# Patient Record
Sex: MALE | Race: WHITE | Employment: OTHER | URBAN - METROPOLITAN AREA
[De-identification: names, ages, dates, MRNs, and addresses within clinical notes are randomized per-mention and may not be internally consistent; named-entity substitution may affect disease eponyms.]

---

## 2016-01-20 LAB — EJECTION FRACTION: 86

## 2017-02-13 ENCOUNTER — TRANSFERRED RECORDS (OUTPATIENT)
Dept: HEALTH INFORMATION MANAGEMENT | Facility: CLINIC | Age: 67
End: 2017-02-13

## 2017-04-28 ENCOUNTER — TRANSFERRED RECORDS (OUTPATIENT)
Dept: HEALTH INFORMATION MANAGEMENT | Facility: CLINIC | Age: 67
End: 2017-04-28

## 2017-05-05 ENCOUNTER — TRANSFERRED RECORDS (OUTPATIENT)
Dept: HEALTH INFORMATION MANAGEMENT | Facility: CLINIC | Age: 67
End: 2017-05-05

## 2017-05-17 ENCOUNTER — TELEPHONE (OUTPATIENT)
Dept: FAMILY MEDICINE | Facility: OTHER | Age: 67
End: 2017-05-17

## 2017-05-17 NOTE — TELEPHONE ENCOUNTER
Glenroy Padilla is a 66 year old male who calls with circulatory concerns.    NURSING ASSESSMENT:  Description:  Waiting for a knee replacement for about 10 years in [place], which has been cancelled 3 times. Once had a sore on his leg. Has seen a vascular provider who stated he has restriction. Looking for a second opinion on circulation in legs, the restriction noted, edema and known neuropathy. Has edema in the legs. Type 2 diabetic and known neuropathy. Denies sudden onset, cough, chest pain, severe pain.   Onset/duration:  ongoing  Last exam/Treatment:  Seen in [place] within the past 3 weeks, last seen in 2000  Allergies:   Allergies   Allergen Reactions     No Known Allergies      NURSING PLAN: Nursing advice to patient to keep scheduled appt    RECOMMENDED DISPOSITION:  To keep scheduled appt  Will comply with recommendation: Yes  If further questions/concerns or if symptoms do not improve, worsen or new symptoms develop, call your PCP or [facility] as soon as possible.    NOTES:  Disposition was determined by the first positive assessment question, therefore all previous assessment questions were negative    Guideline used:  Telephone Triage Protocols for Nurses, Fourth Edition, Leydi Harkins  Leg pain/swelling  Nursing Judgment    Hermelinda Coronado, RN, BSN

## 2017-05-17 NOTE — PROGRESS NOTES
SUBJECTIVE:                                                    Glenroy Padilla is a 66 year old male who presents to clinic today for the following health issues:    Waiting for a knee replacement for about 10 years in [place], which has been cancelled 3 times. Once had a sore on his leg. Has seen a vascular provider who stated he has restriction. Looking for a second opinion on circulation in legs, the restriction noted, edema and known neuropathy. Has edema in the legs. Type 2 diabetic and known neuropathy. Denies sudden onset, cough, chest pain, severe pain.   Onset/duration: ongoing  Last exam/Treatment: Seen in [place] within the past 3 weeks, last seen in 2000  Patient also mentioned having a stent.    Concern - knee pain     Onset: for several years, both knee, Right knee more than left knee    Description:   Pain with movement and walking    Intensity: moderate    Progression of Symptoms:  waxing and waning    Accompanying Signs & Symptoms:  No fever or chills       Previous history of similar problem:   yes    Precipitating factors:   Worsened by: activity    Alleviating factors:  Improved by: rest       Therapies Tried and outcome: OTC medication    Concern - wound left leg     Onset: several weeks    Description:   Left leg ulcer, been seeing specialist and has improved and gotten smaller    Intensity: mild, moderate    Progression of Symptoms:  improving    Accompanying Signs & Symptoms:  Minimum discharge, no fever or chills       Previous history of similar problem:   yes    Precipitating factors:   Worsened by: if hit leg to something accidently    Alleviating factors:  Improved by: wound care       Therapies Tried and outcome: wound care    Diabetes Follow-up      Patient is checking blood sugars: no    Diabetic concerns: other - healing left leg ulcer     Symptoms of hypoglycemia (low blood sugar): none     Paresthesias (numbness or burning in feet) or sores: Yes      Date of last diabetic eye exam:  "unknown     Hyperlipidemia Follow-Up      Rate your low fat/cholesterol diet?: good    Taking statin?  Yes, no muscle aches from statin    Other lipid medications/supplements?:  none     Problem list and histories reviewed & adjusted, as indicated.  Additional history: as documented    Patient Active Problem List   Diagnosis     Type 2 diabetes mellitus with other circulatory complication, without long-term current use of insulin (H)     Venous stasis ulcer of left lower extremity (H)     Acute pain of left knee     Chronic pain of right knee     Open wound of left lower extremity without complication, initial encounter     Hx of coronary artery disease     Hx of heart artery stent     DAYTON (obstructive sleep apnea)     No past surgical history on file.    Social History   Substance Use Topics     Smoking status: Current Some Day Smoker     Types: Cigars     Smokeless tobacco: Never Used     Alcohol use No     No family history on file.      Current Outpatient Prescriptions   Medication Sig Dispense Refill     SitaGLIPtin-MetFORMIN HCl (JANUMET XR) 100-1000 MG TB24        rosuvastatin (CRESTOR) 10 MG tablet Take 1 tablet (10 mg) by mouth daily 90 tablet 1     ferrous sulfate (IRON) 325 (65 FE) MG tablet Take 1 tablet (325 mg) by mouth daily (with breakfast) 90 tablet 2     order for DME One touch glucose monitoring strip with Glucometer and lancets. 1 Box 1     Allergies   Allergen Reactions     No Known Allergies        Reviewed and updated as needed this visit by clinical staff       Reviewed and updated as needed this visit by Provider         ROS:  Constitutional, HEENT, cardiovascular, pulmonary, gi and gu systems are negative, except as otherwise noted.    OBJECTIVE:                                                    /62 (Cuff Size: Adult Regular)  Pulse 84  Temp 98.5  F (36.9  C) (Temporal)  Resp 20  Ht 5' 10\" (1.778 m)  Wt (!) 319 lb 12.8 oz (145.1 kg)  SpO2 94%  BMI 45.89 kg/m2  Body mass index " is 45.89 kg/(m^2).  GENERAL: healthy, alert and no distress  EYES: Eyes grossly normal to inspection, PERRL and conjunctivae and sclerae normal  HENT: ear canals and TM's normal, nose and mouth without ulcers or lesions  NECK: no adenopathy, no asymmetry,  RESP: lungs clear to auscultation - no rales, rhonchi or wheezes  CV: regular rate and rhythm, normal S1 S2,   ABDOMEN: soft, nontender, no hepatosplenomegaly, no masses and bowel sounds normal  MS: no gross musculoskeletal defects noted, no edema  NEURO: Normal strength and tone, mentation intact and speech normal  BACK: no CVA tenderness, no paralumbar tenderness  Diabetic foot exam: DP reduced bilaterally, reduced sensation B/l and venous stasis dermatitis noted  Edema present +1 with venous stasis.    Diagnostic Test Results:  Results for orders placed or performed in visit on 05/22/17   Hepatitis C Screen Reflex to HCV RNA Quant and Genotype   Result Value Ref Range    Hepatitis C Antibody  NR     Nonreactive   Assay performance characteristics have not been established for newborns,   infants, and children     Lipid panel reflex to direct LDL   Result Value Ref Range    Cholesterol 130 <200 mg/dL    Triglycerides 118 <150 mg/dL    HDL Cholesterol 42 >39 mg/dL    LDL Cholesterol Calculated 64 <100 mg/dL    Non HDL Cholesterol 88 <130 mg/dL   Comprehensive metabolic panel (BMP + Alb, Alk Phos, ALT, AST, Total. Bili, TP)   Result Value Ref Range    Sodium 137 133 - 144 mmol/L    Potassium 4.0 3.4 - 5.3 mmol/L    Chloride 101 94 - 109 mmol/L    Carbon Dioxide 29 20 - 32 mmol/L    Anion Gap 7 3 - 14 mmol/L    Glucose 158 (H) 70 - 99 mg/dL    Urea Nitrogen 19 7 - 30 mg/dL    Creatinine 0.91 0.66 - 1.25 mg/dL    GFR Estimate 82 >60 mL/min/1.7m2    GFR Estimate If Black >90   GFR Calc   >60 mL/min/1.7m2    Calcium 9.2 8.5 - 10.1 mg/dL    Bilirubin Total 0.5 0.2 - 1.3 mg/dL    Albumin 3.4 3.4 - 5.0 g/dL    Protein Total 8.4 6.8 - 8.8 g/dL    Alkaline  Phosphatase 58 40 - 150 U/L    ALT 20 0 - 70 U/L    AST 17 0 - 45 U/L   Erythrocyte sedimentation rate auto   Result Value Ref Range    Sed Rate 49 (H) 0 - 20 mm/h   CRP inflammation   Result Value Ref Range    CRP Inflammation 21.5 (H) 0.0 - 8.0 mg/L   CBC with platelets and differential   Result Value Ref Range    WBC 8.7 4.0 - 11.0 10e9/L    RBC Count 4.31 (L) 4.4 - 5.9 10e12/L    Hemoglobin 11.9 (L) 13.3 - 17.7 g/dL    Hematocrit 38.0 (L) 40.0 - 53.0 %    MCV 88 78 - 100 fl    MCH 27.6 26.5 - 33.0 pg    MCHC 31.3 (L) 31.5 - 36.5 g/dL    RDW 16.5 (H) 10.0 - 15.0 %    Platelet Count 279 150 - 450 10e9/L    Diff Method Automated Method     % Neutrophils 77.1 %    % Lymphocytes 11.3 %    % Monocytes 8.4 %    % Eosinophils 2.6 %    % Basophils 0.6 %    Absolute Neutrophil 6.7 1.6 - 8.3 10e9/L    Absolute Lymphocytes 1.0 0.8 - 5.3 10e9/L    Absolute Monocytes 0.7 0.0 - 1.3 10e9/L    Absolute Eosinophils 0.2 0.0 - 0.7 10e9/L    Absolute Basophils 0.1 0.0 - 0.2 10e9/L   Hemoglobin A1c   Result Value Ref Range    Hemoglobin A1C 6.4 (H) 4.3 - 6.0 %        ASSESSMENT/PLAN:                                                      1. Need for hepatitis C screening test  - Hepatitis C Screen Reflex to HCV RNA Quant and Genotype    2. Open wound of left lower extremity without complication, initial encounter  - GENERAL SURG ADULT REFERRAL; Future  - Erythrocyte sedimentation rate auto  - CRP inflammation  - CBC with platelets and differential  The patient and his spouse understood the rational for the diagnosis and treatment plan. All questions were answered to best of my ability and the patient's satisfaction.  Risks, benefits and alternatives of treatments discussed. Plan agreed on.  Followup: if not better. Will call, return to clinic, or go to ED if worsening or symptoms not improving as discussed. See patient instructions.     4. Venous stasis ulcer of left lower extremity (H)    - GENERAL SURG ADULT REFERRAL; Future  -  Erythrocyte sedimentation rate auto  - CRP inflammation  - CBC with platelets and differential  The patient and his spouse understood the rational for the diagnosis and treatment plan. All questions were answered to best of my ability and the patient's satisfaction.  Risks, benefits and alternatives of treatments discussed. Plan agreed on.  Followup: if not better. Will call, return to clinic, or go to ED if worsening or symptoms not improving as discussed. See patient instructions.     5. Type 2 diabetes mellitus with other circulatory complication, without long-term current use of insulin (H)  Stable, no change in medication, follow up in 3 months. Continue healthy life style and balance diet.  - Lipid panel reflex to direct LDL  - Comprehensive metabolic panel (BMP + Alb, Alk Phos, ALT, AST, Total. Bili, TP)  - Erythrocyte sedimentation rate auto  - CRP inflammation  - Hemoglobin A1c  - rosuvastatin (CRESTOR) 10 MG tablet; Take 1 tablet (10 mg) by mouth daily  Dispense: 90 tablet; Refill: 1  - order for DME; One touch glucose monitoring strip with Glucometer and lancets.  Dispense: 1 Box; Refill: 1    The patient and his spouse understood the rational for the diagnosis and treatment plan. All questions were answered to best of my ability and the patient's satisfaction.  Risks, benefits and alternatives of treatments discussed. Plan agreed on.  Followup: if not better.     6. Chronic pain of right knee  The patient and his spouse understood the rational for the diagnosis and treatment plan. All questions were answered to best of my ability and the patient's satisfaction.  Risks, benefits and alternatives of treatments discussed. Plan agreed on.  Followup: if not better. Will call, return to clinic, if worsening or symptoms not improving as discussed. See patient instructions.     7. Hx of heart artery stent  8. Hx of coronary artery disease  9. History of anemia  Stable, continue current medication.  - ferrous  sulfate (IRON) 325 (65 FE) MG tablet; Take 1 tablet (325 mg) by mouth daily (with breakfast)  Dispense: 90 tablet; Refill: 2    10. DAYTON (obstructive sleep apnea)  11. Morbid obesity due to excess calories (H)  Encourage healthy life style and balance diet and regular exercise.    Orders Placed This Encounter   Procedures     Hepatitis C Screen Reflex to HCV RNA Quant and Genotype     Lipid panel reflex to direct LDL     Comprehensive metabolic panel (BMP + Alb, Alk Phos, ALT, AST, Total. Bili, TP)     Erythrocyte sedimentation rate auto     CRP inflammation     CBC with platelets and differential     Hemoglobin A1c     GENERAL SURG ADULT REFERRAL     followup Ortho right knee, medical records pending, need to review them once available.  The patient and his spouse understood the rational for the diagnosis and treatment plan. All questions were answered to best of my ability and the patient's satisfaction.  I have reviewed all pertinent investigations including labs and imaging including outside records if relevent.  Hydrate well, tylenol as needed.  Risks, benefits and alternatives of treatments discussed. Plan agreed on.  Followup: if not better.  Will call, return to clinic, or go to ED if worsening or symptoms not improving as discussed.  See patient instructions.           Patient Instructions       Chronic Venous Insufficiency: Treating Ulcers    If leg swelling due to chronic venous insufficiency isn t controlled, an ulcer (open wound) can form. Although ulcers vary in size and shape, they usually appear on the inside of the ankle.  Treating an Ulcer    Visit your doctor. Ulcers need frequent medical care. Special dressings may be applied. You may be given antibiotics to fight infection.    Your doctor may prescribe medications, such as aspirin or pentoxifylline, to help the ulcer heal.    Elevate your legs often to reduce swelling. The ulcer needs oxygen-rich blood to heal. This blood can t reach the ulcer  until swelling is reduced.  When to Call Your Doctor  Contact your doctor right away if:    You have an increase in pain.    You develop a fever of 101 F (38.3 C) or higher.    The ulcer oozes discolored fluid or smells bad.    Swelling increases suddenly or the dressing feels tight.     8215-2582 [employer]. [address] [place], [place] [id number]. All rights reserved. This information is not intended as a substitute for professional medical care. Always follow your healthcare professional's instructions.        Understanding Chronic Venous Insufficiency  Problems with the veins in the legs may lead to chronic venous insufficiency (CVI). CVI means that there is a long-term problem with the veins not working well. Because of it, blood stays in the legs and causes swelling.   Two problems that may lead to chronic venous insufficiency are:    Damaged valves. Valves keep blood flowing from the legs through the blood vessels and back to the heart. When the valves are damaged, blood does not flow as well.     Deep vein thrombosis (DVT). Blood clots may form in the deep veins of the legs. This may cause pain, redness, and swelling in the legs. It may also block the flow of blood back to the heart. Call your health care provider if you have these symptoms.    A blood clot in the leg can also break off and travel to the lungs. This is called pulmonary embolism (PE). In the lungs, the clot can cut off the flow of blood. This may cause chest pain, trouble breathing, sweating, a fast heartbeat, coughing (may cough up blood), and fainting. It is a medical emergency and may cause death. Call 911 if you have these symptoms.    Health care providers call the 2 conditions, DVT and PE, venous thromboembolism (VTE).  CVI can t be cured, but you can control leg swelling to reduce the likelihood of ulcers (sores).  Recognizing the symptoms    If you stand or sit with your feet down for long periods, your legs may ache  or feel heavy.    Swollen ankles are possibly the most common symptom of CVI.    As swelling increases, the skin over your ankles may show red spots or a brownish tinge. The skin may feel leathery or scaly, and may start to itch.    If swelling is not controlled, an ulcer (open wound) may form.  What you can do  Reduce your risk of developing ulcers by doing the following:    Increase blood flow back to your heart by elevating your legs, exercising daily, and wearing elastic stockings.    Boost blood flow in your legs by losing excess weight.    If you must stand or sit in 1 place for a period of time, keep your blood moving by wiggling your toes, shifting your body position, and rising up on the balls of your feet.      2976-6238 [employer]. [address] [place] [address], [place] [address]. All rights reserved. This information is not intended as a substitute for professional medical care. Always follow your healthcare professional's instructions.        Venous Leg Ulcer  Arteries carry oxygenated blood from your heart to the rest of your body. Veins return the blood to the heart.  When you sit or stand, blood in the veins in your legs must flow  uphill  to your heart. When you move your legs while walking, the contraction of your leg muscles has a pumping effect on the blood in the veins. The veins have one-way valves that stop the blood from flowing backward.  If you have poor blood flow in your veins (venous insufficiency), the one-way valves are damaged. The blood doesn t flow uphill very well. This raises pressure in the veins, and the tissues in your legs don t get enough oxygen. As the problem gets worse, an area of skin near the ankle turns dark and an ulcer forms. This is called a venous stasis ulcer. These ulcers usually don t hurt unless they become infected.  Venous stasis ulcers are treated with compression wraps, antibiotics that you put on your skin, and special dressings. Sometimes you may  need a skin graft. Once the ulcer has healed, you will need to use compression stocking to help the pumping action in your veins. The stockings will also reduce swelling.  Home care  Follow these tips when caring for yourself at home:    Use any special compression dressings or stockings as directed.    If you were told to change your dressing, follow the instructions your health care provider gave you. Many different kinds of dressings can be used for this problem, and each is used differently.    Walk regularly. This helps the blood flow better in your legs.    Maintain a healthy weight. If you are overweight, talk with your provider about a weight loss program.    If you smoke, quit smoking. This will ease your symptoms and lower the chance that the disease will get worse. Join a stop-smoking program or ask your provider for help in quitting.    Check your feet and legs for skin breaks or color changes. Report these to your provider. This could be an early sign of an ulcer.    Wear comfortable, well-fitting shoes and socks. Don t use socks that are tight. If they leave a dent in your leg by the end of the day, they are too tight.    When standing, shift your weight from one leg to the other.    When sitting for long periods, put your feet up. Move your feet and ankles often to get your calf muscles moving. Get up and walk from time to time.  Follow-up care  Follow up with your health care provider.  When to seek medical advice  Call your health care provider right away if any of these occur:    Pus or discharge coming from the ulcer    Pain in or around the ulcer    Redness or swelling of your leg around the ulcer    Fever of 100.4 F (38 C) or higher, or as directed by your health care provider    Shortness of breath or painful breathing    Chest pain, repeated cough, or coughing up blood       0413-6842 [employer]. [address] [place] [address], [place] [address]. All rights reserved. This information is  not intended as a substitute for professional medical care. Always follow your healthcare professional's instructions.        Weight Management: Overcoming Your Barriers  You may have many reasons why you re not ready to lose weight. You may not feel you have the time or the skills. You may be afraid of losing weight and gaining it back again. Well, you can lose weight. And you can keep the weight off, if you make changes slowly and stick with them. Remember that you may never find the perfect time to lose weight. Decide that the right time to be healthier is now.    Common barriers  Barrier 1: I don t want to deny myself.  Barrier Buster: You don t have to! Moderation is the key.    Watch portion sizes and know when you're eating more than one serving.    Plan to ask for a doggy bag when you eat out.    Have just one.    Choose lower-fat and lower-calorie versions of your favorites.  Barrier 2: I lost weight before but I gained it right back.  Barrier Buster: Make this time different.    List what worked and didn t work last time and what you can try this time.    Choose changes that you are willing to stick with.    Work exercise into your weight-loss plan.    Be realistic about what is possible.   Barrier 3: I don t have the time to be active.  Barrier Buster: It takes just a few minutes a day!    Be active with a pet or the kids.    Block off activity time in your schedule.    Borrow some time that you usually spend watching TV.    You are too important not to take time to exercise -- it is your life!   Feel good about yourself  Do you eat more because you feel bad about yourself, then feel even worse as you gain weight? This is a  vicious cycle.  Breaking this cycle is not easy. You may need group support or counseling. Always remember that you are a valuable person, no matter what size or shape you are.  Do you have a health problem? If so, don t use it as an excuse for not losing weight. Ask your doctor,  dietitian, or other health care provider about methods to lose weight that are safe for you. For example, even if you have severe arthritis, you can walk in a pool. Get advice from a .      7046-1636 [employer]. [address] [place] [address], [place] [address]. All rights reserved. This information is not intended as a substitute for professional medical care. Always follow your healthcare professional's instructions.        Weight Management: Take It Off and Keep It Off  It s easy to be motivated when you first start. The key is to stay motivated all along the way and to have realistic and achievable goals. There are things you can do to keep yourself on the path to success.    Don t focus on daily weight gains and losses. Instead, weigh yourself no more than once a week at the same time of day.   Stay motivated    Remind yourself of your goals. Post them near the refrigerator or desk.    Make daily entries in your diary or journal about your activity and eating. A visual reminder of success, like a gold star, can help keep you going.    Every week, take time to look back on how much you ve accomplished.    Try taking a nutrition class. It can help you learn new skills and meet new people. You might try a low-fat cooking or yoga class.    Don t be hard on yourself or give up if you slip. Be patient. Learn from your mistakes and adjust your plan if you need to. Then get right back to it.    Be realistic about your goals. Make sure you can achieve them.   Believe that you can do it  How you think about yourself is just as important as what you do. If you don t think you can succeed, chances are you won t. Believe that you can stick to your plan and meet your goals.    If you don t meet a goal, don t use it as an excuse to give up on your whole plan. Adjust your goal and try again.    Learn how to accept compliments. Even if you get embarrassed, just say  thank you.     Make a list of the  things that others like about you and that you like about yourself. Add something new from time to time. Keep this list to look at when you need a lift.  Resources    The President s [place] on Fitness, Sports & Nutritionwww.fitness.gov    Academy of Nutrition and Dieteticswww.eatright.org    Healthfinderwww.healthfinder.gov    5648-1398 [employer]. [address] [place] [address], [place] [address]. All rights reserved. This information is not intended as a substitute for professional medical care. Always follow your healthcare professional's instructions.        Weight Management: Getting Started  Healthy bodies come in all shapes and sizes. Not all bodies are made to be thin. For some people, a healthy weight is higher or lower than the average weight listed on weight charts. Your doctor can help you decide on a healthy weight for you.    Reasons to lose weight  Losing weight can help with some health problems, such as high blood pressure, heart disease, diabetes, and sleep apnea. You may also feel more energy.  Set your long-term goal  Your goal doesn't even have to be a specific weight. You may decide on a fitness goal (such as being able to walk 10 miles a week), or a health goal (such as lowering your blood pressure). Choose a goal that is measurable and reasonable, so you know when you've reached it. A goal of reaching a BMI of less than 25 is not always reasonable (or possible).   Make an action plan  Habits don t change overnight. Setting your goals too high can leave you feeling discouraged if you can t reach them. Be realistic. Choose 1 or 2 small changes you can make now. Set an action plan for how you are going to make these changes. When you can stick to this plan, keep making a few more small changes. Taking small steps will help you stay on the path to success.  Track your progress  Write down your goals. Then, keep a daily record of your progress. Write down what you eat and how active you  are. This record lets you look back on how much you ve done. It may also help when you re feeling frustrated. Reward yourself for success. Even if you don t reach every goal, give yourself credit for what you do get done.  Get support  Encouragement from others can help make losing weight easier. Ask your family members and friends for support. They may even want to join you. Also look to your doctor, registered dietitian, and  for help. Your local hospital can give you more information about nutrition, exercise, and weight loss.    7183-7374 [employer]. [address] [place] [address], [place] [address]. All rights reserved. This information is not intended as a substitute for professional medical care. Always follow your healthcare professional's instructions.        Losing Weight (Cardiovascular)  Excess weight is a major risk factor for heart disease. Losing weight may help keep your arteries open so that your heart can get the oxygen-rich blood it needs. Weight loss can also help lower your blood pressure and reduce your risk for diabetes. All in all, losing weight makes you healthier.     Exercise with a friend. When activity is fun, you're more likely to stick with it.   Calories and weight loss    Calories are the fuel your body burns for energy. You get the calories you need from the food you eat. For healthy weight loss, women should eat at least 1,200 calories a day, men at least 1,500.    When you eat more calories than you need, your body stores the extra calories as fat. One pound of fat equals 3,500 calories.    To lose weight, try to burn 500 calories a day more than you eat. To do this, eat 250 calories less each day. Add activity to burn the other 250 calories. Walking 2.5 miles burns about 250 calories.    Eat a variety of healthy foods to get the nutrients you need.  Tips for losing weight    Drink 8 to 10 glasses of water a day.    Don t skip meals. Instead, eat smaller  portions.   Brisk activity is best  Brisk activity gets your heart pumping faster and it makes it healthier. It s also a great way to burn calories. In fact, your body may keep burning calories for hours after you stop a brisk activity:    Begin by walking 10 minutes most days.    Add more time and speed to your walk. Build up as you feel able.    Aim for 3 to 4 sessions of aerobic exercise a week. Each session should last about 40 minutes and include moderate to vigorous physical activity.     1203-4009 [employer]. [address] [place] [address]. All rights reserved. This information is not intended as a substitute for professional medical care. Always follow your healthcare professional's instructions.        Wound Care  Taking proper care of your wound will help it heal. Your healthcare provider may show you how to clean and dress the wound. He or she will also explain how to tell if the wound is healing normally. If you are unsure of how to take care of the wound, be sure to clarify what dressing to use and how often you should change the bandages. Here are the basic steps.     A wound that's not healing normally may be dark in color or have white streaks.   Wash your hands  Tips for washing your hands include:    Use liquid soap and lather for 2 minutes. Scrub between your fingers and under your nails.    Rinse with warm water, keeping your fingers pointing down.    Use a paper towel to dry your hands and to turn off the faucet.  Remove the used dressing  Here are suggestions for removing the dressing:    If dressing changes cause you pain, be sure to take your pain medicine as prescribed by your healthcare provider 30 minutes before dressing changes.    Set up your supplies.    Put on disposable gloves if you re dressing a wound for someone else or your wound is infected.    Loosen the tape by pulling gently toward the wound.    Gently take off the old dressing. If the dressing is  stuck to the wound, moisten it with saline (if available) or clean water.    If you have a drain or tube in the wound, be careful not to pull on it.    Remove the dressing 1 layer at a time and put it in a plastic bag.    Remove your gloves.  Inspect and dress the wound  Check the wound carefully:    Each time you change the dressing, inspect the wound carefully to be sure it s healing normally by making sure your wound appears to be pink and moist, and is free of infection.      Wash your hands again. Put on a new pair of gloves.    Clean and dress the wound as directed by your healthcare provider or nurse. Do not put anything in the wound that is not prescribed or directed by your healthcare provider. If you have a drain or tube, be careful not to pull on it. Make sure to secure the drain or tube as well.    Put all supplies in a plastic bag. Seal the bag and put it in the trash.    Be sure to wash your hands again.  Call your healthcare provider  Call your healthcare provider if you see any of the following signs of a problem:    Bleeding that soaks the dressing    Pink fluid weeping from the wound    Increased drainage or drainage that is yellow, yellow-green, or foul-smelling    Increased swelling or pain, or redness or swelling in the skin around the wound    A change in the color of the wound, or if streaks develop in a direction away from the wound    The area between any stitches opens up    An increase in the size of the wound    A fever over 101 F (38.3 C), chills, increased fatigue, or a loss of appetite.        3958-8655 The [employer]. [address] [place] [address], [place] [address]. All rights reserved. This information is not intended as a substitute for professional medical care. Always follow your healthcare professional's instructions.            Hugo Francisco MD  [facility]          Time: I spent 40 minutes of face- face time with patient greater than one half devoted  to coordination of care for diagnosis and plan above.

## 2017-05-22 ENCOUNTER — OFFICE VISIT (OUTPATIENT)
Dept: FAMILY MEDICINE | Facility: OTHER | Age: 67
End: 2017-05-22
Payer: MEDICAID

## 2017-05-22 VITALS
SYSTOLIC BLOOD PRESSURE: 118 MMHG | HEIGHT: 70 IN | BODY MASS INDEX: 45.1 KG/M2 | DIASTOLIC BLOOD PRESSURE: 62 MMHG | WEIGHT: 315 LBS | OXYGEN SATURATION: 94 % | TEMPERATURE: 98.5 F | HEART RATE: 84 BPM | RESPIRATION RATE: 20 BRPM

## 2017-05-22 DIAGNOSIS — Z95.5 HX OF HEART ARTERY STENT: ICD-10-CM

## 2017-05-22 DIAGNOSIS — M25.561 CHRONIC PAIN OF RIGHT KNEE: ICD-10-CM

## 2017-05-22 DIAGNOSIS — S81.802A OPEN WOUND OF LEFT LOWER EXTREMITY WITHOUT COMPLICATION, INITIAL ENCOUNTER: Primary | ICD-10-CM

## 2017-05-22 DIAGNOSIS — I83.029 VENOUS STASIS ULCER OF LEFT LOWER EXTREMITY (H): ICD-10-CM

## 2017-05-22 DIAGNOSIS — E11.59 TYPE 2 DIABETES MELLITUS WITH OTHER CIRCULATORY COMPLICATION, WITHOUT LONG-TERM CURRENT USE OF INSULIN (H): ICD-10-CM

## 2017-05-22 DIAGNOSIS — Z11.59 NEED FOR HEPATITIS C SCREENING TEST: ICD-10-CM

## 2017-05-22 DIAGNOSIS — G89.29 CHRONIC PAIN OF RIGHT KNEE: ICD-10-CM

## 2017-05-22 DIAGNOSIS — Z86.2 HISTORY OF ANEMIA: ICD-10-CM

## 2017-05-22 DIAGNOSIS — G47.33 OSA (OBSTRUCTIVE SLEEP APNEA): ICD-10-CM

## 2017-05-22 DIAGNOSIS — L97.929 VENOUS STASIS ULCER OF LEFT LOWER EXTREMITY (H): ICD-10-CM

## 2017-05-22 DIAGNOSIS — M25.562 ACUTE PAIN OF LEFT KNEE: ICD-10-CM

## 2017-05-22 DIAGNOSIS — E66.01 MORBID OBESITY DUE TO EXCESS CALORIES (H): ICD-10-CM

## 2017-05-22 DIAGNOSIS — Z86.79 HX OF CORONARY ARTERY DISEASE: ICD-10-CM

## 2017-05-22 LAB
ALBUMIN SERPL-MCNC: 3.4 G/DL (ref 3.4–5)
ALP SERPL-CCNC: 58 U/L (ref 40–150)
ALT SERPL W P-5'-P-CCNC: 20 U/L (ref 0–70)
ANION GAP SERPL CALCULATED.3IONS-SCNC: 7 MMOL/L (ref 3–14)
AST SERPL W P-5'-P-CCNC: 17 U/L (ref 0–45)
BASOPHILS # BLD AUTO: 0.1 10E9/L (ref 0–0.2)
BASOPHILS NFR BLD AUTO: 0.6 %
BILIRUB SERPL-MCNC: 0.5 MG/DL (ref 0.2–1.3)
BUN SERPL-MCNC: 19 MG/DL (ref 7–30)
CALCIUM SERPL-MCNC: 9.2 MG/DL (ref 8.5–10.1)
CHLORIDE SERPL-SCNC: 101 MMOL/L (ref 94–109)
CHOLEST SERPL-MCNC: 130 MG/DL
CO2 SERPL-SCNC: 29 MMOL/L (ref 20–32)
CREAT SERPL-MCNC: 0.91 MG/DL (ref 0.66–1.25)
CRP SERPL-MCNC: 21.5 MG/L (ref 0–8)
DIFFERENTIAL METHOD BLD: ABNORMAL
EOSINOPHIL # BLD AUTO: 0.2 10E9/L (ref 0–0.7)
EOSINOPHIL NFR BLD AUTO: 2.6 %
ERYTHROCYTE [DISTWIDTH] IN BLOOD BY AUTOMATED COUNT: 16.5 % (ref 10–15)
ERYTHROCYTE [SEDIMENTATION RATE] IN BLOOD BY WESTERGREN METHOD: 49 MM/H (ref 0–20)
GFR SERPL CREATININE-BSD FRML MDRD: 82 ML/MIN/1.7M2
GLUCOSE SERPL-MCNC: 158 MG/DL (ref 70–99)
HBA1C MFR BLD: 6.4 % (ref 4.3–6)
HCT VFR BLD AUTO: 38 % (ref 40–53)
HDLC SERPL-MCNC: 42 MG/DL
HGB BLD-MCNC: 11.9 G/DL (ref 13.3–17.7)
LDLC SERPL CALC-MCNC: 64 MG/DL
LYMPHOCYTES # BLD AUTO: 1 10E9/L (ref 0.8–5.3)
LYMPHOCYTES NFR BLD AUTO: 11.3 %
MCH RBC QN AUTO: 27.6 PG (ref 26.5–33)
MCHC RBC AUTO-ENTMCNC: 31.3 G/DL (ref 31.5–36.5)
MCV RBC AUTO: 88 FL (ref 78–100)
MONOCYTES # BLD AUTO: 0.7 10E9/L (ref 0–1.3)
MONOCYTES NFR BLD AUTO: 8.4 %
NEUTROPHILS # BLD AUTO: 6.7 10E9/L (ref 1.6–8.3)
NEUTROPHILS NFR BLD AUTO: 77.1 %
NONHDLC SERPL-MCNC: 88 MG/DL
PLATELET # BLD AUTO: 279 10E9/L (ref 150–450)
POTASSIUM SERPL-SCNC: 4 MMOL/L (ref 3.4–5.3)
PROT SERPL-MCNC: 8.4 G/DL (ref 6.8–8.8)
RBC # BLD AUTO: 4.31 10E12/L (ref 4.4–5.9)
SODIUM SERPL-SCNC: 137 MMOL/L (ref 133–144)
TRIGL SERPL-MCNC: 118 MG/DL
WBC # BLD AUTO: 8.7 10E9/L (ref 4–11)

## 2017-05-22 PROCEDURE — 86140 C-REACTIVE PROTEIN: CPT | Performed by: FAMILY MEDICINE

## 2017-05-22 PROCEDURE — 80061 LIPID PANEL: CPT | Performed by: FAMILY MEDICINE

## 2017-05-22 PROCEDURE — 85025 COMPLETE CBC W/AUTO DIFF WBC: CPT | Performed by: FAMILY MEDICINE

## 2017-05-22 PROCEDURE — 99203 OFFICE O/P NEW LOW 30 MIN: CPT | Performed by: FAMILY MEDICINE

## 2017-05-22 PROCEDURE — 83036 HEMOGLOBIN GLYCOSYLATED A1C: CPT | Performed by: FAMILY MEDICINE

## 2017-05-22 PROCEDURE — 85652 RBC SED RATE AUTOMATED: CPT | Performed by: FAMILY MEDICINE

## 2017-05-22 PROCEDURE — 36415 COLL VENOUS BLD VENIPUNCTURE: CPT | Performed by: FAMILY MEDICINE

## 2017-05-22 PROCEDURE — 80053 COMPREHEN METABOLIC PANEL: CPT | Performed by: FAMILY MEDICINE

## 2017-05-22 PROCEDURE — 86803 HEPATITIS C AB TEST: CPT | Performed by: FAMILY MEDICINE

## 2017-05-22 RX ORDER — FERROUS SULFATE 325(65) MG
325 TABLET ORAL
Qty: 90 TABLET | Refills: 2 | Status: ON HOLD | OUTPATIENT
Start: 2017-05-22 | End: 2018-03-13

## 2017-05-22 RX ORDER — ROSUVASTATIN CALCIUM 10 MG/1
10 TABLET, COATED ORAL DAILY
Qty: 90 TABLET | Refills: 1 | Status: SHIPPED | OUTPATIENT
Start: 2017-05-22 | End: 2017-12-14

## 2017-05-22 ASSESSMENT — PAIN SCALES - GENERAL: PAINLEVEL: MILD PAIN (3)

## 2017-05-22 NOTE — PATIENT INSTRUCTIONS
Chronic Venous Insufficiency: Treating Ulcers    If leg swelling due to chronic venous insufficiency isn t controlled, an ulcer (open wound) can form. Although ulcers vary in size and shape, they usually appear on the inside of the ankle.  Treating an Ulcer    Visit your doctor. Ulcers need frequent medical care. Special dressings may be applied. You may be given antibiotics to fight infection.    Your doctor may prescribe medications, such as aspirin or pentoxifylline, to help the ulcer heal.    Elevate your legs often to reduce swelling. The ulcer needs oxygen-rich blood to heal. This blood can t reach the ulcer until swelling is reduced.  When to Call Your Doctor  Contact your doctor right away if:    You have an increase in pain.    You develop a fever of 101 F (38.3 C) or higher.    The ulcer oozes discolored fluid or smells bad.    Swelling increases suddenly or the dressing feels tight.     4903-7378 [employer]. [address] [place] [address]. All rights reserved. This information is not intended as a substitute for professional medical care. Always follow your healthcare professional's instructions.        Understanding Chronic Venous Insufficiency  Problems with the veins in the legs may lead to chronic venous insufficiency (CVI). CVI means that there is a long-term problem with the veins not working well. Because of it, blood stays in the legs and causes swelling.   Two problems that may lead to chronic venous insufficiency are:    Damaged valves. Valves keep blood flowing from the legs through the blood vessels and back to the heart. When the valves are damaged, blood does not flow as well.     Deep vein thrombosis (DVT). Blood clots may form in the deep veins of the legs. This may cause pain, redness, and swelling in the legs. It may also block the flow of blood back to the heart. Call your health care provider if you have these symptoms.    A blood clot in the leg can also  break off and travel to the lungs. This is called pulmonary embolism (PE). In the lungs, the clot can cut off the flow of blood. This may cause chest pain, trouble breathing, sweating, a fast heartbeat, coughing (may cough up blood), and fainting. It is a medical emergency and may cause death. Call 911 if you have these symptoms.    Health care providers call the 2 conditions, DVT and PE, venous thromboembolism (VTE).  CVI can t be cured, but you can control leg swelling to reduce the likelihood of ulcers (sores).  Recognizing the symptoms    If you stand or sit with your feet down for long periods, your legs may ache or feel heavy.    Swollen ankles are possibly the most common symptom of CVI.    As swelling increases, the skin over your ankles may show red spots or a brownish tinge. The skin may feel leathery or scaly, and may start to itch.    If swelling is not controlled, an ulcer (open wound) may form.  What you can do  Reduce your risk of developing ulcers by doing the following:    Increase blood flow back to your heart by elevating your legs, exercising daily, and wearing elastic stockings.    Boost blood flow in your legs by losing excess weight.    If you must stand or sit in 1 place for a period of time, keep your blood moving by wiggling your toes, shifting your body position, and rising up on the balls of your feet.      8583-7984 The [employer]. [address] [place], [place] [id number]. All rights reserved. This information is not intended as a substitute for professional medical care. Always follow your healthcare professional's instructions.        Venous Leg Ulcer  Arteries carry oxygenated blood from your heart to the rest of your body. Veins return the blood to the heart.  When you sit or stand, blood in the veins in your legs must flow  uphill  to your heart. When you move your legs while walking, the contraction of your leg muscles has a pumping effect on the blood in the veins. The  veins have one-way valves that stop the blood from flowing backward.  If you have poor blood flow in your veins (venous insufficiency), the one-way valves are damaged. The blood doesn t flow uphill very well. This raises pressure in the veins, and the tissues in your legs don t get enough oxygen. As the problem gets worse, an area of skin near the ankle turns dark and an ulcer forms. This is called a venous stasis ulcer. These ulcers usually don t hurt unless they become infected.  Venous stasis ulcers are treated with compression wraps, antibiotics that you put on your skin, and special dressings. Sometimes you may need a skin graft. Once the ulcer has healed, you will need to use compression stocking to help the pumping action in your veins. The stockings will also reduce swelling.  Home care  Follow these tips when caring for yourself at home:    Use any special compression dressings or stockings as directed.    If you were told to change your dressing, follow the instructions your health care provider gave you. Many different kinds of dressings can be used for this problem, and each is used differently.    Walk regularly. This helps the blood flow better in your legs.    Maintain a healthy weight. If you are overweight, talk with your provider about a weight loss program.    If you smoke, quit smoking. This will ease your symptoms and lower the chance that the disease will get worse. Join a stop-smoking program or ask your provider for help in quitting.    Check your feet and legs for skin breaks or color changes. Report these to your provider. This could be an early sign of an ulcer.    Wear comfortable, well-fitting shoes and socks. Don t use socks that are tight. If they leave a dent in your leg by the end of the day, they are too tight.    When standing, shift your weight from one leg to the other.    When sitting for long periods, put your feet up. Move your feet and ankles often to get your calf muscles  moving. Get up and walk from time to time.  Follow-up care  Follow up with your health care provider.  When to seek medical advice  Call your health care provider right away if any of these occur:    Pus or discharge coming from the ulcer    Pain in or around the ulcer    Redness or swelling of your leg around the ulcer    Fever of 100.4 F (38 C) or higher, or as directed by your health care provider    Shortness of breath or painful breathing    Chest pain, repeated cough, or coughing up blood       [id number] The [employer]. [address] [place] [address]. All rights reserved. This information is not intended as a substitute for professional medical care. Always follow your healthcare professional's instructions.        Weight Management: Overcoming Your Barriers  You may have many reasons why you re not ready to lose weight. You may not feel you have the time or the skills. You may be afraid of losing weight and gaining it back again. Well, you can lose weight. And you can keep the weight off, if you make changes slowly and stick with them. Remember that you may never find the perfect time to lose weight. Decide that the right time to be healthier is now.    Common barriers  Barrier 1: I don t want to deny myself.  Barrier Buster: You don t have to! Moderation is the key.    Watch portion sizes and know when you're eating more than one serving.    Plan to ask for a doggy bag when you eat out.    Have just one.    Choose lower-fat and lower-calorie versions of your favorites.  Barrier 2: I lost weight before but I gained it right back.  Barrier Buster: Make this time different.    List what worked and didn t work last time and what you can try this time.    Choose changes that you are willing to stick with.    Work exercise into your weight-loss plan.    Be realistic about what is possible.   Barrier 3: I don t have the time to be active.  Barrier Buster: It takes just a few minutes a day!     Be active with a pet or the kids.    Block off activity time in your schedule.    Borrow some time that you usually spend watching TV.    You are too important not to take time to exercise   it is your life!   Feel good about yourself  Do you eat more because you feel bad about yourself, then feel even worse as you gain weight? This is a  vicious cycle.  Breaking this cycle is not easy. You may need group support or counseling. Always remember that you are a valuable person, no matter what size or shape you are.  Do you have a health problem? If so, don t use it as an excuse for not losing weight. Ask your doctor, dietitian, or other health care provider about methods to lose weight that are safe for you. For example, even if you have severe arthritis, you can walk in a pool. Get advice from a .      3198-3090 The [employer]. [address] [place] [address], [place] [address]. All rights reserved. This information is not intended as a substitute for professional medical care. Always follow your healthcare professional's instructions.        Weight Management: Take It Off and Keep It Off  It s easy to be motivated when you first start. The key is to stay motivated all along the way and to have realistic and achievable goals. There are things you can do to keep yourself on the path to success.    Don t focus on daily weight gains and losses. Instead, weigh yourself no more than once a week at the same time of day.   Stay motivated    Remind yourself of your goals. Post them near the refrigerator or desk.    Make daily entries in your diary or journal about your activity and eating. A visual reminder of success, like a gold star, can help keep you going.    Every week, take time to look back on how much you ve accomplished.    Try taking a nutrition class. It can help you learn new skills and meet new people. You might try a low-fat cooking or yoga class.    Don t be hard on yourself or give up if  you slip. Be patient. Learn from your mistakes and adjust your plan if you need to. Then get right back to it.    Be realistic about your goals. Make sure you can achieve them.   Believe that you can do it  How you think about yourself is just as important as what you do. If you don t think you can succeed, chances are you won t. Believe that you can stick to your plan and meet your goals.    If you don t meet a goal, don t use it as an excuse to give up on your whole plan. Adjust your goal and try again.    Learn how to accept compliments. Even if you get embarrassed, just say  thank you.     Make a list of the things that others like about you and that you like about yourself. Add something new from time to time. Keep this list to look at when you need a lift.  Resources    The [employer] [place] on Fitness, Sports & Nutritionwww.fitness.gov    Academy of Nutrition and Dieteticswww.eatright.org    Healthfinderwww.healthfinder.gov    8133-0751 [employer]. [address] [place] [address]. All rights reserved. This information is not intended as a substitute for professional medical care. Always follow your healthcare professional's instructions.        Weight Management: Getting Started  Healthy bodies come in all shapes and sizes. Not all bodies are made to be thin. For some people, a healthy weight is higher or lower than the average weight listed on weight charts. Your doctor can help you decide on a healthy weight for you.    Reasons to lose weight  Losing weight can help with some health problems, such as high blood pressure, heart disease, diabetes, and sleep apnea. You may also feel more energy.  Set your long-term goal  Your goal doesn't even have to be a specific weight. You may decide on a fitness goal (such as being able to walk 10 miles a week), or a health goal (such as lowering your blood pressure). Choose a goal that is measurable and reasonable, so you know when you've  reached it. A goal of reaching a BMI of less than 25 is not always reasonable (or possible).   Make an action plan  Habits don t change overnight. Setting your goals too high can leave you feeling discouraged if you can t reach them. Be realistic. Choose 1 or 2 small changes you can make now. Set an action plan for how you are going to make these changes. When you can stick to this plan, keep making a few more small changes. Taking small steps will help you stay on the path to success.  Track your progress  Write down your goals. Then, keep a daily record of your progress. Write down what you eat and how active you are. This record lets you look back on how much you ve done. It may also help when you re feeling frustrated. Reward yourself for success. Even if you don t reach every goal, give yourself credit for what you do get done.  Get support  Encouragement from others can help make losing weight easier. Ask your family members and friends for support. They may even want to join you. Also look to your doctor, registered dietitian, and  for help. Your local hospital can give you more information about nutrition, exercise, and weight loss.    7840-2604 The [employer]. [address] [place] [address], [place] [address]. All rights reserved. This information is not intended as a substitute for professional medical care. Always follow your healthcare professional's instructions.        Losing Weight (Cardiovascular)  Excess weight is a major risk factor for heart disease. Losing weight may help keep your arteries open so that your heart can get the oxygen-rich blood it needs. Weight loss can also help lower your blood pressure and reduce your risk for diabetes. All in all, losing weight makes you healthier.     Exercise with a friend. When activity is fun, you're more likely to stick with it.   Calories and weight loss    Calories are the fuel your body burns for energy. You get the calories you  need from the food you eat. For healthy weight loss, women should eat at least 1,200 calories a day, men at least 1,500.    When you eat more calories than you need, your body stores the extra calories as fat. One pound of fat equals 3,500 calories.    To lose weight, try to burn 500 calories a day more than you eat. To do this, eat 250 calories less each day. Add activity to burn the other 250 calories. Walking 2.5 miles burns about 250 calories.    Eat a variety of healthy foods to get the nutrients you need.  Tips for losing weight    Drink 8 to 10 glasses of water a day.    Don t skip meals. Instead, eat smaller portions.   Brisk activity is best  Brisk activity gets your heart pumping faster and it makes it healthier. It s also a great way to burn calories. In fact, your body may keep burning calories for hours after you stop a brisk activity:    Begin by walking 10 minutes most days.    Add more time and speed to your walk. Build up as you feel able.    Aim for 3 to 4 sessions of aerobic exercise a week. Each session should last about 40 minutes and include moderate to vigorous physical activity.     0061-2600 [employer]. [address] [place], [place] [id number]. All rights reserved. This information is not intended as a substitute for professional medical care. Always follow your healthcare professional's instructions.        Wound Care  Taking proper care of your wound will help it heal. Your healthcare provider may show you how to clean and dress the wound. He or she will also explain how to tell if the wound is healing normally. If you are unsure of how to take care of the wound, be sure to clarify what dressing to use and how often you should change the bandages. Here are the basic steps.     A wound that's not healing normally may be dark in color or have white streaks.   Wash your hands  Tips for washing your hands include:    Use liquid soap and lather for 2 minutes. Scrub between your  fingers and under your nails.    Rinse with warm water, keeping your fingers pointing down.    Use a paper towel to dry your hands and to turn off the faucet.  Remove the used dressing  Here are suggestions for removing the dressing:    If dressing changes cause you pain, be sure to take your pain medicine as prescribed by your healthcare provider 30 minutes before dressing changes.    Set up your supplies.    Put on disposable gloves if you re dressing a wound for someone else or your wound is infected.    Loosen the tape by pulling gently toward the wound.    Gently take off the old dressing. If the dressing is stuck to the wound, moisten it with saline (if available) or clean water.    If you have a drain or tube in the wound, be careful not to pull on it.    Remove the dressing 1 layer at a time and put it in a plastic bag.    Remove your gloves.  Inspect and dress the wound  Check the wound carefully:    Each time you change the dressing, inspect the wound carefully to be sure it s healing normally by making sure your wound appears to be pink and moist, and is free of infection.      Wash your hands again. Put on a new pair of gloves.    Clean and dress the wound as directed by your healthcare provider or nurse. Do not put anything in the wound that is not prescribed or directed by your healthcare provider. If you have a drain or tube, be careful not to pull on it. Make sure to secure the drain or tube as well.    Put all supplies in a plastic bag. Seal the bag and put it in the trash.    Be sure to wash your hands again.  Call your healthcare provider  Call your healthcare provider if you see any of the following signs of a problem:    Bleeding that soaks the dressing    Pink fluid weeping from the wound    Increased drainage or drainage that is yellow, yellow-green, or foul-smelling    Increased swelling or pain, or redness or swelling in the skin around the wound    A change in the color of the wound, or if  streaks develop in a direction away from the wound    The area between any stitches opens up    An increase in the size of the wound    A fever over 101 F (38.3 C), chills, increased fatigue, or a loss of appetite.        [id number] The [employer]. [address] [place] [address]. All rights reserved. This information is not intended as a substitute for professional medical care. Always follow your healthcare professional's instructions.

## 2017-05-22 NOTE — NURSING NOTE
"Chief Complaint   Patient presents with     Circulation Problems     Panel Management     tdap, prevnar, aortic aneurysm, colonoscopy, fall risk, honoring choices, hep c, ldl        Initial /62 (Cuff Size: Adult Regular)  Pulse 84  Temp 98.5  F (36.9  C) (Temporal)  Resp 20  Ht 5' 10\" (1.778 m)  Wt (!) 319 lb 12.8 oz (145.1 kg)  SpO2 94%  BMI 45.89 kg/m2 Estimated body mass index is 45.89 kg/(m^2) as calculated from the following:    Height as of this encounter: 5' 10\" (1.778 m).    Weight as of this encounter: 319 lb 12.8 oz (145.1 kg).  Medication Reconciliation: luci Zuluaga CMA (AAMA)    "

## 2017-05-22 NOTE — MR AVS SNAPSHOT
After Visit Summary   5/22/2017    Glenroy Padilla    MRN: [id number]           Patient Information     Date Of Birth          1950        Visit Information        Provider Department      5/22/2017 9:10 AM Hugo Francisco MD [facility]        Today's Diagnoses     Open wound of left lower extremity without complication, initial encounter    -  1    Need for hepatitis C screening test        Acute pain of left knee        Venous stasis ulcer of left lower extremity (H)        Type 2 diabetes mellitus with other circulatory complication, without long-term current use of insulin (H)        Chronic pain of right knee        Hx of heart artery stent        Hx of coronary artery disease        History of anemia        [place] (obstructive sleep apnea)        Morbid obesity due to excess calories (H)          Care Instructions      Chronic Venous Insufficiency: Treating Ulcers    If leg swelling due to chronic venous insufficiency isn t controlled, an ulcer (open wound) can form. Although ulcers vary in size and shape, they usually appear on the inside of the ankle.  Treating an Ulcer    Visit your doctor. Ulcers need frequent medical care. Special dressings may be applied. You may be given antibiotics to fight infection.    Your doctor may prescribe medications, such as aspirin or pentoxifylline, to help the ulcer heal.    Elevate your legs often to reduce swelling. The ulcer needs oxygen-rich blood to heal. This blood can t reach the ulcer until swelling is reduced.  When to Call Your Doctor  Contact your doctor right away if:    You have an increase in pain.    You develop a fever of 101 F (38.3 C) or higher.    The ulcer oozes discolored fluid or smells bad.    Swelling increases suddenly or the dressing feels tight.     1840-1495 The [employer]. [address] [place] [address], [place] [address]. All rights reserved. This information is not intended as a substitute for  professional medical care. Always follow your healthcare professional's instructions.        Understanding Chronic Venous Insufficiency  Problems with the veins in the legs may lead to chronic venous insufficiency (CVI). CVI means that there is a long-term problem with the veins not working well. Because of it, blood stays in the legs and causes swelling.   Two problems that may lead to chronic venous insufficiency are:    Damaged valves. Valves keep blood flowing from the legs through the blood vessels and back to the heart. When the valves are damaged, blood does not flow as well.     Deep vein thrombosis (DVT). Blood clots may form in the deep veins of the legs. This may cause pain, redness, and swelling in the legs. It may also block the flow of blood back to the heart. Call your health care provider if you have these symptoms.    A blood clot in the leg can also break off and travel to the lungs. This is called pulmonary embolism (PE). In the lungs, the clot can cut off the flow of blood. This may cause chest pain, trouble breathing, sweating, a fast heartbeat, coughing (may cough up blood), and fainting. It is a medical emergency and may cause death. Call 911 if you have these symptoms.    Health care providers call the 2 conditions, DVT and PE, venous thromboembolism (VTE).  CVI can t be cured, but you can control leg swelling to reduce the likelihood of ulcers (sores).  Recognizing the symptoms    If you stand or sit with your feet down for long periods, your legs may ache or feel heavy.    Swollen ankles are possibly the most common symptom of CVI.    As swelling increases, the skin over your ankles may show red spots or a brownish tinge. The skin may feel leathery or scaly, and may start to itch.    If swelling is not controlled, an ulcer (open wound) may form.  What you can do  Reduce your risk of developing ulcers by doing the following:    Increase blood flow back to your heart by elevating your legs,  exercising daily, and wearing elastic stockings.    Boost blood flow in your legs by losing excess weight.    If you must stand or sit in 1 place for a period of time, keep your blood moving by wiggling your toes, shifting your body position, and rising up on the balls of your feet.      1754-6972 The [employer]. [address] [place] [address]. All rights reserved. This information is not intended as a substitute for professional medical care. Always follow your healthcare professional's instructions.        Venous Leg Ulcer  Arteries carry oxygenated blood from your heart to the rest of your body. Veins return the blood to the heart.  When you sit or stand, blood in the veins in your legs must flow  uphill  to your heart. When you move your legs while walking, the contraction of your leg muscles has a pumping effect on the blood in the veins. The veins have one-way valves that stop the blood from flowing backward.  If you have poor blood flow in your veins (venous insufficiency), the one-way valves are damaged. The blood doesn t flow uphill very well. This raises pressure in the veins, and the tissues in your legs don t get enough oxygen. As the problem gets worse, an area of skin near the ankle turns dark and an ulcer forms. This is called a venous stasis ulcer. These ulcers usually don t hurt unless they become infected.  Venous stasis ulcers are treated with compression wraps, antibiotics that you put on your skin, and special dressings. Sometimes you may need a skin graft. Once the ulcer has healed, you will need to use compression stocking to help the pumping action in your veins. The stockings will also reduce swelling.  Home care  Follow these tips when caring for yourself at home:    Use any special compression dressings or stockings as directed.    If you were told to change your dressing, follow the instructions your health care provider gave you. Many different kinds of dressings  can be used for this problem, and each is used differently.    Walk regularly. This helps the blood flow better in your legs.    Maintain a healthy weight. If you are overweight, talk with your provider about a weight loss program.    If you smoke, quit smoking. This will ease your symptoms and lower the chance that the disease will get worse. Join a stop-smoking program or ask your provider for help in quitting.    Check your feet and legs for skin breaks or color changes. Report these to your provider. This could be an early sign of an ulcer.    Wear comfortable, well-fitting shoes and socks. Don t use socks that are tight. If they leave a dent in your leg by the end of the day, they are too tight.    When standing, shift your weight from one leg to the other.    When sitting for long periods, put your feet up. Move your feet and ankles often to get your calf muscles moving. Get up and walk from time to time.  Follow-up care  Follow up with your health care provider.  When to seek medical advice  Call your health care provider right away if any of these occur:    Pus or discharge coming from the ulcer    Pain in or around the ulcer    Redness or swelling of your leg around the ulcer    Fever of 100.4 F (38 C) or higher, or as directed by your health care provider    Shortness of breath or painful breathing    Chest pain, repeated cough, or coughing up blood       0057-3153 [employer]. [address] [place] [address]. All rights reserved. This information is not intended as a substitute for professional medical care. Always follow your healthcare professional's instructions.        Weight Management: Overcoming Your Barriers  You may have many reasons why you re not ready to lose weight. You may not feel you have the time or the skills. You may be afraid of losing weight and gaining it back again. Well, you can lose weight. And you can keep the weight off, if you make changes slowly and  stick with them. Remember that you may never find the perfect time to lose weight. Decide that the right time to be healthier is now.    Common barriers  Barrier 1: I don t want to deny myself.  Barrier Buster: You don t have to! Moderation is the key.    Watch portion sizes and know when you're eating more than one serving.    Plan to ask for a doggy bag when you eat out.    Have just one.    Choose lower-fat and lower-calorie versions of your favorites.  Barrier 2: I lost weight before but I gained it right back.  Barrier Buster: Make this time different.    List what worked and didn t work last time and what you can try this time.    Choose changes that you are willing to stick with.    Work exercise into your weight-loss plan.    Be realistic about what is possible.   Barrier 3: I don t have the time to be active.  Barrier Buster: It takes just a few minutes a day!    Be active with a pet or the kids.    Block off activity time in your schedule.    Borrow some time that you usually spend watching TV.    You are too important not to take time to exercise -- it is your life!   Feel good about yourself  Do you eat more because you feel bad about yourself, then feel even worse as you gain weight? This is a  vicious cycle.  Breaking this cycle is not easy. You may need group support or counseling. Always remember that you are a valuable person, no matter what size or shape you are.  Do you have a health problem? If so, don t use it as an excuse for not losing weight. Ask your doctor, dietitian, or other health care provider about methods to lose weight that are safe for you. For example, even if you have severe arthritis, you can walk in a pool. Get advice from a .      4295-7340 The [employer]. [address] [place] [address], [place] [address]. All rights reserved. This information is not intended as a substitute for professional medical care. Always follow your healthcare professional's  instructions.        Weight Management: Take It Off and Keep It Off  It s easy to be motivated when you first start. The key is to stay motivated all along the way and to have realistic and achievable goals. There are things you can do to keep yourself on the path to success.    Don t focus on daily weight gains and losses. Instead, weigh yourself no more than once a week at the same time of day.   Stay motivated    Remind yourself of your goals. Post them near the refrigerator or desk.    Make daily entries in your diary or journal about your activity and eating. A visual reminder of success, like a gold star, can help keep you going.    Every week, take time to look back on how much you ve accomplished.    Try taking a nutrition class. It can help you learn new skills and meet new people. You might try a low-fat cooking or yoga class.    Don t be hard on yourself or give up if you slip. Be patient. Learn from your mistakes and adjust your plan if you need to. Then get right back to it.    Be realistic about your goals. Make sure you can achieve them.   Believe that you can do it  How you think about yourself is just as important as what you do. If you don t think you can succeed, chances are you won t. Believe that you can stick to your plan and meet your goals.    If you don t meet a goal, don t use it as an excuse to give up on your whole plan. Adjust your goal and try again.    Learn how to accept compliments. Even if you get embarrassed, just say  thank you.     Make a list of the things that others like about you and that you like about yourself. Add something new from time to time. Keep this list to look at when you need a lift.  Resources    The President s [place] on Fitness, Sports & Nutritionwww.fitness.gov    Academy of Nutrition and Dieteticswww.eatright.org    Healthfinderwww.healthfinder.gov    6422-7670 [employer]. [address] [place] [address], [place] [address]. All rights reserved. This  information is not intended as a substitute for professional medical care. Always follow your healthcare professional's instructions.        Weight Management: Getting Started  Healthy bodies come in all shapes and sizes. Not all bodies are made to be thin. For some people, a healthy weight is higher or lower than the average weight listed on weight charts. Your doctor can help you decide on a healthy weight for you.    Reasons to lose weight  Losing weight can help with some health problems, such as high blood pressure, heart disease, diabetes, and sleep apnea. You may also feel more energy.  Set your long-term goal  Your goal doesn't even have to be a specific weight. You may decide on a fitness goal (such as being able to walk 10 miles a week), or a health goal (such as lowering your blood pressure). Choose a goal that is measurable and reasonable, so you know when you've reached it. A goal of reaching a BMI of less than 25 is not always reasonable (or possible).   Make an action plan  Habits don t change overnight. Setting your goals too high can leave you feeling discouraged if you can t reach them. Be realistic. Choose 1 or 2 small changes you can make now. Set an action plan for how you are going to make these changes. When you can stick to this plan, keep making a few more small changes. Taking small steps will help you stay on the path to success.  Track your progress  Write down your goals. Then, keep a daily record of your progress. Write down what you eat and how active you are. This record lets you look back on how much you ve done. It may also help when you re feeling frustrated. Reward yourself for success. Even if you don t reach every goal, give yourself credit for what you do get done.  Get support  Encouragement from others can help make losing weight easier. Ask your family members and friends for support. They may even want to join you. Also look to your doctor, registered dietitian, and fitness  professional for help. Your local hospital can give you more information about nutrition, exercise, and weight loss.    9496-0038 The [employer]. [address] [place] [address], [place] [address]. All rights reserved. This information is not intended as a substitute for professional medical care. Always follow your healthcare professional's instructions.        Losing Weight (Cardiovascular)  Excess weight is a major risk factor for heart disease. Losing weight may help keep your arteries open so that your heart can get the oxygen-rich blood it needs. Weight loss can also help lower your blood pressure and reduce your risk for diabetes. All in all, losing weight makes you healthier.     Exercise with a friend. When activity is fun, you're more likely to stick with it.   Calories and weight loss    Calories are the fuel your body burns for energy. You get the calories you need from the food you eat. For healthy weight loss, women should eat at least 1,200 calories a day, men at least 1,500.    When you eat more calories than you need, your body stores the extra calories as fat. One pound of fat equals 3,500 calories.    To lose weight, try to burn 500 calories a day more than you eat. To do this, eat 250 calories less each day. Add activity to burn the other 250 calories. Walking 2.5 miles burns about 250 calories.    Eat a variety of healthy foods to get the nutrients you need.  Tips for losing weight    Drink 8 to 10 glasses of water a day.    Don t skip meals. Instead, eat smaller portions.   Brisk activity is best  Brisk activity gets your heart pumping faster and it makes it healthier. It s also a great way to burn calories. In fact, your body may keep burning calories for hours after you stop a brisk activity:    Begin by walking 10 minutes most days.    Add more time and speed to your walk. Build up as you feel able.    Aim for 3 to 4 sessions of aerobic exercise a week. Each session should last about 40  minutes and include moderate to vigorous physical activity.     1433-9877 The [employer]. [address] [place] [address], [place] [address]. All rights reserved. This information is not intended as a substitute for professional medical care. Always follow your healthcare professional's instructions.        Wound Care  Taking proper care of your wound will help it heal. Your healthcare provider may show you how to clean and dress the wound. He or she will also explain how to tell if the wound is healing normally. If you are unsure of how to take care of the wound, be sure to clarify what dressing to use and how often you should change the bandages. Here are the basic steps.     A wound that's not healing normally may be dark in color or have white streaks.   Wash your hands  Tips for washing your hands include:    Use liquid soap and lather for 2 minutes. Scrub between your fingers and under your nails.    Rinse with warm water, keeping your fingers pointing down.    Use a paper towel to dry your hands and to turn off the faucet.  Remove the used dressing  Here are suggestions for removing the dressing:    If dressing changes cause you pain, be sure to take your pain medicine as prescribed by your healthcare provider 30 minutes before dressing changes.    Set up your supplies.    Put on disposable gloves if you re dressing a wound for someone else or your wound is infected.    Loosen the tape by pulling gently toward the wound.    Gently take off the old dressing. If the dressing is stuck to the wound, moisten it with saline (if available) or clean water.    If you have a drain or tube in the wound, be careful not to pull on it.    Remove the dressing 1 layer at a time and put it in a plastic bag.    Remove your gloves.  Inspect and dress the wound  Check the wound carefully:    Each time you change the dressing, inspect the wound carefully to be sure it s healing normally by making sure your wound appears to be  pink and moist, and is free of infection.      Wash your hands again. Put on a new pair of gloves.    Clean and dress the wound as directed by your healthcare provider or nurse. Do not put anything in the wound that is not prescribed or directed by your healthcare provider. If you have a drain or tube, be careful not to pull on it. Make sure to secure the drain or tube as well.    Put all supplies in a plastic bag. Seal the bag and put it in the trash.    Be sure to wash your hands again.  Call your healthcare provider  Call your healthcare provider if you see any of the following signs of a problem:    Bleeding that soaks the dressing    Pink fluid weeping from the wound    Increased drainage or drainage that is yellow, yellow-green, or foul-smelling    Increased swelling or pain, or redness or swelling in the skin around the wound    A change in the color of the wound, or if streaks develop in a direction away from the wound    The area between any stitches opens up    An increase in the size of the wound    A fever over 101 F (38.3 C), chills, increased fatigue, or a loss of appetite.        5783-1577 The [employer]. [address] [place] [address]. All rights reserved. This information is not intended as a substitute for professional medical care. Always follow your healthcare professional's instructions.              Follow-ups after your visit        Additional Services     GENERAL SURG ADULT REFERRAL       Your provider has referred you to: [facility]: [facility] [phone]   [email or web address]  FMG: [facility] [phone]   [email or web address]  FMG: [facility] [phone]   [email or web address]    Please be aware that coverage of these services is subject to the terms and limitations of your health insurance plan.  Call member services at  "your health plan with any benefit or coverage questions.      Please bring the following with you to your appointment:    (1) Any X-Rays, CTs or MRIs which have been performed.  Contact the facility where they were done to arrange for  prior to your scheduled appointment.   (2) List of current medications   (3) This referral request   (4) Any documents/labs given to you for this referral                  Future tests that were ordered for you today     Open Future Orders        Priority Expected Expires Ordered    GENERAL SURG ADULT REFERRAL Routine  8/15/2017 5/22/2017            Who to contact     If you have questions or need follow up information about today's clinic visit or your schedule please contact [facility] directly at [phone].  Normal or non-critical lab and imaging results will be communicated to you by [employer], letter or phone within 4 business days after the clinic has received the results. If you do not hear from us within 7 days, please contact the clinic through [employer] or phone. If you have a critical or abnormal lab result, we will notify you by phone as soon as possible.  Submit refill requests through [employer] or call your pharmacy and they will forward the refill request to us. Please allow 3 business days for your refill to be completed.          Additional Information About Your Visit        [employer] Information     [employer] lets you send messages to your doctor, view your test results, renew your prescriptions, schedule appointments and more. To sign up, go to [email or web address] . Click on \"Log in\" on the left side of the screen, which will take you to the Welcome page. Then click on \"Sign up Now\" on the right side of the page.     You will be asked to enter the access code listed below, as well as some personal information. Please follow the directions to create your username and password.     Your access code is: [email or web address]  Expires: 8/15/2017  8:46 AM   " "  Your access code will  in 90 days. If you need help or a new code, please call your [facility] clinic or [phone].        Care EveryWhere ID     This is your Care EveryWhere ID. This could be used by other organizations to access your [facility] medical records  [id number]        Your Vitals Were     Pulse Temperature Respirations Height Pulse Oximetry BMI (Body Mass Index)    84 98.5  F (36.9  C) (Temporal) 20 5' 10\" (1.778 m) 94% 45.89 kg/m2       Blood Pressure from Last 3 Encounters:   17 118/62   00 152/94    Weight from Last 3 Encounters:   17 (!) 319 lb 12.8 oz (145.1 kg)   00 (!) 350 lb (158.8 kg)              We Performed the Following     CBC with platelets and differential     Comprehensive metabolic panel (BMP + Alb, Alk Phos, ALT, AST, Total. Bili, TP)     CRP inflammation     Erythrocyte sedimentation rate auto     Hemoglobin A1c     Hepatitis C Screen Reflex to HCV RNA Quant and Genotype     Lipid panel reflex to direct LDL          Today's Medication Changes          These changes are accurate as of: 17 10:58 AM.  If you have any questions, ask your nurse or doctor.               Start taking these medicines.        Dose/Directions    ferrous sulfate 325 (65 FE) MG tablet   Commonly known as:  IRON   Used for:  History of anemia   Started by:  Hugo Francisco MD        Dose:  325 mg   Take 1 tablet (325 mg) by mouth daily (with breakfast)   Quantity:  90 tablet   Refills:  2       order for DME   Used for:  Type 2 diabetes mellitus with other circulatory complication, without long-term current use of insulin (H)   Started by:  Hugo Francisco MD        One touch glucose monitoring strip with Glucometer and lancets.   Quantity:  1 Box   Refills:  1       rosuvastatin 10 MG tablet   Commonly known as:  CRESTOR   Used for:  Type 2 diabetes mellitus with other circulatory complication, without long-term current use of insulin (H)   Started by:  Nolan" MD Hugo        Dose:  10 mg   Take 1 tablet (10 mg) by mouth daily   Quantity:  90 tablet   Refills:  1            Where to get your medicines      These medications were sent to [facility] - [address]   [address] [id number]     Phone:  [phone]     ferrous sulfate 325 (65 FE) MG tablet    rosuvastatin 10 MG tablet         Some of these will need a paper prescription and others can be bought over the counter.  Ask your nurse if you have questions.     Bring a paper prescription for each of these medications     order for DME                Primary Care Provider Office Phone # Fax #    Reyna Ramos PA-C [phone]       [facility] [address] [place] [address]        Thank you!     Thank you for choosing [facility]  for your care. Our goal is always to provide you with excellent care. Hearing back from our patients is one way we can continue to improve our services. Please take a few minutes to complete the written survey that you may receive in the mail after your visit with us. Thank you!             Your Updated Medication List - Protect others around you: Learn how to safely use, store and throw away your medicines at www.disposemymeds.org.          This list is accurate as of: 5/22/17 10:58 AM.  Always use your most recent med list.                   Brand Name Dispense Instructions for use    ferrous sulfate 325 (65 FE) MG tablet    IRON    90 tablet    Take 1 tablet (325 mg) by mouth daily (with breakfast)       order for DME     1 Box    One touch glucose monitoring strip with Glucometer and lancets.       rosuvastatin 10 MG tablet    CRESTOR    90 tablet    Take 1 tablet (10 mg) by mouth daily

## 2017-05-22 NOTE — NURSING NOTE
SUBJECTIVE: Patient presents today for wound care/ dressing change.    Left lower shin: hit on coffee table, has been seeing wound care in [place]  New concerns:none,  First time in clinic    OBJECTIVE / ASSESSMENT:    APPEARANCE-redness and drainage  SIZE-open area about 1 cm close to skin surface   CONDITIONS OF MARGINS- intact and healing  SKIN AROUND WOUND-red, swollen and cracked and peeling skin  ODOR-absent  DRAINAGE-present, YES: clear and milky      PLAN:  Reviewed provider orders. Wound Care-Removal of existing dressing  Visual inspection  Cleansing with iodine solution  Application of topical medication bacitracin  Application of sterile dressing  Discussed signs and symptoms of infection. Patient verbalized understanding. Patient will call as needed if symptoms develop. Patient will follow up wound care per AM    Yonny Meyers, RN, BSN

## 2017-05-23 ENCOUNTER — TELEPHONE (OUTPATIENT)
Dept: FAMILY MEDICINE | Facility: OTHER | Age: 67
End: 2017-05-23

## 2017-05-23 LAB — HCV AB SERPL QL IA: NORMAL

## 2017-05-23 NOTE — TELEPHONE ENCOUNTER
Pt's wife stopped in to the clinic to give you their old clinics fax #. It is [facility] [phone]. She states you wanted them to get this for you.  Thank you,  Claire Hinojoas- Pt Rep.

## 2017-05-25 ENCOUNTER — TELEPHONE (OUTPATIENT)
Dept: FAMILY MEDICINE | Facility: OTHER | Age: 67
End: 2017-05-25

## 2017-05-25 ENCOUNTER — OFFICE VISIT (OUTPATIENT)
Dept: SURGERY | Facility: OTHER | Age: 67
End: 2017-05-25
Payer: MEDICAID

## 2017-05-25 VITALS — HEART RATE: 79 BPM | BODY MASS INDEX: 45.2 KG/M2 | WEIGHT: 315 LBS | TEMPERATURE: 97.2 F

## 2017-05-25 DIAGNOSIS — R60.0 EDEMA OF BOTH LEGS: Primary | ICD-10-CM

## 2017-05-25 DIAGNOSIS — S81.802A OPEN WOUND OF LEFT LOWER EXTREMITY WITHOUT COMPLICATION, INITIAL ENCOUNTER: ICD-10-CM

## 2017-05-25 DIAGNOSIS — I87.303 STASIS EDEMA OF BOTH LOWER EXTREMITIES: ICD-10-CM

## 2017-05-25 PROCEDURE — 99243 OFF/OP CNSLTJ NEW/EST LOW 30: CPT | Performed by: SPECIALIST

## 2017-05-25 NOTE — NURSING NOTE
LC Shine-Carlitos applied to lower leg wound , covered with sterile 3x3 gauze and sterile jerry, secured with tape.........................................Hui Ashraf CMA  ([facility])

## 2017-05-25 NOTE — MR AVS SNAPSHOT
After Visit Summary   5/25/2017    Glenroy Padilla    MRN: [id number]           Patient Information     Date Of Birth          1950        Visit Information        Provider Department      5/25/2017 2:15 PM Casey Shine MD [facility]        Today's Diagnoses     Edema of both legs    -  1    Stasis edema of both lower extremities        Open wound of left lower extremity without complication, initial encounter           Follow-ups after your visit        Your next 10 appointments already scheduled     Jun 01, 2017  2:00 PM CDT   US LOWER EXTREMITY ARTERIAL DOPPLER SEG PRESSURES W EXER with PHUS3   [facility] ([facility])    [address] [place] [id number]   [phone]           Please bring a list of your medicines (including vitamins, minerals and over-the-counter drugs). Also, tell your doctor about any allergies you may have. Wear comfortable clothes and leave your valuables at home.  You do not need to do anything special to prepare for your exam.  Please call the Imaging Department at your exam site with any questions.            Jun 02, 2017 10:30 AM CDT   US LOWER EXTREMITY VENOUS COMPETENCY BILATERAL with PHUS1   [facility] ([facility])    [address] [place] [id number]   [phone]           Please bring a list of your medicines (including vitamins, minerals and over-the-counter drugs). Also, tell your doctor about any allergies you may have. Wear comfortable clothes and leave your valuables at home.  You do not need to do anything special to prepare for your exam.  Please call the Imaging Department at your exam site with any questions.              Future tests that were ordered for you today     Open Future Orders        Priority Expected Expires Ordered    US Venous Competency Bilateral Routine  5/25/2018 5/25/2017    US Lower Extremity Venous Duplex Bilateral  "Routine  2018    US Low Ext Arterial Dop Seg Pres w Ex Routine  2018            Who to contact     If you have questions or need follow up information about today's clinic visit or your schedule please contact [facility] directly at [phone].  Normal or non-critical lab and imaging results will be communicated to you by MyChart, letter or phone within 4 business days after the clinic has received the results. If you do not hear from us within 7 days, please contact the clinic through [employer] or phone. If you have a critical or abnormal lab result, we will notify you by phone as soon as possible.  Submit refill requests through [employer] or call your pharmacy and they will forward the refill request to us. Please allow 3 business days for your refill to be completed.          Additional Information About Your Visit        [employer] Information     [employer] lets you send messages to your doctor, view your test results, renew your prescriptions, schedule appointments and more. To sign up, go to [email or web address] . Click on \"Log in\" on the left side of the screen, which will take you to the Welcome page. Then click on \"Sign up Now\" on the right side of the page.     You will be asked to enter the access code listed below, as well as some personal information. Please follow the directions to create your username and password.     Your access code is: [email or web address]  Expires: 8/15/2017  8:46 AM     Your access code will  in 90 days. If you need help or a new code, please call your [place] clinic or [phone].        Care EveryWhere ID     This is your Care EveryWhere ID. This could be used by other organizations to access your [place] medical records  [id number]        Your Vitals Were     Pulse Temperature BMI (Body Mass Index)             79 97.2  F (36.2  C) (Temporal) 45.2 kg/m2          Blood Pressure from Last 3 Encounters:   17 118/62   00 " 152/94    Weight from Last 3 Encounters:   05/25/17 (!) 315 lb (142.9 kg)   05/22/17 (!) 319 lb 12.8 oz (145.1 kg)   12/20/00 (!) 350 lb (158.8 kg)               Primary Care Provider Office Phone # Fax #    Hugo Francisco [phone]       [place] [address] [facility] [address]        Thank you!     Thank you for choosing [facility]  for your care. Our goal is always to provide you with excellent care. Hearing back from our patients is one way we can continue to improve our services. Please take a few minutes to complete the written survey that you may receive in the mail after your visit with us. Thank you!             Your Updated Medication List - Protect others around you: Learn how to safely use, store and throw away your medicines at www.disposemymeds.org.          This list is accurate as of: 5/25/17  4:38 PM.  Always use your most recent med list.                   Brand Name Dispense Instructions for use    ferrous sulfate 325 (65 FE) MG tablet    IRON    90 tablet    Take 1 tablet (325 mg) by mouth daily (with breakfast)       order for DME     1 Box    One touch glucose monitoring strip with Glucometer and lancets.       rosuvastatin 10 MG tablet    CRESTOR    90 tablet    Take 1 tablet (10 mg) by mouth daily

## 2017-05-25 NOTE — PROGRESS NOTES
Consult requested by Dr. Francisco    Reason for consultation - left leg wound    HPI:  Patient is a 66-year-old white male presenting with a 5 week history of a left leg wound. He hit a coffee table 5 weeks ago and the wound is slow to heal. He was initially treated in [place] and has a 15 year history of bilateral lower extremity edema. He has never been treated with any form of compression therapy and was told not to based on an ultrasound results in [place]. Those results are not available to me at this time. He denies any prior DVT, nonhealing wounds, leg trauma, claudication or rest pain. He was told in [place] to treat the leg with Betadine. He decided to come the [place] for second opinion. He now presents for evaluation of his left leg wound.    Past Medical History:   Diagnosis Date     Acute pain of left knee 5/22/2017     Chronic pain of right knee 5/22/2017     Hx of coronary artery disease 5/22/2017     Hx of heart artery stent 5/22/2017     Open wound of left lower extremity without complication, initial encounter 5/22/2017     [place] (obstructive sleep apnea) 5/22/2017     Type 2 diabetes mellitus with other circulatory complication, without long-term current use of insulin (H) 5/22/2017     Venous stasis ulcer of left lower extremity (H) 5/22/2017     Venous stasis ulcer of left lower extremity (H) 5/22/2017     No past surgical history on file.    Current Outpatient Prescriptions   Medication     rosuvastatin (CRESTOR) 10 MG tablet     ferrous sulfate (IRON) 325 (65 FE) MG tablet     order for DME     No current facility-administered medications for this visit.      Plavix       Allergies   Allergen Reactions     No Known Allergies      Social History   Substance Use Topics     Smoking status: Current Some Day Smoker     Types: Cigars     Smokeless tobacco: Never Used     Alcohol use No     No family history on file.     ROS: 10 point ROS neg other than the symptoms noted above in the  HPI.    PE:  B/P: Data Unavailable, T: 97.2, P: 79, R: Data Unavailable  General: well developed, well nourished obese WM who appears his stated age  HEENT: NC/AT, EOMI, (-)icterus, (-)injection, poor dentition  Neck: Supple, No JVD  Chest: CTA  Heart: S1, S2, (-)m/r/g  Abd: obese, Soft, non tender, non distended  Ext; Warm, +2  Edema bilaterally.  Bilateral venous stasis changes with thickened skin.   (+)FEM pulses.  No distal pulses.  LLE wound 1.5x0.4x0.4cm  Psych: AAOx3  Neuro: No focal deficits    Impression/plan:  This is a 66 year old gentleman with bilateral chronic venous stasis changes and edema of unclear etiology. He has a wound as well as a result of his left leg hitting a coffee table. I suspect he has either has underlying lymphedema versus chronic venous insufficiency. He was also told never to wear compression socks for some unclear reason. I think he would benefit greatly from compression therapy however the [place] ultrasound results are not available at this time. The plan was to have him stop the Betadine and just wash it daily with soap and water and apply triad. An arterial and venous ultrasound will be ordered. Compression therapy will be considered pending those results. He will follow-up with me after his ultrasounds and proceed based on most findings.    Casey Shine MD, FACS

## 2017-05-25 NOTE — TELEPHONE ENCOUNTER
Results below given to patient,  Patient went to the wound clinic and they stated that they didn't like him on the water pills, patient states that he would like to talk to you about this, he said he will call back.  Josselin Montes RT (R)        Notes Recorded by Hugo Fracnisco MD on 5/25/2017 at 4:53 PM  Overall labs were with in normal range.    Keep up the great work.    Continue plan as discussed.    If any questions, I can call.

## 2017-05-26 NOTE — TELEPHONE ENCOUNTER
Patient called, he wanted to know, now that he seen surgeon, if he can stop taking his water pills, advised continue taking medications and lasix at this time.  Follow surgeon's recommendations.     Electronically signed:  Hugo Francisco M.D.

## 2017-06-01 ENCOUNTER — HOSPITAL ENCOUNTER (OUTPATIENT)
Dept: ULTRASOUND IMAGING | Facility: CLINIC | Age: 67
Discharge: HOME | End: 2017-06-01
Attending: SPECIALIST | Admitting: SPECIALIST
Payer: MEDICAID

## 2017-06-01 DIAGNOSIS — R60.0 EDEMA OF BOTH LEGS: ICD-10-CM

## 2017-06-01 DIAGNOSIS — S81.802A OPEN WOUND OF LEFT LOWER EXTREMITY WITHOUT COMPLICATION, INITIAL ENCOUNTER: ICD-10-CM

## 2017-06-01 DIAGNOSIS — I87.303 STASIS EDEMA OF BOTH LOWER EXTREMITIES: ICD-10-CM

## 2017-06-01 PROCEDURE — 93970 EXTREMITY STUDY: CPT

## 2017-06-01 PROCEDURE — 93922 UPR/L XTREMITY ART 2 LEVELS: CPT

## 2017-06-02 ENCOUNTER — TELEPHONE (OUTPATIENT)
Dept: SURGERY | Facility: CLINIC | Age: 67
End: 2017-06-02

## 2017-06-02 NOTE — TELEPHONE ENCOUNTER
Reason for Call:  Same Day Appointment, Requested Provider:   Melvin Shine    PCP: Hugo Francisco    Reason for visit: FU and U/S results     Have you been treated for this in the past? Yes    Additional comments: pt stated that he can come in today.     Can we leave a detailed message on this number? YES    Phone number patient can be reached at: Other phone number:  [phone]   Best Time:any today would be great    Call taken on 6/2/2017 at 8:34 AM by Beverly Howe

## 2017-06-05 ENCOUNTER — OFFICE VISIT (OUTPATIENT)
Dept: SURGERY | Facility: CLINIC | Age: 67
End: 2017-06-05
Payer: MEDICAID

## 2017-06-05 VITALS — HEART RATE: 100 BPM | BODY MASS INDEX: 45.37 KG/M2 | WEIGHT: 315 LBS | OXYGEN SATURATION: 96 % | TEMPERATURE: 96.6 F

## 2017-06-05 DIAGNOSIS — R60.0 EDEMA OF BOTH LEGS: ICD-10-CM

## 2017-06-05 DIAGNOSIS — S81.802D OPEN WOUND OF LEFT LOWER EXTREMITY WITHOUT COMPLICATION, SUBSEQUENT ENCOUNTER: Primary | ICD-10-CM

## 2017-06-05 DIAGNOSIS — I87.303 STASIS EDEMA OF BOTH LOWER EXTREMITIES: ICD-10-CM

## 2017-06-05 PROCEDURE — 99213 OFFICE O/P EST LOW 20 MIN: CPT | Performed by: SPECIALIST

## 2017-06-05 NOTE — NURSING NOTE
"Chief Complaint   Patient presents with     RECHECK     US results       Initial Pulse 100  Temp 96.6  F (35.9  C) (Temporal)  Wt (!) 316 lb 3.2 oz (143.4 kg)  SpO2 96%  BMI 45.37 kg/m2 Estimated body mass index is 45.37 kg/(m^2) as calculated from the following:    Height as of 5/22/17: 5' 10\" (1.778 m).    Weight as of this encounter: 316 lb 3.2 oz (143.4 kg).  Medication Reconciliation: complete    "

## 2017-06-05 NOTE — PROGRESS NOTES
F/U for leg wound    Subjective:  Patient is a 66-year-old white male presenting with a 5 week history of a left leg wound. He hit a coffee table 5 weeks ago and the wound is slow to heal. He was initially treated in [place] and has a 15 year history of bilateral lower extremity edema. He has never been treated with any form of compression therapy and was told not to based on an ultrasound results in [place]. Those results are not available to me at this time. He denies any prior DVT, nonhealing wounds, leg trauma, claudication or rest pain. He was told in [place] to treat the leg with Betadine. He decided to come the [place] for second opinion.      He has had his imaging studies and has been using TRIAD for past week.   He now presents for f/u of his left leg wound.      Objective:  B/P: Data Unavailable, T: 96.6, P: 100, R: Data Unavailable  Ext; Warm, +2  Edema bilaterally.  Bilateral venous stasis changes with thickened skin.   (+)FEM pulses.  No distal pulses.  LLE wound 0.7x0.4x0.4cm      U/S -   ULTRASOUND VENOUS COMPETENCY BILATERAL   6/1/2017 3:29 PM      HISTORY: Localized edema. Chronic venous hypertension (idiopathic)  without complications of bilateral lower extremity. Unspecified open  wound, left lower leg, initial encounter.     COMPARISON: None.     TECHNIQUE: Color Doppler and spectral waveform analysis performed.     FINDINGS: Bilateral common femoral veins, femoral veins, and popliteal  veins are patent and demonstrate no evidence of reflux.     Other than questionable 1 second reflux at the right saphenofemoral  junction, the right great saphenous vein is competent throughout its  length. Diameter of the right great saphenous vein is 3-7 mm.     The left great saphenous vein is competent throughout its length.  Diameter of the left great saphenous vein is 2-7 mm.     Visualized portions of bilateral small saphenous veins appear to be  competent, without evidence of reflux.     No prominent  perforators were demonstrated. Bilateral Giacomini veins  were competent.         IMPRESSION: The deep and superficial venous systems of both legs are  patent and competent.     PATRICK SCHERER MD    [facility] -   ULTRASOUND ANKLE-BRACHIAL INDEX DOPPLER NO EXERCISE   6/1/2017 3:16 PM        HISTORY: Localized edema. Chronic venous hypertension (idiopathic)  without complications of bilateral lower extremity.     COMPARISON: None.     FINDINGS: Ankle-brachial index is 0.96 on the right and 0.91 on the  left. Waveforms appear biphasic/monophasic bilaterally.         IMPRESSION: Borderline bilateral arterial insufficiency.     PATRICK SCHERER MD        Impression/plan:  This is a 66 year old gentleman with bilateral chronic venous stasis changes and edema of unclear etiology. He has a wound as well as a result of his left leg hitting a coffee table.  The wound is smaller this week.   I suspect he has either has underlying lymphedema versus central venous stenosis.   His venous U/S saw essentially normal.    His wound is smaller with the TRIAD.  Will start compression to the left leg.   Get CT venogram to r/o central stenosis.   If CT normal then will get a lymphedema consult.    Casey Shine MD, FACS

## 2017-06-05 NOTE — NURSING NOTE
Unna boot placed on left lower leg. Pt will call if the boot falls down over the next week. YAHIR Wilkins

## 2017-06-05 NOTE — MR AVS SNAPSHOT
"              After Visit Summary   2017    Glenroy Padilla    MRN: [id number]           Patient Information     Date Of Birth          1950        Visit Information        Provider Department      2017 8:00 AM Casey Shine MD [facility]        Today's Diagnoses     Open wound of left lower extremity without complication, subsequent encounter    -  1    Edema of both legs        Stasis edema of both lower extremities           Follow-ups after your visit        Who to contact     If you have questions or need follow up information about today's clinic visit or your schedule please contact [facility] directly at [phone].  Normal or non-critical lab and imaging results will be communicated to you by MyChart, letter or phone within 4 business days after the clinic has received the results. If you do not hear from us within 7 days, please contact the clinic through [employer] or phone. If you have a critical or abnormal lab result, we will notify you by phone as soon as possible.  Submit refill requests through [employer] or call your pharmacy and they will forward the refill request to us. Please allow 3 business days for your refill to be completed.          Additional Information About Your Visit        MyChart Information     [employer] lets you send messages to your doctor, view your test results, renew your prescriptions, schedule appointments and more. To sign up, go to [email or web address] . Click on \"Log in\" on the left side of the screen, which will take you to the Welcome page. Then click on \"Sign up Now\" on the right side of the page.     You will be asked to enter the access code listed below, as well as some personal information. Please follow the directions to create your username and password.     Your access code is: [email or web address]  Expires: 8/15/2017  8:46 AM     Your access code will  in 90 days. If you need help or a new code, please call your " [facility] or [phone].        Care EveryWhere ID     This is your Care EveryWhere ID. This could be used by other organizations to access your [place] medical records  [id number]        Your Vitals Were     Pulse Temperature Pulse Oximetry BMI (Body Mass Index)          100 96.6  F (35.9  C) (Temporal) 96% 45.37 kg/m2         Blood Pressure from Last 3 Encounters:   05/22/17 118/62   12/20/00 152/94    Weight from Last 3 Encounters:   06/05/17 (!) 316 lb 3.2 oz (143.4 kg)   05/25/17 (!) 315 lb (142.9 kg)   05/22/17 (!) 319 lb 12.8 oz (145.1 kg)              Today, you had the following     No orders found for display       Primary Care Provider Office Phone # Fax #    Hugo Francisco [phone]       [address] [address]        Thank you!     Thank you for choosing [facility]  for your care. Our goal is always to provide you with excellent care. Hearing back from our patients is one way we can continue to improve our services. Please take a few minutes to complete the written survey that you may receive in the mail after your visit with us. Thank you!             Your Updated Medication List - Protect others around you: Learn how to safely use, store and throw away your medicines at www.disposemymeds.org.          This list is accurate as of: 6/5/17  8:13 AM.  Always use your most recent med list.                   Brand Name Dispense Instructions for use    ferrous sulfate 325 (65 FE) MG tablet    IRON    90 tablet    Take 1 tablet (325 mg) by mouth daily (with breakfast)       order for DME     1 Box    One touch glucose monitoring strip with Glucometer and lancets.       rosuvastatin 10 MG tablet    CRESTOR    90 tablet    Take 1 tablet (10 mg) by mouth daily

## 2017-06-12 ENCOUNTER — HOSPITAL ENCOUNTER (OUTPATIENT)
Dept: WOUND CARE | Facility: CLINIC | Age: 67
Discharge: HOME | End: 2017-06-12
Attending: SPECIALIST | Admitting: SPECIALIST
Payer: MEDICAID

## 2017-06-12 ENCOUNTER — HOSPITAL ENCOUNTER (OUTPATIENT)
Dept: CT IMAGING | Facility: CLINIC | Age: 67
End: 2017-06-12
Attending: SPECIALIST
Payer: MEDICAID

## 2017-06-12 DIAGNOSIS — R60.0 EDEMA OF BOTH LEGS: ICD-10-CM

## 2017-06-12 DIAGNOSIS — I87.303 STASIS EDEMA OF BOTH LOWER EXTREMITIES: ICD-10-CM

## 2017-06-12 PROCEDURE — 99213 OFFICE O/P EST LOW 20 MIN: CPT

## 2017-06-12 PROCEDURE — 25000125 ZZHC RX 250: Performed by: SPECIALIST

## 2017-06-12 PROCEDURE — 25000128 H RX IP 250 OP 636: Performed by: SPECIALIST

## 2017-06-12 PROCEDURE — 74177 CT ABD & PELVIS W/CONTRAST: CPT

## 2017-06-12 RX ORDER — IOPAMIDOL 755 MG/ML
500 INJECTION, SOLUTION INTRAVASCULAR ONCE
Status: COMPLETED | OUTPATIENT
Start: 2017-06-12 | End: 2017-06-12

## 2017-06-12 RX ADMIN — SODIUM CHLORIDE 60 ML: 9 INJECTION, SOLUTION INTRAVENOUS at 09:21

## 2017-06-12 RX ADMIN — IOPAMIDOL 100 ML: 755 INJECTION, SOLUTION INTRAVENOUS at 09:21

## 2017-06-12 NOTE — DISCHARGE INSTRUCTIONS
Today we re did the same cares that Dr Shine did with you last week.  Keep your dressing clean and dry as able.  I will see you in clinic next Monday 6/19/17  Jett Garza RN [email or web address]

## 2017-06-12 NOTE — PROGRESS NOTES
Reason For Visit: Glenroy Padilla, 66 year old male, seen as outpatient to evaluate and treat left lower leg traumatic wound complicated by edema. Referred by Dr Rl BLACK MD. Patient presents with his wife  today.      History: Pt has approximately 15 year history of LE edema but has never worn any type of compression.  Near the end of April of this year pt hit his leg on a table causing a traumatic wound.  He was assessed in [place] and determined he would get a second opinion.  He saw Dr Francisco near the end of May and has seen Dr Rl BLACK MD in the specialty clinic over th last two weeks.  He was referred to me for wound cares for the next two weeks.      Personal/social history: Patient is  and is a dual citizen of [place] and [place].      Objective:   Physical appearance: alert and oriented times three.  Ambulation: slow with use of quad cane.  Current treatment plan: Triad Hydrophilic wound dressing applied to the wound, lower leg wraped with an Unna's boot viscopaste wrap with calamine lotion, covered with dry gauze and then an ace wrap for compression.    Last changed: one week ago on 6/5/17    Wound #1 Left lower leg, lateral of center, traumatic wound complicated by long history of LE edema.  Stage/tissue depth: full thickness  1.1 cm L x 0.6 cm W x 0.3 cm D  Tunneling: no  Undermining: no  Wound bed type/amount: red nongranular tissue; NA fluctuant  Wound Edges: open and irregular  Periwound: edema with hemosiderin staining noted.  Drainage: moderate amount of serosang drainage  Odor: no  Pain: patient denied pain today at the wound    Dorsalis Pedal Pulse: weak but palpable: NA doppler: NA phasic  Hair growth: none noted on the lower leg or foot  Capillary Refill: less than 3 seconds  Feet/toes color: edema with hemosiderin staining   Nails: WNL  R Leg: Edema plus 2. Ankle circumference NA cm. Calf circumference NA cm.  L Leg: Edema plus 2. Ankle circumference NA cm. Calf circumference NA  cm.    Mobility: independent, slow, with use of quad cane  Current offloading/footwear: none, wears regular shoes.  Sensation: limited in the feet and lower legs due to peripheral neuropathy.  HgbA1C: 6.4 Date: 5/22/17  Checks Blood Glucose?:  Pt does not check blood glucose regularly.  Only checks when he feels he is high of low.  On average once a month. Average Readings: when he checks the blood his blood sugars per his report they are never over 200 or below 100.  Other callousing/areas of concern: none noted    Diet: regular  Smoking: Occasional cigar smoker    Discussed: etiology of wound (trauma complicated by edema, venous blood flow), pathophysiology and patient specific goals for wound healing.   Education: On roll of [facility] nurse in his wound care team, follow ups with the Wound and Ostomy clinic next week and then will see Dr Shine again.  Wound status.  Tips on keeping dressing dry during showers.      Assessment:  Wound, based on past charting and pt's wife's report has made some progress.  Wound bed after cleansing with cotton tipped applicator and wound cleanser was clean and all red nongranular tissue.      Barriers to wound healing:   Poor nutrition: inadequate supply of protein, carbohydrates, fatty acids, and trace elements essential for all phases of wound healing, NA  Reduced Blood Supply: inadequate perfusion to heal wound, MINESH ultrasound done 6/1/17 was 0.96 on the right and 0.91 on the left, borderline arterial insuffiencey per report.   Medication: NA  Chemotherapy: suppresses the immune system and inflammatory response, NA  Radiotherapy: increases production of free radical which damage cells, NA  Psychological stress: NA  Obesity: decreases tissue perfusion, NA  Infection: prolongs inflammatory phase, uses vital nutrients, impairs epithelialization and releases toxins, NA no active signs of infection noted at this time.  Underlying Disease: diabetes mellitis, but currently well  controlled  Maceration: reduces wound tensile strength and inhibits epithelial migration, None noted at this time  Patient compliance, NA  Unrelieved pressure, NA  Immobility, NA  Substance abuse: NA    Plan: Continue with current plan of care, triad, unna's boot viscopaste wrap, roll gauze and ace wrap changed weekly.      Topical care: Triad wound paste  Offloading: NA  Additional recommendations: None this visit    Wound Care: Wound cleansed with Microklenz and gauze, patted dry. Periwound protected with NA. Wound base filled with triad hydrophilic wound dressing/paste. Covered with Unna's boot viscopaste wrap, followed by roll gauze. Secured with NA.  Applied Ace wrap over the dressing for compression. To be changed weekly in clinic.    Discussed plan of care with patient and his wife. Teaching done with patient and his wife for dressing changes; current plan of care is for pt to be seen in clinic weekly for dressing changes and will call if any concerns arise with the dressing.    The following discharge instructions were reviewed with and sent home with the patient: See discharge instructions    The following supplies were sent home with the patient:  None this visit  Will reassess care plan in 1 week and order patient supplies as needed    Return visit: 6/19/17    Verbal, written, & demonstrative education provided.  Face to face time (excluding procedure): approximately 45 minutes.  Procedure: NA  Care plan was not changed.    [phone]

## 2017-06-12 NOTE — IP AVS SNAPSHOT
[address] [address]    Phone:  [phone]    Fax:  [phone]                                       After Visit Summary   6/12/2017    Glenroy Padilla    MRN: [id number]           After Visit Summary Signature Page     I have received my discharge instructions, and my questions have been answered. I have discussed any challenges I see with this plan with the nurse or doctor.    ..........................................................................................................................................  Patient/Patient Representative Signature      ..........................................................................................................................................  Patient Representative Print Name and Relationship to Patient    ..................................................               ................................................  Date                                            Time    ..........................................................................................................................................  Reviewed by Signature/Title    ...................................................              ..............................................  Date                                                            Time

## 2017-06-12 NOTE — IP AVS SNAPSHOT
MRN:[id number]                      After Visit Summary   6/12/2017    Glenroy Padilla    MRN: [id number]           Visit Information        Provider Department      6/12/2017  8:00 AM PH WOUND EXAM ROOM [facility]           Review of your medicines      UNREVIEWED medicines. Ask your doctor about these medicines        Dose / Directions    ferrous sulfate 325 (65 FE) MG tablet   Commonly known as:  IRON   Used for:  History of anemia        Dose:  325 mg   Take 1 tablet (325 mg) by mouth daily (with breakfast)   Quantity:  90 tablet   Refills:  2       JANUMET -1000 MG Tb24   Generic drug:  SitaGLIPtin-MetFORMIN HCl        Refills:  0       rosuvastatin 10 MG tablet   Commonly known as:  CRESTOR   Used for:  Type 2 diabetes mellitus with other circulatory complication, without long-term current use of insulin (H)        Dose:  10 mg   Take 1 tablet (10 mg) by mouth daily   Quantity:  90 tablet   Refills:  1         CONTINUE these medicines which have NOT CHANGED        Dose / Directions    order for DME   Used for:  Type 2 diabetes mellitus with other circulatory complication, without long-term current use of insulin (H)        One touch glucose monitoring strip with Glucometer and lancets.   Quantity:  1 Box   Refills:  1                Protect others around you: Learn how to safely use, store and throw away your medicines at www.disposemymeds.org.         Follow-ups after your visit        Your next 10 appointments already scheduled     Jun 12, 2017  9:00 AM CDT   CT ABDOMEN PELVIS W CONTRAST with PHCT1   [facility] CT Scan ([facility])    [address] [place] [id number] [phone]           Please bring any scans or X-rays taken at other hospitals, if similar tests were done. Also bring a list of your medicines, including vitamins, minerals and over-the-counter drugs. It is safest to leave personal items at home.  Be sure to tell  your doctor:   If you have any allergies.   If there s any chance you are pregnant.   If you are breastfeeding.   If you have any special needs.  You may have contrast for this exam. To prepare:   Do not eat or drink for 2 hours before your exam. If you need to take medicine, you may take it with small sips of water. (We may ask you to take liquid medicine as well.)   The day before your exam, drink extra fluids at least six 8-ounce glasses (unless your doctor tells you to restrict your fluids).  Patients over 70 or patients with diabetes or kidney problems:   If you haven t had a blood test (creatinine test) within the last 30 days, go to your clinic or Diagnostic Imaging Department for this test.  If you have diabetes:   If your kidney function is normal, continue taking your metformin (Avandamet, Glucophage, Glucovance, Metaglip) on the day of your exam.   If your kidney function is abnormal, wait 48 hours before restarting this medicine.  You will have oral contrast for this exam:   You will drink the contrast at home. Get this from your clinic or Diagnostic Imaging Department. Please follow the directions given.  Please wear loose clothing, such as a sweat suit or jogging clothes. Avoid snaps, zippers and other metal. We may ask you to undress and put on a hospital gown.  If you have any questions, please call the Imaging Department where you will have your exam.            Jun 19, 2017  8:00 AM CDT   Return Visit with PH WOUND EXAM ROOM   [facility] ([facility]    [address] [place] [id number] [phone]               Care Instructions        Further instructions from your care team       Today we re did the same cares that Dr Shine did with you last week.  Keep your dressing clean and dry as able.  I will see you in clinic next Monday 6/19/17  Jett Garza RN cwocn     Additional Information About Your Visit        MyChart Information     [facility]  "you send messages to your doctor, view your test results, renew your prescriptions, schedule appointments and more. To sign up, go to [email or web address] . Click on \"Log in\" on the left side of the screen, which will take you to the Welcome page. Then click on \"Sign up Now\" on the right side of the page.     You will be asked to enter the access code listed below, as well as some personal information. Please follow the directions to create your username and password.     Your access code is: [email or web address]  Expires: 8/15/2017  8:46 AM     Your access code will  in 90 days. If you need help or a new code, please call your [facility] clinic or [phone].        Care EveryWhere ID     This is your Care EveryWhere ID. This could be used by other organizations to access your [facility] medical records  [id number]         Primary Care Provider Office Phone # Fax #    Hugo Francisco [phone]      Thank you!     Thank you for choosing [facility] for your care. Our goal is always to provide you with excellent care. Hearing back from our patients is one way we can continue to improve our services. Please take a few minutes to complete the written survey that you may receive in the mail after you visit with us. Thank you!             Medication List: This is a list of all your medications and when to take them. Check marks below indicate your daily home schedule. Keep this list as a reference.      Medications           Morning Afternoon Evening Bedtime As Needed    ferrous sulfate 325 (65 FE) MG tablet   Commonly known as:  IRON   Take 1 tablet (325 mg) by mouth daily (with breakfast)                                JANUMET -1000 MG Tb24   Generic drug:  SitaGLIPtin-MetFORMIN HCl                                order for DME   One touch glucose monitoring strip with Glucometer and lancets.                                rosuvastatin 10 MG tablet   Commonly known as:  CRESTOR   Take 1 tablet " (10 mg) by mouth daily

## 2017-06-14 DIAGNOSIS — E11.8 TYPE 2 DIABETES MELLITUS WITH COMPLICATION, WITHOUT LONG-TERM CURRENT USE OF INSULIN (H): Primary | ICD-10-CM

## 2017-06-14 NOTE — TELEPHONE ENCOUNTER
Patient looking for a new order for Janumet, not ever filled here before. Please review historical med and advise.  Yue Freitas, [facility]  [phone]

## 2017-06-19 ENCOUNTER — TELEPHONE (OUTPATIENT)
Dept: FAMILY MEDICINE | Facility: OTHER | Age: 67
End: 2017-06-19

## 2017-06-19 ENCOUNTER — HOSPITAL ENCOUNTER (OUTPATIENT)
Dept: WOUND CARE | Facility: CLINIC | Age: 67
Discharge: HOME | End: 2017-06-19
Attending: FAMILY MEDICINE | Admitting: FAMILY MEDICINE
Payer: MEDICAID

## 2017-06-19 PROCEDURE — 99213 OFFICE O/P EST LOW 20 MIN: CPT

## 2017-06-19 NOTE — PROGRESS NOTES
Reason For Visit: Glenroy Padilla, 66 year old male, seen as outpatient to re evaluate and treat left lower leg traumatic wound complicated by edema. Referred by Dr Rl BLACK MD. Patient presents with his wife  today.       History:  Pt has approximately 15 year history of LE edema but has never worn any type of compression.  Near the end of April of this year pt hit his leg on a table causing a traumatic wound.  He was assessed in [place] and after he determined he would get a second [place].  He saw Dr Francisco near the end of May and has seen Dr Rl BLACK MD in the specialty clinic over the last two weeks.  He was referred to me for continued wound cares.       Personal/social history: Patient is  and is a dual citizen of [place] and [place]. He and his wife will be retuning to their home in [place] later this week and do not intend to return to [place] until the end of August.  Pt needs bilateral knee replacements as both left and right knee's are bone to bone.  He has been prevented from getting this surgery initially due to heart problem requiring stent placement and now due to the open wound on the left lower leg.       Objective:   Physical appearance: alert and oriented times three.  Ambulation: slow with use of quad cane.  Current treatment plan: Triad Hydrophilic wound dressing applied to the wound, lower leg wraped with an Unna's boot viscopaste wrap with calamine lotion, covered with dry gauze and then an ace wrap for compression.    Last changed: one week ago on 6/19/17     Wound #1 Left lower leg, lateral of center, traumatic wound complicated by long history of LE edema.  Stage/tissue depth: full thickness  1.1 cm L x 0.6 cm W x 0.3 cm D, No change since last visit one week ago.  Tunneling: no  Undermining: no  Wound bed type/amount: red nongranular tissue; NA fluctuant  Wound Edges: open and irregular  Periwound: hemosiderin staining noted.  Edema is resolved today.  Drainage: moderate  amounts of bloody drainage, slow initially when dressing was removed but increased to a moderate amount once vigorus cleaning with cotton tipped applicator was done.    Odor: no  Pain: patient denied pain today at the wound.  Only pain pt states is in both of his knees, left knee greater than the right.  This pain has been getting worse as of late.     Dorsalis Pedal Pulse: weak but palpable: NA doppler: NA phasic  Hair growth: none noted on the lower leg or foot  Capillary Refill: less than 3 seconds  Feet/toes color: edema with hemosiderin staining   Nails: WNL  R Leg: Edema plus 2. Ankle circumference NA cm. Calf circumference NA cm.  L Leg: Edema none noted today after dressing was removed. Ankle circumference NA cm. Calf circumference NA cm.     Mobility: independent, slow, with use of quad cane  Current offloading/footwear: none, wears regular shoes.  Sensation: limited in the feet and lower legs due to peripheral neuropathy.  HgbA1C: 6.4 Date: 5/22/17  Checks Blood Glucose?:  Pt does not check blood glucose regularly.  Only checks when he feels he is high of low.   Other callousing/areas of concern: Left heel is very dry today with moderate amount of callusing and flaky skin.  Treated with use of Sween 24 today prior to dressing application.       Diet: regular  Smoking: Occasional cigar smoker     Discussed: etiology of wound (trauma complicated by edema, venous blood flow), pathophysiology and patient specific goals for wound healing. Need for application from them for home care services resumption once they get back home to their place in [place].  Education: On wound cares in writing.. Wound care products currently in use, listed both the brand names and the generic names.  Signs of infection of the wound and wound status today and addition of plain calcium alginate applied to wound for better absorbency today.     Assessment:  Wound is more pink and bloody today but no change in the size today.  Wound  edges remain open, periwound skin remains intact with desquamation noted.     Barriers to wound healing:   Poor nutrition: inadequate supply of protein, carbohydrates, fatty acids, and trace elements essential for all phases of wound healing, NA  Reduced Blood Supply: inadequate perfusion to heal wound, MINESH ultrasound done 6/1/17 was 0.96 on the right and 0.91 on the left, borderline arterial insuffiencey per report.   Medication: NA  Chemotherapy: suppresses the immune system and inflammatory response, NA  Radiotherapy: increases production of free radical which damage cells, NA  Psychological stress: NA  Obesity: decreases tissue perfusion, NA  Infection: prolongs inflammatory phase, uses vital nutrients, impairs epithelialization and releases toxins, NA no active signs of infection noted at this time.  Underlying Disease: diabetes mellitis, but currently well controlled  Maceration: reduces wound tensile strength and inhibits epithelial migration, None noted at this time.  But with the increased drainage noted today compared to last clinic visit, the addition of calcium alginate was done to prevent due to the increased risk for maceration.  Patient compliance, NA  Unrelieved pressure, NA  Immobility, NA  Substance abuse: NA     Plan: Continue with current plan of care, triad, Unna's boot viscopaste wrap, roll gauze and ace wrap changed weekly. Added in today, after applying the Triad paste, a cut to fit piece of plain calcium alginate placed over the opening of the wound.       Topical care: Triad wound paste  Offloading: NA  Additional recommendations: None this visit     Wound Care: Wound cleansed with Microklenz and gauze, patted dry. Periwound protected with NA. Wound base filled with triad hydrophilic wound dressing/paste. Applied cut to fit piece of pain calcium alginate on top of the wounds opening. Covered with Unna's boot viscopaste wrap, followed by roll gauze. Secured with NA.  Applied Ace wrap over  the dressing for compression. To be changed weekly.     Discussed plan of care with patient and his wife. Teaching done with patient and his wife for dressing changes; current plan is for the pts wife to do the cares weekly herself until their home care services can resume in [place].     The following discharge instructions were reviewed with and sent home with the patient: See discharge instructions     The following supplies were sent home with the patient: Remains of Unna's boot not used up today, plus two more Unna's boots for them to use till they can get more when home in [place].  Two rolls of roll gauze.  The remains of the tube of Triad wound paste not used up today in clinic.    Will reassess care plan in, NA pt will be returning to their home in [place] later this week and do not plan to [place] the [place] until the end of Aug 2017.     Return visit: NA see above     Verbal, written, & demonstrative education provided.  Face to face time (excluding procedure): approximately 45 minutes.  Procedure: NA  Care plan was not changed.     [phone]

## 2017-06-19 NOTE — DISCHARGE INSTRUCTIONS
Today we did the same dressing changes as we did last week.  1 Cleanse with wound cleanser.  2 Fill hole of the wound with Triad Hydrophilic Wound Paste.  3 Add a piece of calcium alginate with add absorbency.    4 Apply the Unna's boot, (Visco Paste Wrap) over the lower foot and leg.  Overlap by half once up the leg.  5 Apply roll gauze over the Unna's boot.  6 Apply an Ace wrap from the far foot to just below the knee.  7 Change the dressing once weekly.    Good luck with your travel home.    Jett Garza RN cwocn

## 2017-06-19 NOTE — TELEPHONE ENCOUNTER
Reason for Call:  Request for results:    Name of test or procedure: CT     Date of test of procedure: 6/12    Location of the test or procedure: [facility] to leave the result message on voice mail or with a family member? YES    Phone number Patient can be reached at:  [phone]    Additional comments:  Dr Shine is out, he is wondering if you can give him these results?      Call taken on 6/19/2017 at 10:57 AM by Claire Hinojosa

## 2017-06-19 NOTE — IP AVS SNAPSHOT
MRN:[id number]                      After Visit Summary   6/19/2017    Glenroy Padilla    MRN: [id number]           Visit Information        Provider Department      6/19/2017  8:00 AM  WOUND EXAM ROOM [facility]           Review of your medicines      UNREVIEWED medicines. Ask your doctor about these medicines        Dose / Directions    ferrous sulfate 325 (65 FE) MG tablet   Commonly known as:  IRON   Used for:  History of anemia        Dose:  325 mg   Take 1 tablet (325 mg) by mouth daily (with breakfast)   Quantity:  90 tablet   Refills:  2       rosuvastatin 10 MG tablet   Commonly known as:  CRESTOR   Used for:  Type 2 diabetes mellitus with other circulatory complication, without long-term current use of insulin (H)        Dose:  10 mg   Take 1 tablet (10 mg) by mouth daily   Quantity:  90 tablet   Refills:  1       SitaGLIPtin-MetFORMIN HCl 100-1000 MG Tb24   Commonly known as:  JANUMET XR   Used for:  Type 2 diabetes mellitus with complication, without long-term current use of insulin (H)        Dose:  1 tablet   Take 1 tablet by mouth daily   Quantity:  30 tablet   Refills:  3         CONTINUE these medicines which have NOT CHANGED        Dose / Directions    order for DME   Used for:  Type 2 diabetes mellitus with other circulatory complication, without long-term current use of insulin (H)        One touch glucose monitoring strip with Glucometer and lancets.   Quantity:  1 Box   Refills:  1                Protect others around you: Learn how to safely use, store and throw away your medicines at www.disposemymeds.org.         Follow-ups after your visit         Care Instructions        Further instructions from your care team       Today we did the same dressing changes as we did last week.  1 Cleanse with wound cleanser.  2 Fill hole of the wound with Triad Hydrophilic Wound Paste.  3 Add a piece of calcium alginate with add absorbency.    4 Apply the Unna's boot,  "(Visco Paste Wrap) over the lower foot and leg.  Overlap by half once up the leg.  5 Apply roll gauze over the Unna's boot.  6 Apply an Ace wrap from the far foot to just below the knee.  7 Change the dressing once weekly.    Good luck with your travel home.    Jett Garza RN [email or web address]     Additional Information About Your Visit        [employer] Information     [employer] lets you send messages to your doctor, view your test results, renew your prescriptions, schedule appointments and more. To sign up, go to [email or web address] . Click on \"Log in\" on the left side of the screen, which will take you to the Welcome page. Then click on \"Sign up Now\" on the right side of the page.     You will be asked to enter the access code listed below, as well as some personal information. Please follow the directions to create your username and password.     Your access code is: [email or web address]  Expires: 8/15/2017  8:46 AM     Your access code will  in 90 days. If you need help or a new code, please call your [facility] clinic or [phone].        Care EveryWhere ID     This is your Care EveryWhere ID. This could be used by other organizations to access your [facility] medical records  [id number]         Primary Care Provider Office Phone # Fax #    Hugo Francisco [phone]      Thank you!     Thank you for choosing [facility] for your care. Our goal is always to provide you with excellent care. Hearing back from our patients is one way we can continue to improve our services. Please take a few minutes to complete the written survey that you may receive in the mail after you visit with us. Thank you!             Medication List: This is a list of all your medications and when to take them. Check marks below indicate your daily home schedule. Keep this list as a reference.      Medications           Morning Afternoon Evening Bedtime As Needed    ferrous sulfate 325 (65 FE) MG tablet   Commonly known as:  " IRON   Take 1 tablet (325 mg) by mouth daily (with breakfast)                                order for DME   One touch glucose monitoring strip with Glucometer and lancets.                                rosuvastatin 10 MG tablet   Commonly known as:  CRESTOR   Take 1 tablet (10 mg) by mouth daily                                SitaGLIPtin-MetFORMIN HCl 100-1000 MG Tb24   Commonly known as:  JANUMET XR   Take 1 tablet by mouth daily

## 2017-06-19 NOTE — IP AVS SNAPSHOT
[address] [address]    Phone:  [phone]    Fax:  [phone]                                       After Visit Summary   6/19/2017    Glenroy Padilla    MRN: [id number]           After Visit Summary Signature Page     I have received my discharge instructions, and my questions have been answered. I have discussed any challenges I see with this plan with the nurse or doctor.    ..........................................................................................................................................  Patient/Patient Representative Signature      ..........................................................................................................................................  Patient Representative Print Name and Relationship to Patient    ..................................................               ................................................  Date                                            Time    ..........................................................................................................................................  Reviewed by Signature/Title    ...................................................              ..............................................  Date                                                            Time

## 2017-06-20 NOTE — TELEPHONE ENCOUNTER
Patient called and updated with CT abdomen result.  Explain that there is an enlarged lymph node and needs repeat imagining and follow up.    Electronically signed:  Hugo Francisco M.D.

## 2017-07-05 ENCOUNTER — TELEPHONE (OUTPATIENT)
Dept: FAMILY MEDICINE | Facility: OTHER | Age: 67
End: 2017-07-05

## 2017-07-05 NOTE — TELEPHONE ENCOUNTER
Reason for Call:  Other Question regarding wound care treatment    Detailed comments: Pt states he is in [place] and unsure if he needs to continue wound care treatment.  Pt would like a return santiago with direction on if he needs to continue treatment while in [place].  Pt states he won't return until end of August.    Phone Number Patient can be reached at: [phone]    Best Time: anytime    Can we leave a detailed message on this number? YES    Call taken on 7/5/2017 at 9:36 AM by Tea Dhillon

## 2017-07-05 NOTE — TELEPHONE ENCOUNTER
Jett has the patient in an UNNA boot. Last seen by Jett 6/19/17. Has been seen by home care nurse twice and boot was changed twice. Wound care nurse to see patient next week. Instructed patient to keep boot on and dry from showering. Explained purpose of boot and reason he doesn't have one on the other leg. Writer verbalized understanding and he should make an appt at [place] once back in the [facility].    Before chart review, writer reviewed wound care with MD. Continue triad. After talking with pt and review, noted patient to be in UNNA boot, told pt to continue.

## 2017-07-10 ENCOUNTER — TELEPHONE (OUTPATIENT)
Dept: SURGERY | Facility: CLINIC | Age: 67
End: 2017-07-10

## 2017-07-10 NOTE — TELEPHONE ENCOUNTER
Reason for Call:  Patient would like to talk to Jett. Please call him back    Detailed comments: none    Phone Number Patient can be reached at: Home number on file [phone] (home)    Best Time: any    Can we leave a detailed message on this number? YES    Call taken on 7/10/2017 at 1:02 PM by Meme Isaacs

## 2017-09-22 ENCOUNTER — TELEPHONE (OUTPATIENT)
Dept: SURGERY | Facility: OTHER | Age: 67
End: 2017-09-22

## 2017-09-22 ENCOUNTER — OFFICE VISIT (OUTPATIENT)
Dept: SURGERY | Facility: OTHER | Age: 67
End: 2017-09-22
Payer: MEDICAID

## 2017-09-22 VITALS — TEMPERATURE: 96.6 F | HEART RATE: 78 BPM | BODY MASS INDEX: 45.05 KG/M2 | WEIGHT: 314 LBS

## 2017-09-22 DIAGNOSIS — R60.0 EDEMA OF BOTH LEGS: ICD-10-CM

## 2017-09-22 DIAGNOSIS — I87.303 STASIS EDEMA OF BOTH LOWER EXTREMITIES: ICD-10-CM

## 2017-09-22 DIAGNOSIS — S81.802D OPEN WOUND OF LEFT LOWER EXTREMITY WITHOUT COMPLICATION, SUBSEQUENT ENCOUNTER: Primary | ICD-10-CM

## 2017-09-22 PROCEDURE — 99214 OFFICE O/P EST MOD 30 MIN: CPT | Performed by: SPECIALIST

## 2017-09-22 NOTE — PROGRESS NOTES
SUBJECTIVE:                                                    Glenroy Chung for HPI/ROS submitted by the patient on 9/25/2017   ARNALDO 7 TOTAL SCORE: 7  If you checked off any problems, how difficult have these problems made it for you to do your work, take care of things at home, or get along with other people?: Not difficult at all  PHQ9 TOTAL SCORE: 5  robert is a 67 year old male who presents to clinic today for the following health issues:    HPI    Diabetes Follow-up      Patient is checking blood sugars: 1 times a week    Diabetic concerns: other - Open sores on legs     Symptoms of hypoglycemia (low blood sugar): none     Paresthesias (numbness or burning in feet) or sores: Yes but has vascular deficits noted as well.  Is seeing specialist for this.     Date of last diabetic eye exam: June 2016    Hyperlipidemia Follow-Up      Rate your low fat/cholesterol diet?: fair    Taking statin?  Yes, no muscle aches from statin    Other lipid medications/supplements?:  none    Hypertension Follow-up      Outpatient blood pressures are being checked at home.  Results are 130/70.    Low Salt Diet: no added salt          Problem list and histories reviewed & adjusted, as indicated.  Additional history: as documented    Patient Active Problem List   Diagnosis     Type 2 diabetes mellitus with other circulatory complication, without long-term current use of insulin (H)     Venous stasis ulcer of left lower extremity (H)     Acute pain of left knee     Chronic pain of right knee     Open wound of left lower extremity without complication, initial encounter     Hx of coronary artery disease     Hx of heart artery stent     DAYTON (obstructive sleep apnea)     Hypertension, goal below 140/90     Hyperlipidemia LDL goal <100     No past surgical history on file.    Social History   Substance Use Topics     Smoking status: Current Some Day Smoker     Types: Cigars     Smokeless tobacco: Never Used     Alcohol use No     No  family history on file.          ROS:  Constitutional, HEENT, cardiovascular, pulmonary, gi and gu systems are negative, except as otherwise noted.      OBJECTIVE:   /70 (Cuff Size: Adult Large)  Pulse 80  Temp 97.7  F (36.5  C) (Temporal)  Resp 20  Wt (!) 317 lb 12.8 oz (144.2 kg)  BMI 45.6 kg/m2  Body mass index is 45.6 kg/(m^2).  GENERAL: healthy, alert and no distress  NECK: no adenopathy, no asymmetry, masses, or scars and trachea midline and normal to palpation  RESP: lungs clear to auscultation - no rales, rhonchi or wheezes  CV: regular rate and rhythm, normal S1 S2, no S3 or S4, no murmur, click or rub, no peripheral edema and peripheral pulses strong  MS: compression stockings are noted and patient informs us that we are not to be removing them for evaluation.  Edema noted.  PSYCH: mentation appears normal, affect normal/bright    Diagnostic Test Results:  Results for orders placed or performed in visit on 09/25/17 (from the past 24 hour(s))   Hemoglobin A1c   Result Value Ref Range    Hemoglobin A1C 6.2 (H) 4.3 - 6.0 %   CBC with platelets   Result Value Ref Range    WBC 8.6 4.0 - 11.0 10e9/L    RBC Count 4.08 (L) 4.4 - 5.9 10e12/L    Hemoglobin 11.6 (L) 13.3 - 17.7 g/dL    Hematocrit 36.4 (L) 40.0 - 53.0 %    MCV 89 78 - 100 fl    MCH 28.4 26.5 - 33.0 pg    MCHC 31.9 31.5 - 36.5 g/dL    RDW 16.0 (H) 10.0 - 15.0 %    Platelet Count 340 150 - 450 10e9/L       ASSESSMENT/PLAN:   1. Screen for colon cancer  Due for FIT  - DIABETES EDUCATOR REFERRAL    2. At risk for falling  noted  - DIABETES EDUCATOR REFERRAL    3. DAYTON (obstructive sleep apnea)  historical  - DIABETES EDUCATOR REFERRAL    4. Type 2 diabetes mellitus with other circulatory complication, without long-term current use of insulin (H)  noted  - Albumin Random Urine Quantitative with Creat Ratio  - TSH WITH FREE T4 REFLEX  - OPTOMETRY REFERRAL  - Hemoglobin A1c  - Lipid panel reflex to direct LDL  - Fecal colorectal cancer screen  (FIT); Future  - Comprehensive metabolic panel  - CBC with platelets  - DIABETES EDUCATOR REFERRAL    5. Venous stasis ulcer of left lower extremity (H)  Continue with specialist.  - Comprehensive metabolic panel  - CBC with platelets  - DIABETES EDUCATOR REFERRAL    6. Hx of coronary artery disease  - DIABETES EDUCATOR REFERRAL    7. Hypertension, goal below 140/90  Fair control  - DIABETES EDUCATOR REFERRAL    8. Hyperlipidemia LDL goal <100  Labs due in the future  - Lipid panel reflex to direct LDL  - Comprehensive metabolic panel  - DIABETES EDUCATOR REFERRAL    9. Special screening for malignant neoplasms, colon  noted  - Fecal colorectal cancer screen (FIT); Future  - DIABETES EDUCATOR REFERRAL    Julio Cesar Sahu PA-C  [facility]

## 2017-09-22 NOTE — MR AVS SNAPSHOT
After Visit Summary   9/22/2017    Glenroy Padilla    MRN: [id number]           Patient Information     Date Of Birth          1950        Visit Information        Provider Department      9/22/2017 11:00 AM Casey Shine MD [facility]        Today's Diagnoses     Open wound of left lower extremity without complication, subsequent encounter    -  1    Edema of both legs        Stasis edema of both lower extremities           Follow-ups after your visit        Additional Services     LYMPHEDEMA THERAPY REFERRAL       PT or OT                  Your next 10 appointments already scheduled     Sep 22, 2017 11:00 AM CDT   [facility]    [address]  [facility] [id number] [phone]            Sep 25, 2017 11:20 AM CDT   Office Visit with Julio Csear Esteban PA-C   [facility]    [id number] [place] [address] [phone]           Bring a current list of meds and any records pertaining to this visit. For Physicals, please bring immunization records and any forms needing to be filled out. Please arrive 10 minutes early to complete paperwork.            Sep 29, 2017  9:30 AM CDT   [facility] Visit with [facility]ine MD   [facility]    [address] [address] [phone]              Who to contact     If you have questions or need follow up information about today's clinic visit or your schedule please contact [facility] directly at [phone].  Normal or non-critical lab and imaging results will be communicated to you by MyChart, letter or phone within 4 business days after the clinic has received the results. If you do not hear from us within 7 days, please contact the clinic through MyChart or phone. If you have a critical or abnormal lab  "result, we will notify you by phone as soon as possible.  Submit refill requests through [employer] or call your pharmacy and they will forward the refill request to us. Please allow 3 business days for your refill to be completed.          Additional Information About Your Visit        [employer] Information     [employer] lets you send messages to your doctor, view your test results, renew your prescriptions, schedule appointments and more. To sign up, go to [email or web address] . Click on \"Log in\" on the left side of the screen, which will take you to the Welcome page. Then click on \"Sign up Now\" on the right side of the page.     You will be asked to enter the access code listed below, as well as some personal information. Please follow the directions to create your username and password.     Your access code is: [email or web address]  Expires: 2017 10:46 AM     Your access code will  in 90 days. If you need help or a new code, please call your [place] clinic or [phone].        Care EveryWhere ID     This is your Care EveryWhere ID. This could be used by other organizations to access your [place] medical records  [id number]        Your Vitals Were     Pulse Temperature BMI (Body Mass Index)             78 96.6  F (35.9  C) (Temporal) 45.05 kg/m2          Blood Pressure from Last 3 Encounters:   17 118/62   00 152/94    Weight from Last 3 Encounters:   17 (!) 142.4 kg (314 lb)   17 (!) 143.4 kg (316 lb 3.2 oz)   17 (!) 142.9 kg (315 lb)              We Performed the Following     LYMPHEDEMA THERAPY REFERRAL        Primary Care Provider Office Phone # Fax #    Julio Cesar Esteban PA-C [phone]       [address] [facility] [address]        Equal Access to Services     JZA TOBAR : Pietro Omalley, rudi cody, qaadams enrique. [facility] [phone].    ATENCIÓN: Si miguel dill " disposición servicios gratuitos de asistencia lingüística. [employer] smart [phone].    We comply with applicable federal civil rights laws and [place] laws. We do not discriminate on the basis of race, color, national origin, age, disability sex, sexual orientation or gender identity.            Thank you!     Thank you for choosing [facility]  for your care. Our goal is always to provide you with excellent care. Hearing back from our patients is one way we can continue to improve our services. Please take a few minutes to complete the written survey that you may receive in the mail after your visit with us. Thank you!             Your Updated Medication List - Protect others around you: Learn how to safely use, store and throw away your medicines at www.disposemymeds.org.          This list is accurate as of: 9/22/17 10:47 AM.  Always use your most recent med list.                   Brand Name Dispense Instructions for use Diagnosis    ferrous sulfate 325 (65 FE) MG tablet    IRON    90 tablet    Take 1 tablet (325 mg) by mouth daily (with breakfast)    History of anemia       order for DME     1 Box    One touch glucose monitoring strip with Glucometer and lancets.    Type 2 diabetes mellitus with other circulatory complication, without long-term current use of insulin (H)       rosuvastatin 10 MG tablet    CRESTOR    90 tablet    Take 1 tablet (10 mg) by mouth daily    Type 2 diabetes mellitus with other circulatory complication, without long-term current use of insulin (H)       SitaGLIPtin-MetFORMIN HCl 100-1000 MG Tb24    JANUMET XR    30 tablet    Take 1 tablet by mouth daily    Type 2 diabetes mellitus with complication, without long-term current use of insulin (H)

## 2017-09-22 NOTE — PROGRESS NOTES
F/U for leg wound    Subjective:  Patient is a 66-year-old white male presenting history of a left leg wound. He hit a coffee table  the wound is slow to heal. He was initially treated in [place] and has a 15 year history of bilateral lower extremity edema. He has never been treated with any form of compression therapy and was told not to based on an ultrasound results in [place]. Those results are not available to me at this time. He denies any prior DVT, nonhealing wounds, leg trauma, claudication or rest pain. He was told in [place] to treat the leg with Betadine. He came [place] the [place] for second opinion.      Last seen by me in June and was tx with UNNA boot and silvercel.  Returned to [place] and was treated with hydroferra blue.  Now [place] to [place] for F/[place].      Objective:  B/P: Data Unavailable, T: 96.6, P: 78, R: Data Unavailable  Ext; Warm, +2  Edema bilaterally.  Bilateral venous stasis changes with thickened skin.   (+)FEM pulses.  No distal pulses.  LLE wound 3.5x2.5x0.5cm      U/S -   ULTRASOUND VENOUS COMPETENCY BILATERAL   6/1/2017 3:29 PM      HISTORY: Localized edema. Chronic venous hypertension (idiopathic)  without complications of bilateral lower extremity. Unspecified open  wound, left lower leg, initial encounter.     COMPARISON: None.     TECHNIQUE: Color Doppler and spectral waveform analysis performed.     FINDINGS: Bilateral common femoral veins, femoral veins, and popliteal  veins are patent and demonstrate no evidence of reflux.     Other than questionable 1 second reflux at the right saphenofemoral  junction, the right great saphenous vein is competent throughout its  length. Diameter of the right great saphenous vein is 3-7 mm.     The left great saphenous vein is competent throughout its length.  Diameter of the left great saphenous vein is 2-7 mm.     Visualized portions of bilateral small saphenous veins appear to be  competent, without evidence of reflux.     No prominent  perforators were demonstrated. Bilateral Giacomini veins  were competent.         IMPRESSION: The deep and superficial venous systems of both legs are  patent and competent.     PATRICK SCHERER MD    [facility] -   ULTRASOUND ANKLE-BRACHIAL INDEX DOPPLER NO EXERCISE   6/1/2017 3:16 PM        HISTORY: Localized edema. Chronic venous hypertension (idiopathic)  without complications of bilateral lower extremity.     COMPARISON: None.     FINDINGS: Ankle-brachial index is 0.96 on the right and 0.91 on the  left. Waveforms appear biphasic/monophasic bilaterally.         IMPRESSION: Borderline bilateral arterial insufficiency.     PATRICK SCHERER MD        Impression/plan:  This is a 66 year old gentleman with bilateral chronic stasis changes and lymphedema. He has a wound as well as a result of his left leg hitting a coffee table.   Was treated in [place] with hydroferra blue.  The wound is much worse this visit.      Will resume UNNA boot and silvercel.  Lymphedema consult with PT or OT.  F/U 1 week.      Casey Shine MD, FACS

## 2017-09-22 NOTE — NURSING NOTE
"Chief Complaint   Patient presents with     WOUND CARE     patient has been recieving care in [place]     RECHECK       Initial Pulse 78  Temp 96.6  F (35.9  C) (Temporal)  Wt (!) 142.4 kg (314 lb)  BMI 45.05 kg/m2 Estimated body mass index is 45.05 kg/(m^2) as calculated from the following:    Height as of 5/22/17: 1.778 m (5' 10\").    Weight as of this encounter: 142.4 kg (314 lb).  Medication Reconciliation: complete    "

## 2017-09-22 NOTE — TELEPHONE ENCOUNTER
Reason for Call:  Other     Detailed comments: pt states needs dressing changed on leg with Kub[place]a. Pt states wondering if pt can be worked in today at ER. Please advise     Phone Number Patient can be reached at: Work number on file:  [phone] (work)    Best Time: ANY    Can we leave a detailed message on this number? YES    Call taken on 9/22/2017 at 9:25 AM by Riana Price

## 2017-09-22 NOTE — TELEPHONE ENCOUNTER
Call placed to patient-patient states he is back from [place] and needs wound dressing changed...patient scheduled for today in [place] at 1015....................................Hui Ashraf CMA  ([facility])

## 2017-09-22 NOTE — NURSING NOTE
"This pt is new to me. He has seen MD previously. He is wearing tubigrip on right leg in 2 layers, no open skin. Left lateral santiago with wound, MD did the measurements bleeds with cleansing. Color is yellow/pink. Can feel at 4-6 o'clock sharp edges in wound bed, MD was shown this. Pt states he had \"calcium built up there\". Skin is firm on both lower legs.  tubigrip remains on right lower leg.. Unna boot placed with silvercel cut to fit in 2 layers over wound bed. YAHIR Wilkins     "

## 2017-09-25 ENCOUNTER — TELEPHONE (OUTPATIENT)
Dept: FAMILY MEDICINE | Facility: OTHER | Age: 67
End: 2017-09-25

## 2017-09-25 ENCOUNTER — OFFICE VISIT (OUTPATIENT)
Dept: FAMILY MEDICINE | Facility: OTHER | Age: 67
End: 2017-09-25
Payer: MEDICAID

## 2017-09-25 VITALS
SYSTOLIC BLOOD PRESSURE: 122 MMHG | DIASTOLIC BLOOD PRESSURE: 70 MMHG | RESPIRATION RATE: 20 BRPM | HEART RATE: 80 BPM | WEIGHT: 315 LBS | TEMPERATURE: 97.7 F | BODY MASS INDEX: 45.6 KG/M2

## 2017-09-25 DIAGNOSIS — E11.59 TYPE 2 DIABETES MELLITUS WITH OTHER CIRCULATORY COMPLICATION, WITHOUT LONG-TERM CURRENT USE OF INSULIN (H): ICD-10-CM

## 2017-09-25 DIAGNOSIS — M25.561 CHRONIC PAIN OF RIGHT KNEE: ICD-10-CM

## 2017-09-25 DIAGNOSIS — G89.29 CHRONIC PAIN OF RIGHT KNEE: ICD-10-CM

## 2017-09-25 DIAGNOSIS — I10 HYPERTENSION, GOAL BELOW 140/90: ICD-10-CM

## 2017-09-25 DIAGNOSIS — K21.9 GASTROESOPHAGEAL REFLUX DISEASE, ESOPHAGITIS PRESENCE NOT SPECIFIED: ICD-10-CM

## 2017-09-25 DIAGNOSIS — I83.029 VENOUS STASIS ULCER OF LEFT LOWER EXTREMITY (H): ICD-10-CM

## 2017-09-25 DIAGNOSIS — E78.5 HYPERLIPIDEMIA LDL GOAL <100: ICD-10-CM

## 2017-09-25 DIAGNOSIS — Z86.79 HX OF CORONARY ARTERY DISEASE: ICD-10-CM

## 2017-09-25 DIAGNOSIS — G47.33 OSA (OBSTRUCTIVE SLEEP APNEA): Primary | ICD-10-CM

## 2017-09-25 DIAGNOSIS — Z12.11 SCREEN FOR COLON CANCER: ICD-10-CM

## 2017-09-25 DIAGNOSIS — E11.59 TYPE 2 DIABETES MELLITUS WITH OTHER CIRCULATORY COMPLICATION, WITHOUT LONG-TERM CURRENT USE OF INSULIN (H): Primary | ICD-10-CM

## 2017-09-25 DIAGNOSIS — L97.929 VENOUS STASIS ULCER OF LEFT LOWER EXTREMITY (H): ICD-10-CM

## 2017-09-25 DIAGNOSIS — Z12.11 SPECIAL SCREENING FOR MALIGNANT NEOPLASMS, COLON: ICD-10-CM

## 2017-09-25 DIAGNOSIS — S81.802A OPEN WOUND OF LEFT LOWER EXTREMITY WITHOUT COMPLICATION, INITIAL ENCOUNTER: ICD-10-CM

## 2017-09-25 DIAGNOSIS — Z91.81 AT RISK FOR FALLING: ICD-10-CM

## 2017-09-25 DIAGNOSIS — Z95.5 HISTORY OF CORONARY ARTERY STENT PLACEMENT: ICD-10-CM

## 2017-09-25 LAB
ALBUMIN SERPL-MCNC: 3.3 G/DL (ref 3.4–5)
ALP SERPL-CCNC: 68 U/L (ref 40–150)
ALT SERPL W P-5'-P-CCNC: 19 U/L (ref 0–70)
ANION GAP SERPL CALCULATED.3IONS-SCNC: 12 MMOL/L (ref 3–14)
AST SERPL W P-5'-P-CCNC: 15 U/L (ref 0–45)
BILIRUB SERPL-MCNC: 0.5 MG/DL (ref 0.2–1.3)
BUN SERPL-MCNC: 17 MG/DL (ref 7–30)
CALCIUM SERPL-MCNC: 9.1 MG/DL (ref 8.5–10.1)
CHLORIDE SERPL-SCNC: 100 MMOL/L (ref 94–109)
CHOLEST SERPL-MCNC: 128 MG/DL
CO2 SERPL-SCNC: 27 MMOL/L (ref 20–32)
CREAT SERPL-MCNC: 0.92 MG/DL (ref 0.66–1.25)
CREAT UR-MCNC: 112 MG/DL
ERYTHROCYTE [DISTWIDTH] IN BLOOD BY AUTOMATED COUNT: 16 % (ref 10–15)
GFR SERPL CREATININE-BSD FRML MDRD: 81 ML/MIN/1.7M2
GLUCOSE SERPL-MCNC: 230 MG/DL (ref 70–99)
HBA1C MFR BLD: 6.2 % (ref 4.3–6)
HCT VFR BLD AUTO: 36.4 % (ref 40–53)
HDLC SERPL-MCNC: 43 MG/DL
HGB BLD-MCNC: 11.6 G/DL (ref 13.3–17.7)
LDLC SERPL CALC-MCNC: 57 MG/DL
MCH RBC QN AUTO: 28.4 PG (ref 26.5–33)
MCHC RBC AUTO-ENTMCNC: 31.9 G/DL (ref 31.5–36.5)
MCV RBC AUTO: 89 FL (ref 78–100)
MICROALBUMIN UR-MCNC: 117 MG/L
MICROALBUMIN/CREAT UR: 104.46 MG/G CR (ref 0–17)
NONHDLC SERPL-MCNC: 85 MG/DL
PLATELET # BLD AUTO: 340 10E9/L (ref 150–450)
POTASSIUM SERPL-SCNC: 4.1 MMOL/L (ref 3.4–5.3)
PROT SERPL-MCNC: 8.4 G/DL (ref 6.8–8.8)
RBC # BLD AUTO: 4.08 10E12/L (ref 4.4–5.9)
SODIUM SERPL-SCNC: 139 MMOL/L (ref 133–144)
TRIGL SERPL-MCNC: 140 MG/DL
TSH SERPL DL<=0.005 MIU/L-ACNC: 2.07 MU/L (ref 0.4–4)
WBC # BLD AUTO: 8.6 10E9/L (ref 4–11)

## 2017-09-25 PROCEDURE — 99214 OFFICE O/P EST MOD 30 MIN: CPT | Performed by: PHYSICIAN ASSISTANT

## 2017-09-25 PROCEDURE — 36415 COLL VENOUS BLD VENIPUNCTURE: CPT | Performed by: PHYSICIAN ASSISTANT

## 2017-09-25 PROCEDURE — 80061 LIPID PANEL: CPT | Performed by: PHYSICIAN ASSISTANT

## 2017-09-25 PROCEDURE — 82043 UR ALBUMIN QUANTITATIVE: CPT | Performed by: PHYSICIAN ASSISTANT

## 2017-09-25 PROCEDURE — 83036 HEMOGLOBIN GLYCOSYLATED A1C: CPT | Performed by: PHYSICIAN ASSISTANT

## 2017-09-25 PROCEDURE — 84443 ASSAY THYROID STIM HORMONE: CPT | Performed by: PHYSICIAN ASSISTANT

## 2017-09-25 PROCEDURE — 80053 COMPREHEN METABOLIC PANEL: CPT | Performed by: PHYSICIAN ASSISTANT

## 2017-09-25 PROCEDURE — 85027 COMPLETE CBC AUTOMATED: CPT | Performed by: PHYSICIAN ASSISTANT

## 2017-09-25 RX ORDER — LIDOCAINE 50 MG/G
OINTMENT TOPICAL PRN
COMMUNITY
End: 2017-09-26

## 2017-09-25 ASSESSMENT — ANXIETY QUESTIONNAIRES
3. WORRYING TOO MUCH ABOUT DIFFERENT THINGS: SEVERAL DAYS
7. FEELING AFRAID AS IF SOMETHING AWFUL MIGHT HAPPEN: NOT AT ALL
6. BECOMING EASILY ANNOYED OR IRRITABLE: SEVERAL DAYS
GAD7 TOTAL SCORE: 7
7. FEELING AFRAID AS IF SOMETHING AWFUL MIGHT HAPPEN: NOT AT ALL
2. NOT BEING ABLE TO STOP OR CONTROL WORRYING: SEVERAL DAYS
GAD7 TOTAL SCORE: 7
1. FEELING NERVOUS, ANXIOUS, OR ON EDGE: SEVERAL DAYS
GAD7 TOTAL SCORE: 7
4. TROUBLE RELAXING: MORE THAN HALF THE DAYS
5. BEING SO RESTLESS THAT IT IS HARD TO SIT STILL: SEVERAL DAYS

## 2017-09-25 ASSESSMENT — PATIENT HEALTH QUESTIONNAIRE - PHQ9
SUM OF ALL RESPONSES TO PHQ QUESTIONS 1-9: 5
SUM OF ALL RESPONSES TO PHQ QUESTIONS 1-9: 5
10. IF YOU CHECKED OFF ANY PROBLEMS, HOW DIFFICULT HAVE THESE PROBLEMS MADE IT FOR YOU TO DO YOUR WORK, TAKE CARE OF THINGS AT HOME, OR GET ALONG WITH OTHER PEOPLE: NOT DIFFICULT AT ALL

## 2017-09-25 ASSESSMENT — PAIN SCALES - GENERAL: PAINLEVEL: MODERATE PAIN (4)

## 2017-09-25 NOTE — MR AVS SNAPSHOT
After Visit Summary   9/25/2017    Glenroy Padilla    MRN: [id number]           Patient Information     Date Of Birth          1950        Visit Information        Provider Department      9/25/2017 11:20 AM Julio Cesar Esteban PA-C [facility]        Today's Diagnoses     [place] (obstructive sleep apnea)    -  1    Screen for colon cancer        At risk for falling        Type 2 diabetes mellitus with other circulatory complication, without long-term current use of insulin (H)        Venous stasis ulcer of left lower extremity (H)        Hx of coronary artery disease        Hypertension, goal below 140/90        Hyperlipidemia LDL goal <100        Special screening for malignant neoplasms, colon           Follow-ups after your visit        Additional Services     DIABETES EDUCATOR REFERRAL       DIABETES SELF MANAGEMENT TRAINING (DSMT)      Your provider has referred you to Diabetes Education: [facility]: Diabetes Education - All [facility] [phone]   [email or web address]    Type of training and number of hours: Previous Diagnosis: Follow-up DSMT - 2 hours.    Medicare covers: 10 hours of initial DSMT in 12 month period from the time of first visit, plus 2 hours of follow-up DSMT annually, and additional hours as requested for insulin training.    Diabetes Type: Type 2 - On Oral Medication             Diabetes Co-Morbidities: atherosclerotic cardiovascular disease, hypertension and neuropathy               A1C Goal:  <8.0       A1C is: Lab Results       Component                Value               Date                       A1C                      6.4                 05/22/2017              If an urgent visit is needed or A1C is above 12, Care Team to call the Diabetes Education Team at [phone] or send an In Basket message to the Diabetes Education Pool (P DIAB ED-PATIENT CARE).    Diabetes Education Topics: Comprehensive Knowledge  Assessment and Instruction    Special Educational Needs Requiring Individual DSMT: Additional DSMT hours requested: 2 hours       MEDICAL NUTRITION THERAPY (MNT) for Diabetes    Medical Nutrition Therapy with a Registered Dietitian can be provided in coordination with Diabetes Self-Management Training to assist in achieving optimal diabetes management.    MNT Type and Hours: Previous diagnosis: Annual follow-up MNT - 2 hours                       Medicare will cover: 3 hours initial MNT in 12 month period after first visit, plus 2 hours of follow-up MNT annually    Please be aware that coverage of these services is subject to the terms and limitations of your health insurance plan.  Call member services at your health plan to determine Diabetes Self-Management Training benefits and ask which blood glucose monitor brands are covered by your plan.      Please bring the following with you to your appointment:    (1)  List of current medications   (2)  List of Blood Glucose Monitor brands that are covered by your insurance plan  (3)  Blood Glucose Monitor and log book  (4)   Food records for the 3 days prior to your visit    The Certified Diabetes Educator may make diabetes medication adjustments per the CDE Protocol and Collaborative Practice Agreement.            OPTOMETRY REFERRAL       Your provider has referred you to:  [facility]: [facility] [phone]   [email or web address]    Please be aware that coverage of these services is subject to the terms and limitations of your health insurance plan.  Call member services at your health plan with any benefit or coverage questions.      Please bring the following to your appointment:  >>   Any x-rays, CTs or MRIs which have been performed.  Contact the facility where they were done to arrange for  prior to your scheduled appointment.  Any new CT, MRI or other procedures ordered by your  "specialist must be performed at a [place] facility or coordinated by your clinic's referral office.    >>   List of current medications   >>   This referral request   >>   Any documents/labs given to you for this referral                  Your next 10 appointments already scheduled     Sep 29, 2017  9:45 AM CDT   [facility] with [facility] MD   [facility]    [address] [place] [id number] [phone]              Future tests that were ordered for you today     Open Future Orders        Priority Expected Expires Ordered    Fecal colorectal cancer screen (FIT) Routine 10/16/2017 12/18/2017 9/25/2017            Who to contact     If you have questions or need follow up information about today's clinic visit or your schedule please contact [facility] directly at [phone].  Normal or non-critical lab and imaging results will be communicated to you by MyChart, letter or phone within 4 business days after the clinic has received the results. If you do not hear from us within 7 days, please contact the clinic through MyChart or phone. If you have a critical or abnormal lab result, we will notify you by phone as soon as possible.  Submit refill requests through [employer] or call your pharmacy and they will forward the refill request to us. Please allow 3 business days for your refill to be completed.          Additional Information About Your Visit        [employer] Information     [employer] lets you send messages to your doctor, view your test results, renew your prescriptions, schedule appointments and more. To sign up, go to [email or web address] . Click on \"Log in\" on the left side of the screen, which will take you to the Welcome page. Then click on \"Sign up Now\" on the right side of the page.     You will be asked to enter the access code listed below, as well as some personal information. Please follow the directions to create your " username and password.     Your access code is: [email or web address]  Expires: 2017 10:46 AM     Your access code will  in 90 days. If you need help or a new code, please call your [place] clinic or [phone].        Care EveryWhere ID     This is your Care EveryWhere ID. This could be used by other organizations to access your [place] medical records  [id number]        Your Vitals Were     Pulse Temperature Respirations BMI (Body Mass Index)          80 97.7  F (36.5  C) (Temporal) 20 45.6 kg/m2         Blood Pressure from Last 3 Encounters:   17 122/70   17 118/62   00 152/94    Weight from Last 3 Encounters:   17 (!) 317 lb 12.8 oz (144.2 kg)   17 (!) 314 lb (142.4 kg)   17 (!) 316 lb 3.2 oz (143.4 kg)              We Performed the Following     Albumin Random Urine Quantitative with Creat Ratio     CBC with platelets     Comprehensive metabolic panel     DIABETES EDUCATOR REFERRAL     Hemoglobin A1c     Lipid panel reflex to direct LDL     OPTOMETRY REFERRAL     TSH WITH FREE T4 REFLEX        Primary Care Provider Office Phone # Fax #    Julio Cesar Esteban PA-C [phone]       [address] [facility] [address]        Equal Access to Services     JAZ TOBAR AH: Hadii aad ku hadasho Soomaali, waaxda luqadaha, qaybta kaalmada adeegyada, adams horton. So [facility] [phone].    ATENCIÓN: Si habla español, tiene a wilkinson disposición servicios gratuitos de asistencia lingüística. Llame al [phone].    We comply with applicable federal civil rights laws and [place] laws. We do not discriminate on the basis of race, color, national origin, age, disability sex, sexual orientation or gender identity.            Thank you!     Thank you for choosing [facility]  for your care. Our goal is always to provide you with excellent care. Hearing back from our patients is one way we can continue to improve our services. Please take a  few minutes to complete the written survey that you may receive in the mail after your visit with us. Thank you!             Your Updated Medication List - Protect others around you: Learn how to safely use, store and throw away your medicines at www.disposemymeds.org.          This list is accurate as of: 9/25/17 12:05 PM.  Always use your most recent med list.                   Brand Name Dispense Instructions for use Diagnosis    CLOPIDOGREL BISULFATE PO      Take 75 mg by mouth        FAMOTIDINE PO      Take 40 mg by mouth        ferrous sulfate 325 (65 FE) MG tablet    IRON    90 tablet    Take 1 tablet (325 mg) by mouth daily (with breakfast)    History of anemia       FUROSEMIDE PO      Take 40 mg by mouth daily        lidocaine 5 % ointment    XYLOCAINE     Apply topically as needed for moderate pain        order for DME     1 Box    One touch glucose monitoring strip with Glucometer and lancets.    Type 2 diabetes mellitus with other circulatory complication, without long-term current use of insulin (H)       rosuvastatin 10 MG tablet    CRESTOR    90 tablet    Take 1 tablet (10 mg) by mouth daily    Type 2 diabetes mellitus with other circulatory complication, without long-term current use of insulin (H)       SitaGLIPtin-MetFORMIN HCl 100-1000 MG Tb24    JANUMET XR    30 tablet    Take 1 tablet by mouth daily    Type 2 diabetes mellitus with complication, without long-term current use of insulin (H)

## 2017-09-25 NOTE — TELEPHONE ENCOUNTER
TC. When patient calls back give results below.  Notes Recorded by Julio Cesar Esteban PA-C on 9/25/2017 at 5:14 PM  Very stable labs at this point in time hemoglobin A1c is excellent. The microalbumin in your urine is of concern enough to say that we should have you increase the fluids that you are drinking and recheck this in about one month. We may want to consider medication adjustment at that point in time.    Send  letter, with copy of results.  Please place microalbumin for one month from now.  Electronically signed:    Julio Cesar Bedolla MA

## 2017-09-25 NOTE — NURSING NOTE
"Chief Complaint   Patient presents with     Establish Care     Panel Management     Eye exam, TSH, microalbumin, Tdap, ARNALDO, PHQ, CC screen, honoring choices, Fall risk, prevnar, Flu       Initial /70 (Cuff Size: Adult Large)  Pulse 80  Temp 97.7  F (36.5  C) (Temporal)  Resp 20  Wt (!) 317 lb 12.8 oz (144.2 kg)  BMI 45.6 kg/m2 Estimated body mass index is 45.6 kg/(m^2) as calculated from the following:    Height as of 5/22/17: 5' 10\" (1.778 m).    Weight as of this encounter: 317 lb 12.8 oz (144.2 kg).  Medication Reconciliation: complete   Lisa Zuluaga CMA (AAMA)    "

## 2017-09-26 RX ORDER — LIDOCAINE 50 MG/G
OINTMENT TOPICAL DAILY
Qty: 50 G | Refills: 3 | Status: SHIPPED | OUTPATIENT
Start: 2017-09-26 | End: 2019-02-21

## 2017-09-26 RX ORDER — CLOPIDOGREL BISULFATE 75 MG/1
75 TABLET ORAL DAILY
Qty: 90 TABLET | Refills: 1 | Status: ON HOLD | OUTPATIENT
Start: 2017-09-26 | End: 2018-03-14

## 2017-09-26 RX ORDER — FUROSEMIDE 40 MG
40 TABLET ORAL DAILY
Qty: 90 TABLET | Refills: 1 | Status: ON HOLD | OUTPATIENT
Start: 2017-09-26 | End: 2018-03-13

## 2017-09-26 RX ORDER — FAMOTIDINE 40 MG/1
40 TABLET, FILM COATED ORAL
Qty: 90 TABLET | Refills: 1 | Status: SHIPPED | OUTPATIENT
Start: 2017-09-26 | End: 2018-01-25

## 2017-09-26 ASSESSMENT — ANXIETY QUESTIONNAIRES: GAD7 TOTAL SCORE: 7

## 2017-09-26 ASSESSMENT — PATIENT HEALTH QUESTIONNAIRE - PHQ9: SUM OF ALL RESPONSES TO PHQ QUESTIONS 1-9: 5

## 2017-09-26 NOTE — TELEPHONE ENCOUNTER
Patient informed. He is wondering if you could now refill all of his rx's now to [facility] pharmacy.

## 2017-09-26 NOTE — TELEPHONE ENCOUNTER
Furosemide      Last Written Prescription Date:  NA  Last Fill Quantity: NA,   # refills: NA  Last Office Visit with [facility], [facility] or [facility] prescribing provider: 09/25/17  Future Office visit:    Next 5 appointments (look out 90 days)     Sep 29, 2017  9:45 AM CDT   [facility] Visit with [facility] MD   [facility]    [address] [place] [address]   [phone]                   Routing refill request to provider for review/approval because:  Medication is reported/historical  Clopidogrel      Last Written Prescription Date:  NA  Last Fill Quantity: NA,   # refills: NA  Last Office Visit with [facility], [facility] or [facility] prescribing provider: 09/25/17  Future Office visit:    Next 5 appointments (look out 90 days)     Sep 29, 2017  9:45 AM CDT   Return Visit with Casey Shine MD   [facility]    [address] [place] [address]   [phone]                   Routing refill request to provider for review/approval because:  Drug not active on patient's medication list  Lidocaine 5% ointment      Last Written Prescription Date:  NA  Last Fill Quantity: NA,   # refills: NA  Last Office Visit with [facility], [facility] or [facility] prescribing provider: 09/25/17  Future Office visit:    Next 5 appointments (look out 90 days)     Sep 29, 2017  9:45 AM CDT   Return Visit with Casey Shine MD   [facility]    [address] [place] [address]   [phone]                   Routing refill request to provider for review/approval because:  Drug not on the [facility], [facility] or [facility] refill protocol or controlled substance  Medication is reported/historical  Janumet         Last Written Prescription Date: 06/15/17  Last Fill Quantity: 30, # refills: 3  Last Office Visit with [facility], [facility] or [facility] prescribing provider:  09/26/17   Next 5 appointments (look out 90 days)     Sep  29, 2017  9:45 AM CDT   [facility]   [facility]    [address] [place] [id number] [phone]                   BP Readings from Last 3 Encounters:   09/25/17 122/70   05/22/17 118/62   12/20/00 152/94     Lab Results   Component Value Date    MICROL 117 09/25/2017     Lab Results   Component Value Date    UMALCR 104.46 09/25/2017     Creatinine   Date Value Ref Range Status   09/25/2017 0.92 0.66 - 1.25 mg/dL Final   ]  GFR Estimate   Date Value Ref Range Status   09/25/2017 81 >60 mL/min/1.7m2 Final     Comment:     Non  GFR Calc   05/22/2017 82 >60 mL/min/1.7m2 Final     Comment:     Non  GFR Calc     GFR Estimate If Black   Date Value Ref Range Status   09/25/2017 >90 >60 mL/min/1.7m2 Final     Comment:      GFR Calc   05/22/2017 >90   GFR Calc   >60 mL/min/1.7m2 Final     Lab Results   Component Value Date    CHOL 128 09/25/2017     Lab Results   Component Value Date    HDL 43 09/25/2017     Lab Results   Component Value Date    LDL 57 09/25/2017     Lab Results   Component Value Date    TRIG 140 09/25/2017     No results found for: CHOLHDLRATIO  Lab Results   Component Value Date    AST 15 09/25/2017     Lab Results   Component Value Date    ALT 19 09/25/2017     Lab Results   Component Value Date    A1C 6.2 09/25/2017    A1C 6.4 05/22/2017     Potassium   Date Value Ref Range Status   09/25/2017 4.1 3.4 - 5.3 mmol/L Final     Rouvastatin     Last Written Prescription Date: 05/22/17  Last Fill Quantity: 90, # refills: 1  Last Office Visit with [facility], [facility] or  Health prescribing provider: 05/22/17  Next 5 appointments (look out 90 days)     Sep 29, 2017  9:45 AM CDT   [facility]    [address] [place] [id number] [phone]                   Lab Results    Component Value Date    CHOL 128 09/25/2017     Lab Results   Component Value Date    HDL 43 09/25/2017     Lab Results   Component Value Date    LDL 57 09/25/2017     Lab Results   Component Value Date    TRIG 140 09/25/2017     No results found for: CHOLHDLRATIO  Ferrous Sulfate      Last Written Prescription Date:  05/22/17  Last Fill Quantity: 90,   # refills: 2  Last Office Visit with [facility] or  Health prescribing provider: 09/25/17  Future Office visit:    Next 5 appointments (look out 90 days)     Sep 29, 2017  9:45 AM CDT   [facility] with [facility]   [facility]    [address] [place] [address] [phone]                   Routing refill request to provider for review/approval because:  Drug not on the [facility], [facility] or  Health refill protocol or controlled substance

## 2017-09-28 ENCOUNTER — TELEPHONE (OUTPATIENT)
Dept: FAMILY MEDICINE | Facility: OTHER | Age: 67
End: 2017-09-28

## 2017-09-28 DIAGNOSIS — K21.9 GASTROESOPHAGEAL REFLUX DISEASE, ESOPHAGITIS PRESENCE NOT SPECIFIED: ICD-10-CM

## 2017-09-28 NOTE — TELEPHONE ENCOUNTER
Famotidine is non-formulary.  Consider a change to ranitidine or a prior auth.    [facility]  [phone]  ID [id number]    Thanks.

## 2017-09-29 ENCOUNTER — OFFICE VISIT (OUTPATIENT)
Dept: SURGERY | Facility: CLINIC | Age: 67
End: 2017-09-29
Payer: MEDICAID

## 2017-09-29 VITALS — HEART RATE: 106 BPM | OXYGEN SATURATION: 96 % | BODY MASS INDEX: 45.59 KG/M2 | WEIGHT: 315 LBS

## 2017-09-29 DIAGNOSIS — R60.0 EDEMA OF BOTH LEGS: ICD-10-CM

## 2017-09-29 DIAGNOSIS — I87.303 STASIS EDEMA OF BOTH LOWER EXTREMITIES: ICD-10-CM

## 2017-09-29 DIAGNOSIS — E11.59 TYPE 2 DIABETES MELLITUS WITH OTHER CIRCULATORY COMPLICATION, WITHOUT LONG-TERM CURRENT USE OF INSULIN (H): ICD-10-CM

## 2017-09-29 DIAGNOSIS — S81.802D OPEN WOUND OF LEFT LOWER EXTREMITY WITHOUT COMPLICATION, SUBSEQUENT ENCOUNTER: Primary | ICD-10-CM

## 2017-09-29 PROCEDURE — 99213 OFFICE O/P EST LOW 20 MIN: CPT | Performed by: SPECIALIST

## 2017-09-29 NOTE — MR AVS SNAPSHOT
After Visit Summary   9/29/2017    Glenroy Padilla    MRN: [id number]           Patient Information     Date Of Birth          1950        Visit Information        Provider Department      9/29/2017 9:45 AM Casey Shine MD [facility]         Follow-ups after your visit        Your next 10 appointments already scheduled     Sep 29, 2017  9:45 AM CDT   Return Visit with Casey Shine MD   [facility]    [address] [place] [id number] [phone]            Oct 02, 2017  1:40 PM CDT   Office Visit with Hernandez Hendricks DO   [facility])    [address] [place] [id number] [phone]           Bring a current list of meds and any records pertaining to this visit. For Physicals, please bring immunization records and any forms needing to be filled out. Please arrive 10 minutes early to complete paperwork.            Oct 06, 2017  1:30 PM CDT   Lymphedema Evaluation with Zoya Harkins, PT, NL LYMPHEDEMA REHAB ROOM   [facility] ([facility])    [address] [place] [address] [address] [place] [address] [phone]              Who to contact     If you have questions or need follow up information about today's clinic visit or your schedule please contact [facility] directly at [phone].  Normal or non-critical lab and imaging results will be communicated to you by MyChart, letter or phone within 4 business days after the clinic has received the results. If you do not hear from us within 7 days, please contact the clinic through MyChart or phone. If you have a critical or abnormal lab result, we will notify you by phone as soon as possible.  Submit refill requests through [employer] or call your pharmacy and they will forward the refill request to us. Please allow 3 business days for your refill to be  "completed.          Additional Information About Your Visit        [place] [employer] Information     [employer] lets you send messages to your doctor, view your test results, renew your prescriptions, schedule appointments and more. To sign up, go to [email or web address] . Click on \"Log in\" on the left side of the screen, which will take you to the Welcome page. Then click on \"Sign up Now\" on the right side of the page.     You will be asked to enter the access code listed below, as well as some personal information. Please follow the directions to create your username and password.     Your access code is: [email or web address]  Expires: 2017 10:46 AM     Your access code will  in 90 days. If you need help or a new code, please call your [place] clinic or [phone].        Care EveryWhere ID     This is your Care EveryWhere ID. This could be used by other organizations to access your [place] medical records  [id number]        Your Vitals Were     Pulse Pulse Oximetry BMI (Body Mass Index)             106 96% 45.59 kg/m2          Blood Pressure from Last 3 Encounters:   17 122/70   17 118/62   00 152/94    Weight from Last 3 Encounters:   17 (!) 317 lb 11.2 oz (144.1 kg)   17 (!) 317 lb 12.8 oz (144.2 kg)   17 (!) 314 lb (142.4 kg)              Today, you had the following     No orders found for display       Primary Care Provider Office Phone # Fax #    Julio Cesar Esteban PA-C [phone]       [address] [facility] [address]        Equal Access to Services     [facility] : Hadii katia Omalley, waaxda luqadaha, qaybta kaalmada duke, adams horton. So [facility] [phone].    ATENCIÓN: Si habla español, tiene a wilkinson disposición servicios gratuitos de asistencia lingüística. Llame al [phone].    We comply with applicable federal civil rights laws and [place] laws. We do not discriminate on the basis of race, color, national " origin, age, disability sex, sexual orientation or gender identity.            Thank you!     Thank you for choosing [facility]  for your care. Our goal is always to provide you with excellent care. Hearing back from our patients is one way we can continue to improve our services. Please take a few minutes to complete the written survey that you may receive in the mail after your visit with us. Thank you!             Your Updated Medication List - Protect others around you: Learn how to safely use, store and throw away your medicines at www.disposemymeds.org.          This list is accurate as of: 9/29/17  9:43 AM.  Always use your most recent med list.                   Brand Name Dispense Instructions for use Diagnosis    clopidogrel 75 MG tablet    PLAVIX    90 tablet    Take 1 tablet (75 mg) by mouth daily    Hypertension, goal below 140/90, Hyperlipidemia LDL goal <100, Type 2 diabetes mellitus with other circulatory complication, without long-term current use of insulin (H)       famotidine 40 MG tablet    PEPCID    90 tablet    Take 1 tablet (40 mg) by mouth nightly as needed    Gastroesophageal reflux disease, esophagitis presence not specified       ferrous sulfate 325 (65 FE) MG tablet    IRON    90 tablet    Take 1 tablet (325 mg) by mouth daily (with breakfast)    History of anemia       furosemide 40 MG tablet    LASIX    90 tablet    Take 1 tablet (40 mg) by mouth daily    Hypertension, goal below 140/90       lidocaine 5 % ointment    XYLOCAINE    50 g    Apply topically daily    Chronic pain of right knee       order for DME     1 Box    One touch glucose monitoring strip with Glucometer and lancets.    Type 2 diabetes mellitus with other circulatory complication, without long-term current use of insulin (H)       ranitidine 150 MG tablet    ZANTAC    60 tablet    Take 1 tablet (150 mg) by mouth 2 times daily    Gastroesophageal reflux disease, esophagitis presence not specified        rosuvastatin 10 MG tablet    CRESTOR    90 tablet    Take 1 tablet (10 mg) by mouth daily    Type 2 diabetes mellitus with other circulatory complication, without long-term current use of insulin (H)       SitaGLIPtin-MetFORMIN HCl 100-1000 MG Tb24    JANUMET XR    30 tablet    Take 1 tablet by mouth daily    Type 2 diabetes mellitus with complication, without long-term current use of insulin (H)

## 2017-09-29 NOTE — NURSING NOTE
"Chief Complaint   Patient presents with     WOUND CARE     UNNA boot with silvercell       Initial Pulse 106  Wt (!) 317 lb 11.2 oz (144.1 kg)  SpO2 96%  BMI 45.59 kg/m2 Estimated body mass index is 45.59 kg/(m^2) as calculated from the following:    Height as of 5/22/17: 5' 10\" (1.778 m).    Weight as of this encounter: 317 lb 11.2 oz (144.1 kg).  Medication Reconciliation: complete    "

## 2017-09-29 NOTE — PROGRESS NOTES
F/U for leg wound    Subjective:  Patient is a 66-year-old white male presenting history of a left leg wound. He hit a coffee table  the wound is slow to heal. He was initially treated in [place] and has a 15 year history of bilateral lower extremity edema. He has never been treated with any form of compression therapy and was told not to based on an ultrasound results in [place]. Those results are not available to me at this time. He denies any prior DVT, nonhealing wounds, leg trauma, claudication or rest pain. He was told in [place] to treat the leg with Betadine. [place] opinion.      Last seen by me in June and was tx with UNNA boot and silvercel.  Returned to [place] and was treated with hydroferra blue.      Unna boot with silvercel restarted last week.      Objective:  B/P: Data Unavailable, T: Data Unavailable, P: 106, R: Data Unavailable  Ext; Warm, +2  Edema bilaterally.  Bilateral venous stasis changes with thickened skin.   (+)FEM pulses.  No distal pulses.  LLE wound 3.5x2.1x0.4cm      U/S -   ULTRASOUND VENOUS COMPETENCY BILATERAL   6/1/2017 3:29 PM      HISTORY: Localized edema. Chronic venous hypertension (idiopathic)  without complications of bilateral lower extremity. Unspecified open  wound, left lower leg, initial encounter.     COMPARISON: None.     TECHNIQUE: Color Doppler and spectral waveform analysis performed.     FINDINGS: Bilateral common femoral veins, femoral veins, and popliteal  veins are patent and demonstrate no evidence of reflux.     Other than questionable 1 second reflux at the right saphenofemoral  junction, the right great saphenous vein is competent throughout its  length. Diameter of the right great saphenous vein is 3-7 mm.     The left great saphenous vein is competent throughout its length.  Diameter of the left great saphenous vein is 2-7 mm.     Visualized portions of bilateral small saphenous veins appear to be  competent, without evidence  of reflux.     No prominent perforators were demonstrated. Bilateral Giacomini veins  were competent.         IMPRESSION: The deep and superficial venous systems of both legs are  patent and competent.     PATRICK SCHERER MD    [facility] -   ULTRASOUND ANKLE-BRACHIAL INDEX DOPPLER NO EXERCISE   6/1/2017 3:16 PM        HISTORY: Localized edema. Chronic venous hypertension (idiopathic)  without complications of bilateral lower extremity.     COMPARISON: None.     FINDINGS: Ankle-brachial index is 0.96 on the right and 0.91 on the  left. Waveforms appear biphasic/monophasic bilaterally.         IMPRESSION: Borderline bilateral arterial insufficiency.     PATRICK SCHERER MD      Assessment/plan:  This is a 66 year old gentleman with bilateral chronic stasis changes and lymphedema. He has a wound as well as a result of his left leg hitting a coffee table.   Was treated in [place] with hydroferra blue.  Restarted UNNA boot and Silvercel last week.  Wound much improved and smaller.  Will cont UNNA boot and silvercel.  Lymphedema consult with PT or OT.  F/U 1 week.      Casey Shine MD, FACS.

## 2017-09-29 NOTE — NURSING NOTE
Pt here for f/u of left pretibial wound. Had unna boot on this week. Wound bed is clean, no slough. Pink color. Measures 3.5 x 2.1 x 0.4cm. No maceration. No leg edema. Is wearing the tubigrip on right lower leg. Drainage was minimal.   New unna placed with silvercel cut to fit over wound bed. Pt is seeing lymphedema therapist in 1 week.YAHIR Wilkins

## 2017-09-30 ENCOUNTER — HOSPITAL ENCOUNTER (EMERGENCY)
Facility: CLINIC | Age: 67
Discharge: HOME | End: 2017-09-30
Attending: FAMILY MEDICINE | Admitting: FAMILY MEDICINE
Payer: MEDICAID

## 2017-09-30 ENCOUNTER — NURSE TRIAGE (OUTPATIENT)
Dept: NURSING | Facility: CLINIC | Age: 67
End: 2017-09-30

## 2017-09-30 VITALS
HEART RATE: 91 BPM | BODY MASS INDEX: 45.48 KG/M2 | TEMPERATURE: 96.2 F | DIASTOLIC BLOOD PRESSURE: 64 MMHG | WEIGHT: 315 LBS | SYSTOLIC BLOOD PRESSURE: 172 MMHG | OXYGEN SATURATION: 92 % | RESPIRATION RATE: 16 BRPM

## 2017-09-30 DIAGNOSIS — M79.662 PAIN OF LEFT LOWER LEG: ICD-10-CM

## 2017-09-30 PROCEDURE — 82274 ASSAY TEST FOR BLOOD FECAL: CPT | Performed by: PHYSICIAN ASSISTANT

## 2017-09-30 PROCEDURE — 99282 EMERGENCY DEPT VISIT SF MDM: CPT | Performed by: FAMILY MEDICINE

## 2017-09-30 PROCEDURE — 99282 EMERGENCY DEPT VISIT SF MDM: CPT | Mod: Z6 | Performed by: FAMILY MEDICINE

## 2017-09-30 NOTE — DISCHARGE INSTRUCTIONS
I replaced your dressing with a looser dressing so it was not constricting the circulation to the lower extremity.    Return to the ER if your symptoms worsen at any time.    Follow up as planned next week for your wound care and dressing change.

## 2017-09-30 NOTE — ED AVS SNAPSHOT
[facility]    [address]     [place] [address]    Phone:  [phone]    Fax:  [phone]                                       Glenroy Padilla   MRN: [id number]    Department:  [facility]   Date of Visit:  9/30/2017           Patient Information     Date Of Birth          1950        Your diagnoses for this visit were:     Pain of left lower leg        You were seen by Hang Corbin MD.      Follow-up Information     Follow up with Wound clinic In 5 days.        Follow up with [facility].    Specialty:  EMERGENCY MEDICINE    Why:  If symptoms worsen    Contact information:    [address] [place] [address] [phone]    Additional information:    From [address]: Exit at [address] on [place] side of [place]. Turn right on [address]. Turn left at stoplight on [address]. [facility] will be in view two blocks ahead        Discharge Instructions       I replaced your dressing with a looser dressing so it was not constricting the circulation to the lower extremity.    Return to the ER if your symptoms worsen at any time.    Follow up as planned next week for your wound care and dressing change.    Future Appointments        Provider Department Dept Phone Center    10/2/2017 9:30 AM NL LAB [facility] [phone] [facility]    10/2/2017 1:40 PM Hernandez Hendricks DO [facility] [phone] [facility]    10/6/2017 11:00 AM Casey Shine MD [facility] [phone] [facility]    10/6/2017 1:30 PM Zoya Harkins, PT; NL LYMPHEDEMA REHAB ROOM [facility] [phone] [facility]      24 Hour Appointment Hotline       To make an appointment at any [facility], call [phone] ([phone]). If you don't have a family doctor or clinic, we will help you find one. [facility]  clinics are conveniently located to serve the needs of you and your family.             Review of your medicines      Our records show that you are taking the medicines listed below. If these are incorrect, please call your family doctor or clinic.        Dose / Directions Last dose taken    clopidogrel 75 MG tablet   Commonly known as:  PLAVIX   Dose:  75 mg   Quantity:  90 tablet        Take 1 tablet (75 mg) by mouth daily   Refills:  1        famotidine 40 MG tablet   Commonly known as:  PEPCID   Dose:  40 mg   Quantity:  90 tablet        Take 1 tablet (40 mg) by mouth nightly as needed   Refills:  1        ferrous sulfate 325 (65 FE) MG tablet   Commonly known as:  IRON   Dose:  325 mg   Quantity:  90 tablet        Take 1 tablet (325 mg) by mouth daily (with breakfast)   Refills:  2        furosemide 40 MG tablet   Commonly known as:  LASIX   Dose:  40 mg   Quantity:  90 tablet        Take 1 tablet (40 mg) by mouth daily   Refills:  1        lidocaine 5 % ointment   Commonly known as:  XYLOCAINE   Quantity:  50 g        Apply topically daily   Refills:  3        order for DME   Quantity:  1 Box        One touch glucose monitoring strip with Glucometer and lancets.   Refills:  1        ranitidine 150 MG tablet   Commonly known as:  ZANTAC   Dose:  150 mg   Quantity:  60 tablet        Take 1 tablet (150 mg) by mouth 2 times daily   Refills:  1        rosuvastatin 10 MG tablet   Commonly known as:  CRESTOR   Dose:  10 mg   Quantity:  90 tablet        Take 1 tablet (10 mg) by mouth daily   Refills:  1        SitaGLIPtin-MetFORMIN HCl 100-1000 MG Tb24   Commonly known as:  JANUMET XR   Dose:  1 tablet   Quantity:  30 tablet        Take 1 tablet by mouth daily   Refills:  3                Orders Needing Specimen Collection     None      Pending Results     No orders found from 9/28/2017 to 10/1/2017.            Pending Culture Results     No orders found from 9/28/2017 to 10/1/2017.            Pending Results  "Instructions     If you had any lab results that were not finalized at the time of your Discharge, you can call the ED Lab Result RN at [phone]. You will be contacted by this team for any positive Lab results or changes in treatment. The nurses are available 7 days a week from 10A to 6:30P.  You can leave a message 24 hours per day and they will return your call.        Thank you for choosing [facility]       Thank you for choosing [facility] for your care. Our goal is always to provide you with excellent care. Hearing back from our patients is one way we can continue to improve our services. Please take a few minutes to complete the written survey that you may receive in the mail after you visit with us. Thank you!        [employer] Information     [employer] lets you send messages to your doctor, view your test results, renew your prescriptions, schedule appointments and more. To sign up, go to [email or web address] . Click on \"Log in\" on the left side of the screen, which will take you to the Welcome page. Then click on \"Sign up Now\" on the right side of the page.     You will be asked to enter the access code listed below, as well as some personal information. Please follow the directions to create your username and password.     Your access code is: [email or web address]  Expires: 2017 10:46 AM     Your access code will  in 90 days. If you need help or a new code, please call your [facility] clinic or [phone].        Care EveryWhere ID     This is your Care EveryWhere ID. This could be used by other organizations to access your [facility] medical records  [id number]        Equal Access to Services     [facility] Hospital[facility]FIA : Hadii katia [facility], waaxda maribethadaha, qaybta adams stafford . So [facility] [phone].    ATENCIÓN: Si habla español, tiene a wilkinson disposición servicios gratuitos de asistencia lingüística. Llame al [phone].    We comply with applicable " federal civil rights laws and [place] laws. We do not discriminate on the basis of race, color, national origin, age, disability, sex, sexual orientation, or gender identity.            After Visit Summary       This is your record. Keep this with you and show to your community pharmacist(s) and doctor(s) at your next visit.

## 2017-09-30 NOTE — ED PROVIDER NOTES
ED Provider Note     CC:     Chief Complaint   Patient presents with     Leg Pain          History is obtained from the patient.  He is accompanied by his wife.    HPI: Glenroy Padilla is a 67 year old male with diabetes who is currently being treated for a venous stasis ulcer presenting with acute pain in the left lower extremity, likely related to a dressing that was causing constriction of circulation.  Patient states that this typical Unna boot is usually not as constricting.  There is usually a overlap of dressing and not a circumferential dressing.  It seems to be cutting off the circulation from the proximal calf, and it is digging into the skin.  He has some numbness and tingling into the toes that is worse than usual.        Patient Active Problem List   Diagnosis     Type 2 diabetes mellitus with other circulatory complication, without long-term current use of insulin (H)     Venous stasis ulcer of left lower extremity (H)     Acute pain of left knee     Chronic pain of right knee     Open wound of left lower extremity without complication, initial encounter     Hx of coronary artery disease     Hx of heart artery stent     [place] (obstructive sleep apnea)     Hypertension, goal below 140/90     Hyperlipidemia LDL goal <100     History of coronary artery stent placement       MEDS:   No current facility-administered medications on file prior to encounter.   Current Outpatient Prescriptions on File Prior to Encounter:  ranitidine (ZANTAC) 150 MG tablet Take 1 tablet (150 mg) by mouth 2 times daily   furosemide (LASIX) 40 MG tablet Take 1 tablet (40 mg) by mouth daily   clopidogrel (PLAVIX) 75 MG tablet Take 1 tablet (75 mg) by mouth daily   famotidine (PEPCID) 40 MG tablet Take 1 tablet (40 mg) by mouth nightly as needed   lidocaine (XYLOCAINE) 5 % ointment Apply topically daily   SitaGLIPtin-MetFORMIN HCl (JANUMET XR) 100-1000 MG TB24 Take 1 tablet  by mouth daily   rosuvastatin (CRESTOR) 10 MG tablet Take 1 tablet (10 mg) by mouth daily   ferrous sulfate (IRON) 325 (65 FE) MG tablet Take 1 tablet (325 mg) by mouth daily (with breakfast)   order for DME One touch glucose monitoring strip with Glucometer and lancets.       Allergies: No known allergies    Triage and ursing notes were reviewed.    ROS: Negative except in HPI    Physical Exam:  Vitals:    09/30/17 1406   BP: 172/64   Pulse: 91   Resp: 16   Temp: 96.2  F (35.7  C)   TempSrc: Oral   SpO2: 92%   Weight: (!) 143.8 kg (317 lb)     GENERAL APPEARANCE: Alert, oriented, mild distress   HEAD: atraumatic  EYES: PER  HENT: Normal external exam  NECK: no adenopathy or masses, trachea is midline  RESP: Normal respiratory effort  EXT: Dressing of the left lower extremity is constricting from the proximal calf down.  He has slight discoloration of the toes, and it is tingly to touch.  The proximal end of the dressing is indenting the skin.  SKIN: no rash; as above  NEURO: mentation and speech normal; no focal deficits    No results found for this or any previous visit (from the past 24 hour(s)).    Procedure Note: The patient's dressing was partially removed without exposing the wound.  This was replaced with a 4 inch circumferential dressing, Waffle gauze, and Ace wrap.    Impression:  Final diagnoses:   Pain of left lower leg         ED Course & Medical Decision Making (Plan):  Glenroy Padilla is a 67 year old male with a dressing that is to constricting causing pain.  Dressing was placed yesterday and he is now having worse tingling and numbness to the toes with what appears to be a dressing that is too snug.  This dressing was changed, and he felt much better.  Follow-up as planned with the wound clinic next Friday, and sooner if he has any further problems.  Written after-visit summary and instructions were given at the time of discharge.          New Prescriptions    No medications on file           This  note was completed in part using Dragon voice recognition, and may contain word and grammatical errors.        Hang Corbin MD  09/30/17 6219

## 2017-09-30 NOTE — ED NOTES
Pt w/hx of venous stasis and diabetes/neuropathy with unna boot placed yesterday.  Initially dressing did not cause a problem but now is tight at upper calf.  Dressing partially removed and restriction noted at top of calf.  Pt states feels better with dressing removed.  MD present and replacing dressing.  No other complaints.  Wife present.  Pt able to feel light touch and states his neuropathy is improving.

## 2017-09-30 NOTE — ED AVS SNAPSHOT
[facility] Emergency Department    [address] [place] [address]    Phone:  [phone]    Fax:  [phone]                                       Glenroy Padilla   MRN: [id number]    Department:  [facility] Emergency Department   Date of Visit:  9/30/2017           After Visit Summary Signature Page     I have received my discharge instructions, and my questions have been answered. I have discussed any challenges I see with this plan with the nurse or doctor.    ..........................................................................................................................................  Patient/Patient Representative Signature      ..........................................................................................................................................  Patient Representative Print Name and Relationship to Patient    ..................................................               ................................................  Date                                            Time    ..........................................................................................................................................  Reviewed by Signature/Title    ...................................................              ..............................................  Date                                                            Time

## 2017-09-30 NOTE — TELEPHONE ENCOUNTER
"Patient reporting he was in clinic yesterday and had left leg wrapped. Reporting back of leg \"is pinching.\" Patient is requesting to come in to have leg rewrapped for swelling and leg wound.  Reporting toes are blue intermittent \"it comes and goes.\" Patient has elevated his leg today with no improvement.  Patient agrees to be seen in ED now.    Reason for Disposition    Entire foot is cool or blue in comparison to other side    Additional Information    Negative: Severe difficulty breathing (e.g., struggling for each breath, speaks in single words)    Negative: Looks like a broken bone or dislocated joint (e.g., crooked or deformed)    Negative: Sounds like a life-threatening emergency to the triager    Negative: Chest pain    Negative: Followed a leg injury    Negative: [1] Small area of swelling AND [2] followed an insect bite to the area    Negative: Swelling of one ankle joint    Negative: Swelling of knee is main symptom    Negative: Pregnant    Negative: Postpartum (< 1 month since delivery)    Negative: Difficulty breathing at rest    Protocols used: LEG SWELLING AND EDEMA-ADULT-    "

## 2017-10-02 ENCOUNTER — OFFICE VISIT (OUTPATIENT)
Dept: INTERNAL MEDICINE | Facility: CLINIC | Age: 67
End: 2017-10-02
Payer: MEDICAID

## 2017-10-02 VITALS
SYSTOLIC BLOOD PRESSURE: 158 MMHG | BODY MASS INDEX: 45.63 KG/M2 | TEMPERATURE: 96.4 F | DIASTOLIC BLOOD PRESSURE: 86 MMHG | HEART RATE: 72 BPM | OXYGEN SATURATION: 95 % | WEIGHT: 315 LBS

## 2017-10-02 DIAGNOSIS — Z71.89 ADVANCED DIRECTIVES, COUNSELING/DISCUSSION: ICD-10-CM

## 2017-10-02 DIAGNOSIS — Z12.11 SPECIAL SCREENING FOR MALIGNANT NEOPLASMS, COLON: ICD-10-CM

## 2017-10-02 DIAGNOSIS — Z12.11 SPECIAL SCREENING FOR MALIGNANT NEOPLASMS, COLON: Primary | ICD-10-CM

## 2017-10-02 DIAGNOSIS — E11.59 TYPE 2 DIABETES MELLITUS WITH OTHER CIRCULATORY COMPLICATION, WITHOUT LONG-TERM CURRENT USE OF INSULIN (H): ICD-10-CM

## 2017-10-02 LAB — HEMOCCULT STL QL IA: ABNORMAL

## 2017-10-02 PROCEDURE — 99207 ZZC NO CHARGE LOS: CPT | Performed by: INTERNAL MEDICINE

## 2017-10-02 ASSESSMENT — PAIN SCALES - GENERAL: PAINLEVEL: NO PAIN (0)

## 2017-10-02 NOTE — PROGRESS NOTES
"  SUBJECTIVE:   Glenroy Padilla is a 67 year old male who presents to clinic today for the following health issues:    New Patient/Transfer of Care    CHIEF COMPLAINT:    The patient is a 67-year-old gentleman who presents today for first time evaluation. He spent at least 15 minutes complaining about his medical care in the providers that he has recently seen. He was quite concerned that his most recent provider gave him Zantac instead of Pepcid and that he \"messed up the dose\" etc. He is concerned regarding his surgeon, the wound care that he is received, and his cardiac care. 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  At this point I have explained to the patient that I'm pretty certain that I will be his next \"incompetent\" physician.. And that at this time, I had nothing to offer him. There will be no charge for this visit and I wish him the best of luck in his future endeavors. He understood fully my concerns as did his significant other who was with him.      Ruthie"

## 2017-10-02 NOTE — NURSING NOTE
"Chief Complaint   Patient presents with     Establish Care       Initial /86 (BP Location: Right arm, Patient Position: Chair, Cuff Size: Adult Large)  Pulse 72  Temp 96.4  F (35.8  C) (Temporal)  Wt (!) 318 lb (144.2 kg)  SpO2 95%  BMI 45.63 kg/m2 Estimated body mass index is 45.63 kg/(m^2) as calculated from the following:    Height as of 5/22/17: 5' 10\" (1.778 m).    Weight as of this encounter: 318 lb (144.2 kg).  Medication Reconciliation: complete  Health Maintenance reviewed at today's visit patient asked to schedule/complete:   Colon Cancer:  Patient agrees to schedule   Josselin STRAUSS      "

## 2017-10-06 ENCOUNTER — OFFICE VISIT (OUTPATIENT)
Dept: SURGERY | Facility: CLINIC | Age: 67
End: 2017-10-06
Payer: MEDICAID

## 2017-10-06 ENCOUNTER — HOSPITAL ENCOUNTER (OUTPATIENT)
Dept: PHYSICAL THERAPY | Facility: CLINIC | Age: 67
Setting detail: THERAPIES SERIES
End: 2017-10-06
Attending: SPECIALIST
Payer: MEDICAID

## 2017-10-06 VITALS — HEART RATE: 111 BPM | OXYGEN SATURATION: 94 % | BODY MASS INDEX: 45.69 KG/M2 | WEIGHT: 315 LBS

## 2017-10-06 DIAGNOSIS — R60.0 EDEMA OF BOTH LEGS: ICD-10-CM

## 2017-10-06 DIAGNOSIS — I87.303 STASIS EDEMA OF BOTH LOWER EXTREMITIES: ICD-10-CM

## 2017-10-06 DIAGNOSIS — S81.802D OPEN WOUND OF LEFT LOWER EXTREMITY WITHOUT COMPLICATION, SUBSEQUENT ENCOUNTER: Primary | ICD-10-CM

## 2017-10-06 PROCEDURE — 97162 PT EVAL MOD COMPLEX 30 MIN: CPT | Mod: GP

## 2017-10-06 PROCEDURE — 99213 OFFICE O/P EST LOW 20 MIN: CPT | Performed by: SPECIALIST

## 2017-10-06 PROCEDURE — G8990 OTHER PT/OT CURRENT STATUS: HCPCS | Mod: GP,CK

## 2017-10-06 PROCEDURE — G8991 OTHER PT/OT GOAL STATUS: HCPCS | Mod: GP,CI

## 2017-10-06 PROCEDURE — 40000449 ZZHC STATISTIC PT VISIT, LYMPHEDEMA

## 2017-10-06 NOTE — NURSING NOTE
Pt here for f/u of left lower leg wounds. MD charted measurements. Has granulation tissue buds present in wound bed today. Red color. Drainage was minimal. New unna boot placed with spiral application.  Pt is going to [place] for a couple of weeks, We sent along pt's notes from today. YAHIR Wilkins

## 2017-10-06 NOTE — NURSING NOTE
"Chief Complaint   Patient presents with     RECHECK     WOUND CARE     UNNA boot       Initial Pulse 111  Wt (!) 318 lb 6.4 oz (144.4 kg)  SpO2 94%  BMI 45.69 kg/m2 Estimated body mass index is 45.69 kg/(m^2) as calculated from the following:    Height as of 5/22/17: 5' 10\" (1.778 m).    Weight as of this encounter: 318 lb 6.4 oz (144.4 kg).  Medication Reconciliation: complete    "

## 2017-10-06 NOTE — PROGRESS NOTES
F/U for leg wound    Subjective:  Patient is a 66-year-old white male presenting history of a left leg wound. He hit a coffee table  the wound is slow to heal. He was initially treated in [place] and has a 15 year history of bilateral lower extremity edema. He has never been treated with any form of compression therapy and was told not to based on an ultrasound results in [place]. Those results are not available to me at this time. He denies any prior DVT, nonhealing wounds, leg trauma, claudication or rest pain. He was told in [place] to treat the leg with Betadine. [place].      Last seen by me in June and was tx with UNNA boot and silvercel.  Returned to [place] and was treated with hydroferra blue.      Unna boot with silvercel last week.  Going back the [place] for 4 weeks.      Objective:  B/P: Data Unavailable, T: Data Unavailable, P: 106, R: Data Unavailable  Ext; Warm, +2  Edema bilaterally.  Bilateral venous stasis changes with thickened skin.   (+)FEM pulses.  No distal pulses.  LLE wound 3.5x2.1x0.4cm, .  More granulation buds.      U/S -   ULTRASOUND VENOUS COMPETENCY BILATERAL   6/1/2017 3:29 PM      HISTORY: Localized edema. Chronic venous hypertension (idiopathic)  without complications of bilateral lower extremity. Unspecified open  wound, left lower leg, initial encounter.     COMPARISON: None.     TECHNIQUE: Color Doppler and spectral waveform analysis performed.     FINDINGS: Bilateral common femoral veins, femoral veins, and popliteal  veins are patent and demonstrate no evidence of reflux.     Other than questionable 1 second reflux at the right saphenofemoral  junction, the right great saphenous vein is competent throughout its  length. Diameter of the right great saphenous vein is 3-7 mm.     The left great saphenous vein is competent throughout its length.  Diameter of the left great saphenous vein is 2-7 mm.     Visualized portions of bilateral  small saphenous veins appear to be  competent, without evidence of reflux.     No prominent perforators were demonstrated. Bilateral Giacomini veins  were competent.         IMPRESSION: The deep and superficial venous systems of both legs are  patent and competent.     PATRICK SCHERER MD    [facility] -   ULTRASOUND ANKLE-BRACHIAL INDEX DOPPLER NO EXERCISE   6/1/2017 3:16 PM        HISTORY: Localized edema. Chronic venous hypertension (idiopathic)  without complications of bilateral lower extremity.     COMPARISON: None.     FINDINGS: Ankle-brachial index is 0.96 on the right and 0.91 on the  left. Waveforms appear biphasic/monophasic bilaterally.         IMPRESSION: Borderline bilateral arterial insufficiency.     PATRICK SCHERER MD      Assessment/plan:  This is a 66 year old gentleman with bilateral chronic stasis changes and lymphedema. He has a wound as well as a result of his left leg hitting a coffee table.   Was treated in [place] with hydroferra blue.  Restarted UNNA boot and Silvercel.   Wound much improved and smaller.  Will cont UNNA boot and silvercel.  Given silvercel to take to [place].  Will have Unna Boot replaced there.   Lymphedema consult with PT or OT.  F/U 1 week.      Casey Shine MD, FACS.

## 2017-10-06 NOTE — MR AVS SNAPSHOT
"              After Visit Summary   10/6/2017    Glenroy Padilla    MRN: [id number]           Patient Information     Date Of Birth          1950        Visit Information        Provider Department      10/6/2017 11:00 AM Casey Shine MD [facility]         Follow-ups after your visit        Your next 10 appointments already scheduled     Oct 06, 2017  1:30 PM CDT   Lymphedema Evaluation with Zoya Harkins, PT, NL LYMPHEDEMA REHAB ROOM   [facility] Physical Therapy ([facility])    [address] [place] [address] [phone]              Who to contact     If you have questions or need follow up information about today's clinic visit or your schedule please contact [facility] directly at [phone].  Normal or non-critical lab and imaging results will be communicated to you by MyChart, letter or phone within 4 business days after the clinic has received the results. If you do not hear from us within 7 days, please contact the clinic through MyChart or phone. If you have a critical or abnormal lab result, we will notify you by phone as soon as possible.  Submit refill requests through [employer] or call your pharmacy and they will forward the refill request to us. Please allow 3 business days for your refill to be completed.          Additional Information About Your Visit        MyChart Information     [employer] lets you send messages to your doctor, view your test results, renew your prescriptions, schedule appointments and more. To sign up, go to [email or web address] . Click on \"Log in\" on the left side of the screen, which will take you to the Welcome page. Then click on \"Sign up Now\" on the right side of the page.     You will be asked to enter the access code listed below, as well as some personal information. Please follow the directions to create your username and password.     Your access code is: [email or web address]  Expires: 12/21/2017 " 10:46 AM     Your access code will  in 90 days. If you need help or a new code, please call your [facility] clinic or [phone].        Care EveryWhere ID     This is your Care EveryWhere ID. This could be used by other organizations to access your [facility] medical records  [id number]        Your Vitals Were     Pulse Pulse Oximetry BMI (Body Mass Index)             111 94% 45.69 kg/m2          Blood Pressure from Last 3 Encounters:   10/02/17 158/86   17 172/64   17 122/70    Weight from Last 3 Encounters:   10/06/17 (!) 318 lb 6.4 oz (144.4 kg)   10/02/17 (!) 318 lb (144.2 kg)   17 (!) 317 lb (143.8 kg)              Today, you had the following     No orders found for display       Primary Care Provider    None Specified       No primary provider on file.        Equal Access to Services     [facility] : Hadii katia Omalley, waaxda ludidi, qaybta kaalmada duke, adams batres . So [facility] [phone].    ATENCIÓN: Si habla español, tiene a wilkinson disposición servicios gratuitos de asistencia lingüística. Frances al [phone].    We comply with applicable federal civil rights laws and [place] laws. We do not discriminate on the basis of race, color, national origin, age, disability, sex, sexual orientation, or gender identity.            Thank you!     Thank you for choosing [facility]  for your care. Our goal is always to provide you with excellent care. Hearing back from our patients is one way we can continue to improve our services. Please take a few minutes to complete the written survey that you may receive in the mail after your visit with us. Thank you!             Your Updated Medication List - Protect others around you: Learn how to safely use, store and throw away your medicines at www.disposemymeds.org.          This list is accurate as of: 10/6/17 11:15 AM.  Always use your most recent med list.                   Brand  Name Dispense Instructions for use Diagnosis    clopidogrel 75 MG tablet    PLAVIX    90 tablet    Take 1 tablet (75 mg) by mouth daily    Hypertension, goal below 140/90, Hyperlipidemia LDL goal <100, Type 2 diabetes mellitus with other circulatory complication, without long-term current use of insulin (H)       famotidine 40 MG tablet    PEPCID    90 tablet    Take 1 tablet (40 mg) by mouth nightly as needed    Gastroesophageal reflux disease, esophagitis presence not specified       ferrous sulfate 325 (65 FE) MG tablet    IRON    90 tablet    Take 1 tablet (325 mg) by mouth daily (with breakfast)    History of anemia       furosemide 40 MG tablet    LASIX    90 tablet    Take 1 tablet (40 mg) by mouth daily    Hypertension, goal below 140/90       lidocaine 5 % ointment    XYLOCAINE    50 g    Apply topically daily    Chronic pain of right knee       order for DME     1 Box    One touch glucose monitoring strip with Glucometer and lancets.    Type 2 diabetes mellitus with other circulatory complication, without long-term current use of insulin (H)       rosuvastatin 10 MG tablet    CRESTOR    90 tablet    Take 1 tablet (10 mg) by mouth daily    Type 2 diabetes mellitus with other circulatory complication, without long-term current use of insulin (H)       SitaGLIPtin-MetFORMIN HCl 100-1000 MG Tb24    JANUMET XR    30 tablet    Take 1 tablet by mouth daily    Type 2 diabetes mellitus with complication, without long-term current use of insulin (H)

## 2017-10-10 NOTE — ADDENDUM NOTE
Encounter addended by: Zoya Harkins, PT on: 10/10/2017  7:09 AM<BR>     Actions taken: Charge Capture section accepted, Episode edited, Flowsheet accepted

## 2017-10-11 NOTE — PROGRESS NOTES
10/06/17 1335   Quick Adds   Quick Adds Certification   Type of Visit   Type of visit Initial Edema Evaluation   General Information   Start of care 10/06/17   Referring physician Dr. Casey Shine   Orders Evaluate and treat as indicated   Order date 09/22/17   Medical diagnosis Open wound left lower extremitry   Onset of illness / date of surgery 03/06/17   Edema onset 10/06/52   Affected body parts RLE;LLE   Edema etiology Chronic Venous Insufficiency   Pertinent history of current problem (PT: include personal factors and/or comorbidities that impact the POC; OT: include additional occupational profile info) Pt reports having had edema for many years . bilat knee degen . needs bilat TKA  but needs wound healed and edema managed.   Surgical / medical history reviewed No   Edema special tests Ultrasound   Prior level of functional mobility using A 4 wheel walker . 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   Current assistive devices 4 wheeled walker   Fall Screening   Fall screen completed by PT   Per patient, fall 2 or more times in past year? Yes   Per patient, fall with injury in past year? Yes   Fall screen comments he is using a 4 wheel walker   System Outcome Measures   Outcome Measures Lymphedema   Lymphedema Life Impact Scale (score range 0-72). A higher score indicates greater impairment. 40   Subjective Report   Patient report of symptoms restless leg syndrome   Precautions   Precautions comments none identified   Patient / Family Goals   Patient / family goals statement get edema down   Pain   Patient currently in pain Yes   Pain location knees   Pain rating 6   Pain description Ache;Pressure   Vital Signs   BMI 45.7   Cognitive Status    Orientation Orientation to person, place and time   Level of consciousness Alert   Follows commands and answers questions 100% of the time   Personal safety and judgement Intact   Memory Intact   Edema Exam / Assessment   Skin condition Hemosiderin deposits;Dryness;Lipodermatosclerosis   Skin condition comments dry scaley skin lower leg   Scar No   Capillary refill Symmetrical   Dorsal pedal pulse Symmetrical   Dorsal pedal pulse comments could not appreciate. left had an max boot on and the right has significant fibrosis of tissue at his foot.   Stemmer sign Positive   Ulceration Yes   Ulceration comments he has a wound on left lower leg which is being treated with an max boot. Unable to see the wound   Girth Measurements   Girth Measurements Refer to separate girth measurement flowsheet   Volume LE   Right LE (mL) 53421   Left LE (mL) 58750   LE volume comparison RLE volume greater than LLE volume   Range of Motion   ROM comments functional but limited due to obesity.    Strength   Strength comments LE have pain limiting strength. He is grossly 3+/5. difficulty iwth moving sit to stand unless seating is higher than normal.   Posture   Posture Normal   Posture comments excess weight anteriorally so creates some lordosis.    Palpation   Palpation sensitive skin of LE   Activities of Daily Living   Activities of Daily Living indep   Bed Mobility   Bed mobility indep   Transfers   Transfers indep   Gait / Locomotion   Gait / Locomotion modified indep needs walker or canes   Sensory   Sensory perception comments no problems found   Vascular Assessment   Vascular Assessment Comments arterioal testing revealed borderline arteriole insufficiency.  US for DVT completed and it was found that his venous return is patent.   Coordination   Coordination Gross motor coordination appropriate   Muscle Tone   Muscle tone No deficits were identified   Planned Edema Interventions   Planned edema interventions Manual lymph  drainage;Gradient compression bandaging;Fit for compression garment;Exercises;Precautions to prevent infection / exacerbation;Education   Clinical Impression   Criteria for skilled therapeutic intervention met Yes   Therapy diagnosis stage 3 lymphedema related to venous insufficiency, knee pain   Clinical Decision Making (Complexity) Moderate complexity   Treatment frequency (4x/week for 1 to 2 weeks and then 1x/week start in 3-4weeks)   Treatment duration pt had to go back to [place] for 3 to 4 weeks so we will start then for 60 days   Patient / family and/or staff in agreement with plan of care Yes   Risks and benefits of therapy have been explained Yes   Education Assessment   Preferred learning style Listening;Demonstration;Pictures / video   Barriers to learning No barriers   Goals   Edema Eval Goals 1;2;3   Goal 1   Goal identifier 1   Goal description Pt will be indep with gradient compression bandaging to reduce edema fully and be able to get compression garments in order to minimize the risk of infection.   Target date 01/03/18   Goal 2   Goal identifier 2   Goal description Pt will be indep with managing risk factors related to edma and with self MLD and muscle pump exercises in order to preserve the reduction in lymphedema plus minimize the risk of infection.   Target date 01/03/18   Goal 3   Goal identifier 3   Goal description Pt will report 50 to 75% reduction in knee pain to be able to do strengthening exercises to be able to move sit to stand with ease from a standard chair,   Target date 01/03/18   Total Evaluation Time   Total evaluation time 60   Certification   Certification date from 10/06/17   Certification date to 01/03/18   Medical Diagnosis Lymphedema

## 2017-10-11 NOTE — ADDENDUM NOTE
Encounter addended by: Zoya Harkins, PT on: 10/11/2017  2:57 PM<BR>     Actions taken: Flowsheet accepted, Sign clinical note, Document created, Charge Capture section accepted

## 2017-10-11 NOTE — PROGRESS NOTES
[facility]      OUTPATIENT PHYSICAL THERAPY EVALUATION  PLAN OF TREATMENT FOR OUTPATIENT REHABILITATION  (COMPLETE FOR INITIAL CLAIMS ONLY)  Patient's Last Name, First Name, M.I.  YOB: 1950  Glenroy Padilla                        Provider's Name  [facility] Medical Record No.  [id number]                               Onset Date:  03/06/17   Start of Care Date:  10/06/17      Type:  PT Medical Diagnosis:  Lymphedema                        Treatment Diagnosis:  stage 3 lymphedema related to venous insufficiency, knee pain   Visits from SOC:  1   _________________________________________________________________________________  Plan of Treatment/Functional Goals    Planned Interventions:   Manual lymph drainage, Gradient compression bandaging, Fit for compression garment, Exercises, Precautions to prevent infection / exacerbation, Education;      Goals: See   Goals on Care Plan in [employer] electronic health record.    Therapy Frequency:  (4x/week for 1 to 2 weeks and then 1x/week start in 3-4weeks)  Predicted Duration of Therapy Intervention: pt had to go back to [facility] for 3 to 4 weeks so we will start then for 60 days  _________________________________________________________________________________    I CERTIFY THE NEED FOR THESE SERVICES FURNISHED UNDER        THIS PLAN OF TREATMENT AND WHILE UNDER MY CARE     (Physician co-signature of this document indicates review and certification of the therapy plan).                  Certification date from: 10/06/17, Certification date to: 01/03/18    Referring physician: Dr. Casey Shine            Initial Assessment        See   Evaluation dated 10/06/17 in Epic electronic health record.

## 2017-10-18 ENCOUNTER — TELEPHONE (OUTPATIENT)
Dept: FAMILY MEDICINE | Facility: OTHER | Age: 67
End: 2017-10-18

## 2017-10-18 NOTE — TELEPHONE ENCOUNTER
You placed a referral for patient to optometry on 9/25/17 for a diabetic eye exam.  Patient has not scheduled as of yet.      Please review and forward to team if follow up with the patient is needed.     Thank you!  Maggie/Clinic Referrals Dyad II

## 2017-10-18 NOTE — LETTER
[facility]  [id number] [place] [id number] [phone]        October 18, 2017    Glenroy Padilla  [address] [address]              Dear [facility] Padilla    We recently noticed that your provider had referred you to Optometry and you haven't scheduled yet. Please call [phone] to schedule your appointment. If you have questions, concerns or no longer need your appointment please call [phone].          Sincerely,        Julio Cesar Sahu PA-C

## 2017-12-11 NOTE — PROGRESS NOTES
SUBJECTIVE:                                                    Glenroy Padilla is a 67 year old male who presents to clinic today for the following health issues:      HPI  The ASCVD Risk score ([place] DC Jr, et al., 2013) failed to calculate for the following reasons:    The valid total cholesterol range is 130 to 320 mg/dL  Patient is eligible for use of low-dose aspirin for primary prevention of heart attack and stroke.  Provider has discussed aspirin with patient and our decision was:     Prescribe:  Daily low-dose aspirin recommended for primary prevention, patient agrees with plan.        Diabetes Follow-up      Patient is checking blood sugars: 3 times a week    Diabetic concerns: None     Symptoms of hypoglycemia (low blood sugar): none     Paresthesias (numbness or burning in feet) or sores: Yes chronic neuropathy is noted he has an avulsion on the right lower leg in 2 spots that needs further attention.  He is seen the surgeon for debridement and wound care on 15 December.     Date of last diabetic eye exam: Due for this.as documented    Hyperlipidemia Follow-Up      Rate your low fat/cholesterol diet?: good    Taking statin?  Yes, no muscle aches from statin    Other lipid medications/supplements?:  none    Hypertension Follow-up      Outpatient blood pressures are not being checked.    Low Salt Diet: low salt    BP Readings from Last 2 Encounters:   12/14/17 150/86   10/02/17 158/86     Hemoglobin A1C (%)   Date Value   09/25/2017 6.2 (H)   05/22/2017 6.4 (H)     LDL Cholesterol Calculated (mg/dL)   Date Value   09/25/2017 57   05/22/2017 64           Problem list and histories reviewed & adjusted, as indicated.  Additional history: as documented    Patient Active Problem List   Diagnosis     Type 2 diabetes mellitus with other circulatory complication, without long-term current use of insulin (H)     Venous stasis ulcer of left lower extremity (H)     Acute pain of left knee     Chronic pain of right  knee     Open wound of left lower extremity without complication, initial encounter     Hx of coronary artery disease     Hx of heart artery stent     [place] (obstructive sleep apnea)     Hypertension, goal below 140/90     Hyperlipidemia LDL goal <100     History of coronary artery stent placement     Advanced directives, counseling/discussion     Obesity, morbid, BMI 40.0-49.9 (H)     History reviewed. No pertinent surgical history.    Social History   Substance Use Topics     Smoking status: Current Some Day Smoker     Types: Cigars     Smokeless tobacco: Never Used     Alcohol use No     History reviewed. No pertinent family history.      Current Outpatient Prescriptions   Medication Sig Dispense Refill     losartan (COZAAR) 25 MG tablet Take 1 tablet (25 mg) by mouth daily 90 tablet 1     cephALEXin (KEFLEX) 500 MG capsule Take 1 capsule (500 mg) by mouth 3 times daily 30 capsule 0     rosuvastatin (CRESTOR) 10 MG tablet Take 1 tablet (10 mg) by mouth daily 90 tablet 1     furosemide (LASIX) 40 MG tablet Take 1 tablet (40 mg) by mouth daily 90 tablet 1     clopidogrel (PLAVIX) 75 MG tablet Take 1 tablet (75 mg) by mouth daily 90 tablet 1     famotidine (PEPCID) 40 MG tablet Take 1 tablet (40 mg) by mouth nightly as needed 90 tablet 1     lidocaine (XYLOCAINE) 5 % ointment Apply topically daily 50 g 3     SitaGLIPtin-MetFORMIN HCl (JANUMET XR) 100-1000 MG TB24 Take 1 tablet by mouth daily 30 tablet 3     ferrous sulfate (IRON) 325 (65 FE) MG tablet Take 1 tablet (325 mg) by mouth daily (with breakfast) 90 tablet 2     order for DME One touch glucose monitoring strip with Glucometer and lancets. 1 Box 1     [DISCONTINUED] rosuvastatin (CRESTOR) 10 MG tablet Take 1 tablet (10 mg) by mouth daily 90 tablet 1     Allergies   Allergen Reactions     No Known Allergies      Recent Labs   Lab Test  09/25/17   1216  05/22/17   1029   A1C  6.2*  6.4*   LDL  57  64   HDL  43  42   TRIG  140  118   ALT  19  20   CR  0.92  0.91    GFRESTIMATED  81  82   GFRESTBLACK  >90  >90  African American GFR Calc     POTASSIUM  4.1  4.0   TSH  2.07   --       BP Readings from Last 3 Encounters:   12/14/17 118/66   10/02/17 158/86   09/30/17 172/64    Wt Readings from Last 3 Encounters:   12/14/17 (!) 311 lb 14.4 oz (141.5 kg)   10/06/17 (!) 318 lb 6.4 oz (144.4 kg)   10/02/17 (!) 318 lb (144.2 kg)                          ROS:  Constitutional, HEENT, cardiovascular, pulmonary, gi and gu systems are negative, except as otherwise noted.      OBJECTIVE:   /66 (Cuff Size: Adult Regular)  Pulse 80  Temp 97.3  F (36.3  C) (Temporal)  Resp 20  Wt (!) 311 lb 14.4 oz (141.5 kg)  BMI 44.75 kg/m2  Body mass index is 44.75 kg/(m^2).  GENERAL: healthy, alert and no distress  RESP: lungs clear to auscultation - no rales, rhonchi or wheezes  CV: regular rate and rhythm, normal S1 S2, no S3 or S4, no murmur, click or rub, no peripheral edema and peripheral pulses strong  ABDOMEN: soft, nontender, no hepatosplenomegaly, no masses and bowel sounds normal  MS: no gross musculoskeletal defects noted, no edema  SKIN: Avulsion to the skin of the right lower extremity.  2 areas are noted about 4 cm inferior to the patella and then lower down towards the ankle as well.  There is been some attempts at repair of the superior avulsion this appears to be complete with eschar and possibly even these stitches not being effective.  He is seen the wound care specialist tomorrow.  We cleaned this area gently and I do not appreciate any abscess.  There may be more surface infection and potential for some mild cellulitis as discussed.  We will prescribe medications as indicated.  NEURO: Normal strength and tone, mentation intact and speech normal  PSYCH: mentation appears normal, affect normal/bright    ASSESSMENT/PLAN:     1. Screening for diabetic retinopathy  Due for this soon he is well aware.    2. Need for prophylactic vaccination and inoculation against  influenza  Declines    3. Need for prophylactic vaccination against Streptococcus pneumoniae (pneumococcus)  Accepts    4. Need for prophylactic vaccination with tetanus-diphtheria (TD)  Accepts    5. Hypertension, goal below 140/90  Refill  - losartan (COZAAR) 25 MG tablet; Take 1 tablet (25 mg) by mouth daily  Dispense: 90 tablet; Refill: 1    6. Open wound of left lower extremity without complication, initial encounter  Noted and further evaluation with surgery is planned for 15 December.    7. Type 2 diabetes mellitus with other circulatory complication, without long-term current use of insulin (H)  This is been under good control recently no new concerns at this point in time will have labs rechecked in March 2018.  - losartan (COZAAR) 25 MG tablet; Take 1 tablet (25 mg) by mouth daily  Dispense: 90 tablet; Refill: 1  - cephALEXin (KEFLEX) 500 MG capsule; Take 1 capsule (500 mg) by mouth 3 times daily  Dispense: 30 capsule; Refill: 0  - TDAP (ADACEL)  - Pneumococcal vaccine 23 valent PPSV23  (Pneumovax) [64366]  - rosuvastatin (CRESTOR) 10 MG tablet; Take 1 tablet (10 mg) by mouth daily  Dispense: 90 tablet; Refill: 1    8. Hyperlipidemia LDL goal <100  Has been in excellent control recheck in March 2018    9. Avulsion of lower extremity, right, initial encounter  As noted above prescription sent follow-up as directed with surgery.  - cephALEXin (KEFLEX) 500 MG capsule; Take 1 capsule (500 mg) by mouth 3 times daily  Dispense: 30 capsule; Refill: 0  - TDAP (ADACEL)    Follow-up with surgeon for debridement and evaluation of sutures.    Julio Cesar Sahu PA-C  [place] [id number]

## 2017-12-14 ENCOUNTER — OFFICE VISIT (OUTPATIENT)
Dept: FAMILY MEDICINE | Facility: OTHER | Age: 67
End: 2017-12-14
Payer: MEDICAID

## 2017-12-14 VITALS
WEIGHT: 311.9 LBS | DIASTOLIC BLOOD PRESSURE: 66 MMHG | HEART RATE: 80 BPM | RESPIRATION RATE: 20 BRPM | BODY MASS INDEX: 44.75 KG/M2 | TEMPERATURE: 97.3 F | SYSTOLIC BLOOD PRESSURE: 118 MMHG

## 2017-12-14 DIAGNOSIS — E78.5 HYPERLIPIDEMIA LDL GOAL <100: ICD-10-CM

## 2017-12-14 DIAGNOSIS — I10 HYPERTENSION, GOAL BELOW 140/90: Primary | ICD-10-CM

## 2017-12-14 DIAGNOSIS — Z23 NEED FOR PROPHYLACTIC VACCINATION AGAINST STREPTOCOCCUS PNEUMONIAE (PNEUMOCOCCUS): ICD-10-CM

## 2017-12-14 DIAGNOSIS — S81.802A OPEN WOUND OF LEFT LOWER EXTREMITY WITHOUT COMPLICATION, INITIAL ENCOUNTER: ICD-10-CM

## 2017-12-14 DIAGNOSIS — Z23 NEED FOR PROPHYLACTIC VACCINATION AND INOCULATION AGAINST INFLUENZA: ICD-10-CM

## 2017-12-14 DIAGNOSIS — Z23 NEED FOR PROPHYLACTIC VACCINATION WITH TETANUS-DIPHTHERIA (TD): ICD-10-CM

## 2017-12-14 DIAGNOSIS — E11.59 TYPE 2 DIABETES MELLITUS WITH OTHER CIRCULATORY COMPLICATION, WITHOUT LONG-TERM CURRENT USE OF INSULIN (H): ICD-10-CM

## 2017-12-14 DIAGNOSIS — Z13.5 SCREENING FOR DIABETIC RETINOPATHY: ICD-10-CM

## 2017-12-14 DIAGNOSIS — S81.801A: ICD-10-CM

## 2017-12-14 DIAGNOSIS — Z23 NEED FOR VACCINATION: ICD-10-CM

## 2017-12-14 PROCEDURE — 90670 PCV13 VACCINE IM: CPT | Performed by: PHYSICIAN ASSISTANT

## 2017-12-14 PROCEDURE — G0009 ADMIN PNEUMOCOCCAL VACCINE: HCPCS | Performed by: PHYSICIAN ASSISTANT

## 2017-12-14 PROCEDURE — 90472 IMMUNIZATION ADMIN EACH ADD: CPT | Performed by: PHYSICIAN ASSISTANT

## 2017-12-14 PROCEDURE — 90715 TDAP VACCINE 7 YRS/> IM: CPT | Performed by: PHYSICIAN ASSISTANT

## 2017-12-14 PROCEDURE — 99214 OFFICE O/P EST MOD 30 MIN: CPT | Mod: 25 | Performed by: PHYSICIAN ASSISTANT

## 2017-12-14 RX ORDER — ROSUVASTATIN CALCIUM 10 MG/1
10 TABLET, COATED ORAL DAILY
Qty: 90 TABLET | Refills: 1 | Status: ON HOLD | OUTPATIENT
Start: 2017-12-14 | End: 2018-03-13

## 2017-12-14 RX ORDER — LOSARTAN POTASSIUM 25 MG/1
25 TABLET ORAL DAILY
Qty: 90 TABLET | Refills: 1 | Status: ON HOLD | OUTPATIENT
Start: 2017-12-14 | End: 2018-03-13

## 2017-12-14 RX ORDER — CEPHALEXIN 500 MG/1
500 CAPSULE ORAL 3 TIMES DAILY
Qty: 30 CAPSULE | Refills: 0 | Status: SHIPPED | OUTPATIENT
Start: 2017-12-14 | End: 2018-01-25

## 2017-12-14 ASSESSMENT — PAIN SCALES - GENERAL: PAINLEVEL: NO PAIN (0)

## 2017-12-14 NOTE — NURSING NOTE
Non stick bandage 4x4 times two applied to wounds with bacitracin and rolled gauze over top. Patient seeing wound care doctor tomorrow 12/15/17.  Lisa Zuluaga CMA (MA)

## 2017-12-14 NOTE — NURSING NOTE
"Chief Complaint   Patient presents with     Follow Up For     Panel Management     Tdap, prevnar, flu, asa, eye exam,        Initial /86 (Cuff Size: Adult Regular)  Pulse 96  Temp 97.3  F (36.3  C) (Temporal)  Resp 20  Wt (!) 311 lb 14.4 oz (141.5 kg)  BMI 44.75 kg/m2 Estimated body mass index is 44.75 kg/(m^2) as calculated from the following:    Height as of 5/22/17: 5' 10\" (1.778 m).    Weight as of this encounter: 311 lb 14.4 oz (141.5 kg).  Medication Reconciliation: complete   Lisa Zuluaga CMA (AAMA)    "

## 2017-12-14 NOTE — NURSING NOTE
Screening Questionnaire for Adult Immunization    Are you sick today?   No   Do you have allergies to medications, food, a vaccine component or latex?   No   Have you ever had a serious reaction after receiving a vaccination?   No   Do you have a long-term health problem with heart disease, lung disease, asthma, kidney disease, metabolic disease (e.g. diabetes), anemia, or other blood disorder?   Yes   Do you have cancer, leukemia, HIV/AIDS, or any other immune system problem?   No   In the past 3 months, have you taken medications that affect  your immune system, such as prednisone, other steroids, or anticancer drugs; drugs for the treatment of rheumatoid arthritis, Crohn s disease, or psoriasis; or have you had radiation treatments?   No   Have you had a seizure, or a brain or other nervous system problem?   No   During the past year, have you received a transfusion of blood or blood     products, or been given immune (gamma) globulin or antiviral drug?   No   For women: Are you pregnant or is there a chance you could become        pregnant during the next month?   No   Have you received any vaccinations in the past 4 weeks?   No     Immunization questionnaire was positive for at least one answer.  Notified Julio Cesar Sahu.        Per orders of Julio Cesar Sahu,, injection of Tdap and prevnar given by Lisa Zuluaga. Patient instructed to remain in clinic for 15 minutes afterwards, and to report any adverse reaction to me immediately.       Screening performed by Lisa Zuluaga on 12/14/2017 at 11:19 AM.

## 2017-12-14 NOTE — MR AVS SNAPSHOT
After Visit Summary   12/14/2017    Glenroy Padilla    MRN: [id number]           Patient Information     Date Of Birth          1950        Visit Information        Provider Department      12/14/2017 10:00 AM Julio Cesar Esteban PA-C [facility]        Today's Diagnoses     Hypertension, goal below 140/90    -  1    Screening for diabetic retinopathy        Need for prophylactic vaccination and inoculation against influenza        Need for prophylactic vaccination against Streptococcus pneumoniae (pneumococcus)        Need for prophylactic vaccination with tetanus-diphtheria (TD)        Open wound of left lower extremity without complication, initial encounter        Type 2 diabetes mellitus with other circulatory complication, without long-term current use of insulin (H)        Hyperlipidemia LDL goal <100        Avulsion of lower extremity, right, initial encounter        Need for vaccination           Follow-ups after your visit        Your next 10 appointments already scheduled     Dec 15, 2017 10:15 AM CST   Return Visit with Casey Shine MD   [facility] ([facility]    [address] [place] [id number] [phone]              Who to contact     If you have questions or need follow up information about today's clinic visit or your schedule please contact [facility] directly at [phone].  Normal or non-critical lab and imaging results will be communicated to you by MyChart, letter or phone within 4 business days after the clinic has received the results. If you do not hear from us within 7 days, please contact the clinic through MyChart or phone. If you have a critical or abnormal lab result, we will notify you by phone as soon as possible.  Submit refill requests through "[employer] or call your pharmacy and they will forward the refill request to us. Please allow 3 business days for your refill to be completed.     "      Additional Information About Your Visit        MyChart Information     [employer] lets you send messages to your doctor, view your test results, renew your prescriptions, schedule appointments and more. To sign up, go to [email or web address] . Click on \"Log in\" on the left side of the screen, which will take you to the Welcome page. Then click on \"Sign up Now\" on the right side of the page.     You will be asked to enter the access code listed below, as well as some personal information. Please follow the directions to create your username and password.     Your access code is: [email or web address]  Expires: 2017  9:46 AM     Your access code will  in 90 days. If you need help or a new code, please call your [place] [facility] clinic or [phone].        Care EveryWhere ID     This is your Care EveryWhere ID. This could be used by other organizations to access your [facility] medical records  [id number]        Your Vitals Were     Pulse Temperature Respirations BMI (Body Mass Index)          80 97.3  F (36.3  C) (Temporal) 20 44.75 kg/m2         Blood Pressure from Last 3 Encounters:   17 118/66   10/02/17 158/86   17 172/64    Weight from Last 3 Encounters:   17 (!) 311 lb 14.4 oz (141.5 kg)   10/06/17 (!) 318 lb 6.4 oz (144.4 kg)   10/02/17 (!) 318 lb (144.2 kg)              We Performed the Following     1st  Administration  [21628]     ADMIN PNEUMOCOCCAL VACCINE (For MEDICARE Patients ONLY) []     Pneumococcal vaccine 13 valent PCV13 IM (Prevnar) [88219]     TDAP (ADACEL)          Today's Medication Changes          These changes are accurate as of: 17 11:25 AM.  If you have any questions, ask your nurse or doctor.               Start taking these medicines.        Dose/Directions    cephALEXin 500 MG capsule   Commonly known as:  KEFLEX   Used for:  Avulsion of lower extremity, right, initial encounter, Type 2 diabetes mellitus with other circulatory complication, without " long-term current use of insulin (H)   Started by:  Julio Cesar Esteban PA-C        Dose:  500 mg   Take 1 capsule (500 mg) by mouth 3 times daily   Quantity:  30 capsule   Refills:  0       losartan 25 MG tablet   Commonly known as:  COZAAR   Used for:  Hypertension, goal below 140/90, Type 2 diabetes mellitus with other circulatory complication, without long-term current use of insulin (H)   Started by:  Julio Cesar Esteban PA-C        Dose:  25 mg   Take 1 tablet (25 mg) by mouth daily   Quantity:  90 tablet   Refills:  1            Where to get your medicines      These medications were sent to [facility] - [place] - [address]   [address] [place] [address]     Phone:  [phone]     cephALEXin 500 MG capsule    losartan 25 MG tablet    rosuvastatin 10 MG tablet                Primary Care Provider Fax #    Physician No Ref-Primary [phone]       No address on file        Equal Access to Services     DEBBI [facility] HospitalSOFIA : Hadii katia bernalo Sogloria, waaxda luqadaha, qaybta kaalmada adeegyada, adams batres . So [facility] [phone].    ATENCIÓN: Si habla español, tiene a wilkinson disposición servicios gratuitos de asistencia lingüística. Ll[facility] [phone].    We comply with applicable federal civil rights laws and [place] laws. We do not discriminate on the basis of race, color, national origin, age, disability, sex, sexual orientation, or gender identity.            Thank you!     Thank you for choosing [facility]  for your care. Our goal is always to provide you with excellent care. Hearing back from our patients is one way we can continue to improve our services. Please take a few minutes to complete the written survey that you may receive in the mail after your visit with us. Thank you!             Your Updated Medication List - Protect others around you: Learn how to safely use, store and throw away your medicines at  www.disposemymeds.org.          This list is accurate as of: 12/14/17 11:25 AM.  Always use your most recent med list.                   Brand Name Dispense Instructions for use Diagnosis    cephALEXin 500 MG capsule    KEFLEX    30 capsule    Take 1 capsule (500 mg) by mouth 3 times daily    Avulsion of lower extremity, right, initial encounter, Type 2 diabetes mellitus with other circulatory complication, without long-term current use of insulin (H)       clopidogrel 75 MG tablet    PLAVIX    90 tablet    Take 1 tablet (75 mg) by mouth daily    Hypertension, goal below 140/90, Hyperlipidemia LDL goal <100, Type 2 diabetes mellitus with other circulatory complication, without long-term current use of insulin (H)       famotidine 40 MG tablet    PEPCID    90 tablet    Take 1 tablet (40 mg) by mouth nightly as needed    Gastroesophageal reflux disease, esophagitis presence not specified       ferrous sulfate 325 (65 FE) MG tablet    IRON    90 tablet    Take 1 tablet (325 mg) by mouth daily (with breakfast)    History of anemia       furosemide 40 MG tablet    LASIX    90 tablet    Take 1 tablet (40 mg) by mouth daily    Hypertension, goal below 140/90       lidocaine 5 % ointment    XYLOCAINE    50 g    Apply topically daily    Chronic pain of right knee       losartan 25 MG tablet    COZAAR    90 tablet    Take 1 tablet (25 mg) by mouth daily    Hypertension, goal below 140/90, Type 2 diabetes mellitus with other circulatory complication, without long-term current use of insulin (H)       order for DME     1 Box    One touch glucose monitoring strip with Glucometer and lancets.    Type 2 diabetes mellitus with other circulatory complication, without long-term current use of insulin (H)       rosuvastatin 10 MG tablet    CRESTOR    90 tablet    Take 1 tablet (10 mg) by mouth daily    Type 2 diabetes mellitus with other circulatory complication, without long-term current use of insulin (H)       SitaGLIPtin-MetFORMIN  HCl 100-1000 MG Tb24    JANUMET XR    30 tablet    Take 1 tablet by mouth daily    Type 2 diabetes mellitus with complication, without long-term current use of insulin (H)

## 2017-12-15 ENCOUNTER — OFFICE VISIT (OUTPATIENT)
Dept: SURGERY | Facility: CLINIC | Age: 67
End: 2017-12-15
Payer: MEDICAID

## 2017-12-15 VITALS — OXYGEN SATURATION: 97 % | TEMPERATURE: 95.9 F | BODY MASS INDEX: 44.77 KG/M2 | WEIGHT: 312 LBS | HEART RATE: 87 BPM

## 2017-12-15 DIAGNOSIS — S81.802D OPEN WOUND OF LEFT LOWER EXTREMITY WITHOUT COMPLICATION, SUBSEQUENT ENCOUNTER: Primary | ICD-10-CM

## 2017-12-15 DIAGNOSIS — R60.0 EDEMA OF BOTH LEGS: ICD-10-CM

## 2017-12-15 DIAGNOSIS — I87.303 STASIS EDEMA OF BOTH LOWER EXTREMITIES: ICD-10-CM

## 2017-12-15 PROCEDURE — 99213 OFFICE O/P EST LOW 20 MIN: CPT | Performed by: SPECIALIST

## 2017-12-15 NOTE — PROGRESS NOTES
F/U for leg wound    Subjective:  Patient is a 66-year-old white male presenting history of a left leg wound. He hit a coffee table  the wound is slow to heal. He was initially treated in [place] and has a 15 year history of bilateral lower extremity edema. He has never been treated with any form of compression therapy and was told not to based on an ultrasound results in [place]. Those results are not available to me at this time. He denies any prior DVT, nonhealing wounds, leg trauma, claudication or rest pain. He was told in [place] to treat the leg with Betadine. He came [place] the [place] for second opinion.      Last seen by me in June and was tx with UNNA boot and silvercel.  Returned to [place] and was treated with hydroferra blue.      Went back to [place] for 4 weeks and was treated with wraps.  Also fell and lacerated RLE - had sutures placed.      Objective:  B/P: Data Unavailable, T: 95.9, P: 87, R: Data Unavailable  Ext; Warm, +2  Edema bilaterally.  Bilateral venous stasis changes with thickened skin.   (+)FEM pulses.  No distal pulses.  LLE wound - now split into 2  1.3x0.8x0.4cm and 1.4x1.4cm .  More granulation buds.    RLE - sutures removed.      U/S -   ULTRASOUND VENOUS COMPETENCY BILATERAL   6/1/2017 3:29 PM      HISTORY: Localized edema. Chronic venous hypertension (idiopathic)  without complications of bilateral lower extremity. Unspecified open  wound, left lower leg, initial encounter.     COMPARISON: None.     TECHNIQUE: Color Doppler and spectral waveform analysis performed.     FINDINGS: Bilateral common femoral veins, femoral veins, and popliteal  veins are patent and demonstrate no evidence of reflux.     Other than questionable 1 second reflux at the right saphenofemoral  junction, the right great saphenous vein is competent throughout its  length. Diameter of the right great saphenous vein is 3-7 mm.     The left great saphenous vein is competent throughout its length.  Diameter  of the left great saphenous vein is 2-7 mm.     Visualized portions of bilateral small saphenous veins appear to be  competent, without evidence of reflux.     No prominent perforators were demonstrated. Bilateral Giacomini veins  were competent.         IMPRESSION: The deep and superficial venous systems of both legs are  patent and competent.     PATRICK SCHERER MD    [facility] -   ULTRASOUND ANKLE-BRACHIAL INDEX DOPPLER NO EXERCISE   6/1/2017 3:16 PM        HISTORY: Localized edema. Chronic venous hypertension (idiopathic)  without complications of bilateral lower extremity.     COMPARISON: None.     FINDINGS: Ankle-brachial index is 0.96 on the right and 0.91 on the  left. Waveforms appear biphasic/monophasic bilaterally.         IMPRESSION: Borderline bilateral arterial insufficiency.     PATRICK SCHERER MD      Assessment/plan:  This is a 66 year old gentleman with bilateral chronic stasis changes and lymphedema. He has a wound as well as a result of his left leg hitting a coffee table.   Was treated in [place] with hydroferra blue.   Wound much improved and smaller.  Will cont UNNA boot and silvercel.  F/U 1 week.      Casey Shine MD, FACS.

## 2017-12-15 NOTE — LETTER
12/15/2017         RE: Glenroy Padilla  [address] [facility] [address] [facility] [address]        Dear Colleague,    Thank you for referring your patient, Glen[place] Padilla, to the [facility]. Please see a copy of my visit note below.    F/U for leg wound    Subjective:  Patient is a 66-year-old white male presenting history of a left leg wound. He hit a coffee table  the wound is slow to heal. He was initially treated in [place] and has a 15 year history of bilateral lower extremity edema. He has never been treated with any form of compression therapy and was told not to based on an ultrasound results in [place]. Those results are not available to me at this time. He denies any prior DVT, nonhealing wounds, leg trauma, claudication or rest pain. He was told in [place] to treat the leg with Betadine. [place] the [place] opinion.      Last seen by me in June and was tx with UNNA boot and silvercel.  Returned to [place] and was treated with hydroferra blue.      Went back to [place] for 4 weeks and was treated with wraps.  Also fell and lacerated RLE - had sutures placed.      Objective:  B/P: Data Unavailable, T: 95.9, P: 87, R: Data Unavailable  Ext; Warm, +2  Edema bilaterally.  Bilateral venous stasis changes with thickened skin.   (+)FEM pulses.  No distal pulses.  LLE wound - now split into 2  1.3x0.8x0.4cm and 1.4x1.4cm .  More granulation buds.    RLE - sutures removed.      U/S -   ULTRASOUND VENOUS COMPETENCY BILATERAL   6/1/2017 3:29 PM      HISTORY: Localized edema. Chronic venous hypertension (idiopathic)  without complications of bilateral lower extremity. Unspecified open  wound, left lower leg, initial encounter.     COMPARISON: None.     TECHNIQUE: Color Doppler and spectral waveform analysis performed.     FINDINGS: Bilateral common femoral veins, femoral veins, and popliteal  veins are patent and demonstrate no evidence of reflux.     Other than questionable 1 second  reflux at the right saphenofemoral  junction, the right great saphenous vein is competent throughout its  length. Diameter of the right great saphenous vein is 3-7 mm.     The left great saphenous vein is competent throughout its length.  Diameter of the left great saphenous vein is 2-7 mm.     Visualized portions of bilateral small saphenous veins appear to be  competent, without evidence of reflux.     No prominent perforators were demonstrated. Bilateral Giacomini veins  were competent.         IMPRESSION: The deep and superficial venous systems of both legs are  patent and competent.     PATRICK SCHERER MD    [facility] -   ULTRASOUND ANKLE-BRACHIAL INDEX DOPPLER NO EXERCISE   6/1/2017 3:16 PM        HISTORY: Localized edema. Chronic venous hypertension (idiopathic)  without complications of bilateral lower extremity.     COMPARISON: None.     FINDINGS: Ankle-brachial index is 0.96 on the right and 0.91 on the  left. Waveforms appear biphasic/monophasic bilaterally.         IMPRESSION: Borderline bilateral arterial insufficiency.     PATRICK SCHERER MD      Assessment/plan:  This is a 66 year old gentleman with bilateral chronic stasis changes and lymphedema. He has a wound as well as a result of his left leg hitting a coffee table.   Was treated in [place] with hydroferra blue.   Wound much improved and smaller.  Will cont UNNA boot and silvercel.  F/U 1 week.      Casey Shine MD, FACS.    Again, thank you for allowing me to participate in the care of your patient.        Sincerely,        Casey Shine MD

## 2017-12-15 NOTE — MR AVS SNAPSHOT
"              After Visit Summary   12/15/2017    Glenroy Padilla    MRN: [id number]           Patient Information     Date Of Birth          1950        Visit Information        Provider Department      12/15/2017 10:15 AM Casey Shine MD [facility]        Today's Diagnoses     Open wound of left lower extremity without complication, subsequent encounter    -  1    Edema of both legs        Stasis edema of both lower extremities           Follow-ups after your visit        Who to contact     If you have questions or need follow up information about today's clinic visit or your schedule please contact [facility] directly at [phone].  Normal or non-critical lab and imaging results will be communicated to you by MyChart, letter or phone within 4 business days after the clinic has received the results. If you do not hear from us within 7 days, please contact the clinic through [employer] or phone. If you have a critical or abnormal lab result, we will notify you by phone as soon as possible.  Submit refill requests through [employer] or call your pharmacy and they will forward the refill request to us. Please allow 3 business days for your refill to be completed.          Additional Information About Your Visit        MyChart Information     [employer] lets you send messages to your doctor, view your test results, renew your prescriptions, schedule appointments and more. To sign up, go to [email or web address] . Click on \"Log in\" on the left side of the screen, which will take you to the Welcome page. Then click on \"Sign up Now\" on the right side of the page.     You will be asked to enter the access code listed below, as well as some personal information. Please follow the directions to create your username and password.     Your access code is: [email or web address]  Expires: 2017  9:46 AM     Your access code will  in 90 days. If you need help or a new code, please call " your [facility] clinic or [phone].        Care EveryWhere ID     This is your Care EveryWhere ID. This could be used by other organizations to access your [facility] medical records  [id number]        Your Vitals Were     Pulse Temperature Pulse Oximetry BMI (Body Mass Index)          87 95.9  F (35.5  C) (Temporal) 97% 44.77 kg/m2         Blood Pressure from Last 3 Encounters:   12/14/17 118/66   10/02/17 158/86   09/30/17 172/64    Weight from Last 3 Encounters:   12/15/17 (!) 141.5 kg (312 lb)   12/14/17 (!) 141.5 kg (311 lb 14.4 oz)   10/06/17 (!) 144.4 kg (318 lb 6.4 oz)              Today, you had the following     No orders found for display       Primary Care Provider Fax #    Physician No Ref-Primary [phone]       No address on file        Equal Access to Services     JAZ TOBAR : Hadii katia bernalo Sogloria, waaxda luqadaha, qaybta kaalmada adeegyada, adams batres . So [facility] [phone].    ATENCIÓN: Si habla español, tiene a wilkinson disposición servicios gratuitos de asistencia lingüística. Llame al [phone].    We comply with applicable federal civil rights laws and [place] laws. We do not discriminate on the basis of race, color, national origin, age, disability, sex, sexual orientation, or gender identity.            Thank you!     Thank you for choosing [facility]  for your care. Our goal is always to provide you with excellent care. Hearing back from our patients is one way we can continue to improve our services. Please take a few minutes to complete the written survey that you may receive in the mail after your visit with us. Thank you!             Your Updated Medication List - Protect others around you: Learn how to safely use, store and throw away your medicines at www.disposemymeds.org.          This list is accurate as of: 12/15/17 10:39 AM.  Always use your most recent med list.                   Brand Name Dispense Instructions for use  Diagnosis    cephALEXin 500 MG capsule    KEFLEX    30 capsule    Take 1 capsule (500 mg) by mouth 3 times daily    Avulsion of lower extremity, right, initial encounter, Type 2 diabetes mellitus with other circulatory complication, without long-term current use of insulin (H)       clopidogrel 75 MG tablet    PLAVIX    90 tablet    Take 1 tablet (75 mg) by mouth daily    Hypertension, goal below 140/90, Hyperlipidemia LDL goal <100, Type 2 diabetes mellitus with other circulatory complication, without long-term current use of insulin (H)       famotidine 40 MG tablet    PEPCID    90 tablet    Take 1 tablet (40 mg) by mouth nightly as needed    Gastroesophageal reflux disease, esophagitis presence not specified       ferrous sulfate 325 (65 FE) MG tablet    IRON    90 tablet    Take 1 tablet (325 mg) by mouth daily (with breakfast)    History of anemia       furosemide 40 MG tablet    LASIX    90 tablet    Take 1 tablet (40 mg) by mouth daily    Hypertension, goal below 140/90       lidocaine 5 % ointment    XYLOCAINE    50 g    Apply topically daily    Chronic pain of right knee       losartan 25 MG tablet    COZAAR    90 tablet    Take 1 tablet (25 mg) by mouth daily    Hypertension, goal below 140/90, Type 2 diabetes mellitus with other circulatory complication, without long-term current use of insulin (H)       order for DME     1 Box    One touch glucose monitoring strip with Glucometer and lancets.    Type 2 diabetes mellitus with other circulatory complication, without long-term current use of insulin (H)       rosuvastatin 10 MG tablet    CRESTOR    90 tablet    Take 1 tablet (10 mg) by mouth daily    Type 2 diabetes mellitus with other circulatory complication, without long-term current use of insulin (H)       SitaGLIPtin-MetFORMIN HCl 100-1000 MG Tb24    JANUMET XR    30 tablet    Take 1 tablet by mouth daily    Type 2 diabetes mellitus with complication, without long-term current use of insulin (H)

## 2017-12-15 NOTE — NURSING NOTE
WOUND CARE    presented to clinic with tubi- over both legs.  LEFT LEG:  Location: left lower leg  Measures: the wound had formed a thick bridge, creating two wounds. Drainage was moderate upon removing the silvercel. The superior wound 1.3 x 0.8 x 0.4 cm with the deepest area at 11 o'clock, at that ml, I felt something hard at the bottom. MD aware. Wound 50% granulation and 50% slough.  The inferior wound in 80% granulation and 20% slough 1.4 x 1.4 x 0.2 cm. 6 o'clock there was a hard black thing in wound. Per pt, he has hx of calcium deposits in the wound and have been removed. MD viewed this and said he'd likely need to remove this next visit.  Action & Treatment order per doctor: Writer cleansed wound, Applied Sween moisturizer to leg. Applied UNNA boot with silvercel to left leg.  Patient verbalized understanding of treatment plan.    RIGHT LEG:Pt presents with 2 wounds to right lower leg. He fell down the stairs and injured himself last Friday. The proximal wound was larger with stitches (MD removed) and the distal wound was small and appeared to be healing. Per MD order, cleansed leg, applied nonadherent dressings to wounds and new tubi-. Pt to wash with soap and water daily and apply new dressing daily.     Follow up: 1 week. appt made with MD.     Meme Beth RN  [facility]  [phone]  12/15/2017 12:31 PM

## 2017-12-15 NOTE — NURSING NOTE
"Chief Complaint   Patient presents with     WOUND CARE       Initial Pulse 87  Temp 95.9  F (35.5  C) (Temporal)  Wt (!) 141.5 kg (312 lb)  SpO2 97%  BMI 44.77 kg/m2 Estimated body mass index is 44.77 kg/(m^2) as calculated from the following:    Height as of 5/22/17: 1.778 m (5' 10\").    Weight as of this encounter: 141.5 kg (312 lb).  Medication Reconciliation: complete    "

## 2017-12-22 ENCOUNTER — OFFICE VISIT (OUTPATIENT)
Dept: SURGERY | Facility: CLINIC | Age: 67
End: 2017-12-22
Payer: MEDICAID

## 2017-12-22 DIAGNOSIS — S81.802D OPEN WOUND OF LEFT LOWER EXTREMITY WITHOUT COMPLICATION, SUBSEQUENT ENCOUNTER: Primary | ICD-10-CM

## 2017-12-22 DIAGNOSIS — S81.801A OPEN WOUND OF RIGHT LOWER LEG, INITIAL ENCOUNTER: ICD-10-CM

## 2017-12-22 DIAGNOSIS — R60.0 EDEMA OF BOTH LEGS: ICD-10-CM

## 2017-12-22 PROCEDURE — 99213 OFFICE O/P EST LOW 20 MIN: CPT | Performed by: SPECIALIST

## 2017-12-22 RX ORDER — HYDROPHILIC CREAM
1 PASTE (GRAM) TOPICAL DAILY
Qty: 1 TUBE | Refills: 3 | Status: SHIPPED | OUTPATIENT
Start: 2017-12-22 | End: 2019-03-21

## 2017-12-22 NOTE — NURSING NOTE
PT here for f/u of bilateral lower leg ulcers. Pt has wound on upper outer right calf and left lower lateral calf.  Left lateral: has 3 open wound in entire wound bed: @ 3 o'clock measures 0.2 x 0.2cm; @ 6 0.8 x 0.8cm; @11 1 x 1 cm.hypergranulation tissue buds at 11 & 6 o'clock. MD applied Ag NO3. After bldg stopped unna boot placed with silvercel to wound bed.  Right calf: color yellow and red with black eschar comprising 2/3 of whole space, measures 4 x 3cm. Very little drainage. periwound skin intact. Triad placed in wound bed covered with dry gauze and pt had tubigrip on leg so this was replaced. YAHIR Wilkins

## 2017-12-22 NOTE — LETTER
12/22/2017         RE: Glenroy Padilla  [address] [facility] [address]        Dear Colleague,    Thank you for referring your patient, Glenroy Padilla, to the [facility]. Please see a copy of my visit note below.    F/U for leg wound    Subjective:  Patient is a 66-year-old white male presenting history of a left leg wound. He hit a coffee table  the wound is slow to heal. He was initially treated in [place] and has a 15 year history of bilateral lower extremity edema. He has never been treated with any form of compression therapy and was told not to based on an ultrasound results in [place]. Those results are not available to me at this time. He denies any prior DVT, nonhealing wounds, leg trauma, claudication or rest pain. He was told in [place] to treat the leg with Betadine. [place] came [place] the [place] for second opinion.      Last seen by me in June and was tx with UNNA boot and silvercel.  Returned to [place] and was treated with hydroferra blue.      Went back to [place] for 4 weeks and was treated with wraps.  Also fell and lacerated RLE.   Had UNNA boot on left for past week.      Objective:  B/P: Data Unavailable, T: Data Unavailable, P: Data Unavailable, R: Data Unavailable  Ext; Warm, +2  Edema right.  No edema left. Bilateral venous stasis changes with thickened skin.   (+)FEM pulses.  No distal pulses.  LLE wound - now split into 2  1.x1x0.4cm and 0.8x0.8cm .  Hypergranulation - AGNO4 applied.  Some calcifications remove with Rongeur    RLE - skin flap broke down.  4x3 cm open wound    U/S -   ULTRASOUND VENOUS COMPETENCY BILATERAL   6/1/2017 3:29 PM      HISTORY: Localized edema. Chronic venous hypertension (idiopathic)  without complications of bilateral lower extremity. Unspecified open  wound, left lower leg, initial encounter.     COMPARISON: None.     TECHNIQUE: Color Doppler and spectral waveform analysis performed.     FINDINGS: Bilateral common femoral veins,  femoral veins, and popliteal  veins are patent and demonstrate no evidence of reflux.     Other than questionable 1 second reflux at the right saphenofemoral  junction, the right great saphenous vein is competent throughout its  length. Diameter of the right great saphenous vein is 3-7 mm.     The left great saphenous vein is competent throughout its length.  Diameter of the left great saphenous vein is 2-7 mm.     Visualized portions of bilateral small saphenous veins appear to be  competent, without evidence of reflux.     No prominent perforators were demonstrated. Bilateral Giacomini veins  were competent.         IMPRESSION: The deep and superficial venous systems of both legs are  patent and competent.     PATRICK SCHERER MD    [facility] -   ULTRASOUND ANKLE-BRACHIAL INDEX DOPPLER NO EXERCISE   6/1/2017 3:16 PM        HISTORY: Localized edema. Chronic venous hypertension (idiopathic)  without complications of bilateral lower extremity.     COMPARISON: None.     FINDINGS: Ankle-brachial index is 0.96 on the right and 0.91 on the  left. Waveforms appear biphasic/monophasic bilaterally.         IMPRESSION: Borderline bilateral arterial insufficiency.     PATRICK SCHERER MD      Assessment/plan:  This is a 66 year old gentleman with bilateral chronic stasis changes and lymphedema. He has a new right leg wound as well as a result of a fall.   Left leg much smaller - calcifications debrided, AGNO4 applied.  Will start TRIAD to right.   Cont UNNA to left. F/U 1 week.    Casey Shine MD, FACS.    Again, thank you for allowing me to participate in the care of your patient.        Sincerely,        Casey Shine MD

## 2017-12-22 NOTE — MR AVS SNAPSHOT
"              After Visit Summary   12/22/2017    Glenroy Padilla    MRN: [id number]           Patient Information     Date Of Birth          1950        Visit Information        Provider Department      12/22/2017 9:45 AM Casey Shine MD [facility]        Today's Diagnoses     Open wound of left lower extremity without complication, subsequent encounter    -  1    Edema of both legs        Open wound of right lower leg, initial encounter           Follow-ups after your visit        Your next 10 appointments already scheduled     Dec 28, 2017  9:00 AM CST   Return Visit with Casey Shine MD   [facility]    [address] [place] [address] [id number] [phone]              Who to contact     If you have questions or need follow up information about today's clinic visit or your schedule please contact [facility] directly at [phone].  Normal or non-critical lab and imaging results will be communicated to you by MyChart, letter or phone within 4 business days after the clinic has received the results. If you do not hear from us within 7 days, please contact the clinic through MyChart or phone. If you have a critical or abnormal lab result, we will notify you by phone as soon as possible.  Submit refill requests through MyTrade or call your pharmacy and they will forward the refill request to us. Please allow 3 business days for your refill to be completed.          Additional Information About Your Visit        MyChart Information     MyTrade lets you send messages to your doctor, view your test results, renew your prescriptions, schedule appointments and more. To sign up, go to [email or web address] . Click on \"Log in\" on the left side of the screen, which will take you to the Welcome page. Then click on \"Sign up Now\" on the right side of the page.     You will be asked to enter the access code listed below, as well " as some personal information. Please follow the directions to create your username and password.     Your access code is: [email or web address]  Expires: 3/22/2018 10:22 AM     Your access code will  in 90 days. If you need help or a new code, please call your [place] clinic or [phone].        Care EveryWhere ID     This is your Care EveryWhere ID. This could be used by other organizations to access your [place] medical records  [id number]         Blood Pressure from Last 3 Encounters:   17 118/66   10/02/17 158/86   17 172/64    Weight from Last 3 Encounters:   12/15/17 (!) 141.5 kg (312 lb)   17 (!) 141.5 kg (311 lb 14.4 oz)   10/06/17 (!) 144.4 kg (318 lb 6.4 oz)              Today, you had the following     No orders found for display         Today's Medication Changes          These changes are accurate as of: 17 10:22 AM.  If you have any questions, ask your nurse or doctor.               Start taking these medicines.        Dose/Directions    TRIAD HYDROPHILIC WOUND DRESSI Pste   Used for:  Open wound of right lower leg, initial encounter        Dose:  1 applicator   Externally apply 1 g topically daily   Quantity:  1 Tube   Refills:  3            Where to get your medicines      These medications were sent to [facility] [address]   [address] [facility] , [facility] [address]     Phone:  [phone]     TRIAD HYDROPHILIC WOUND DRESSI Pste                Primary Care Provider Fax #    Physician No Ref-Primary [phone]       No address on file        Equal Access to Services     [facility]: Hadii aad ku hadasho Soomaali, waaxda luqadaha, qaybta kaalmada adeegyada, adams horton. So [facility] [phone].    ATENCIÓN: Si habla español, tiene a wilkinson disposición servicios gratuitos de asistencia lingüística. Llame al [phone].    We comply with applicable federal civil rights laws and [place] laws. We do not  discriminate on the basis of race, color, national origin, age, disability, sex, sexual orientation, or gender identity.            Thank you!     Thank you for choosing [facility]  for your care. Our goal is always to provide you with excellent care. Hearing back from our patients is one way we can continue to improve our services. Please take a few minutes to complete the written survey that you may receive in the mail after your visit with us. Thank you!             Your Updated Medication List - Protect others around you: Learn how to safely use, store and throw away your medicines at www.disposemymeds.org.          This list is accurate as of: 12/22/17 10:22 AM.  Always use your most recent med list.                   Brand Name Dispense Instructions for use Diagnosis    cephALEXin 500 MG capsule    KEFLEX    30 capsule    Take 1 capsule (500 mg) by mouth 3 times daily    Avulsion of lower extremity, right, initial encounter, Type 2 diabetes mellitus with other circulatory complication, without long-term current use of insulin (H)       clopidogrel 75 MG tablet    PLAVIX    90 tablet    Take 1 tablet (75 mg) by mouth daily    Hypertension, goal below 140/90, Hyperlipidemia LDL goal <100, Type 2 diabetes mellitus with other circulatory complication, without long-term current use of insulin (H)       famotidine 40 MG tablet    PEPCID    90 tablet    Take 1 tablet (40 mg) by mouth nightly as needed    Gastroesophageal reflux disease, esophagitis presence not specified       ferrous sulfate 325 (65 FE) MG tablet    IRON    90 tablet    Take 1 tablet (325 mg) by mouth daily (with breakfast)    History of anemia       furosemide 40 MG tablet    LASIX    90 tablet    Take 1 tablet (40 mg) by mouth daily    Hypertension, goal below 140/90       lidocaine 5 % ointment    XYLOCAINE    50 g    Apply topically daily    Chronic pain of right knee       losartan 25 MG tablet    COZAAR    90 tablet    Take 1  tablet (25 mg) by mouth daily    Hypertension, goal below 140/90, Type 2 diabetes mellitus with other circulatory complication, without long-term current use of insulin (H)       order for DME     1 Box    One touch glucose monitoring strip with Glucometer and lancets.    Type 2 diabetes mellitus with other circulatory complication, without long-term current use of insulin (H)       rosuvastatin 10 MG tablet    CRESTOR    90 tablet    Take 1 tablet (10 mg) by mouth daily    Type 2 diabetes mellitus with other circulatory complication, without long-term current use of insulin (H)       SitaGLIPtin-MetFORMIN HCl 100-1000 MG Tb24    JANUMET XR    30 tablet    Take 1 tablet by mouth daily    Type 2 diabetes mellitus with complication, without long-term current use of insulin (H)       TRIAD HYDROPHILIC WOUND DRESSI Pste     1 Tube    Externally apply 1 g topically daily    Open wound of right lower leg, initial encounter

## 2017-12-22 NOTE — PROGRESS NOTES
F/U for leg wound    Subjective:  Patient is a 66-year-old white male presenting history of a left leg wound. He hit a coffee table  the wound is slow to heal. He was initially treated in [place] and has a 15 year history of bilateral lower extremity edema. He has never been treated with any form of compression therapy and was told not to based on an ultrasound results in [place]. Those results are not available to me at this time. He denies any prior DVT, nonhealing wounds, leg trauma, claudication or rest pain. He was told in [place] to treat the leg with Betadine. [place].      Last seen by me in June and was tx with UNNA boot and silvercel.  Returned to [place] and was treated with hydroferra blue.      Went back to [place] for 4 weeks and was treated with wraps.  Also fell and lacerated RLE.   Had UNNA boot on left for past week.      Objective:  B/P: Data Unavailable, T: Data Unavailable, P: Data Unavailable, R: Data Unavailable  Ext; Warm, +2  Edema right.  No edema left. Bilateral venous stasis changes with thickened skin.   (+)FEM pulses.  No distal pulses.  LLE wound - now split into 2  1.x1x0.4cm and 0.8x0.8cm .  Hypergranulation - AGNO4 applied.  Some calcifications remove with Rongeur    RLE - skin flap broke down.  4x3 cm open wound    U/S -   ULTRASOUND VENOUS COMPETENCY BILATERAL   6/1/2017 3:29 PM      HISTORY: Localized edema. Chronic venous hypertension (idiopathic)  without complications of bilateral lower extremity. Unspecified open  wound, left lower leg, initial encounter.     COMPARISON: None.     TECHNIQUE: Color Doppler and spectral waveform analysis performed.     FINDINGS: Bilateral common femoral veins, femoral veins, and popliteal  veins are patent and demonstrate no evidence of reflux.     Other than questionable 1 second reflux at the right saphenofemoral  junction, the right great saphenous vein is competent throughout its  length. Diameter  of the right great saphenous vein is 3-7 mm.     The left great saphenous vein is competent throughout its length.  Diameter of the left great saphenous vein is 2-7 mm.     Visualized portions of bilateral small saphenous veins appear to be  competent, without evidence of reflux.     No prominent perforators were demonstrated. Bilateral Giacomini veins  were competent.         IMPRESSION: The deep and superficial venous systems of both legs are  patent and competent.     PATRICK SCHERER MD    [facility] -   ULTRASOUND ANKLE-BRACHIAL INDEX DOPPLER NO EXERCISE   6/1/2017 3:16 PM        HISTORY: Localized edema. Chronic venous hypertension (idiopathic)  without complications of bilateral lower extremity.     COMPARISON: None.     FINDINGS: Ankle-brachial index is 0.96 on the right and 0.91 on the  left. Waveforms appear biphasic/monophasic bilaterally.         IMPRESSION: Borderline bilateral arterial insufficiency.     PATRICK SCHERER MD      Assessment/plan:  This is a 66 year old gentleman with bilateral chronic stasis changes and lymphedema. He has a new right leg wound as well as a result of a fall.   Left leg much smaller - calcifications debrided, AGNO4 applied.  Will start TRIAD to right.   Cont UNNA to left. F/U 1 week.    Casey Shine MD, FACS.

## 2017-12-28 ENCOUNTER — OFFICE VISIT (OUTPATIENT)
Dept: SURGERY | Facility: CLINIC | Age: 67
End: 2017-12-28
Payer: MEDICAID

## 2017-12-28 VITALS — HEART RATE: 120 BPM | TEMPERATURE: 97.5 F | BODY MASS INDEX: 44.62 KG/M2 | WEIGHT: 311 LBS | OXYGEN SATURATION: 95 %

## 2017-12-28 DIAGNOSIS — S81.802D OPEN WOUND OF LEFT LOWER EXTREMITY WITHOUT COMPLICATION, SUBSEQUENT ENCOUNTER: Primary | ICD-10-CM

## 2017-12-28 DIAGNOSIS — S81.801D OPEN WOUND OF RIGHT LOWER LEG, SUBSEQUENT ENCOUNTER: ICD-10-CM

## 2017-12-28 DIAGNOSIS — R60.0 EDEMA OF BOTH LEGS: ICD-10-CM

## 2017-12-28 DIAGNOSIS — I87.303 STASIS EDEMA OF BOTH LOWER EXTREMITIES: ICD-10-CM

## 2017-12-28 PROCEDURE — 99213 OFFICE O/P EST LOW 20 MIN: CPT | Performed by: SPECIALIST

## 2017-12-28 NOTE — NURSING NOTE
Left leg wound: superior wound 0.3 x 0.3 x <0.1cm all red tissue. Large inferior wound 4 x 1.2 cm. 12 o'clock 0.2 cm deep.  6 o'clock 0.4 cm deep. Both 12 and 6 o'clock have calcium deposits.     Right leg wound: 50% wound necrotic tissue coming from 10 o'clock. Granulation tissue present. Measures 4 x 2.2 x < 0.1 cm. Lots of new skin. Wound irregular shape.    MD applied dressings after visit.

## 2017-12-28 NOTE — NURSING NOTE
LC Bettencourt cut and placed in lower left leg wound, UNNA boot placed, covered with jerry and ace.  Triad applied to right leg wound, covered with sterile non stick gauze, secured with jerry. Tubex wrap supplied by RN applied to leg. Patient tolerated application with no concerns...............................Hui Ashraf CMA  ([facility])

## 2017-12-28 NOTE — MR AVS SNAPSHOT
"              After Visit Summary   2017    Glenroy Padilla    MRN: [id number]           Patient Information     Date Of Birth          1950        Visit Information        Provider Department      2017 9:00 AM Casey Shine MD [facility]        Today's Diagnoses     Open wound of left lower extremity without complication, subsequent encounter    -  1    Edema of both legs        Open wound of right lower leg, subsequent encounter        Stasis edema of both lower extremities           Follow-ups after your visit        Who to contact     If you have questions or need follow up information about today's clinic visit or your schedule please contact [facility] directly at [phone].  Normal or non-critical lab and imaging results will be communicated to you by MyChart, letter or phone within 4 business days after the clinic has received the results. If you do not hear from us within 7 days, please contact the clinic through MyChart or phone. If you have a critical or abnormal lab result, we will notify you by phone as soon as possible.  Submit refill requests through Gogobot or call your pharmacy and they will forward the refill request to us. Please allow 3 business days for your refill to be completed.          Additional Information About Your Visit        MyChart Information     [employer]ot lets you send messages to your doctor, view your test results, renew your prescriptions, schedule appointments and more. To sign up, go to [email or web address] . Click on \"Log in\" on the left side of the screen, which will take you to the Welcome page. Then click on \"Sign up Now\" on the right side of the page.     You will be asked to enter the access code listed below, as well as some personal information. Please follow the directions to create your username and password.     Your access code is: [email or web address]  Expires: 3/22/2018 10:22 AM     Your access code will  in " 90 days. If you need help or a new code, please call your [place] clinic or [phone].        Care EveryWhere ID     This is your Care EveryWhere ID. This could be used by other organizations to access your [place] medical records  [id number]        Your Vitals Were     Pulse Temperature Pulse Oximetry BMI (Body Mass Index)          120 97.5  F (36.4  C) (Temporal) 95% 44.62 kg/m2         Blood Pressure from Last 3 Encounters:   12/14/17 118/66   10/02/17 158/86   09/30/17 172/64    Weight from Last 3 Encounters:   12/28/17 (!) 141.1 kg (311 lb)   12/15/17 (!) 141.5 kg (312 lb)   12/14/17 (!) 141.5 kg (311 lb 14.4 oz)              Today, you had the following     No orders found for display       Primary Care Provider Fax #    Physician No Ref-Primary [phone]       No address on file        Equal Access to Services     [facility] : Hadii katia bernalo Sogloria, waaxda luqadaha, qaybta kaalmada adeegyada, adams batres . So [facility] [phone].    ATENCIÓN: Si habla español, tiene a wilkinson disposición servicios gratuitos de asistencia lingüística. Llame al [phone].    We comply with applicable federal civil rights laws and [place] laws. We do not discriminate on the basis of race, color, national origin, age, disability, sex, sexual orientation, or gender identity.            Thank you!     Thank you for choosing [facility]  for your care. Our goal is always to provide you with excellent care. Hearing back from our patients is one way we can continue to improve our services. Please take a few minutes to complete the written survey that you may receive in the mail after your visit with us. Thank you!             Your Updated Medication List - Protect others around you: Learn how to safely use, store and throw away your medicines at www.disposemymeds.org.          This list is accurate as of: 12/28/17  9:51 AM.  Always use your most recent med list.                    Brand Name Dispense Instructions for use Diagnosis    cephALEXin 500 MG capsule    KEFLEX    30 capsule    Take 1 capsule (500 mg) by mouth 3 times daily    Avulsion of lower extremity, right, initial encounter, Type 2 diabetes mellitus with other circulatory complication, without long-term current use of insulin (H)       clopidogrel 75 MG tablet    PLAVIX    90 tablet    Take 1 tablet (75 mg) by mouth daily    Hypertension, goal below 140/90, Hyperlipidemia LDL goal <100, Type 2 diabetes mellitus with other circulatory complication, without long-term current use of insulin (H)       famotidine 40 MG tablet    PEPCID    90 tablet    Take 1 tablet (40 mg) by mouth nightly as needed    Gastroesophageal reflux disease, esophagitis presence not specified       ferrous sulfate 325 (65 FE) MG tablet    IRON    90 tablet    Take 1 tablet (325 mg) by mouth daily (with breakfast)    History of anemia       furosemide 40 MG tablet    LASIX    90 tablet    Take 1 tablet (40 mg) by mouth daily    Hypertension, goal below 140/90       lidocaine 5 % ointment    XYLOCAINE    50 g    Apply topically daily    Chronic pain of right knee       losartan 25 MG tablet    COZAAR    90 tablet    Take 1 tablet (25 mg) by mouth daily    Hypertension, goal below 140/90, Type 2 diabetes mellitus with other circulatory complication, without long-term current use of insulin (H)       order for DME     1 Box    One touch glucose monitoring strip with Glucometer and lancets.    Type 2 diabetes mellitus with other circulatory complication, without long-term current use of insulin (H)       rosuvastatin 10 MG tablet    CRESTOR    90 tablet    Take 1 tablet (10 mg) by mouth daily    Type 2 diabetes mellitus with other circulatory complication, without long-term current use of insulin (H)       SitaGLIPtin-MetFORMIN HCl 100-1000 MG Tb24    JANUMET XR    30 tablet    Take 1 tablet by mouth daily    Type 2 diabetes mellitus with complication,  without long-term current use of insulin (H)       TRIAD HYDROPHILIC WOUND DRESSI Pste     1 Tube    Externally apply 1 g topically daily    Open wound of right lower leg, initial encounter

## 2017-12-28 NOTE — NURSING NOTE
"Pulse 120  Temp 97.5  F (36.4  C) (Temporal)  Wt (!) 141.1 kg (311 lb)  SpO2 95%  BMI 44.62 kg/m2  Estimated body mass index is 25.87 kg/(m^2) as calculated from the following:    Height as of 9/2/16: 1.638 m (5' 4.5\").    Weight as of this encounter: 69.4 kg (153 lb).  BP Readings from Last 1 Encounters:   09/06/16 121/80   ]  BP cuff size:  NA (Not Taken)  Do you feel safe in your environment?  Not asked  Does the patient need any medication refills today? no    Meme Beth RN  [facility]  [phone]  12/28/2017 9:21 AM      "

## 2017-12-28 NOTE — LETTER
12/28/2017         RE: Glenroy Padilla  [address] [facility] [address]        Dear Colleague,    Thank you for referring your patient, Glenroy Padilla, to the [facility]. Please see a copy of my visit note below.    F/U for leg wound    Subjective:  Patient is a 66-year-old white male presenting history of a left leg wound. He hit a coffee table  the wound is slow to heal. He was initially treated in [place] and has a 15 year history of bilateral lower extremity edema. He has never been treated with any form of compression therapy and was told not to based on an ultrasound results in [place]. Those results are not available to me at this time. He denies any prior DVT, nonhealing wounds, leg trauma, claudication or rest pain. He was told in [place] to treat the leg with Betadine. [place] came to the [place] for second opinion.      Last seen by me in June and was tx with UNNA boot and silvercel.  Returned to [place] and was treated with hydroferra blue.      Went back to [place] for 4 weeks and was treated with wraps.  Also fell and lacerated RLE.   Had UNNA boot on left for past week.  Using TRIAD on laceration on right.      Objective:  B/P: Data Unavailable, T: Data Unavailable, P: Data Unavailable, R: Data Unavailable  Ext; Warm, +2  Edema right.  No edema left. Bilateral venous stasis changes with thickened skin.   (+)FEM pulses.  No distal pulses.  LLE wound - now split into 2  1.x1.2x0.4cm and 0.8x0.8cm .  Hypergranulation - AGNO4 applied.  Some calcifications remove with Rongeur    RLE - skin flap broke down.  4x2.2 cm open wound    U/S -   ULTRASOUND VENOUS COMPETENCY BILATERAL   6/1/2017 3:29 PM      HISTORY: Localized edema. Chronic venous hypertension (idiopathic)  without complications of bilateral lower extremity. Unspecified open  wound, left lower leg, initial encounter.     COMPARISON: None.     TECHNIQUE: Color Doppler and spectral waveform analysis  performed.     FINDINGS: Bilateral common femoral veins, femoral veins, and popliteal  veins are patent and demonstrate no evidence of reflux.     Other than questionable 1 second reflux at the right saphenofemoral  junction, the right great saphenous vein is competent throughout its  length. Diameter of the right great saphenous vein is 3-7 mm.     The left great saphenous vein is competent throughout its length.  Diameter of the left great saphenous vein is 2-7 mm.     Visualized portions of bilateral small saphenous veins appear to be  competent, without evidence of reflux.     No prominent perforators were demonstrated. Bilateral Giacomini veins  were competent.         IMPRESSION: The deep and superficial venous systems of both legs are  patent and competent.     PATRICK SCHERER MD    [facility]ESH -   ULTRASOUND ANKLE-BRACHIAL INDEX DOPPLER NO EXERCISE   6/1/2017 3:16 PM        HISTORY: Localized edema. Chronic venous hypertension (idiopathic)  without complications of bilateral lower extremity.     COMPARISON: None.     FINDINGS: Ankle-brachial index is 0.96 on the right and 0.91 on the  left. Waveforms appear biphasic/monophasic bilaterally.         IMPRESSION: Borderline bilateral arterial insufficiency.     PATRICK SCHERER MD      Assessment/plan:  This is a 66 year old gentleman with bilateral chronic stasis changes and lymphedema. He has a new right leg wound as well as a result of a fall.   Left leg much smaller - calcifications debrided, AGNO4 applied.  Will start TRIAD to right.   Cont UNNA to left. F/U 1 week.    Casey Shine MD, FACS.    Again, thank you for allowing me to participate in the care of your patient.        Sincerely,        Casey Shine MD

## 2017-12-28 NOTE — PROGRESS NOTES
F/U for leg wound    Subjective:  Patient is a 66-year-old white male presenting history of a left leg wound. He hit a coffee table  the wound is slow to heal. He was initially treated in [place] and has a 15 year history of bilateral lower extremity edema. He has never been treated with any form of compression therapy and was told not to based on an ultrasound results in [place]. Those results are not available to me at this time. He denies any prior DVT, nonhealing wounds, leg trauma, claudication or rest pain. He was told in [place] to treat the leg with Betadine. [place] the [place].      Last seen by me in June and was tx with UNNA boot and silvercel.  Returned to [place] and was treated with hydroferra blue.      Went back to [place] for 4 weeks and was treated with wraps.  Also fell and lacerated RLE.   Had UNNA boot on left for past week.  Using TRIAD on laceration on right.      Objective:  B/P: Data Unavailable, T: Data Unavailable, P: Data Unavailable, R: Data Unavailable  Ext; Warm, +2  Edema right.  No edema left. Bilateral venous stasis changes with thickened skin.   (+)FEM pulses.  No distal pulses.  LLE wound - now split into 2  1.x1.2x0.4cm and 0.8x0.8cm .  Hypergranulation - AGNO4 applied.  Some calcifications remove with Rongeur    RLE - skin flap broke down.  4x2.2 cm open wound    U/S -   ULTRASOUND VENOUS COMPETENCY BILATERAL   6/1/2017 3:29 PM      HISTORY: Localized edema. Chronic venous hypertension (idiopathic)  without complications of bilateral lower extremity. Unspecified open  wound, left lower leg, initial encounter.     COMPARISON: None.     TECHNIQUE: Color Doppler and spectral waveform analysis performed.     FINDINGS: Bilateral common femoral veins, femoral veins, and popliteal  veins are patent and demonstrate no evidence of reflux.     Other than questionable 1 second reflux at the right saphenofemoral  junction, the right great saphenous vein is  competent throughout its  length. Diameter of the right great saphenous vein is 3-7 mm.     The left great saphenous vein is competent throughout its length.  Diameter of the left great saphenous vein is 2-7 mm.     Visualized portions of bilateral small saphenous veins appear to be  competent, without evidence of reflux.     No prominent perforators were demonstrated. Bilateral Giacomini veins  were competent.         IMPRESSION: The deep and superficial venous systems of both legs are  patent and competent.     PATRICK SCHERER MD    [facility] -   ULTRASOUND ANKLE-BRACHIAL INDEX DOPPLER NO EXERCISE   6/1/2017 3:16 PM        HISTORY: Localized edema. Chronic venous hypertension (idiopathic)  without complications of bilateral lower extremity.     COMPARISON: None.     FINDINGS: Ankle-brachial index is 0.96 on the right and 0.91 on the  left. Waveforms appear biphasic/monophasic bilaterally.         IMPRESSION: Borderline bilateral arterial insufficiency.     PATRICK SCHERER MD      Assessment/plan:  This is a 66 year old gentleman with bilateral chronic stasis changes and lymphedema. He has a new right leg wound as well as a result of a fall.   Left leg much smaller - calcifications debrided, AGNO4 applied.  Will start TRIAD to right.   Cont UNNA to left. F/U 1 week.    Casey Shine MD, FACS.

## 2018-01-24 ENCOUNTER — TELEPHONE (OUTPATIENT)
Dept: FAMILY MEDICINE | Facility: OTHER | Age: 68
End: 2018-01-24

## 2018-01-24 NOTE — TELEPHONE ENCOUNTER
Reason for Call:  Same Day Appointment, Requested Provider:  MARLO Gonzalez    PCP: No Ref-Primary, Physician    Reason for visit: f/u bronchitis, thinks it may be turning into pneumonia    Duration of symptoms: ongoing     Have you been treated for this in the past? Yes- in [place]     Additional comments: Would like to be worked in to see Julio Cesar tomorrow.     Can we leave a detailed message on this number? YES    Phone number patient can be reached at: [phone]- this is daughter Yuko- please call her, Glenroy is driving here from [place] right now so will be unavailable.     Best Time: any     Call taken on 1/24/2018 at 2:23 PM by Claire Hinojosa

## 2018-01-24 NOTE — TELEPHONE ENCOUNTER
Spoke with daughter who is not with patient (no C2C) and not able to get a hold of patient. Daughter will have patient call nurse line when he gets home to be triaged.     Chantel Mederos, RN, BSN

## 2018-01-25 ENCOUNTER — RADIANT APPOINTMENT (OUTPATIENT)
Dept: GENERAL RADIOLOGY | Facility: OTHER | Age: 68
End: 2018-01-25
Attending: PHYSICIAN ASSISTANT
Payer: MEDICAID

## 2018-01-25 ENCOUNTER — TELEPHONE (OUTPATIENT)
Dept: FAMILY MEDICINE | Facility: OTHER | Age: 68
End: 2018-01-25

## 2018-01-25 ENCOUNTER — OFFICE VISIT (OUTPATIENT)
Dept: FAMILY MEDICINE | Facility: OTHER | Age: 68
End: 2018-01-25
Payer: MEDICAID

## 2018-01-25 VITALS
DIASTOLIC BLOOD PRESSURE: 74 MMHG | HEIGHT: 70 IN | HEART RATE: 96 BPM | WEIGHT: 280.6 LBS | OXYGEN SATURATION: 98 % | BODY MASS INDEX: 40.17 KG/M2 | TEMPERATURE: 97.8 F | SYSTOLIC BLOOD PRESSURE: 132 MMHG

## 2018-01-25 DIAGNOSIS — I10 HYPERTENSION, GOAL BELOW 140/90: ICD-10-CM

## 2018-01-25 DIAGNOSIS — E11.59 TYPE 2 DIABETES MELLITUS WITH OTHER CIRCULATORY COMPLICATION, WITHOUT LONG-TERM CURRENT USE OF INSULIN (H): ICD-10-CM

## 2018-01-25 DIAGNOSIS — R06.89: ICD-10-CM

## 2018-01-25 DIAGNOSIS — K21.9 GASTROESOPHAGEAL REFLUX DISEASE WITHOUT ESOPHAGITIS: ICD-10-CM

## 2018-01-25 DIAGNOSIS — J06.9 URI WITH COUGH AND CONGESTION: Primary | ICD-10-CM

## 2018-01-25 DIAGNOSIS — M21.371 FOOT DROP, RIGHT: ICD-10-CM

## 2018-01-25 DIAGNOSIS — I51.7 CARDIOMEGALY: ICD-10-CM

## 2018-01-25 DIAGNOSIS — Z86.79 HX OF CORONARY ARTERY DISEASE: ICD-10-CM

## 2018-01-25 DIAGNOSIS — G62.9 NEUROPATHY: ICD-10-CM

## 2018-01-25 DIAGNOSIS — Z95.5 HISTORY OF CORONARY ARTERY STENT PLACEMENT: ICD-10-CM

## 2018-01-25 DIAGNOSIS — J06.9 URI WITH COUGH AND CONGESTION: ICD-10-CM

## 2018-01-25 DIAGNOSIS — Z95.5 HX OF HEART ARTERY STENT: ICD-10-CM

## 2018-01-25 PROBLEM — M25.562 CHRONIC PAIN OF LEFT KNEE: Status: ACTIVE | Noted: 2017-05-22

## 2018-01-25 PROBLEM — G89.29 CHRONIC PAIN OF LEFT KNEE: Status: ACTIVE | Noted: 2017-05-22

## 2018-01-25 LAB
BASOPHILS # BLD AUTO: 0 10E9/L (ref 0–0.2)
BASOPHILS NFR BLD AUTO: 0.5 %
DIFFERENTIAL METHOD BLD: ABNORMAL
EOSINOPHIL # BLD AUTO: 0.3 10E9/L (ref 0–0.7)
EOSINOPHIL NFR BLD AUTO: 2.9 %
ERYTHROCYTE [DISTWIDTH] IN BLOOD BY AUTOMATED COUNT: 16.9 % (ref 10–15)
HCT VFR BLD AUTO: 37.7 % (ref 40–53)
HGB BLD-MCNC: 11.7 G/DL (ref 13.3–17.7)
LYMPHOCYTES # BLD AUTO: 0.9 10E9/L (ref 0.8–5.3)
LYMPHOCYTES NFR BLD AUTO: 9.9 %
MCH RBC QN AUTO: 27.7 PG (ref 26.5–33)
MCHC RBC AUTO-ENTMCNC: 31 G/DL (ref 31.5–36.5)
MCV RBC AUTO: 89 FL (ref 78–100)
MONOCYTES # BLD AUTO: 0.7 10E9/L (ref 0–1.3)
MONOCYTES NFR BLD AUTO: 8.3 %
NEUTROPHILS # BLD AUTO: 6.9 10E9/L (ref 1.6–8.3)
NEUTROPHILS NFR BLD AUTO: 78.4 %
PLATELET # BLD AUTO: 382 10E9/L (ref 150–450)
RBC # BLD AUTO: 4.22 10E12/L (ref 4.4–5.9)
WBC # BLD AUTO: 8.8 10E9/L (ref 4–11)

## 2018-01-25 PROCEDURE — 99214 OFFICE O/P EST MOD 30 MIN: CPT | Performed by: PHYSICIAN ASSISTANT

## 2018-01-25 PROCEDURE — 85025 COMPLETE CBC W/AUTO DIFF WBC: CPT | Performed by: PHYSICIAN ASSISTANT

## 2018-01-25 PROCEDURE — 36415 COLL VENOUS BLD VENIPUNCTURE: CPT | Performed by: PHYSICIAN ASSISTANT

## 2018-01-25 PROCEDURE — 71045 X-RAY EXAM CHEST 1 VIEW: CPT | Mod: FY

## 2018-01-25 RX ORDER — PANTOPRAZOLE SODIUM 40 MG/1
40 TABLET, DELAYED RELEASE ORAL DAILY
Qty: 1 TABLET | Refills: 0 | Status: ON HOLD | COMMUNITY
Start: 2018-01-25 | End: 2018-03-13

## 2018-01-25 RX ORDER — DULOXETIN HYDROCHLORIDE 30 MG/1
30 CAPSULE, DELAYED RELEASE ORAL 2 TIMES DAILY
Qty: 1 CAPSULE | Refills: 0 | Status: ON HOLD | COMMUNITY
Start: 2018-01-25 | End: 2018-03-13

## 2018-01-25 ASSESSMENT — PAIN SCALES - GENERAL: PAINLEVEL: NO PAIN (0)

## 2018-01-25 NOTE — NURSING NOTE
"No chief complaint on file.      Initial /74 (BP Location: Left arm, Cuff Size: Adult Large)  Pulse 96  Temp 97.8  F (36.6  C) (Temporal)  Ht 5' 10\" (1.778 m)  Wt 280 lb 9.6 oz (127.3 kg)  SpO2 98%  BMI 40.26 kg/m2 Estimated body mass index is 40.26 kg/(m^2) as calculated from the following:    Height as of this encounter: 5' 10\" (1.778 m).    Weight as of this encounter: 280 lb 9.6 oz (127.3 kg).  Medication Reconciliation: complete  "

## 2018-01-25 NOTE — TELEPHONE ENCOUNTER
Reason for Call: Request for an order or referral:    Order or referral being requested: chest xray    Date needed: as soon as possible    Has the patient been seen by the PCP for this problem? YES    Additional comments: was told could get order from Julio Cesar for [place] x ray.      Phone number Patient can be reached at:  Home number on file [phone] (home) daughter Yuko [phone]    Best Time:  any    Can we leave a detailed message on this number?  YES    Call taken on 1/25/2018 at 9:50 AM by Neda Cortez

## 2018-01-25 NOTE — PROGRESS NOTES
SUBJECTIVE:   Glenroy Padilla is a 67 year old male who presents to clinic today for the following health issues:      HPI  Acute Illness   Acute illness concerns: URI  Onset: 3 weeks    Fever: no    Chills/Sweats: YES    Headache (location?): no    Sinus Pressure:no    Conjunctivitis:  YES: bilateral    Ear Pain: YES Left    Rhinorrhea: YES    Congestion: YES    Sore Throat: YES     Cough: YES-non-productive    Wheeze: YES    Decreased Appetite: YES    Nausea: no    Vomiting: no    Diarrhea:  no    Dysuria/Freq.: no    Fatigue/Achiness: YES    Sick/Strep Exposure: YES     Therapies Tried and outcome: Antibiotic- not sure which one     Problem list and histories reviewed & adjusted, as indicated.  Additional history: as documented    Patient Active Problem List   Diagnosis     Type 2 diabetes mellitus with other circulatory complication, without long-term current use of insulin (H)     Venous stasis ulcer of left lower extremity (H)     Acute pain of left knee     Chronic pain of left knee     Open wound of left lower extremity without complication, initial encounter     Hx of coronary artery disease     Hx of heart artery stent     DAY[place] (obstructive sleep apnea)     Hypertension, goal below 140/90     Hyperlipidemia LDL goal <100     History of coronary artery stent placement     Advanced directives, counseling/discussion     Obesity, morbid, BMI 40.0-49.9 (H)     Neuropathy     Foot drop, right     History reviewed. No pertinent surgical history.    Social History   Substance Use Topics     Smoking status: Current Some Day Smoker     Types: Cigars     Smokeless tobacco: Never Used     Alcohol use No     History reviewed. No pertinent family history.      Current Outpatient Prescriptions   Medication Sig Dispense Refill     Wound Dressings (TRIAD HYDROPHILIC WOUND DRESSI) PSTE Externally apply 1 g topically daily 1 Tube 3     losartan (COZAAR) 25 MG tablet Take 1 tablet (25 mg) by mouth daily 90 tablet 1      "rosuvastatin (CRESTOR) 10 MG tablet Take 1 tablet (10 mg) by mouth daily 90 tablet 1     furosemide (LASIX) 40 MG tablet Take 1 tablet (40 mg) by mouth daily 90 tablet 1     clopidogrel (PLAVIX) 75 MG tablet Take 1 tablet (75 mg) by mouth daily 90 tablet 1     famotidine (PEPCID) 40 MG tablet Take 1 tablet (40 mg) by mouth nightly as needed 90 tablet 1     lidocaine (XYLOCAINE) 5 % ointment Apply topically daily 50 g 3     SitaGLIPtin-MetFORMIN HCl (JANUMET XR) 100-1000 MG TB24 Take 1 tablet by mouth daily 30 tablet 3     ferrous sulfate (IRON) 325 (65 FE) MG tablet Take 1 tablet (325 mg) by mouth daily (with breakfast) 90 tablet 2     order for DME One touch glucose monitoring strip with Glucometer and lancets. 1 Box 1     Allergies   Allergen Reactions     No Known Allergies      Recent Labs   Lab Test  09/25/17   1216  05/22/17   1029   A1C  6.2*  6.4*   LDL  57  64   HDL  43  42   TRIG  140  118   ALT  19  20   CR  0.92  0.91   GFRESTIMATED  81  82   GFRESTBLACK  >90  >90  African American GFR Calc     POTASSIUM  4.1  4.0   TSH  2.07   --       BP Readings from Last 3 Encounters:   01/25/18 132/74   12/14/17 118/66   10/02/17 158/86    Wt Readings from Last 3 Encounters:   01/25/18 280 lb 9.6 oz (127.3 kg)   12/28/17 (!) 311 lb (141.1 kg)   12/15/17 (!) 312 lb (141.5 kg)                  Labs reviewed in EPIC    ROS:  Constitutional, HEENT, cardiovascular, pulmonary, gi and gu systems are negative, except as otherwise noted.    OBJECTIVE:     /74 (BP Location: Left arm, Cuff Size: Adult Large)  Pulse 96  Temp 97.8  F (36.6  C) (Temporal)  Ht 5' 10\" (1.778 m)  Wt 280 lb 9.6 oz (127.3 kg)  SpO2 98%  BMI 40.26 kg/m2  Body mass index is 40.26 kg/(m^2).  GENERAL: healthy, alert and no distress  NECK: no adenopathy, no asymmetry, masses, or scars and thyroid normal to palpation  RESP: decreased breath sounds tubular right posterior mid lung and fair congested cough  CV: regular rate and rhythm, normal S1 " S2, no S3 or S4, no murmur, click or rub, no peripheral edema and peripheral pulses strong  ABDOMEN: soft, nontender, no hepatosplenomegaly, no masses and bowel sounds normal  MS: no gross musculoskeletal defects noted, no edema  NEURO: Normal strength and tone, mentation intact and speech normal  PSYCH: mentation appears normal, affect normal/bright    Diagnostic Test Results:  Results for orders placed or performed in visit on 01/25/18 (from the past 24 hour(s))   CBC with platelets and differential   Result Value Ref Range    WBC 8.8 4.0 - 11.0 10e9/L    RBC Count 4.22 (L) 4.4 - 5.9 10e12/L    Hemoglobin 11.7 (L) 13.3 - 17.7 g/dL    Hematocrit 37.7 (L) 40.0 - 53.0 %    MCV 89 78 - 100 fl    MCH 27.7 26.5 - 33.0 pg    MCHC 31.0 (L) 31.5 - 36.5 g/dL    RDW 16.9 (H) 10.0 - 15.0 %    Platelet Count 382 150 - 450 10e9/L    Diff Method Automated Method     % Neutrophils 78.4 %    % Lymphocytes 9.9 %    % Monocytes 8.3 %    % Eosinophils 2.9 %    % Basophils 0.5 %    Absolute Neutrophil 6.9 1.6 - 8.3 10e9/L    Absolute Lymphocytes 0.9 0.8 - 5.3 10e9/L    Absolute Monocytes 0.7 0.0 - 1.3 10e9/L    Absolute Eosinophils 0.3 0.0 - 0.7 10e9/L    Absolute Basophils 0.0 0.0 - 0.2 10e9/L     CXR - cardiomegaly but no concerns for pneumonia.    ASSESSMENT/PLAN:     1. URI with cough and congestion  Most liklely viral  - CBC with platelets and differential    2. Type 2 diabetes mellitus with other circulatory complication, without long-term current use of insulin (H)  Recheck in 3 months.  - CBC with platelets and differential  - NEUROLOGY ADULT REFERRAL  - CARDIOLOGY EVAL ADULT REFERRAL    3. Hypertension, goal below 140/90  stable  - CBC with platelets and differential  - NEUROLOGY ADULT REFERRAL  - CARDIOLOGY EVAL ADULT REFERRAL    4. Foot drop, right  5. Neuropathy  - NEUROLOGY ADULT REFERRAL    6. Tubular breathing    7. History of coronary artery stent placement  8. Hx of coronary artery disease  9. Hx of heart artery  "stent  10. Cardiomegaly  - CARDIOLOGY EVAL ADULT REFERRAL    11. Gastroesophageal reflux disease without esophagitis  Historical medications update  - pantoprazole (PROTONIX) 40 MG EC tablet; Take 1 tablet (40 mg) by mouth daily Take 30-60 minutes before a meal.  Dispense: 1 tablet; Refill: 0    ROV 3 months advised.  I would be quite hesitant to \"work him in\" again.    Julio Cesar Sahu PA-C  [facility]  "

## 2018-01-25 NOTE — MR AVS SNAPSHOT
After Visit Summary   1/25/2018    Glenroy Padilla    MRN: [id number]           Patient Information     Date Of Birth          1950        Visit Information        Provider Department      1/25/2018 11:20 AM Julio Cesar Esteban PA-C [facility]        Today's Diagnoses     URI with cough and congestion    -  1    Type 2 diabetes mellitus with other circulatory complication, without long-term current use of insulin (H)        Hypertension, goal below 140/90        Foot drop, right        Neuropathy        Tubular breathing        History of coronary artery stent placement        Hx of coronary artery disease        Hx of heart artery stent           Follow-ups after your visit        Additional Services     CARDIOLOGY EVAL ADULT REFERRAL       Your provider has referred you to:  [facility] [phone]   [email or web address]    Please be aware that coverage of these services is subject to the terms and limitations of your health insurance plan.  Call member services at your health plan with any benefit or coverage questions.      Type of Referral:  New Cardiology Consult    Timeframe requested:  Within 1 month    Please bring the following to your appointment:  >>   Any x-rays, CTs or MRIs which have been performed.  Contact the facility where they were done to arrange for  prior to your scheduled appointment.    >>   List of current medications  >>   This referral request   >>   Any documents/labs given to you for this referral            NEUROLOGY ADULT REFERRAL       Your provider has referred you for the following:   Consult at [facility] [phone]   [email or web address]    Please be aware that coverage of these services is subject to the terms and limitations of your health  "insurance plan.  Call member services at your health plan with any benefit or coverage questions.      Please bring the following with you to your appointment:    (1) Any X-Rays, CTs or MRIs which have been performed.  Contact the facility where they were done to arrange for  prior to your scheduled appointment.    (2) List of current medications  (3) This referral request   (4) Any documents/labs given to you for this referral                  Your next 10 appointments already scheduled     Jan 26, 2018  8:30 AM CST   [facility]    [address] [place] [id number] [phone]              Future tests that were ordered for you today     Open Future Orders        Priority Expected Expires Ordered    XR Chest 2 Views Routine 1/25/2018 1/25/2019 1/25/2018            Who to contact     If you have questions or need follow up information about today's clinic visit or your schedule please contact [facility] directly at [phone].  Normal or non-critical lab and imaging results will be communicated to you by MyChart, letter or phone within 4 business days after the clinic has received the results. If you do not hear from us within 7 days, please contact the clinic through [email or web address] or phone. If you have a critical or abnormal lab result, we will notify you by phone as soon as possible.  Submit refill requests through [employer] or call your pharmacy and they will forward the refill request to us. Please allow 3 business days for your refill to be completed.          Additional Information About Your Visit        [employer] Information     [employer] lets you send messages to your doctor, view your test results, renew your prescriptions, schedule appointments and more. To sign up, go to [email or web address] . Click on \"Log in\" on the left side of the screen, which will take you to the Welcome page. Then " "click on \"Sign up Now\" on the right side of the page.     You will be asked to enter the access code listed below, as well as some personal information. Please follow the directions to create your username and password.     Your access code is: [email or web address]  Expires: 3/22/2018 10:22 AM     Your access code will  in 90 days. If you need help or a new code, please call your [place] clinic or [phone].        Care EveryWhere ID     This is your Care EveryWhere ID. This could be used by other organizations to access your [place] medical records  [id number]        Your Vitals Were     Pulse Temperature Height Pulse Oximetry BMI (Body Mass Index)       96 97.8  F (36.6  C) (Temporal) 5' 10\" (1.778 m) 98% 40.26 kg/m2        Blood Pressure from Last 3 Encounters:   18 132/74   17 118/66   10/02/17 158/86    Weight from Last 3 Encounters:   18 280 lb 9.6 oz (127.3 kg)   17 (!) 311 lb (141.1 kg)   12/15/17 (!) 312 lb (141.5 kg)              We Performed the Following     CARDIOLOGY EVAL ADULT REFERRAL     CBC with platelets and differential     NEUROLOGY ADULT REFERRAL          Today's Medication Changes          These changes are accurate as of 18 11:42 AM.  If you have any questions, ask your nurse or doctor.               Stop taking these medicines if you haven't already. Please contact your care team if you have questions.     cephALEXin 500 MG capsule   Commonly known as:  KEFLEX   Stopped by:  Julio Cesar Esteban PA-C                    Primary Care Provider Fax #    Physician No Ref-Primary [phone]       No address on file        Equal Access to Services     DEBBI TOBAR : Pietro Omalley, rudi cody, qaybsherri kaalmada duke, adams horton. So [facility] [phone].    ATENCIÓN: Si habla español, tiene a wilkinson disposición servicios gratuitos de asistencia lingüística. Llame al [phone].    We comply with applicable federal civil " rights laws and [place] laws. We do not discriminate on the basis of race, color, national origin, age, disability, sex, sexual orientation, or gender identity.            Thank you!     Thank you for choosing [facility]  for your care. Our goal is always to provide you with excellent care. Hearing back from our patients is one way we can continue to improve our services. Please take a few minutes to complete the written survey that you may receive in the mail after your visit with us. Thank you!             Your Updated Medication List - Protect others around you: Learn how to safely use, store and throw away your medicines at www.disposemymeds.org.          This list is accurate as of 1/25/18 11:42 AM.  Always use your most recent med list.                   Brand Name Dispense Instructions for use Diagnosis    clopidogrel 75 MG tablet    PLAVIX    90 tablet    Take 1 tablet (75 mg) by mouth daily    Hypertension, goal below 140/90, Hyperlipidemia LDL goal <100, Type 2 diabetes mellitus with other circulatory complication, without long-term current use of insulin (H)       famotidine 40 MG tablet    PEPCID    90 tablet    Take 1 tablet (40 mg) by mouth nightly as needed    Gastroesophageal reflux disease, esophagitis presence not specified       ferrous sulfate 325 (65 FE) MG tablet    IRON    90 tablet    Take 1 tablet (325 mg) by mouth daily (with breakfast)    History of anemia       furosemide 40 MG tablet    LASIX    90 tablet    Take 1 tablet (40 mg) by mouth daily    Hypertension, goal below 140/90       lidocaine 5 % ointment    XYLOCAINE    50 g    Apply topically daily    Chronic pain of right knee       losartan 25 MG tablet    COZAAR    90 tablet    Take 1 tablet (25 mg) by mouth daily    Hypertension, goal below 140/90, Type 2 diabetes mellitus with other circulatory complication, without long-term current use of insulin (H)       order for DME     1 Box    One touch glucose  monitoring strip with Glucometer and lancets.    Type 2 diabetes mellitus with other circulatory complication, without long-term current use of insulin (H)       rosuvastatin 10 MG tablet    CRESTOR    90 tablet    Take 1 tablet (10 mg) by mouth daily    Type 2 diabetes mellitus with other circulatory complication, without long-term current use of insulin (H)       SitaGLIPtin-MetFORMIN HCl 100-1000 MG Tb24    JANUMET XR    30 tablet    Take 1 tablet by mouth daily    Type 2 diabetes mellitus with complication, without long-term current use of insulin (H)       TRIAD HYDROPHILIC WOUND DRESSI Pste     1 Tube    Externally apply 1 g topically daily    Open wound of right lower leg, initial encounter

## 2018-01-26 ENCOUNTER — HOSPITAL ENCOUNTER (EMERGENCY)
Facility: CLINIC | Age: 68
Discharge: HOME | End: 2018-01-26
Attending: PHYSICIAN ASSISTANT | Admitting: PHYSICIAN ASSISTANT
Payer: MEDICAID

## 2018-01-26 ENCOUNTER — OFFICE VISIT (OUTPATIENT)
Dept: SURGERY | Facility: CLINIC | Age: 68
End: 2018-01-26
Payer: MEDICAID

## 2018-01-26 VITALS
BODY MASS INDEX: 40.37 KG/M2 | HEIGHT: 70 IN | SYSTOLIC BLOOD PRESSURE: 140 MMHG | RESPIRATION RATE: 20 BRPM | WEIGHT: 282 LBS | HEART RATE: 98 BPM | TEMPERATURE: 97.8 F | OXYGEN SATURATION: 99 % | DIASTOLIC BLOOD PRESSURE: 68 MMHG

## 2018-01-26 VITALS — TEMPERATURE: 97.9 F | BODY MASS INDEX: 42.77 KG/M2 | WEIGHT: 298.1 LBS | OXYGEN SATURATION: 95 % | HEART RATE: 115 BPM

## 2018-01-26 DIAGNOSIS — S81.802D OPEN WOUND OF LEFT LOWER EXTREMITY WITHOUT COMPLICATION, SUBSEQUENT ENCOUNTER: Primary | ICD-10-CM

## 2018-01-26 DIAGNOSIS — S31.801A TEAR OF SKIN OF BUTTOCK, INITIAL ENCOUNTER: ICD-10-CM

## 2018-01-26 DIAGNOSIS — R60.0 EDEMA OF BOTH LEGS: ICD-10-CM

## 2018-01-26 LAB
BASOPHILS # BLD AUTO: 0 10E9/L (ref 0–0.2)
BASOPHILS NFR BLD AUTO: 0.4 %
DIFFERENTIAL METHOD BLD: ABNORMAL
EOSINOPHIL # BLD AUTO: 0.3 10E9/L (ref 0–0.7)
EOSINOPHIL NFR BLD AUTO: 2.7 %
ERYTHROCYTE [DISTWIDTH] IN BLOOD BY AUTOMATED COUNT: 16.8 % (ref 10–15)
HCT VFR BLD AUTO: 39.1 % (ref 40–53)
HGB BLD-MCNC: 11.5 G/DL (ref 13.3–17.7)
IMM GRANULOCYTES # BLD: 0 10E9/L (ref 0–0.4)
IMM GRANULOCYTES NFR BLD: 0.2 %
LYMPHOCYTES # BLD AUTO: 1 10E9/L (ref 0.8–5.3)
LYMPHOCYTES NFR BLD AUTO: 9.3 %
MCH RBC QN AUTO: 26.7 PG (ref 26.5–33)
MCHC RBC AUTO-ENTMCNC: 29.4 G/DL (ref 31.5–36.5)
MCV RBC AUTO: 91 FL (ref 78–100)
MONOCYTES # BLD AUTO: 1 10E9/L (ref 0–1.3)
MONOCYTES NFR BLD AUTO: 9.8 %
NEUTROPHILS # BLD AUTO: 8 10E9/L (ref 1.6–8.3)
NEUTROPHILS NFR BLD AUTO: 77.6 %
PLATELET # BLD AUTO: 367 10E9/L (ref 150–450)
RBC # BLD AUTO: 4.31 10E12/L (ref 4.4–5.9)
WBC # BLD AUTO: 10.3 10E9/L (ref 4–11)

## 2018-01-26 PROCEDURE — 85025 COMPLETE CBC W/AUTO DIFF WBC: CPT | Performed by: PHYSICIAN ASSISTANT

## 2018-01-26 PROCEDURE — 99283 EMERGENCY DEPT VISIT LOW MDM: CPT | Performed by: PHYSICIAN ASSISTANT

## 2018-01-26 PROCEDURE — 99213 OFFICE O/P EST LOW 20 MIN: CPT | Performed by: SPECIALIST

## 2018-01-26 PROCEDURE — 99283 EMERGENCY DEPT VISIT LOW MDM: CPT | Mod: Z6 | Performed by: PHYSICIAN ASSISTANT

## 2018-01-26 ASSESSMENT — ENCOUNTER SYMPTOMS
FATIGUE: 1
PALPITATIONS: 0
BACK PAIN: 0
CHILLS: 0
ARTHRALGIAS: 1
HEMATURIA: 0
CONSTIPATION: 0
ABDOMINAL PAIN: 0
DIZZINESS: 0
VOMITING: 0
LIGHT-HEADEDNESS: 1
COUGH: 1
SHORTNESS OF BREATH: 1
BLOOD IN STOOL: 1
DIARRHEA: 0
NAUSEA: 0
WOUND: 1
ANAL BLEEDING: 1
DYSURIA: 0
WHEEZING: 0
FEVER: 0
RECTAL PAIN: 1
ABDOMINAL DISTENTION: 0
WEAKNESS: 1
BRUISES/BLEEDS EASILY: 1
TREMORS: 0

## 2018-01-26 NOTE — NURSING NOTE
"Chief Complaint   Patient presents with     WOUND CARE     lower leg wound       Initial Pulse 115  Temp 97.9  F (36.6  C) (Temporal)  Wt 135.2 kg (298 lb 1.6 oz)  SpO2 95%  BMI 42.77 kg/m2 Estimated body mass index is 42.77 kg/(m^2) as calculated from the following:    Height as of 1/25/18: 1.778 m (5' 10\").    Weight as of this encounter: 135.2 kg (298 lb 1.6 oz).  Medication Reconciliation: complete    "

## 2018-01-26 NOTE — ED PROVIDER NOTES
History     Chief Complaint   Patient presents with     Rectal Bleeding     The history is provided by the patient and the spouse. No  was used.     Glenroy Padilla is a 67 year old male with PMH of DM2, CAD sp stent placement on Plavix, and obesity who fell onto buttocks today and then noticed continuous perirectal/rectal bleeding.    Was walking around 2:30 when his knees gave out, was unable to get up and required help. Got up at 3:00 pm. Was able to roll around on the floor, but unable to get up. Did not hit anything when fell - felt like he just sat down slowly and landed on his butt/hips. Holds on to walker while he fell so he does not go down fast (falls frequently due to bad knees).     Then went to the bathroom and noticed bleeding - soaking multiple bath towels. Felt weak/tired sitting in a chair. When he got up from chair, there was blood underneath him. Bright red blood, no blood clots.     No pain with this. Harder stools than usual.     Had a stent placed about two years ago (feb 2016) and is on Plavix - takes every day.     For the past month, has always had blood on wipe after using restroom. Once it was enough that it was dripping.  No blood in urine/vomit, no nosebleeds, does take longer to stop bleeding when cuts himself.     Prescribed Protonix - but does not take every day. Has been on ampicillin for bronchitis - has one pill left and wondering if he should take it.        Problem List:    Patient Active Problem List    Diagnosis Date Noted     Neuropathy 01/25/2018     Priority: Medium     Foot drop, right 01/25/2018     Priority: Medium     Cardiomegaly 01/25/2018     Priority: Medium     Gastroesophageal reflux disease without esophagitis 01/25/2018     Priority: Medium     Obesity, morbid, BMI 40.0-49.9 (H) 11/20/2017     Priority: Medium     Advanced directives, counseling/discussion 10/02/2017     Priority: Medium     Will bring in a copy       Hypertension, goal  below 140/90 09/25/2017     Priority: Medium     Hyperlipidemia LDL goal <100 09/25/2017     Priority: Medium     History of coronary artery stent placement 09/25/2017     Priority: Medium     Type 2 diabetes mellitus with other circulatory complication, without long-term current use of insulin (H) 05/22/2017     Priority: Medium     Venous stasis ulcer of left lower extremity (H) 05/22/2017     Priority: Medium     Acute pain of left knee 05/22/2017     Priority: Medium     Chronic pain of left knee 05/22/2017     Priority: Medium     Open wound of left lower extremity without complication, initial encounter 05/22/2017     Priority: Medium     Hx of heart artery stent 05/22/2017     Priority: Medium     DAYTON (obstructive sleep apnea) 05/22/2017     Priority: Medium     Hx of coronary artery disease 03/12/2016     Priority: Medium        Past Medical History:    Past Medical History:   Diagnosis Date     Acute pain of left knee 5/22/2017     Chronic pain of right knee 5/22/2017     Hx of coronary artery disease 5/22/2017     Hx of heart artery stent 5/22/2017     Obesity, morbid, BMI 40.0-49.9 (H) 11/20/2017     Open wound of left lower extremity without complication, initial encounter 5/22/2017     DAYTON (obstructive sleep apnea) 5/22/2017     Type 2 diabetes mellitus with other circulatory complication, without long-term current use of insulin (H) 5/22/2017     Venous stasis ulcer of left lower extremity (H) 5/22/2017     Venous stasis ulcer of left lower extremity (H) 5/22/2017       Past Surgical History:    No past surgical history on file.    Family History:    No family history on file.    Social History:  Marital Status:   [2]  Social History   Substance Use Topics     Smoking status: Current Some Day Smoker     Types: Cigars     Smokeless tobacco: Never Used     Alcohol use No        Medications:      pantoprazole (PROTONIX) 40 MG EC tablet   DULoxetine (CYMBALTA) 30 MG EC capsule   Wound Dressings  "(TRIAD HYDROPHILIC WOUND DRESSI) PSTE   losartan (COZAAR) 25 MG tablet   rosuvastatin (CRESTOR) 10 MG tablet   furosemide (LASIX) 40 MG tablet   clopidogrel (PLAVIX) 75 MG tablet   lidocaine (XYLOCAINE) 5 % ointment   SitaGLIPtin-MetFORMIN HCl (JANUMET XR) 100-1000 MG TB24   ferrous sulfate (IRON) 325 (65 FE) MG tablet   order for DME         Review of Systems   Constitutional: Positive for fatigue. Negative for chills and fever.   HENT: Negative for congestion and nosebleeds.    Respiratory: Positive for cough (dx bronchitis) and shortness of breath (endroses with exertion, like his recent fall, and starirs). Negative for wheezing.    Cardiovascular: Positive for leg swelling. Negative for chest pain and palpitations.   Gastrointestinal: Positive for anal bleeding, blood in stool and rectal pain (sometimes with defecation). Negative for abdominal distention, abdominal pain, constipation, diarrhea, nausea and vomiting.   Genitourinary: Negative for dysuria and hematuria.   Musculoskeletal: Positive for arthralgias. Negative for back pain.   Skin: Positive for pallor and wound (chronic wound on bilateral lower legs - following with wound care).   Neurological: Positive for weakness and light-headedness. Negative for dizziness, tremors and syncope.   Hematological: Bruises/bleeds easily.       Physical Exam   BP: 143/70  Pulse: 98  Heart Rate: 88  Temp: 97.8  F (36.6  C)  Resp: 14  Height: 177.8 cm (5' 10\")  Weight: 127.9 kg (282 lb)  SpO2: 95 %      Physical Exam   Constitutional: He is oriented to person, place, and time. He appears well-developed and well-nourished. No distress.   HENT:   Head: Normocephalic and atraumatic.   Eyes: Conjunctivae and EOM are normal. Pupils are equal, round, and reactive to light.   Cardiovascular: Normal rate, regular rhythm and normal heart sounds.  Exam reveals no gallop and no friction rub.    No murmur heard.  Pulmonary/Chest: Effort normal and breath sounds normal. No " "respiratory distress.   Some rhonchi resolved with cough    Abdominal: Soft. Bowel sounds are normal. He exhibits no distension. There is no tenderness. There is no rebound and no guarding.   Genitourinary:         Genitourinary Comments: Two 2.5 cm skin tears posterior to anus, not actively bleeding. No blood coming from rectum, instead noted brown non-bloody stool.    Musculoskeletal: He exhibits edema.   Wounds wrapped on lower extremities bilaterally   Neurological: He is alert and oriented to person, place, and time.   Skin: He is not diaphoretic. No pallor.       ED Course     ED Course     Procedures            Critical Care time:  none       Labs Ordered and Resulted from Time of ED Arrival Up to the Time of Departure from the ED   CBC WITH PLATELETS DIFFERENTIAL - Abnormal; Notable for the following:        Result Value    RBC Count 4.31 (*)     Hemoglobin 11.5 (*)     Hematocrit 39.1 (*)     MCHC 29.4 (*)     RDW 16.8 (*)     All other components within normal limits       Assessments & Plan (with Medical Decision Making)  67 year old male with DM2, CAD s/p stent placement on plavix, obesity, and knee pain presenting after a fall and presumed GI bleed. Patient fell onto his buttocks about 3 hours prior to presenting to ED via EMS, required help getting up. Later noted a large amount of blood in the toilet and on towels when sitting, started feeling weak and lightheaded. Did not hit his head.   /68  Pulse 98  Temp 97.8  F (36.6  C) (Temporal)  Resp 20  Ht 1.778 m (5' 10\")  Wt 127.9 kg (282 lb)  SpO2 99%  BMI 40.46 kg/m2  On exam, patient is not pale or diaphoretic. Exam of his rectum revealed two 2.5 cm skin tears posterior to the anus and non-bloody brown stool coming from anus (no blood). Will clean and apply dressing to the area and instruct patient and his wife on cares. Recommend patient get colonoscopy as he has not had one, but rectal exam and stable vitals make actual GI bleed " (diverticulosis, angiodysplasia, etc.) less likely. Patient may have some hemorrhoids which bleed with defecation, but not bleeding currently. Patient should talk to his PCP for GI screening and cardiologist regarding his Plavix use.   CBC did not show any significant change in his hemoglobin to suggest hemorrhage. Exam findings show a skin tear. This is the cause of his symptoms, and he does not have any persistent bleeding. Nursing cleanse the area and then applied bacitracin ointment and a bandage. We discussed perirectal cares to involve placing Vaseline on the skin tears prior to having any type of a BM. Sit in a bath or take a shower immediately having a BM to keep the area clean. Apply dressings 3 times per day until the skin tears improved. Sit on a donut to avoid pressure on the skin tears. Indications to return to the ED discussed with him. His wife stated that she would be able to handle his cares at home.  At one time, she questioned if she would be able to help him. We did discuss that we could keep him for an observation stay and find a nursing home. They were not interested in that option and wanted to be discharged home.      I have reviewed the nursing notes.    I have reviewed the findings, diagnosis, plan and need for follow up with the patient.    Discharge Medication List as of 1/26/2018  7:42 PM          Final diagnoses:   Tear of skin of buttock, initial encounter     This document serves as a record of the services and decisions personally performed and made by Gabo Marshall PA-C.  It was created on his/her behalf by Alysia Pond, a trained medical student and scribe.  The creation of this record is based on the provider's personal observations and the statements of the patient.     Alysia Pond, MS3  January 26, 2018    I evaluated and saw this patient along with the Alysia.    1/26/2018   Gabo Marshall PA-C   [facility] EMERGENCY DEPARTMENT     Gabo Marshall,  ÁNGELA  01/27/18 0046      1:29 PM   1/27/2018     The patient's daughter, Lizzeth, contacted the ED due to her father's worsening condition. Despite other strong family members, they are not able to transition him to the restroom. He is not able to stand on his own due to his increasing lower extremity weakness and bilateral knee pain. They're concerned that he cannot perform any of his ADLs, and the family is unable to help him as well. I did speak with our hospitalist, Dr. Massey, regarding his case given his need for observation stay and rehabilitation placement. She stated that she would accept his care, but to have a conversation with the family about the upper front money required by rehabilitation facilities. I called Yuko back, and discussed this with her. She was comfortable with this and was aware of these conditions. They're unable to get her father in the car due to his lower extremity weakness and obesity. Therefore, they are going to contact an ambulance and have him transferred up to [facility] ED for further evaluation.    Gabo Marshall, Gabo Martinez PA-C  01/27/18 1330

## 2018-01-26 NOTE — LETTER
1/26/2018         RE: Glenroy Padilla  [address] [address]        Dear Colleague,    Thank you for referring your patient, Glenroy Padilla, to the [facility]. Please see a copy of my visit note below.    F/U for leg wound    Subjective:  Patient is a 66-year-old white male presenting history of a left leg wound. He hit a coffee table  the wound is slow to heal. He was initially treated in [place] and has a 15 year history of bilateral lower extremity edema. He has never been treated with any form of compression therapy and was told not to based on an ultrasound results in [place]. Those results are not available to me at this time. He denies any prior DVT, nonhealing wounds, leg trauma, claudication or rest pain. He was told in [place] to treat the leg with Betadine. He came to the [place] for second opinion.      Last seen by me in June and was tx with UNNA boot and silvercel.  Returned to [place] and was treated with hydroferra blue.       Had UNNA boot on when was last seen.  Was back in [place] for past 4 weeks.    Using TRIAD on laceration on right.      Objective:  B/P: Data Unavailable, T: 97.9, P: 115, R: Data Unavailable  Ext; Warm, +2  Edema right.  No edema left. Bilateral venous stasis changes with thickened skin.   (+)FEM pulses.  No distal pulses.  LLE wound - now split into 2  0.8.8xcm and 0.9x0.8cm .   New ant ankle wound - 2x1.8mc      RLE - skin flap broke down.  2 pinpoint openings      U/S -   ULTRASOUND VENOUS COMPETENCY BILATERAL   6/1/2017 3:29 PM      HISTORY: Localized edema. Chronic venous hypertension (idiopathic)  without complications of bilateral lower extremity. Unspecified open  wound, left lower leg, initial encounter.     COMPARISON: None.     TECHNIQUE: Color Doppler and spectral waveform analysis performed.     FINDINGS: Bilateral common femoral veins, femoral veins, and popliteal  veins are patent and demonstrate no evidence of  reflux.     Other than questionable 1 second reflux at the right saphenofemoral  junction, the right great saphenous vein is competent throughout its  length. Diameter of the right great saphenous vein is 3-7 mm.     The left great saphenous vein is competent throughout its length.  Diameter of the left great saphenous vein is 2-7 mm.     Visualized portions of bilateral small saphenous veins appear to be  competent, without evidence of reflux.     No prominent perforators were demonstrated. Bilateral Giacomini veins  were competent.         IMPRESSION: The deep and superficial venous systems of both legs are  patent and competent.     PATRICK SCHERER MD    [facility] -   ULTRASOUND ANKLE-BRACHIAL INDEX DOPPLER NO EXERCISE   6/1/2017 3:16 PM        HISTORY: Localized edema. Chronic venous hypertension (idiopathic)  without complications of bilateral lower extremity.     COMPARISON: None.     FINDINGS: Ankle-brachial index is 0.96 on the right and 0.91 on the  left. Waveforms appear biphasic/monophasic bilaterally.         IMPRESSION: Borderline bilateral arterial insufficiency.     PATRICK SCHERER MD      Assessment/plan:  This is a 66 year old gentleman with bilateral chronic stasis changes and lymphedema. He has a new right leg wound as well as a result of a fall - almost closed.   Left leg much smaller - new ankle wound 2nd to poor fitting boot in [place]  Will cont TRIAD to right.   Cont UNNA to left with foam at ankle. F/U 1 week.    Casey Shine MD, FACS.    Again, thank you for allowing me to participate in the care of your patient.        Sincerely,        Casey Shine MD

## 2018-01-26 NOTE — ED NOTES
Bed: ED15  Expected date: 1/26/18  Expected time: 5:33 PM  Means of arrival:   Comments:  GI bleed, fall on blood thinners

## 2018-01-26 NOTE — ED NOTES
Pt fell this afternoon at home after his knees gave out, now this evening had blood in the toilet. Pt is on Plavix.

## 2018-01-26 NOTE — ED AVS SNAPSHOT
[facility] Emergency Department    [address] [place] [address]    Phone:  [phone]    Fax:  [phone]                                       Glenroy Padilla   MRN: [id number]    Department:  [facility] Emergency Department   Date of Visit:  1/26/2018           Patient Information     Date Of Birth          1950        Your diagnoses for this visit were:     Tear of skin of buttock, initial encounter        You were seen by Gabo Marshall PA-C.      Follow-up Information     Follow up with [facility] Emergency Department.    Specialty:  EMERGENCY MEDICINE    Why:  As needed, If symptoms worsen    Contact information:    [address]  [place] [address] [phone]    Additional information:    From [address]: Exit at [employer] on south side of [place]. Turn right on [address]. Turn left at stoplight on [address]. [facility] will be in view two blocks ahead        Discharge Instructions       It was a pleasure working with you today!  I hope your illness improves rapidly!    Thankfully you just have a skin tear of your rectal area from your fall and the bleeding has now stopped.  Your hemoglobin remains stable, which is a great sign that you did not loose a tremendous amount of blood.    Please change your dressing 2-3 times per day to help the skin heal.  Use Bacitracin Ointment ( antibiotic ointment) under the bandage.  Once the skin has healed, then you can stop putting ointment on the area.    Please put a large amount of vaseline on the area prior to having a BM.  This will prevent further irritation of the area.     Please sit on a 'donut' until your skin heals to avoid further irritation of the area.              Future Appointments        Provider Department Dept Phone Center    1/31/2018 9:00 AM Grant Merino MD [facility] [phone] [facility]     2/2/2018 9:00 AM Casey Shine MD [facility] [phone] [facility]      24 Hour Appointment Hotline       To make an appointment at any [facility], call [phone] ([phone]). If you don't have a family doctor or clinic, we will help you find one. [facility] are conveniently located to serve the needs of you and your family.             Review of your medicines      Our records show that you are taking the medicines listed below. If these are incorrect, please call your family doctor or clinic.        Dose / Directions Last dose taken    clopidogrel 75 MG tablet   Commonly known as:  PLAVIX   Dose:  75 mg   Quantity:  90 tablet        Take 1 tablet (75 mg) by mouth daily   Refills:  1        DULoxetine 30 MG EC capsule   Commonly known as:  CYMBALTA   Dose:  30 mg   Quantity:  1 capsule        Take 1 capsule (30 mg) by mouth 2 times daily   Refills:  0        ferrous sulfate 325 (65 FE) MG tablet   Commonly known as:  IRON   Dose:  325 mg   Quantity:  90 tablet        Take 1 tablet (325 mg) by mouth daily (with breakfast)   Refills:  2        furosemide 40 MG tablet   Commonly known as:  LASIX   Dose:  40 mg   Quantity:  90 tablet        Take 1 tablet (40 mg) by mouth daily   Refills:  1        lidocaine 5 % ointment   Commonly known as:  XYLOCAINE   Quantity:  50 g        Apply topically daily   Refills:  3        losartan 25 MG tablet   Commonly known as:  COZAAR   Dose:  25 mg   Quantity:  90 tablet        Take 1 tablet (25 mg) by mouth daily   Refills:  1        order for DME   Quantity:  1 Box        One touch glucose monitoring strip with Glucometer and lancets.   Refills:  1        PROTONIX 40 MG EC tablet   Dose:  40 mg   Quantity:  1 tablet   Generic drug:  pantoprazole        Take 1 tablet (40 mg) by mouth daily Take 30-60 minutes before a meal.   Refills:  0        rosuvastatin 10 MG tablet   Commonly known as:  CRESTOR   Dose:  10 mg   Quantity:  90 tablet      "   Take 1 tablet (10 mg) by mouth daily   Refills:  1        SitaGLIPtin-MetFORMIN HCl 100-1000 MG Tb24   Commonly known as:  JANUMET XR   Dose:  1 tablet   Quantity:  30 tablet        Take 1 tablet by mouth daily   Refills:  3        TRIAD HYDROPHILIC WOUND DRESSI Pste   Dose:  1 applicator   Quantity:  1 Tube        Externally apply 1 g topically daily   Refills:  3                Procedures and tests performed during your visit     CBC with platelets differential      Orders Needing Specimen Collection     None      Pending Results     No orders found from 1/24/2018 to 1/27/2018.            Pending Culture Results     No orders found from 1/24/2018 to 1/27/2018.            Pending Results Instructions     If you had any lab results that were not finalized at the time of your Discharge, you can call the ED Lab Result RN at [phone]. You will be contacted by this team for any positive Lab results or changes in treatment. The nurses are available 7 days a week from 10A to 6:30P.  You can leave a message 24 hours per day and they will return your call.        Thank you for choosing [facility]       Thank you for choosing [facility] for your care. Our goal is always to provide you with excellent care. Hearing back from our patients is one way we can continue to improve our services. Please take a few minutes to complete the written survey that you may receive in the mail after you visit with us. Thank you!        [employer] Information     [employer] lets you send messages to your doctor, view your test results, renew your prescriptions, schedule appointments and more. To sign up, go to [email or web address] . Click on \"Log in\" on the left side of the screen, which will take you to the Welcome page. Then click on \"Sign up Now\" on the right side of the page.     You will be asked to enter the access code listed below, as well as some personal information. Please follow the directions to create your username and " password.     Your access code is: [email or web address]  Expires: 3/22/2018 10:22 AM     Your access code will  in 90 days. If you need help or a new code, please call your [facility] clinic or [phone].        Care EveryWhere ID     This is your Care EveryWhere ID. This could be used by other organizations to access your [facility] medical records  [id number]        Equal Access to Services     JAZ TOBAR : Hadii katia benedict hadasho Soomaali, waaxda luqadaha, qaybta kaalmada adeegyada, waxay pinedain latrice batres . So [facility] [phone].    ATENCIÓN: Si habla español, tiene a wilkinson disposición servicios gratuitos de asistencia lingüística. Llame al [phone].    We comply with applicable federal civil rights laws and [place] laws. We do not discriminate on the basis of race, color, national origin, age, disability, sex, sexual orientation, or gender identity.            After Visit Summary       This is your record. Keep this with you and show to your community pharmacist(s) and doctor(s) at your next visit.

## 2018-01-26 NOTE — PROGRESS NOTES
F/U for leg wound    Subjective:  Patient is a 66-year-old white male presenting history of a left leg wound. He hit a coffee table  the wound is slow to heal. He was initially treated in [place] and has a 15 year history of bilateral lower extremity edema. He has never been treated with any form of compression therapy and was told not to based on an ultrasound results in [place]. Those results are not available to me at this time. He denies any prior DVT, nonhealing wounds, leg trauma, claudication or rest pain. He was told in [place] to treat the leg with Betadine. He came to the [place] for second opinion.      Last seen by me in June and was tx with UNNA boot and silvercel.  Returned to [place] and was treated with hydroferra blue.       Had UNNA boot on when was last seen.  Was back in [place] for past 4 weeks.    Using TRIAD on laceration on right.      Objective:  B/P: Data Unavailable, T: 97.9, P: 115, R: Data Unavailable  Ext; Warm, +2  Edema right.  No edema left. Bilateral venous stasis changes with thickened skin.   (+)FEM pulses.  No distal pulses.  LLE wound - now split into 2  0.8.8xcm and 0.9x0.8cm .   New ant ankle wound - 2x1.8mc      RLE - skin flap broke down.  2 pinpoint openings      U/S -   ULTRASOUND VENOUS COMPETENCY BILATERAL   6/1/2017 3:29 PM      HISTORY: Localized edema. Chronic venous hypertension (idiopathic)  without complications of bilateral lower extremity. Unspecified open  wound, left lower leg, initial encounter.     COMPARISON: None.     TECHNIQUE: Color Doppler and spectral waveform analysis performed.     FINDINGS: Bilateral common femoral veins, femoral veins, and popliteal  veins are patent and demonstrate no evidence of reflux.     Other than questionable 1 second reflux at the right saphenofemoral  junction, the right great saphenous vein is competent throughout its  length. Diameter of the right great saphenous vein is 3-7 mm.     The left great saphenous vein  is competent throughout its length.  Diameter of the left great saphenous vein is 2-7 mm.     Visualized portions of bilateral small saphenous veins appear to be  competent, without evidence of reflux.     No prominent perforators were demonstrated. Bilateral Giacomini veins  were competent.         IMPRESSION: The deep and superficial venous systems of both legs are  patent and competent.     PATRICK SCHERER MD    [facility] -   ULTRASOUND ANKLE-BRACHIAL INDEX DOPPLER NO EXERCISE   6/1/2017 3:16 PM        HISTORY: Localized edema. Chronic venous hypertension (idiopathic)  without complications of bilateral lower extremity.     COMPARISON: None.     FINDINGS: Ankle-brachial index is 0.96 on the right and 0.91 on the  left. Waveforms appear biphasic/monophasic bilaterally.         IMPRESSION: Borderline bilateral arterial insufficiency.     PATRICK SCHERER MD      Assessment/plan:  This is a 66 year old gentleman with bilateral chronic stasis changes and lymphedema. He has a new right leg wound as well as a result of a fall - almost closed.   Left leg much smaller - new ankle wound 2nd to poor fitting boot in [place]  Will cont TRIAD to right.   Cont UNNA to left with foam at ankle. F/U 1 week.    Casey Shine MD, FACS.

## 2018-01-26 NOTE — ED AVS SNAPSHOT
[facility] Emergency Department    [address] [place] [address]    Phone:  [phone]    Fax:  [phone]                                       Glenroy Padilla   MRN: [id number]    Department:  [facility] Emergency Department   Date of Visit:  1/26/2018           After Visit Summary Signature Page     I have received my discharge instructions, and my questions have been answered. I have discussed any challenges I see with this plan with the nurse or doctor.    ..........................................................................................................................................  Patient/Patient Representative Signature      ..........................................................................................................................................  Patient Representative Print Name and Relationship to Patient    ..................................................               ................................................  Date                                            Time    ..........................................................................................................................................  Reviewed by Signature/Title    ...................................................              ..............................................  Date                                                            Time

## 2018-01-26 NOTE — NURSING NOTE
Pt returns for f/u of bilateral lower leg wounds. Has been back in [place] and back to [place] again. Right lower leg with wound just below his knee is almost closed, two pinpoint areas that are open, clean and pink.   Left anterior ankle: pressure ulcer from unna boot, measures 2 x 1.8cm, yellow color, periwound skin intact;                                 old wound left lateral pretibial area with 2 wounds-upper measures 0.8 x 0.8cm, lower measures 0.9 x 0.8 x 0.7cm, upper clean and red. Lower with gray slough before cleansing, then pink.  Triad applied to right leg wound, covered with dry gauze then tubigrip placed.  Unna boot to left lower leg with silvercell to old wound and foam padding over the anterior ankle wound. YAHIR Wilkins

## 2018-01-26 NOTE — MR AVS SNAPSHOT
"              After Visit Summary   1/26/2018    Glenroy Padilla    MRN: [id number]           Patient Information     Date Of Birth          1950        Visit Information        Provider Department      1/26/2018 8:30 AM Casey Shine MD [facility]        Today's Diagnoses     Open wound of left lower extremity without complication, subsequent encounter    -  1    Edema of both legs           Follow-ups after your visit        Your next 10 appointments already scheduled     Jan 31, 2018  9:00 AM CST   New Visit with Grant Merino MD   [facility] ([facility])    [address] [place] [address] [id number] [phone]              Who to contact     If you have questions or need follow up information about today's clinic visit or your schedule please contact [facility] directly at [phone].  Normal or non-critical lab and imaging results will be communicated to you by MyChart, letter or phone within 4 business days after the clinic has received the results. If you do not hear from us within 7 days, please contact the clinic through MyChart or phone. If you have a critical or abnormal lab result, we will notify you by phone as soon as possible.  Submit refill requests through [employer] or call your pharmacy and they will forward the refill request to us. Please allow 3 business days for your refill to be completed.          Additional Information About Your Visit        MyChart Information     [employer] lets you send messages to your doctor, view your test results, renew your prescriptions, schedule appointments and more. To sign up, go to [email or web address] . Click on \"Log in\" on the left side of the screen, which will take you to the Welcome page. Then click on \"Sign up Now\" on the right side of the page.     You will be asked to enter the access code listed below, as well as some personal " information. Please follow the directions to create your username and password.     Your access code is: [email or web address]  Expires: 3/22/2018 10:22 AM     Your access code will  in 90 days. If you need help or a new code, please call your [facility] clinic or [phone].        Care EveryWhere ID     This is your Care EveryWhere ID. This could be used by other organizations to access your [facility] medical records  [id number]        Your Vitals Were     Pulse Temperature Pulse Oximetry BMI (Body Mass Index)          115 97.9  F (36.6  C) (Temporal) 95% 42.77 kg/m2         Blood Pressure from Last 3 Encounters:   18 132/74   17 118/66   10/02/17 158/86    Weight from Last 3 Encounters:   18 135.2 kg (298 lb 1.6 oz)   18 127.3 kg (280 lb 9.6 oz)   17 (!) 141.1 kg (311 lb)              Today, you had the following     No orders found for display       Primary Care Provider Fax #    Physician No Ref-Primary [phone]       No address on file        Equal Access to Services     [facility] Center[facility] : Hadii katia benedict hadaltagraciao Someeraali, waaxda luqadaha, qaybta kaalmada adebradyyada, adams batres . So [facility] [phone].    ATENCIÓN: Si habla español, tiene a wilkinson disposición servicios gratuitos de asistencia lingüística. Llame al [phone].    We comply with applicable federal civil rights laws and [place] laws. We do not discriminate on the basis of race, color, national origin, age, disability, sex, sexual orientation, or gender identity.            Thank you!     Thank you for choosing [facility]  for your care. Our goal is always to provide you with excellent care. Hearing back from our patients is one way we can continue to improve our services. Please take a few minutes to complete the written survey that you may receive in the mail after your visit with us. Thank you!             Your Updated Medication List - Protect others around you: Learn how to  safely use, store and throw away your medicines at www.disposemymeds.org.          This list is accurate as of 1/26/18  9:03 AM.  Always use your most recent med list.                   Brand Name Dispense Instructions for use Diagnosis    clopidogrel 75 MG tablet    PLAVIX    90 tablet    Take 1 tablet (75 mg) by mouth daily    Hypertension, goal below 140/90, Hyperlipidemia LDL goal <100, Type 2 diabetes mellitus with other circulatory complication, without long-term current use of insulin (H)       DULoxetine 30 MG EC capsule    CYMBALTA    1 capsule    Take 1 capsule (30 mg) by mouth 2 times daily    Neuropathy, Foot drop, right       ferrous sulfate 325 (65 FE) MG tablet    IRON    90 tablet    Take 1 tablet (325 mg) by mouth daily (with breakfast)    History of anemia       furosemide 40 MG tablet    LASIX    90 tablet    Take 1 tablet (40 mg) by mouth daily    Hypertension, goal below 140/90       lidocaine 5 % ointment    XYLOCAINE    50 g    Apply topically daily    Chronic pain of right knee       losartan 25 MG tablet    COZAAR    90 tablet    Take 1 tablet (25 mg) by mouth daily    Hypertension, goal below 140/90, Type 2 diabetes mellitus with other circulatory complication, without long-term current use of insulin (H)       order for DME     1 Box    One touch glucose monitoring strip with Glucometer and lancets.    Type 2 diabetes mellitus with other circulatory complication, without long-term current use of insulin (H)       PROTONIX 40 MG EC tablet   Generic drug:  pantoprazole     1 tablet    Take 1 tablet (40 mg) by mouth daily Take 30-60 minutes before a meal.    Gastroesophageal reflux disease without esophagitis       rosuvastatin 10 MG tablet    CRESTOR    90 tablet    Take 1 tablet (10 mg) by mouth daily    Type 2 diabetes mellitus with other circulatory complication, without long-term current use of insulin (H)       SitaGLIPtin-MetFORMIN HCl 100-1000 MG Tb24    JANUMET XR    30 tablet     Take 1 tablet by mouth daily    Type 2 diabetes mellitus with complication, without long-term current use of insulin (H)       TRIAD HYDROPHILIC WOUND DRESSI Pste     1 Tube    Externally apply 1 g topically daily    Open wound of right lower leg, initial encounter

## 2018-01-27 ENCOUNTER — HOSPITAL ENCOUNTER (OUTPATIENT)
Facility: CLINIC | Age: 68
Setting detail: OBSERVATION
Discharge: HOME | End: 2018-01-30
Attending: PHYSICIAN ASSISTANT | Admitting: FAMILY MEDICINE
Payer: MEDICAID

## 2018-01-27 ENCOUNTER — APPOINTMENT (OUTPATIENT)
Dept: GENERAL RADIOLOGY | Facility: CLINIC | Age: 68
End: 2018-01-27
Attending: PHYSICIAN ASSISTANT
Payer: MEDICAID

## 2018-01-27 ENCOUNTER — APPOINTMENT (OUTPATIENT)
Dept: CT IMAGING | Facility: CLINIC | Age: 68
End: 2018-01-27
Attending: PHYSICIAN ASSISTANT
Payer: MEDICAID
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
## 2018-01-27 DIAGNOSIS — M48.061 SPINAL STENOSIS OF LUMBAR REGION, UNSPECIFIED WHETHER NEUROGENIC CLAUDICATION PRESENT: ICD-10-CM

## 2018-01-27 DIAGNOSIS — I10 ESSENTIAL HYPERTENSION, MALIGNANT: ICD-10-CM

## 2018-01-27 DIAGNOSIS — M48.061 SPINAL STENOSIS, LUMBAR REGION, WITHOUT NEUROGENIC CLAUDICATION: ICD-10-CM
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
## 2018-01-27 DIAGNOSIS — Z72.0 TOBACCO ABUSE: ICD-10-CM

## 2018-01-27 DIAGNOSIS — E11.9 TYPE 2 DIABETES MELLITUS WITHOUT COMPLICATION, UNSPECIFIED LONG TERM INSULIN USE STATUS: ICD-10-CM
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
## 2018-01-27 DIAGNOSIS — R29.898 WEAKNESS OF BOTH LOWER EXTREMITIES: ICD-10-CM

## 2018-01-27 DIAGNOSIS — S30.810A ABRASION OF BUTTOCK, INITIAL ENCOUNTER: ICD-10-CM

## 2018-01-27 DIAGNOSIS — M17.11 PRIMARY OSTEOARTHRITIS OF RIGHT KNEE: ICD-10-CM

## 2018-01-27 DIAGNOSIS — S39.82XA OTHER SPECIFIED INJURIES OF LOWER BACK, INITIAL ENCOUNTER: ICD-10-CM

## 2018-01-27 DIAGNOSIS — S31.801D TEAR OF SKIN OF BUTTOCK, UNSPECIFIED LATERALITY, SUBSEQUENT ENCOUNTER: ICD-10-CM

## 2018-01-27 DIAGNOSIS — R53.1 WEAKNESS GENERALIZED: ICD-10-CM

## 2018-01-27 PROBLEM — M48.00 CENTRAL SPINAL STENOSIS: Status: ACTIVE | Noted: 2018-01-27

## 2018-01-27 PROBLEM — L97.929 VENOUS STASIS ULCER OF LEFT LOWER EXTREMITY (H): Status: ACTIVE | Noted: 2017-05-22

## 2018-01-27 PROBLEM — E78.5 HYPERLIPIDEMIA LDL GOAL <100: Status: ACTIVE | Noted: 2017-09-25

## 2018-01-27 PROBLEM — E11.59 TYPE 2 DIABETES MELLITUS WITH OTHER CIRCULATORY COMPLICATION, WITHOUT LONG-TERM CURRENT USE OF INSULIN (H): Status: ACTIVE | Noted: 2017-05-22

## 2018-01-27 PROBLEM — I83.029 VENOUS STASIS ULCER OF LEFT LOWER EXTREMITY (H): Status: ACTIVE | Noted: 2017-05-22

## 2018-01-27 PROBLEM — G47.33 OSA (OBSTRUCTIVE SLEEP APNEA): Status: ACTIVE | Noted: 2017-05-22

## 2018-01-27 PROBLEM — R29.6 FALLS FREQUENTLY: Status: ACTIVE | Noted: 2018-01-27

## 2018-01-27 PROBLEM — E66.01 OBESITY, MORBID, BMI 40.0-49.9 (H): Status: ACTIVE | Noted: 2017-11-20

## 2018-01-27 LAB
ALBUMIN UR-MCNC: NEGATIVE MG/DL
ANION GAP SERPL CALCULATED.3IONS-SCNC: 10 MMOL/L (ref 3–14)
APPEARANCE UR: ABNORMAL
BASOPHILS # BLD AUTO: 0.1 10E9/L (ref 0–0.2)
BASOPHILS NFR BLD AUTO: 0.5 %
BILIRUB UR QL STRIP: NEGATIVE
BUN SERPL-MCNC: 16 MG/DL (ref 7–30)
CALCIUM SERPL-MCNC: 9 MG/DL (ref 8.5–10.1)
CHLORIDE SERPL-SCNC: 97 MMOL/L (ref 94–109)
CO2 SERPL-SCNC: 30 MMOL/L (ref 20–32)
COLOR UR AUTO: YELLOW
CREAT SERPL-MCNC: 1.17 MG/DL (ref 0.66–1.25)
DIFFERENTIAL METHOD BLD: ABNORMAL
EOSINOPHIL # BLD AUTO: 0.3 10E9/L (ref 0–0.7)
EOSINOPHIL NFR BLD AUTO: 2.9 %
ERYTHROCYTE [DISTWIDTH] IN BLOOD BY AUTOMATED COUNT: 17.2 % (ref 10–15)
GFR SERPL CREATININE-BSD FRML MDRD: 62 ML/MIN/1.7M2
GLUCOSE BLDC GLUCOMTR-MCNC: 138 MG/DL (ref 70–99)
GLUCOSE SERPL-MCNC: 102 MG/DL (ref 70–99)
GLUCOSE UR STRIP-MCNC: NEGATIVE MG/DL
HBA1C MFR BLD: 5.6 % (ref 4.3–6)
HCT VFR BLD AUTO: 36.8 % (ref 40–53)
HGB BLD-MCNC: 11.2 G/DL (ref 13.3–17.7)
HGB UR QL STRIP: ABNORMAL
HYALINE CASTS #/AREA URNS LPF: 3 /LPF (ref 0–2)
KETONES UR STRIP-MCNC: NEGATIVE MG/DL
LEUKOCYTE ESTERASE UR QL STRIP: ABNORMAL
LYMPHOCYTES # BLD AUTO: 1.2 10E9/L (ref 0.8–5.3)
LYMPHOCYTES NFR BLD AUTO: 10.6 %
MCH RBC QN AUTO: 27.7 PG (ref 26.5–33)
MCHC RBC AUTO-ENTMCNC: 30.4 G/DL (ref 31.5–36.5)
MCV RBC AUTO: 91 FL (ref 78–100)
MONOCYTES # BLD AUTO: 1.5 10E9/L (ref 0–1.3)
MONOCYTES NFR BLD AUTO: 13 %
MUCOUS THREADS #/AREA URNS LPF: PRESENT /LPF
NEUTROPHILS # BLD AUTO: 8.2 10E9/L (ref 1.6–8.3)
NEUTROPHILS NFR BLD AUTO: 73 %
NITRATE UR QL: NEGATIVE
PH UR STRIP: 5 PH (ref 5–7)
PLATELET # BLD AUTO: 389 10E9/L (ref 150–450)
POTASSIUM SERPL-SCNC: 3.8 MMOL/L (ref 3.4–5.3)
RBC # BLD AUTO: 4.05 10E12/L (ref 4.4–5.9)
RBC #/AREA URNS AUTO: 28 /HPF (ref 0–2)
SODIUM SERPL-SCNC: 137 MMOL/L (ref 133–144)
SOURCE: ABNORMAL
SP GR UR STRIP: 1.01 (ref 1–1.03)
UROBILINOGEN UR STRIP-MCNC: 0 MG/DL (ref 0–2)
WBC # BLD AUTO: 11.3 10E9/L (ref 4–11)
WBC #/AREA URNS AUTO: 72 /HPF (ref 0–2)
YEAST #/AREA URNS HPF: ABNORMAL /HPF

## 2018-01-27 PROCEDURE — 73590 X-RAY EXAM OF LOWER LEG: CPT | Mod: TC,RT

## 2018-01-27 PROCEDURE — 87086 URINE CULTURE/COLONY COUNT: CPT | Performed by: FAMILY MEDICINE

## 2018-01-27 PROCEDURE — 83036 HEMOGLOBIN GLYCOSYLATED A1C: CPT | Performed by: FAMILY MEDICINE

## 2018-01-27 PROCEDURE — 99285 EMERGENCY DEPT VISIT HI MDM: CPT | Mod: 25 | Performed by: EMERGENCY MEDICINE

## 2018-01-27 PROCEDURE — 99218 ZZC INITIAL OBSERVATION CARE,LEVL I: CPT | Performed by: FAMILY MEDICINE

## 2018-01-27 PROCEDURE — 87106 FUNGI IDENTIFICATION YEAST: CPT | Performed by: FAMILY MEDICINE

## 2018-01-27 PROCEDURE — G0378 HOSPITAL OBSERVATION PER HR: HCPCS

## 2018-01-27 PROCEDURE — 99285 EMERGENCY DEPT VISIT HI MDM: CPT | Mod: 25 | Performed by: PHYSICIAN ASSISTANT

## 2018-01-27 PROCEDURE — 99207 ZZC CDG-HISTORY COMP: MEETS EXP. PROB FOCUSED- DOWN CODED LACK OF PFSH: CPT | Performed by: FAMILY MEDICINE

## 2018-01-27 PROCEDURE — 85025 COMPLETE CBC W/AUTO DIFF WBC: CPT | Performed by: PHYSICIAN ASSISTANT

## 2018-01-27 PROCEDURE — 00000146 ZZHCL STATISTIC GLUCOSE BY METER IP

## 2018-01-27 PROCEDURE — 25000132 ZZH RX MED GY IP 250 OP 250 PS 637: Performed by: FAMILY MEDICINE

## 2018-01-27 PROCEDURE — 73562 X-RAY EXAM OF KNEE 3: CPT | Mod: TC,RT

## 2018-01-27 PROCEDURE — 80048 BASIC METABOLIC PNL TOTAL CA: CPT | Performed by: PHYSICIAN ASSISTANT

## 2018-01-27 PROCEDURE — 81001 URINALYSIS AUTO W/SCOPE: CPT | Performed by: FAMILY MEDICINE

## 2018-01-27 PROCEDURE — 72131 CT LUMBAR SPINE W/O DYE: CPT

## 2018-01-27 RX ORDER — ACETAMINOPHEN 650 MG/1
650 SUPPOSITORY RECTAL EVERY 4 HOURS PRN
Status: DISCONTINUED | OUTPATIENT
Start: 2018-01-27 | End: 2018-01-30 | Stop reason: HOSPADM

## 2018-01-27 RX ORDER — ACETAMINOPHEN 325 MG/1
650 TABLET ORAL EVERY 4 HOURS PRN
Status: DISCONTINUED | OUTPATIENT
Start: 2018-01-27 | End: 2018-01-30 | Stop reason: HOSPADM

## 2018-01-27 RX ORDER — HYDROCODONE BITARTRATE AND ACETAMINOPHEN 5; 325 MG/1; MG/1
1 TABLET ORAL EVERY 4 HOURS PRN
Status: DISCONTINUED | OUTPATIENT
Start: 2018-01-27 | End: 2018-01-30 | Stop reason: HOSPADM

## 2018-01-27 RX ORDER — DEXTROSE MONOHYDRATE 25 G/50ML
25-50 INJECTION, SOLUTION INTRAVENOUS
Status: DISCONTINUED | OUTPATIENT
Start: 2018-01-27 | End: 2018-01-30 | Stop reason: HOSPADM

## 2018-01-27 RX ORDER — PROCHLORPERAZINE 25 MG
12.5 SUPPOSITORY, RECTAL RECTAL EVERY 12 HOURS PRN
Status: DISCONTINUED | OUTPATIENT
Start: 2018-01-27 | End: 2018-01-30 | Stop reason: HOSPADM

## 2018-01-27 RX ORDER — METFORMIN HCL 500 MG
1000 TABLET, EXTENDED RELEASE 24 HR ORAL
Status: DISCONTINUED | OUTPATIENT
Start: 2018-01-28 | End: 2018-01-27 | Stop reason: CLARIF

## 2018-01-27 RX ORDER — DULOXETIN HYDROCHLORIDE 30 MG/1
30 CAPSULE, DELAYED RELEASE ORAL 2 TIMES DAILY
Status: DISCONTINUED | OUTPATIENT
Start: 2018-01-27 | End: 2018-01-30 | Stop reason: HOSPADM

## 2018-01-27 RX ORDER — CLOPIDOGREL BISULFATE 75 MG/1
75 TABLET ORAL DAILY
Status: DISCONTINUED | OUTPATIENT
Start: 2018-01-28 | End: 2018-01-30 | Stop reason: HOSPADM

## 2018-01-27 RX ORDER — PROCHLORPERAZINE MALEATE 5 MG
5 TABLET ORAL EVERY 6 HOURS PRN
Status: DISCONTINUED | OUTPATIENT
Start: 2018-01-27 | End: 2018-01-30 | Stop reason: HOSPADM

## 2018-01-27 RX ORDER — ONDANSETRON 4 MG/1
4 TABLET, ORALLY DISINTEGRATING ORAL EVERY 6 HOURS PRN
Status: DISCONTINUED | OUTPATIENT
Start: 2018-01-27 | End: 2018-01-30 | Stop reason: HOSPADM

## 2018-01-27 RX ORDER — LOSARTAN POTASSIUM 25 MG/1
25 TABLET ORAL DAILY
Status: DISCONTINUED | OUTPATIENT
Start: 2018-01-28 | End: 2018-01-30 | Stop reason: HOSPADM

## 2018-01-27 RX ORDER — HYDROPHILIC CREAM
1 PASTE (GRAM) TOPICAL DAILY
Status: DISCONTINUED | OUTPATIENT
Start: 2018-01-28 | End: 2018-01-30 | Stop reason: HOSPADM

## 2018-01-27 RX ORDER — AMOXICILLIN 250 MG
1 CAPSULE ORAL 2 TIMES DAILY PRN
Status: DISCONTINUED | OUTPATIENT
Start: 2018-01-27 | End: 2018-01-30 | Stop reason: HOSPADM

## 2018-01-27 RX ORDER — ONDANSETRON 2 MG/ML
4 INJECTION INTRAMUSCULAR; INTRAVENOUS EVERY 6 HOURS PRN
Status: DISCONTINUED | OUTPATIENT
Start: 2018-01-27 | End: 2018-01-30 | Stop reason: HOSPADM

## 2018-01-27 RX ORDER — FUROSEMIDE 40 MG
40 TABLET ORAL DAILY
Status: DISCONTINUED | OUTPATIENT
Start: 2018-01-28 | End: 2018-01-30 | Stop reason: HOSPADM

## 2018-01-27 RX ORDER — AMOXICILLIN 250 MG
2 CAPSULE ORAL 2 TIMES DAILY PRN
Status: DISCONTINUED | OUTPATIENT
Start: 2018-01-27 | End: 2018-01-30 | Stop reason: HOSPADM

## 2018-01-27 RX ORDER — NICOTINE POLACRILEX 4 MG
15-30 LOZENGE BUCCAL
Status: DISCONTINUED | OUTPATIENT
Start: 2018-01-27 | End: 2018-01-30 | Stop reason: HOSPADM

## 2018-01-27 RX ORDER — NALOXONE HYDROCHLORIDE 0.4 MG/ML
.1-.4 INJECTION, SOLUTION INTRAMUSCULAR; INTRAVENOUS; SUBCUTANEOUS
Status: DISCONTINUED | OUTPATIENT
Start: 2018-01-27 | End: 2018-01-30 | Stop reason: HOSPADM

## 2018-01-27 RX ADMIN — DULOXETINE HYDROCHLORIDE 30 MG: 30 CAPSULE, DELAYED RELEASE ORAL at 21:32

## 2018-01-27 NOTE — IP AVS SNAPSHOT
` `           [address] MEDICAL SURGICAL: [phone]                 INTERAGENCY TRANSFER FORM - NOTES (H&P, Discharge Summary, Consults, Procedures, Therapies)   2018                    Hospital Admission Date: 2018  GLENROY PADILLA   : 1950  Sex: Male        Patient PCP Information     Provider PCP Type    Julio Cesar Sahu PA-C General         History & Physicals      H&P by Linda Massey MD at 2018  7:20 PM     Author:  Linda Massey MD Service:  Family Medicine Author Type:  Physician    Filed:  2018  7:26 AM Date of Service:  2018  7:20 PM Creation Time:  2018  7:18 PM    Status:  Addendum :  Linda Massey MD (Physician)         [facility] History and Physical    Glenroy Padilla MRN# [id number]   Age: 67 year old YOB: 1950     Date of Admission:  2018    Home clinic: [facility]  Primary care provider: Julio Cesar Sahu          Assessment and Plan:   Assessment/Plan:   Active Problems:    Falls frequently    Assessment: Patient has been having more falls in the home environment with a 2-3 month history of worsening weakness in bilateral lower legs, noted right foot drop, intermittent incontinence of stool.  Family is no longer able to take care of the patient in the home environment.  Workup has revealed central spinal stenosis and foraminal stenosis as below.  Neurosurgeon on call from the [facility](Dr. Fox) was consulted and emergent intervention is not required however patient does need urgent workup in the next 1-2 weeks to further define the patient's symptoms and plan of care going forward.     Plan:  Family is not able to perform activities of daily living, even with family support, therefore decision was made to observe patient in the hospital, ensure no red flag symptoms occurred during this observation stay, and  have physical  therapy assess to assist with safe disposition plan going forward.  Patient will be placed under observation status.  Will perform MRI without contrast tomorrow if able and if significant findings are noted, will rediscuss with neurosurgery.  Will have patient be seen by physical therapy and place a social work consult for assistance in finding safe disposition      Weakness of both legs    Assessment:  Likely secondary to central spinal stenosis and foraminal stenosis, however patient does also have obesity with deconditioning, osteomyelitis, and known peripheral neuropathy which may be contributing.      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 Shine as an outpatient    Plan: we will continue outpatient wound care      Hx of coronary artery disease    Assessment:  Previous history with stent placed in 2016     Plan: Continue with Plavix and losartan      DAYTON (obstructive sleep apnea)    Assessment: Patient does  use CPAP, unfortunately does not have it here    Plan: We will use oxygen as needed overnight, wife will bring in CPAP machine tomorrow if patient ends up needing to stay beyond tomorrow.[EP1.1]      Hypertension, goal below 140/90    Assessment: Patient on  losartan and Lasix    Plan: Continue home regimen and monitor      Hyperlipidemia LDL goal <100    Assessment: Patient on Crestor    Plan: We will hold during patient's observation stay       Obesity, morbid, BMI 40.0-49.9 (H)    Assessment:  chronic and stable    Plan: No acute intervention is needed      Neuropathy    Assessment: Patient has been diagnosed previously with peripheral neuropathy, felt to be secondary to diabetes    Plan: Proceed with workup as above, patient will need evaluation by neurology going forward      Gastroesophageal reflux disease without esophagitis    Assessment: Patient has Protonix, but does not use it faithfully as he does not have significant symptoms    Plan: We will hold during this observation stay      VTE:  Observation stay  Code Status:  Full code     Core Measures   Acute MI, CHF, or stroke core measures do not apply for this admission            Chief Complaint:   Weakness and inability to care for himself at home, even with family support    History is obtained from the patient and his spouse         History of Present Illness:   This patient is a 67 year old [EP1.2] male with a significant past medical history of DM, HTN, CAD, Hyperlipidemia, peripheral neuropathy (without formal testing ever done) who presents with progressive bilateral leg weakness over the past 2-4 months.  Patient fell yesterday and had rectal bleeding, which thankfully was secondary to skin tears and patient being on Plavix.  Bleeding stopped without incident and he tried to return home, however the severity of his weakness was pronounced and family was unable to care for him, therefore he came back to the emergency room for further evaluation.  On further discussion, patient's weakness has been progressively worsening over the past 2-4 months and he has been having some incontinence of bowel and bladder, although it is not consistent.  He is also been having a right foot drop and was seen by  his primary care provider recently and was referred to outpatient neurology with an appointment next month.  CT scan of the lumbar spine was performed and does show moderate to severe central and foraminal stenosis and neurosurgery was consulted, who recommends urgent outpatient workup this following week but no need for emergent transfer.  Patient will be placed under observation status with MRI to be performed tomorrow, evaluation by physical therapy, and discussion of safe disposition as  family cannot take care of patient and his current status[EP1.3]          Past Medical History:     Past Medical History:   Diagnosis Date     Acute pain of left knee 5/22/2017     Chronic pain of right knee 5/22/2017     Hx of coronary artery disease 5/22/2017     Hx of heart artery stent 5/22/2017     Obesity, morbid, BMI 40.0-49.9 (H) 11/20/2017     Open wound of left lower extremity without complication, initial encounter 5/22/2017     DAYTON (obstructive sleep apnea) 5/22/2017     Type 2 diabetes mellitus with other circulatory complication, without long-term current use of insulin (H) 5/22/2017     Venous stasis ulcer of left lower extremity (H) 5/22/2017     Venous stasis ulcer of left lower extremity (H) 5/22/2017[EP1.4]             Past Surgical History:[EP1.1]   History reviewed. No pertinent surgical history.[EP1.4]         Family History:[EP1.1]   No family history on file.[EP1.4]         Social History:     Social History     Social History     Marital status: Single     Spouse name: N/A     Number of children: N/A     Years of education: N/A     Social History Main Topics     Smoking status: Current Some Day Smoker     Types: Cigars     Smokeless tobacco: Never Used     Alcohol use No     Drug use: No     Sexual activity: No     Other Topics Concern     None     Social History Narrative             Allergies:[EP1.1]     Allergies   Allergen Reactions     No Known Allergies[EP1.4]              Medications:[EP1.1]      Current Outpatient Prescriptions   Medication Sig Dispense Refill     pantoprazole (PROTONIX) 40 MG EC tablet Take 1 tablet (40 mg) by mouth daily Take 30-60 minutes before a meal. 1 tablet 0     DULoxetine (CYMBALTA) 30 MG EC capsule Take 1 capsule (30 mg) by mouth 2 times daily 1 capsule 0     losartan (COZAAR) 25 MG tablet Take 1 tablet (25 mg) by mouth daily 90 tablet 1     rosuvastatin (CRESTOR) 10 MG tablet Take 1 tablet (10 mg) by mouth daily 90 tablet 1     furosemide (LASIX) 40 MG tablet Take 1 tablet (40 mg) by mouth daily 90 tablet 1     clopidogrel (PLAVIX) 75 MG tablet Take 1 tablet (75 mg) by mouth daily 90 tablet 1     SitaGLIPtin-MetFORMIN HCl (JANUMET XR) 100-1000 MG TB24 Take 1 tablet by mouth daily 30 tablet 3     ferrous sulfate (IRON) 325 (65 FE) MG tablet Take 1 tablet (325 mg) by mouth daily (with breakfast) 90 tablet 2     Wound Dressings (TRIAD HYDROPHILIC WOUND DRESSI) PSTE Externally apply 1 g topically daily 1 Tube 3     lidocaine (XYLOCAINE) 5 % ointment Apply topically daily 50 g 3     order for DME One touch glucose monitoring strip with Glucometer and lancets. 1 Box 1[EP1.4]             Review of Systems:[EP1.1]   C: NEGATIVE for fever, chills, change in weight  INTEGUMENTARY/SKIN: Patient does have chronic venous stasis ulcer on the left shin which is slowly improving  E/M: NEGATIVE for ear, mouth and throat problems  R: NEGATIVE for significant cough or SOB  CV: NEGATIVE for chest pain, palpitations or peripheral edema  GI: Patient has no nausea or vomiting, no abdominal pain.  He has had several episodes of bowel incontinence in which he was not even aware he had to have a bowel movement until after it had occurred.  This has been occurring intermittently over the past 1-2 months   male : Patient has been having intermittent episodes in which he is not aware of bladder fullness and is incontinent of urine  MUSCULOSKELETAL:Significant weakness of bilateral lower extremities  with right leg foot drop more pronounced than left and frequent falls in the home environment as above  NEURO: Significant weakness, decreased sensation, increased right foot drop, intermittent incontinence of bowel and bladder as above  HEME/ALLERGY/IMMUNE: Patient has increased bleeding secondary to being on Plavix  P: NEGATIVE for changes in mood or affect          Ph[EP1.3]ysical Exam:[EP1.1]   Pulse 93, temperature 98.7  F (37.1  C), temperature source Temporal, resp. rate 18, weight 288 lb (130.6 kg), SpO2 96 %.[EP1.4]  Constitutional:   awake, alert, cooperative, no apparent distress, and appears stated age     Lungs:   No increased work of breathing, good air exchange, clear to auscultation bilaterally, no crackles or wheezing     Cardiovascular:   Normal apical impulse, regular rate and rhythm, normal S1 and S2, no S3 or S4, and no murmur noted     Abdomen:   Bowel sounds present, abdomen is obese but nondistended, nontender     Musculoskeletal:   Patient has significant weakness in bilateral lower extremities with no significant movement of toes 2 through 5 on either foot and patient having weakness on dorsiflexion which is worse on the right than the left.       Neurologic:   Awake, alert, oriented to name, place and time. Patient does have decreased sensation to touch in bilateral feet which is diffuse and does not follow a specific dermatome.  Muscle weakness as above     Skin:   Patient does have bruising present in bilateral lower legs, likely secondary to his frequent falls.  He does have a small right wound from a recent fall, which looks unchanged compared to his visit with Dr. Shine last week.[EP1.3]  Left leg wrapped and per family it is not supposed to be removed until his appointment with Dr. Shine next week as he changes the dressing weekly and evaluates.[EP1.5]               Data:   All laboratory and imaging data in the past 24 hours reviewed[EP1.3]     Attestation:    I have reviewed  today's vital signs, notes, medications, labs and imaging.    Electronically Signed:  Linda Massey MD    Note: Chart documentation done in part with Dragon Voice Recognition software. Although reviewed after completion, some word and grammatical errors may remain.          Revision History        User Key Date/Time User Provider Type Action    > EP1.5 1/28/2018  7:26 AM Linda Massey MD Physician Addend     EP1.3 1/27/2018  7:40 PM Linda Massey MD Physician Sign     EP1.2 1/27/2018  7:29 PM Linda Massey MD Physician      EP1.4 1/27/2018  7:20 PM Linda Massey MD Physician      EP1.1 1/27/2018  7:18 PM Linda Massey MD Physician                      Discharge Summaries      Discharge Summaries by Marc Mancera MD at 1/30/2018 11:20 AM     Author:  Marc Mancera MD Service:  Hospitalist Author Type:  Physician    Filed:  1/30/2018 11:26 AM Date of Service:  1/30/2018 11:20 AM Creation Time:  1/30/2018 11:20 AM    Status:  Signed :  Marc Mancera MD (Physician)         [facility] Observation Discharge Summary    Glenroy Padilla MRN# [id number]   Age: 67 year old YOB: 1950     Date of Observation:  1/27/2018  Date of Discharge:[JK1.1]  1/30/2018[JK1.2]   Initial Physician:  Linda Massey MD  Discharge Physician:  Marc Mancera MD     Home clinic: [facility]  Primary care provider: Julio Cesar Esteban          Admission Diagnoses:   Primary osteoarthritis of right knee [M17.11]  Fall, initial encounter [W19.XXXA]  Tear of skin of buttock, unspecified laterality, subsequent encounter [S31.801D]  Weakness of both lower extremities [R29.898]  Spinal stenosis of lumbar region, unspecified whether neurogenic claudication present [M48.061]          Discharge Diagnoses:   Principal diagnosis: Central spinal stenosis    Secondary diagnoses:    Central spinal stenosis L3-4 and L4-5     Type 2 diabetes mellitus with other circulatory complication, without long-term current use of insulin (H)    Venous stasis ulcer of left lower extremity (H)    Neuropathy    Falls frequently    Weakness of both legs    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 *            Procedures:   No procedures performed during this hospital stay           Discharge Medications:     Outpatient Prescriptions Marked as Taking for the 1/27/18 encounter (Hospital Encounter)   Medication Sig     HYDROcodone-acetaminophen (NORCO) 5-325 MG per tablet Take 1 tablet by mouth every 4 hours as needed for other (pain control or improvement in physical function.)     senna-docusate (SENOKOT-S;PERICOLACE) 8.6-50 MG per tablet Take 1 tablet by mouth 2 times daily as needed for constipation     pantoprazole (PROTONIX) 40 MG EC tablet Take 1 tablet (40 mg) by mouth daily Take 30-60 minutes before a meal.     DULoxetine (CYMBALTA) 30 MG EC capsule Take 1 capsule (30 mg) by mouth 2 times daily     losartan (COZAAR) 25 MG tablet Take 1 tablet (25 mg) by mouth daily     rosuvastatin (CRESTOR) 10 MG tablet Take 1 tablet (10 mg) by mouth daily     furosemide (LASIX) 40 MG tablet Take 1 tablet (40 mg) by mouth daily     clopidogrel (PLAVIX) 75 MG tablet Take 1 tablet (75 mg) by mouth daily     SitaGLIPtin-MetFORMIN HCl (JANUMET XR) 100-1000 MG TB24 Take 1 tablet by mouth daily     ferrous sulfate (IRON) 325 (65 FE) MG tablet Take 1 tablet (325 mg) by mouth daily (with breakfast)       Current Discharge Medication List      START taking these medications    Details   HYDROcodone-acetaminophen (NORCO) 5-325 MG per tablet Take 1 tablet by mouth every 4 hours as needed for other  (pain control or improvement in physical function.)  Qty: 20 tablet, Refills: 0    Associated Diagnoses: Spinal stenosis of lumbar region, unspecified whether neurogenic claudication present      senna-docusate (SENOKOT-S;PERICOLACE) 8.6-50 MG per tablet Take 1 tablet by mouth 2 times daily as needed for constipation  Qty: 100 tablet    Associated Diagnoses: Spinal stenosis, lumbar region, without neurogenic claudication         CONTINUE these medications which have NOT CHANGED    Details   pantoprazole (PROTONIX) 40 MG EC tablet Take 1 tablet (40 mg) by mouth daily Take 30-60 minutes before a meal.  Qty: 1 tablet, Refills: 0    Associated Diagnoses: Gastroesophageal reflux disease without esophagitis      DULoxetine (CYMBALTA) 30 MG EC capsule Take 1 capsule (30 mg) by mouth 2 times daily  Qty: 1 capsule, Refills: 0    Associated Diagnoses: Neuropathy; Foot drop, right      losartan (COZAAR) 25 MG tablet Take 1 tablet (25 mg) by mouth daily  Qty: 90 tablet, Refills: 1    Associated Diagnoses: Hypertension, goal below 140/90; Type 2 diabetes mellitus with other circulatory complication, without long-term current use of insulin (H)      rosuvastatin (CRESTOR) 10 MG tablet Take 1 tablet (10 mg) by mouth daily  Qty: 90 tablet, Refills: 1    Associated Diagnoses: Type 2 diabetes mellitus with other circulatory complication, without long-term current use of insulin (H)      furosemide (LASIX) 40 MG tablet Take 1 tablet (40 mg) by mouth daily  Qty: 90 tablet, Refills: 1    Associated Diagnoses: Hypertension, goal below 140/90      clopidogrel (PLAVIX) 75 MG tablet Take 1 tablet (75 mg) by mouth daily  Qty: 90 tablet, Refills: 1    Associated Diagnoses: Hypertension, goal below 140/90; Hyperlipidemia LDL goal <100; Type 2 diabetes mellitus with other circulatory complication, without long-term current use of insulin (H)      SitaGLIPtin-MetFORMIN HCl (JANUMET XR) 100-1000 MG TB24 Take 1 tablet by mouth daily  Qty: 30  tablet, Refills: 3    Associated Diagnoses: Type 2 diabetes mellitus with complication, without long-term current use of insulin (H)      ferrous sulfate (IRON) 325 (65 FE) MG tablet Take 1 tablet (325 mg) by mouth daily (with breakfast)  Qty: 90 tablet, Refills: 2    Associated Diagnoses: History of anemia      Wound Dressings (TRIAD HYDROPHILIC WOUND DRESSI) PSTE Externally apply 1 g topically daily  Qty: 1 Tube, Refills: 3    Associated Diagnoses: Open wound of right lower leg, initial encounter      lidocaine (XYLOCAINE) 5 % ointment Apply topically daily  Qty: 50 g, Refills: 3    Associated Diagnoses: Chronic pain of right knee      order for DME One touch glucose monitoring strip with Glucometer and lancets.  Qty: 1 Box, Refills: 1    Associated Diagnoses: Type 2 diabetes mellitus with other circulatory complication, without long-term current use of insulin (H)                   Consultations:   No consultations were requested during this hospital stay          Brief History of Illness:   67 year old male patient with multiple chronic medical problems including diabetes with peripheral neuropathy and lower extremity lymphedema with venous stasis ulcers presented with history of worsening leg weakness and new inability to ambulate.   Due to the concern about his acute functional decline, he was hospitalized for observation. See ER note and history and physical for details.          Hospital Course:   Patient was observed in the hospital.  Lumbar spine MRI demonstrated radiographic findings consistent with severe central spinal stenosis at L3-4.  Case was discussed by telephone with neurosurgery who was not available at this facility for on-site consultation.  Neurosurgery at the [facility] advised against emergency surgery but recommended close outpatient short-term follow-up with them to discuss surgical treatment options of severe and probably symptomatic lumbar central spinal stenosis.  He was  evaluated and treated by physical therapy who advised discharge to a TCU for anticipated short-term rehabilitation.  His chronic medical problems including diabetes, hypertension, and venous stasis ulcers were stable throughout this hospital stay.  He was advised to continue his chronic treatments for his chronic medical problems.  He otherwise had an unremarkable clinical course.    I evaluated this patient on the day of discharge.  Vital signs remained stable.  He appeared to be resting comfortably without signs of distress.  No confusion was present.            Discharge Instructions and Follow-Up:   Discharge diet: Moderate consistent carbohydrate (3496-2628 santiago / 4-6 CHO units per meal)   Discharge activity: Activity as tolerated   Discharge follow-up:  Follow-up with TCU provider  Follow-up with neurosurgery Dr. Fox at [facility] according to their advice       Pending test results: none         Discharge Disposition:   Discharged to nursing home      Attestation:  I have reviewed today's vital signs, notes, medications, and test results.    Marc Mancera MD[JK1.1]           Revision History        User Key Date/Time User Provider Type Action    > JK1.2 1/30/2018 11:26 AM Marc Mancera MD Physician Sign     JK1.1 1/30/2018 11:20 AM Marc Mancera MD Physician                      Consult Notes      Consults by Augusta John at 1/28/2018 10:19 AM     Author:  Augusta John Service:  (none) Author Type:      Filed:  1/28/2018 10:25 AM Date of Service:  1/28/2018 10:19 AM Creation Time:  1/28/2018 10:14 AM    Status:  Addendum :  Augusta John ()     Consult Orders:    1. Social Work IP Consult [[id number]] ordered by Linda Massey MD at 01/27/18 1918                CARE TRANSITION SOCIAL WORK INITIAL ASSESSMENT:  Reason For Consult: discharge planning   Met with: Patient and Family.    DATA  Active Problems:    Type 2 diabetes mellitus with other circulatory  complication, without long-term current use of insulin (H)    Venous stasis ulcer of left lower extremity (H)    Hx of coronary artery disease    DAYTON (obstructive sleep apnea)    Hypertension, goal below 140/90    Hyperlipidemia LDL goal <100    Obesity, morbid, BMI 40.0-49.9 (H)    Neuropathy    Gastroesophageal reflux disease without esophagitis    Falls frequently    Weakness of both legs    Central spinal stenosis L3-4 and L4-5    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       Primary Care Clinic Name: [facility]  Primary Care MD Name: Julio Cesar Rosen    ASSESSMENT  Cognitive Status: awake, alert and oriented.       Resources List: Skilled Nursing Facility     Lives With: spouse, child(kirsten), adult  Living Arrangements: house  Quality Of Family Relationships: supportive, helpful, involved  Description of Support System: Involved, Supportive   Who is your support system?: Wife, Children       Insurance Concerns: No Insurance issues identified        This writer met with pt introduced self and role. Discussed discharge planning and medicare guidelines in regards to home care and SNF benefits.  Also talked with patient's daughter, Yuko, by phone.  Patient is not able to be cared for safely by family at this time.  He is in need of TCU placement.  Patient to have a MRI today.  Unsure of discharge date at this time, pending MRI results.  Discussed list of options for TCU for patient.  Patient/family would like referrals sent to the [facility].  Unsure if patient will have insurance coverage for TCU.  Facilities receiving referrals will run financials on Monday.      PLAN    TCU    Discharge Planner   Discharge Plans in progress: TCU  Barriers to discharge plan: possible cost  Follow up plan: PCP    SARAH Darden  [facility] [phone]/ [place] [phone]         Entered by: Augusta John 01/28/2018 10:14 AM[JK1.1]     Referrals sent to [facility], [facility] in [place], and [facility] in  [place].[JK1.2]         Revision History        User Key Date/Time User Provider Type Action    > JK1.2 1/28/2018 10:25 AM Augusta John  Addend     JK1.1 1/28/2018 10:19 AM Augusta John  Sign                     Progress Notes - Physician (Notes from 01/27/18 through 01/30/18)      Progress Notes by Olivia Malone RN at 1/30/2018  4:18 AM     Author:  Olivia Malone RN Service:  (none) Author Type:  Registered Nurse    Filed:  1/30/2018  4:23 AM Date of Service:  1/30/2018  4:18 AM Creation Time:  1/30/2018  4:18 AM    Status:  Signed :  Olivia Malone RN (Registered Nurse)         S-(situation): shift note    B-(background): central spinal stenosis    A-(assessment): pt has denied pain. Vss. Has fairly good bed mobility, pt states has been able to pivot to use bedside commode and chair. Pt has refused to turn and reposition at times but does shift weight in bed. cepacol lozenge given for sore throat.     R-(recommendations): awaiting referrals from TCU for pt placement at d/c. Will continue with plan of care.[SD1.1]        Revision History        User Key Date/Time User Provider Type Action    > SD1.1 1/30/2018  4:23 AM Olivia Malone RN Registered Nurse Sign            Progress Notes by Marc Mancera MD at 1/29/2018  4:10 PM     Author:  Marc Mancera MD Service:  Hospitalist Author Type:  Physician    Filed:  1/29/2018  4:14 PM Date of Service:  1/29/2018  4:10 PM Creation Time:  1/29/2018  4:10 PM    Status:  Signed :  Marc Mancera MD (Physician)         [facility]    Hospitalist Progress Note    Date of Service (when I saw the patient): 01/29/2018    Assessment & Plan   Glenroy Padilla is a 67 year old male who was hospitalized on 1/27/2018 due to lower extremity weakness and acute functional decline presumably from lumbar spinal stenosis for which nonoperative management has been advised in the short-term by neurosurgery.  Other  chronic medical problems including diabetes with neuropathy, lymphedema, and venous stasis ulcers of the lower legs are stable.  Overall, he is medically stable and ready for discharge but does not have a safe disposition plan.    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    Marc Mancera    Interval History   He feels about the same today.  He offers no new complaints.  He remains afebrile with stable vital signs.  Oxygenation is normal.  He is voiding spontaneously.  Nursing reports that he was able to bear weight on his legs with assistance from 2 persons today which is an improvement compared with yesterday when he required a Gaby lift.  He is tolerating adequate oral intake.    -Data reviewed today: I reviewed all new labs and imaging results over the last 24 hours. I personally reviewed no images or EKG's today.    Physical Exam   Temp: 97.6  F (36.4  C) Temp src: Oral BP: 118/68 Pulse: 88 Heart Rate: 88 Resp: 20 SpO2: 97 % O2 Device: None (Room air)    Vitals:    01/27/18 1516 01/27/18 2040   Weight: 130.6 kg (288 lb) (!) 137.5 kg (303 lb 2.1 oz)     Vital Signs with Ranges  Temp:  [96.1  F (35.6  C)-97.6  F (36.4  C)] 97.6  F (36.4  C)  Pulse:  [85-90] 88  Heart Rate:  [85-90] 88  Resp:  [16-20]  20  BP: (118-150)/(55-76) 118/68  SpO2:  [91 %-97 %] 97 %  I/O last 3 completed shifts:  In: 400 [P.O.:400]  Out: 550 [Urine:550]    Constitutional: No acute distress  Neuro: Alert and maintains wakefulness and attention, no confusion    Medications       miconazole   Topical BID     clopidogrel  75 mg Oral Daily     DULoxetine  30 mg Oral BID     furosemide  40 mg Oral Daily     TRIAD HYDROPHILIC WOUND DRESSI  1 applicator Apply externally Daily     losartan  25 mg Oral Daily     insulin aspart  1-3 Units Subcutaneous TID AC     insulin aspart  1-3 Units Subcutaneous At Bedtime     SitaGLIPtin-MetFORMIN HCl  2 tablet Oral Daily with breakfast       Data   Data reviewed today:  I personally reviewed no images or EKG's today.    Recent Labs  Lab 01/28/18  0530 01/27/18  1825 01/26/18  1920 01/25/18  1119   WBC  --  11.3* 10.3 8.8   HGB 10.6* 11.2* 11.5* 11.7*   MCV  --  91 91 89   PLT  --  389 367 382    137  --   --    POTASSIUM 3.7 3.8  --   --    CHLORIDE 98 97  --   --    CO2 31 30  --   --    BUN 16 16  --   --    CR 1.04 1.17  --   --    ANIONGAP 8 10  --   --    PHAN 8.6 9.0  --   --    * 102*  --   --[JK1.1]               Revision History        User Key Date/Time User Provider Type Action    > JK1.1 1/29/2018  4:14 PM Marc Mancera MD Physician Sign            Progress Notes by Augusta John at 1/29/2018  3:30 PM     Author:  Augusta John Service:  (none) Author Type:      Filed:  1/29/2018  3:32 PM Date of Service:  1/29/2018  3:30 PM Creation Time:  1/29/2018  3:30 PM    Status:  Signed :  Augusta John ()         Continuing to look for TCU placement for patient.    [facility] - No Beds  [facility] - Too Complex   [facility] - No Beds    [facility] - Assessing    Additional referrals sent to:    - [facility]  - [facility] [place]  - [facility], [facility]  [phone] [address] [phone][JK1.1]         Revision History        User Key Date/Time User Provider Type Action    > JK1.1 1/29/2018  3:32 PM Augusta John  Sign            Progress Notes by Lencho Garvin at 1/29/2018 10:49 AM     Author:  Lencho Garvin Service:  Spiritual Health Author Type:      Filed:  1/29/2018 10:50 AM Date of Service:  1/29/2018 10:49 AM Creation Time:  1/29/2018 10:49 AM    Status:  Signed :  Lencho Garvin ()         [facility] HEALTH SERVICES  SPIRITUAL ASSESSMENT Progress Note  [facility]      During Rounding,  introduced himself to Glenroy Padilla and informed him of his availability.    Lencho Garvin M.Div., [facility]  Staff   Office tel: [phone][JV1.1]       Revision History        User Key Date/Time User Provider Type Action    > JV1.1 1/29/2018 10:50 AM Lencho Garvin Sign            Progress Notes by Linda Massey MD at 1/28/2018  3:44 PM     Author:  Linda Massey MD Service:  Family Medicine Author Type:  Physician    Filed:  1/28/2018  4:29 PM Date of Service:  1/28/2018  3:44 PM Creation Time:  1/28/2018  3:44 PM    Status:  Signed :  Linda Massey MD (Physician)         [facility] Progress Note          Assessment and Plan:   Assessment:   Active Problems:    Central spinal stenosis L3-4 and L4-5    Assessment:[EP1.1] Patient's symptoms have gotten to the point that he does fall frequently and has foot drop and weakness.  MRI shows severe stenosis in L3-4 and moderate to sever narrowing at L4-5[EP1.2]    Plan:[EP1.1] patient will need expedited work up as an outpatient this week - Dr. Fox, neurosurgeon on call for the [facility], will be assisting in setting up with expedited work up.  Physical therapy has evaluated and TCU is planned at time of discharge - patient is medically stable to leave as soon as placement has  been found.[EP1.2]        Falls frequently    Assessment:[EP1.1] likely secondary to stenosis as above in addition to deconditioning[EP1.2]    Plan:[EP1.1] discharge to TCU with plan as above[EP1.2]      Weakness of both legs    Assessment:[EP1.1] progressively worsening in the past 2-4 months, no thought likely to the severe spinal stenosis found on CT and confirmed on MRI.[EP1.2]      Plan:[EP1.1] discharge to TCU with outpatient work-up this week as above[EP1.2]      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 Shine as an outpatient[EP1.2]    Plan:[EP1.1] plan to continue outpatient follow up[EP1.2]      Neuropathy    Assessment:[EP1.1] was attributed to diabetes and peripheral neuropathy however with identification of severe spinal stenosis as above[EP1.2]    Plan:[EP1.1] proceed with outpatient work up as above[EP1.2]      Gastroesophageal reflux disease without esophagitis    Assessment:[EP1.1] chronic and stable with patient not regularly taking protonix[EP1.2]    Plan:[EP1.1] continue to hold and provide prn reflux medication during this observation stay[EP1.2]      Hx of coronary artery disease    Assessment:[EP1.1] s/p stent placed in 2016[EP1.2]    Plan:[EP1.1] continue with Plavix and losartan[EP1.2]      DAYTON (obstructive sleep apnea)    Assessment:[EP1.1] Uses CPAP at home, wife has brought in home machine[EP1.2]    Plan:[EP1.1]  "will use home machine during this stay[EP1.2]      Hypertension, goal below 140/90    Assessment:[EP1.1] blood pressure at goal[EP1.2]    Plan:[EP1.1] continue home regimen of losartan and lasix and monitor[EP1.2]      Hyperlipidemia LDL goal <100    Assessment:[EP1.1] on Crestor[EP1.2]    Plan:[EP1.1] continue to hold during this observation stay and restart at time of discharge[EP1.2]       Obesity, morbid, BMI 40.0-49.9 (H)    Assessment:[EP1.1] chronic[EP1.2]    Plan:[EP1.1] no acute intervention needed[EP1.2]     VTE:  Observation stay  Code Status:  Full code        Interval History:[EP1.1]   About the same today.  Vitals signs stable.  Tolerating medications and treatment plan without significant side effects or problems.  Patient has not had any incontinence during the observation stay, no worsening symptoms.  Eating and voiding well.  No new concerns today.[EP1.2]            Significant Problems:[EP1.1]     Past Medical History:   Diagnosis Date     Acute pain of left knee 5/22/2017     Chronic pain of right knee 5/22/2017     Hx of coronary artery disease 5/22/2017     Hx of heart artery stent 5/22/2017     Obesity, morbid, BMI 40.0-49.9 (H) 11/20/2017     Open wound of left lower extremity without complication, initial encounter 5/22/2017     DAYTON (obstructive sleep apnea) 5/22/2017     Type 2 diabetes mellitus with other circulatory complication, without long-term current use of insulin (H) 5/22/2017     Venous stasis ulcer of left lower extremity (H) 5/22/2017     Venous stasis ulcer of left lower extremity (H) 5/22/2017[EP1.3]            Physical Exam:   Blood pressure 128/62, pulse 89, temperature 96.4  F (35.8  C), temperature source Oral, resp. rate 20, height 1.778 m (5' 10\"), weight (!) 137.5 kg (303 lb 2.1 oz), SpO2 91 %.  Constitutional:[EP1.1]   awake, alert, cooperative, no apparent distress, and appears stated age[EP1.2]     Lungs:[EP1.1]   No increased work of breathing, good air exchange, " clear to auscultation bilaterally, no crackles or wheezing[EP1.2]     Cardiovascular:[EP1.1]   Normal apical impulse, regular rate and rhythm, normal S1 and S2, no S3 or S4, and no murmur noted[EP1.2]     Abdomen:[EP1.1]   Bowel sounds present, abdomen obese but soft and non-tender[EP1.2]     Musculoskeletal:[EP1.1]   Left leg remain in wrap, no significant pitting edema bilaterally[EP1.2]     Neurologic:[EP1.1]   Awake, alert, oriented to name, place and time.[EP1.2]       Skin:[EP1.1]   No new skin lesions or bruising identified.[EP1.2]              Data:   All laboratory and imaging data in the past 24 hours reviewed    Attestation:  I have reviewed today's vital signs, notes, medications, labs and imaging.     Electronically Signed:  Linda Massey MD    Note: Chart documentation done in part with Dragon Voice Recognition software. Although reviewed after completion, some word and grammatical errors may remain.[EP1.1]          Revision History        User Key Date/Time User Provider Type Action    > EP1.2 1/28/2018  4:29 PM Linda Massey MD Physician Sign     EP1.3 1/28/2018  3:45 PM Linda Massey MD Physician      EP1.1 1/28/2018  3:44 PM Linda Massey MD Physician             Progress Notes by Gayle Bob PT at 1/28/2018 11:35 AM     Author:  Gayle Bob PT Service:  Family Medicine Author Type:  Physical Therapist    Filed:  1/28/2018 11:35 AM Date of Service:  1/28/2018 11:35 AM Creation Time:  1/28/2018 11:35 AM    Status:  Attested :  Gayle Bob PT (Physical Therapist)    Cosigner:  Linda Massey MD at 1/28/2018 12:50 PM        Attestation signed by Linda Massey MD at 1/28/2018 12:50 PM        Physician Attestation   I agree with the information in this note.    Linda Massey                                                                                                              [facility]      OUTPATIENT PHYSICAL THERAPY EVALUATION  PLAN OF TREATMENT FOR OUTPATIENT REHABILITATION  (COMPLETE FOR INITIAL CLAIMS ONLY)  Patient's Last Name, First Name, M.I.  YOB: 1950  Glenroy Padilla                        Provider's Name  [facility] Medical Record No.  [id number]                               Onset Date:  01/27/18   Start of Care Date:  01/28/18      Type:     _X_PT   ___OT   ___SLP Medical Diagnosis:  Decreased mobility                        PT Diagnosis:  Limited mobility, lower extremity weakness.    Visits from SOC:  1   _________________________________________________________________________________  Plan of Treatment/Functional Goals    Planned Interventions:  ,     ,       Goals: See Physical Therapy Goals on Care Plan in "[employer] electronic health record.    Therapy Frequency: other (see comments)  Predicted Duration of Therapy Intervention:  (Eval only for D/C disposition.)  _________________________________________________________________________________    I CERTIFY THE NEED FOR THESE SERVICES FURNISHED UNDER        THIS PLAN OF TREATMENT AND WHILE UNDER MY CARE     (Physician co-signature of this document indicates review and certification of the therapy plan).                Certification date from: 01/28/18, Certification date to: 01/28/18    Referring Physician: Dr. Linda Massey            Initial Assessment        See Physical Therapy evaluation dated 01/28/18 in [employer] electronic health record.[PB1.1]     Revision History        User Key Date/Time User Provider Type Action    > PB1.1 1/28/2018 11:35 AM Gayle Bob PT Physical Therapist Sign            Progress Notes by Gayle Bob PT at 1/28/2018 11:34 AM     Author:  Gayle Bob PT Service:  (none) Author Type:  Physical Therapist    Filed:  1/28/2018 11:34 AM Date of Service:  1/28/2018 11:34 AM Creation Time:  1/28/2018 11:34 AM    Status:   Signed :  Gayle Bob, PT (Physical Therapist)            01/28/18 1100   Quick Adds   Quick Adds Certification   Type of Visit Initial PT Evaluation   Living Environment   Lives With spouse;child(kirsten), adult   Living Arrangements house   Home Accessibility ramps present at home   Transportation Available van, wheelchair accessible   Functional Level Prior   Ambulation 4-->completely dependent   Transferring 4-->completely dependent   Cognition 0 - no cognition issues reported   Fall history within last six months yes   Number of times patient has fallen within last six months 12   Which of the above functional risks had a recent onset or change? ambulation;transferring;fall history   General Information   Onset of Illness/Injury or Date of Surgery - Date 01/27/18   Referring Physician Dr. Linda Massey   Patient/Family Goals Statement Go to rehab get stronger so can be more independent.   Pertinent History of Current Problem (include personal factors and/or comorbidities that impact the POC) Glenroy reports that he has had 12 falls in the last 6 months, he has heart disease with a stint that doesn't feel right to him, type 2 diabetes, ulcers in his lower extremities, bilateral lower extremity neuropathy, severe lumbar stenosis, decreasing mobility and strength.    Precautions/Limitations fall precautions   Weight-Bearing Status - LUE full weight-bearing   Weight-Bearing Status - RUE full weight-bearing   Weight-Bearing Status - LLE full weight-bearing   Weight-Bearing Status - RLE full weight-bearing   Heart Disease Risk Factors Diabetes;Lack of physical activity;Overweight;Gender;Age   General Observations Glenroy is sitting in a Jason chair with bandages on his lower extremities due to ulcers that are being treated, his is mobidly obese, very pleasant, requires significant assist to transfer, is non ambulatory at this time.    Cognitive Status Examination   Orientation orientation to person, place  and time   Level of Consciousness alert   Follows Commands and Answers Questions 100% of the time   Personal Safety and Judgment intact   Memory intact   Pain Assessment   Patient Currently in Pain No   Integumentary/Edema   Integumentary/Edema Comments Right lower extremity is very discolored due to venus insufficency with bandages in place. Left lower extremity is wrapped with what looks like an scarlett boot and ace wrap.    Posture    Posture Forward head position;Protracted shoulders;Kyphosis   Range of Motion (ROM)   ROM Comment Trunk ROM is limited due to weight.    Strength   Strength Comments Right knee flexion is 4/5, extension is 4/5 and pain producing, right dorsiflexion is 4/5, left lower extremity is 5/5.    Bed Mobility   Bed Mobility Comments NA as patient was in a chair and did not want to go back to bed.    Transfer Skills   Transfer Comments Sit to stand. Moderate assist of 1 for sit to stand then stood at chair side for 2 min. to assess ability, pt. unable to stand up stright, hips, knees and back were flexed.    Gait   Gait Comments NA, pt. and P. T. not comfortable at this time.    Balance   Balance Comments While holding onto a walker, pt. able to stand x 2 minutes.    Clinical Impression   Criteria for Skilled Therapeutic Intervention evaluation only   PT Diagnosis Limited mobility, lower extremity weakness.    Influenced by the following impairments Weakness, poor mobility   Functional limitations due to impairments Transfers, ambulation.    Clinical Presentation Evolving/Changing   Clinical Presentation Rationale Clinical judgement, multiple body systems.    Clinical Decision Making (Complexity) High complexity   Therapy Frequency` other (see comments)   Predicted Duration of Therapy Intervention (days/wks) (Eval only for D/C disposition.)   Anticipated Discharge Disposition Transitional Care Facility   Risk & Benefits of therapy have been explained Yes   Patient, Family & other staff in  agreement with plan of care Yes   Clinical Impression Comments Glenroy is here for observation due to increasing lower extremity weakness and giving way. His family is no longer able to assist him at home as he is not able to help as much with transfers and gait. He is morbidly obese with severe lumbar stenosis and increasing weakness in his lower extremities, he admits to falling at least 12 times in the last 6 months.    1x Eval Only-Outpatient/Observation Medicare G-Code   G-code Mobility: Walking & Moving Around   Mobility: Walking & Moving Around   Mobility: Current Status , Goal , Discharge -Tawg Only- Modifier the same for all G-codes CM: 80-99% impairment   Mobility: Current & Discharge Modifier Rationale-Eval Only Clinical judgement, observation   Mobility Comments Requires significant assistance and is very large.    Therapy Certification   Start of care date 01/28/18   Certification date from 01/28/18   Certification date to 01/28/18   Medical Diagnosis Decreased mobility[PB1.1]        Revision History        User Key Date/Time User Provider Type Action    > PB1.1 1/28/2018 11:34 AM Gayle Bob, PT Physical Therapist Sign            ED Provider Notes by Gabo Marshall PA-C at 1/27/2018  3:15 PM     Author:  Gabo Marshall PA-C Service:  Emergency Medicine Author Type:  Physician Assistant    Filed:  1/27/2018 11:47 PM Date of Service:  1/27/2018  3:15 PM Creation Time:  1/27/2018  3:52 PM    Status:  Attested :  Gabo Marshall PA-C (Physician Assistant)    Cosigner:  Damon Hernandez MD at 1/28/2018  7:03 AM        Attestation signed by Damon Hernandez MD at 1/28/2018  7:03 AM        This is a shared visit with Gabo Marshall.  I have reviewed the chart and personally seen the patient.  He has had progressive weakness of the lower extremities over the last 3 years he tells me.  He now has the inability to stand.  He has fallen and is unable to ambulate with  his walker today.  He initially denied any loss of bowel or bladder but on further questioning did report he has had some remotely over the last 3 months.  None of this in the last week.  He does have some noted weakness to the quadriceps, right greater than left.  CT of the lumbar spine was obtained which did show central spinal stenosis.  In conjunction with  Dr. Barbara Massey, we did consult with neurosurgery.  Concern would be for the canal stenosis causing some of his weakness and bowel and bladder changes over the last 3 months.  Due to the chronicity of this, they felt appropriate to keep ear and get an MRI in the next 36 hours.  He is admitted to the hospitalist service.  He did have a fall yesterday when he had some bleeding.  He has had  no further bleeding since he was seen here yesterday.                                 History[NB1.1]     Chief Complaint   Patient presents with     Generalized Weakness[NB1.2]     HPI  Glenroy Padilla is a 67 year old male who presents for evaluation of lower extremity weakness and inability to care for himself at home. Patient was seen in the ED by myself yesterday on 1/26/18. At that time, she was evaluated for a skin tear of the buttocks that was bleeding due to administration of Plavix. Bleeding had stabilized by the time he presented to the ED. She did not have any other abnormal exam findings. The area was cleansed and dressed with antibiotic ointment and a dressing. We did discuss an observation stay at that time, but the patient and his wife thought that they would be okay at home. It apparently took him 3 hours to get into the house last night. He almost fell 2 different times with trying to get into the house. Over the last 24 hours, he has been unable to move. Unable to bear any weight due to lack of strength in the lower extremities. He feels like the strength is even worse than usual in his right lower extremity. Now he questions if his right leg got caught  underneath him when he fell. Yesterday he did not recall this detail. He states that he is having increased right knee and lower leg pain. He questions if this is causing the weakness. It is difficult as he has underlying diabetic neuropathy. Patient also states that he has developed a bruise of the right lower back. He does have some low back pain associated with this. Denies any loss of bowel/bladder function.  Past history significant for bilateral severe osteoarthritis that he has battled with for over 10 years. He was said to have total knee replacement, but this was delayed 2 years ago due to discovery of coronary artery disease and need for drug-eluting stent placement. His orthopedist has not wanted to perform surgeries since then due to his comorbid medical conditions.  He feels that his lower extremity strength has slowly, but progressively worsened over the last 2 years due to his knee condition and neuropathy.        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 01/27/2018     Priority: Medium     Neuropathy 01/25/2018     Priority: Medium     Foot drop, right 01/25/2018     Priority: Medium     Cardiomegaly 01/25/2018     Priority: Medium     Gastroesophageal reflux disease without esophagitis 01/25/2018     Priority: Medium     Obesity, morbid, BMI 40.0-49.9 (H) 11/20/2017     Priority: Medium     Advanced directives, counseling/discussion 10/02/2017     Priority: Medium     Will bring in a copy       Hypertension, goal below 140/90 09/25/2017     Priority: Medium     Hyperlipidemia LDL goal <100 09/25/2017     Priority: Medium     History of coronary artery stent placement 09/25/2017     Priority: Medium     Type 2 diabetes mellitus with other  circulatory complication, without long-term current use of insulin (H) 05/22/2017     Priority: Medium     Venous stasis ulcer of left lower extremity (H) 05/22/2017     Priority: Medium     Acute pain of left knee 05/22/2017     Priority: Medium     Chronic pain of left knee 05/22/2017     Priority: Medium     Open wound of left lower extremity without complication, initial encounter 05/22/2017     Priority: Medium     Hx of heart artery stent 05/22/2017     Priority: Medium     DAY[place] (obstructive sleep apnea) 05/22/2017     Priority: Medium     Hx of coronary artery disease 03/12/2016     Priority: Medium[NB1.2]        Past Medical History:[NB1.1]    Past Medical History:   Diagnosis Date     Acute pain of left knee 5/22/2017     Chronic pain of right knee 5/22/2017     Hx of coronary artery disease 5/22/2017     Hx of heart artery stent 5/22/2017     Obesity, morbid, BMI 40.0-49.9 (H) 11/20/2017     Open wound of left lower extremity without complication, initial encounter 5/22/2017     DAY[place] (obstructive sleep apnea) 5/22/2017     Type 2 diabetes mellitus with other circulatory complication, without long-term current use of insulin (H) 5/22/2017     Venous stasis ulcer of left lower extremity (H) 5/22/2017     Venous stasis ulcer of left lower extremity (H) 5/22/2017[NB1.2]       Past Surgical History:[NB1.1]    History reviewed. No pertinent surgical history.[NB1.2]    Family History:[NB1.1]    No family history on file.[NB1.2]    Social History:  Marital Status:  Single [1][NB1.1]  Social History   Substance Use Topics     Smoking status: Current Some Day Smoker     Types: Cigars     Smokeless tobacco: Never Used     Alcohol use No[NB1.2]        Medications:[NB1.1]      No current outpatient prescriptions on file.[NB1.2]      Review of Systems   All other systems reviewed and are negative.      Physical Exam[NB1.1]   BP: 115/67  Pulse: 93  Heart Rate: 97  Temp: 98.7  F (37.1  C)  Resp: 18  Height: 177.8 cm (5'  "10\")  Weight: 130.6 kg (288 lb)  SpO2: 96 %[NB1.2]      Physical Exam   Nursing note and vitals reviewed.  Generally healthy appearing male in NAD who is active and non-toxic appearing.   Morbidly obese. Lying supine in the bed. Brought in by EMS.  Skin:  No rashes or lesions are noted on inspection of the torso, face, and upper extremities.   Head: Normocephalic, atraumatic, nontender to palpation  Eyes: PERRLA, conjunctiva and sclera clear  Ears: Bilateral TM's and canals are clear.  TM's translucent without erythema or effusion.  Nose: Nares normal and patent bilaterally.  Mucous membranes are non-erythematous and non-edematous.  No sinus tenderness.  Throat: Mucous membranes are clear.  No tonsilar hypertrophy, exudate, or erythema.  Neck: Supple.  FROM without pain.  No adenopathy.  No thyromegaly.   Heart:  RRR with normal S1 and S2.  No S3 or S4.  No murmur, rub, gallop, or click.  PMI is nondisplaced.   Lungs:  CTA bilaterally without wheezes, rales, or rhonchi.  Good breath sounds heard throughout all lung fields.  Tympanitic to percussion with no areas of dullness.   Abdomen: Positive bowel sounds in all four quadrants.  No tenderness to palpation throughout.  No rebound and no guarding.  No hepatosplenomegaly.  No masses.   Lumbar spine - tender to palpation over L4 to S1. A bruise of the right posterior pelvic area, but no significant tenderness in this area.  Right leg: Tender to palpation over the medial joint line of the right knee. Pain with any range of motion of the knee. Tender to palpation over the proximal to the mid anterior tibia. No foot or ankle tenderness. Normal range of motion of the foot and ankle. Patient has decreased strength with knee flexion and extension. He can plantarflex and dorsiflex the foot, but the strength is decreased on the right side. Sensation significantly decreased to light touch due to his neuropathy.  Neuro:  Cranial nerves II-XII grossly intact.Cerebellar testing " is WNL's.  Sensation greatly decreased in the bilateral lower extremities from about the knee down. Not following a dermatomal pattern.  Patellar DTRs not performed given the significant knee discomfort that he has at this time.[NB1.1]  No saddle anesthesia on exam. He does feel light touch sensation in the genital area.[NB1.3]        ED Course[NB1.1]     ED Course[NB1.2]     Patient is having increased pain in his right knee and right lower leg since the fall yesterday. He certainly could have suffered a more significant injury and was not fully aware of his condition previously due to the neuropathy. We are going to x-ray his knee and tib-fib/fib. He also has increased discomfort in the low back with increased weakness of the right lower extremity. Therefore, we will investigate with a lumbar CT for further evaluation.  The patient is unable to care for himself, and his family is unable to care for him at home. They are unable to move him. He is unable to bear weight on his own. They cannot get him to the bathroom.. Therefore, he will not be able to return home. We trialed outpatient management for his symptoms over the last 24 hours, but this failed.    Procedures               Critical Care time:  none[NB1.1]        Results for orders placed or performed during the hospital encounter of 01/27/18   CT Lumbar spine w/o contrast*    Narrative    CT LUMBAR SPINE WITHOUT CONTRAST 1/27/2018 4:25 PM     HISTORY: Fall yesterday, low back pain with right LE weakness.     TECHNIQUE: Multiplanar multisequence images were obtained through the  lumbar spine without contrast.    FINDINGS: Sagittal images demonstrate normal posterior alignment. Five  lumbar type vertebral bodies are present. There is no evidence for  fracture. No intraosseous lesions are evident. Degenerative changes  are seen between the spinous processes with hypertrophic spinous  processes noted.    T12-L1: Mild facet hypertrophy. There is no evidence for  any  significant stenosis.    L1-L2: Broad-based posterior osteophyte formation, disc bulging and  facet hypertrophy is present but there is no significant stenosis.    L2-L3: Broad-based posterior osteophyte formation, disc bulging and  facet hypertrophy is present. There is asymmetric left posterolateral  osteophyte but there is no significant stenosis.    L3-L4: Broad-based disc bulging and moderate facet and ligamentum  flavum hypertrophy is present causing moderate to severe central canal  stenosis and mild to moderate bilateral neural foraminal stenosis.    L4-L5: Broad-based disc bulging or disc protrusion is present along  with moderate to severe facet and ligamentum flavum hypertrophy. There  is moderate to severe central canal and bilateral neural foraminal  stenosis.    L5-S1: Moderate facet hypertrophy and minimal posterior osteophyte  formation and disc bulging is present causing moderate bilateral  neural foraminal stenosis but no significant central canal stenosis.    Paraspinal soft tissues: Remarkable for vascular calcifications.      Impression    IMPRESSION:  1. Multilevel degenerative disc and facet disease most advanced at  L3-L4 and L4-L5 where there is moderate to severe central canal  stenosis. There is also moderate to severe bilateral neural foraminal  stenoses at L4-L5.  2. Hypertrophic spinous processes with degenerative changes between  the spinous processes.  3. No evidence for fracture or any posterior malalignment.    TASHA GAYTAN MD   XR Knee Right 3 Views    Narrative    KNEE RIGHT THREE VIEW   1/27/2018 4:26 PM     HISTORY: Fall, right knee pain.     COMPARISON: None.      Impression    IMPRESSION: Mild tricompartmental osteoarthritis is present most  advanced in the medial compartment. There is no evidence for fracture.  Vascular calcifications also noted.    TASHA GAYTAN MD   XR Tibia & Fibula Right 2 Views    Narrative    RIGHT TIBIA-FIBULA TWO VIEWS 1/27/2018 4:27 PM      HISTORY: Fall, right knee to ankle pain.     COMPARISON: None.      Impression    IMPRESSION: Negative for fracture. Extensive soft tissue  calcifications noted in the leg raising the possibility of chronic  venous insufficiency or unusual autoimmune diseases such as  dermatomyositis.    TASHA GAYTAN MD   CBC with platelets differential   Result Value Ref Range    WBC 11.3 (H) 4.0 - 11.0 10e9/L    RBC Count 4.05 (L) 4.4 - 5.9 10e12/L    Hemoglobin 11.2 (L) 13.3 - 17.7 g/dL    Hematocrit 36.8 (L) 40.0 - 53.0 %    MCV 91 78 - 100 fl    MCH 27.7 26.5 - 33.0 pg    MCHC 30.4 (L) 31.5 - 36.5 g/dL    RDW 17.2 (H) 10.0 - 15.0 %    Platelet Count 389 150 - 450 10e9/L    Diff Method Automated Method     % Neutrophils 73.0 %    % Lymphocytes 10.6 %    % Monocytes 13.0 %    % Eosinophils 2.9 %    % Basophils 0.5 %    Absolute Neutrophil 8.2 1.6 - 8.3 10e9/L    Absolute Lymphocytes 1.2 0.8 - 5.3 10e9/L    Absolute Monocytes 1.5 (H) 0.0 - 1.3 10e9/L    Absolute Eosinophils 0.3 0.0 - 0.7 10e9/L    Absolute Basophils 0.1 0.0 - 0.2 10e9/L   Basic metabolic panel   Result Value Ref Range    Sodium 137 133 - 144 mmol/L    Potassium 3.8 3.4 - 5.3 mmol/L    Chloride 97 94 - 109 mmol/L    Carbon Dioxide 30 20 - 32 mmol/L    Anion Gap 10 3 - 14 mmol/L    Glucose 102 (H) 70 - 99 mg/dL    Urea Nitrogen 16 7 - 30 mg/dL    Creatinine 1.17 0.66 - 1.25 mg/dL    GFR Estimate 62 >60 mL/min/1.7m2    GFR Estimate If Black 75 >60 mL/min/1.7m2    Calcium 9.0 8.5 - 10.1 mg/dL   UA reflex to Microscopic and Culture   Result Value Ref Range    Color Urine Yellow     Appearance Urine Slightly Cloudy     Glucose Urine Negative NEG^Negative mg/dL    Bilirubin Urine Negative NEG^Negative    Ketones Urine Negative NEG^Negative mg/dL    Specific Gravity Urine 1.010 1.003 - 1.035    Blood Urine Small (A) NEG^Negative    pH Urine 5.0 5.0 - 7.0 pH    Protein Albumin Urine Negative NEG^Negative mg/dL    Urobilinogen mg/dL 0.0 0.0 - 2.0 mg/dL    Nitrite Urine  Negative NEG^Negative    Leukocyte Esterase Urine Small (A) NEG^Negative    Source Unspecified Urine     RBC Urine 28 (H) 0 - 2 /HPF    WBC Urine 72 (H) 0 - 2 /HPF    Yeast Urine Many (A) NEG^Negative /HPF    Mucous Urine Present (A) NEG^Negative /LPF    Hyaline Casts 3 (H) 0 - 2 /LPF   Hemoglobin A1c   Result Value Ref Range    Hemoglobin A1C 5.6 4.3 - 6.0 %   Glucose by meter   Result Value Ref Range    Glucose 138 (H) 70 - 99 mg/dL           Labs Ordered and Resulted from Time of ED Arrival Up to the Time of Departure from the ED   CBC WITH PLATELETS DIFFERENTIAL - Abnormal; Notable for the following:        Result Value    WBC 11.3 (*)     RBC Count 4.05 (*)     Hemoglobin 11.2 (*)     Hematocrit 36.8 (*)     MCHC 30.4 (*)     RDW 17.2 (*)     Absolute Monocytes 1.5 (*)     All other components within normal limits   BASIC METABOLIC PANEL - Abnormal; Notable for the following:     Glucose 102 (*)     All other components within normal limits   PERIPHERAL IV CATHETER[NB1.2]       Assessments & Plan (with Medical Decision Making)[NB1.1]     Weakness of both lower extremities  Spinal stenosis of lumbar region, unspecified whether neurogenic claudication present  Fall, initial encounter  Tear of skin of buttock, unspecified laterality, subsequent encounter  Primary osteoarthritis of right knee[NB1.2]     67 year old[NB1.4] male with a history of hypertension, CAD, type 2 diabetes, and diabetic neuropathy who presents for evaluation of worsening right lower extremity weakness, low back pain, buttock pain and given the recent buttock skin tear, and right lower leg pain. He was seen in the ED yesterday for his fall and was noted to have 2 buttock skin tears that achieved hemostasis with pressure and time. No significant hemoglobin loss at that time. He has not had any bleeding from the area since then. His main concern is increasing lower extremity weakness which has made it impossible for him to bear his weight. His  family is unable to lift him and to provide his cares. He was unable to get into a vehicle at home, therefore EMS had to bring him to the ED. On exam pulse 93, temperature 98.7, and oxygen saturation of 96% on room air. Lying supine in the bed. He has some tenderness over the lumbar spine spinous processes of L3 to S1. Slight bruise at the right posterior pelvic area, but does not have any tenderness to palpation in that area. Tenderness of the medial knee and anterior tibial area. Lumbar CT performed and this displayed[NB1.1] central canal stenosis from L3 to L5 which does include some right foraminal stenosis likely causing some of his increased radicular symptoms into his right lower extremity[NB1.3].[NB1.1]  X-ray of the knee shows mild medial degenerative joint disease but no new fracture. Tib-fib x-ray negative for fracture.  CBC with stable hemoglobin suggesting no further blood loss. Basic metabolic panel with normal electrolytes and stable renal function.   Patient has had troubles getting up to use the restroom due to his lower extremity weakness and pain. He is able to control release of his bladder, but does not recognize when he is finished urinating. These symptoms have been ongoing for quite some time and have not shown any acute change. The recent change is the increased weakness in the lower extremities which have been presumed by his PCP to be related to his knee osteoarthritis. Based on today's exam and CT findings, I think his lower extremity weakness are more suggestive of lumbar disc disease with central canal stenosis and foraminal stenosis. He will need further evaluation for this with an MRI of the lumbar spine. I consulted with Dr. Massey, inpatient hospitalist, and she saw the patient here in the ED. She spoke with neurosurgery at [facility] who stated that the patient did not require emergent MRI of the lumbar spine, rather that this could be done the following day. He will  need to see neurosurgery this upcoming week for a full evaluation as well. If his symptoms acutely change throughout the night, then he would need to be transferred for a more emergent MRI evaluation to rule out formal cauda equina syndrome.   Dr Massey agreed to accept his care for an observation stay until a rehabilitation facility is found for him given his inability to care for himself at home. The patient was in agreement with this plan. Dr. Hernandez, ZACHARY MCGEE, was involved with patient's care as well.[NB1.3]      I have reviewed the nursing notes.    I have reviewed the findings, diagnosis, plan and need for follow up with the patient.[NB1.1]       Current Discharge Medication List          Final diagnoses:   Weakness of both lower extremities   Spinal stenosis of lumbar region, unspecified whether neurogenic claudication present   Fall, initial encounter   Tear of skin of buttock, unspecified laterality, subsequent encounter   Primary osteoarthritis of right knee[NB1.2]       Disclaimer: This note consists of symbols derived from keyboarding, dictation and/or voice recognition software. As a result, there may be errors in the script that have gone undetected. Please consider this when interpreting information found in this chart.      1/27/2018   Gabo Marshall PA-C   [facility] EMERGENCY DEPARTMENT[NB1.1]     Gabo Marshall PA-C  01/27/18 2347  [NB1.2]     Revision History        User Key Date/Time User Provider Type Action    > NB1.2 1/27/2018 11:47 PM Gabo Marshall PA-C Physician Assistant Sign     NB1.3 1/27/2018 11:42 PM Gabo Marshall PA-C Physician Assistant      NB1.4 1/27/2018  4:03 PM Gabo Marshall PA-C Physician Assistant Share     NB1.1 1/27/2018  3:52 PM Gabo Marshall PA-C Physician Assistant             Progress Notes by Olivia Malone RN at 1/28/2018  5:30 AM     Author:  Olivia Malone RN Service:  (none) Author Type:  Registered Nurse    Filed:   1/28/2018  5:42 AM Date of Service:  1/28/2018  5:30 AM Creation Time:  1/28/2018  5:30 AM    Status:  Signed :  Olivia Malone RN (Registered Nurse)         S-(situation): shift note    B-(background): generalized weakness, falls at home    A-(assessment): pt denies pain and declines pain meds, though does report tenderness to R knee and has mild swelling to R knee. Has been turned and repositioned every 2 hours. Has unna boot to LLE and will not allow to be removed as pt and wife state not to be removed until clinic visit with Dr. Shine. Vss. Unable to raise RLE off of bed which pt states has been present. Using ceiling lift for repositioning. Voiding well.     R-(recommendations): will monitor weakness, PT, SS to see today.[SD1.1]        Revision History        User Key Date/Time User Provider Type Action    > SD1.1 1/28/2018  5:42 AM Olivia Malone RN Registered Nurse Sign            Progress Notes by Olivia Malone RN at 1/27/2018  8:36 PM     Author:  Olivia Malone RN Service:  (none) Author Type:  Registered Nurse    Filed:  1/27/2018  9:21 PM Date of Service:  1/27/2018  8:36 PM Creation Time:  1/27/2018  8:36 PM    Status:  Signed :  Olivia Malone RN (Registered Nurse)         S-(situation): Patient registered to Observation. Patient arrived to room 267 via cart from ED    B-(background): weakness[SD1.1], falls at home[SD1.2]    A-(assessment):[SD1.1] pt is alert and oriented x4. Pt has baseline neuropathy to BLE and BUE. Has scattered bruises from falls at home, two skin tears/fissures to pako-rectal area. Has DARIAN boot drsg to LLE and pt and wife state this is not to be removed until office visit with Dr. Shine later in the week. Has scabbed area to upper R shin. Groin area is reddened. Vss. Denies pain at this time.[SD1.2]     R-(recommendations): Orders and observation goals reviewed with[SD1.1] pt and pt's wife. Will monitor weakness, labs, CMS, skin integrity. PT and SS.[SD1.2]      Nursing Observation criteria listed below was met:    Skin issues/needs documented:[SD1.1]Yes[SD1.2]  Isolation needs addressed, if appropriate:[SD1.1] NA[SD1.2]  Fall Prevention: Education given and documented:[SD1.1] Yes[SD1.2]  Education Assessment documented:[SD1.1]Yes[SD1.2]  Education Documented (Pre-existing chronic infection such as, MRSA/VRE need education on admission):[SD1.1] Yes[SD1.2]  New medication patient education completed and documented (Possible Side Effects of Common Medications handout):[SD1.1] Yes[SD1.2]  Home medications if not able to send immediately home with family stored here:[SD1.1] Yes[SD1.2]  Reminder note placed in discharge instructions:[SD1.1] Yes[SD1.2]  Patient has discharge needs (If yes, please explain):[SD1.1] Yes[SD1.2]               Revision History        User Key Date/Time User Provider Type Action    > SD1.2 1/27/2018  9:21 PM Olivia Malone RN Registered Nurse Sign     SD1.1 1/27/2018  8:36 PM Olivia Malone RN Registered Nurse             ED Notes by Lizzette Chester RN at 1/27/2018  8:05 PM     Author:  Lizzette Chester RN Service:  Emergency Medicine Author Type:  Registered Nurse    Filed:  1/27/2018  8:06 PM Date of Service:  1/27/2018  8:05 PM Creation Time:  1/27/2018  8:06 PM    Status:  Signed :  Lizzette Chester RN (Registered Nurse)         ED Nursing criteria listed below was addressed during verbal handoff:     Abnormal vitals: No  Abnormal results: No  Med Reconciliation completed: Yes  Meds given in ED: No  Any Overdue Meds: No  Core Measures: N/A  Isolation: No  Special needs: Yes  Mobility due to weakness  Skin assessment: Yes    Observation Patient  Education provided: Yes[TV1.1]       Revision History        User Key Date/Time User Provider Type Action    > TV1.1 1/27/2018  8:06 PM Lizzette Chester RN Registered Nurse Sign            ED Notes by Lisbeth Young RN at 1/27/2018  7:27 PM     Author:  Lisbeth Young, RN  Service:  (none) Author Type:  Registered Nurse    Filed:  1/27/2018  7:27 PM Date of Service:  1/27/2018  7:27 PM Creation Time:  1/27/2018  7:27 PM    Status:  Signed :  Lisbeth Young RN (Registered Nurse)         Patient rolled to his right side.[JK1.1]     Revision History        User Key Date/Time User Provider Type Action    > JK1.1 1/27/2018  7:27 PM Lisbeth Young RN Registered Nurse Sign            ED Notes by Claire Castellanos at 1/27/2018  6:31 PM     Author:  Claire Castellanos Service:  (none) Author Type:  Technician    Filed:  1/27/2018  6:31 PM Date of Service:  1/27/2018  6:31 PM Creation Time:  1/27/2018  6:31 PM    Status:  Signed :  Claire Castellanos (Technician)         Blood drawn with IV start.[JB1.1]     Revision History        User Key Date/Time User Provider Type Action    > JB1.1 1/27/2018  6:31 PM Cliare Castellanos Technician Sign            ED Notes by Lizzette Chester RN at 1/27/2018  5:15 PM     Author:  Lizzette Chester RN Service:  Emergency Medicine Author Type:  Registered Nurse    Filed:  1/27/2018  5:15 PM Date of Service:  1/27/2018  5:15 PM Creation Time:  1/27/2018  5:15 PM    Status:  Signed :  Lizzette Chester RN (Registered Nurse)         Care assumed from [place]a SINCLAIR[TV1.1]     Revision History        User Key Date/Time User Provider Type Action    > TV1.1 1/27/2018  5:15 PM Lizzette Chester RN Registered Nurse Sign            ED Notes by Lisbeth Young RN at 1/27/2018  3:20 PM     Author:  Lisbeth Young RN Service:  (none) Author Type:  Registered Nurse    Filed:  1/27/2018  3:21 PM Date of Service:  1/27/2018  3:20 PM Creation Time:  1/27/2018  3:20 PM    Status:  Signed :  Lisbeth Young RN (Registered Nurse)         Patient here because he and his wife ae unable to care for him at home. Patient fell yesterday and has not moved from [facility] since getting home yesterday. Did not even get up to use the bathroom.[JK1.1]       Revision History        User Key Date/Time User Provider Type Action    > JK1.1 1/27/2018  3:21 PM Lisbeth Young RN Registered Nurse Sign            ED Notes by Shakila Leigh RN at 1/27/2018  3:16 PM     Author:  Shakila Leigh RN Service:  (none) Author Type:  Registered Nurse    Filed:  1/27/2018  3:16 PM Date of Service:  1/27/2018  3:16 PM Creation Time:  1/27/2018  3:16 PM    Status:  Signed :  Shakila Leigh RN (Registered Nurse)         Bed: ED02  Expected date:   Expected time:   Means of arrival:   Comments:     Revision History        User Key Date/Time User Provider Type Action    > EP1.1 1/27/2018  3:16 PM Shakila Leigh RN Registered Nurse Sign            ED Provider Notes by Gabo Marshall PA-C at 1/26/2018  5:37 PM     Author:  Gabo Marshall PA-C Service:  Emergency Medicine Author Type:  Physician Assistant    Filed:  1/27/2018  1:30 PM Date of Service:  1/26/2018  5:37 PM Creation Time:  1/26/2018  5:50 PM    Status:  Addendum :  Gabo Marshall PA-C (Physician Assistant)           History[EH1.1]     Chief Complaint   Patient presents with     Rectal Bleeding[NB1.1]     The history is provided by[EH1.1] the patient and the spouse[EH1.2].[EH1.1] No  was used[EH1.2].     Glenroy Padilla is a 67 year old male[EH1.1] with PMH[EH1.3] of DM2, CAD sp stent placement on Plavix, and obesity[EH1.4] who[EH1.1] fell onto buttocks today and then noticed continuous perirectal/rectal bleeding.[EH1.4]    Was walking around 2:30 when his knees gave out, was unable to get up and required help. Got up at 3:00 pm. Was able to roll around on the floor, but unable to get up. Did not hit anything when fell - felt like he just sat down slowly[EH1.1] and landed on his butt/hips[EH1.3]. Holds on to walker while he fell[EH1.1] so he does not go down fast (falls frequently due to bad knees)[EH1.3].     Then went to the bathroom and noticed bleeding -  soaking multiple bath towels. Felt weak/tired sitting in a chair. When he got up from chair, there was blood underneath him. Bright red blood, no blood clots.     No pain with this. Harder stools than usual.     Had a stent placed about two years ago (feb 2016) and is on Plavix - takes every[EH1.1] day[EH1.3].     For the past month, has always had blood on wipe after using restroom. Once it was enough that it was dripping.  No blood in urine/vomit, no nosebleeds, does take longer to stop bleeding when cuts himself.     Prescribed Protonix - but does not take every day.[EH1.1] Has been on ampicillin for bronchitis - has one pill left and wondering if he should take it.[EH1.4]        Problem List:[EH1.1]    Patient Active Problem List    Diagnosis Date Noted     Neuropathy 01/25/2018     Priority: Medium     Foot drop, right 01/25/2018     Priority: Medium     Cardiomegaly 01/25/2018     Priority: Medium     Gastroesophageal reflux disease without esophagitis 01/25/2018     Priority: Medium     Obesity, morbid, BMI 40.0-49.9 (H) 11/20/2017     Priority: Medium     Advanced directives, counseling/discussion 10/02/2017     Priority: Medium     Will bring in a copy       Hypertension, goal below 140/90 09/25/2017     Priority: Medium     Hyperlipidemia LDL goal <100 09/25/2017     Priority: Medium     History of coronary artery stent placement 09/25/2017     Priority: Medium     Type 2 diabetes mellitus with other circulatory complication, without long-term current use of insulin (H) 05/22/2017     Priority: Medium     Venous stasis ulcer of left lower extremity (H) 05/22/2017     Priority: Medium     Acute pain of left knee 05/22/2017     Priority: Medium     Chronic pain of left knee 05/22/2017     Priority: Medium     Open wound of left lower extremity without complication, initial encounter 05/22/2017     Priority: Medium     Hx of heart artery stent 05/22/2017     Priority: Medium     DAYTON (obstructive sleep apnea)  05/22/2017     Priority: Medium     Hx of coronary artery disease 03/12/2016     Priority: Medium[NB1.1]        Past Medical History:[EH1.1]    Past Medical History:   Diagnosis Date     Acute pain of left knee 5/22/2017     Chronic pain of right knee 5/22/2017     Hx of coronary artery disease 5/22/2017     Hx of heart artery stent 5/22/2017     Obesity, morbid, BMI 40.0-49.9 (H) 11/20/2017     Open wound of left lower extremity without complication, initial encounter 5/22/2017     DAYTON (obstructive sleep apnea) 5/22/2017     Type 2 diabetes mellitus with other circulatory complication, without long-term current use of insulin (H) 5/22/2017     Venous stasis ulcer of left lower extremity (H) 5/22/2017     Venous stasis ulcer of left lower extremity (H) 5/22/2017[NB1.1]       Past Surgical History:[EH1.1]    No past surgical history on file.[NB1.1]    Family History:[EH1.1]    No family history on file.[NB1.1]    Social History:  Marital Status:   [2][EH1.1]  Social History   Substance Use Topics     Smoking status: Current Some Day Smoker     Types: Cigars     Smokeless tobacco: Never Used     Alcohol use No[NB1.1]        Medications:[EH1.1]      pantoprazole (PROTONIX) 40 MG EC tablet   DULoxetine (CYMBALTA) 30 MG EC capsule   Wound Dressings (TRIAD HYDROPHILIC WOUND DRESSI) PSTE   losartan (COZAAR) 25 MG tablet   rosuvastatin (CRESTOR) 10 MG tablet   furosemide (LASIX) 40 MG tablet   clopidogrel (PLAVIX) 75 MG tablet   lidocaine (XYLOCAINE) 5 % ointment   SitaGLIPtin-MetFORMIN HCl (JANUMET XR) 100-1000 MG TB24   ferrous sulfate (IRON) 325 (65 FE) MG tablet   order for DME[NB1.1]         Review of Systems   Constitutional: Positive for[EH1.1] fatigue[EH1.2]. Negative for chills and fever.   HENT: Negative for congestion and[EH1.1] nosebleeds[EH1.2].    Respiratory: Positive for cough (dx bronchitis) and shortness of breath (endroses with exertion, like his recent fall, and starirs). Negative for wheezing.   "  Cardiovascular: Positive for leg swelling. Negative for chest pain and palpitations.   Gastrointestinal: Positive for anal bleeding, blood in stool and rectal pain (sometimes with defecation). Negative for abdominal distention, abdominal pain, constipation, diarrhea, nausea and vomiting.   Genitourinary: Negative for dysuria and hematuria.   Musculoskeletal: Positive for arthralgias. Negative for back pain.   Skin: Positive for pallor and wound (chronic wound on bilateral lower legs - following with wound care).   Neurological: Positive for weakness and light-headedness. Negative for dizziness, tremors and syncope.   Hematological: Bruises/bleeds easily.       Physical Exam[EH1.1]   BP: 143/70  Pulse: 98  Heart Rate: 88  Temp: 97.8  F (36.6  C)  Resp: 14  Height: 177.8 cm (5' 10\")  Weight: 127.9 kg (282 lb)  SpO2: 95 %[NB1.1]      Physical Exam   Constitutional: He is[EH1.1] oriented to person, place, and time[EH1.3]. He appears[EH1.1] well-developed[EH1.3] and[EH1.1] well-nourished[EH1.3].[EH1.1] No distress[EH1.3].   HENT:   Head:[EH1.1] Normocephalic[EH1.3] and[EH1.1] atraumatic[EH1.3].   Eyes:[EH1.1] Conjunctivae[EH1.3] and[EH1.1] EOM[EH1.3] are normal.[EH1.1] Pupils are equal, round, and reactive to light[EH1.3].   Cardiovascular:[EH1.1] Normal rate[EH1.3],[EH1.1] regular rhythm[EH1.3] and[EH1.1] normal heart sounds[EH1.3].  Exam reveals[EH1.1] no gallop[EH1.3] and[EH1.1] no friction rub[EH1.3].[EH1.1]    No murmur[EH1.3] heard.  Pulmonary/Chest:[EH1.1] Effort normal[EH1.3] and[EH1.1] breath sounds normal[EH1.3]. No[EH1.1] respiratory distress[EH1.3].[EH1.1]   Some rhonchi resolved with cough[EH1.3]    Abdominal:[EH1.1] Soft[EH1.3].[EH1.1] Bowel sounds are normal[EH1.3]. He exhibits[EH1.1] no distension[EH1.3]. There is[EH1.1] no tenderness[EH1.3]. There is[EH1.1] no rebound[EH1.3] and[EH1.1] no guarding[EH1.3].   Genitourinary:[EH1.1]     [EH1.2]    Genitourinary Comments:[EH1.1] Two 2.5 cm skin tears " "posterior to anus, not actively bleeding. No blood coming from rectum, instead noted brown non-bloody stool.[EH1.2]    Musculoskeletal: He exhibits[EH1.1] edema[EH1.3].[EH1.1]   Wounds wrapped on lower extremities bilaterally[EH1.3]   Neurological: He is[EH1.1] alert[EH1.3] and[EH1.1] oriented to person, place, and time[EH1.3].   Skin: He is[EH1.1] not diaphoretic[EH1.3]. No[EH1.1] pallor[EH1.2].       ED Course[EH1.1]     ED Course[NB1.1]     Procedures            Critical Care time:[EH1.1]  none[EH1.2]       Labs Ordered and Resulted from Time of ED Arrival Up to the Time of Departure from the ED   CBC WITH PLATELETS DIFFERENTIAL - Abnormal; Notable for the following:        Result Value    RBC Count 4.31 (*)     Hemoglobin 11.5 (*)     Hematocrit 39.1 (*)     MCHC 29.4 (*)     RDW 16.8 (*)     All other components within normal limits[NB1.1]       Assessments & Plan (with Medical Decision Making)[EH1.1]  67 year old[EH1.5] male with DM2, CAD s/p stent placement on plavix, obesity, and knee pain presenting after a fall and presumed GI bleed. Patient fell onto his buttocks about 3 hours prior to presenting to ED via EMS, required help getting up. Later noted a large amount of blood in the toilet and on towels when sitting, started feeling weak and lightheaded. Did not hit his head.[EH1.2]   /68  Pulse 98  Temp 97.8  F (36.6  C) (Temporal)  Resp 20  Ht 1.778 m (5' 10\")  Wt 127.9 kg (282 lb)  SpO2 99%  BMI 40.46 kg/m2[NB1.1]  On exam, patient is not pale or diaphoretic. Exam of his rectum revealed two 2.5 cm skin tears posterior to the anus and non-bloody brown stool coming from anus (no blood). Will clean and apply dressing to the area and instruct patient and his wife on cares. Recommend patient get colonoscopy as he has not had one, but rectal exam and stable vitals make actual GI bleed (diverticulosis, angiodysplasia, etc.) less likely. Patient may have some hemorrhoids which bleed with defecation, " but not bleeding currently. Patient should talk to his PCP for GI screening and cardiologist regarding his Plavix use.   CBC[EH1.2] did not show any significant change in his hemoglobin to suggest hemorrhage. Exam findings show a skin tear. This is the cause of his symptoms, and he does not have any persistent bleeding. Nursing cleanse the area and then applied bacitracin ointment and a bandage. We discussed perirectal cares to involve placing Vaseline on the skin tears prior to having any type of a BM. Sit in a bath or take a shower immediately having a BM to keep the area clean. Apply dressings 3 times per day until the skin tears improved. Sit on a donut to avoid pressure on the skin tears. Indications to return to the ED discussed with him. His wife stated that she would be able to handle his cares at home.  At one time, she questioned if she would be able to help him. We did discuss that we could keep him for an observation stay and find a nursing home. They were not interested in that option and wanted to be discharged home.[NB1.1]      I have reviewed the nursing notes.    I have reviewed the findings, diagnosis, plan and need for follow up with the patient.[EH1.1]    Discharge Medication List as of 1/26/2018  7:42 PM          Final diagnoses:   Tear of skin of buttock, initial encounter[NB1.1]     This document serves as a record of the services and decisions personally performed and made by Gabo Marshall PA-C.  It was created on his/her behalf by Alysia Pond, a trained medical student and scribe.  The creation of this record is based on the provider's personal observations and the statements of the patient.     Alysia Pond, MS3  January 26, 2018[EH1.3]    I evaluated and saw this patient along with the Alysia.[NB1.1]    1/26/2018[EH1.1]   Gabo Marshall PA-C[NB1.1]   [facility] EMERGENCY DEPARTMENT[EH1.1]     Gabo Marshall PA-C  01/27/18 0046[NB1.2]      1:29 PM   1/27/2018[NB1.3]      The patient's daughter, Lizzeth, contacted the ED due to her father's worsening condition. Despite other strong family members, they are not able to transition him to the restroom. He is not able to stand on his own due to his increasing lower extremity weakness and bilateral knee pain. They're concerned that he cannot perform any of his ADLs, and the family is unable to help him as well. I did speak with our hospitalist, Dr. Massey, regarding his case given his need for observation stay and rehabilitation placement. She stated that she would accept his care, but to have a conversation with the family about the upper front money required by rehabilitation facilities. I called Yuko back, and discussed this with her. She was comfortable with this and was aware of these conditions. They're unable to get her father in the car due to his lower extremity weakness and obesity. Therefore, they are going to contact an ambulance and have him transferred up to [facility] ED for further evaluation.    Gabo Marshall PA-C[NB1.4]      Gabo Marshall PA-C  01/27/18 1330  [NB1.5]     Revision History        User Key Date/Time User Provider Type Action    > NB1.5 1/27/2018  1:30 PM Gabo Marshall PA-C Physician Assistant Addend     NB1.3 1/27/2018  1:29 PM Gabo Marshall PA-C Physician Assistant      NB1.4 1/27/2018  1:28 PM Gabo Marshall PA-C Physician Assistant      NB1.2 1/27/2018 12:46 AM Gabo Marshall PA-C Physician Assistant Sign     NB1.1 1/27/2018 12:44 AM Gabo Marshall PA-C Physician Assistant      EH1.4 1/26/2018  7:11 PM Alysia Pond Medical Student Share     EH1.5 1/26/2018  7:06 PM Alysia Pond Medical Student Share     EH1.2 1/26/2018  6:54 PM Alysia Pond Medical Student      EH1.3 1/26/2018  6:39 PM Alysia Pond Medical Student Share     EH1.1 1/26/2018  6:06 PM Alysia Pond Medical Student Share                  Procedure Notes      No notes of this type exist for this encounter.         Progress Notes - Therapies (Notes from 01/27/18 through 01/30/18)      Progress Notes by Gayle Bob PT at 1/28/2018 11:35 AM     Author:  Gayle Bob PT Service:  Family Medicine Author Type:  Physical Therapist    Filed:  1/28/2018 11:35 AM Date of Service:  1/28/2018 11:35 AM Creation Time:  1/28/2018 11:35 AM    Status:  Attested :  Gayle Bob PT (Physical Therapist)    Cosigner:  Linda Massey MD at 1/28/2018 12:50 PM        Attestation signed by Linda Massey MD at 1/28/2018 12:50 PM        Physician Attestation   I agree with the information in this note.    Linda Massey                                                                                                             [facility]      OUTPATIENT PHYSICAL THERAPY EVALUATION  PLAN OF TREATMENT FOR OUTPATIENT REHABILITATION  (COMPLETE FOR INITIAL CLAIMS ONLY)  Patient's Last Name, First Name, M.I.  YOB: 1950  Glenroy Padilla                        Provider's Name  [facility] Medical Record No.  [id number]                               Onset Date:  01/27/18   Start of Care Date:  01/28/18      Type:     _X_PT   ___OT   ___SLP Medical Diagnosis:  Decreased mobility                        PT Diagnosis:  Limited mobility, lower extremity weakness.    Visits from SOC:  1   _________________________________________________________________________________  Plan of Treatment/Functional Goals    Planned Interventions:  ,     ,       Goals: See Physical Therapy Goals on Care Plan in [facility] electronic health record.    Therapy Frequency: other (see comments)  Predicted Duration of Therapy Intervention:  (Eval only for D/C disposition.)  _________________________________________________________________________________    I CERTIFY THE NEED FOR THESE SERVICES FURNISHED UNDER         THIS PLAN OF TREATMENT AND WHILE UNDER MY CARE     (Physician co-signature of this document indicates review and certification of the therapy plan).                Certification date from: 01/28/18, Certification date to: 01/28/18    Referring Physician: Dr. Linda Massey            Initial Assessment        See Physical Therapy evaluation dated 01/28/18 in Epic electronic health record.[PB1.1]     Revision History        User Key Date/Time User Provider Type Action    > PB1.1 1/28/2018 11:35 AM Gayle Bob PT Physical Therapist Sign            Progress Notes by Gayle Bob PT at 1/28/2018 11:34 AM     Author:  Gayle Bob PT Service:  (none) Author Type:  Physical Therapist    Filed:  1/28/2018 11:34 AM Date of Service:  1/28/2018 11:34 AM Creation Time:  1/28/2018 11:34 AM    Status:  Signed :  Gayle Bob PT (Physical Therapist)            01/28/18 1100   Quick Adds   Quick Adds Certification   Type of Visit Initial PT Evaluation   Living Environment   Lives With spouse;child(kirsten), adult   Living Arrangements house   Home Accessibility ramps present at home   Transportation Available van, wheelchair accessible   Functional Level Prior   Ambulation 4-->completely dependent   Transferring 4-->completely dependent   Cognition 0 - no cognition issues reported   Fall history within last six months yes   Number of times patient has fallen within last six months 12   Which of the above functional risks had a recent onset or change? ambulation;transferring;fall history   General Information   Onset of Illness/Injury or Date of Surgery - Date 01/27/18   Referring Physician Dr. Linda Massey   Patient/Family Goals Statement Go to rehab get stronger so can be more independent.   Pertinent History of Current Problem (include personal factors and/or comorbidities that impact the POC) Glenroy reports that he has had 12 falls in the last 6 months, he has heart disease with a stint  that doesn't feel right to him, type 2 diabetes, ulcers in his lower extremities, bilateral lower extremity neuropathy, severe lumbar stenosis, decreasing mobility and strength.    Precautions/Limitations fall precautions   Weight-Bearing Status - LUE full weight-bearing   Weight-Bearing Status - RUE full weight-bearing   Weight-Bearing Status - LLE full weight-bearing   Weight-Bearing Status - RLE full weight-bearing   Heart Disease Risk Factors Diabetes;Lack of physical activity;Overweight;Gender;Age   General Observations Glenroy is sitting in a Jason chair with bandages on his lower extremities due to ulcers that are being treated, his is mobidly obese, very pleasant, requires significant assist to transfer, is non ambulatory at this time.    Cognitive Status Examination   Orientation orientation to person, place and time   Level of Consciousness alert   Follows Commands and Answers Questions 100% of the time   Personal Safety and Judgment intact   Memory intact   Pain Assessment   Patient Currently in Pain No   Integumentary/Edema   Integumentary/Edema Comments Right lower extremity is very discolored due to venus insufficency with bandages in place. Left lower extremity is wrapped with what looks like an scarlett boot and ace wrap.    Posture    Posture Forward head position;Protracted shoulders;Kyphosis   Range of Motion (ROM)   ROM Comment Trunk ROM is limited due to weight.    Strength   Strength Comments Right knee flexion is 4/5, extension is 4/5 and pain producing, right dorsiflexion is 4/5, left lower extremity is 5/5.    Bed Mobility   Bed Mobility Comments NA as patient was in a chair and did not want to go back to bed.    Transfer Skills   Transfer Comments Sit to stand. Moderate assist of 1 for sit to stand then stood at chair side for 2 min. to assess ability, pt. unable to stand up stright, hips, knees and back were flexed.    Gait   Gait Comments NA, pt. and P. T. not comfortable at this time.     Balance   Balance Comments While holding onto a walker, pt. able to stand x 2 minutes.    Clinical Impression   Criteria for Skilled Therapeutic Intervention evaluation only   PT Diagnosis Limited mobility, lower extremity weakness.    Influenced by the following impairments Weakness, poor mobility   Functional limitations due to impairments Transfers, ambulation.    Clinical Presentation Evolving/Changing   Clinical Presentation Rationale Clinical judgement, multiple body systems.    Clinical Decision Making (Complexity) High complexity   Therapy Frequency` other (see comments)   Predicted Duration of Therapy Intervention (days/wks) (Eval only for D/C disposition.)   Anticipated Discharge Disposition Transitional Care Facility   Risk & Benefits of therapy have been explained Yes   Patient, Family & other staff in agreement with plan of care Yes   Clinical Impression Comments Glenroy is here for observation due to increasing lower extremity weakness and giving way. His family is no longer able to assist him at home as he is not able to help as much with transfers and gait. He is morbidly obese with severe lumbar stenosis and increasing weakness in his lower extremities, he admits to falling at least 12 times in the last 6 months.    1x Eval Only-Outpatient/Observation Medicare G-Code   G-code Mobility: Walking & Moving Around   Mobility: Walking & Moving Around   Mobility: Current Status , Goal , Discharge -Orbl Only- Modifier the same for all G-codes CM: 80-99% impairment   Mobility: Current & Discharge Modifier Rationale-Eval Only Clinical judgement, observation   Mobility Comments Requires significant assistance and is very large.    Therapy Certification   Start of care date 01/28/18   Certification date from 01/28/18   Certification date to 01/28/18   Medical Diagnosis Decreased mobility[PB1.1]        Revision History        User Key Date/Time User Provider Type Action    > PB1.1 1/28/2018 11:34  AM Gayle Bob, PT Physical Therapist Sign

## 2018-01-27 NOTE — IP AVS SNAPSHOT
` ` Patient Information     Patient Name Sex     Glenroy Padilla ([id number]) Male 1950       Room Bed    267 267-01      Patient Demographics     Address Phone E-mail Address    [address] [place], [place] [id number] [phone] (Home) *Preferred*  [phone] (Work) [email or web address]      Patient Ethnicity & Race     Ethnic Group Patient Race    American White      Emergency Contact(s)     Name Relation Home Work Mobile    Radha Padilla Significant other [phone]    Huyen Macario  Daughter [phone]        Documents on File        Status Date Received Description       Documents for the Patient    Affiliate Privacy placeholder   phase3    Consent for EHR Access Received 17     Insurance Card Received () 17 Medicare- part A    External Medication Information Consent Accepted 17     Patient ID Received () 17     [facility] Specified Other       Consent for Services/Privacy Notice - Hospital/Clinic Received 17     Privacy Notice - [place] Received 17     Insurance Card Received 17 medicare-part A    Consent to Communicate  17 AUTHORIZATION TO DISCUSS PROTECTED HEALTH INFORMATION    Consent to Communicate  17 AUTHORIZATION TO DISCUSS PROTECTED HEALTH INFORMATION - 17    HIM ALEXANDRA Authorization - File Only  17 [facility], 2017    Physical Therapy Certification Received 10/16/17 10/6/2017 to 1/3/2018    Business/Insurance/Care Coordination/Health Form - Patient  17 [facility] WRITTEN REQUEST FOR RECORDS, 10/4/2017    HIM ALEXANDRA Authorization - File Only  17 [facility]    Care Everywhere Prospective Auth Received 17     HIM ALEXANDRA Authorization  17 [facility]    Consent to Communicate  18 AUTHORIZATION TO DISCUSS PROTECTED  HEALTH INFORMATION 18    Insurance Card Received 18 [place]    Insurance Card Received (Deleted)  05/17/17 [facility]       Documents for the Encounter    CMS IM for Patient Signature Received 01/27/18     Observation Notice Received 01/27/18     CMS JOH[place]ON for Patient Signature Received 01/29/18       Admission Information     Attending Provider Admitting Provider Admission Type Admission Date/Time    Linda Massey MD Peterson, Elizabeth Joanne, MD Emergency 01/27/18  1515    Discharge Date Hospital Service Auth/Cert Status Service Area     Hospitalist Incomplete [facility]    Unit Room/Bed Admission Status       PH 2A MEDICAL SURGICAL 267/267-01 Admission (Confirmed)       Admission     Complaint    Weakness of both legs      Hospital Account     Name Acct ID Class Status Primary Coverage    Glenroy Padilla [id number] Observation Open MEDICAID [facility] HEALTH CARE            Guarantor Account (for Hospital Account #[id number])     Name Relation to Pt Service Area Active? Acct Type    Glenroy Padilla  FCS Yes Personal/Family    Address Phone          [address] [place] [address] [phone](H)  [phone](O)              Coverage Information (for Hospital Account #[id number])     F/O Payor/Plan Precert #    [place]     Subscriber Subscriber #    Glenroy Padilla [id number]    Address Phone    [address] [id number]  [place] [address] [phone]

## 2018-01-27 NOTE — IP AVS SNAPSHOT
"` `           [address] MEDICAL SURGICAL: [phone]                                              INTERAGENCY TRANSFER FORM - NURSING   2018                    Hospital Admission Date: 2018  MALCOLM SANCHES   : 1950  Sex: Male        Attending Provider: Linda Massey MD     Allergies:  No Known Allergies    Infection:  None   Service:  HOSPITALIST    Ht:  1.778 m (5' 10\")   Wt:  137.5 kg (303 lb 2.1 oz)   Admission Wt:  130.6 kg (288 lb)    BMI:  43.49 kg/m 2   BSA:  2.61 m 2            Patient PCP Information     Provider PCP Type    Julio Cesar Sahu PA-C General      Current Code Status     Date Active Code Status Order ID Comments User Context       Prior      Code Status History     Date Active Date Inactive Code Status Order ID Comments User Context    2018  1:16 PM  Full Code [id number]  Marc Mancera MD Outpatient    2018  8:32 PM 2018  1:16 PM Full Code [id number]  Linda Massey MD Inpatient      Advance Directives        Scanned docmt in ACP Activity?           No scanned doc        Hospital Problems as of 2018              Priority Class Noted POA    Hx of coronary artery disease Medium  3/12/2016 Yes    Type 2 diabetes mellitus with other circulatory complication, without long-term current use of insulin (H) Medium  2017 Yes    Venous stasis ulcer of left lower extremity (H) Medium  2017 Yes    [place] (obstructive sleep apnea) Medium  2017 Yes    Hypertension, goal below 140/90 Medium  2017 Yes    Hyperlipidemia LDL goal <100 Medium  2017 Yes    Obesity, morbid, BMI 40.0-49.9 (H) Medium  2017 Yes    Neuropathy Medium  2018 Yes    Gastroesophageal reflux disease without esophagitis Medium  2018 Yes    Falls frequently Medium  2018 Yes    Weakness of both legs Medium  2018 Yes    * (Principal)Central spinal stenosis L3-4 and L4-5 Medium  2018 Yes    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 Medium  1/27/2018 Yes    Lymphedema Medium  1/29/2018 Yes      Non-Hospital Problems as of 1/30/2018              Priority Class Noted    Acute pain of left knee Medium  5/22/2017    Chronic pain of left knee Medium  5/22/2017    Open wound of left lower extremity without complication, initial encounter Medium  5/22/2017    Hx of heart artery stent Medium  5/22/2017    History of coronary artery stent placement Medium  9/25/2017    Advanced directives, counseling/discussion Medium  10/2/2017    Foot drop, right Medium  1/25/2018    Cardiomegaly Medium  1/25/2018      Immunizations     Name Date      Pneumo Conj 13-V (2010&after) 12/14/17     TDAP Vaccine (Adacel) 12/14/17          END      ASSESSMENT     Discharge Profile Flowsheet     EXPECTED DISCHARGE     GI WDL  WDL 01/30/18 1120    Expected Discharge Date  01/29/18 01/28/18 1014   Abdominal Appearance  obese;rounded 01/30/18 0042    DISCHARGE NEEDS ASSESSMENT     Last Bowel Movement  01/29/18 01/29/18 2053    Patient/family verbalizes understanding of discharge plan recommendations?  Yes 01/28/18 1014   COMMUNICATION ASSESSMENT      Medical Team notified of plan?  yes 01/28/18 1014   Patient's communication style  spoken language (English or Bilingual) 01/30/18 1126    Transportation Available  van, wheelchair accessible 01/28/18 1117   FINAL RESOURCES      # of Referrals Placed by CTS  Post Acute Facilities 01/28/18 1014   Resources List  Skilled Nursing Facility 01/28/18 1014    FUNCTIONAL LEVEL CURRENT     SKIN      Ambulation  3 - assistive equipment and person 01/30/18 1126   Inspection of bony prominences  Full except (identify areas not inspected) 01/30/18 1120    Transferring  3 - assistive equipment and person 01/30/18 1126   Full except areas not inspected   Ankle, left;Heel, left;Foot, left 01/30/18 1120    Toileting  3 - assistive equipment and person 01/30/18 1126   Skin WDL  ex 01/30/18 1120    Bathing  2 - assistive person 01/30/18  "1126   Skin Color/Characteristics  bruised (ecchymotic);redness blanchable;foreign 01/30/18 1120    Dressing  0 - independent 01/30/18 1126   Skin Temperature  warm 01/30/18 0042    Eating  0 - independent 01/30/18 1126   Skin Moisture  dry 01/30/18 0042    Communication  0 - understands/communicates without difficulty 01/30/18 1126   Skin Integrity  bruise(s);scab(s);skin tear(s) 01/30/18 1120    Swallowing  0 - swallows foods/liquids without difficulty 01/30/18 1126   Skin Elasticity  quick return to original state 01/29/18 1608    COGNITIVE/PERCEPTUAL/DEVELOPMENTAL     SAFETY      Current Mental Status/Cognitive Functioning  no deficits noted 01/30/18 1126   Safety WDL  WDL 01/30/18 1120    Recent Changes in Mental Status/Cognitive Functioning  no changes 01/30/18 1126   All Alarms  alarm(s) activated and audible 01/30/18 1120    GASTROINTESTINAL (ADULT,PEDIATRIC,OB)                        Assessment WDL (Within Defined Limits) Definitions           Safety WDL     Effective: 09/28/15    Row Information: <b>WDL Definition:</b> Bed in low position, wheels locked; call light in reach; upper side rails up x 2; ID band on<br> <font color=\"gray\"><i>Item=AS safety wdl>>List=AS safety wdl>>Version=F14</i></font>      Skin WDL     Effective: 09/28/15    Row Information: <b>WDL Definition:</b> Warm; dry; intact; elastic; without discoloration; pressure points without redness<br> <font color=\"gray\"><i>Item=AS skin wdl>>List=AS skin wdl>>Version=F14</i></font>      Vitals     Vital Signs Flowsheet     VITAL SIGNS     Pain Control  partially effective 01/29/18 1042    Temp  97  F (36.1  C) 01/30/18 0727   Functioning  can do most things, but pain gets in the way of some 01/29/18 1042    Temp src  Oral 01/30/18 0727   Sleep  normal sleep 01/29/18 1042    Resp  20 01/30/18 0727   HEIGHT AND WEIGHT      Pulse  94 01/30/18 0727   Height  1.778 m (5' 10\") 01/27/18 2042    Heart Rate  94 01/30/18 0727   Height Method  Stated 01/27/18 " 2042    Pulse/Heart Rate Source  Monitor 01/30/18 0727   Height Method  Stated 01/27/18 1517    BP  152/75 01/30/18 0727   Weight  (!)  137.5 kg (303 lb 2.1 oz) 01/27/18 2042    BP Location  Left arm 01/29/18 2332   Weight Method  Bed scale 01/27/18 2042    OXYGEN THERAPY     Bed Scale  Other, please comment (green lift sheet on bed) 01/27/18 2042    SpO2  93 % 01/30/18 0727   BSA (Calculated - sq m)  2.61 01/27/18 2042    O2 Device  None (Room air) 01/30/18 0727   BMI (Calculated)  43.59 01/27/18 2042    PAIN/COMFORT     RESPIRATORY MONITORING      Patient Currently in Pain  denies 01/30/18 1041   Respiratory Monitoring (EtCO2)  0 mmHg 01/27/18 1944    Preferred Pain Scale  CAPA (Clinically Aligned Pain Assessment) ([facility] Adults Only) 01/29/18 1553   DAILY CARE      0-10 Pain Scale  2 01/27/18 1517   Activity Assistance Provided  assistance, 2 people 01/30/18 1120    Pain Location  Back 01/28/18 0917   Assistive Device Utilized  gait belt;walker 01/30/18 1120    Pain Orientation  Right 01/27/18 2042   Additional Documentation  Activity Device Assistance (Row) 01/27/18 2112    Pain Intervention(s)  Declines 01/28/18 0917   Activity Management  up in chair;activity adjusted per tolerance 01/30/18 1120    CLINICALLY ALIGNED PAIN ASSESSMENT (CAPA) ([facility] ADULTS ONLY)     POSITIONING      Comfort  negligible pain 01/29/18 2332   Body Position  weight shift assistance provided 01/30/18 0042    Change in Pain  about the same 01/29/18 1042                 Patient Lines/Drains/Airways Status    Active LINES/DRAINS/AIRWAYS     Name: Placement date: Placement time: Site: Days: Last dressing change:    Peripheral IV 01/27/18 Left Lower forearm 01/27/18   1830   Lower forearm   2     Wound 09/30/17 Lateral;Left Leg unna boot on with healing lesion covered on left lower lateral leg.   09/30/17   1427   Leg   121     Wound 01/27/18 Right Leg 01/27/18   1840   Leg   2     Wound 01/27/18  Rectum Fissure;Skin tear pako-rectal skin tear/fissure x2 01/27/18   2042   Rectum   2             Patient Lines/Drains/Airways Status    Active PICC/CVC     None            Intake/Output Detail Report     Date Intake     Output Net    Shift P.O. I.V. IV Piggyback Total Urine Total       Noc 01/28/18 2300 - 01/29/18 0659 -- -- -- -- 300 300 -300    Day 01/29/18 0700 - 01/29/18 1459 -- -- -- -- -- -- 0    Landy 01/29/18 1500 - 01/29/18 2259 -- -- -- -- -- -- 0    Noc 01/29/18 2300 - 01/30/18 0659 240 -- -- 240 650 650 -410    Day 01/30/18 0700 - 01/30/18 1459 -- -- -- -- -- -- 0      Last Void/BM       Most Recent Value    Urine Occurrence 1 at 01/28/2018 0930    Stool Occurrence 1 at 01/29/2018 1845      Case Management/Discharge Planning     Case Management/Discharge Planning Flowsheet     REFERRAL INFORMATION     Reaction To Health Status  adjusting 01/28/18 1014    Admission Type  observation 01/28/18 1014   Understanding Of Condition And Treatment  adequate understanding of treatment;adequate understanding of medical condition 01/28/18 1014    Arrived From  home or self-care 01/28/18 1014   COPING/STRESS CAREGIVER      Referral Source  physician 01/28/18 1014   Primary Caregiver Strengths  expressive of needs;expressive of emotions;resilient 01/28/18 1014    # of Referrals Placed by CTS  Post Acute Facilities 01/28/18 1014   Sources Of Support  adult child(kirsten) 01/28/18 1014    Reason For Consult  discharge planning 01/28/18 1014   Reaction To Health Status  accepting 01/28/18 1014    CTS Assigned to Case  [place] 01/28/18 1014   Understanding Of Condition And Treatment  adequate understanding of medical condition;adequate understanding of treatment 01/28/18 1014    Primary Care Clinic Name  [facility] 01/28/18 1014   EXPECTED DISCHARGE      Primary Care MD Name  Julio Cesar Rosen 01/28/18 1014   Expected Discharge Date  01/29/18 01/28/18 1014    LIVING ENVIRONMENT     DISCHARGE PLANNING      Lives With   spouse;child(kirsten), adult 01/28/18 1117   Patient/family verbalizes understanding of discharge plan recommendations?  Yes 01/28/18 1014    Living Arrangements  house 01/28/18 1117   Medical Team notified of plan?  yes 01/28/18 1014    Provides Primary Care For  no one, unable/limited ability to care for self 01/28/18 1014   Transportation Available  van, wheelchair accessible 01/28/18 1117    Primary Care Provided By  spouse/significant other;child(kirsten) (specify) 01/28/18 1014   FINAL RESOURCES      Quality Of Family Relationships  supportive;helpful;involved 01/28/18 1014   Resources List  Skilled Nursing Facility 01/28/18 1014    Able to Return to Prior Living Arrangements  no 01/28/18 1014   ABUSE RISK SCREEN      ASSESSMENT OF FAMILY/SOCIAL SUPPORT     QUESTION TO PATIENT:  Has a member of your family or a partner(now or in the past) intimidated, hurt, manipulated, or controlled you in any way?  no 01/27/18 1519    Marital Status   01/28/18 1014   QUESTION TO PATIENT: Do you feel safe going back to the place where you are living?  yes 01/27/18 1519    Who is your support system?  Wife;Children 01/28/18 1014   OBSERVATION: Is there reason to believe there has been maltreatment of a vulnerable adult (ie. Physical/Sexual/Emotional abuse, self neglect, lack of adequate food, shelter, medical care, or financial exploitation)?  no 01/27/18 1519    Spouse's Name  Radha 01/28/18 1014   OTHER      Description of Support System  Involved;Supportive 01/28/18 1014   Are you depressed or being treated for depression?  No 01/27/18 2052    COPING/STRESS     HOMICIDE RISK      Patient Personal Strengths  resilient;expressive of needs 01/28/18 1014   Feels Like Hurting Others  no 01/27/18 1519    Sources Of Support  adult child(kirsten);significant other 01/28/18 1014

## 2018-01-27 NOTE — IP AVS SNAPSHOT
MRN:[id number]                      After Visit Summary   1/27/2018    Glenroy Padilla    MRN: [id number]           Thank you!     Thank you for choosing [facility] for your care. Our goal is always to provide you with excellent care. Hearing back from our patients is one way we can continue to improve our services. Please take a few minutes to complete the written survey that you may receive in the mail after you visit with us. Thank you!        Patient Information     Date Of Birth          1950        About your hospital stay     You were admitted on:  January 27, 2018 You last received care in the:  [address] [facility]    You were discharged on:  January 30, 2018       Who to Call     For medical emergencies, please call 911.  For non-urgent questions about your medical care, please call your primary care provider or clinic, [phone]          Attending Provider     Provider Gabo Dillon PA-C [facility]    Linda Massey MD [facility]       Primary Care Provider Office Phone # Fax #    Julio Cesar Esteban PA-C [phone]      After Care Instructions     Activity - Up with nursing assistance           Advance Diet as Tolerated       Follow this diet upon discharge: Orders Placed This Encounter      Room Service      Moderate Consistent CHO Diet            Fall precautions           General info for SNF       Length of Stay Estimate: Short Term Care: Estimated # of Days <30  Condition at Discharge: Stable  Level of care:skilled   Rehabilitation Potential: Fair  Admission H&P remains valid and up-to-date: Yes  Recent Chemotherapy: N/A  Use Nursing Home Standing Orders: Yes            Glucose monitor nursing POCT       Once daily at varying times before meals and at bedtime            Wound care       Site:   Bilateral lower legs  Instructions:  Triad to right leg wound, cover with dry gauze, then place tubigrip.    Unna  "boot to left lower leg with silvercell to old wound and foam padding over the anterior ankle wound                  Follow-up Appointments     Follow Up and recommended labs and tests       Follow up with Nursing home physician.    Follow up with Dr. Shine in 1 week for leg ulcers.    Follow up with Neurosurgery in 1-2 weeks.                  Your next 10 appointments already scheduled     Feb 02, 2018  9:00 AM CST   [facility]    [address] [place] [address] [id number] [phone]              Additional Services     Physical Therapy Adult Consult       Evaluate and treat as clinically indicated.    Reason:  Leg weakness, spinal stenosis                  Further instructions from your care team       Return home medications to patient at discharge. Medications are currently stored in pt's bin in med room.     Pending Results     No orders found from 1/25/2018 to 1/28/2018.            Statement of Approval     Ordered          01/30/18 1119  I have reviewed and agree with all the recommendations and orders detailed in this document.  EFFECTIVE NOW     Approved and electronically signed by:  Marc Mancera MD             Admission Information     Date & Time Provider Department Dept. Phone    1/27/2018 Linda Massey MD [address] [facility] [phone]      Your Vitals Were     Blood Pressure Pulse Temperature Respirations Height Weight    152/75 94 97  F (36.1  C) (Oral) 20 1.778 m (5' 10\") 137.5 kg (303 lb 2.1 oz)    Pulse Oximetry BMI (Body Mass Index)                93% 43.49 kg/m2          MyChart Information     [employer] lets you send messages to your doctor, view your test results, renew your prescriptions, schedule appointments and more. To sign up, go to [email or web address] . Click on \"Log in\" on the left side of the screen, which will take you to the Welcome page. Then click " "on \"Sign up Now\" on the right side of the page.     You will be asked to enter the access code listed below, as well as some personal information. Please follow the directions to create your username and password.     Your access code is: [email or web address]  Expires: 3/22/2018 10:22 AM     Your access code will  in 90 days. If you need help or a new code, please call your [place] clinic or [phone].        Care EveryWhere ID     This is your Care EveryWhere ID. This could be used by other organizations to access your [place] medical records  [id number]        Equal Access to Services     [facility] : Pietro Omalley, rudi cody, leonardo wall, adams horton. So [facility] [phone].    ATENCIÓN: Si habla español, tiene a wilkinson disposición servicios gratuitos de asistencia lingüística. [facility] [phone].    We comply with applicable federal civil rights laws and [place] laws. We do not discriminate on the basis of race, color, national origin, age, disability, sex, sexual orientation, or gender identity.               Review of your medicines      START taking        Dose / Directions    HYDROcodone-acetaminophen 5-325 MG per tablet   Commonly known as:  NORCO   Used for:  Spinal stenosis of lumbar region, unspecified whether neurogenic claudication present        Dose:  1 tablet   Take 1 tablet by mouth every 4 hours as needed for other (pain control or improvement in physical function.)   Quantity:  20 tablet   Refills:  0       senna-docusate 8.6-50 MG per tablet   Commonly known as:  SENOKOT-S;PERICOLACE   Used for:  Spinal stenosis, lumbar region, without neurogenic claudication        Dose:  1 tablet   Take 1 tablet by mouth 2 times daily as needed for constipation   Quantity:  100 tablet   Refills:  0         CONTINUE these medicines which have NOT CHANGED        Dose / Directions    clopidogrel 75 MG tablet   Commonly known as:  PLAVIX   Used " for:  Hypertension, goal below 140/90, Hyperlipidemia LDL goal <100, Type 2 diabetes mellitus with other circulatory complication, without long-term current use of insulin (H)        Dose:  75 mg   Take 1 tablet (75 mg) by mouth daily   Quantity:  90 tablet   Refills:  1       DULoxetine 30 MG EC capsule   Commonly known as:  CYMBALTA   Used for:  Neuropathy, Foot drop, right        Dose:  30 mg   Take 1 capsule (30 mg) by mouth 2 times daily   Quantity:  1 capsule   Refills:  0       ferrous sulfate 325 (65 FE) MG tablet   Commonly known as:  IRON   Used for:  History of anemia        Dose:  325 mg   Take 1 tablet (325 mg) by mouth daily (with breakfast)   Quantity:  90 tablet   Refills:  2       furosemide 40 MG tablet   Commonly known as:  LASIX   Used for:  Hypertension, goal below 140/90        Dose:  40 mg   Take 1 tablet (40 mg) by mouth daily   Quantity:  90 tablet   Refills:  1       lidocaine 5 % ointment   Commonly known as:  XYLOCAINE   Used for:  Chronic pain of right knee        Apply topically daily   Quantity:  50 g   Refills:  3       losartan 25 MG tablet   Commonly known as:  COZAAR   Used for:  Hypertension, goal below 140/90, Type 2 diabetes mellitus with other circulatory complication, without long-term current use of insulin (H)        Dose:  25 mg   Take 1 tablet (25 mg) by mouth daily   Quantity:  90 tablet   Refills:  1       order for DME   Used for:  Type 2 diabetes mellitus with other circulatory complication, without long-term current use of insulin (H)        One touch glucose monitoring strip with Glucometer and lancets.   Quantity:  1 Box   Refills:  1       PROTONIX 40 MG EC tablet   Used for:  Gastroesophageal reflux disease without esophagitis   Generic drug:  pantoprazole        Dose:  40 mg   Take 1 tablet (40 mg) by mouth daily Take 30-60 minutes before a meal.   Quantity:  1 tablet   Refills:  0       rosuvastatin 10 MG tablet   Commonly known as:  CRESTOR   Used for:  Type 2  diabetes mellitus with other circulatory complication, without long-term current use of insulin (H)        Dose:  10 mg   Take 1 tablet (10 mg) by mouth daily   Quantity:  90 tablet   Refills:  1       SitaGLIPtin-MetFORMIN HCl 100-1000 MG Tb24   Commonly known as:  JANUMET XR   Used for:  Type 2 diabetes mellitus with complication, without long-term current use of insulin (H)        Dose:  1 tablet   Take 1 tablet by mouth daily   Quantity:  30 tablet   Refills:  3       TRIAD HYDROPHILIC WOUND DRESSI Pste   Used for:  Open wound of right lower leg, initial encounter        Dose:  1 applicator   Externally apply 1 g topically daily   Quantity:  1 Tube   Refills:  3            Where to get your medicines      Some of these will need a paper prescription and others can be bought over the counter. Ask your nurse if you have questions.     Bring a paper prescription for each of these medications     HYDROcodone-acetaminophen 5-325 MG per tablet       You don't need a prescription for these medications     senna-docusate 8.6-50 MG per tablet                Protect others around you: Learn how to safely use, store and throw away your medicines at www.disposemymeds.org.        Information about OPIOIDS     PRESCRIPTION OPIOIDS: WHAT YOU NEED TO KNOW    Prescription opioids can be used to help relieve moderate to severe pain and are often prescribed following a surgery or injury, or for certain health conditions. These medications can be an important part of treatment but also come with serious risks. It is important to work with your health care provider to make sure you are getting the safest, most effective care.    WHAT ARE THE RISKS AND SIDE EFFECTS OF OPIOID USE?  Prescription opioids carry serious risks of addiction and overdose, especially with prolonged use. An opioid overdose, often marked by slowed breathing can cause sudden death. The use of prescription opioids can have a number of side effects as well, even  when taken as directed:      Tolerance - meaning you might need to take more of a medication for the same pain relief    Physical dependence - meaning you have symptoms of withdrawal when a medication is stopped    Increased sensitivity to pain    Constipation    Nausea, vomiting, and dry mouth    Sleepiness and dizziness    Confusion    Depression    Low levels of testosterone that can result in lower sex drive, energy, and strength    Itching and sweating    RISKS ARE GREATER WITH:    History of drug misuse, substance use disorder, or overdose    Mental health conditions (such as depression or anxiety)    Sleep apnea    Older age (65 years or older)    Pregnancy    Avoid alcohol while taking prescription opioids.   Also, unless specifically advised by your health care provider, medications to avoid include:    Benzodiazepines (such as Xanax or Valium)    Muscle relaxants (such as Soma or Flexeril)    Hypnotics (such as Ambien or Lunesta)    Other prescription opioids    KNOW YOUR OPTIONS:  Talk to your health care provider about ways to manage your pain that do not involve prescription opioids. Some of these options may actually work better and have fewer risks and side effects:    Pain relievers such as acetaminophen, ibuprofen, and naproxen    Some medications that are also used for depression or seizures    Physical therapy and exercise    Cognitive behavioral therapy, a psychological, goal-directed approach, in which patients learn how to modify physical, behavioral, and emotional triggers of pain and stress    IF YOU ARE PRESCRIBED OPIOIDS FOR PAIN:    Never take opioids in greater amounts or more often than prescribed    Follow up with your primary health care provider and work together to create a plan on how to manage your pain.    Talk about ways to help manage your pain that do not involve prescription opioids    Talk about all concerns and side effects    Help prevent misuse and abuse    Never sell or  share prescription opioids    Never use another person's prescription opioids    Store prescription opioids in a secure place and out of reach of others (this may include visitors, children, friends, and family)    Visit www.cdc.gov/drugoverdose to learn about risks of opioid abuse and overdose    If you believe you may be struggling with addiction, tell your health care provider and ask for guidance or call [facility]'s National Helpline at 9-247-850-HELP    LEARN MORE / www.cdc.gov/drugoverdose/prescribing/guideline.html    Safely dispose of unused prescription opioids: Find your local drug take-back programs and more information about the importance of safe disposal at www.doseofreality.mn.gov             Medication List: This is a list of all your medications and when to take them. Check marks below indicate your daily home schedule. Keep this list as a reference.      Medications           Morning Afternoon Evening Bedtime As Needed    clopidogrel 75 MG tablet   Commonly known as:  PLAVIX   Take 1 tablet (75 mg) by mouth daily   Last time this was given:  75 mg on 1/30/2018  8:47 AM                                DULoxetine 30 MG EC capsule   Commonly known as:  CYMBALTA   Take 1 capsule (30 mg) by mouth 2 times daily   Last time this was given:  30 mg on 1/30/2018  8:47 AM                                ferrous sulfate 325 (65 FE) MG tablet   Commonly known as:  IRON   Take 1 tablet (325 mg) by mouth daily (with breakfast)                                furosemide 40 MG tablet   Commonly known as:  LASIX   Take 1 tablet (40 mg) by mouth daily   Last time this was given:  40 mg on 1/30/2018  8:47 AM                                HYDROcodone-acetaminophen 5-325 MG per tablet   Commonly known as:  NORCO   Take 1 tablet by mouth every 4 hours as needed for other (pain control or improvement in physical function.)                                lidocaine 5 % ointment   Commonly known as:  XYLOCAINE   Apply topically  daily                                losartan 25 MG tablet   Commonly known as:  COZAAR   Take 1 tablet (25 mg) by mouth daily   Last time this was given:  25 mg on 1/30/2018  8:47 AM                                order for DME   One touch glucose monitoring strip with Glucometer and lancets.                                PROTONIX 40 MG EC tablet   Take 1 tablet (40 mg) by mouth daily Take 30-60 minutes before a meal.   Generic drug:  pantoprazole                                rosuvastatin 10 MG tablet   Commonly known as:  CRESTOR   Take 1 tablet (10 mg) by mouth daily                                senna-docusate 8.6-50 MG per tablet   Commonly known as:  SENOKOT-S;PERICOLACE   Take 1 tablet by mouth 2 times daily as needed for constipation                                SitaGLIPtin-MetFORMIN HCl 100-1000 MG Tb24   Commonly known as:  JANUMET XR   Take 1 tablet by mouth daily                                TRIAD HYDROPHILIC WOUND DRESSI Pste   Externally apply 1 g topically daily   Last time this was given:  1 g on 1/30/2018  8:48 AM

## 2018-01-27 NOTE — DISCHARGE INSTRUCTIONS
It was a pleasure working with you today!  I hope your illness improves rapidly!    Thankfully you just have a skin tear of your rectal area from your fall and the bleeding has now stopped.  Your hemoglobin remains stable, which is a great sign that you did not loose a tremendous amount of blood.    Please change your dressing 2-3 times per day to help the skin heal.  Use Bacitracin Ointment ( antibiotic ointment) under the bandage.  Once the skin has healed, then you can stop putting ointment on the area.    Please put a large amount of vaseline on the area prior to having a BM.  This will prevent further irritation of the area.     Please sit on a 'donut' until your skin heals to avoid further irritation of the area.

## 2018-01-27 NOTE — ED NOTES
Patient here because he and his wife ae unable to care for him at home. Patient fell yesterday and has not moved from [facility] since getting home yesterday. Did not even get up to use the bathroom.

## 2018-01-27 NOTE — IP AVS SNAPSHOT
"[address] MEDICAL SURGICAL: [phone]                                              INTERAGENCY TRANSFER FORM - PHYSICIAN ORDERS   2018                    Hospital Admission Date: 2018  MALCOLM SANCHES   : 1950  Sex: Male        Attending Provider: Linda Massey MD     Allergies:  No Known Allergies    Infection:  None   Service:  HOSPITALIST    Ht:  1.778 m (5' 10\")   Wt:  137.5 kg (303 lb 2.1 oz)   Admission Wt:  130.6 kg (288 lb)    BMI:  43.49 kg/m 2   BSA:  2.61 m 2            Patient PCP Information     Provider PCP Type    Julio Cesar Sahu PA-C General      ED Clinical Impression     Diagnosis Description Comment Added By Time Added    Weakness of both lower extremities [R29.898] Weakness of both lower extremities [R29.898]  Gabo Marshall PA-C 2018  5:20 PM    Spinal stenosis of lumbar region, unspecified whether neurogenic claudication present [M48.061] Spinal stenosis of lumbar region, unspecified whether neurogenic claudication present [M48.061]  Gabo Marshall PA-C 2018  5:21 PM    Fall, initial encounter [W19.XXXA] Fall, initial encounter [W19.XXXA]  Gabo Marshall PA-C 2018  5:21 PM    Tear of skin of buttock, unspecified laterality, subsequent encounter [S31.801D] Tear of skin of buttock, unspecified laterality, subsequent encounter [S31.801D]  Gabo Marshall PA-C 2018  5:21 PM    Primary osteoarthritis of right knee [M17.11] Primary osteoarthritis of right knee [M17.11]  Gabo Marshall PA-C 2018  5:22 PM      Hospital Problems as of 2018              Priority Class Noted POA    Hx of coronary artery disease Medium  3/12/2016 Yes    Type 2 diabetes mellitus with other circulatory complication, without long-term current use of insulin (H) Medium  2017 Yes    Venous stasis ulcer of left lower extremity (H) Medium  2017 Yes    [place] (obstructive sleep apnea) Medium  2017 Yes "    Hypertension, goal below 140/90 Medium  9/25/2017 Yes    Hyperlipidemia LDL goal <100 Medium  9/25/2017 Yes    Obesity, morbid, BMI 40.0-49.9 (H) Medium  11/20/2017 Yes    Neuropathy Medium  1/25/2018 Yes    Gastroesophageal reflux disease without esophagitis Medium  1/25/2018 Yes    Falls frequently Medium  1/27/2018 Yes    Weakness of both legs Medium  1/27/2018 Yes    * (Principal)Central spinal stenosis L3-4 and L4-5 Medium  1/27/2018 Yes    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 Medium  1/27/2018 Yes    Lymphedema Medium  1/29/2018 Yes      Non-Hospital Problems as of 1/30/2018              Priority Class Noted    Acute pain of left knee Medium  5/22/2017    Chronic pain of left knee Medium  5/22/2017    Open wound of left lower extremity without complication, initial encounter Medium  5/22/2017    Hx of heart artery stent Medium  5/22/2017    History of coronary artery stent placement Medium  9/25/2017    Advanced directives, counseling/discussion Medium  10/2/2017    Foot drop, right Medium  1/25/2018    Cardiomegaly Medium  1/25/2018      Code Status History     Date Active Date Inactive Code Status Order ID Comments User Context    1/29/2018  1:16 PM  Full Code [id number]  Marc Mancera MD Outpatient    1/27/2018  8:32 PM 1/29/2018  1:16 PM Full Code [id number]  Linda Massey MD Inpatient         Medication Review      START taking        Dose / Directions Comments    HYDROcodone-acetaminophen 5-325 MG per tablet   Commonly known as:  NORCO   Used for:  Spinal stenosis of lumbar region, unspecified whether neurogenic claudication present        Dose:  1 tablet   Take 1 tablet by mouth every 4 hours as needed for other (pain control or improvement in physical function.)   Quantity:  20 tablet   Refills:  0        senna-docusate 8.6-50 MG per tablet   Commonly known as:  SENOKOT-S;PERICOLACE   Used for:  Spinal stenosis, lumbar region, without neurogenic claudication        Dose:  1 tablet    Take 1 tablet by mouth 2 times daily as needed for constipation   Quantity:  100 tablet   Refills:  0          CONTINUE these medications which have NOT CHANGED        Dose / Directions Comments    clopidogrel 75 MG tablet   Commonly known as:  PLAVIX   Used for:  Hypertension, goal below 140/90, Hyperlipidemia LDL goal <100, Type 2 diabetes mellitus with other circulatory complication, without long-term current use of insulin (H)        Dose:  75 mg   Take 1 tablet (75 mg) by mouth daily   Quantity:  90 tablet   Refills:  1        DULoxetine 30 MG EC capsule   Commonly known as:  CYMBALTA   Used for:  Neuropathy, Foot drop, right        Dose:  30 mg   Take 1 capsule (30 mg) by mouth 2 times daily   Quantity:  1 capsule   Refills:  0        ferrous sulfate 325 (65 FE) MG tablet   Commonly known as:  IRON   Used for:  History of anemia        Dose:  325 mg   Take 1 tablet (325 mg) by mouth daily (with breakfast)   Quantity:  90 tablet   Refills:  2        furosemide 40 MG tablet   Commonly known as:  LASIX   Used for:  Hypertension, goal below 140/90        Dose:  40 mg   Take 1 tablet (40 mg) by mouth daily   Quantity:  90 tablet   Refills:  1        lidocaine 5 % ointment   Commonly known as:  XYLOCAINE   Used for:  Chronic pain of right knee        Apply topically daily   Quantity:  50 g   Refills:  3        losartan 25 MG tablet   Commonly known as:  COZAAR   Used for:  Hypertension, goal below 140/90, Type 2 diabetes mellitus with other circulatory complication, without long-term current use of insulin (H)        Dose:  25 mg   Take 1 tablet (25 mg) by mouth daily   Quantity:  90 tablet   Refills:  1        order for DME   Used for:  Type 2 diabetes mellitus with other circulatory complication, without long-term current use of insulin (H)        One touch glucose monitoring strip with Glucometer and lancets.   Quantity:  1 Box   Refills:  1        PROTONIX 40 MG EC tablet   Used for:  Gastroesophageal reflux  disease without esophagitis   Generic drug:  pantoprazole        Dose:  40 mg   Take 1 tablet (40 mg) by mouth daily Take 30-60 minutes before a meal.   Quantity:  1 tablet   Refills:  0        rosuvastatin 10 MG tablet   Commonly known as:  CRESTOR   Used for:  Type 2 diabetes mellitus with other circulatory complication, without long-term current use of insulin (H)        Dose:  10 mg   Take 1 tablet (10 mg) by mouth daily   Quantity:  90 tablet   Refills:  1        SitaGLIPtin-MetFORMIN HCl 100-1000 MG Tb24   Commonly known as:  JANUMET XR   Used for:  Type 2 diabetes mellitus with complication, without long-term current use of insulin (H)        Dose:  1 tablet   Take 1 tablet by mouth daily   Quantity:  30 tablet   Refills:  3        TRIAD HYDROPHILIC WOUND DRESSI Pste   Used for:  Open wound of right lower leg, initial encounter        Dose:  1 applicator   Externally apply 1 g topically daily   Quantity:  1 Tube   Refills:  3                  Further instructions from your care team       Return home medications to patient at discharge. Medications are currently stored in pt's bin in med room.     After Care     Activity - Up with nursing assistance           Advance Diet as Tolerated       Follow this diet upon discharge: Orders Placed This Encounter      Room Service      Moderate Consistent CHO Diet       Fall precautions           General info for SNF       Length of Stay Estimate: Short Term Care: Estimated # of Days <30  Condition at Discharge: Stable  Level of care:skilled   Rehabilitation Potential: Fair  Admission H&P remains valid and up-to-date: Yes  Recent Chemotherapy: N/A  Use Nursing Home Standing Orders: Yes       Glucose monitor nursing POCT       Once daily at varying times before meals and at bedtime       Wound care       Site:   Bilateral lower legs  Instructions:  Triad to right leg wound, cover with dry gauze, then place tubigrip.    Unna boot to left lower leg with silvercell to old  wound and foam padding over the anterior ankle wound             Referrals     Physical Therapy Adult Consult       Evaluate and treat as clinically indicated.    Reason:  Leg weakness, spinal stenosis             Your next 10 appointments already scheduled     Feb 02, 2018  9:00 AM CST   [facility] Visit with Casey Shine MD   [facility]    [address] [place] [id number] [phone]              Follow-Up Appointment Instructions     Future Labs/Procedures    Follow Up and recommended labs and tests     Comments:    Follow up with Nursing home physician.    Follow up with Dr. Shine in 1 week for leg ulcers.    Follow up with Neurosurgery in 1-2 weeks.      Follow-Up Appointment Instructions     Follow Up and recommended labs and tests       Follow up with Nursing home physician.    Follow up with Dr. Shine in 1 week for leg ulcers.    Follow up with Neurosurgery in 1-2 weeks.             Statement of Approval     Ordered          01/30/18 1119  I have reviewed and agree with all the recommendations and orders detailed in this document.  EFFECTIVE NOW     Approved and electronically signed by:  Marc Mancera MD

## 2018-01-27 NOTE — IP AVS SNAPSHOT
"          [address] SURGICAL: [phone]                                              INTERAGENCY TRANSFER FORM - LAB / IMAGING / EKG / EMG RESULTS   2018                    Hospital Admission Date: 2018  MALCOLM SANCHES   : 1950  Sex: Male        Attending Provider: Linda Massey MD     Allergies:  No Known Allergies    Infection:  None   Service:  HOSPITALIST    Ht:  1.778 m (5' 10\")   Wt:  137.5 kg (303 lb 2.1 oz)   Admission Wt:  130.6 kg (288 lb)    BMI:  43.49 kg/m 2   BSA:  2.61 m 2            Patient PCP Information     Provider PCP Type    Julio Ceasr Sahu PA-C General         Lab Results - 3 Days      Glucose by meter [[id number]] (Abnormal)  Resulted: 18 0734, Result status: Final result    Ordering provider: Linda Massey MD  18 0724 Resulting lab: POINT OF CARE TEST, GLUCOSE    Specimen Information    Type Source Collected On     18 0724          Components       Value Reference Range Flag Lab   Glucose 111 70 - 99 mg/dL H 170            Glucose by meter [[id number]] (Abnormal)  Resulted: 18 0155, Result status: Final result    Ordering provider: Linda Massey MD  18 0149 Resulting lab: POINT OF CARE TEST, GLUCOSE    Specimen Information    Type Source Collected On     18 0149          Components       Value Reference Range Flag Lab   Glucose 111 70 - 99 mg/dL H 170            Influenza A/B antigen [[id number]]  Resulted: 18 0013, Result status: Final result    Ordering provider: Armne Wild MD  18 2228 Resulting lab: [facility]    Specimen Information    Type Source Collected On   Nasal Nasal Swab 18 2340          Components       Value Reference Range Flag Lab   Influenza A/B Agn Specimen Nasal   [facility]   Influenza A Negative NEG^Negative  [facility]   Influenza B Negative NEG^Negative  [facility]   Comment:         Test results must be " correlated with clinical data. If necessary, results   should be confirmed by a molecular assay or viral culture.              Glucose by meter [[id number]] (Abnormal)  Resulted: 01/29/18 2055, Result status: Final result    Ordering provider: Linda Massey MD  01/29/18 2048 Resulting lab: POINT OF CARE TEST, GLUCOSE    Specimen Information    Type Source Collected On     01/29/18 2048          Components       Value Reference Range Flag Lab   Glucose 156 70 - 99 mg/dL H 170            Glucose by meter [[id number]]  Resulted: 01/29/18 1712, Result status: Final result    Ordering provider: Linda Massey MD  01/29/18 1705 Resulting lab: POINT OF CARE TEST, GLUCOSE    Specimen Information    Type Source Collected On     01/29/18 1705          Components       Value Reference Range Flag Lab   Glucose 94 70 - 99 mg/dL  170            Urine Culture Aerobic Bacterial [[id number]] (Abnormal)  Resulted: 01/29/18 1348, Result status: Final result    Ordering provider: Linda Massey MD  01/27/18 2145 Resulting lab: INFECTIOUS DISEASE DIAGNOSTIC LABORATORY    Specimen Information    Type Source Collected On   Unspecified Urine  01/27/18 2145          Components       Value Reference Range Flag Lab   Specimen Description Unspecified Urine      Special Requests Specimen received in preservative   75   Culture Micro --  A 225   Result:         50,000 to 100,000 colonies/mL  Candida tropicalis  Susceptibility testing not routinely done              Glucose by meter [[id number]] (Abnormal)  Resulted: 01/29/18 1135, Result status: Final result    Ordering provider: Linda Massey MD  01/29/18 1132 Resulting lab: POINT OF CARE TEST, GLUCOSE    Specimen Information    Type Source Collected On     01/29/18 1132          Components       Value Reference Range Flag Lab   Glucose 113 70 - 99 mg/dL H 170            Glucose by meter [[id number]] (Abnormal)  Resulted: 01/29/18 0736, Result  status: Final result    Ordering provider: Linda Massey MD  01/29/18 0734 Resulting lab: POINT OF CARE TEST, GLUCOSE    Specimen Information    Type Source Collected On     01/29/18 0734          Components       Value Reference Range Flag Lab   Glucose 111 70 - 99 mg/dL H 170            Glucose by meter [[id number]] (Abnormal)  Resulted: 01/29/18 0211, Result status: Final result    Ordering provider: Linda Massey MD  01/29/18 0206 Resulting lab: POINT OF CARE TEST, GLUCOSE    Specimen Information    Type Source Collected On     01/29/18 0206          Components       Value Reference Range Flag Lab   Glucose 128 70 - 99 mg/dL H 170            Glucose by meter [[id number]] (Abnormal)  Resulted: 01/28/18 2100, Result status: Final result    Ordering provider: Linda Massey MD  01/28/18 2054 Resulting lab: POINT OF CARE TEST, GLUCOSE    Specimen Information    Type Source Collected On     01/28/18 2054          Components       Value Reference Range Flag Lab   Glucose 131 70 - 99 mg/dL H 170            Glucose by meter [[id number]] (Abnormal)  Resulted: 01/28/18 1631, Result status: Final result    Ordering provider: Linda Massey MD  01/28/18 1625 Resulting lab: POINT OF CARE TEST, GLUCOSE    Specimen Information    Type Source Collected On     01/28/18 1625          Components       Value Reference Range Flag Lab   Glucose 107 70 - 99 mg/dL H 170            Glucose by meter [[id number]] (Abnormal)  Resulted: 01/28/18 1155, Result status: Final result    Ordering provider: Linda Massey MD  01/28/18 1151 Resulting lab: POINT OF CARE TEST, GLUCOSE    Specimen Information    Type Source Collected On     01/28/18 1151          Components       Value Reference Range Flag Lab   Glucose 134 70 - 99 mg/dL H 170            Glucose by meter [[id number]] (Abnormal)  Resulted: 01/28/18 0756, Result status: Final result    Ordering provider: Linda Massey  MD Gayla  01/28/18 0746 Resulting lab: POINT OF CARE TEST, GLUCOSE    Specimen Information    Type Source Collected On     01/28/18 0746          Components       Value Reference Range Flag Lab   Glucose 126 70 - 99 mg/dL H 170            Basic metabolic panel [[id number]] (Abnormal)  Resulted: 01/28/18 0600, Result status: Final result    Ordering provider: Linda Massey MD  01/28/18 0000 Resulting lab: [facility]    Specimen Information    Type Source Collected On   Blood  01/28/18 0530          Components       Value Reference Range Flag Lab   Sodium 137 133 - 144 mmol/L  [facility]   Potassium 3.7 3.4 - 5.3 mmol/L  [facility]   Chloride 98 94 - 109 mmol/L  [facility]   Carbon Dioxide 31 20 - 32 mmol/L  [facility]   Anion Gap 8 3 - 14 mmol/L  [facility]   Glucose 126 70 - 99 mg/dL H [facility]   Urea Nitrogen 16 7 - 30 mg/dL  [facility]   Creatinine 1.04 0.66 - 1.25 mg/dL  [facility]   GFR Estimate 71 >60 mL/min/1.7m2  [facility]   Comment:  Non  GFR Calc   GFR Estimate If Black 86 >60 mL/min/1.7m2  [facility]   Comment:  African American GFR Calc   Calcium 8.6 8.5 - 10.1 mg/dL  [facility]            Hemoglobin [[id number]] (Abnormal)  Resulted: 01/28/18 0552, Result status: Final result    Ordering provider: Linda Massey MD  01/28/18 0000 Resulting lab: [facility]    Specimen Information    Type Source Collected On   Blood  01/28/18 0530          Components       Value Reference Range Flag Lab   Hemoglobin 10.6 13.3 - 17.7 g/dL L [facility]            UA reflex to Microscopic and Culture [[id number]] (Abnormal)  Resulted: 01/27/18 2218, Result status: Edited Result - FINAL    Ordering provider: Gabo Marshall PA-C  01/27/18 1802 Resulting lab: [facility]    Specimen Information    Type Source Collected On   Unspecified Urine Urine Midstream 01/27/18 2145          Components       Value Reference Range Flag Lab   Color Urine  Yellow   [facility]   Appearance Urine Slightly Cloudy   [facility]   Glucose Urine Negative NEG^Negative mg/dL  [facility]   Bilirubin Urine Negative NEG^Negative  [facility]   Ketones Urine Negative NEG^Negative mg/dL  [facility]   Specific Phelps Urine 1.010 1.003 - 1.035  [facility]   Blood Urine Small NEG^Negative A [facility]   pH Urine 5.0 5.0 - 7.0 pH  [facility]   Protein Albumin Urine Negative NEG^Negative mg/dL  [facility]   Urobilinogen mg/dL 0.0 0.0 - 2.0 mg/dL  [facility]   Nitrite Urine Negative NEG^Negative  [facility]   Leukocyte Esterase Urine Small NEG^Negative A [facility]   Source Unspecified Urine   [facility]   RBC Urine 28 0 - 2 /HPF H [facility]   WBC Urine 72 0 - 2 /HPF H [facility]   Yeast Urine Many NEG^Negative /HPF A [facility]   Mucous Urine Present NEG^Negative /LPF A [facility]   Hyaline Casts 3 0 - 2 /LPF H [facility]            Hemoglobin A1c [[id number]]  Resulted: 01/27/18 2146, Result status: Final result    Ordering provider: Linda Massey MD  01/27/18 2032 Resulting lab: [facility]    Specimen Information    Type Source Collected On   Blood  01/27/18 1825          Components       Value Reference Range Flag Lab   Hemoglobin A1C 5.6 4.3 - 6.0 %  [facility]            Glucose by meter [[id number]] (Abnormal)  Resulted: 01/27/18 2056, Result status: Final result    Ordering provider: Linda Massey MD  01/27/18 2048 Resulting lab: POINT OF CARE TEST, GLUCOSE    Specimen Information    Type Source Collected On     01/27/18 2048          Components       Value Reference Range Flag Lab   Glucose 138 70 - 99 mg/dL H 170            Basic metabolic panel [[id number]] (Abnormal)  Resulted: 01/27/18 1848, Result status: Final result    Ordering provider: Gabo Marshall PA-C  01/27/18 1802 Resulting lab: [facility]    Specimen Information    Type Source Collected On   Blood  01/27/18 1825          Components       Value Reference Range Flag Lab   Sodium 137 133 - 144  mmol/L  [facility]   Potassium 3.8 3.4 - 5.3 mmol/L  [facility]   Chloride 97 94 - 109 mmol/L  [facility]   Carbon Dioxide 30 20 - 32 mmol/L  [facility]   Anion Gap 10 3 - 14 mmol/L  [facility]   Glucose 102 70 - 99 mg/dL H [facility]   Urea Nitrogen 16 7 - 30 mg/dL  [facility]   Creatinine 1.17 0.66 - 1.25 mg/dL  [facility]   GFR Estimate 62 >60 mL/min/1.7m2  [facility]   Comment:  Non  GFR Calc   GFR Estimate If Black 75 >60 mL/min/1.7m2  [facility]   Comment:  African American GFR Calc   Calcium 9.0 8.5 - 10.1 mg/dL  [facility]            CBC with platelets differential [[id number]] (Abnormal)  Resulted: 01/27/18 1836, Result status: Edited Result - FINAL    Ordering provider: Gabo Marsahll PA-C  01/27/18 1802 Resulting lab: [facility]    Specimen Information    Type Source Collected On   Blood  01/27/18 1825          Components       Value Reference Range [facility]   WBC 11.3 4.0 - 11.0 10e9/L H [facility]   RBC Count 4.05 4.4 - 5.9 10e12/L L [facility]   Hemoglobin 11.2 13.3 - 17.7 g/dL L [facility]   Hematocrit 36.8 40.0 - 53.0 % L [facility]   MCV 91 78 - 100 fl  [facility]   MCH 27.7 26.5 - 33.0 pg  [facility]   MCHC 30.4 31.5 - 36.5 g/dL L [facility]   RDW 17.2 10.0 - 15.0 % H [facility]   Platelet Count 389 150 - 450 10e9/L  [facility]   Diff Method Automated Method   [facility]   % Neutrophils 73.0 %  [facility]   % Lymphocytes 10.6 %  [facility]   % Monocytes 13.0 %  [facility]   % Eosinophils 2.9 %  [facility]   % Basophils 0.5 %  [facility]   Absolute Neutrophil 8.2 1.6 - 8.3 10e9/L  [facility]   Absolute Lymphocytes 1.2 0.8 - 5.3 10e9/L  [facility]   Absolute Monocytes 1.5 0.0 - 1.3 10e9/L H [facility]   Absolute Eosinophils 0.3 0.0 - 0.7 10e9/L  [facility]   Absolute Basophils 0.1 0.0 - 0.2 10e9/L  [facility]            Testing Performed By     Lab - Abbreviation Name Director Address Valid Date Range    22 - [facility] Unknown [address] [place] [address] 05/08/15 1057 - Present    75 - Unknown [facility]  [facility] Unknown [address] [address] 01/15/15 1019 - Present    170 - Unknown POINT OF CARE TEST, GLUCOSE Unknown Unknown 10/31/11 1114 - Present    225 - Unknown INFECTIOUS DISEASE DIAGNOSTIC LABORATORY Unknown [address] [address] 12/19/14 0954 - Present            Unresulted Labs     None         Imaging Results - 3 Days      MR Lumbar Spine w/o Contrast [[id number]]  Resulted: 01/28/18 1042, Result status: Final result    Ordering provider: Linda Massey MD  01/27/18 2032 Resulted by: Segun Rosales MD    Performed: 01/28/18 0929 - 01/28/18 1006 Resulting lab: RADIOLOGY RESULTS    Narrative:       MRI LUMBAR SPINE WITHOUT CONTRAST   1/28/2018 10:06 AM     HISTORY: Worsening foot drop, right more than left. Intermittent  incontinence of stool.     TECHNIQUE: Multiplanar multisequence MRI of the lumbar spine without  contrast.    COMPARISON: Lumbar spine CT 1/27/2018.     FINDINGS: There are five lumbar-type vertebral bodies assumed for the  purposes of this dictation. The conus is unremarkable terminating at  the L1-L2 level. The nerve roots of the cauda equina are bunched up  cranial to the L3-L4 level where there is severe spinal canal  stenosis.    Alignment of the lumbar spine is normal. No loss of vertebral body  height. There are no destructive osseous lesions. Mild loss of  intervertebral disc height with disc desiccation throughout all levels  in the lumbar spine aside from the relatively normal-appearing L5-S1  disc. There is anterior osteophytic spurring throughout the lumbar  spine.     Level by level as follows:     T12-L1:  No significant spinal canal or neural foraminal narrowing.     L1-L2:  No significant spinal canal or neural foraminal narrowing.     L2-L3:  Left subarticular and lateral disc bulge with associated  bilateral endplate osteophytic spurring. There is no significant  spinal canal or neural foraminal narrowing.      L3-L4:  Severe central spinal canal narrowing with loss of T2 signal  around the nerve roots at this level. This is caused by moderate  circumferential disc bulge as well as bilateral facet hypertrophy and  ligamentum flavum infolding and fairly prominent posterior epidural  fat. There is moderate bilateral neural foraminal narrowing due to  disc bulge and facet hypertrophy.     L4-L5:  Moderate to severe central spinal canal narrowing due to  marked bilateral facet hypertrophy and small circumferential disc  bulge. There are bilateral facet joint effusions. Mild bilateral  neural foraminal narrowing due to disc bulge and facet hypertrophy.     L5-S1:  Bilateral facet hypertrophy, left greater than right, without  significant spinal canal or neural foraminal narrowing.     Paraspinous soft tissues:  There is posterior paraspinous muscle  atrophy.      Impression:       IMPRESSION:    1. Severe central spinal canal narrowing at the L3-L4 level due to  disc bulge, facet hypertrophy, ligamentum flavum infolding, and  prominent epidural fat.  2. Moderate to severe spinal canal narrowing at the L4-L5 level due to  disc bulge and marked facet hypertrophy.  3. Additional degenerative changes throughout the lumbar spine as  described above.    RENUKA SULLIVAN MD      XR Tibia & Fibula Right 2 Views [[id number]]  Resulted: 01/27/18 1744, Result status: Final result    Ordering provider: Gabo Marshall PA-C  01/27/18 1552 Resulted by: Tasha Gaytan MD    Performed: 01/27/18 1625 - 01/27/18 1627 Resulting lab: RADIOLOGY RESULTS    Narrative:       RIGHT TIBIA-FIBULA TWO VIEWS 1/27/2018 4:27 PM     HISTORY: Fall, right knee to ankle pain.     COMPARISON: None.      Impression:       IMPRESSION: Negative for fracture. Extensive soft tissue  calcifications noted in the leg raising the possibility of chronic  venous insufficiency or unusual autoimmune diseases such as  dermatomyositis.    TASHA GAYTAN MD      XR  Knee Right 3 Views [[id number]]  Resulted: 01/27/18 1744, Result status: Final result    Ordering provider: Gabo Marshall PA-C  01/27/18 1552 Resulted by: Tasha Gaytan MD    Performed: 01/27/18 1625 - 01/27/18 1626 Resulting lab: RADIOLOGY RESULTS    Narrative:       KNEE RIGHT THREE VIEW   1/27/2018 4:26 PM     HISTORY: Fall, right knee pain.     COMPARISON: None.      Impression:       IMPRESSION: Mild tricompartmental osteoarthritis is present most  advanced in the medial compartment. There is no evidence for fracture.  Vascular calcifications also noted.    TASHA GAYTAN MD      CT Lumbar spine w/o contrast* [[id number]]  Resulted: 01/27/18 1743, Result status: Final result    Ordering provider: Gabo Marshall PA-C  01/27/18 1552 Resulted by: Tasha Gaytan MD    Performed: 01/27/18 1600 - 01/27/18 1625 Resulting lab: RADIOLOGY RESULTS    Narrative:       CT LUMBAR SPINE WITHOUT CONTRAST 1/27/2018 4:25 PM     HISTORY: Fall yesterday, low back pain with right LE weakness.     TECHNIQUE: Multiplanar multisequence images were obtained through the  lumbar spine without contrast.    FINDINGS: Sagittal images demonstrate normal posterior alignment. Five  lumbar type vertebral bodies are present. There is no evidence for  fracture. No intraosseous lesions are evident. Degenerative changes  are seen between the spinous processes with hypertrophic spinous  processes noted.    T12-L1: Mild facet hypertrophy. There is no evidence for any  significant stenosis.    L1-L2: Broad-based posterior osteophyte formation, disc bulging and  facet hypertrophy is present but there is no significant stenosis.    L2-L3: Broad-based posterior osteophyte formation, disc bulging and  facet hypertrophy is present. There is asymmetric left posterolateral  osteophyte but there is no significant stenosis.    L3-L4: Broad-based disc bulging and moderate facet and ligamentum  flavum hypertrophy is present causing  moderate to severe central canal  stenosis and mild to moderate bilateral neural foraminal stenosis.    L4-L5: Broad-based disc bulging or disc protrusion is present along  with moderate to severe facet and ligamentum flavum hypertrophy. There  is moderate to severe central canal and bilateral neural foraminal  stenosis.    L5-S1: Moderate facet hypertrophy and minimal posterior osteophyte  formation and disc bulging is present causing moderate bilateral  neural foraminal stenosis but no significant central canal stenosis.    Paraspinal soft tissues: Remarkable for vascular calcifications.      Impression:       IMPRESSION:  1. Multilevel degenerative disc and facet disease most advanced at  L3-L4 and L4-L5 where there is moderate to severe central canal  stenosis. There is also moderate to severe bilateral neural foraminal  stenoses at L4-L5.  2. Hypertrophic spinous processes with degenerative changes between  the spinous processes.  3. No evidence for fracture or any posterior malalignment.    TASHA GAYTAN MD      Testing Performed By     Lab - Abbreviation Name Director Address Valid Date Range    104 - Rad [facility] RADIOLOGY RESULTS Unknown Unknown 02/16/05 1553 - Present            Encounter-Level Documents:     There are no encounter-level documents.      Order-Level Documents:     There are no order-level documents.

## 2018-01-27 NOTE — LETTER
Transition Communication Hand-off for Care Transitions to Next Level of Care Provider    Name: Glenroy Padilla  MRN #: [id number]  Primary Care Provider: Julio Cesar Sahu  Primary Care MD Name: Julio Cesar Rosen  Primary Clinic: [place] [id number] [facility] [id number]  Primary Care Clinic Name: [facility]  Reason for Hospitalization:  Primary osteoarthritis of right knee [M17.11]  Fall, initial encounter [W19.XXXA]  Tear of skin of buttock, unspecified laterality, subsequent encounter [S31.801D]  Weakness of both lower extremities [R29.898]  Spinal stenosis of lumbar region, unspecified whether neurogenic claudication present [M48.061]  Admit Date/Time: 1/27/2018  3:15 PM  Discharge Date: 1/30/18  Payor Source: Pay[place] / [place] [facility] [place] / Product Type: Medicaid /     Readmission Assessment Measure (KRUNAL) Risk Score/category: none             Reason for Communication Hand-off Referral: Fragility  Multiple providers/specialties    Discharge Plan:       Concern for non-adherence with plan of care:   Y/N no  Discharge Needs Assessment:  Needs       Most Recent Value    Transportation Available van, wheelchair accessible    # of Referrals Placed by CTS Post Acute Facilities    Skilled Nursing Facility [facility] [phone], Fax: [phone]    PAS Number [id number]          Already enrolled in Tele-monitoring program and name of program:  no  Follow-up specialty is recommended: Yes    Follow-up plan:  Future Appointments  Date Time Provider Department Center   2/2/2018 9:00 AM Casey Shine MD [facility]       Any outstanding tests or procedures:        Referrals     Future Labs/Procedures    Physical Therapy Adult Consult     Comments:    Evaluate and treat as clinically indicated.    Reason:  Leg weakness, spinal stenosis            Key Recommendations:  Patient will go to [facility] for rehab with hopes to have spine surgery and be able to return  home.    Stacy Navarro    AVS/Discharge Summary is the source of truth; this is a helpful guide for improved communication of patient story

## 2018-01-27 NOTE — IP AVS SNAPSHOT
` `     [address] SURGICAL: [phone]            Medication Administration Report for Glenroy Padilla as of 01/30/18 1138   Legend:    Given Hold Not Given Due Canceled Entry Other Actions    Time Time (Time) Time  Time-Action       Inactive    Active    Linked        Medications 01/24/18 01/25/18 01/26/18 01/27/18 01/28/18 01/29/18 01/30/18    acetaminophen (TYLENOL) Suppository 650 mg  Dose: 650 mg  Freq: EVERY 4 HOURS PRN Route: RE  PRN Reason: mild pain  Start: 01/27/18 2032   Admin Instructions: Alternate ibuprofen (if ordered) with acetaminophen.  Maximum acetaminophen dose from all sources = 75 mg/kg/day not to exceed 4 grams/day.               acetaminophen (TYLENOL) tablet 650 mg  Dose: 650 mg  Freq: EVERY 4 HOURS PRN Route: PO  PRN Reason: mild pain  Start: 01/27/18 2032   Admin Instructions: Alternate ibuprofen (if ordered) with acetaminophen.  Maximum acetaminophen dose from all sources = 75 mg/kg/day not to exceed 4 grams/day.               benzocaine-menthol (CHLORASEPTIC) 6-10 MG lozenge 1 lozenge  Dose: 1 lozenge  Freq: EVERY 1 HOUR PRN Route: BU  PRN Reason: sore throat  Start: 01/29/18 2228         2332 (1 lozenge)-Given            clopidogrel (PLAVIX) tablet 75 mg  Dose: 75 mg  Freq: DAILY Route: PO  Start: 01/28/18 0900        0852 (75 mg)-Given        0810 (75 mg)-Given        0847 (75 mg)-Given           DULoxetine (CYMBALTA) EC capsule 30 mg  Dose: 30 mg  Freq: 2 TIMES DAILY Route: PO  Start: 01/27/18 2100       2132 (30 mg)-Given        0852 (30 mg)-Given       2148 (30 mg)-Given        0810 (30 mg)-Given       2044 (30 mg)-Given        0847 (30 mg)-Given       [ ] 2100           furosemide (LASIX) tablet 40 mg  Dose: 40 mg  Freq: DAILY Route: PO  Start: 01/28/18 0900        0852 (40 mg)-Given        0810 (40 mg)-Given        0847 (40 mg)-Given           glucose 40 % gel 15-30 g  Dose: 15-30 g  Freq: EVERY 15 MIN PRN Route: PO  PRN Reason: low blood sugar  Start:  01/27/18 2032   Admin Instructions: Give 15 g for BG 51 to 69 mg/dL IF patient is conscious and able to swallow. Give 30 g for BG less than or equal to 50 mg/dL IF patient is conscious and able to swallow. Do NOT give glucose gel via enteral tube.  IF patient has enteral tube: give apple juice 120 mL (4 oz or 15 g of CHO) via enteral tube for BG 51 to 69 mg/dL.  Give apple juice 240 mL (8 oz or 30 g of CHO) via enteral tube for BG less than or equal to 50 mg/dL.    ~Oral gel is preferable for conscious and able to swallow patient.   ~IF gel unavailable or patient refuses may provide apple juice 120 mL (4 oz or 15 g of CHO). Document juice on I and O flowsheet.              Or  dextrose 50 % injection 25-50 mL  Dose: 25-50 mL  Freq: EVERY 15 MIN PRN Route: IV  PRN Reason: low blood sugar  Start: 01/27/18 2032   Admin Instructions: Use if have IV access, BG less than 70 mg/dL and meet dose criteria below:  Dose if conscious and alert (or disorientated) and NPO = 25 mL  Dose if unconscious / not alert = 50 mL  Vesicant. For ordered doses up to 25 mg, give IV Push undiluted. Give each 5g over 1 minute.              Or  glucagon injection 1 mg  Dose: 1 mg  Freq: EVERY 15 MIN PRN Route: SC  PRN Reason: low blood sugar  PRN Comment: May repeat x 1 only  Start: 01/27/18 2032   Admin Instructions: May give SQ or IM. ONLY use glucagon IF patient has NO IV access AND is UNABLE to swallow AND blood glucose is LESS than or EQUAL to 50 mg/dL.  Give IV Push over 1 minute. Reconstitute with 1mL sterile water.               HYDROcodone-acetaminophen (NORCO) 5-325 MG per tablet 1 tablet  Dose: 1 tablet  Freq: EVERY 4 HOURS PRN Route: PO  PRN Reason: other  PRN Comment: pain control or improvement in physical function.  Start: 01/27/18 2032   Admin Instructions: Start with the lowest dose.    May adjust dose by 1 tablet every 4 hours as needed for pain control or improvement in physical function.  Hold dose for analgesic side effects.   Notify provider to assess for uncontrolled pain or analgesic side effects.  Maximum acetaminophen dose from all sources= 75 mg/kg/day not to exceed 4 grams               insulin aspart (NovoLOG) inj (RAPID ACTING)  Dose: 1-3 Units  Freq: AT BEDTIME Route: SC  Start: 01/27/18 2100   Admin Instructions: Patient may be liable for high cost of pen while on observation status, request from pharmacy if patient wants this medication.<br>LOW INSULIN RESISTANCE DOSING  <br>Do Not give Bedtime Correction Insulin if BG less than 200. <br>For  - 299 give 1 unit. <br>For  - 399 give 2 units <br>For BG greater than or equal 400 give 3 units. <br>Notify provider if glucose greater than or equal to 350 mg/dL after administration of correction dose.  If given at mealtime, must be administered 5 min before meal or immediately after.        (2116)-Not Given        (2144)-Not Given        (2054)-Not Given        [ ] 2100           insulin aspart (NovoLOG) inj (RAPID ACTING)  Dose: 1-3 Units  Freq: 3 TIMES DAILY BEFORE MEALS Route: SC  Start: 01/28/18 0800   Admin Instructions: Patient may be liable for high cost of pen while on observation status, request from pharmacy if patient wants this medication.<br>Correction Scale - LOW INSULIN RESISTANCE DOSING   <br>Do Not give Correction Insulin if Pre-Meal BG less than 140. <br>For Pre-Meal  - 239 give 1 unit. <br>For Pre-Meal  - 339 give 2 units. <br>For Pre-Meal BG greater than or equal to 340 give 3 units. <br>To be given with prandial insulin, and based on pre-meal blood glucose. <br>Notify provider if glucose greater than or equal to 350 mg/dL after administration of correction dose.  If given at mealtime, must be administered 5 min before meal or immediately after.         (0815)-Not Given       (1219)-Not Given       (1654)-Not Given        (0807)-Not Given       (1151)-Not Given       (1718)-Not Given        (0848)-Not Given       [ ] 1200       [ ] 1700            losartan (COZAAR) tablet 25 mg  Dose: 25 mg  Freq: DAILY Route: PO  Start: 01/28/18 0900        0852 (25 mg)-Given        0810 (25 mg)-Given        0847 (25 mg)-Given           melatonin tablet 1 mg  Dose: 1 mg  Freq: AT BEDTIME PRN Route: PO  PRN Reason: sleep  Start: 01/27/18 2032   Admin Instructions: Do not give unless at least 6 hours of uninterrupted sleep is expected.               miconazole (MICATIN; MICRO GUARD) 2 % powder  Freq: 2 TIMES DAILY Route: Top  Start: 01/28/18 0045   Admin Instructions: Apply to erythematous areas of his groin folds         0105 ( )-Given       0852 ( )-Given       2148 ( )-Given        0946 ( )-Given       2054 ( )-Given        0848 ( )-Given       [ ] 2100           naloxone (NARCAN) injection 0.1-0.4 mg  Dose: 0.1-0.4 mg  Freq: EVERY 2 MIN PRN Route: IV  PRN Reason: opioid reversal  Start: 01/27/18 2032   Admin Instructions: For respiratory rate LESS than or EQUAL to 8.  Partial reversal dose:  0.1 mg titrated q 2 minutes for Analgesia Side Effects Monitoring Sedation Level of 3 (frequently drowsy, arousable, drifts to sleep during conversation).Full reversal dose:  0.4 mg bolus for Analgesia Side Effects Monitoring Sedation Level of 4 (somnolent, minimal or no response to stimulation).  For ordered doses up to 2mg give IVP. Give each 0.4mg over 15 seconds in emergency situations. For non-emergent situations further dilute in 9mL of NS to facilitate titration of response.               ondansetron (ZOFRAN-ODT) ODT tab 4 mg  Dose: 4 mg  Freq: EVERY 6 HOURS PRN Route: PO  PRN Reasons: nausea,vomiting  Start: 01/27/18 2032   Admin Instructions: This is Step 1 of nausea and vomiting management.  If nausea not resolved in 15 minutes, go to Step 2 prochlorperazine (COMPAZINE). Do not push through foil backing. Peel back foil and gently remove. Place on tongue immediately. Administration with liquid unnecessary  With dry hands, peel back foil backing and gently remove tablet;  do not push oral disintegrating tablet through foil backing; administer immediately on tongue and oral disintegrating tablet dissolves in seconds; then swallow with saliva; liquid not required.              Or  ondansetron (ZOFRAN) injection 4 mg  Dose: 4 mg  Freq: EVERY 6 HOURS PRN Route: IV  PRN Reasons: nausea,vomiting  Start: 01/27/18 2032   Admin Instructions: This is Step 1 of nausea and vomiting management.  If nausea not resolved in 15 minutes, go to Step 2 prochlorperazine (COMPAZINE).  Irritant. For ordered doses up to 4 mg, give IV Push undiluted over 2-5 minutes.               prochlorperazine (COMPAZINE) injection 5 mg  Dose: 5 mg  Freq: EVERY 6 HOURS PRN Route: IV  PRN Reasons: nausea,vomiting  Start: 01/27/18 2032   Admin Instructions: This is Step 2 of nausea and vomiting management. Give if nausea not resolved 15 minutes after giving ondansetron (ZOFRAN). If nausea not resolved in 15 minutes, go to Step 3 metoclopramide (REGLAN), if ordered.  For ordered doses up to 10 mg, give IV Push undiluted. Each 5mg over 1 minute.              Or  prochlorperazine (COMPAZINE) tablet 5 mg  Dose: 5 mg  Freq: EVERY 6 HOURS PRN Route: PO  PRN Reason: vomiting  Start: 01/27/18 2032   Admin Instructions: This is Step 2 of nausea and vomiting management. Give if nausea not resolved 15 minutes after giving ondansetron (ZOFRAN). If nausea not resolved in 15 minutes, go to Step 3 metoclopramide (REGLAN), if ordered.              Or  prochlorperazine (COMPAZINE) Suppository 12.5 mg  Dose: 12.5 mg  Freq: EVERY 12 HOURS PRN Route: RE  PRN Reasons: nausea,vomiting  Start: 01/27/18 2032   Admin Instructions: This is Step 2 of nausea and vomiting management. Give if nausea not resolved 15 minutes after giving ondansetron (ZOFRAN). If nausea not resolved in 15 minutes, go to Step 3 metoclopramide (REGLAN), if ordered.               senna-docusate (SENOKOT-S;PERICOLACE) 8.6-50 MG per tablet 1 tablet  Dose: 1 tablet  Freq: 2  TIMES DAILY PRN Route: PO  PRN Reason: constipation  Start: 01/27/18 2032   Admin Instructions: If no bowel movement in 24 hours, increase to 2 tablets PO.  Hold for loose stools.  This is the first step of a three step constipation treatment.              Or  senna-docusate (SENOKOT-S;PERICOLACE) 8.6-50 MG per tablet 2 tablet  Dose: 2 tablet  Freq: 2 TIMES DAILY PRN Route: PO  PRN Reason: constipation  Start: 01/27/18 2032   Admin Instructions: Hold for loose stools.  This is the first step of a three step constipation treatment.               SitaGLIPtin-MetFORMIN HCl  MG TB24 2 tablet  Dose: 2 tablet  Freq: DAILY WITH BREAKFAST Route: PO  Start: 01/28/18 0800   Admin Instructions: Do not crush. If the patient receives intravenous, iodinated contrast, hold metformin for 24 hours before AND 48 hours after procedure. Contact pharmacy if no hold order is written.<br>*Patient supplied medication per MD request - verified and relabeled by pharmacy*         0853 (2 tablet)-Given        0811 (2 tablet)-Given        0847 (2 tablet)-Given           TRIAD HYDROPHILIC WOUND DRESSI PSTE 1 g  Dose: 1 applicator  Freq: DAILY Route: EX  Start: 01/28/18 0900        0853 (1 g)-Given        0946 (1 g)-Given        0848 (1 g)-Given          Discontinued Medications  Medications 01/24/18 01/25/18 01/26/18 01/27/18 01/28/18 01/29/18 01/30/18         Dose: 1 tablet  Freq: DAILY Route: PO  Start: 01/28/18 0900   End: 01/27/18 2140   Admin Instructions: Do not crush. If the patient receives intravenous, iodinated contrast, hold metformin for 24 hours before AND 48 hours after procedure. Contact pharmacy if no hold order is written.        2140-Med Discontinued

## 2018-01-27 NOTE — ED PROVIDER NOTES
History     Chief Complaint   Patient presents with     Generalized Weakness     HPI  Glenroy Padilla is a 67 year old male who presents for evaluation of lower extremity weakness and inability to care for himself at home. Patient was seen in the ED by myself yesterday on 1/26/18. At that time, she was evaluated for a skin tear of the buttocks that was bleeding due to administration of Plavix. Bleeding had stabilized by the time he presented to the ED. She did not have any other abnormal exam findings. The area was cleansed and dressed with antibiotic ointment and a dressing. We did discuss an observation stay at that time, but the patient and his wife thought that they would be okay at home. It apparently took him 3 hours to get into the house last night. He almost fell 2 different times with trying to get into the house. Over the last 24 hours, he has been unable to move. Unable to bear any weight due to lack of strength in the lower extremities. He feels like the strength is even worse than usual in his right lower extremity. Now he questions if his right leg got caught underneath him when he fell. Yesterday he did not recall this detail. He states that he is having increased right knee and lower leg pain. He questions if this is causing the weakness. It is difficult as he has underlying diabetic neuropathy. Patient also states that he has developed a bruise of the right lower back. He does have some low back pain associated with this. Denies any loss of bowel/bladder function.  Past history significant for bilateral severe osteoarthritis that he has battled with for over 10 years. He was said to have total knee replacement, but this was delayed 2 years ago due to discovery of coronary artery disease and need for drug-eluting stent placement. His orthopedist has not wanted to perform surgeries since then due to his comorbid medical conditions.  He feels that his lower extremity strength has slowly, but  progressively worsened over the last 2 years due to his knee condition and neuropathy.        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 01/27/2018     Priority: Medium     Neuropathy 01/25/2018     Priority: Medium     Foot drop, right 01/25/2018     Priority: Medium     Cardiomegaly 01/25/2018     Priority: Medium     Gastroesophageal reflux disease without esophagitis 01/25/2018     Priority: Medium     Obesity, morbid, BMI 40.0-49.9 (H) 11/20/2017     Priority: Medium     Advanced directives, counseling/discussion 10/02/2017     Priority: Medium     Will bring in a copy       Hypertension, goal below 140/90 09/25/2017     Priority: Medium     Hyperlipidemia LDL goal <100 09/25/2017     Priority: Medium     History of coronary artery stent placement 09/25/2017     Priority: Medium     Type 2 diabetes mellitus with other circulatory complication, without long-term current use of insulin (H) 05/22/2017     Priority: Medium     Venous stasis ulcer of left lower extremity (H) 05/22/2017     Priority: Medium     Acute pain of left knee 05/22/2017     Priority: Medium     Chronic pain of left knee 05/22/2017     Priority: Medium     Open wound of left lower extremity without complication, initial encounter 05/22/2017     Priority: Medium     Hx of heart artery stent 05/22/2017     Priority: Medium     DAYTON (obstructive sleep apnea) 05/22/2017     Priority: Medium     Hx of coronary artery disease 03/12/2016     Priority: Medium        Past Medical History:    Past Medical History:   Diagnosis Date     Acute pain of left knee 5/22/2017     Chronic pain of right knee 5/22/2017     Hx of coronary artery disease 5/22/2017     Hx of heart artery stent 5/22/2017     Obesity,  "morbid, BMI 40.0-49.9 (H) 11/20/2017     Open wound of left lower extremity without complication, initial encounter 5/22/2017     DAYTON (obstructive sleep apnea) 5/22/2017     Type 2 diabetes mellitus with other circulatory complication, without long-term current use of insulin (H) 5/22/2017     Venous stasis ulcer of left lower extremity (H) 5/22/2017     Venous stasis ulcer of left lower extremity (H) 5/22/2017       Past Surgical History:    History reviewed. No pertinent surgical history.    Family History:    No family history on file.    Social History:  Marital Status:  Single [1]  Social History   Substance Use Topics     Smoking status: Current Some Day Smoker     Types: Cigars     Smokeless tobacco: Never Used     Alcohol use No        Medications:      No current outpatient prescriptions on file.      Review of Systems   All other systems reviewed and are negative.      Physical Exam   BP: 115/67  Pulse: 93  Heart Rate: 97  Temp: 98.7  F (37.1  C)  Resp: 18  Height: 177.8 cm (5' 10\")  Weight: 130.6 kg (288 lb)  SpO2: 96 %      Physical Exam   Nursing note and vitals reviewed.  Generally healthy appearing male in NAD who is active and non-toxic appearing.   Morbidly obese. Lying supine in the bed. Brought in by EMS.  Skin:  No rashes or lesions are noted on inspection of the torso, face, and upper extremities.   Head: Normocephalic, atraumatic, nontender to palpation  Eyes: PERRLA, conjunctiva and sclera clear  Ears: Bilateral TM's and canals are clear.  TM's translucent without erythema or effusion.  Nose: Nares normal and patent bilaterally.  Mucous membranes are non-erythematous and non-edematous.  No sinus tenderness.  Throat: Mucous membranes are clear.  No tonsilar hypertrophy, exudate, or erythema.  Neck: Supple.  FROM without pain.  No adenopathy.  No thyromegaly.   Heart:  RRR with normal S1 and S2.  No S3 or S4.  No murmur, rub, gallop, or click.  PMI is nondisplaced.   Lungs:  CTA bilaterally " without wheezes, rales, or rhonchi.  Good breath sounds heard throughout all lung fields.  Tympanitic to percussion with no areas of dullness.   Abdomen: Positive bowel sounds in all four quadrants.  No tenderness to palpation throughout.  No rebound and no guarding.  No hepatosplenomegaly.  No masses.   Lumbar spine - tender to palpation over L4 to S1. A bruise of the right posterior pelvic area, but no significant tenderness in this area.  Right leg: Tender to palpation over the medial joint line of the right knee. Pain with any range of motion of the knee. Tender to palpation over the proximal to the mid anterior tibia. No foot or ankle tenderness. Normal range of motion of the foot and ankle. Patient has decreased strength with knee flexion and extension. He can plantarflex and dorsiflex the foot, but the strength is decreased on the right side. Sensation significantly decreased to light touch due to his neuropathy.  Neuro:  Cranial nerves II-XII grossly intact.Cerebellar testing is WNL's.  Sensation greatly decreased in the bilateral lower extremities from about the knee down. Not following a dermatomal pattern.  Patellar DTRs not performed given the significant knee discomfort that he has at this time.  No saddle anesthesia on exam. He does feel light touch sensation in the genital area.        ED Course     ED Course     Patient is having increased pain in his right knee and right lower leg since the fall yesterday. He certainly could have suffered a more significant injury and was not fully aware of his condition previously due to the neuropathy. We are going to x-ray his knee and tib-fib/fib. He also has increased discomfort in the low back with increased weakness of the right lower extremity. Therefore, we will investigate with a lumbar CT for further evaluation.  The patient is unable to care for himself, and his family is unable to care for him at home. They are unable to move him. He is unable to bear  weight on his own. They cannot get him to the bathroom.. Therefore, he will not be able to return home. We trialed outpatient management for his symptoms over the last 24 hours, but this failed.    Procedures               Critical Care time:  none        Results for orders placed or performed during the hospital encounter of 01/27/18   CT Lumbar spine w/o contrast*    Narrative    CT LUMBAR SPINE WITHOUT CONTRAST 1/27/2018 4:25 PM     HISTORY: Fall yesterday, low back pain with right LE weakness.     TECHNIQUE: Multiplanar multisequence images were obtained through the  lumbar spine without contrast.    FINDINGS: Sagittal images demonstrate normal posterior alignment. Five  lumbar type vertebral bodies are present. There is no evidence for  fracture. No intraosseous lesions are evident. Degenerative changes  are seen between the spinous processes with hypertrophic spinous  processes noted.    T12-L1: Mild facet hypertrophy. There is no evidence for any  significant stenosis.    L1-L2: Broad-based posterior osteophyte formation, disc bulging and  facet hypertrophy is present but there is no significant stenosis.    L2-L3: Broad-based posterior osteophyte formation, disc bulging and  facet hypertrophy is present. There is asymmetric left posterolateral  osteophyte but there is no significant stenosis.    L3-L4: Broad-based disc bulging and moderate facet and ligamentum  flavum hypertrophy is present causing moderate to severe central canal  stenosis and mild to moderate bilateral neural foraminal stenosis.    L4-L5: Broad-based disc bulging or disc protrusion is present along  with moderate to severe facet and ligamentum flavum hypertrophy. There  is moderate to severe central canal and bilateral neural foraminal  stenosis.    L5-S1: Moderate facet hypertrophy and minimal posterior osteophyte  formation and disc bulging is present causing moderate bilateral  neural foraminal stenosis but no significant central canal  stenosis.    Paraspinal soft tissues: Remarkable for vascular calcifications.      Impression    IMPRESSION:  1. Multilevel degenerative disc and facet disease most advanced at  L3-L4 and L4-L5 where there is moderate to severe central canal  stenosis. There is also moderate to severe bilateral neural foraminal  stenoses at L4-L5.  2. Hypertrophic spinous processes with degenerative changes between  the spinous processes.  3. No evidence for fracture or any posterior malalignment.    TASHA GAYTAN MD   XR Knee Right 3 Views    Narrative    KNEE RIGHT THREE VIEW   1/27/2018 4:26 PM     HISTORY: Fall, right knee pain.     COMPARISON: None.      Impression    IMPRESSION: Mild tricompartmental osteoarthritis is present most  advanced in the medial compartment. There is no evidence for fracture.  Vascular calcifications also noted.    TASHA GAYTAN MD   XR Tibia & Fibula Right 2 Views    Narrative    RIGHT TIBIA-FIBULA TWO VIEWS 1/27/2018 4:27 PM     HISTORY: Fall, right knee to ankle pain.     COMPARISON: None.      Impression    IMPRESSION: Negative for fracture. Extensive soft tissue  calcifications noted in the leg raising the possibility of chronic  venous insufficiency or unusual autoimmune diseases such as  dermatomyositis.    TASHA GAYTAN MD   CBC with platelets differential   Result Value Ref Range    WBC 11.3 (H) 4.0 - 11.0 10e9/L    RBC Count 4.05 (L) 4.4 - 5.9 10e12/L    Hemoglobin 11.2 (L) 13.3 - 17.7 g/dL    Hematocrit 36.8 (L) 40.0 - 53.0 %    MCV 91 78 - 100 fl    MCH 27.7 26.5 - 33.0 pg    MCHC 30.4 (L) 31.5 - 36.5 g/dL    RDW 17.2 (H) 10.0 - 15.0 %    Platelet Count 389 150 - 450 10e9/L    Diff Method Automated Method     % Neutrophils 73.0 %    % Lymphocytes 10.6 %    % Monocytes 13.0 %    % Eosinophils 2.9 %    % Basophils 0.5 %    Absolute Neutrophil 8.2 1.6 - 8.3 10e9/L    Absolute Lymphocytes 1.2 0.8 - 5.3 10e9/L    Absolute Monocytes 1.5 (H) 0.0 - 1.3 10e9/L    Absolute Eosinophils 0.3 0.0 - 0.7  10e9/L    Absolute Basophils 0.1 0.0 - 0.2 10e9/L   Basic metabolic panel   Result Value Ref Range    Sodium 137 133 - 144 mmol/L    Potassium 3.8 3.4 - 5.3 mmol/L    Chloride 97 94 - 109 mmol/L    Carbon Dioxide 30 20 - 32 mmol/L    Anion Gap 10 3 - 14 mmol/L    Glucose 102 (H) 70 - 99 mg/dL    Urea Nitrogen 16 7 - 30 mg/dL    Creatinine 1.17 0.66 - 1.25 mg/dL    GFR Estimate 62 >60 mL/min/1.7m2    GFR Estimate If Black 75 >60 mL/min/1.7m2    Calcium 9.0 8.5 - 10.1 mg/dL   UA reflex to Microscopic and Culture   Result Value Ref Range    Color Urine Yellow     Appearance Urine Slightly Cloudy     Glucose Urine Negative NEG^Negative mg/dL    Bilirubin Urine Negative NEG^Negative    Ketones Urine Negative NEG^Negative mg/dL    Specific Gravity Urine 1.010 1.003 - 1.035    Blood Urine Small (A) NEG^Negative    pH Urine 5.0 5.0 - 7.0 pH    Protein Albumin Urine Negative NEG^Negative mg/dL    Urobilinogen mg/dL 0.0 0.0 - 2.0 mg/dL    Nitrite Urine Negative NEG^Negative    Leukocyte Esterase Urine Small (A) NEG^Negative    Source Unspecified Urine     RBC Urine 28 (H) 0 - 2 /HPF    WBC Urine 72 (H) 0 - 2 /HPF    Yeast Urine Many (A) NEG^Negative /HPF    Mucous Urine Present (A) NEG^Negative /LPF    Hyaline Casts 3 (H) 0 - 2 /LPF   Hemoglobin A1c   Result Value Ref Range    Hemoglobin A1C 5.6 4.3 - 6.0 %   Glucose by meter   Result Value Ref Range    Glucose 138 (H) 70 - 99 mg/dL           Labs Ordered and Resulted from Time of ED Arrival Up to the Time of Departure from the ED   CBC WITH PLATELETS DIFFERENTIAL - Abnormal; Notable for the following:        Result Value    WBC 11.3 (*)     RBC Count 4.05 (*)     Hemoglobin 11.2 (*)     Hematocrit 36.8 (*)     MCHC 30.4 (*)     RDW 17.2 (*)     Absolute Monocytes 1.5 (*)     All other components within normal limits   BASIC METABOLIC PANEL - Abnormal; Notable for the following:     Glucose 102 (*)     All other components within normal limits   PERIPHERAL IV CATHETER        Assessments & Plan (with Medical Decision Making)     Weakness of both lower extremities  Spinal stenosis of lumbar region, unspecified whether neurogenic claudication present  Fall, initial encounter  Tear of skin of buttock, unspecified laterality, subsequent encounter  Primary osteoarthritis of right knee     67 year old male with a history of hypertension, CAD, type 2 diabetes, and diabetic neuropathy who presents for evaluation of worsening right lower extremity weakness, low back pain, buttock pain and given the recent buttock skin tear, and right lower leg pain. He was seen in the ED yesterday for his fall and was noted to have 2 buttock skin tears that achieved hemostasis with pressure and time. No significant hemoglobin loss at that time. He has not had any bleeding from the area since then. His main concern is increasing lower extremity weakness which has made it impossible for him to bear his weight. His family is unable to lift him and to provide his cares. He was unable to get into a vehicle at home, therefore EMS had to bring him to the ED. On exam pulse 93, temperature 98.7, and oxygen saturation of 96% on room air. Lying supine in the bed. He has some tenderness over the lumbar spine spinous processes of L3 to S1. Slight bruise at the right posterior pelvic area, but does not have any tenderness to palpation in that area. Tenderness of the medial knee and anterior tibial area. Lumbar CT performed and this displayed central canal stenosis from L3 to L5 which does include some right foraminal stenosis likely causing some of his increased radicular symptoms into his right lower extremity.  X-ray of the knee shows mild medial degenerative joint disease but no new fracture. Tib-fib x-ray negative for fracture.  CBC with stable hemoglobin suggesting no further blood loss. Basic metabolic panel with normal electrolytes and stable renal function.   Patient has had troubles getting up to use the  restroom due to his lower extremity weakness and pain. He is able to control release of his bladder, but does not recognize when he is finished urinating. These symptoms have been ongoing for quite some time and have not shown any acute change. The recent change is the increased weakness in the lower extremities which have been presumed by his PCP to be related to his knee osteoarthritis. Based on today's exam and CT findings, I think his lower extremity weakness are more suggestive of lumbar disc disease with central canal stenosis and foraminal stenosis. He will need further evaluation for this with an MRI of the lumbar spine. I consulted with Dr. Massey, inpatient hospitalist, and she saw the patient here in the ED. She spoke with neurosurgery at [facility] who stated that the patient did not require emergent MRI of the lumbar spine, rather that this could be done the following day. He will need to see neurosurgery this upcoming week for a full evaluation as well. If his symptoms acutely change throughout the night, then he would need to be transferred for a more emergent MRI evaluation to rule out formal cauda equina syndrome.   Dr Massey agreed to accept his care for an observation stay until a rehabilitation facility is found for him given his inability to care for himself at home. The patient was in agreement with this plan. Dr. Hernandez, ZACHARY MCGEE, was involved with patient's care as well.      I have reviewed the nursing notes.    I have reviewed the findings, diagnosis, plan and need for follow up with the patient.       Current Discharge Medication List          Final diagnoses:   Weakness of both lower extremities   Spinal stenosis of lumbar region, unspecified whether neurogenic claudication present   Fall, initial encounter   Tear of skin of buttock, unspecified laterality, subsequent encounter   Primary osteoarthritis of right knee       Disclaimer: This note consists of symbols derived  from keyboarding, dictation and/or voice recognition software. As a result, there may be errors in the script that have gone undetected. Please consider this when interpreting information found in this chart.      1/27/2018   Gabo Marshall PA-C   [facility] EMERGENCY DEPARTMENT     Gabo Marshall PA-C  01/27/18 [id number]

## 2018-01-28 ENCOUNTER — APPOINTMENT (OUTPATIENT)
Dept: MRI IMAGING | Facility: CLINIC | Age: 68
End: 2018-01-28
Attending: FAMILY MEDICINE
Payer: MEDICAID

## 2018-01-28 ENCOUNTER — APPOINTMENT (OUTPATIENT)
Dept: PHYSICAL THERAPY | Facility: CLINIC | Age: 68
End: 2018-01-28
Payer: MEDICAID

## 2018-01-28 LAB
ANION GAP SERPL CALCULATED.3IONS-SCNC: 8 MMOL/L (ref 3–14)
BUN SERPL-MCNC: 16 MG/DL (ref 7–30)
CALCIUM SERPL-MCNC: 8.6 MG/DL (ref 8.5–10.1)
CHLORIDE SERPL-SCNC: 98 MMOL/L (ref 94–109)
CO2 SERPL-SCNC: 31 MMOL/L (ref 20–32)
CREAT SERPL-MCNC: 1.04 MG/DL (ref 0.66–1.25)
GFR SERPL CREATININE-BSD FRML MDRD: 71 ML/MIN/1.7M2
GLUCOSE BLDC GLUCOMTR-MCNC: 107 MG/DL (ref 70–99)
GLUCOSE BLDC GLUCOMTR-MCNC: 126 MG/DL (ref 70–99)
GLUCOSE BLDC GLUCOMTR-MCNC: 131 MG/DL (ref 70–99)
GLUCOSE BLDC GLUCOMTR-MCNC: 134 MG/DL (ref 70–99)
GLUCOSE SERPL-MCNC: 126 MG/DL (ref 70–99)
HGB BLD-MCNC: 10.6 G/DL (ref 13.3–17.7)
POTASSIUM SERPL-SCNC: 3.7 MMOL/L (ref 3.4–5.3)
SODIUM SERPL-SCNC: 137 MMOL/L (ref 133–144)

## 2018-01-28 PROCEDURE — 72148 MRI LUMBAR SPINE W/O DYE: CPT

## 2018-01-28 PROCEDURE — 25000132 ZZH RX MED GY IP 250 OP 250 PS 637: Performed by: INTERNAL MEDICINE

## 2018-01-28 PROCEDURE — G8978 MOBILITY CURRENT STATUS: HCPCS | Mod: GP,CM | Performed by: PHYSICAL THERAPIST

## 2018-01-28 PROCEDURE — 80048 BASIC METABOLIC PNL TOTAL CA: CPT | Performed by: FAMILY MEDICINE

## 2018-01-28 PROCEDURE — 85018 HEMOGLOBIN: CPT | Performed by: FAMILY MEDICINE

## 2018-01-28 PROCEDURE — 97163 PT EVAL HIGH COMPLEX 45 MIN: CPT | Mod: GP | Performed by: PHYSICAL THERAPIST

## 2018-01-28 PROCEDURE — 99225 ZZC SUBSEQUENT OBSERVATION CARE,LEVEL II: CPT | Performed by: FAMILY MEDICINE

## 2018-01-28 PROCEDURE — G8980 MOBILITY D/C STATUS: HCPCS | Mod: GP,CM | Performed by: PHYSICAL THERAPIST

## 2018-01-28 PROCEDURE — G0378 HOSPITAL OBSERVATION PER HR: HCPCS

## 2018-01-28 PROCEDURE — 00000146 ZZHCL STATISTIC GLUCOSE BY METER IP

## 2018-01-28 PROCEDURE — 36415 COLL VENOUS BLD VENIPUNCTURE: CPT | Performed by: FAMILY MEDICINE

## 2018-01-28 PROCEDURE — 25000132 ZZH RX MED GY IP 250 OP 250 PS 637: Performed by: FAMILY MEDICINE

## 2018-01-28 PROCEDURE — 40000718 ZZHC STATISTIC PT DEPARTMENT ORTHO VISIT: Performed by: PHYSICAL THERAPIST

## 2018-01-28 PROCEDURE — 25000125 ZZHC RX 250: Performed by: FAMILY MEDICINE

## 2018-01-28 RX ADMIN — MICONAZOLE NITRATE: 2 POWDER TOPICAL at 01:05

## 2018-01-28 RX ADMIN — DULOXETINE HYDROCHLORIDE 30 MG: 30 CAPSULE, DELAYED RELEASE ORAL at 21:48

## 2018-01-28 RX ADMIN — FUROSEMIDE 40 MG: 40 TABLET ORAL at 08:52

## 2018-01-28 RX ADMIN — DULOXETINE HYDROCHLORIDE 30 MG: 30 CAPSULE, DELAYED RELEASE ORAL at 08:52

## 2018-01-28 RX ADMIN — MICONAZOLE NITRATE: 2 POWDER TOPICAL at 21:48

## 2018-01-28 RX ADMIN — CLOPIDOGREL BISULFATE 75 MG: 75 TABLET, FILM COATED ORAL at 08:52

## 2018-01-28 RX ADMIN — Medication 1 G: at 08:53

## 2018-01-28 RX ADMIN — MICONAZOLE NITRATE: 2 POWDER TOPICAL at 08:52

## 2018-01-28 RX ADMIN — LOSARTAN POTASSIUM 25 MG: 25 TABLET, FILM COATED ORAL at 08:52

## 2018-01-28 NOTE — PROGRESS NOTES
[facility] Progress Note          Assessment and Plan:   Assessment:   Active Problems:    Central spinal stenosis L3-4 and L4-5    Assessment: Patient's symptoms have gotten to the point that he does fall frequently and has foot drop and weakness.  MRI shows severe stenosis in L3-4 and moderate to sever narrowing at L4-5    Plan: patient will need expedited work up as an outpatient this week - Dr. Fox, neurosurgeon on call for the [facility], will be assisting in setting up with expedited work up.  Physical therapy has evaluated and TCU is planned at time of discharge - patient is medically stable to leave as soon as placement has been found.        Falls frequently    Assessment: likely secondary to stenosis as above in addition to deconditioning    Plan: discharge to TCU with plan as above      Weakness of both legs    Assessment: progressively worsening in the past 2-4 months, no thought likely to the severe spinal stenosis found on CT and confirmed on MRI.      Plan: discharge to TCU with outpatient work-up this week as above      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    Assessment: noted on CT and MRI    Plan: ongoing outpatient work up as above      Type 2 diabetes mellitus with other circulatory complication, without long-term current use of insulin (H)    Assessment: hemoglobin A1C is 5.6 on check this hospital stay     Plan: continue with home regimen of Janumet but monitor blood sugars during this stay - may consider reduction of dose if there is concern for hypoglycemia development       Venous stasis ulcer of left lower extremity (H)    Assessment: chronic, followed by Dr. Shine as an outpatient    Plan: plan to continue outpatient follow up      Neuropathy    Assessment: was attributed to diabetes and peripheral neuropathy however with identification of severe spinal stenosis as above    Plan: proceed with outpatient work up as above      Gastroesophageal reflux disease without  esophagitis    Assessment: chronic and stable with patient not regularly taking protonix    Plan: continue to hold and provide prn reflux medication during this observation stay      Hx of coronary artery disease    Assessment: s/p stent placed in 2016    Plan: continue with Plavix and losartan      DAYTON (obstructive sleep apnea)    Assessment: Uses CPAP at home, wife has brought in home machine    Plan: will use home machine during this stay      Hypertension, goal below 140/90    Assessment: blood pressure at goal    Plan: continue home regimen of losartan and lasix and monitor      Hyperlipidemia LDL goal <100    Assessment: on Crestor    Plan: continue to hold during this observation stay and restart at time of discharge       Obesity, morbid, BMI 40.0-49.9 (H)    Assessment: chronic    Plan: no acute intervention needed     VTE:  Observation stay  Code Status:  Full code        Interval History:   About the same today.  Vitals signs stable.  Tolerating medications and treatment plan without significant side effects or problems.  Patient has not had any incontinence during the observation stay, no worsening symptoms.  Eating and voiding well.  No new concerns today.            Significant Problems:     Past Medical History:   Diagnosis Date     Acute pain of left knee 5/22/2017     Chronic pain of right knee 5/22/2017     Hx of coronary artery disease 5/22/2017     Hx of heart artery stent 5/22/2017     Obesity, morbid, BMI 40.0-49.9 (H) 11/20/2017     Open wound of left lower extremity without complication, initial encounter 5/22/2017     DAYTON (obstructive sleep apnea) 5/22/2017     Type 2 diabetes mellitus with other circulatory complication, without long-term current use of insulin (H) 5/22/2017     Venous stasis ulcer of left lower extremity (H) 5/22/2017     Venous stasis ulcer of left lower extremity (H) 5/22/2017            Physical Exam:   Blood pressure 128/62, pulse 89, temperature 96.4  F (35.8  C),  "temperature source Oral, resp. rate 20, height 1.778 m (5' 10\"), weight (!) 137.5 kg (303 lb 2.1 oz), SpO2 91 %.  Constitutional:   awake, alert, cooperative, no apparent distress, and appears stated age     Lungs:   No increased work of breathing, good air exchange, clear to auscultation bilaterally, no crackles or wheezing     Cardiovascular:   Normal apical impulse, regular rate and rhythm, normal S1 and S2, no S3 or S4, and no murmur noted     Abdomen:   Bowel sounds present, abdomen obese but soft and non-tender     Musculoskeletal:   Left leg remain in wrap, no significant pitting edema bilaterally     Neurologic:   Awake, alert, oriented to name, place and time.       Skin:   No new skin lesions or bruising identified.              Data:   All laboratory and imaging data in the past 24 hours reviewed    Attestation:  I have reviewed today's vital signs, notes, medications, labs and imaging.     Electronically Signed:  Linda Massey MD    Note: Chart documentation done in part with Dragon Voice Recognition software. Although reviewed after completion, some word and grammatical errors may remain.       "

## 2018-01-28 NOTE — PROGRESS NOTES
S-(situation): shift note    B-(background): generalized weakness, falls at home    A-(assessment): pt denies pain and declines pain meds, though does report tenderness to R knee and has mild swelling to R knee. Has been turned and repositioned every 2 hours. Has unna boot to LLE and will not allow to be removed as pt and wife state not to be removed until clinic visit with Dr. Shine. Vss. Unable to raise RLE off of bed which pt states has been present. Using ceiling lift for repositioning. Voiding well.     R-(recommendations): will monitor weakness, PT, SS to see today.

## 2018-01-28 NOTE — ED NOTES
ED Nursing criteria listed below was addressed during verbal handoff:     Abnormal vitals: No  Abnormal results: No  Med Reconciliation completed: Yes  Meds given in ED: No  Any Overdue Meds: No  Core Measures: N/A  Isolation: No  Special needs: Yes  Mobility due to weakness  Skin assessment: Yes    Observation Patient  Education provided: Yes

## 2018-01-28 NOTE — PROGRESS NOTES
01/28/18 1100   Quick Adds   Quick Adds Certification   Type of Visit Initial PT Evaluation   Living Environment   Lives With spouse;child(kirsten), adult   Living Arrangements house   Home Accessibility ramps present at home   Transportation Available van, wheelchair accessible   Functional Level Prior   Ambulation 4-->completely dependent   Transferring 4-->completely dependent   Cognition 0 - no cognition issues reported   Fall history within last six months yes   Number of times patient has fallen within last six months 12   Which of the above functional risks had a recent onset or change? ambulation;transferring;fall history   General Information   Onset of Illness/Injury or Date of Surgery - Date 01/27/18   Referring Physician Dr. Linda Massey   Patient/Family Goals Statement Go to rehab get stronger so can be more independent.   Pertinent History of Current Problem (include personal factors and/or comorbidities that impact the POC) Glenroy reports that he has had 12 falls in the last 6 months, he has heart disease with a stint that doesn't feel right to him, type 2 diabetes, ulcers in his lower extremities, bilateral lower extremity neuropathy, severe lumbar stenosis, decreasing mobility and strength.    Precautions/Limitations fall precautions   Weight-Bearing Status - LUE full weight-bearing   Weight-Bearing Status - RUE full weight-bearing   Weight-Bearing Status - LLE full weight-bearing   Weight-Bearing Status - RLE full weight-bearing   Heart Disease Risk Factors Diabetes;Lack of physical activity;Overweight;Gender;Age   General Observations Glenroy is sitting in a Jason chair with bandages on his lower extremities due to ulcers that are being treated, his is mobidly obese, very pleasant, requires significant assist to transfer, is non ambulatory at this time.    Cognitive Status Examination   Orientation orientation to person, place and time   Level of Consciousness alert   Follows Commands and  Answers Questions 100% of the time   Personal Safety and Judgment intact   Memory intact   Pain Assessment   Patient Currently in Pain No   Integumentary/Edema   Integumentary/Edema Comments Right lower extremity is very discolored due to venus insufficency with bandages in place. Left lower extremity is wrapped with what looks like an scarlett boot and ace wrap.    Posture    Posture Forward head position;Protracted shoulders;Kyphosis   Range of Motion (ROM)   ROM Comment Trunk ROM is limited due to weight.    Strength   Strength Comments Right knee flexion is 4/5, extension is 4/5 and pain producing, right dorsiflexion is 4/5, left lower extremity is 5/5.    Bed Mobility   Bed Mobility Comments NA as patient was in a chair and did not want to go back to bed.    Transfer Skills   Transfer Comments Sit to stand. Moderate assist of 1 for sit to stand then stood at chair side for 2 min. to assess ability, pt. unable to stand up stright, hips, knees and back were flexed.    Gait   Gait Comments NA, pt. and P. T. not comfortable at this time.    Balance   Balance Comments While holding onto a walker, pt. able to stand x 2 minutes.    Clinical Impression   Criteria for Skilled Therapeutic Intervention evaluation only   PT Diagnosis Limited mobility, lower extremity weakness.    Influenced by the following impairments Weakness, poor mobility   Functional limitations due to impairments Transfers, ambulation.    Clinical Presentation Evolving/Changing   Clinical Presentation Rationale Clinical judgement, multiple body systems.    Clinical Decision Making (Complexity) High complexity   Therapy Frequency` other (see comments)   Predicted Duration of Therapy Intervention (days/wks) (Eval only for D/C disposition.)   Anticipated Discharge Disposition Transitional Care Facility   Risk & Benefits of therapy have been explained Yes   Patient, Family & other staff in agreement with plan of care Yes   Clinical Impression Comments Glenroy  is here for observation due to increasing lower extremity weakness and giving way. His family is no longer able to assist him at home as he is not able to help as much with transfers and gait. He is morbidly obese with severe lumbar stenosis and increasing weakness in his lower extremities, he admits to falling at least 12 times in the last 6 months.    1x Eval Only-Outpatient/Observation Medicare G-Code   G-code Mobility: Walking & Moving Around   Mobility: Walking & Moving Around   Mobility: Current Status , Goal , Discharge -Ganh Only- Modifier the same for all G-codes CM: 80-99% impairment   Mobility: Current & Discharge Modifier Rationale-Eval Only Clinical judgement, observation   Mobility Comments Requires significant assistance and is very large.    Therapy Certification   Start of care date 01/28/18   Certification date from 01/28/18   Certification date to 01/28/18   Medical Diagnosis Decreased mobility

## 2018-01-28 NOTE — PROGRESS NOTES
[facility]      OUTPATIENT PHYSICAL THERAPY EVALUATION  PLAN OF TREATMENT FOR OUTPATIENT REHABILITATION  (COMPLETE FOR INITIAL CLAIMS ONLY)  Patient's Last Name, First Name, M.I.  YOB: 1950  Glenroy Padilla                        Provider's Name  [facility] Medical Record No.  [id number]                               Onset Date:  01/27/18   Start of Care Date:  01/28/18      Type:     _X_PT   ___OT   ___SLP Medical Diagnosis:  Decreased mobility                        PT Diagnosis:  Limited mobility, lower extremity weakness.    Visits from SOC:  1   _________________________________________________________________________________  Plan of Treatment/Functional Goals    Planned Interventions:  ,     ,       Goals: See Physical Therapy Goals on Care Plan in [facility] electronic health record.    Therapy Frequency: other (see comments)  Predicted Duration of Therapy Intervention:  (Eval only for D/C disposition.)  _________________________________________________________________________________    I CERTIFY THE NEED FOR THESE SERVICES FURNISHED UNDER        THIS PLAN OF TREATMENT AND WHILE UNDER MY CARE     (Physician co-signature of this document indicates review and certification of the therapy plan).                Certification date from: 01/28/18, Certification date to: 01/28/18    Referring Physician: Dr. Linda Massey            Initial Assessment        See Physical Therapy evaluation dated 01/28/18 in Epic electronic health record.

## 2018-01-28 NOTE — CONSULTS
CARE TRANSITION SOCIAL WORK INITIAL ASSESSMENT:  Reason For Consult: discharge planning   Met with: Patient and Family.    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       Resources List: Skilled Nursing Facility     Lives With: spouse, child(kirsten), adult  Living Arrangements: house  Quality Of Family Relationships: supportive, helpful, involved  Description of Support System: Involved, Supportive   Who is your support system?: Wife, Children       Insurance Concerns: No Insurance issues identified        This writer met with pt introduced self and role. Discussed discharge planning and medicare guidelines in regards to home care and SNF benefits.  Also talked with patient's daughter, Yuko, by phone.  Patient is not able to be cared for safely by family at this time.  He is in need of TCU placement.  Patient to have a MRI today.  Unsure of discharge date at this time, pending MRI results.  Discussed list of options for TCU for patient.  Patient/family would like referrals sent to the [facility].  Unsure if patient will have insurance coverage for TCU.  Facilities receiving referrals will run financials on Monday.      PLAN    TCU    Discharge Planner   Discharge Plans in progress: TCU  Barriers to discharge plan: possible cost  Follow up plan: PCP    [place]  SARAH John  [facility] [phone]/ [facility] [phone]         Entered by: [place] John 01/28/2018 10:14 AM     Referrals sent to [facility], [facility] in [place], and [facility] in [place].

## 2018-01-28 NOTE — PLAN OF CARE
Problem: Patient Care Overview  Goal: Plan of Care/Patient Progress Review  Assess for discharge disposition.  Discharge Planner PT   Patient plan for discharge: To TCU for rehab  Current status: Very limited mobility due to weakness in lower extremities and size.   Barriers to return to prior living situation: Wife and daughter in law can no longer help his as he has become more dependent.   Recommendations for discharge: TCU for mobility and strengthening  Rationale for recommendations: Pt. Is not safe at home as he has been falling more frequently and is very large so it is difficult for his family to help him.        Entered by: Gayle Bob 01/28/2018 11:37 AM

## 2018-01-28 NOTE — PROGRESS NOTES
S-(situation): Patient registered to Observation. Patient arrived to room 267 via cart from ED    B-(background): weakness, falls at home    A-(assessment): pt is alert and oriented x4. Pt has baseline neuropathy to BLE and BUE. Has scattered bruises from falls at home, two skin tears/fissures to pako-rectal area. Has DARIAN boot drsg to LLE and pt and wife state this is not to be removed until office visit with Dr. Shine later in the week. Has scabbed area to upper R shin. Groin area is reddened. Vss. Denies pain at this time.     R-(recommendations): Orders and observation goals reviewed with pt and pt's wife. Will monitor weakness, labs, CMS, skin integrity. PT and SS.     Nursing Observation criteria listed below was met:    Skin issues/needs documented:Yes  Isolation needs addressed, if appropriate: NA  Fall Prevention: Education given and documented: Yes  Education Assessment documented:Yes  Education Documented (Pre-existing chronic infection such as, MRSA/VRE need education on admission): Yes  New medication patient education completed and documented (Possible Side Effects of Common Medications handout): Yes  Home medications if not able to send immediately home with family stored here: Yes  Reminder note placed in discharge instructions: Yes  Patient has discharge needs (If yes, please explain): Yes

## 2018-01-28 NOTE — DISCHARGE INSTRUCTIONS
Return home medications to patient at discharge. Medications are currently stored in pt's bin in med room.

## 2018-01-29 PROBLEM — I89.0 LYMPHEDEMA: Status: ACTIVE | Noted: 2018-01-29

## 2018-01-29 LAB
BACTERIA SPEC CULT: ABNORMAL
GLUCOSE BLDC GLUCOMTR-MCNC: 111 MG/DL (ref 70–99)
GLUCOSE BLDC GLUCOMTR-MCNC: 113 MG/DL (ref 70–99)
GLUCOSE BLDC GLUCOMTR-MCNC: 128 MG/DL (ref 70–99)
GLUCOSE BLDC GLUCOMTR-MCNC: 156 MG/DL (ref 70–99)
GLUCOSE BLDC GLUCOMTR-MCNC: 94 MG/DL (ref 70–99)
Lab: ABNORMAL
SPECIMEN SOURCE: ABNORMAL

## 2018-01-29 PROCEDURE — 25000132 ZZH RX MED GY IP 250 OP 250 PS 637: Performed by: FAMILY MEDICINE

## 2018-01-29 PROCEDURE — 87804 INFLUENZA ASSAY W/OPTIC: CPT | Performed by: INTERNAL MEDICINE

## 2018-01-29 PROCEDURE — 99224 ZZC SUBSEQUENT OBSERVATION CARE,LEVEL I: CPT | Performed by: PEDIATRICS

## 2018-01-29 PROCEDURE — 25000132 ZZH RX MED GY IP 250 OP 250 PS 637: Performed by: INTERNAL MEDICINE

## 2018-01-29 PROCEDURE — 00000146 ZZHCL STATISTIC GLUCOSE BY METER IP

## 2018-01-29 PROCEDURE — 25000125 ZZHC RX 250: Performed by: FAMILY MEDICINE

## 2018-01-29 PROCEDURE — G0378 HOSPITAL OBSERVATION PER HR: HCPCS

## 2018-01-29 RX ORDER — HYDROCODONE BITARTRATE AND ACETAMINOPHEN 5; 325 MG/1; MG/1
1 TABLET ORAL EVERY 4 HOURS PRN
Qty: 20 TABLET | Refills: 0 | Status: SHIPPED | OUTPATIENT
Start: 2018-01-29 | End: 2018-03-09

## 2018-01-29 RX ORDER — AMOXICILLIN 250 MG
1 CAPSULE ORAL 2 TIMES DAILY PRN
Qty: 100 TABLET | DISCHARGE
Start: 2018-01-29 | End: 2019-03-25

## 2018-01-29 RX ADMIN — LOSARTAN POTASSIUM 25 MG: 25 TABLET, FILM COATED ORAL at 08:10

## 2018-01-29 RX ADMIN — MICONAZOLE NITRATE: 2 POWDER TOPICAL at 20:54

## 2018-01-29 RX ADMIN — Medication 1 G: at 09:46

## 2018-01-29 RX ADMIN — CLOPIDOGREL BISULFATE 75 MG: 75 TABLET, FILM COATED ORAL at 08:10

## 2018-01-29 RX ADMIN — FUROSEMIDE 40 MG: 40 TABLET ORAL at 08:10

## 2018-01-29 RX ADMIN — Medication 1 LOZENGE: at 23:32

## 2018-01-29 RX ADMIN — MICONAZOLE NITRATE: 2 POWDER TOPICAL at 09:46

## 2018-01-29 RX ADMIN — DULOXETINE HYDROCHLORIDE 30 MG: 30 CAPSULE, DELAYED RELEASE ORAL at 08:10

## 2018-01-29 RX ADMIN — DULOXETINE HYDROCHLORIDE 30 MG: 30 CAPSULE, DELAYED RELEASE ORAL at 20:44

## 2018-01-29 NOTE — PLAN OF CARE
Problem: Patient Care Overview  Goal: Plan of Care/Patient Progress Review  Assess for discharge disposition.   Outcome: Improving  S-(situation): end of shift note    B-(background):  Central spinal stenosis L3-4 and L4-5, weakness    A-(assessment): patient was up with assist of 2 to chair and commode today. Has trouble with backwards steps. Vital signs stable. Scab to right leg with no drainage.     R-(recommendations): will continue to monitor. Awaiting rehab placement.

## 2018-01-29 NOTE — PROGRESS NOTES
SPIRITUAL HEALTH SERVICES  SPIRITUAL ASSESSMENT Progress Note  [facility]      During Rounding,  introduced himself to Glenroy Padilla and informed him of his availability.    Lencho Garvin M.Div., [facility]  Staff   Office tel: [phone]

## 2018-01-29 NOTE — PROGRESS NOTES
[facility]    Hospitalist Progress Note    Date of Service (when I saw the patient): 01/29/2018    Assessment & Plan   Glenroy Padilla is a 67 year old male who was hospitalized on 1/27/2018 due to lower extremity weakness and acute functional decline presumably from lumbar spinal stenosis for which nonoperative management has been advised in the short-term by neurosurgery.  Other chronic medical problems including diabetes with neuropathy, lymphedema, and venous stasis ulcers of the lower legs are stable.  Overall, he is medically stable and ready for discharge but does not have a safe disposition plan.    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    Marc Mancera    Interval History   He feels about the same today.  He offers no new complaints.  He remains afebrile with stable vital signs.  Oxygenation is normal.  He is voiding spontaneously.  Nursing reports that he was able to bear weight on his legs with assistance from 2 persons today which is an improvement compared with yesterday when he required a Gaby lift.  He is tolerating adequate oral intake.    -Data reviewed today: I reviewed all new labs and imaging results over the last 24 hours. I personally reviewed no  images or EKG's today.    Physical Exam   Temp: 97.6  F (36.4  C) Temp src: Oral BP: 118/68 Pulse: 88 Heart Rate: 88 Resp: 20 SpO2: 97 % O2 Device: None (Room air)    Vitals:    01/27/18 1516 01/27/18 2040   Weight: 130.6 kg (288 lb) (!) 137.5 kg (303 lb 2.1 oz)     Vital Signs with Ranges  Temp:  [96.1  F (35.6  C)-97.6  F (36.4  C)] 97.6  F (36.4  C)  Pulse:  [85-90] 88  Heart Rate:  [85-90] 88  Resp:  [16-20] 20  BP: (118-150)/(55-76) 118/68  SpO2:  [91 %-97 %] 97 %  I/O last 3 completed shifts:  In: 400 [P.O.:400]  Out: 550 [Urine:550]    Constitutional: No acute distress  Neuro: Alert and maintains wakefulness and attention, no confusion    Medications       miconazole   Topical BID     clopidogrel  75 mg Oral Daily     DULoxetine  30 mg Oral BID     furosemide  40 mg Oral Daily     TRIAD HYDROPHILIC WOUND DRESSI  1 applicator Apply externally Daily     losartan  25 mg Oral Daily     insulin aspart  1-3 Units Subcutaneous TID AC     insulin aspart  1-3 Units Subcutaneous At Bedtime     SitaGLIPtin-MetFORMIN HCl  2 tablet Oral Daily with breakfast       Data   Data reviewed today:  I personally reviewed no images or EKG's today.    Recent Labs  Lab 01/28/18  0530 01/27/18  1825 01/26/18  1920 01/25/18  1119   WBC  --  11.3* 10.3 8.8   HGB 10.6* 11.2* 11.5* 11.7*   MCV  --  91 91 89   PLT  --  389 367 382    137  --   --    POTASSIUM 3.7 3.8  --   --    CHLORIDE 98 97  --   --    CO2 31 30  --   --    BUN 16 16  --   --    CR 1.04 1.17  --   --    ANIONGAP 8 10  --   --    PHAN 8.6 9.0  --   --    * 102*  --   --

## 2018-01-29 NOTE — PLAN OF CARE
Problem: Patient Care Overview  Goal: Plan of Care/Patient Progress Review  Assess for discharge disposition.   Outcome: No Change  Patients vitals stable on room air. Patient denies pain or need for pain medication tonight. Patients  and 131.

## 2018-01-29 NOTE — PROGRESS NOTES
"S-(situation): Patient arrives to room 271 via cart from ED    B-(background): Diverticulitis     A-(assessment): /68 (BP Location: Left arm)  Pulse 88  Temp 97.6  F (36.4  C) (Oral)  Resp 20  Ht 1.778 m (5' 10\")  Wt (!) 137.5 kg (303 lb 2.1 oz)  SpO2 97%  BMI 43.49 kg/m2. A&O x4, ambulating with SBA. Pain controlled.       R-(recommendations): Orders reviewed with Pt . Will monitor patient per MD orders.     Inpatient nursing criteria listed below were met:     Health care directives status obtained and documented: No  Core Measures assessed (SSI): Yes  SCD's Documented: No  Vaccine assessment done and vaccines ordered if appropriate: Yes  Skin issues/needs documented:NA  Isolation needs addressed, if appropriate: NA  Fall Prevention: Care plan updated, Education given and documented Yes  MRSA swab completed for patient 55 years and older (exclude ANJEL and TKA): Yes  My Chart patient sign up addressed and documented: No  Care Plan initiated: Yes  Education Assessment documented:Yes  Education Documented (Pre-existing chronic infection such as, MRSA/VRE need education on admission): No  New medication patient education completed and documented (Possible Side Effects of Common Medications handout): Yes  Home medications if not able to send immediately home with family stored here: NA   Reminder note placed in discharge instructions: NA  Discharge planning review completed (admission navigator) No        "

## 2018-01-29 NOTE — PLAN OF CARE
"Problem: Patient Care Overview  Goal: Plan of Care/Patient Progress Review  Assess for discharge disposition.   Outcome: Improving  Vital signs stable. /68  Pulse 88  Temp 96.1  F (35.6  C) (Oral)  Resp 20  Ht 1.778 m (5' 10\")  Wt (!) 137.5 kg (303 lb 2.1 oz)  SpO2 91%  BMI 43.49 kg/m2.  Afebrile. On RA.  A&O x4. Voiding adequately with urinal, did not ambulate. RLE and LLE are foreign in color with +2 edema. Able to verbalize needs and use the call light appropriately. Will continue to monitor per pt care plan.                 "

## 2018-01-30 ENCOUNTER — CARE COORDINATION (OUTPATIENT)
Dept: CARE COORDINATION | Facility: CLINIC | Age: 68
End: 2018-01-30

## 2018-01-30 VITALS
HEART RATE: 94 BPM | SYSTOLIC BLOOD PRESSURE: 152 MMHG | OXYGEN SATURATION: 93 % | BODY MASS INDEX: 43.4 KG/M2 | TEMPERATURE: 97 F | WEIGHT: 303.13 LBS | HEIGHT: 70 IN | DIASTOLIC BLOOD PRESSURE: 75 MMHG | RESPIRATION RATE: 20 BRPM

## 2018-01-30 LAB
FLUAV+FLUBV AG SPEC QL: NEGATIVE
FLUAV+FLUBV AG SPEC QL: NEGATIVE
GLUCOSE BLDC GLUCOMTR-MCNC: 111 MG/DL (ref 70–99)
GLUCOSE BLDC GLUCOMTR-MCNC: 111 MG/DL (ref 70–99)
GLUCOSE BLDC GLUCOMTR-MCNC: 134 MG/DL (ref 70–99)
SPECIMEN SOURCE: NORMAL

## 2018-01-30 PROCEDURE — G0378 HOSPITAL OBSERVATION PER HR: HCPCS

## 2018-01-30 PROCEDURE — 00000146 ZZHCL STATISTIC GLUCOSE BY METER IP

## 2018-01-30 PROCEDURE — 25000132 ZZH RX MED GY IP 250 OP 250 PS 637: Performed by: FAMILY MEDICINE

## 2018-01-30 PROCEDURE — 25000125 ZZHC RX 250: Performed by: FAMILY MEDICINE

## 2018-01-30 PROCEDURE — 99217 ZZC OBSERVATION CARE DISCHARGE: CPT | Performed by: PEDIATRICS

## 2018-01-30 RX ADMIN — DULOXETINE HYDROCHLORIDE 30 MG: 30 CAPSULE, DELAYED RELEASE ORAL at 08:47

## 2018-01-30 RX ADMIN — MICONAZOLE NITRATE: 2 POWDER TOPICAL at 08:48

## 2018-01-30 RX ADMIN — FUROSEMIDE 40 MG: 40 TABLET ORAL at 08:47

## 2018-01-30 RX ADMIN — CLOPIDOGREL BISULFATE 75 MG: 75 TABLET, FILM COATED ORAL at 08:47

## 2018-01-30 RX ADMIN — LOSARTAN POTASSIUM 25 MG: 25 TABLET, FILM COATED ORAL at 08:47

## 2018-01-30 RX ADMIN — Medication 1 G: at 08:48

## 2018-01-30 NOTE — DISCHARGE SUMMARY
[facility] Observation Discharge Summary    Glenroy Padilla MRN# [id number]   Age: 67 year old YOB: 1950     Date of Observation:  1/27/2018  Date of Discharge:  1/30/2018   Initial Physician:  Linda Massey MD  Discharge Physician:  Marc Mancera MD     Home clinic: [facility]  Primary care provider: Julio Cesar Esteban          Admission Diagnoses:   Primary osteoarthritis of right knee [M17.11]  Fall, initial encounter [W19.XXXA]  Tear of skin of buttock, unspecified laterality, subsequent encounter [S31.801D]  Weakness of both lower extremities [R29.898]  Spinal stenosis of lumbar region, unspecified whether neurogenic claudication present [M48.061]          Discharge Diagnoses:   Principal diagnosis: Central spinal stenosis    Secondary diagnoses:    Central spinal stenosis L3-4 and L4-5    Type 2 diabetes mellitus with other circulatory complication, without long-term current use of insulin (H)    Venous stasis ulcer of left lower extremity (H)    Neuropathy    Falls frequently    Weakness of both legs    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 *            Procedures:   No procedures performed during this hospital stay           Discharge Medications:     Outpatient Prescriptions Marked as Taking for the 1/27/18 encounter (Hospital Encounter)   Medication Sig     HYDROcodone-acetaminophen (NORCO) 5-325 MG per tablet Take 1 tablet by mouth every 4 hours as needed for other (pain control or improvement in physical function.)     senna-docusate (SENOKOT-S;PERICOLACE) 8.6-50 MG per tablet Take 1 tablet by mouth 2 times daily as needed for constipation     pantoprazole (PROTONIX) 40 MG EC tablet  Take 1 tablet (40 mg) by mouth daily Take 30-60 minutes before a meal.     DULoxetine (CYMBALTA) 30 MG EC capsule Take 1 capsule (30 mg) by mouth 2 times daily     losartan (COZAAR) 25 MG tablet Take 1 tablet (25 mg) by mouth daily     rosuvastatin (CRESTOR) 10 MG tablet Take 1 tablet (10 mg) by mouth daily     furosemide (LASIX) 40 MG tablet Take 1 tablet (40 mg) by mouth daily     clopidogrel (PLAVIX) 75 MG tablet Take 1 tablet (75 mg) by mouth daily     SitaGLIPtin-MetFORMIN HCl (JANUMET XR) 100-1000 MG TB24 Take 1 tablet by mouth daily     ferrous sulfate (IRON) 325 (65 FE) MG tablet Take 1 tablet (325 mg) by mouth daily (with breakfast)       Current Discharge Medication List      START taking these medications    Details   HYDROcodone-acetaminophen (NORCO) 5-325 MG per tablet Take 1 tablet by mouth every 4 hours as needed for other (pain control or improvement in physical function.)  Qty: 20 tablet, Refills: 0    Associated Diagnoses: Spinal stenosis of lumbar region, unspecified whether neurogenic claudication present      senna-docusate (SENOKOT-S;PERICOLACE) 8.6-50 MG per tablet Take 1 tablet by mouth 2 times daily as needed for constipation  Qty: 100 tablet    Associated Diagnoses: Spinal stenosis, lumbar region, without neurogenic claudication         CONTINUE these medications which have NOT CHANGED    Details   pantoprazole (PROTONIX) 40 MG EC tablet Take 1 tablet (40 mg) by mouth daily Take 30-60 minutes before a meal.  Qty: 1 tablet, Refills: 0    Associated Diagnoses: Gastroesophageal reflux disease without esophagitis      DULoxetine (CYMBALTA) 30 MG EC capsule Take 1 capsule (30 mg) by mouth 2 times daily  Qty: 1 capsule, Refills: 0    Associated Diagnoses: Neuropathy; Foot drop, right      losartan (COZAAR) 25 MG tablet Take 1 tablet (25 mg) by mouth daily  Qty: 90 tablet, Refills: 1    Associated Diagnoses: Hypertension, goal below 140/90; Type 2 diabetes mellitus with other circulatory  complication, without long-term current use of insulin (H)      rosuvastatin (CRESTOR) 10 MG tablet Take 1 tablet (10 mg) by mouth daily  Qty: 90 tablet, Refills: 1    Associated Diagnoses: Type 2 diabetes mellitus with other circulatory complication, without long-term current use of insulin (H)      furosemide (LASIX) 40 MG tablet Take 1 tablet (40 mg) by mouth daily  Qty: 90 tablet, Refills: 1    Associated Diagnoses: Hypertension, goal below 140/90      clopidogrel (PLAVIX) 75 MG tablet Take 1 tablet (75 mg) by mouth daily  Qty: 90 tablet, Refills: 1    Associated Diagnoses: Hypertension, goal below 140/90; Hyperlipidemia LDL goal <100; Type 2 diabetes mellitus with other circulatory complication, without long-term current use of insulin (H)      SitaGLIPtin-MetFORMIN HCl (JANUMET XR) 100-1000 MG TB24 Take 1 tablet by mouth daily  Qty: 30 tablet, Refills: 3    Associated Diagnoses: Type 2 diabetes mellitus with complication, without long-term current use of insulin (H)      ferrous sulfate (IRON) 325 (65 FE) MG tablet Take 1 tablet (325 mg) by mouth daily (with breakfast)  Qty: 90 tablet, Refills: 2    Associated Diagnoses: History of anemia      Wound Dressings (TRIAD HYDROPHILIC WOUND DRESSI) PSTE Externally apply 1 g topically daily  Qty: 1 Tube, Refills: 3    Associated Diagnoses: Open wound of right lower leg, initial encounter      lidocaine (XYLOCAINE) 5 % ointment Apply topically daily  Qty: 50 g, Refills: 3    Associated Diagnoses: Chronic pain of right knee      order for DME One touch glucose monitoring strip with Glucometer and lancets.  Qty: 1 Box, Refills: 1    Associated Diagnoses: Type 2 diabetes mellitus with other circulatory complication, without long-term current use of insulin (H)                   Consultations:   No consultations were requested during this hospital stay          Brief History of Illness:   67 year old male patient with multiple chronic medical problems including diabetes  with peripheral neuropathy and lower extremity lymphedema with venous stasis ulcers presented with history of worsening leg weakness and new inability to ambulate.   Due to the concern about his acute functional decline, he was hospitalized for observation. See ER note and history and physical for details.          Hospital Course:   Patient was observed in the hospital.  Lumbar spine MRI demonstrated radiographic findings consistent with severe central spinal stenosis at L3-4.  Case was discussed by telephone with neurosurgery who was not available at this facility for on-site consultation.  Neurosurgery at the [facility] advised against emergency surgery but recommended close outpatient short-term follow-up with them to discuss surgical treatment options of severe and probably symptomatic lumbar central spinal stenosis.  He was evaluated and treated by physical therapy who advised discharge to a TCU for anticipated short-term rehabilitation.  His chronic medical problems including diabetes, hypertension, and venous stasis ulcers were stable throughout this hospital stay.  He was advised to continue his chronic treatments for his chronic medical problems.  He otherwise had an unremarkable clinical course.    I evaluated this patient on the day of discharge.  Vital signs remained stable.  He appeared to be resting comfortably without signs of distress.  No confusion was present.            Discharge Instructions and Follow-Up:   Discharge diet: Moderate consistent carbohydrate (8658-6531 santiago / 4-6 CHO units per meal)   Discharge activity: Activity as tolerated   Discharge follow-up:  Follow-up with TCU provider  Follow-up with neurosurgery Dr. Fox at [facility] according to their advice       Pending test results: none         Discharge Disposition:   Discharged to nursing home      Attestation:  I have reviewed today's vital signs, notes, medications, and test results.    Marc Mancera MD

## 2018-01-30 NOTE — PROGRESS NOTES
S: Patient discharged to [facility] via w/c with [facility]    B: Observation goals met     A: Denies pain. VSS, lungs clear. Pivot transfers with assist of two.     R: Discharge instructions reviewed with patient. Report called to NH. Listed belongings gathered and returned to patient.    Patient Education resolved: Yes  New medications-Pt. Has been educated about reason of use and side effects Yes  Home and hospital acquired medications returned to patient Yes  Medication Bin checked and emptied on discharge Yes

## 2018-01-30 NOTE — PROGRESS NOTES
Care Coordinator- Discharge Planning     Admission Date/Time:  1/27/2018  Attending MD:  Linda Massey*     Data  Date of initial CC assessment:  1/28/18  Chart reviewed, discussed with interdisciplinary team.   Patient was admitted for:   1. Weakness of both lower extremities    2. Spinal stenosis of lumbar region, unspecified whether neurogenic claudication present    3. Fall, initial encounter    4. Tear of skin of buttock, unspecified laterality, subsequent encounter    5. Primary osteoarthritis of right knee    6. Spinal stenosis, lumbar region, without neurogenic claudication    7. Other specified injuries of lower back, initial encounter    8. Other fall on same level, initial encounter    9. Weakness generalized    10. Abrasion of buttock, initial encounter    11. Essential hypertension, malignant    12. Type 2 diabetes mellitus without complication, unspecified long term insulin use status (H)    13. Tobacco abuse    14. Fall on same level from slipping, initial encounter         Assessment  Full assessment completed in previous note    Coordination of Care and Referrals: Provided patient/family with options for Skilled Nursing Facility, TCU. # for  [facility] [phone] press [phone] ext [phone]      Plan  Anticipated Discharge Date:  1/30/18  Anticipated Discharge Plan:  [facility] [phone] via family car    CTS Handoff completed:  YES    Stacy Navarro RN   PAS-RR    Per DHS regulation, CTS team completed and submitted PAS-RR to [place] Board on Aging Direct Connect via the Senior [employer] Line. CTS team advised SNF and they are aware a PAS-RR has been submitted.     CTS team reviewed with pt or health care agent that they may be contacted for a follow up appointment within 10 days of hospital discharge if SNF stay is <30 days. Contact information for [facility] was also provided.     Pt or health care agent verbalized understanding.     PAS-RR # [id number]

## 2018-01-30 NOTE — PROGRESS NOTES
Physical Therapy Discharge Summary    Reason for therapy discharge:    Discharged to transitional care facility.    Progress towards therapy goal(s). See goals on Care Plan in [facility] electronic health record for goal details.  Placement needed and found.    Therapy recommendation(s):    Continued therapy is recommended.  Rationale/Recommendations:  per rehab therapist.     Thank you for your referral.    Amina Shah, PT, DPT  [facility]  [phone]

## 2018-01-30 NOTE — PROGRESS NOTES
S-(situation): shift note    B-(background): central spinal stenosis    A-(assessment): pt has denied pain. Vss. Has fairly good bed mobility, pt states has been able to pivot to use bedside commode and chair. Pt has refused to turn and reposition at times but does shift weight in bed. cepacol lozenge given for sore throat.     R-(recommendations): awaiting referrals from TCU for pt placement at d/c. Will continue with plan of care.

## 2018-01-30 NOTE — PLAN OF CARE
"Problem: Patient Care Overview  Goal: Plan of Care/Patient Progress Review  Assess for discharge disposition.   Outcome: Improving  VSS. /68 (BP Location: Left arm)  Pulse 88  Temp 97.6  F (36.4  C) (Oral)  Resp 20  Ht 1.778 m (5' 10\")  Wt (!) 137.5 kg (303 lb 2.1 oz)  SpO2 97%  BMI 43.49 kg/m2.  Able to stand and pivot with assist of 2 and walker.  A&Ox4.       "

## 2018-01-30 NOTE — PROGRESS NOTES
Clinic Care Coordination     Situation: Patient chart reviewed by care coordinator.    Background:   Transition Communication Hand-off for Care Transitions to Next Level of Care Provider     Name: Glenroy Padilla  MRN #: [id number]  Primary Care Provider: Julio Cesar Sahu  Primary Care MD Name: Julio Cesar Rosen  Primary Clinic: [facility] [address] [facility] [place] [address]  Primary Care Clinic Name: [facility]  Reason for Hospitalization:  Primary osteoarthritis of right knee [M17.11]  Fall, initial encounter [W19.XXXA]  Tear of skin of buttock, unspecified laterality, subsequent encounter [S31.801D]  Weakness of both lower extremities [R29.898]  Spinal stenosis of lumbar region, unspecified whether neurogenic claudication present [M48.061]  Admit Date/Time: 1/27/2018  3:15 PM  Discharge Date: 1/30/18  Payor Source: Payor[place] / [place] / Product Type: Medicaid /      Readmission Assessment Measure (KRUNAL) Risk Score/category: none            Reason for Communication Hand-off Referral: Fragility  Multiple providers/specialties     Discharge Plan:         Concern for non-adherence with plan of care:                           Y/N no  Discharge Needs Assessment:  Needs        Most Recent Value     Transportation Available van, wheelchair accessible     # of Referrals Placed by CTS Post Acute Facilities     Skilled Nursing Facility [facility] [phone], Fax: [phone]     PAS Number [id number]            Already enrolled in Tele-monitoring program and name of program:  no    Key Recommendations:  Patient will go to [facility] for rehab with hopes to have spine surgery and be able to return home.        Plan/Recommendations: Writer will outreach [facility] to initiate transition of care    Grant Briseno RN  Clinic Care Coordinator  [facility]  [phone]

## 2018-01-30 NOTE — PROGRESS NOTES
Glenroy Baughsilvino  Gender: male  : 1950  [address]  [place]  [facility] [address]  [phone] (home) [phone] (work)    Medical Record: [id number]  Pharmacy:    [facility] - [place], [place] - [address]  [facility] - [place], [place] - [address] [facility]  Primary Care Provider: Julio Cesar Esteban    Parent's names are: Data Unavailable (mother) and Data Unavailable (father).      [facility]  2018     Discharge Phone Call:  Key Words/Key Times      Introduction - AIDET (Acknowledge, Introduce, Duration, Explanation)      Empathy-   We are calling to see how you are since your recent stay in the hospital?             Call back COMMENTS:  18 1400 Pt discharged to [facility] via [facility] transportation.

## 2018-01-31 ENCOUNTER — TRANSFERRED RECORDS (OUTPATIENT)
Dept: HEALTH INFORMATION MANAGEMENT | Facility: CLINIC | Age: 68
End: 2018-01-31

## 2018-02-01 ENCOUNTER — TELEPHONE (OUTPATIENT)
Dept: SURGERY | Facility: CLINIC | Age: 68
End: 2018-02-01

## 2018-02-01 NOTE — TELEPHONE ENCOUNTER
Created and faxed letter with Dr. Shine's order below.  Meme Beth RN  [facility]  2/1/2018 4:17 PM

## 2018-02-01 NOTE — LETTER
2/1/2018        RE: Glenroy Padilla  [address] [facility] [address]      To whom it may concern,    Right Leg: apply triad to wound, cover with dry gauze, then tubigrip.  Left Leg: silvercel to wounds, foam to anterior ankle for protection, then apply UNNA boot. Change weekly.    Sincerely,                Casey Shine MD

## 2018-02-01 NOTE — TELEPHONE ENCOUNTER
RN to contact facility - orders as follows - Triad applied to right leg wound, covered with dry gauze then tubigrip placed.   Unna boot to left lower leg with silvercell to old wound and foam padding over the anterior ankle wound.

## 2018-02-01 NOTE — TELEPHONE ENCOUNTER
Reason for Call: Request for an order or referral:    Order or referral being requested: wound care orders Nurse at the [facility] would like to know frequency ect.. Please fax orders for wound care to Fax#[phone]. Thank You Beverly    Date needed: as soon as possible    Has the patient been seen by the PCP for this problem? YES    Additional comments: Nurse Abrahan stated they have a home care wound care nurse at the assisted living facility and the pt would like to utilize this service instead of having to come to [facility].     Phone number Patient can be reached at:  Other phone number:  [phone]    Best Time:  any    Can we leave a detailed message on this number?  YES    Call taken on 2/1/2018 at 11:02 AM by Beverly Howe

## 2018-02-05 ENCOUNTER — CARE COORDINATION (OUTPATIENT)
Dept: CARE COORDINATION | Facility: CLINIC | Age: 68
End: 2018-02-05

## 2018-02-05 NOTE — PROGRESS NOTES
Clinic Care Coordination Contact  Care Coordination Transition Communication    Transition to Facility:              [facility]              Contact name and phone number Chantel? Intake [phone]  Left message on intake voicemail requesting she call me when patient discharged.    Plan: RN Care Coordinator will await notification from facility staff informing Care Coordinator of patient's discharge plans/needs.  Care Coordinator will review chart and outreach to facility staff every 4 weeks and as needed.     Grant Briseno RN  Clinic Care Coordinator  [facility]  [phone]

## 2018-02-08 ENCOUNTER — TELEPHONE (OUTPATIENT)
Dept: FAMILY MEDICINE | Facility: OTHER | Age: 68
End: 2018-02-08

## 2018-02-08 NOTE — TELEPHONE ENCOUNTER
Reason for Call: Request for an order or referral:    Order or referral being requested: Patient needs Julio Cesar to call the rehab he is in and give ok to give him his diabetes medication.    Date needed: as soon as possible    Has the patient been seen by the PCP for this problem? YES    Additional comments: please call patient asap    Phone number Patient can be reached at:  Home number on file [phone] (home)    Best Time:      Can we leave a detailed message on this number?  YES    Call taken on 2/8/2018 at 1:09 PM by Neda Cortez

## 2018-02-08 NOTE — TELEPHONE ENCOUNTER
Spoke to patient who asked the provider call [facility] where [facility] is doing [facility]hab and tell them he needs to be able to take his own personal medications.   Phone number for [facility] [place] is [phone]. Patient stated his blood sugars have been high 150-205 and his vision was affected.    Spoke to nurse at [facility] in [place], they gave me the dose of medications they have been giving to patient which matches what is on our medication list. Nurse at skilled nursing facility stated she would talk to patient.    Huddled with provider who stated having the nurse speak to the patient is appropriate.  Lisa Zuluaga CMA ([facility])

## 2018-02-15 ENCOUNTER — MEDICAL CORRESPONDENCE (OUTPATIENT)
Dept: HEALTH INFORMATION MANAGEMENT | Facility: CLINIC | Age: 68
End: 2018-02-15

## 2018-02-15 ENCOUNTER — OFFICE VISIT (OUTPATIENT)
Dept: NEUROSURGERY | Facility: OTHER | Age: 68
End: 2018-02-15
Payer: MEDICAID

## 2018-02-15 VITALS — TEMPERATURE: 96.8 F | HEIGHT: 70 IN | WEIGHT: 303 LBS | BODY MASS INDEX: 43.38 KG/M2

## 2018-02-15 DIAGNOSIS — R29.898 WEAKNESS OF RIGHT LOWER EXTREMITY: Primary | ICD-10-CM

## 2018-02-15 PROCEDURE — 99204 OFFICE O/P NEW MOD 45 MIN: CPT | Performed by: PHYSICIAN ASSISTANT

## 2018-02-15 NOTE — PATIENT INSTRUCTIONS
Cervical and thoracic MRI ordered at [facility] in [place], [place]. Please call [phone] to schedule. I will contact you with results.

## 2018-02-15 NOTE — MR AVS SNAPSHOT
"              After Visit Summary   2/15/2018    Glenroy Padilla    MRN: [id number]           Patient Information     Date Of Birth          1950        Visit Information        Provider Department      2/15/2018 10:05 AM Martha Reddy PA-C [facility]        Today's Diagnoses     Weakness of right lower extremity    -  1      Care Instructions    Cervical and thoracic MRI ordered at [facility] in [place], [place]. Please call [phone] to schedule. I will contact you with results.          Follow-ups after your visit        Future tests that were ordered for you today     Open Future Orders        Priority Expected Expires Ordered    MR Cervical Spine w/o Contrast Routine  2/15/2019 2/15/2018    MR Thoracic Spine w/o Contrast Routine  2/15/2019 2/15/2018            Who to contact     If you have questions or need follow up information about today's clinic visit or your schedule please contact [facility] directly at [phone].  Normal or non-critical lab and imaging results will be communicated to you by Clutterhart, letter or phone within 4 business days after the clinic has received the results. If you do not hear from us within 7 days, please contact the clinic through Clutterhart or phone. If you have a critical or abnormal lab result, we will notify you by phone as soon as possible.  Submit refill requests through PayClip or call your pharmacy and they will forward the refill request to us. Please allow 3 business days for your refill to be completed.          Additional Information About Your Visit        Clutterhart Information     PayClip lets you send messages to your doctor, view your test results, renew your prescriptions, schedule appointments and more. To sign up, go to [email or web address] . Click on \"Log in\" on the left side of the screen, which will take you to the Welcome page. Then click on \"Sign up Now\" on the right side of the page.     You will be asked to " "enter the access code listed below, as well as some personal information. Please follow the directions to create your username and password.     Your access code is: [email or web address]  Expires: 3/22/2018 10:22 AM     Your access code will  in 90 days. If you need help or a new code, please call your [facility] clinic or [phone].        Care EveryWhere ID     This is your Care EveryWhere ID. This could be used by other organizations to access your [facility] medical records  [id number]        Your Vitals Were     Temperature Height BMI (Body Mass Index)             96.8  F (36  C) (Temporal) 5' 10\" (1.778 m) 43.48 kg/m2          Blood Pressure from Last 3 Encounters:   18 152/75   18 140/68   18 132/74    Weight from Last 3 Encounters:   02/15/18 (!) 303 lb (137.4 kg)   18 (!) 303 lb 2.1 oz (137.5 kg)   18 282 lb (127.9 kg)               Primary Care Provider Office Phone # Fax #    Julio Cesar Esteban PA-C [phone]       [place] [address]        Equal Access to Services     JAZ TOBAR AH: Hadii katia ku hadasho Soomaali, waaxda luqadaha, qaybta kaalmada adeegyada, waxay idiin haygabriella horton. So [facility] [phone].    ATENCIÓN: Si habla español, tiene a wilkinson disposición servicios gratuitos de asistencia lingüística. Llame al [phone].    We comply with applicable federal civil rights laws and [place] laws. We do not discriminate on the basis of race, color, national origin, age, disability, sex, sexual orientation, or gender identity.            Thank you!     Thank you for choosing [facility]  for your care. Our goal is always to provide you with excellent care. Hearing back from our patients is one way we can continue to improve our services. Please take a few minutes to complete the written survey that you may receive in the mail after your visit with us. Thank you!             Your Updated Medication " List - Protect others around you: Learn how to safely use, store and throw away your medicines at www.disposemymeds.org.          This list is accurate as of 2/15/18 10:30 AM.  Always use your most recent med list.                   Brand Name Dispense Instructions for use Diagnosis    clopidogrel 75 MG tablet    PLAVIX    90 tablet    Take 1 tablet (75 mg) by mouth daily    Hypertension, goal below 140/90, Hyperlipidemia LDL goal <100, Type 2 diabetes mellitus with other circulatory complication, without long-term current use of insulin (H)       DULoxetine 30 MG EC capsule    CYMBALTA    1 capsule    Take 1 capsule (30 mg) by mouth 2 times daily    Neuropathy, Foot drop, right       ferrous sulfate 325 (65 FE) MG tablet    IRON    90 tablet    Take 1 tablet (325 mg) by mouth daily (with breakfast)    History of anemia       furosemide 40 MG tablet    LASIX    90 tablet    Take 1 tablet (40 mg) by mouth daily    Hypertension, goal below 140/90       HYDROcodone-acetaminophen 5-325 MG per tablet    NORCO    20 tablet    Take 1 tablet by mouth every 4 hours as needed for other (pain control or improvement in physical function.)    Spinal stenosis of lumbar region, unspecified whether neurogenic claudication present       lidocaine 5 % ointment    XYLOCAINE    50 g    Apply topically daily    Chronic pain of right knee       losartan 25 MG tablet    COZAAR    90 tablet    Take 1 tablet (25 mg) by mouth daily    Hypertension, goal below 140/90, Type 2 diabetes mellitus with other circulatory complication, without long-term current use of insulin (H)       order for DME     1 Box    One touch glucose monitoring strip with Glucometer and lancets.    Type 2 diabetes mellitus with other circulatory complication, without long-term current use of insulin (H)       PROTONIX 40 MG EC tablet   Generic drug:  pantoprazole     1 tablet    Take 1 tablet (40 mg) by mouth daily Take 30-60 minutes before a meal.    Gastroesophageal  reflux disease without esophagitis       rosuvastatin 10 MG tablet    CRESTOR    90 tablet    Take 1 tablet (10 mg) by mouth daily    Type 2 diabetes mellitus with other circulatory complication, without long-term current use of insulin (H)       senna-docusate 8.6-50 MG per tablet    SENOKOT-S;PERICOLACE    100 tablet    Take 1 tablet by mouth 2 times daily as needed for constipation    Spinal stenosis, lumbar region, without neurogenic claudication       SitaGLIPtin-MetFORMIN HCl 100-1000 MG Tb24    JANUMET XR    30 tablet    Take 1 tablet by mouth daily    Type 2 diabetes mellitus with complication, without long-term current use of insulin (H)       TRIAD HYDROPHILIC WOUND DRESSI Pste     1 Tube    Externally apply 1 g topically daily    Open wound of right lower leg, initial encounter

## 2018-02-15 NOTE — PROGRESS NOTES
"Dr. Hugh Mendieta  [facility]  Neurosurgery Clinic Visit      CC: Lower extremity weakness    Primary care Provider: Julio Cesar Esteban      Reason For Visit:   I was asked by Dr. Hernandez to consult on the patient for spinal stneosis.      HPI: Glenroy Padilla is a 67 year old male who presents for evaluation of lower extremity weakness x 3 years that has recently worsened. He was recently seen at [facility] ED for increased weakness. States he does not have any pain in his low back or legs, just a feeling of weakness and \"odd sensation\", although he does also have diabetic neuropathy, so he thinks it is attributed to this. He states his weakness feels as though it is throughout his bilateral lower extremities R>L. He has sustained falls recently and has had to ambulate with a walker. He has been at a TCU working with therapy, which has been helpful and has been able to increase his ambulation distance. He does have multiple comorbid conditions and had a stent placed 2 years ago and was put on plavix. He does not exhibit signs of neurogenic claudication. Denies bladder/bowel incontinence.    Current pain: no pain, just weakness    Past Medical History:   Diagnosis Date     Acute pain of left knee 5/22/2017     Chronic pain of right knee 5/22/2017     Hx of coronary artery disease 5/22/2017     Hx of heart artery stent 5/22/2017     Obesity, morbid, BMI 40.0-49.9 (H) 11/20/2017     Open wound of left lower extremity without complication, initial encounter 5/22/2017     DAYTON (obstructive sleep apnea) 5/22/2017     Type 2 diabetes mellitus with other circulatory complication, without long-term current use of insulin (H) 5/22/2017     Venous stasis ulcer of left lower extremity (H) 5/22/2017     Venous stasis ulcer of left lower extremity (H) 5/22/2017       Past Medical History reviewed with patient during visit.    No past surgical history on file.  Past Surgical History reviewed with patient during " "visit.    Current Outpatient Prescriptions   Medication     HYDROcodone-acetaminophen (NORCO) 5-325 MG per tablet     senna-docusate (SENOKOT-S;PERICOLACE) 8.6-50 MG per tablet     pantoprazole (PROTONIX) 40 MG EC tablet     DULoxetine (CYMBALTA) 30 MG EC capsule     Wound Dressings (TRIAD HYDROPHILIC WOUND DRESSI) PSTE     losartan (COZAAR) 25 MG tablet     rosuvastatin (CRESTOR) 10 MG tablet     furosemide (LASIX) 40 MG tablet     clopidogrel (PLAVIX) 75 MG tablet     lidocaine (XYLOCAINE) 5 % ointment     SitaGLIPtin-MetFORMIN HCl (JANUMET XR) 100-1000 MG TB24     ferrous sulfate (IRON) 325 (65 FE) MG tablet     order for DME     No current facility-administered medications for this visit.        Allergies   Allergen Reactions     No Known Allergies        Social History     Social History     Marital status: Single     Spouse name: N/A     Number of children: N/A     Years of education: N/A     Social History Main Topics     Smoking status: Current Some Day Smoker     Types: Cigars     Smokeless tobacco: Never Used     Alcohol use No     Drug use: No     Sexual activity: No     Other Topics Concern     Not on file     Social History Narrative       No family history on file.       ROS: 10 point ROS neg other than the symptoms noted above in the HPI.    Vital Signs: Temp 96.8  F (36  C) (Temporal)  Ht 5' 10\" (1.778 m)  Wt (!) 303 lb (137.4 kg)  BMI 43.48 kg/m2    Examination:  Constitutional:  Alert, obese, NAD.  HEENT: Normocephalic, atraumatic.   Pulmonary:  Without shortness of breath, normal effort.   Lymph: no lymphadenopathy to low back or LE.   Integumentary: Skin is free of rashes or lesions.   Cardiovascular:  No pitting edema of BLE.      Neurological:  Awake  Alert  Oriented x 3  Speech clear  Cranial nerves II - XII grossly intact  PERRL  EOMI  Face symmetric  Tongue midline  Motor exam   Hip Flexor:                Right: 3/5  Left:  5/5  Hip Adductor:             Right:  5/5  Left:  5/5  Hip " Abductor:             Right:  5/5  Left:  5/5  Gastroc Soleus:        Right:  5/5  Left:  5/5  Tib/Ant:                      Right:  5/5  Left:  5/5  EHL:                          Right:  5/5  Left:  5/5       Sensation normal to bilateral upper and lower extremities.    Reflexes are 2+ in the patellar and Achilles. There is no clonus. Downgoing Babinski.  Musculoskeletal:  Gait: In wheelchair. Per pt ambulates with walker.  Lumbar examination reveals no tenderness of the spine or paraspinous muscles.     Imaging:   MRI LUMBAR SPINE WITHOUT CONTRAST   1/28/2018 10:06 AM      HISTORY: Worsening foot drop, right more than left. Intermittent  incontinence of stool.      TECHNIQUE: Multiplanar multisequence MRI of the lumbar spine without contrast.     COMPARISON: Lumbar spine CT 1/27/2018.      FINDINGS: There are five lumbar-type vertebral bodies assumed for the purposes of this dictation. The conus is unremarkable terminating at the L1-L2 level. The nerve roots of the cauda equina are bunched up cranial to the L3-L4 level where there is severe spinal canal stenosis.     Alignment of the lumbar spine is normal. No loss of vertebral body height. There are no destructive osseous lesions. Mild loss of intervertebral disc height with disc desiccation throughout all levels in the lumbar spine aside from the relatively normal-appearing L5-S1 disc. There is anterior osteophytic spurring throughout the lumbar spine.      Level by level as follows:      T12-L1:  No significant spinal canal or neural foraminal narrowing.      L1-L2:  No significant spinal canal or neural foraminal narrowing.      L2-L3:  Left subarticular and lateral disc bulge with associated  bilateral endplate osteophytic spurring. There is no significant  spinal canal or neural foraminal narrowing.      L3-L4:  Severe central spinal canal narrowing with loss of T2 signal around the nerve roots at this level. This is caused by moderate circumferential disc  "bulge as well as bilateral facet hypertrophy and ligamentum flavum infolding and fairly prominent posterior epidural fat. There is moderate bilateral neural foraminal narrowing due to disc bulge and facet hypertrophy.      L4-L5:  Moderate to severe central spinal canal narrowing due to  marked bilateral facet hypertrophy and small circumferential disc  bulge. There are bilateral facet joint effusions. Mild bilateral  neural foraminal narrowing due to disc bulge and facet hypertrophy.      L5-S1:  Bilateral facet hypertrophy, left greater than right, without  significant spinal canal or neural foraminal narrowing.      Paraspinous soft tissues:  There is posterior paraspinous muscle atrophy.      IMPRESSION:    1. Severe central spinal canal narrowing at the L3-L4 level due to  disc bulge, facet hypertrophy, ligamentum flavum infolding, and  prominent epidural fat.  2. Moderate to severe spinal canal narrowing at the L4-L5 level due to disc bulge and marked facet hypertrophy.  3. Additional degenerative changes throughout the lumbar spine as described above.     RENUKA SULLIVAN MD    Assessment/Plan:   Glenroy Padilla is a 67 year old male who presents for evaluation of lower extremity weakness x 3 years that has recently worsened. He was recently seen at [facility] ED for increased weakness. States he does not have any pain in his low back or legs, just a feeling of weakness and \"odd sensation\", although he does also have diabetic neuropathy, so he thinks it is attributed to this. He states his weakness feels as though it is throughout his bilateral lower extremities R>L. MRI reviewed with patient and he does understand that he has severe stenosis at L3-4. Given his proximal LE weakness and difficulty with ambulation, I have ordered him a cervical and thoracic MRI for further evaluation and will call him with the results. I have also advised he continue with therapy at his TCU. Patient voiced understanding and " agreement.           Martha Reddy PA-C  [facility]  [facility]  [address] [place] [id number]    Tel [phone]  Pager [phone]

## 2018-02-15 NOTE — NURSING NOTE
"Glenroy Padilla is a 67 year old male who presents for:  Chief Complaint   Patient presents with     Consult     PHER 01/27/18, lumbar stenosis        Initial Vitals:  Temp 96.8  F (36  C) (Temporal)  Ht 5' 10\" (1.778 m)  Wt (!) 303 lb (137.4 kg)  BMI 43.48 kg/m2 Estimated body mass index is 43.48 kg/(m^2) as calculated from the following:    Height as of this encounter: 5' 10\" (1.778 m).    Weight as of this encounter: 303 lb (137.4 kg).. Body surface area is 2.61 meters squared. BP completed using cuff size: NA (Not Taken)  Data Unavailable    Do you feel safe in your environment?  No:  Lives at [facility], not getting proper care, but hoping to move soon to another facility  Do you need any refills today? No    Nursing Comments:         Micah Jackson    "

## 2018-02-15 NOTE — LETTER
"    2/15/2018         RE: Glenroy Padilla  [address] [facility] [address]        Dear Colleague,    Thank you for referring your patient, Glenroy Padilla, to the [facility]. Please see a copy of my visit note below.    Dr. Hugh Mendieta  [facility]  Neurosurgery Clinic Visit      CC: Lower extremity weakness    Primary care Provider: Julio Cesar Esteban      Reason For Visit:   I was asked by Dr. Hernandez to consult on the patient for spinal stneosis.      HPI: Glenroy Padilla is a 67 year old male who presents for evaluation of lower extremity weakness x 3 years that has recently worsened. He was recently seen at [facility] ED for increased weakness. States he does not have any pain in his low back or legs, just a feeling of weakness and \"odd sensation\", although he does also have diabetic neuropathy, so he thinks it is attributed to this. He states his weakness feels as though it is throughout his bilateral lower extremities R>L. He has sustained falls recently and has had to ambulate with a walker. He has been at a TCU working with therapy, which has been helpful and has been able to increase his ambulation distance. He does have multiple comorbid conditions and had a stent placed 2 years ago and was put on plavix. He does not exhibit signs of neurogenic claudication. Denies bladder/bowel incontinence.    Current pain: no pain, just weakness    Past Medical History:   Diagnosis Date     Acute pain of left knee 5/22/2017     Chronic pain of right knee 5/22/2017     Hx of coronary artery disease 5/22/2017     Hx of heart artery stent 5/22/2017     Obesity, morbid, BMI 40.0-49.9 (H) 11/20/2017     Open wound of left lower extremity without complication, initial encounter 5/22/2017     DAYTON (obstructive sleep apnea) 5/22/2017     Type 2 diabetes mellitus with other circulatory complication, without long-term current use of insulin (H) 5/22/2017     Venous stasis ulcer of left lower " "extremity (H) 5/22/2017     Venous stasis ulcer of left lower extremity (H) 5/22/2017       Past Medical History reviewed with patient during visit.    No past surgical history on file.  Past Surgical History reviewed with patient during visit.    Current Outpatient Prescriptions   Medication     HYDROcodone-acetaminophen (NORCO) 5-325 MG per tablet     senna-docusate (SENOKOT-S;PERICOLACE) 8.6-50 MG per tablet     pantoprazole (PROTONIX) 40 MG EC tablet     DULoxetine (CYMBALTA) 30 MG EC capsule     Wound Dressings (TRIAD HYDROPHILIC WOUND DRESSI) PSTE     losartan (COZAAR) 25 MG tablet     rosuvastatin (CRESTOR) 10 MG tablet     furosemide (LASIX) 40 MG tablet     clopidogrel (PLAVIX) 75 MG tablet     lidocaine (XYLOCAINE) 5 % ointment     SitaGLIPtin-MetFORMIN HCl (JANUMET XR) 100-1000 MG TB24     ferrous sulfate (IRON) 325 (65 FE) MG tablet     order for DME     No current facility-administered medications for this visit.        Allergies   Allergen Reactions     No Known Allergies        Social History     Social History     Marital status: Single     Spouse name: N/A     Number of children: N/A     Years of education: N/A     Social History Main Topics     Smoking status: Current Some Day Smoker     Types: Cigars     Smokeless tobacco: Never Used     Alcohol use No     Drug use: No     Sexual activity: No     Other Topics Concern     Not on file     Social History Narrative       No family history on file.       ROS: 10 point ROS neg other than the symptoms noted above in the HPI.    Vital Signs: Temp 96.8  F (36  C) (Temporal)  Ht 5' 10\" (1.778 m)  Wt (!) 303 lb (137.4 kg)  BMI 43.48 kg/m2    Examination:  Constitutional:  Alert, obese, NAD.  HEENT: Normocephalic, atraumatic.   Pulmonary:  Without shortness of breath, normal effort.   Lymph: no lymphadenopathy to low back or LE.   Integumentary: Skin is free of rashes or lesions.   Cardiovascular:  No pitting edema of BLE.  "     Neurological:  Awake  Alert  Oriented x 3  Speech clear  Cranial nerves II - XII grossly intact  PERRL  EOMI  Face symmetric  Tongue midline  Motor exam   Hip Flexor:                Right: 3/5  Left:  5/5  Hip Adductor:             Right:  5/5  Left:  5/5  Hip Abductor:             Right:  5/5  Left:  5/5  Gastroc Soleus:        Right:  5/5  Left:  5/5  Tib/Ant:                      Right:  5/5  Left:  5/5  EHL:                          Right:  5/5  Left:  5/5       Sensation normal to bilateral upper and lower extremities.    Reflexes are 2+ in the patellar and Achilles. There is no clonus. Downgoing Babinski.  Musculoskeletal:  Gait: In wheelchair. Per pt ambulates with walker.  Lumbar examination reveals no tenderness of the spine or paraspinous muscles.     Imaging:   MRI LUMBAR SPINE WITHOUT CONTRAST   1/28/2018 10:06 AM      HISTORY: Worsening foot drop, right more than left. Intermittent  incontinence of stool.      TECHNIQUE: Multiplanar multisequence MRI of the lumbar spine without contrast.     COMPARISON: Lumbar spine CT 1/27/2018.      FINDINGS: There are five lumbar-type vertebral bodies assumed for the purposes of this dictation. The conus is unremarkable terminating at the L1-L2 level. The nerve roots of the cauda equina are bunched up cranial to the L3-L4 level where there is severe spinal canal stenosis.     Alignment of the lumbar spine is normal. No loss of vertebral body height. There are no destructive osseous lesions. Mild loss of intervertebral disc height with disc desiccation throughout all levels in the lumbar spine aside from the relatively normal-appearing L5-S1 disc. There is anterior osteophytic spurring throughout the lumbar spine.      Level by level as follows:      T12-L1:  No significant spinal canal or neural foraminal narrowing.      L1-L2:  No significant spinal canal or neural foraminal narrowing.      L2-L3:  Left subarticular and lateral disc bulge with  "associated  bilateral endplate osteophytic spurring. There is no significant  spinal canal or neural foraminal narrowing.      L3-L4:  Severe central spinal canal narrowing with loss of T2 signal around the nerve roots at this level. This is caused by moderate circumferential disc bulge as well as bilateral facet hypertrophy and ligamentum flavum infolding and fairly prominent posterior epidural fat. There is moderate bilateral neural foraminal narrowing due to disc bulge and facet hypertrophy.      L4-L5:  Moderate to severe central spinal canal narrowing due to  marked bilateral facet hypertrophy and small circumferential disc  bulge. There are bilateral facet joint effusions. Mild bilateral  neural foraminal narrowing due to disc bulge and facet hypertrophy.      L5-S1:  Bilateral facet hypertrophy, left greater than right, without  significant spinal canal or neural foraminal narrowing.      Paraspinous soft tissues:  There is posterior paraspinous muscle atrophy.      IMPRESSION:    1. Severe central spinal canal narrowing at the L3-L4 level due to  disc bulge, facet hypertrophy, ligamentum flavum infolding, and  prominent epidural fat.  2. Moderate to severe spinal canal narrowing at the L4-L5 level due to disc bulge and marked facet hypertrophy.  3. Additional degenerative changes throughout the lumbar spine as described above.     RENUKA SULLIVAN MD    Assessment/Plan:   Glenroy Padilla is a 67 year old male who presents for evaluation of lower extremity weakness x 3 years that has recently worsened. He was recently seen at [facility] ED for increased weakness. States he does not have any pain in his low back or legs, just a feeling of weakness and \"odd sensation\", although he does also have diabetic neuropathy, so he thinks it is attributed to this. He states his weakness feels as though it is throughout his bilateral lower extremities R>L. MRI reviewed with patient and he does understand that he has severe " stenosis at L3-4. Given his proximal LE weakness and difficulty with ambulation, I have ordered him a cervical and thoracic MRI for further evaluation and will call him with the results. I have also advised he continue with therapy at his TCU. Patient voiced understanding and agreement.           Martha Reddy PA-C  [facility]  [address] [address]    Tel [phone]  Pager [phone]      Again, thank you for allowing me to participate in the care of your patient.        Sincerely,        Martha Reddy PA-C

## 2018-02-16 ENCOUNTER — TELEPHONE (OUTPATIENT)
Dept: ADMISSION | Facility: CLINIC | Age: 68
End: 2018-02-16

## 2018-02-16 NOTE — TELEPHONE ENCOUNTER
Orders forwarded via fax to Radiology scheduling [phone].  Spouse notified and will contact [facility] to schedule testing

## 2018-02-16 NOTE — TELEPHONE ENCOUNTER
Reason for Call:  Other call back    Detailed comments: was told orders for an MRI will be put in and pt wife call to schedule and there is no order in as of 2/16/17. Please call to advise Pt wife what to do and when to schedule.    Phone Number Patient can be reached at: Cell number on file:    No relevant phone numbers on file.       Best Time: asap    Can we leave a detailed message on this number? YES    Call taken on 2/16/2018 at 12:24 PM by Zoya Guerra

## 2018-02-20 ENCOUNTER — TRANSFERRED RECORDS (OUTPATIENT)
Dept: HEALTH INFORMATION MANAGEMENT | Facility: CLINIC | Age: 68
End: 2018-02-20

## 2018-02-23 ENCOUNTER — MEDICAL CORRESPONDENCE (OUTPATIENT)
Dept: HEALTH INFORMATION MANAGEMENT | Facility: CLINIC | Age: 68
End: 2018-02-23

## 2018-02-26 ENCOUNTER — TELEPHONE (OUTPATIENT)
Dept: FAMILY MEDICINE | Facility: OTHER | Age: 68
End: 2018-02-26

## 2018-02-26 DIAGNOSIS — M48.061 SPINAL STENOSIS OF LUMBAR REGION, UNSPECIFIED WHETHER NEUROGENIC CLAUDICATION PRESENT: ICD-10-CM

## 2018-02-26 NOTE — TELEPHONE ENCOUNTER
Forms have been completed, signed, faxed/mailed, and sent to scanning.  Lisa Zuluaga CMA ([facility])

## 2018-02-26 NOTE — TELEPHONE ENCOUNTER
Reason for Call:  Form, our goal is to have forms completed with 72 hours, however, some forms may require a visit or additional information.    Type of letter, form or note:  medical    Who is the form from?: on site hearing (if other please explain)    Where did the form come from: form was faxed in    What clinic location was the form placed at?: [facility] - [phone]    Where the form was placed: 's Box    What number is listed as a contact on the form?: [phone]       Additional comments: n/a    Call taken on 2/26/2018 at 3:16 PM by Diamond Juarez

## 2018-02-26 NOTE — TELEPHONE ENCOUNTER
Alice from [facility] calling. She would like to know if you and Dr. Copeland can follow [facility] for [facility] starting on Wednesday? Please call her at [phone].  Thank you,  Claire Hionjosa- Pt Rep.

## 2018-02-26 NOTE — TELEPHONE ENCOUNTER
Spoke to Alice she informed me that patient ill be discharged today form form [facility].   Alice was wondering if Julio Cesar Sahu would be willing to sign off on further orders for the patient.   Current orders include Skilled nursing for wound care.   Alice stated she will fax the current orders she has received.   Lisa Zuluaga CMA ([facility])

## 2018-02-27 ENCOUNTER — MEDICAL CORRESPONDENCE (OUTPATIENT)
Dept: HEALTH INFORMATION MANAGEMENT | Facility: CLINIC | Age: 68
End: 2018-02-27

## 2018-02-27 ENCOUNTER — TELEPHONE (OUTPATIENT)
Dept: FAMILY MEDICINE | Facility: OTHER | Age: 68
End: 2018-02-27

## 2018-02-27 RX ORDER — HYDROCODONE BITARTRATE AND ACETAMINOPHEN 5; 325 MG/1; MG/1
1 TABLET ORAL EVERY 4 HOURS PRN
Qty: 20 TABLET | Refills: 0 | OUTPATIENT
Start: 2018-02-27

## 2018-02-27 NOTE — TELEPHONE ENCOUNTER
Forms have been completed, signed, faxed/mailed, and sent to scanning.  Lisa Zuluaga CMA ([facility])

## 2018-02-27 NOTE — TELEPHONE ENCOUNTER
Reason for Call:  Form, our goal is to have forms completed with 72 hours, however, some forms may require a visit or additional information.    Type of letter, form or note:  medical    Who is the form from?: [facility] (if other please explain)    Where did the form come from: form was faxed in    What clinic location was the form placed at?: [facility] - [phone]    Where the form was placed: 's Box    What number is listed as a contact on the form?: [phone]        Additional comments: fax completed form to [phone]    Call taken on 2/27/2018 at 2:25 PM by Audrey Last

## 2018-02-27 NOTE — TELEPHONE ENCOUNTER
We do not have a pain contract with him.  No further refills without office visit.  Electronically signed:    Julio Cesra Sahu PA-C

## 2018-02-27 NOTE — TELEPHONE ENCOUNTER
Pending Prescriptions:                       Disp   Refills    HYDROcodone-acetaminophen (NORCO) 5-325 M*20 tab*0            Sig: Take 1 tablet by mouth every 4 hours as needed           for other (pain control or improvement in           physical function.)    Routing refill request to provider for review/approval because:  Drug not on the [facility] refill protocol     Yonny Meyers RN, BSN

## 2018-02-28 ENCOUNTER — MEDICAL CORRESPONDENCE (OUTPATIENT)
Dept: HEALTH INFORMATION MANAGEMENT | Facility: CLINIC | Age: 68
End: 2018-02-28

## 2018-02-28 ENCOUNTER — TRANSFERRED RECORDS (OUTPATIENT)
Dept: HEALTH INFORMATION MANAGEMENT | Facility: CLINIC | Age: 68
End: 2018-02-28

## 2018-03-01 ENCOUNTER — TELEPHONE (OUTPATIENT)
Dept: FAMILY MEDICINE | Facility: OTHER | Age: 68
End: 2018-03-01

## 2018-03-01 ENCOUNTER — OFFICE VISIT (OUTPATIENT)
Dept: NEUROSURGERY | Facility: OTHER | Age: 68
End: 2018-03-01
Payer: MEDICAID

## 2018-03-01 VITALS — BODY MASS INDEX: 42.8 KG/M2 | TEMPERATURE: 97.4 F | HEIGHT: 70 IN | WEIGHT: 299 LBS

## 2018-03-01 DIAGNOSIS — D17.79 EPIDURAL LIPOMATOSIS: Primary | ICD-10-CM

## 2018-03-01 PROCEDURE — 99214 OFFICE O/P EST MOD 30 MIN: CPT | Performed by: NEUROLOGICAL SURGERY

## 2018-03-01 NOTE — PATIENT INSTRUCTIONS
Surgery scheduled at [facility] for T5-9 laminectomies and resection of epidural lipomatosis    Pre-Operative:  -Surgical risks: blood clots in the leg or lung, problems urinating, nerve damage, drainage from the incision, infection, stiffness    -Appointment with Interventional Radiology prior to surgery. We will coordinate scheduling for you.  - Pre-operative physical with primary care physician within 30 days of surgical date.   -Stop all foods and liquids 8 hours prior to surgery.  -Shower procedure: please shower with antibacterial soap the night before surgery and morning of surgery. Refer to information sheet in folder.   - Discontinue Plavix prior to surgery.    -Discontinue Aspirin, NSAIDs (Advil, Ibuprofen, Naproxen, Nuprin, Diclofenac, Meloxicam, Aleve, Celebrex) x 7 days prior to surgical date. After surgery, do not begin taking these medications until given clearance.  - May try Tylenol for pain.    Post-Operative:  -Hospital stay: 1-2 days  - Post operative pain  will require pain medications and muscle relaxants. You will receive medication upon discharge.  -Do NOT drive while taking narcotic pain medication.  -Post operative incision care-    -Watch for signs of infection: redness, swelling, warmth, drainage, and fever of 101 degrees or higher. Notify clinic [phone].   -Keep incision clean and dry. You may shower. No submerging incision in water such as pools, hot tubs, baths for at least 8 weeks or until incision is healed.   - Post operative activity limitations for 6 weeks after surgery: no lifting > 10 pounds, no bending, twisting, or overhead reaching. You will be re-evaluated at your follow up appointments.   -If you are currently employed, you will need to be off work for recovery and healing. Please fax any FMLA/short term disability paperwork to [phone]. You may call our clinic when you'd like to return to work and we can provide a work letter.   - Follow up  appointments: 6 week post op and 3 months post op. Please call to schedule follow up appointment at [phone].

## 2018-03-01 NOTE — PROGRESS NOTES
Patient Education    Education included but not limited to:  - Surgical risks: blood clots, urinating difficulties, nerve damage, infection.  - Pre-operative physical with primary care physician within 30 days of surgical date.   - Pre-operative clearance from other pertaining specialties.   - Discontinue NSAIDS x 7 days prior to surgical date.   - Discontinue Plavix prior to surgery.  -Do not begin taking NSAIDs (Advil, Motrin, Ibuprofen, Nuprin, Diclofenac, Meloxicam, Aleve, Celebrex, Aspirin, etc.) until 6 weeks after surgery if you had a fusion. May cause bleeding and interfere with bone healing.    -May try Tylenol for pain.  -Smoking cessation  -Discussed being off work after surgery, short term disability, FMLA, etc.   -Forms to be completed    -Pre-op timeline: NPO, shower, medications    -Hospital stay: Checking in, surgery, recovery room, hospital room.    - Post operative pain management: narcotics, muscle relaxants, ice, etc.   -No driving while taking narcotics     -Post operative incision care:   Keep your incision clean and dry.   Okay to shower. No submerging in water until incision healed.   Watch for signs of infection and notify clinic if drainage or fever develops.   - Post operative activity limitations: for the first 6 weeks we recommend no lifting > 10 pounds, no bending, twisting, or overhead reaching.  -If a brace is required per Dr. Mendieta, Orthotics will fit you for the brace in the hospital.  - Follow up appointments: Suture/staple removal at 2 weeks post op. 6 week post op, 3 months post op. Please call to schedule follow up appointment at [phone].   - Education book was also given to the patient for further review.      Patient verbalized understanding of above instructions. All questions were answered to the best of my ability and the patient's satisfaction. Patient advised to call with any additional questions or concerns.

## 2018-03-01 NOTE — MR AVS SNAPSHOT
After Visit Summary   3/1/2018    Glenroy Padilla    MRN: [id number]           Patient Information     Date Of Birth          1950        Visit Information        Provider Department      3/1/2018 10:40 AM Hugh Mendieta MD [facility]        Today's Diagnoses     Epidural lipomatosis    -  1      Care Instructions    Surgery scheduled at [facility] for T5-9 laminectomies and resection of epidural lipomatosis    Pre-Operative:  -Surgical risks: blood clots in the leg or lung, problems urinating, nerve damage, drainage from the incision, infection, stiffness    -Appointment with Interventional Radiology prior to surgery. We will coordinate scheduling for you.  - Pre-operative physical with primary care physician within 30 days of surgical date.   -Stop all foods and liquids 8 hours prior to surgery.  -Shower procedure: please shower with antibacterial soap the night before surgery and morning of surgery. Refer to information sheet in folder.   - Discontinue Plavix prior to surgery.    -Discontinue Aspirin, NSAIDs (Advil, Ibuprofen, Naproxen, Nuprin, Diclofenac, Meloxicam, Aleve, Celebrex) x 7 days prior to surgical date. After surgery, do not begin taking these medications until given clearance.  - May try Tylenol for pain.    Post-Operative:  -Hospital stay: 1-2 days  - Post operative pain  will require pain medications and muscle relaxants. You will receive medication upon discharge.  -Do NOT drive while taking narcotic pain medication.  -Post operative incision care-    -Watch for signs of infection: redness, swelling, warmth, drainage, and fever of 101 degrees or higher. Notify clinic [phone].   -Keep incision clean and dry. You may shower. No submerging incision in water such as pools, hot tubs, baths for at least 8 weeks or until incision is healed.   - Post operative activity limitations for 6 weeks after surgery: no lifting > 10 pounds, no bending,  twisting, or overhead reaching. You will be re-evaluated at your follow up appointments.   -If you are currently employed, you will need to be off work for recovery and healing. Please fax any FMLA/short term disability paperwork to [phone]. You may call our clinic when you'd like to return to work and we can provide a work letter.   - Follow up appointments: 6 week post op and 3 months post op. Please call to schedule follow up appointment at [phone].                  Follow-ups after your visit        Your next 10 appointments already scheduled     Mar 09, 2018 10:00 AM CST   Office Visit with Julio Cesar Esteban PA-C   [facility]    [address] [address] [address] [phone]           Bring a current list of meds and any records pertaining to this visit. For Physicals, please bring immunization records and any forms needing to be filled out. Please arrive 10 minutes early to complete paperwork.              Who to contact     If you have questions or need follow up information about today's clinic visit or your schedule please contact [facility] HILDA KEARNEY directly at [phone].  Normal or non-critical lab and imaging results will be communicated to you by MyChart, letter or phone within 4 business days after the clinic has received the results. If you do not hear from us within 7 days, please contact the clinic through [employer] or phone. If you have a critical or abnormal lab result, we will notify you by phone as soon as possible.  Submit refill requests through [employer] or call your pharmacy and they will forward the refill request to us. Please allow 3 business days for your refill to be completed.          Additional Information About Your Visit        Pepp[employer]orn Information     Pe[employer]orn lets you send messages to your doctor, view your test results, renew your prescriptions, schedule appointments and more. To sign up, go to  [email or web address] . Click on \"Log in\" on the left side of the screen, which will take you to the Welcome page. Then click on \"Sign up Now\" on the right side of the page.     You will be asked to enter the access code listed below, as well as some personal information. Please follow the directions to create your username and password.     Your access code is: [email or web address]  Expires: 3/22/2018 10:22 AM     Your access code will  in 90 days. If you need help or a new code, please call your [place] clinic or [phone].        Care EveryWhere ID     This is your Care EveryWhere ID. This could be used by other organizations to access your [place] medical records  [id number]        Your Vitals Were     Temperature Height BMI (Body Mass Index)             97.4  F (36.3  C) (Temporal) 5' 10\" (1.778 m) 42.9 kg/m2          Blood Pressure from Last 3 Encounters:   18 152/75   18 140/68   18 132/74    Weight from Last 3 Encounters:   18 299 lb (135.6 kg)   02/15/18 (!) 303 lb (137.4 kg)   18 (!) 303 lb 2.1 oz (137.5 kg)              Today, you had the following     No orders found for display       Primary Care Provider Office Phone # Fax #    Julio Cesar Esteban PA-C [phone]       [place] [address]        Equal Access to Services     [facility] MedicineSOFIA : Hadii katia benedict hadasho Soomaali, waaxda luqadaha, qaybta kaalmada duke, adams batres . So [facility] [phone].    ATENCIÓN: Si habla español, tiene a wilkinson disposición servicios gratuitos de asistencia lingüística. Llame al [phone].    We comply with applicable federal civil rights laws and [place] laws. We do not discriminate on the basis of race, color, national origin, age, disability, sex, sexual orientation, or gender identity.            Thank you!     Thank you for choosing [facility]  for your care. Our goal is always to provide " you with excellent care. Hearing back from our patients is one way we can continue to improve our services. Please take a few minutes to complete the written survey that you may receive in the mail after your visit with us. Thank you!             Your Updated Medication List - Protect others around you: Learn how to safely use, store and throw away your medicines at www.disposemymeds.org.          This list is accurate as of 3/1/18 11:10 AM.  Always use your most recent med list.                   Brand Name Dispense Instructions for use Diagnosis    clopidogrel 75 MG tablet    PLAVIX    90 tablet    Take 1 tablet (75 mg) by mouth daily    Hypertension, goal below 140/90, Hyperlipidemia LDL goal <100, Type 2 diabetes mellitus with other circulatory complication, without long-term current use of insulin (H)       DULoxetine 30 MG EC capsule    CYMBALTA    1 capsule    Take 1 capsule (30 mg) by mouth 2 times daily    Neuropathy, Foot drop, right       ferrous sulfate 325 (65 FE) MG tablet    IRON    90 tablet    Take 1 tablet (325 mg) by mouth daily (with breakfast)    History of anemia       furosemide 40 MG tablet    LASIX    90 tablet    Take 1 tablet (40 mg) by mouth daily    Hypertension, goal below 140/90       HYDROcodone-acetaminophen 5-325 MG per tablet    NORCO    20 tablet    Take 1 tablet by mouth every 4 hours as needed for other (pain control or improvement in physical function.)    Spinal stenosis of lumbar region, unspecified whether neurogenic claudication present       lidocaine 5 % ointment    XYLOCAINE    50 g    Apply topically daily    Chronic pain of right knee       losartan 25 MG tablet    COZAAR    90 tablet    Take 1 tablet (25 mg) by mouth daily    Hypertension, goal below 140/90, Type 2 diabetes mellitus with other circulatory complication, without long-term current use of insulin (H)       order for DME     1 Box    One touch glucose monitoring strip with Glucometer and lancets.    Type 2  diabetes mellitus with other circulatory complication, without long-term current use of insulin (H)       PROTONIX 40 MG EC tablet   Generic drug:  pantoprazole     1 tablet    Take 1 tablet (40 mg) by mouth daily Take 30-60 minutes before a meal.    Gastroesophageal reflux disease without esophagitis       rosuvastatin 10 MG tablet    CRESTOR    90 tablet    Take 1 tablet (10 mg) by mouth daily    Type 2 diabetes mellitus with other circulatory complication, without long-term current use of insulin (H)       senna-docusate 8.6-50 MG per tablet    SENOKOT-S;PERICOLACE    100 tablet    Take 1 tablet by mouth 2 times daily as needed for constipation    Spinal stenosis, lumbar region, without neurogenic claudication       SitaGLIPtin-MetFORMIN HCl 100-1000 MG Tb24    JANUMET XR    30 tablet    Take 1 tablet by mouth daily    Type 2 diabetes mellitus with complication, without long-term current use of insulin (H)       TRIAD HYDROPHILIC WOUND DRESSI Pste     1 Tube    Externally apply 1 g topically daily    Open wound of right lower leg, initial encounter

## 2018-03-01 NOTE — TELEPHONE ENCOUNTER
Meme Lala from [facility] Care is calling to get orders for Home physical therapy 2 times a week for 4 weeks.  Please call her [phone] ok to leave a message  Thanks  Josselin NATION (R)

## 2018-03-01 NOTE — TELEPHONE ENCOUNTER
Called and left a message on the identified phone mail with approval of PT, OT and skilled nursing.  Electronically signed:    Julio Cesar Sahu PA-C

## 2018-03-01 NOTE — NURSING NOTE
"Glenroy Padilla is a 67 year old male who presents for:  Chief Complaint   Patient presents with     RECHECK     MRI follow up from 02/15/18        Initial Vitals:  Temp 97.4  F (36.3  C) (Temporal)  Ht 5' 10\" (1.778 m)  Wt 299 lb (135.6 kg)  BMI 42.9 kg/m2 Estimated body mass index is 42.9 kg/(m^2) as calculated from the following:    Height as of this encounter: 5' 10\" (1.778 m).    Weight as of this encounter: 299 lb (135.6 kg).. Body surface area is 2.59 meters squared. BP completed using cuff size: NA (Not Taken)  Data Unavailable    Do you feel safe in your environment?  Yes  Do you need any refills today? No    Nursing Comments:         Micah Jackson    "

## 2018-03-01 NOTE — TELEPHONE ENCOUNTER
Rossi from [facility] calling. She is looking for verbal orders for skilled nursing one time a week for 9 weeks and OT eval & treat. Please call her at [phone].  Thank you,  Claire Hinojosa- Pt Rep.

## 2018-03-01 NOTE — PROGRESS NOTES
"Glenroy Padilla is a 67 year old male who presents for evaluation of lower extremity weakness x 3 years that has recently worsened. He was recently seen at [facility] ED for increased weakness. States he does not have any pain in his low back or legs, just a feeling of weakness and \"odd sensation\", although he does also have diabetic neuropathy, so he thinks it is attributed to this. He states his weakness feels as though it is throughout his bilateral lower extremities R>L. He has sustained falls recently and has had to ambulate with a walker. He has been at a TCU working with therapy, which has been helpful and has been able to increase his ambulation distance. He does have multiple comorbid conditions and had a stent placed 2 years ago and was put on plavix. He does not exhibit signs of neurogenic claudication. Denies bladder/bowel incontinence.    Returns for follow up.  3 years of LE weakness, worse in the last 3 months after a fall.  Painless, proximal, bilateral LE weakness, worse in the right leg.  New MRI Thoracic spine showed profound epidural lipomatosis with severe stenosis from T5-T9 with cord signal change.  MRI Cervical Spine showed spondylotic change with moderate central stenosis.  No neck or arm pain, weakness, or sensory changes.    Past Medical History:   Diagnosis Date     Acute pain of left knee 5/22/2017     Chronic pain of right knee 5/22/2017     Hx of coronary artery disease 5/22/2017     Hx of heart artery stent 5/22/2017     Obesity, morbid, BMI 40.0-49.9 (H) 11/20/2017     Open wound of left lower extremity without complication, initial encounter 5/22/2017     DAY[place] (obstructive sleep apnea) 5/22/2017     Type 2 diabetes mellitus with other circulatory complication, without long-term current use of insulin (H) 5/22/2017     Venous stasis ulcer of left lower extremity (H) 5/22/2017     Venous stasis ulcer of left lower extremity (H) 5/22/2017       Past Medical History reviewed with patient " "during visit.    No past surgical history on file.  Past Surgical History reviewed with patient during visit.    Current Outpatient Prescriptions   Medication     HYDROcodone-acetaminophen (NORCO) 5-325 MG per tablet     senna-docusate (SENOKOT-S;PERICOLACE) 8.6-50 MG per tablet     pantoprazole (PROTONIX) 40 MG EC tablet     DULoxetine (CYMBALTA) 30 MG EC capsule     Wound Dressings (TRIAD HYDROPHILIC WOUND DRESSI) PSTE     losartan (COZAAR) 25 MG tablet     rosuvastatin (CRESTOR) 10 MG tablet     furosemide (LASIX) 40 MG tablet     clopidogrel (PLAVIX) 75 MG tablet     lidocaine (XYLOCAINE) 5 % ointment     SitaGLIPtin-MetFORMIN HCl (JANUMET XR) 100-1000 MG TB24     ferrous sulfate (IRON) 325 (65 FE) MG tablet     order for DME     No current facility-administered medications for this visit.        Allergies   Allergen Reactions     No Known Allergies        Social History     Social History     Marital status: Single     Spouse name: N/A     Number of children: N/A     Years of education: N/A     Social History Main Topics     Smoking status: Current Some Day Smoker     Types: Cigars     Smokeless tobacco: Never Used     Alcohol use No     Drug use: No     Sexual activity: No     Other Topics Concern     Not on file     Social History Narrative       No family history on file.       ROS: 10 point ROS neg other than the symptoms noted above in the HPI.    Vital Signs: Temp 97.4  F (36.3  C) (Temporal)  Ht 1.778 m (5' 10\")  Wt 135.6 kg (299 lb)  BMI 42.9 kg/m2    Examination:  Constitutional:  Alert, obese, NAD.  HEENT: Normocephalic, atraumatic.   Pulmonary:  Without shortness of breath, normal effort.   Lymph: no lymphadenopathy to low back or LE.   Integumentary: Skin is free of rashes or lesions.   Cardiovascular:  No pitting edema of BLE.      Neurological:  Awake  Alert  Oriented x 3  Speech clear  Cranial nerves II - XII grossly intact  PERRL  EOMI  Face symmetric  Tongue midline  Motor exam   Hip Flexor: "                Right: 3/5  Left:  4/5  Hip Adductor:             Right:  4/5  Left:  4+/5  Hip Abductor:             Right:  4/5  Left:  5/5  Gastroc Soleus:        Right:  5/5  Left:  5/5  Tib/Ant:                      Right:  5/5  Left:  5/5  EHL:                          Right:  5/5  Left:  5/5       Sensation normal to bilateral upper and lower extremities.    Reflexes are 2+ in the patellar and Achilles. There is no clonus. Downgoing Babinski.  Musculoskeletal:  Gait: In wheelchair. Per pt ambulates with walker.  Lumbar examination reveals no tenderness of the spine or paraspinous muscles.       Assessment/Plan:   Glenroy Padilla is a 67 year old male who presents for evaluation of lower extremity weakness x 3 years that has recently worsened.     We discussed his multi-focal degenerative changes  The most severe stenosis and cord signal change are in the thoracic spine, which is consistent with his painless, proximal leg weakness  Cord compression in the thoracic spine is also worse on the right  We discussed that if his symptoms were driven by the cervical spine, I would expect more neck and arm symptoms  Will plan for T5-9 laminectomies and resection of epidural lipomatosis  Pre-op placement of IR fiducial

## 2018-03-01 NOTE — TELEPHONE ENCOUNTER
Spoke with Meme in home care, she states that you can just call Rossi at [facility]#[phone] and give her the orders for:  skilled nursing one time a week for 9 weeks and OT eval & treat  Physical Therapy 2 times a week for 4 weeks     Thanks  Josselin Montes RT (R)

## 2018-03-01 NOTE — LETTER
"    3/1/2018         RE: Glenroy Padilla  [address] [facility] [address]        Dear Colleague,    Thank you for referring your patient, Glenroy Padilla, to the [facility]. Please see a copy of my visit note below.    Glenroy Padilla is a 67 year old male who presents for evaluation of lower extremity weakness x 3 years that has recently worsened. He was recently seen at [facility] ED for increased weakness. States he does not have any pain in his low back or legs, just a feeling of weakness and \"odd sensation\", although he does also have diabetic neuropathy, so he thinks it is attributed to this. He states his weakness feels as though it is throughout his bilateral lower extremities R>L. He has sustained falls recently and has had to ambulate with a walker. He has been at a TCU working with therapy, which has been helpful and has been able to increase his ambulation distance. He does have multiple comorbid conditions and had a stent placed 2 years ago and was put on plavix. He does not exhibit signs of neurogenic claudication. Denies bladder/bowel incontinence.    Returns for follow up.  3 years of LE weakness, worse in the last 3 months after a fall.  Painless, proximal, bilateral LE weakness, worse in the right leg.  New MRI Thoracic spine showed profound epidural lipomatosis with severe stenosis from T5-T9 with cord signal change.  MRI Cervical Spine showed spondylotic change with moderate central stenosis.  No neck or arm pain, weakness, or sensory changes.    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    No past surgical history on file.  Past Surgical History reviewed with patient during visit.    Current Outpatient Prescriptions   Medication     HYDROcodone-acetaminophen (NORCO) 5-325 MG per tablet     senna-docusate (SENOKOT-S;PERICOLACE) 8.6-50 MG per tablet     pantoprazole (PROTONIX) 40 MG EC tablet     DULoxetine (CYMBALTA) 30 MG EC capsule     Wound Dressings (TRIAD HYDROPHILIC WOUND DRESSI) PSTE     losartan (COZAAR) 25 MG tablet     rosuvastatin (CRESTOR) 10 MG tablet     furosemide (LASIX) 40 MG tablet     clopidogrel (PLAVIX) 75 MG tablet     lidocaine (XYLOCAINE) 5 % ointment     SitaGLIPtin-MetFORMIN HCl (JANUMET XR) 100-1000 MG TB24     ferrous sulfate (IRON) 325 (65 FE) MG tablet     order for DME     No current facility-administered medications for this visit.        Allergies   Allergen Reactions     No Known Allergies        Social History     Social History     Marital status: Single     Spouse name: N/A     Number of children: N/A     Years of education: N/A     Social History Main Topics     Smoking status: Current Some Day Smoker     Types: Cigars     Smokeless tobacco: Never Used     Alcohol use No     Drug use: No     Sexual activity: No     Other Topics Concern     Not on file     Social History Narrative       No family history on file.       ROS: 10 point ROS neg other than the symptoms noted above in the HPI.    Vital Signs: Temp 97.4  F (36.3  C) (Temporal)  Ht 1.778 m (5' 10\")  Wt 135.6 kg (299 lb)  BMI 42.9 kg/m2    Examination:  Constitutional:  Alert, obese, NAD.  HEENT: Normocephalic, atraumatic.   Pulmonary:  Without shortness of breath, normal effort.   Lymph: no lymphadenopathy to low back or LE.   Integumentary: Skin is " free of rashes or lesions.   Cardiovascular:  No pitting edema of BLE.      Neurological:  Awake  Alert  Oriented x 3  Speech clear  Cranial nerves II - XII grossly intact  PERRL  EOMI  Face symmetric  Tongue midline  Motor exam   Hip Flexor:                Right: 3/5  Left:  4/5  Hip Adductor:             Right:  4/5  Left:  4+/5  Hip Abductor:             Right:  4/5  Left:  5/5  Gastroc Soleus:        Right:  5/5  Left:  5/5  Tib/Ant:                      Right:  5/5  Left:  5/5  EHL:                          Right:  5/5  Left:  5/5       Sensation normal to bilateral upper and lower extremities.    Reflexes are 2+ in the patellar and Achilles. There is no clonus. Downgoing Babinski.  Musculoskeletal:  Gait: In wheelchair. Per pt ambulates with walker.  Lumbar examination reveals no tenderness of the spine or paraspinous muscles.       Assessment/Plan:   Glenroy Padilla is a 67 year old male who presents for evaluation of lower extremity weakness x 3 years that has recently worsened.     We discussed his multi-focal degenerative changes  The most severe stenosis and cord signal change are in the thoracic spine, which is consistent with his painless, proximal leg weakness  Cord compression in the thoracic spine is also worse on the right  We discussed that if his symptoms were driven by the cervical spine, I would expect more neck and arm symptoms  Will plan for T5-9 laminectomies and resection of epidural lipomatosis  Pre-op placement of IR fiducial           Patient Education    Education included but not limited to:  - Surgical risks: blood clots, urinating difficulties, nerve damage, infection.  - Pre-operative physical with primary care physician within 30 days of surgical date.   - Pre-operative clearance from other pertaining specialties.   - Discontinue NSAIDS x 7 days prior to surgical date.   - Discontinue Plavix prior to surgery.  -Do not begin taking NSAIDs (Advil, Motrin, Ibuprofen, Nuprin,  Diclofenac, Meloxicam, Aleve, Celebrex, Aspirin, etc.) until 6 weeks after surgery if you had a fusion. May cause bleeding and interfere with bone healing.    -May try Tylenol for pain.  -Smoking cessation  -Discussed being off work after surgery, short term disability, FMLA, etc.   -Forms to be completed    -Pre-op timeline: NPO, shower, medications    -Hospital stay: Checking in, surgery, recovery room, hospital room.    - Post operative pain management: narcotics, muscle relaxants, ice, etc.   -No driving while taking narcotics     -Post operative incision care:   Keep your incision clean and dry.   Okay to shower. No submerging in water until incision healed.   Watch for signs of infection and notify clinic if drainage or fever develops.   - Post operative activity limitations: for the first 6 weeks we recommend no lifting > 10 pounds, no bending, twisting, or overhead reaching.  -If a brace is required per Dr. Mendieta, [facility] will fit you for the brace in the hospital.  - Follow up appointments: Suture/staple removal at 2 weeks post op. 6 week post op, 3 months post op. Please call to schedule follow up appointment at [phone].   - Education book was also given to the patient for further review.      Patient verbalized understanding of above instructions. All questions were answered to the best of my ability and the patient's satisfaction. Patient advised to call with any additional questions or concerns.                Again, thank you for allowing me to participate in the care of your patient.        Sincerely,        Hugh Mendieta MD

## 2018-03-01 NOTE — TELEPHONE ENCOUNTER
Please clarify which home care needs a call...  I won't call 2 home cares (different numbers noted) for this patient.  Electronically signed:    Julio Cesar Sahu PA-C

## 2018-03-02 ENCOUNTER — MEDICAL CORRESPONDENCE (OUTPATIENT)
Dept: HEALTH INFORMATION MANAGEMENT | Facility: CLINIC | Age: 68
End: 2018-03-02

## 2018-03-02 ENCOUNTER — TELEPHONE (OUTPATIENT)
Dept: FAMILY MEDICINE | Facility: OTHER | Age: 68
End: 2018-03-02

## 2018-03-02 NOTE — TELEPHONE ENCOUNTER
Forms have been completed, signed, faxed/mailed, and sent to scanning.  Lisa Zuluaga CMA ([facility])

## 2018-03-02 NOTE — TELEPHONE ENCOUNTER
Reason for Call:  Form, our goal is to have forms completed with 72 hours, however, some forms may require a visit or additional information.    Type of letter, form or note:  medical    Who is the form from?: [employer] (if other please explain)    Where did the form come from: form was faxed in    What clinic location was the form placed at?: [facility] - [phone]    Where the form was placed: 's Box    What number is listed as a contact on the form?: [phone]       Additional comments: n/a    Call taken on 3/2/2018 at 12:19 PM by Diamond Juarez

## 2018-03-05 ENCOUNTER — TELEPHONE (OUTPATIENT)
Dept: FAMILY MEDICINE | Facility: OTHER | Age: 68
End: 2018-03-05

## 2018-03-05 NOTE — TELEPHONE ENCOUNTER
Reason for Call:  Form, our goal is to have forms completed with 72 hours, however, some forms may require a visit or additional information.    Type of letter, form or note:  medical    Who is the form from?: [employer] (if other please explain)    Where did the form come from: form was faxed in    What clinic location was the form placed at?: [facility] - [phone]    Where the form was placed: 's Box    What number is listed as a contact on the form?: [phone]       Additional comments: n/a    Call taken on 3/5/2018 at 11:54 AM by Diamond Juarez

## 2018-03-06 ENCOUNTER — TELEPHONE (OUTPATIENT)
Dept: FAMILY MEDICINE | Facility: OTHER | Age: 68
End: 2018-03-06

## 2018-03-06 ENCOUNTER — TELEPHONE (OUTPATIENT)
Dept: NEUROSURGERY | Facility: CLINIC | Age: 68
End: 2018-03-06

## 2018-03-06 NOTE — TELEPHONE ENCOUNTER
Reason for Call:  Form, our goal is to have forms completed with 72 hours, however, some forms may require a visit or additional information.    Type of letter, form or note:  medical    Who is the form from?: [facility] (if other please explain)    Where did the form come from: form was faxed in    What clinic location was the form placed at?: [facility] - [phone]    Where the form was placed: 's Box    What number is listed as a contact on the form?: [phone]       Additional comments: n/a    Call taken on 3/6/2018 at 2:17 PM by Diamond Juarez

## 2018-03-06 NOTE — TELEPHONE ENCOUNTER
When should patient stop his plavix his surgery is on 03/13/18 Please call Tanja at [phone]

## 2018-03-07 ENCOUNTER — TELEPHONE (OUTPATIENT)
Dept: FAMILY MEDICINE | Facility: OTHER | Age: 68
End: 2018-03-07

## 2018-03-07 DIAGNOSIS — D17.79 EPIDURAL LIPOMATOSIS: Primary | ICD-10-CM

## 2018-03-07 NOTE — TELEPHONE ENCOUNTER
Reason for Call:  Form, our goal is to have forms completed with 72 hours, however, some forms may require a visit or additional information.    Type of letter, form or note:  medical    Who is the form from?: [employer] (if other please explain)    Where did the form come from: form was faxed in    What clinic location was the form placed at?: [facility] - [phone]    Where the form was placed: 's Box    What number is listed as a contact on the form?: [phone]       Additional comments: n/a    Call taken on 3/7/2018 at 10:15 AM by Diamond Juarez

## 2018-03-07 NOTE — PROGRESS NOTES
Placed order for thoracic fiducial marker for upcoming surgery: T5-9 laminectomies, resection of epidural lipomatosis.     This will be done on day of surgery 3/13 at pre op. Notified pre op charge nurse.     Notified patient's assisted living to have patient check in at [facility] 4 hours prior to surgery (2 hours for fiducial placement, 2 hours in pre op).

## 2018-03-07 NOTE — TELEPHONE ENCOUNTER
Spoke with Tanja SINCLAIR at patient's assisted living. Advised to stop NSAIDS 7 days prior to surgery. Tanja will coordinate with pt's PCP regarding plavix.

## 2018-03-08 ENCOUNTER — MEDICAL CORRESPONDENCE (OUTPATIENT)
Dept: HEALTH INFORMATION MANAGEMENT | Facility: CLINIC | Age: 68
End: 2018-03-08

## 2018-03-08 NOTE — TELEPHONE ENCOUNTER
Form has been faxed, sent to scanning and copy placed in Dr Copeland fax folder  Closing encounter  Josselin NATION (R)

## 2018-03-09 ENCOUNTER — TELEPHONE (OUTPATIENT)
Dept: FAMILY MEDICINE | Facility: OTHER | Age: 68
End: 2018-03-09

## 2018-03-09 ENCOUNTER — OFFICE VISIT (OUTPATIENT)
Dept: FAMILY MEDICINE | Facility: OTHER | Age: 68
End: 2018-03-09
Payer: MEDICAID

## 2018-03-09 VITALS
HEART RATE: 105 BPM | DIASTOLIC BLOOD PRESSURE: 76 MMHG | TEMPERATURE: 97.9 F | BODY MASS INDEX: 43.48 KG/M2 | WEIGHT: 303 LBS | OXYGEN SATURATION: 95 % | RESPIRATION RATE: 20 BRPM | SYSTOLIC BLOOD PRESSURE: 122 MMHG

## 2018-03-09 DIAGNOSIS — M48.061 FORAMINAL STENOSIS OF LUMBAR REGION: ICD-10-CM

## 2018-03-09 DIAGNOSIS — I10 HYPERTENSION, GOAL BELOW 140/90: ICD-10-CM

## 2018-03-09 DIAGNOSIS — Z01.818 PREOP GENERAL PHYSICAL EXAM: Primary | ICD-10-CM

## 2018-03-09 DIAGNOSIS — L97.929 VENOUS STASIS ULCER OF LEFT LOWER EXTREMITY (H): ICD-10-CM

## 2018-03-09 DIAGNOSIS — Z95.5 HISTORY OF CORONARY ARTERY STENT PLACEMENT: ICD-10-CM
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
## 2018-03-09 DIAGNOSIS — N39.46 MIXED STRESS AND URGE URINARY INCONTINENCE: ICD-10-CM

## 2018-03-09 DIAGNOSIS — R29.898 WEAKNESS OF BOTH LEGS: ICD-10-CM

## 2018-03-09 DIAGNOSIS — E11.59 TYPE 2 DIABETES MELLITUS WITH OTHER CIRCULATORY COMPLICATION, WITHOUT LONG-TERM CURRENT USE OF INSULIN (H): ICD-10-CM

## 2018-03-09 DIAGNOSIS — M25.512 ACUTE PAIN OF LEFT SHOULDER: ICD-10-CM

## 2018-03-09 DIAGNOSIS — I83.029 VENOUS STASIS ULCER OF LEFT LOWER EXTREMITY (H): ICD-10-CM

## 2018-03-09 DIAGNOSIS — E78.5 HYPERLIPIDEMIA LDL GOAL <100: ICD-10-CM

## 2018-03-09 DIAGNOSIS — M48.00 CENTRAL SPINAL STENOSIS: ICD-10-CM

## 2018-03-09 LAB
ALBUMIN SERPL-MCNC: 3.4 G/DL (ref 3.4–5)
ALP SERPL-CCNC: 73 U/L (ref 40–150)
ALT SERPL W P-5'-P-CCNC: 19 U/L (ref 0–70)
ANION GAP SERPL CALCULATED.3IONS-SCNC: 10 MMOL/L (ref 3–14)
AST SERPL W P-5'-P-CCNC: 15 U/L (ref 0–45)
BILIRUB SERPL-MCNC: 0.4 MG/DL (ref 0.2–1.3)
BUN SERPL-MCNC: 19 MG/DL (ref 7–30)
CALCIUM SERPL-MCNC: 9.3 MG/DL (ref 8.5–10.1)
CHLORIDE SERPL-SCNC: 98 MMOL/L (ref 94–109)
CO2 SERPL-SCNC: 30 MMOL/L (ref 20–32)
CREAT SERPL-MCNC: 0.91 MG/DL (ref 0.66–1.25)
ERYTHROCYTE [DISTWIDTH] IN BLOOD BY AUTOMATED COUNT: 16.8 % (ref 10–15)
GFR SERPL CREATININE-BSD FRML MDRD: 82 ML/MIN/1.7M2
GLUCOSE SERPL-MCNC: 179 MG/DL (ref 70–99)
HCT VFR BLD AUTO: 37.8 % (ref 40–53)
HGB BLD-MCNC: 11.6 G/DL (ref 13.3–17.7)
LDLC SERPL DIRECT ASSAY-MCNC: 76 MG/DL
MCH RBC QN AUTO: 27.6 PG (ref 26.5–33)
MCHC RBC AUTO-ENTMCNC: 30.7 G/DL (ref 31.5–36.5)
MCV RBC AUTO: 90 FL (ref 78–100)
PLATELET # BLD AUTO: 317 10E9/L (ref 150–450)
POTASSIUM SERPL-SCNC: 4.6 MMOL/L (ref 3.4–5.3)
PROT SERPL-MCNC: 8.5 G/DL (ref 6.8–8.8)
PSA SERPL-ACNC: 0.38 UG/L (ref 0–4)
RBC # BLD AUTO: 4.2 10E12/L (ref 4.4–5.9)
SODIUM SERPL-SCNC: 138 MMOL/L (ref 133–144)
WBC # BLD AUTO: 10.5 10E9/L (ref 4–11)

## 2018-03-09 PROCEDURE — G0103 PSA SCREENING: HCPCS | Performed by: PHYSICIAN ASSISTANT

## 2018-03-09 PROCEDURE — 83721 ASSAY OF BLOOD LIPOPROTEIN: CPT | Performed by: PHYSICIAN ASSISTANT

## 2018-03-09 PROCEDURE — 93000 ELECTROCARDIOGRAM COMPLETE: CPT | Performed by: PHYSICIAN ASSISTANT

## 2018-03-09 PROCEDURE — 99215 OFFICE O/P EST HI 40 MIN: CPT | Performed by: PHYSICIAN ASSISTANT

## 2018-03-09 PROCEDURE — 80053 COMPREHEN METABOLIC PANEL: CPT | Performed by: PHYSICIAN ASSISTANT

## 2018-03-09 PROCEDURE — 85027 COMPLETE CBC AUTOMATED: CPT | Performed by: PHYSICIAN ASSISTANT

## 2018-03-09 PROCEDURE — 36415 COLL VENOUS BLD VENIPUNCTURE: CPT | Performed by: PHYSICIAN ASSISTANT

## 2018-03-09 RX ORDER — METOPROLOL TARTRATE 25 MG/1
25 TABLET, FILM COATED ORAL 2 TIMES DAILY
Qty: 28 TABLET | Refills: 0 | Status: SHIPPED | OUTPATIENT
Start: 2018-03-09 | End: 2018-04-19

## 2018-03-09 RX ORDER — BLOOD-GLUCOSE METER
EACH MISCELLANEOUS
Refills: 0 | COMMUNITY
Start: 2018-03-02

## 2018-03-09 ASSESSMENT — PAIN SCALES - GENERAL: PAINLEVEL: MODERATE PAIN (4)

## 2018-03-09 NOTE — PROGRESS NOTES
[facility]  [address] [facility] [id number] [phone]  Dept: [phone]    PRE-OP EVALUATION:  Today's date: 3/9/2018    Glenroy Padilla (: 1950) presents for pre-operative evaluation assessment as requested by Dr. Mendieta.  He requires evaluation and anesthesia risk assessment prior to undergoing surgery/procedure for treatment of back pain .    Fax number for surgical facility:   Primary Physician: Julio Cesar Esteban  Type of Anesthesia Anticipated: General    Patient has a Health Care Directive or Living Will:  YES     Preop Questions 3/9/2018   1.  Do you have a history of Heart attack, stroke, stent, coronary bypass surgery, or other heart surgery? YES  -    2.  Do you ever have any pain or discomfort in your chest? No   3.  Do you have a history of  Heart Failure? No   4.   Are you troubled by shortness of breath when:  walking on a level surface, or up a slight hill, or at night? No   5.  Do you currently have a cold, bronchitis or other respiratory infection? No   6.  Do you have a cough, shortness of breath, or wheezing? No   7.  Do you sometimes get pains in the calves of your legs when you walk? YES -    8. Do you or anyone in your family have previous history of blood clots? No   9.  Do you or does anyone in your family have a serious bleeding problem such as prolonged bleeding following surgeries or cuts? No   10. Have you ever had problems with anemia or been told to take iron pills? YES -    11. Have you had any abnormal blood loss such as black, tarry or bloody stools? No   12. Have you ever had a blood transfusion? No   13. Have you or any of your relatives ever had problems with anesthesia? No   14. Do you have sleep apnea, excessive snoring or daytime drowsiness? YES -    15. Do you have any prosthetic heart valves? No   16. Do you have prosthetic joints? No         HPI:     HPI related to upcoming procedure: Back pain treatment      See problem list for active  medical problems.  Problems all longstanding and stable, except as noted/documented.  See ROS for pertinent symptoms related to these conditions.                                                                                                  .    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 01/27/2018     Priority: Medium     Neuropathy 01/25/2018     Priority: Medium     Foot drop, right 01/25/2018     Priority: Medium     Cardiomegaly 01/25/2018     Priority: Medium     Gastroesophageal reflux disease without esophagitis 01/25/2018     Priority: Medium     Obesity, morbid, BMI 40.0-49.9 (H) 11/20/2017     Priority: Medium     Advanced directives, counseling/discussion 10/02/2017     Priority: Medium     Will bring in a copy       Hypertension, goal below 140/90 09/25/2017     Priority: Medium     Hyperlipidemia LDL goal <100 09/25/2017     Priority: Medium     History of coronary artery stent placement 09/25/2017     Priority: Medium     Type 2 diabetes mellitus with other circulatory complication, without long-term current use of insulin (H) 05/22/2017     Priority: Medium     Venous stasis ulcer of left lower extremity (H) 05/22/2017     Priority: Medium     Acute pain of left knee 05/22/2017     Priority: Medium     Chronic pain of left knee 05/22/2017     Priority: Medium     Open wound of left lower extremity without complication, initial encounter 05/22/2017     Priority: Medium     Hx of heart artery stent 05/22/2017     Priority: Medium     DAYTON (obstructive sleep apnea) 05/22/2017     Priority: Medium     Hx of coronary artery disease 03/12/2016     Priority: Medium      Past Medical History:   Diagnosis  Date     Acute pain of left knee 5/22/2017     Chronic pain of right knee 5/22/2017     Hx of coronary artery disease 5/22/2017     Hx of heart artery stent 5/22/2017     Obesity, morbid, BMI 40.0-49.9 (H) 11/20/2017     Open wound of left lower extremity without complication, initial encounter 5/22/2017     DAYTON (obstructive sleep apnea) 5/22/2017     Type 2 diabetes mellitus with other circulatory complication, without long-term current use of insulin (H) 5/22/2017     Venous stasis ulcer of left lower extremity (H) 5/22/2017     Venous stasis ulcer of left lower extremity (H) 5/22/2017     No past surgical history on file.  Current Outpatient Prescriptions   Medication Sig Dispense Refill     HYDROcodone-acetaminophen (NORCO) 5-325 MG per tablet Take 1 tablet by mouth every 4 hours as needed for other (pain control or improvement in physical function.) 20 tablet 0     senna-docusate (SENOKOT-S;PERICOLACE) 8.6-50 MG per tablet Take 1 tablet by mouth 2 times daily as needed for constipation 100 tablet      pantoprazole (PROTONIX) 40 MG EC tablet Take 1 tablet (40 mg) by mouth daily Take 30-60 minutes before a meal. 1 tablet 0     DULoxetine (CYMBALTA) 30 MG EC capsule Take 1 capsule (30 mg) by mouth 2 times daily 1 capsule 0     Wound Dressings (TRIAD HYDROPHILIC WOUND DRESSI) PSTE Externally apply 1 g topically daily 1 Tube 3     losartan (COZAAR) 25 MG tablet Take 1 tablet (25 mg) by mouth daily 90 tablet 1     rosuvastatin (CRESTOR) 10 MG tablet Take 1 tablet (10 mg) by mouth daily 90 tablet 1     furosemide (LASIX) 40 MG tablet Take 1 tablet (40 mg) by mouth daily 90 tablet 1     clopidogrel (PLAVIX) 75 MG tablet Take 1 tablet (75 mg) by mouth daily 90 tablet 1     lidocaine (XYLOCAINE) 5 % ointment Apply topically daily 50 g 3     SitaGLIPtin-MetFORMIN HCl (JANUMET XR) 100-1000 MG TB24 Take 1 tablet by mouth daily 30 tablet 3     ferrous sulfate (IRON) 325 (65 FE) MG tablet Take 1 tablet (325 mg) by mouth daily  (with breakfast) 90 tablet 2     order for DME One touch glucose monitoring strip with Glucometer and lancets. 1 Box 1     OTC products: None, except as noted above    Allergies   Allergen Reactions     No Known Allergies       Latex Allergy: NO    Social History   Substance Use Topics     Smoking status: Current Some Day Smoker     Types: Cigars     Smokeless tobacco: Never Used     Alcohol use No     History   Drug Use No       REVIEW OF SYSTEMS:   CONSTITUTIONAL: NEGATIVE for fever, chills, change in weight  INTEGUMENTARY/SKIN: NEGATIVE for worrisome rashes, moles or lesions  EYES: NEGATIVE for vision changes or irritation  ENT/MOUTH: NEGATIVE for ear, mouth and throat problems  RESP: NEGATIVE for significant cough or SOB  BREAST: NEGATIVE for masses, tenderness or discharge  CV: NEGATIVE for chest pain, palpitations or peripheral edema  GI: NEGATIVE for nausea, abdominal pain, heartburn, or change in bowel habits  : NEGATIVE for frequency, dysuria, or hematuria  MUSCULOSKELETAL: NEGATIVE for significant arthralgias or myalgia  NEURO: NEGATIVE for weakness, dizziness or paresthesias  ENDOCRINE: NEGATIVE for temperature intolerance, skin/hair changes  HEME: NEGATIVE for bleeding problems  PSYCHIATRIC: NEGATIVE for changes in mood or affect    EXAM:   There were no vitals taken for this visit.    GENERAL APPEARANCE: healthy, alert and no distress     EYES: EOMI,  PERRL     HENT: ear canals and TM's normal and nose and mouth without ulcers or lesions     NECK: no adenopathy, no asymmetry, masses, or scars and thyroid normal to palpation     RESP: lungs clear to auscultation - no rales, rhonchi or wheezes     CV: regular rates and rhythm, normal S1 S2, no S3 or S4 and no murmur, click or rub     ABDOMEN:  soft, nontender, no HSM or masses and bowel sounds normal     MS: back pain and more recently left shoulder pain noted.     SKIN: no suspicious lesions or rashes     NEURO: Normal strength and tone, sensory exam  grossly normal, mentation intact and speech normal     PSYCH: mentation appears normal. and affect normal/bright     LYMPHATICS: No cervical adenopathy    DIAGNOSTICS:     EKG: appears normal, NSR, normal axis, normal intervals, no acute ST/T changes c/w ischemia, no LVH by voltage criteria, unchanged from previous tracings  Labs Resulted Today:   Results for orders placed or performed during the hospital encounter of 01/27/18   CT Lumbar spine w/o contrast*    Narrative    CT LUMBAR SPINE WITHOUT CONTRAST 1/27/2018 4:25 PM     HISTORY: Fall yesterday, low back pain with right LE weakness.     TECHNIQUE: Multiplanar multisequence images were obtained through the  lumbar spine without contrast.    FINDINGS: Sagittal images demonstrate normal posterior alignment. Five  lumbar type vertebral bodies are present. There is no evidence for  fracture. No intraosseous lesions are evident. Degenerative changes  are seen between the spinous processes with hypertrophic spinous  processes noted.    T12-L1: Mild facet hypertrophy. There is no evidence for any  significant stenosis.    L1-L2: Broad-based posterior osteophyte formation, disc bulging and  facet hypertrophy is present but there is no significant stenosis.    L2-L3: Broad-based posterior osteophyte formation, disc bulging and  facet hypertrophy is present. There is asymmetric left posterolateral  osteophyte but there is no significant stenosis.    L3-L4: Broad-based disc bulging and moderate facet and ligamentum  flavum hypertrophy is present causing moderate to severe central canal  stenosis and mild to moderate bilateral neural foraminal stenosis.    L4-L5: Broad-based disc bulging or disc protrusion is present along  with moderate to severe facet and ligamentum flavum hypertrophy. There  is moderate to severe central canal and bilateral neural foraminal  stenosis.    L5-S1: Moderate facet hypertrophy and minimal posterior osteophyte  formation and disc bulging is  present causing moderate bilateral  neural foraminal stenosis but no significant central canal stenosis.    Paraspinal soft tissues: Remarkable for vascular calcifications.      Impression    IMPRESSION:  1. Multilevel degenerative disc and facet disease most advanced at  L3-L4 and L4-L5 where there is moderate to severe central canal  stenosis. There is also moderate to severe bilateral neural foraminal  stenoses at L4-L5.  2. Hypertrophic spinous processes with degenerative changes between  the spinous processes.  3. No evidence for fracture or any posterior malalignment.    TASHA GAYTAN MD   XR Knee Right 3 Views    Narrative    KNEE RIGHT THREE VIEW   1/27/2018 4:26 PM     HISTORY: Fall, right knee pain.     COMPARISON: None.      Impression    IMPRESSION: Mild tricompartmental osteoarthritis is present most  advanced in the medial compartment. There is no evidence for fracture.  Vascular calcifications also noted.    TASHA GAYTAN MD   XR Tibia & Fibula Right 2 Views    Narrative    RIGHT TIBIA-FIBULA TWO VIEWS 1/27/2018 4:27 PM     HISTORY: Fall, right knee to ankle pain.     COMPARISON: None.      Impression    IMPRESSION: Negative for fracture. Extensive soft tissue  calcifications noted in the leg raising the possibility of chronic  venous insufficiency or unusual autoimmune diseases such as  dermatomyositis.    TASHA GAYTAN MD   MR Lumbar Spine w/o Contrast    Narrative    MRI LUMBAR SPINE WITHOUT CONTRAST   1/28/2018 10:06 AM     HISTORY: Worsening foot drop, right more than left. Intermittent  incontinence of stool.     TECHNIQUE: Multiplanar multisequence MRI of the lumbar spine without  contrast.    COMPARISON: Lumbar spine CT 1/27/2018.     FINDINGS: There are five lumbar-type vertebral bodies assumed for the  purposes of this dictation. The conus is unremarkable terminating at  the L1-L2 level. The nerve roots of the cauda equina are bunched up  cranial to the L3-L4 level where there is severe spinal  canal  stenosis.    Alignment of the lumbar spine is normal. No loss of vertebral body  height. There are no destructive osseous lesions. Mild loss of  intervertebral disc height with disc desiccation throughout all levels  in the lumbar spine aside from the relatively normal-appearing L5-S1  disc. There is anterior osteophytic spurring throughout the lumbar  spine.     Level by level as follows:     T12-L1:  No significant spinal canal or neural foraminal narrowing.     L1-L2:  No significant spinal canal or neural foraminal narrowing.     L2-L3:  Left subarticular and lateral disc bulge with associated  bilateral endplate osteophytic spurring. There is no significant  spinal canal or neural foraminal narrowing.     L3-L4:  Severe central spinal canal narrowing with loss of T2 signal  around the nerve roots at this level. This is caused by moderate  circumferential disc bulge as well as bilateral facet hypertrophy and  ligamentum flavum infolding and fairly prominent posterior epidural  fat. There is moderate bilateral neural foraminal narrowing due to  disc bulge and facet hypertrophy.     L4-L5:  Moderate to severe central spinal canal narrowing due to  marked bilateral facet hypertrophy and small circumferential disc  bulge. There are bilateral facet joint effusions. Mild bilateral  neural foraminal narrowing due to disc bulge and facet hypertrophy.     L5-S1:  Bilateral facet hypertrophy, left greater than right, without  significant spinal canal or neural foraminal narrowing.     Paraspinous soft tissues:  There is posterior paraspinous muscle  atrophy.      Impression    IMPRESSION:    1. Severe central spinal canal narrowing at the L3-L4 level due to  disc bulge, facet hypertrophy, ligamentum flavum infolding, and  prominent epidural fat.  2. Moderate to severe spinal canal narrowing at the L4-L5 level due to  disc bulge and marked facet hypertrophy.  3. Additional degenerative changes throughout the lumbar  spine as  described above.    RENUKA SULLIVAN MD   CBC with platelets differential   Result Value Ref [facility]    WBC 11.3 (H) 4.0 - 11.0 10e9/L    RBC Count 4.05 (L) 4.4 - 5.9 10e12/L    Hemoglobin 11.2 (L) 13.3 - 17.7 g/dL    Hematocrit 36.8 (L) 40.0 - 53.0 %    MCV 91 78 - 100 fl    MCH 27.7 26.5 - 33.0 pg    MCHC 30.4 (L) 31.5 - 36.5 g/dL    RDW 17.2 (H) 10.0 - 15.0 %    Platelet Count 389 150 - 450 10e9/L    Diff Method Automated Method     % Neutrophils 73.0 %    % Lymphocytes 10.6 %    % Monocytes 13.0 %    % Eosinophils 2.9 %    % Basophils 0.5 %    Absolute Neutrophil 8.2 1.6 - 8.3 10e9/L    Absolute Lymphocytes 1.2 0.8 - 5.3 10e9/L    Absolute Monocytes 1.5 (H) 0.0 - 1.3 10e9/L    Absolute Eosinophils 0.3 0.0 - 0.7 10e9/L    Absolute Basophils 0.1 0.0 - 0.2 10e9/L   Basic metabolic panel   Result Value Ref [facility]    Sodium 137 133 - 144 mmol/L    Potassium 3.8 3.4 - 5.3 mmol/L    Chloride 97 94 - 109 mmol/L    Carbon Dioxide 30 20 - 32 mmol/L    Anion Gap 10 3 - 14 mmol/L    Glucose 102 (H) 70 - 99 mg/dL    Urea Nitrogen 16 7 - 30 mg/dL    Creatinine 1.17 0.66 - 1.25 mg/dL    GFR Estimate 62 >60 mL/min/1.7m2    GFR Estimate If Black 75 >60 mL/min/1.7m2    Calcium 9.0 8.5 - 10.1 mg/dL   UA reflex to Microscopic and Culture   Result Value Ref [facility]    Color Urine Yellow     Appearance Urine Slightly Cloudy     Glucose Urine Negative NEG^Negative mg/dL    Bilirubin Urine Negative NEG^Negative    Ketones Urine Negative NEG^Negative mg/dL    Specific Gravity Urine 1.010 1.003 - 1.035    Blood Urine Small (A) NEG^Negative    pH Urine 5.0 5.0 - 7.0 pH    Protein Albumin Urine Negative NEG^Negative mg/dL    Urobilinogen mg/dL 0.0 0.0 - 2.0 mg/dL    Nitrite Urine Negative NEG^Negative    Leukocyte Esterase Urine Small (A) NEG^Negative    Source Unspecified Urine     RBC Urine 28 (H) 0 - 2 /HPF    WBC Urine 72 (H) 0 - 2 /HPF    Yeast Urine Many (A) NEG^Negative /HPF    Mucous Urine Present (A) NEG^Negative /LPF    Hyaline  Casts 3 (H) 0 - 2 /LPF   Hemoglobin A1c   Result Value Ref [facility]    Hemoglobin A1C 5.6 4.3 - 6.0 %   Glucose by meter   Result Value Ref [facility]    Glucose 138 (H) 70 - 99 mg/dL   Hemoglobin   Result Value Ref [facility]    Hemoglobin 10.6 (L) 13.3 - 17.7 g/dL   Basic metabolic panel   Result Value Ref [facility]    Sodium 137 133 - 144 mmol/L    Potassium 3.7 3.4 - 5.3 mmol/L    Chloride 98 94 - 109 mmol/L    Carbon Dioxide 31 20 - 32 mmol/L    Anion Gap 8 3 - 14 mmol/L    Glucose 126 (H) 70 - 99 mg/dL    Urea Nitrogen 16 7 - 30 mg/dL    Creatinine 1.04 0.66 - 1.25 mg/dL    GFR Estimate 71 >60 mL/min/1.7m2    GFR Estimate If Black 86 >60 mL/min/1.7m2    Calcium 8.6 8.5 - 10.1 mg/dL   Glucose by meter   Result Value Ref [facility]    Glucose 126 (H) 70 - 99 mg/dL   Glucose by meter   Result Value Ref [facility]    Glucose 134 (H) 70 - 99 mg/dL   Glucose by meter   Result Value Ref [facility]    Glucose 107 (H) 70 - 99 mg/dL   Glucose by meter   Result Value Ref [facility]    Glucose 131 (H) 70 - 99 mg/dL   Glucose by meter   Result Value Ref [facility]    Glucose 128 (H) 70 - 99 mg/dL   Glucose by meter   Result Value Ref [facility]    Glucose 111 (H) 70 - 99 mg/dL   Glucose by meter   Result Value Ref [facility]    Glucose 113 (H) 70 - 99 mg/dL   Glucose by meter   Result Value Ref [facility]    Glucose 94 70 - 99 mg/dL   Glucose by meter   Result Value Ref [facility]    Glucose 156 (H) 70 - 99 mg/dL   Glucose by meter   Result Value Ref [facility]    Glucose 111 (H) 70 - 99 mg/dL   Glucose by meter   Result Value Ref [facility]    Glucose 111 (H) 70 - 99 mg/dL   Glucose by meter   Result Value Ref [facility]    Glucose 134 (H) 70 - 99 mg/dL   Social Work IP Consult    Narrative    Augusta John     1/28/2018 10:25 AM  CARE TRANSITION SOCIAL WORK INITIAL ASSESSMENT:  Reason For Consult: discharge planning   Met with: Patient and Family.    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       Resources List: Skilled Nursing Facility     Lives With: spouse, child(kirsten), adult  Living Arrangements: house  Quality Of Family Relationships: supportive, helpful, involved  Description of Support System: Involved, Supportive   Who is your support system?: Wife, Children       Insurance Concerns: No Insurance issues identified        This writer met with pt introduced self and role. Discussed   discharge planning and medicare guidelines in regards to home   care and SNF benefits.  Also talked with patient's daughter,   Yuko, by phone.  Patient is not able to be cared for safely by   family at this time.  He is in need of TCU placement.  Patient to   have a MRI today.  Unsure of discharge date at this time, pending   MRI results.  Discussed list of options for TCU for patient.    Patient/family would like referrals sent to the   [place] area.  Unsure if patient will have insurance   coverage for TCU.  Facilities receiving referrals will run   financials on Monday.      PLAN    TCU    Discharge Planner   Discharge Plans in progress: TCU  Barriers to discharge plan: possible cost  Follow up plan: PCP    SARAH Darden  [facility] [phone]/ [facility] [phone]         Entered by: Augusta John 01/28/2018 10:14 AM     Referrals sent to [facility], [facility] in [place], and [facility] in [place].       Urine Culture Aerobic Bacterial   Result Value  Ref [facility]    Specimen Description Unspecified Urine     Special Requests Specimen received in preservative     Culture Micro (A)      50,000 to 100,000 colonies/mL  Candida tropicalis  Susceptibility testing not routinely done     Influenza A/B antigen   Result Value Ref [facility]    Influenza A/B Agn Specimen Nasal     Influenza A Negative NEG^Negative    Influenza B Negative NEG^Negative     Labs Drawn and in Process:   Unresulted Labs Ordered in the Past 30 Days of this Admission     No orders found from 1/8/2018 to 3/10/2018.          Recent Labs   Lab Test  01/28/18   0530  01/27/18   1825  01/26/18   1920   09/25/17   1216   HGB  10.6*  11.2*  11.5*   < >  11.6*   PLT   --   389  367   < >  340   NA  137  137   --    --   139   POTASSIUM  3.7  3.8   --    --   4.1   CR  1.04  1.17   --    --   0.92   A1C   --   5.6   --    --   6.2*    < > = values in this interval not displayed.        IMPRESSION:   Reason for surgery/procedure: back pain treatment  Diagnosis/reason for consult: anesthesia / surgical clearance.    The proposed surgical procedure is considered INTERMEDIATE risk.    REVISED CARDIAC RISK INDEX  The patient has the following serious cardiovascular risks for perioperative complications such as (MI, PE, VFib and 3  AV Block):  No serious cardiac risks  INTERPRETATION: 2 risks: Class III (moderate risk - 6.6% complication rate)    The patient has the following additional risks for perioperative complications:  No identified additional risks  The ASCVD Risk score (Radha DC Jr, et al., 2013) failed to calculate for the following reasons:    The valid total cholesterol range is 130 to 320 mg/dL      ICD-10-CM    1. Preop general physical exam Z01.818 EKG 12-lead complete w/read - Clinics     CBC with platelets     Comprehensive metabolic panel     PSA, screen     *UA reflex to Microscopic and Culture ([facility] (except [place] and [place])     LDL cholesterol direct   2. Central spinal  stenosis L3-4 and L4-5 M48.00 EKG 12-lead complete w/read - Clinics     CBC with platelets     Comprehensive metabolic panel     PSA, screen     *UA reflex to Microscopic and Culture ([facility] (except [place] and [place])     LDL cholesterol direct   3. 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 M99.83 EKG 12-lead complete w/read - Clinics     CBC with platelets     Comprehensive metabolic panel     PSA, screen     *UA reflex to Microscopic and Culture ([facility] (except [place] and [place])     LDL cholesterol direct   4. Type 2 diabetes mellitus with other circulatory complication, without long-term current use of insulin (H) E11.59 EKG 12-lead complete w/read - Clinics     CBC with platelets     Comprehensive metabolic panel     LDL cholesterol direct   5. History of coronary artery stent placement Z95.5 EKG 12-lead complete w/read - Clinics     CBC with platelets     Comprehensive metabolic panel     LDL cholesterol direct   6. Weakness of both legs R29.898 EKG 12-lead complete w/read - Clinics     CBC with platelets     Comprehensive metabolic panel     LDL cholesterol direct   7. Venous stasis ulcer of left lower extremity (H) I83.029 EKG 12-lead complete w/read - Clinics     CBC with platelets     Comprehensive metabolic panel     LDL cholesterol direct   8. Acute pain of left shoulder M25.512 XR Shoulder Left G/E 3 Views     MR Shoulder Left w/o Contrast     LDL cholesterol direct   9. Fall at home, initial encounter W19.XXXA XR Shoulder Left G/E 3 Views    Y92.099 MR Shoulder Left w/o Contrast     LDL cholesterol direct   10. Mixed stress and urge urinary incontinence N39.46 PSA, screen     *UA reflex to Microscopic and Culture ([facility] (except [place] and [place])     LDL cholesterol direct   11. Hyperlipidemia LDL goal <100 E78.5 LDL cholesterol direct   12. Hypertension, goal below 140/90 I10 LDL cholesterol direct       RECOMMENDATIONS:      --Consult hospital rounder / IM to assist post-op medical management    Cardiovascular Risk  Beta blockers recommended Metoprolol Tartrate (Lopressor) 25 mg twice daily (low dose)    --Patient is to take all scheduled medications on the day of surgery EXCEPT for modifications listed below.    ACE Inhibitor or Angiotensin Receptor Blocker (ARB) Use  Ace inhibitor or Angiotensin Receptor Blocker (ARB) and should HOLD this medication for the 24 hours prior to surgery.      APPROVAL GIVEN to proceed with proposed procedure, without further diagnostic evaluation       Signed Electronically by: DAKOTA Barker PAJosephC    Copy of this evaluation report is provided to requesting physician.    [facility] Preop Guidelines

## 2018-03-09 NOTE — NURSING NOTE
"Chief Complaint   Patient presents with     Pre-Op Exam       Initial /76 (Cuff Size: Adult Large)  Pulse 105  Temp 97.9  F (36.6  C) (Temporal)  Resp 20  Wt (!) 303 lb (137.4 kg)  SpO2 95%  BMI 43.48 kg/m2 Estimated body mass index is 43.48 kg/(m^2) as calculated from the following:    Height as of 3/1/18: 5' 10\" (1.778 m).    Weight as of this encounter: 303 lb (137.4 kg).  Medication Reconciliation: complete  "

## 2018-03-09 NOTE — MR AVS SNAPSHOT
After Visit Summary   3/9/2018    Glenroy Padilla    MRN: [id number]           Patient Information     Date Of Birth          1950        Visit Information        Provider Department      3/9/2018 9:00 AM Julio Cesar Esteban PA-C [facility] [place]        Today's Diagnoses     Preop general physical exam    -  1    Central spinal stenosis L3-4 and L4-5        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        Type 2 diabetes mellitus with other circulatory complication, without long-term current use of insulin (H)        History of coronary artery stent placement        Weakness of both legs        Venous stasis ulcer of left lower extremity (H)        Acute pain of left shoulder        Fall at home, initial encounter        Mixed stress and urge urinary incontinence        Hyperlipidemia LDL goal <100        Hypertension, goal below 140/90          Care Instructions      Before Your Surgery      Call your surgeon if there is any change in your health. This includes signs of a cold or flu (such as a sore throat, runny nose, cough, rash or fever).    Do not smoke, drink alcohol or take over the counter medicine (unless your surgeon or primary care doctor tells you to) for the 24 hours before and after surgery.    If you take prescribed drugs: Follow your doctor s orders about which medicines to take and which to stop until after surgery.    Eating and drinking prior to surgery: follow the instructions from your surgeon    Take a shower or bath the night before surgery. Use the soap your surgeon gave you to gently clean your skin. If you do not have soap from your surgeon, use your regular soap. Do not shave or scrub the surgery site.  Wear clean pajamas and have clean sheets on your bed.           Follow-ups after your visit        Your next 10 appointments already scheduled     Mar 12, 2018  2:30 PM CDT   Return Visit with Casey Shine MD   [facility] ([facility]  "[place])    [address] [address]   [phone]            Mar 13, 2018   Procedure with Hugh Mendieta MD   [facility] (--)    [address], [address]  [place] [address]   [phone]            Apr 26, 2018 10:10 AM CDT   Return Visit with Martha Reddy PA-C   [facility]    [address], [address]  [place] [address] [phone]            Jun 14, 2018  9:00 AM CDT   Return Visit with Hugh Mendieta MD   [facility] ([facility])    [address], [address]  [place] [address] [phone]              Future tests that were ordered for you today     Open Future Orders        Priority Expected Expires Ordered    MR Shoulder Left w/o Contrast Routine  5/9/2018 3/9/2018            Who to contact     If you have questions or need follow up information about today's clinic visit or your schedule please contact [facility] directly at [phone].  Normal or non-critical lab and imaging results will be communicated to you by [employer], letter or phone within 4 business days after the clinic has received the results. If you do not hear from us within 7 days, please contact the clinic through [employer] or phone. If you have a critical or abnormal lab result, we will notify you by phone as soon as possible.  Submit refill requests through [employer] or call your pharmacy and they will forward the refill request to us. Please allow 3 business days for your refill to be completed.          Additional Information About Your Visit        [employer] Search Information     [employer] lets you send messages to your doctor, view your test results, renew your prescriptions, schedule appointments and more. To sign up, go to [email or web address] . Click on \"Log in\" on the left side of the screen, which will take you to the Welcome page. Then click on \"Sign up Now\" on " the right side of the page.     You will be asked to enter the access code listed below, as well as some personal information. Please follow the directions to create your username and password.     Your access code is: [email or web address]  Expires: 3/22/2018 10:22 AM     Your access code will  in 90 days. If you need help or a new code, please call your [facility] clinic or [phone].        Care EveryWhere ID     This is your Care EveryWhere ID. This could be used by other organizations to access your [facility] medical records  [id number]        Your Vitals Were     Pulse Temperature Respirations Pulse Oximetry BMI (Body Mass Index)       105 97.9  F (36.6  C) (Temporal) 20 95% 43.48 kg/m2        Blood Pressure from Last 3 Encounters:   18 122/76   18 152/75   18 140/68    Weight from Last 3 Encounters:   18 (!) 303 lb (137.4 kg)   18 299 lb (135.6 kg)   02/15/18 (!) 303 lb (137.4 kg)              We Performed the Following     *UA reflex to Microscopic and Culture ([facility] and [facility] (except [place] and [place])     CBC with platelets     Comprehensive metabolic panel     EKG 12-lead complete w/read - Clinics     LDL cholesterol direct     PSA, screen          Today's Medication Changes          These changes are accurate as of 3/9/18  9:58 AM.  If you have any questions, ask your nurse or doctor.               Start taking these medicines.        Dose/Directions    metoprolol tartrate 25 MG tablet   Commonly known as:  LOPRESSOR   Used for:  Type 2 diabetes mellitus with other circulatory complication, without long-term current use of insulin (H), History of coronary artery stent placement, Hypertension, goal below 140/90   Started by:  Julio Cesar Esteban PA-C        Dose:  25 mg   Take 1 tablet (25 mg) by mouth 2 times daily   Quantity:  28 tablet   Refills:  0         Stop taking these medicines if you haven't already. Please contact your care team if you have questions.      HYDROcodone-acetaminophen 5-325 MG per tablet   Commonly known as:  NORCO   Stopped by:  Julio Cesar Esteban PA-C                Where to get your medicines      These medications were sent to [facility] [place] - [address]   [address] [place] [address]     Phone:  [phone]     metoprolol tartrate 25 MG tablet                Primary Care Provider Office Phone # Fax #    Julio Cesar Esteban PA-C [phone]       [facility] [address]  [place] [address]        Equal Access to Services     [facility]: Hadii aad ku hadasho Soomaali, waaxda luqadaha, qaybta kaalmada adeegyada, waxay idiin hayaan adeeg kharamamadou batres . So [facility] [phone].    ATENCIÓN: Si habla español, tiene a wilkinson disposición servicios gratuitos de asistencia lingüística. [facility] [phone].    We comply with applicable federal civil rights laws and [place] laws. We do not discriminate on the basis of race, color, national origin, age, disability, sex, sexual orientation, or gender identity.            Thank you!     Thank you for choosing [facility]  for your care. Our goal is always to provide you with excellent care. Hearing back from our patients is one way we can continue to improve our services. Please take a few minutes to complete the written survey that you may receive in the mail after your visit with us. Thank you!             Your Updated Medication List - Protect others around you: Learn how to safely use, store and throw away your medicines at www.disposemymeds.org.          This list is accurate as of 3/9/18  9:58 AM.  Always use your most recent med list.                   Brand Name Dispense Instructions for use Diagnosis    clopidogrel 75 MG tablet    PLAVIX    90 tablet    Take 1 tablet (75 mg) by mouth daily    Hypertension, goal below 140/90, Hyperlipidemia LDL goal <100, Type 2 diabetes mellitus with other circulatory complication,  without long-term current use of insulin (H)       DULoxetine 30 MG EC capsule    CYMBALTA    1 capsule    Take 1 capsule (30 mg) by mouth 2 times daily    Neuropathy, Foot drop, right       ferrous sulfate 325 (65 FE) MG tablet    IRON    90 tablet    Take 1 tablet (325 mg) by mouth daily (with breakfast)    History of anemia       furosemide 40 MG tablet    LASIX    90 tablet    Take 1 tablet (40 mg) by mouth daily    Hypertension, goal below 140/90       GLUCOCARD EXPRESSION MONITOR W/DEVICE Kit      USE TWICE DAILY TO TEST BLOOD GLUCOSE        lidocaine 5 % ointment    XYLOCAINE    50 g    Apply topically daily    Chronic pain of right knee       losartan 25 MG tablet    COZAAR    90 tablet    Take 1 tablet (25 mg) by mouth daily    Hypertension, goal below 140/90, Type 2 diabetes mellitus with other circulatory complication, without long-term current use of insulin (H)       metoprolol tartrate 25 MG tablet    LOPRESSOR    28 tablet    Take 1 tablet (25 mg) by mouth 2 times daily    Type 2 diabetes mellitus with other circulatory complication, without long-term current use of insulin (H), History of coronary artery stent placement, Hypertension, goal below 140/90       order for DME     1 Box    One touch glucose monitoring strip with Glucometer and lancets.    Type 2 diabetes mellitus with other circulatory complication, without long-term current use of insulin (H)       PROTONIX 40 MG EC tablet   Generic drug:  pantoprazole     1 tablet    Take 1 tablet (40 mg) by mouth daily Take 30-60 minutes before a meal.    Gastroesophageal reflux disease without esophagitis       rosuvastatin 10 MG tablet    CRESTOR    90 tablet    Take 1 tablet (10 mg) by mouth daily    Type 2 diabetes mellitus with other circulatory complication, without long-term current use of insulin (H)       senna-docusate 8.6-50 MG per tablet    SENOKOT-S;PERICOLACE    100 tablet    Take 1 tablet by mouth 2 times daily as needed for constipation     Spinal stenosis, lumbar region, without neurogenic claudication       SitaGLIPtin-MetFORMIN HCl 100-1000 MG Tb24    JANUMET XR    30 tablet    Take 1 tablet by mouth daily    Type 2 diabetes mellitus with complication, without long-term current use of insulin (H)       TRIAD HYDROPHILIC WOUND DRESSI Pste     1 Tube    Externally apply 1 g topically daily    Open wound of right lower leg, initial encounter

## 2018-03-09 NOTE — TELEPHONE ENCOUNTER
Reason for Call:  Form, our goal is to have forms completed with 72 hours, however, some forms may require a visit or additional information.    Type of letter, form or note:  medical    Who is the form from?: [facility] (if other please explain)    Where did the form come from: form was faxed in    What clinic location was the form placed at?: [facility] - [phone]    Where the form was placed: 's Box    What number is listed as a contact on the form?: [phone]       Additional comments: n/a    Call taken on 3/9/2018 at 3:20 PM by Diamond Juarez

## 2018-03-11 ENCOUNTER — MEDICAL CORRESPONDENCE (OUTPATIENT)
Dept: HEALTH INFORMATION MANAGEMENT | Facility: CLINIC | Age: 68
End: 2018-03-11

## 2018-03-12 ENCOUNTER — MEDICAL CORRESPONDENCE (OUTPATIENT)
Dept: HEALTH INFORMATION MANAGEMENT | Facility: CLINIC | Age: 68
End: 2018-03-12

## 2018-03-12 ENCOUNTER — TELEPHONE (OUTPATIENT)
Dept: FAMILY MEDICINE | Facility: OTHER | Age: 68
End: 2018-03-12

## 2018-03-12 ENCOUNTER — OFFICE VISIT (OUTPATIENT)
Dept: SURGERY | Facility: CLINIC | Age: 68
End: 2018-03-12
Payer: MEDICAID

## 2018-03-12 VITALS — OXYGEN SATURATION: 97 % | TEMPERATURE: 97.9 F | HEART RATE: 89 BPM

## 2018-03-12 DIAGNOSIS — S81.802D OPEN WOUND OF LEFT LOWER EXTREMITY WITHOUT COMPLICATION, SUBSEQUENT ENCOUNTER: Primary | ICD-10-CM

## 2018-03-12 DIAGNOSIS — R60.0 EDEMA OF BOTH LEGS: ICD-10-CM

## 2018-03-12 PROCEDURE — 99213 OFFICE O/P EST LOW 20 MIN: CPT | Performed by: SPECIALIST

## 2018-03-12 NOTE — NURSING NOTE
"Chief Complaint   Patient presents with     WOUND CARE       Initial Pulse 89  Temp 97.9  F (36.6  C) (Temporal)  SpO2 97% Estimated body mass index is 43.48 kg/(m^2) as calculated from the following:    Height as of 3/1/18: 1.778 m (5' 10\").    Weight as of 3/9/18: 137.4 kg (303 lb).  Medication Reconciliation: complete    "

## 2018-03-12 NOTE — LETTER
March 12, 2018      Glenroy Padilla  [address] [address]        Dear ,    We are writing to inform you of your test results.    Elevation in blood sugar is noted.  Your diabetes has been under good control. We will check your hemoglobin A1c at the three-month ml.  You will be due towards the tail end of April for a diabetes recheck.    Resulted Orders   CBC with platelets   Result Value Ref Range    WBC 10.5 4.0 - 11.0 10e9/L    RBC Count 4.20 (L) 4.4 - 5.9 10e12/L    Hemoglobin 11.6 (L) 13.3 - 17.7 g/dL    Hematocrit 37.8 (L) 40.0 - 53.0 %    MCV 90 78 - 100 fl    MCH 27.6 26.5 - 33.0 pg    MCHC 30.7 (L) 31.5 - 36.5 g/dL    RDW 16.8 (H) 10.0 - 15.0 %    Platelet Count 317 150 - 450 10e9/L   Comprehensive metabolic panel   Result Value Ref Range    Sodium 138 133 - 144 mmol/L    Potassium 4.6 3.4 - 5.3 mmol/L    Chloride 98 94 - 109 mmol/L    Carbon Dioxide 30 20 - 32 mmol/L    Anion Gap 10 3 - 14 mmol/L    Glucose 179 (H) 70 - 99 mg/dL      Comment:      Non Fasting    Urea Nitrogen 19 7 - 30 mg/dL    Creatinine 0.91 0.66 - 1.25 mg/dL    GFR Estimate 82 >60 mL/min/1.7m2      Comment:      Non  GFR Calc    GFR Estimate If Black >90 >60 mL/min/1.7m2      Comment:       GFR Calc    Calcium 9.3 8.5 - 10.1 mg/dL    Bilirubin Total 0.4 0.2 - 1.3 mg/dL    Albumin 3.4 3.4 - 5.0 g/dL    Protein Total 8.5 6.8 - 8.8 g/dL    Alkaline Phosphatase 73 40 - 150 U/L    ALT 19 0 - 70 U/L    AST 15 0 - 45 U/L   PSA, screen   Result Value Ref Range    PSA 0.38 0 - 4 ug/L      Comment:      Assay Method:  Chemiluminescence using Siemens Vista analyzer   LDL cholesterol direct   Result Value Ref Range    LDL Cholesterol Direct 76 <100 mg/dL      Comment:      Desirable:       <100 mg/dl       If you have any questions or concerns, please call the clinic at the number listed above.       Sincerely,        Julio Cesar Sahu PA-C

## 2018-03-12 NOTE — TELEPHONE ENCOUNTER
Reason for Call:  Form, our goal is to have forms completed with 72 hours, however, some forms may require a visit or additional information.    Type of letter, form or note:  medical    Who is the form from?: [facility] (if other please explain)    Where did the form come from: form was faxed in    What clinic location was the form placed at?: [facility] - [phone]    Where the form was placed: 's Box    What number is listed as a contact on the form?: [phone]       Additional comments: please complete and fax  Fax [phone]    Call taken on 3/12/2018 at 8:23 AM by Olivia Mayfield

## 2018-03-12 NOTE — H&P (VIEW-ONLY)
[facility]  [id number] [facility] [id number] [phone]  Dept: [phone]    PRE-OP EVALUATION:  Today's date: 3/9/2018    Glenroy Padilla (: 1950) presents for pre-operative evaluation assessment as requested by Dr. Mendieta.  He requires evaluation and anesthesia risk assessment prior to undergoing surgery/procedure for treatment of back pain .    Fax number for surgical facility:   Primary Physician: Julio Cesar Esteban  Type of Anesthesia Anticipated: General    Patient has a Health Care Directive or Living Will:  YES     Preop Questions 3/9/2018   1.  Do you have a history of Heart attack, stroke, stent, coronary bypass surgery, or other heart surgery? YES  -    2.  Do you ever have any pain or discomfort in your chest? No   3.  Do you have a history of  Heart Failure? No   4.   Are you troubled by shortness of breath when:  walking on a level surface, or up a slight hill, or at night? No   5.  Do you currently have a cold, bronchitis or other respiratory infection? No   6.  Do you have a cough, shortness of breath, or wheezing? No   7.  Do you sometimes get pains in the calves of your legs when you walk? YES -    8. Do you or anyone in your family have previous history of blood clots? No   9.  Do you or does anyone in your family have a serious bleeding problem such as prolonged bleeding following surgeries or cuts? No   10. Have you ever had problems with anemia or been told to take iron pills? YES -    11. Have you had any abnormal blood loss such as black, tarry or bloody stools? No   12. Have you ever had a blood transfusion? No   13. Have you or any of your relatives ever had problems with anesthesia? No   14. Do you have sleep apnea, excessive snoring or daytime drowsiness? YES -    15. Do you have any prosthetic heart valves? No   16. Do you have prosthetic joints? No         HPI:     HPI related to upcoming procedure: Back pain treatment      See problem list for active  medical problems.  Problems all longstanding and stable, except as noted/documented.  See ROS for pertinent symptoms related to these conditions.                                                                                                  .    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 01/27/2018     Priority: Medium     Neuropathy 01/25/2018     Priority: Medium     Foot drop, right 01/25/2018     Priority: Medium     Cardiomegaly 01/25/2018     Priority: Medium     Gastroesophageal reflux disease without esophagitis 01/25/2018     Priority: Medium     Obesity, morbid, BMI 40.0-49.9 (H) 11/20/2017     Priority: Medium     Advanced directives, counseling/discussion 10/02/2017     Priority: Medium     Will bring in a copy       Hypertension, goal below 140/90 09/25/2017     Priority: Medium     Hyperlipidemia LDL goal <100 09/25/2017     Priority: Medium     History of coronary artery stent placement 09/25/2017     Priority: Medium     Type 2 diabetes mellitus with other circulatory complication, without long-term current use of insulin (H) 05/22/2017     Priority: Medium     Venous stasis ulcer of left lower extremity (H) 05/22/2017     Priority: Medium     Acute pain of left knee 05/22/2017     Priority: Medium     Chronic pain of left knee 05/22/2017     Priority: Medium     Open wound of left lower extremity without complication, initial encounter 05/22/2017     Priority: Medium     Hx of heart artery stent 05/22/2017     Priority: Medium     DAYTON (obstructive sleep apnea) 05/22/2017     Priority: Medium     Hx of coronary artery disease 03/12/2016     Priority: Medium      Past Medical History:   Diagnosis  Date     Acute pain of left knee 5/22/2017     Chronic pain of right knee 5/22/2017     Hx of coronary artery disease 5/22/2017     Hx of heart artery stent 5/22/2017     Obesity, morbid, BMI 40.0-49.9 (H) 11/20/2017     Open wound of left lower extremity without complication, initial encounter 5/22/2017     DAYTON (obstructive sleep apnea) 5/22/2017     Type 2 diabetes mellitus with other circulatory complication, without long-term current use of insulin (H) 5/22/2017     Venous stasis ulcer of left lower extremity (H) 5/22/2017     Venous stasis ulcer of left lower extremity (H) 5/22/2017     No past surgical history on file.  Current Outpatient Prescriptions   Medication Sig Dispense Refill     HYDROcodone-acetaminophen (NORCO) 5-325 MG per tablet Take 1 tablet by mouth every 4 hours as needed for other (pain control or improvement in physical function.) 20 tablet 0     senna-docusate (SENOKOT-S;PERICOLACE) 8.6-50 MG per tablet Take 1 tablet by mouth 2 times daily as needed for constipation 100 tablet      pantoprazole (PROTONIX) 40 MG EC tablet Take 1 tablet (40 mg) by mouth daily Take 30-60 minutes before a meal. 1 tablet 0     DULoxetine (CYMBALTA) 30 MG EC capsule Take 1 capsule (30 mg) by mouth 2 times daily 1 capsule 0     Wound Dressings (TRIAD HYDROPHILIC WOUND DRESSI) PSTE Externally apply 1 g topically daily 1 Tube 3     losartan (COZAAR) 25 MG tablet Take 1 tablet (25 mg) by mouth daily 90 tablet 1     rosuvastatin (CRESTOR) 10 MG tablet Take 1 tablet (10 mg) by mouth daily 90 tablet 1     furosemide (LASIX) 40 MG tablet Take 1 tablet (40 mg) by mouth daily 90 tablet 1     clopidogrel (PLAVIX) 75 MG tablet Take 1 tablet (75 mg) by mouth daily 90 tablet 1     lidocaine (XYLOCAINE) 5 % ointment Apply topically daily 50 g 3     SitaGLIPtin-MetFORMIN HCl (JANUMET XR) 100-1000 MG TB24 Take 1 tablet by mouth daily 30 tablet 3     ferrous sulfate (IRON) 325 (65 FE) MG tablet Take 1 tablet (325 mg) by mouth daily  (with breakfast) 90 tablet 2     order for DME One touch glucose monitoring strip with Glucometer and lancets. 1 Box 1     OTC products: None, except as noted above    Allergies   Allergen Reactions     No Known Allergies       Latex Allergy: NO    Social History   Substance Use Topics     Smoking status: Current Some Day Smoker     Types: Cigars     Smokeless tobacco: Never Used     Alcohol use No     History   Drug Use No       REVIEW OF SYSTEMS:   CONSTITUTIONAL: NEGATIVE for fever, chills, change in weight  INTEGUMENTARY/SKIN: NEGATIVE for worrisome rashes, moles or lesions  EYES: NEGATIVE for vision changes or irritation  ENT/MOUTH: NEGATIVE for ear, mouth and throat problems  RESP: NEGATIVE for significant cough or SOB  BREAST: NEGATIVE for masses, tenderness or discharge  CV: NEGATIVE for chest pain, palpitations or peripheral edema  GI: NEGATIVE for nausea, abdominal pain, heartburn, or change in bowel habits  : NEGATIVE for frequency, dysuria, or hematuria  MUSCULOSKELETAL: NEGATIVE for significant arthralgias or myalgia  NEURO: NEGATIVE for weakness, dizziness or paresthesias  ENDOCRINE: NEGATIVE for temperature intolerance, skin/hair changes  HEME: NEGATIVE for bleeding problems  PSYCHIATRIC: NEGATIVE for changes in mood or affect    EXAM:   There were no vitals taken for this visit.    GENERAL APPEARANCE: healthy, alert and no distress     EYES: EOMI,  PERRL     HENT: ear canals and TM's normal and nose and mouth without ulcers or lesions     NECK: no adenopathy, no asymmetry, masses, or scars and thyroid normal to palpation     RESP: lungs clear to auscultation - no rales, rhonchi or wheezes     CV: regular rates and rhythm, normal S1 S2, no S3 or S4 and no murmur, click or rub     ABDOMEN:  soft, nontender, no HSM or masses and bowel sounds normal     MS: back pain and more recently left shoulder pain noted.     SKIN: no suspicious lesions or rashes     NEURO: Normal strength and tone, sensory exam  grossly normal, mentation intact and speech normal     PSYCH: mentation appears normal. and affect normal/bright     LYMPHATICS: No cervical adenopathy    DIAGNOSTICS:     EKG: appears normal, NSR, normal axis, normal intervals, no acute ST/T changes c/w ischemia, no LVH by voltage criteria, unchanged from previous tracings  Labs Resulted Today:   Results for orders placed or performed during the hospital encounter of 01/27/18   CT Lumbar spine w/o contrast*    Narrative    CT LUMBAR SPINE WITHOUT CONTRAST 1/27/2018 4:25 PM     HISTORY: Fall yesterday, low back pain with right LE weakness.     TECHNIQUE: Multiplanar multisequence images were obtained through the  lumbar spine without contrast.    FINDINGS: Sagittal images demonstrate normal posterior alignment. Five  lumbar type vertebral bodies are present. There is no evidence for  fracture. No intraosseous lesions are evident. Degenerative changes  are seen between the spinous processes with hypertrophic spinous  processes noted.    T12-L1: Mild facet hypertrophy. There is no evidence for any  significant stenosis.    L1-L2: Broad-based posterior osteophyte formation, disc bulging and  facet hypertrophy is present but there is no significant stenosis.    L2-L3: Broad-based posterior osteophyte formation, disc bulging and  facet hypertrophy is present. There is asymmetric left posterolateral  osteophyte but there is no significant stenosis.    L3-L4: Broad-based disc bulging and moderate facet and ligamentum  flavum hypertrophy is present causing moderate to severe central canal  stenosis and mild to moderate bilateral neural foraminal stenosis.    L4-L5: Broad-based disc bulging or disc protrusion is present along  with moderate to severe facet and ligamentum flavum hypertrophy. There  is moderate to severe central canal and bilateral neural foraminal  stenosis.    L5-S1: Moderate facet hypertrophy and minimal posterior osteophyte  formation and disc bulging is  present causing moderate bilateral  neural foraminal stenosis but no significant central canal stenosis.    Paraspinal soft tissues: Remarkable for vascular calcifications.      Impression    IMPRESSION:  1. Multilevel degenerative disc and facet disease most advanced at  L3-L4 and L4-L5 where there is moderate to severe central canal  stenosis. There is also moderate to severe bilateral neural foraminal  stenoses at L4-L5.  2. Hypertrophic spinous processes with degenerative changes between  the spinous processes.  3. No evidence for fracture or any posterior malalignment.    TASHA GAYTAN MD   XR Knee Right 3 Views    Narrative    KNEE RIGHT THREE VIEW   1/27/2018 4:26 PM     HISTORY: Fall, right knee pain.     COMPARISON: None.      Impression    IMPRESSION: Mild tricompartmental osteoarthritis is present most  advanced in the medial compartment. There is no evidence for fracture.  Vascular calcifications also noted.    TASHA GAYTAN MD   XR Tibia & Fibula Right 2 Views    Narrative    RIGHT TIBIA-FIBULA TWO VIEWS 1/27/2018 4:27 PM     HISTORY: Fall, right knee to ankle pain.     COMPARISON: None.      Impression    IMPRESSION: Negative for fracture. Extensive soft tissue  calcifications noted in the leg raising the possibility of chronic  venous insufficiency or unusual autoimmune diseases such as  dermatomyositis.    TASHA GAYTAN MD   MR Lumbar Spine w/o Contrast    Narrative    MRI LUMBAR SPINE WITHOUT CONTRAST   1/28/2018 10:06 AM     HISTORY: Worsening foot drop, right more than left. Intermittent  incontinence of stool.     TECHNIQUE: Multiplanar multisequence MRI of the lumbar spine without  contrast.    COMPARISON: Lumbar spine CT 1/27/2018.     FINDINGS: There are five lumbar-type vertebral bodies assumed for the  purposes of this dictation. The conus is unremarkable terminating at  the L1-L2 level. The nerve roots of the cauda equina are bunched up  cranial to the L3-L4 level where there is severe spinal  canal  stenosis.    Alignment of the lumbar spine is normal. No loss of vertebral body  height. There are no destructive osseous lesions. Mild loss of  intervertebral disc height with disc desiccation throughout all levels  in the lumbar spine aside from the relatively normal-appearing L5-S1  disc. There is anterior osteophytic spurring throughout the lumbar  spine.     Level by level as follows:     T12-L1:  No significant spinal canal or neural foraminal narrowing.     L1-L2:  No significant spinal canal or neural foraminal narrowing.     L2-L3:  Left subarticular and lateral disc bulge with associated  bilateral endplate osteophytic spurring. There is no significant  spinal canal or neural foraminal narrowing.     L3-L4:  Severe central spinal canal narrowing with loss of T2 signal  around the nerve roots at this level. This is caused by moderate  circumferential disc bulge as well as bilateral facet hypertrophy and  ligamentum flavum infolding and fairly prominent posterior epidural  fat. There is moderate bilateral neural foraminal narrowing due to  disc bulge and facet hypertrophy.     L4-L5:  Moderate to severe central spinal canal narrowing due to  marked bilateral facet hypertrophy and small circumferential disc  bulge. There are bilateral facet joint effusions. Mild bilateral  neural foraminal narrowing due to disc bulge and facet hypertrophy.     L5-S1:  Bilateral facet hypertrophy, left greater than right, without  significant spinal canal or neural foraminal narrowing.     Paraspinous soft tissues:  There is posterior paraspinous muscle  atrophy.      Impression    IMPRESSION:    1. Severe central spinal canal narrowing at the L3-L4 level due to  disc bulge, facet hypertrophy, ligamentum flavum infolding, and  prominent epidural fat.  2. Moderate to severe spinal canal narrowing at the L4-L5 level due to  disc bulge and marked facet hypertrophy.  3. Additional degenerative changes throughout the lumbar  spine as  described above.    RENUKA SULLIVAN MD   CBC with platelets differential   Result Value Ref [facility]    WBC 11.3 (H) 4.0 - 11.0 10e9/L    RBC Count 4.05 (L) 4.4 - 5.9 10e12/L    Hemoglobin 11.2 (L) 13.3 - 17.7 g/dL    Hematocrit 36.8 (L) 40.0 - 53.0 %    MCV 91 78 - 100 fl    MCH 27.7 26.5 - 33.0 pg    MCHC 30.4 (L) 31.5 - 36.5 g/dL    RDW 17.2 (H) 10.0 - 15.0 %    Platelet Count 389 150 - 450 10e9/L    Diff Method Automated Method     % Neutrophils 73.0 %    % Lymphocytes 10.6 %    % Monocytes 13.0 %    % Eosinophils 2.9 %    % Basophils 0.5 %    Absolute Neutrophil 8.2 1.6 - 8.3 10e9/L    Absolute Lymphocytes 1.2 0.8 - 5.3 10e9/L    Absolute Monocytes 1.5 (H) 0.0 - 1.3 10e9/L    Absolute Eosinophils 0.3 0.0 - 0.7 10e9/L    Absolute Basophils 0.1 0.0 - 0.2 10e9/L   Basic metabolic panel   Result Value Ref [facility]    Sodium 137 133 - 144 mmol/L    Potassium 3.8 3.4 - 5.3 mmol/L    Chloride 97 94 - 109 mmol/L    Carbon Dioxide 30 20 - 32 mmol/L    Anion Gap 10 3 - 14 mmol/L    Glucose 102 (H) 70 - 99 mg/dL    Urea Nitrogen 16 7 - 30 mg/dL    Creatinine 1.17 0.66 - 1.25 mg/dL    GFR Estimate 62 >60 mL/min/1.7m2    GFR Estimate If Black 75 >60 mL/min/1.7m2    Calcium 9.0 8.5 - 10.1 mg/dL   UA reflex to Microscopic and Culture   Result Value Ref [facility]    Color Urine Yellow     Appearance Urine Slightly Cloudy     Glucose Urine Negative NEG^Negative mg/dL    Bilirubin Urine Negative NEG^Negative    Ketones Urine Negative NEG^Negative mg/dL    Specific Gravity Urine 1.010 1.003 - 1.035    Blood Urine Small (A) NEG^Negative    pH Urine 5.0 5.0 - 7.0 pH    Protein Albumin Urine Negative NEG^Negative mg/dL    Urobilinogen mg/dL 0.0 0.0 - 2.0 mg/dL    Nitrite Urine Negative NEG^Negative    Leukocyte Esterase Urine Small (A) NEG^Negative    Source Unspecified Urine     RBC Urine 28 (H) 0 - 2 /HPF    WBC Urine 72 (H) 0 - 2 /HPF    Yeast Urine Many (A) NEG^Negative /HPF    Mucous Urine Present (A) NEG^Negative /LPF    Hyaline  Casts 3 (H) 0 - 2 /LPF   Hemoglobin A1c   Result Value Ref [facility]    Hemoglobin A1C 5.6 4.3 - 6.0 %   Glucose by meter   Result Value Ref [facility]    Glucose 138 (H) 70 - 99 mg/dL   Hemoglobin   Result Value Ref [facility]    Hemoglobin 10.6 (L) 13.3 - 17.7 g/dL   Basic metabolic panel   Result Value Ref [facility]    Sodium 137 133 - 144 mmol/L    Potassium 3.7 3.4 - 5.3 mmol/L    Chloride 98 94 - 109 mmol/L    Carbon Dioxide 31 20 - 32 mmol/L    Anion Gap 8 3 - 14 mmol/L    Glucose 126 (H) 70 - 99 mg/dL    Urea Nitrogen 16 7 - 30 mg/dL    Creatinine 1.04 0.66 - 1.25 mg/dL    GFR Estimate 71 >60 mL/min/1.7m2    GFR Estimate If Black 86 >60 mL/min/1.7m2    Calcium 8.6 8.5 - 10.1 mg/dL   Glucose by meter   Result Value Ref [facility]    Glucose 126 (H) 70 - 99 mg/dL   Glucose by meter   Result Value Ref [facility]    Glucose 134 (H) 70 - 99 mg/dL   Glucose by meter   Result Value Ref [facility]    Glucose 107 (H) 70 - 99 mg/dL   Glucose by meter   Result Value Ref [facility]    Glucose 131 (H) 70 - 99 mg/dL   Glucose by meter   Result Value Ref [facility]    Glucose 128 (H) 70 - 99 mg/dL   Glucose by meter   Result Value Ref [facility]    Glucose 111 (H) 70 - 99 mg/dL   Glucose by meter   Result Value Ref [facility]    Glucose 113 (H) 70 - 99 mg/dL   Glucose by meter   Result Value Ref [facility]    Glucose 94 70 - 99 mg/dL   Glucose by meter   Result Value Ref [facility]    Glucose 156 (H) 70 - 99 mg/dL   Glucose by meter   Result Value Ref Range    Glucose 111 (H) 70 - 99 mg/dL   Glucose by meter   Result Value Ref [facility]    Glucose 111 (H) 70 - 99 mg/dL   Glucose by meter   Result Value Ref [facility]    Glucose 134 (H) 70 - 99 mg/dL   Social Work IP Consult    Narrative    Augusta John     1/28/2018 10:25 AM  CARE TRANSITION SOCIAL WORK INITIAL ASSESSMENT:  Reason For Consult: discharge planning   Met with: Patient and Family.    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       Resources List: Skilled Nursing Facility     Lives With: spouse, child(kirsten), adult  Living Arrangements: house  Quality Of Family Relationships: supportive, helpful, involved  Description of Support System: Involved, Supportive   Who is your support system?: Wife, Children       Insurance Concerns: No Insurance issues identified        This writer met with pt introduced self and role. Discussed   discharge planning and medicare guidelines in regards to home   care and SNF benefits.  Also talked with patient's daughter,   Yuko, by phone.  Patient is not able to be cared for safely by   family at this time.  He is in need of TCU placement.  Patient to   have a MRI today.  Unsure of discharge date at this time, pending   MRI results.  Discussed list of options for TCU for patient.    Patient/family would like referrals sent to the   [place] area.  Unsure if patient will have insurance   coverage for TCU.  Facilities receiving referrals will run   financials on Monday.      PLAN    TCU    Discharge Planner   Discharge Plans in progress: TCU  Barriers to discharge plan: possible cost  Follow up plan: PCP    SARAH Darden  [facility] [phone]/ [facility] [phone]         Entered by: Augusta John 01/28/2018 10:14 AM     Referrals sent to [facility] in [place], and [facility] in [place].       Urine Culture Aerobic Bacterial   Result Value  Ref [facility]    Specimen Description Unspecified Urine     Special Requests Specimen received in preservative     Culture Micro (A)      50,000 to 100,000 colonies/mL  Candida tropicalis  Susceptibility testing not routinely done     Influenza A/B antigen   Result Value Ref [facility]    Influenza A/B Agn Specimen Nasal     Influenza A Negative NEG^Negative    Influenza B Negative NEG^Negative     Labs Drawn and in Process:   Unresulted Labs Ordered in the Past 30 Days of this Admission     No orders found from 1/8/2018 to 3/10/2018.          Recent Labs   Lab Test  01/28/18   0530  01/27/18   1825  01/26/18   1920   09/25/17   1216   HGB  10.6*  11.2*  11.5*   < >  11.6*   PLT   --   389  367   < >  340   NA  137  137   --    --   139   POTASSIUM  3.7  3.8   --    --   4.1   CR  1.04  1.17   --    --   0.92   A1C   --   5.6   --    --   6.2*    < > = values in this interval not displayed.        IMPRESSION:   Reason for surgery/procedure: back pain treatment  Diagnosis/reason for consult: anesthesia / surgical clearance.    The proposed surgical procedure is considered INTERMEDIATE risk.    REVISED CARDIAC RISK INDEX  The patient has the following serious cardiovascular risks for perioperative complications such as (MI, PE, VFib and 3  AV Block):  No serious cardiac risks  INTERPRETATION: 2 risks: Class III (moderate risk - 6.6% complication rate)    The patient has the following additional risks for perioperative complications:  No identified additional risks  The ASCVD Risk score (Radha DC Jr, et al., 2013) failed to calculate for the following reasons:    The valid total cholesterol range is 130 to 320 mg/dL      ICD-10-CM    1. Preop general physical exam Z01.818 EKG 12-lead complete w/read - Clinics     CBC with platelets     Comprehensive metabolic panel     PSA, screen     *UA reflex to Microscopic and Culture ([facility] (except [place] and [place])     LDL cholesterol direct   2. Central spinal  stenosis L3-4 and L4-5 M48.00 EKG 12-lead complete w/read - Clinics     CBC with platelets     Comprehensive metabolic panel     PSA, screen     *UA reflex to Microscopic and Culture ([facility] (except [place] and [place])     LDL cholesterol direct   3. 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 Type 2 diabetes mellitus with other circulatory complication, without long-term current use of insulin (H) E11.59 EKG 12-lead complete w/read - Clinics     CBC with platelets     Comprehensive metabolic panel     LDL cholesterol direct   5. History of coronary artery stent placement Z95.5 EKG 12-lead complete w/read - Clinics     CBC with platelets     Comprehensive metabolic panel     LDL cholesterol direct   6. Weakness of both legs R29.898 EKG 12-lead complete w/read - Clinics     CBC with platelets     Comprehensive metabolic panel     LDL cholesterol direct   7. Venous stasis ulcer of left lower extremity (H) I83.029 EKG 12-lead complete w/read - Clinics     CBC with platelets     Comprehensive metabolic panel     LDL cholesterol direct   8. Acute pain of left shoulder M25.512 XR Shoulder Left G/E 3 Views     MR Shoulder Left w/o Contrast     LDL cholesterol direct   9. Fall at home, initial encounter W19.XXXA XR Shoulder Left G/E 3 Views    Y92.099 MR Shoulder Left w/o Contrast     LDL cholesterol direct   10. Mixed stress and urge urinary incontinence N39.46 PSA, screen     *UA reflex to Microscopic and Culture ([facility] (except [place] and [place])     LDL cholesterol direct   11. Hyperlipidemia LDL goal <100 E78.5 LDL cholesterol direct   12. Hypertension, goal below 140/90 I10 LDL cholesterol direct       RECOMMENDATIONS:      --Consult hospital rounder / IM to assist post-op medical management    Cardiovascular Risk  Beta blockers recommended Metoprolol Tartrate (Lopressor) 25 mg twice daily (low dose)    --Patient is to take all scheduled medications on the day of surgery EXCEPT for modifications listed below.    ACE Inhibitor or Angiotensin Receptor Blocker (ARB) Use  Ace inhibitor or Angiotensin Receptor Blocker (ARB) and should HOLD this medication for the 24 hours prior to surgery.      APPROVAL GIVEN to proceed with proposed procedure, without further diagnostic evaluation       Signed Electronically by: DAKOTA Barker PAJosephC    Copy of this evaluation report is provided to requesting physician.    [facility] Preop Guidelines

## 2018-03-12 NOTE — MR AVS SNAPSHOT
After Visit Summary   3/12/2018    Glenroy Padilla    MRN: [id number]           Patient Information     Date Of Birth          1950        Visit Information        Provider Department      3/12/2018 2:30 PM Casey Shine MD [facility]        Today's Diagnoses     Open wound of left lower extremity without complication, subsequent encounter    -  1    Edema of both legs           Follow-ups after your visit        Your next 10 appointments already scheduled     Mar 13, 2018 10:00 AM CDT   XR CERVICAL/THORACIC EPIDURAL INJECTION with SHXR4, SH MSK RAD   [facility] ([facility])    [address] [place] [id number] [phone]           For nerve root injection, please send or bring copies of any MRIs or other scans you have had.  Bring a list of your current medicines to your exam. (Include vitamins, minerals and over-the-counter medicines.) Leave your valuables at home.  Plan to have someone drive you home afterward.  Stop taking the following medicines (but talk to your doctor first):   If you take blood thinners, you may need to stop taking them a few days before treatment. Talk to your doctor before stopping these medicines.Stop taking Coumadin (warfarin) 3 days before treatment. Restart the day after treatment.   If you take Plavix, Ticlid, Pletal or Persantine, please ask your doctor if you should stop these medicines. You may need extra tests on the morning of your scan.   If you take Xarelto (Rivaroxaban), you may need to stop taking it 24 hours before treatment. Talk to your doctor before stopping this medicine. Restart the day after treatment.  You may take your other medicines as normal.  Stop all food and drink (including water) 3 hours before your test or treatment.  Please tell the doctor:   If you are allergic to X-ray dye (contrast fluid).   If you may be pregnant.  Injections take about 30 to 45 minutes. Most  "people spend up to 2 hours in the clinic or hospital.  Please call the Imaging Department at your exam site with any questions.            Mar 13, 2018   Procedure with Hugh Mendieta MD   [facility] (--)    [address]  [facility] [address]   [phone]            Apr 26, 2018 10:10 AM CDT   [facility] Visit with Martha Reddy PA-C   [facility]    [address], [address]  [place] [address] [phone]            Jun 14, 2018  9:00 AM CDT   Return Visit with Hugh Mendieta MD   [facility]    [address], [address]  [place] [address] [phone]              Who to contact     If you have questions or need follow up information about today's clinic visit or your schedule please contact [facility] directly at [phone].  Normal or non-critical lab and imaging results will be communicated to you by [employer], letter or phone within 4 business days after the clinic has received the results. If you do not hear from us within 7 days, please contact the clinic through [employer] or phone. If you have a critical or abnormal lab result, we will notify you by phone as soon as possible.  Submit refill requests through [employer] or call your pharmacy and they will forward the refill request to us. Please allow 3 business days for your refill to be completed.          Additional Information About Your Visit        [employer] Information     [employer] lets you send messages to your doctor, view your test results, renew your prescriptions, schedule appointments and more. To sign up, go to [email or web address] . Click on \"Log in\" on the left side of the screen, which will take you to the Welcome page. Then click on \"Sign up Now\" on the right side of the page.     You will be asked to enter the access code listed below, as well as some personal " information. Please follow the directions to create your username and password.     Your access code is: [email or web address]  Expires: 3/22/2018 11:22 AM     Your access code will  in 90 days. If you need help or a new code, please call your [facility] clinic or [phone].        Care EveryWhere ID     This is your Care EveryWhere ID. This could be used by other organizations to access your [facility] medical records  [id number]        Your Vitals Were     Pulse Temperature Pulse Oximetry             89 97.9  F (36.6  C) (Temporal) 97%          Blood Pressure from Last 3 Encounters:   18 122/76   18 152/75   18 140/68    Weight from Last 3 Encounters:   18 (!) 137.4 kg (303 lb)   18 135.6 kg (299 lb)   02/15/18 (!) 137.4 kg (303 lb)              Today, you had the following     No orders found for display       Primary Care Provider Office Phone # Fax #    Julio Cesar Esteban PA-C [phone]       [place] [address]        Equal Access to Services     JAZ TOBAR AH: Hadii katia ku hadasho Soomaali, waaxda luqadaha, qaybta kaalmada adeegyada, adams horton. So [facility] [phone].    ATENCIÓN: Si habla español, tiene a wilkinson disposición servicios gratuitos de asistencia lingüística. Llame al [phone].    We comply with applicable federal civil rights laws and [place] laws. We do not discriminate on the basis of race, color, national origin, age, disability, sex, sexual orientation, or gender identity.            Thank you!     Thank you for choosing [facility]  for your care. Our goal is always to provide you with excellent care. Hearing back from our patients is one way we can continue to improve our services. Please take a few minutes to complete the written survey that you may receive in the mail after your visit with us. Thank you!             Your Updated Medication List - Protect others around  you: Learn how to safely use, store and throw away your medicines at www.disposemymeds.org.          This list is accurate as of 3/12/18  3:36 PM.  Always use your most recent med list.                   Brand Name Dispense Instructions for use Diagnosis    clopidogrel 75 MG tablet    PLAVIX    90 tablet    Take 1 tablet (75 mg) by mouth daily    Hypertension, goal below 140/90, Hyperlipidemia LDL goal <100, Type 2 diabetes mellitus with other circulatory complication, without long-term current use of insulin (H)       DULoxetine 30 MG EC capsule    CYMBALTA    1 capsule    Take 1 capsule (30 mg) by mouth 2 times daily    Neuropathy, Foot drop, right       ferrous sulfate 325 (65 FE) MG tablet    IRON    90 tablet    Take 1 tablet (325 mg) by mouth daily (with breakfast)    History of anemia       furosemide 40 MG tablet    LASIX    90 tablet    Take 1 tablet (40 mg) by mouth daily    Hypertension, goal below 140/90       GLUCOCARD EXPRESSION MONITOR W/DEVICE Kit      USE TWICE DAILY TO TEST BLOOD GLUCOSE        lidocaine 5 % ointment    XYLOCAINE    50 g    Apply topically daily    Chronic pain of right knee       losartan 25 MG tablet    COZAAR    90 tablet    Take 1 tablet (25 mg) by mouth daily    Hypertension, goal below 140/90, Type 2 diabetes mellitus with other circulatory complication, without long-term current use of insulin (H)       metoprolol tartrate 25 MG tablet    LOPRESSOR    28 tablet    Take 1 tablet (25 mg) by mouth 2 times daily    Type 2 diabetes mellitus with other circulatory complication, without long-term current use of insulin (H), History of coronary artery stent placement, Hypertension, goal below 140/90       order for DME     1 Box    One touch glucose monitoring strip with Glucometer and lancets.    Type 2 diabetes mellitus with other circulatory complication, without long-term current use of insulin (H)       PROTONIX 40 MG EC tablet   Generic drug:  pantoprazole     1 tablet    Take  1 tablet (40 mg) by mouth daily Take 30-60 minutes before a meal.    Gastroesophageal reflux disease without esophagitis       rosuvastatin 10 MG tablet    CRESTOR    90 tablet    Take 1 tablet (10 mg) by mouth daily    Type 2 diabetes mellitus with other circulatory complication, without long-term current use of insulin (H)       senna-docusate 8.6-50 MG per tablet    SENOKOT-S;PERICOLACE    100 tablet    Take 1 tablet by mouth 2 times daily as needed for constipation    Spinal stenosis, lumbar region, without neurogenic claudication       SitaGLIPtin-MetFORMIN HCl 100-1000 MG Tb24    JANUMET XR    30 tablet    Take 1 tablet by mouth daily    Type 2 diabetes mellitus with complication, without long-term current use of insulin (H)       TRIAD HYDROPHILIC WOUND DRESSI Pste     1 Tube    Externally apply 1 g topically daily    Open wound of right lower leg, initial encounter

## 2018-03-12 NOTE — LETTER
[address] [address]  Phone: [phone]      2018      RE:  MALCOLM PADILLA             1950        To Whom It May Concern:        Mr. Padilla will need left lower leg wound dressing changes, instructions as follows:      Daily dressing change  Triad wound paste to both wounds  Cover with dry gauze  Apply ace bandage to knee        Call with any questions or concerns.        Casey Shine M.D. [facility]  [phone]

## 2018-03-12 NOTE — LETTER
3/12/2018         RE: Glenroy Padilla  [address] [address]        Dear Colleague,    Thank you for referring your patient, Glenroy Padilla, to the [facility]. Please see a copy of my visit note below.    F/U for leg wound    Subjective:  Patient is a 67-year-old white male presenting history of a left leg wound. He hit a coffee table  the wound is slow to heal. He was initially treated in [place] and has a 15 year history of bilateral lower extremity edema. He has never been treated with any form of compression therapy and was told not to based on an ultrasound results in [place]. Those results are not available to me at this time. He denies any prior DVT, nonhealing wounds, leg trauma, claudication or rest pain. He was told in [place] to treat the leg with Betadine. [place] came [place] the [place] for second opinion.      Last seen by me in June and was tx with UNNA boot and silvercel.  Returned to [place] and was treated with hydroferra blue.       Had UNNA boot on when was last seen.  Has been in Assisted living since last seen by me in January for leg weakness.  TX with TRIAD, Foam and UNNA.   Laceration on right healed.    He is schedule for back surgery tomorrow at [facility].    Objective:  B/P: Data Unavailable, T: 97.9, P: 115, R: Data Unavailable  Ext; Warm, +2  Edema right.  No edema left. Bilateral venous stasis changes with thickened skin.   (+)FEM pulses.  No distal pulses.  LLE wound - 0.5x0.5cm   New ant ankle wound - 0.9x1cm    U/S -   ULTRASOUND VENOUS COMPETENCY BILATERAL   6/1/2017 3:29 PM      HISTORY: Localized edema. Chronic venous hypertension (idiopathic)  without complications of bilateral lower extremity. Unspecified open  wound, left lower leg, initial encounter.     COMPARISON: None.     TECHNIQUE: Color Doppler and spectral waveform analysis performed.     FINDINGS: Bilateral common femoral veins, femoral veins, and popliteal  veins are patent and  demonstrate no evidence of reflux.     Other than questionable 1 second reflux at the right saphenofemoral  junction, the right great saphenous vein is competent throughout its  length. Diameter of the right great saphenous vein is 3-7 mm.     The left great saphenous vein is competent throughout its length.  Diameter of the left great saphenous vein is 2-7 mm.     Visualized portions of bilateral small saphenous veins appear to be  competent, without evidence of reflux.     No prominent perforators were demonstrated. Bilateral Giacomini veins  were competent.         IMPRESSION: The deep and superficial venous systems of both legs are  patent and competent.     PATRICK SCHERER MD    [facility] -   ULTRASOUND ANKLE-BRACHIAL INDEX DOPPLER NO EXERCISE   6/1/2017 3:16 PM        HISTORY: Localized edema. Chronic venous hypertension (idiopathic)  without complications of bilateral lower extremity.     COMPARISON: None.     FINDINGS: Ankle-brachial index is 0.96 on the right and 0.91 on the  left. Waveforms appear biphasic/monophasic bilaterally.         IMPRESSION: Borderline bilateral arterial insufficiency.     PATRICK CSHERER MD      Assessment/plan:  This is a 67 year old gentleman with bilateral chronic stasis changes and lymphedema. He has a new right leg wound as well as a result of a fall - closed.   Left leg much smaller.      Will change to TRIAD and ACE for surgical procedure.  May resume UNNA to left with foam at ankle in post op period or after Rehab.   F/U when back from Rehab.    Casey Shine MD, FACS.    Again, thank you for allowing me to participate in the care of your patient.        Sincerely,        Casey Shine MD

## 2018-03-12 NOTE — PROGRESS NOTES
F/U for leg wound    Subjective:  Patient is a 67-year-old white male presenting history of a left leg wound. He hit a coffee table  the wound is slow to heal. He was initially treated in [place] and has a 15 year history of bilateral lower extremity edema. He has never been treated with any form of compression therapy and was told not to based on an ultrasound results in [place]. Those results are not available to me at this time. He denies any prior DVT, nonhealing wounds, leg trauma, claudication or rest pain. He was told in [place] to treat the leg with Betadine. [place].      Last seen by me in June and was tx with UNNA boot and silvercel.  Returned to [place] and was treated with hydroferra blue.       Had UNNA boot on when was last seen.  Has been in Assisted living since last seen by me in January for leg weakness.  TX with TRIAD, Foam and UNNA.   Laceration on right healed.    He is schedule for back surgery tomorrow [facility].    Objective:  B/P: Data Unavailable, T: 97.9, P: 115, R: Data Unavailable  Ext; Warm, +2  Edema right.  No edema left. Bilateral venous stasis changes with thickened skin.   (+)FEM pulses.  No distal pulses.  LLE wound - 0.5x0.5cm   New ant ankle wound - 0.9x1cm    U/S -   ULTRASOUND VENOUS COMPETENCY BILATERAL   6/1/2017 3:29 PM      HISTORY: Localized edema. Chronic venous hypertension (idiopathic)  without complications of bilateral lower extremity. Unspecified open  wound, left lower leg, initial encounter.     COMPARISON: None.     TECHNIQUE: Color Doppler and spectral waveform analysis performed.     FINDINGS: Bilateral common femoral veins, femoral veins, and popliteal  veins are patent and demonstrate no evidence of reflux.     Other than questionable 1 second reflux at the right saphenofemoral  junction, the right great saphenous vein is competent throughout its  length. Diameter of the right great saphenous vein is 3-7 mm.     The  left great saphenous vein is competent throughout its length.  Diameter of the left great saphenous vein is 2-7 mm.     Visualized portions of bilateral small saphenous veins appear to be  competent, without evidence of reflux.     No prominent perforators were demonstrated. Bilateral Giacomini veins  were competent.         IMPRESSION: The deep and superficial venous systems of both legs are  patent and competent.     PATRICK SCHERER MD    [facility] -   ULTRASOUND ANKLE-BRACHIAL INDEX DOPPLER NO EXERCISE   6/1/2017 3:16 PM        HISTORY: Localized edema. Chronic venous hypertension (idiopathic)  without complications of bilateral lower extremity.     COMPARISON: None.     FINDINGS: Ankle-brachial index is 0.96 on the right and 0.91 on the  left. Waveforms appear biphasic/monophasic bilaterally.         IMPRESSION: Borderline bilateral arterial insufficiency.     PATRICK SCHERER MD      Assessment/plan:  This is a 67 year old gentleman with bilateral chronic stasis changes and lymphedema. He has a new right leg wound as well as a result of a fall - closed.   Left leg much smaller.      Will change to TRIAD and ACE for surgical procedure.  May resume UNNA to left with foam at ankle in post op period or after Rehab.   F/U when back from Rehab.    Casey Shine MD, FACS.

## 2018-03-12 NOTE — TELEPHONE ENCOUNTER
Notes Recorded by Julio Cesar Esteban PA-C on 3/9/2018 at 5:21 PM  Elevation in blood sugar is noted.  Patient's diabetes has been under good control.  Will check hemoglobin A1c at the three-month ml.  Please advise that he be seen towards the tail end of April for a diabetes recheck.  Electronically signed:    Julio Cesar Esteban PA-C     Letter sent.  [facility]

## 2018-03-12 NOTE — NURSING NOTE
Pt here for f/u of left lower leg wounds. All of the wounds on the right leg are healed. Pt has been in rehab units and now assisted living since we last saw him. The unna boot he had on today is not on correctly as far as the wrap goes and also he has dressing on underneath the unna boot/ pt states it has been done correctly up until this application.   Anterior ankle measures 0.9 x 1cm. Lateral gator area measures 0.8 x 0.8cm. Both are clean and beefy red color.   Pt is having back surgery tomorrow at [facility]. We gave pt a letter with wound care instructions to give to assisted living facility and to hospital tomorrow.  Triad placed in both wound beds, covered with dry gauze and then ace bandage. YAHIR Wilkins

## 2018-03-13 ENCOUNTER — APPOINTMENT (OUTPATIENT)
Dept: GENERAL RADIOLOGY | Facility: CLINIC | Age: 68
DRG: 029 | End: 2018-03-13
Attending: NEUROLOGICAL SURGERY
Payer: MEDICARE

## 2018-03-13 ENCOUNTER — ANESTHESIA (OUTPATIENT)
Dept: SURGERY | Facility: CLINIC | Age: 68
DRG: 029 | End: 2018-03-13
Payer: MEDICARE

## 2018-03-13 ENCOUNTER — TELEPHONE (OUTPATIENT)
Dept: FAMILY MEDICINE | Facility: OTHER | Age: 68
End: 2018-03-13

## 2018-03-13 ENCOUNTER — HOSPITAL ENCOUNTER (OUTPATIENT)
Dept: GENERAL RADIOLOGY | Facility: CLINIC | Age: 68
DRG: 029 | End: 2018-03-13
Attending: NEUROLOGICAL SURGERY | Admitting: NEUROLOGICAL SURGERY
Payer: MEDICARE

## 2018-03-13 ENCOUNTER — HOSPITAL ENCOUNTER (INPATIENT)
Facility: CLINIC | Age: 68
LOS: 6 days | Discharge: NURSING, REHAB OR LONG-TERM CARE | DRG: 029 | End: 2018-03-19
Attending: NEUROLOGICAL SURGERY | Admitting: NEUROLOGICAL SURGERY
Payer: MEDICARE

## 2018-03-13 ENCOUNTER — SURGERY (OUTPATIENT)
Age: 68
End: 2018-03-13

## 2018-03-13 ENCOUNTER — ANESTHESIA EVENT (OUTPATIENT)
Dept: SURGERY | Facility: CLINIC | Age: 68
DRG: 029 | End: 2018-03-13
Payer: MEDICARE

## 2018-03-13 DIAGNOSIS — M21.371 FOOT DROP, RIGHT: ICD-10-CM

## 2018-03-13 DIAGNOSIS — Z86.2 HISTORY OF ANEMIA: ICD-10-CM

## 2018-03-13 DIAGNOSIS — E11.59 TYPE 2 DIABETES MELLITUS WITH OTHER CIRCULATORY COMPLICATION, WITHOUT LONG-TERM CURRENT USE OF INSULIN (H): ICD-10-CM

## 2018-03-13 DIAGNOSIS — G62.9 NEUROPATHY: ICD-10-CM

## 2018-03-13 DIAGNOSIS — I10 HYPERTENSION, GOAL BELOW 140/90: ICD-10-CM

## 2018-03-13 DIAGNOSIS — K21.9 GASTROESOPHAGEAL REFLUX DISEASE WITHOUT ESOPHAGITIS: ICD-10-CM

## 2018-03-13 DIAGNOSIS — E78.5 HYPERLIPIDEMIA LDL GOAL <100: ICD-10-CM

## 2018-03-13 DIAGNOSIS — D17.79 EPIDURAL LIPOMATOSIS: ICD-10-CM

## 2018-03-13 DIAGNOSIS — Z98.890 S/P SPINAL SURGERY: Primary | ICD-10-CM

## 2018-03-13 LAB
ABO + RH BLD: NORMAL
ABO + RH BLD: NORMAL
BLD GP AB SCN SERPL QL: NORMAL
BLOOD BANK CMNT PATIENT-IMP: NORMAL
CREAT SERPL-MCNC: 0.95 MG/DL (ref 0.66–1.25)
GFR SERPL CREATININE-BSD FRML MDRD: 79 ML/MIN/1.7M2
GLUCOSE BLDC GLUCOMTR-MCNC: 132 MG/DL (ref 70–99)
GLUCOSE BLDC GLUCOMTR-MCNC: 189 MG/DL (ref 70–99)
GLUCOSE BLDC GLUCOMTR-MCNC: 194 MG/DL (ref 70–99)
GLUCOSE SERPL-MCNC: 157 MG/DL (ref 70–99)
HCT VFR BLD AUTO: 27.8 % (ref 40–53)
HGB BLD-MCNC: 8.7 G/DL (ref 13.3–17.7)
PLATELET # BLD AUTO: 298 10E9/L (ref 150–450)
POTASSIUM SERPL-SCNC: 4.3 MMOL/L (ref 3.4–5.3)
SPECIMEN EXP DATE BLD: NORMAL

## 2018-03-13 PROCEDURE — 95940 IONM IN OPERATNG ROOM 15 MIN: CPT | Performed by: NEUROLOGICAL SURGERY

## 2018-03-13 PROCEDURE — P9041 ALBUMIN (HUMAN),5%, 50ML: HCPCS

## 2018-03-13 PROCEDURE — 25000125 ZZHC RX 250: Performed by: PHYSICIAN ASSISTANT

## 2018-03-13 PROCEDURE — 85027 COMPLETE CBC AUTOMATED: CPT | Performed by: NEUROLOGICAL SURGERY

## 2018-03-13 PROCEDURE — 27210995 ZZH RX 272: Performed by: NEUROLOGICAL SURGERY

## 2018-03-13 PROCEDURE — 36000065 ZZH SURGERY LEVEL 4 W FLUORO 1ST 30 MIN: Performed by: NEUROLOGICAL SURGERY

## 2018-03-13 PROCEDURE — 00NX0ZZ RELEASE THORACIC SPINAL CORD, OPEN APPROACH: ICD-10-PCS | Performed by: NEUROLOGICAL SURGERY

## 2018-03-13 PROCEDURE — 86850 RBC ANTIBODY SCREEN: CPT | Performed by: ANESTHESIOLOGY

## 2018-03-13 PROCEDURE — 00BT0ZZ EXCISION OF SPINAL MENINGES, OPEN APPROACH: ICD-10-PCS | Performed by: NEUROLOGICAL SURGERY

## 2018-03-13 PROCEDURE — 25000128 H RX IP 250 OP 636: Performed by: NEUROLOGICAL SURGERY

## 2018-03-13 PROCEDURE — 25000128 H RX IP 250 OP 636: Performed by: ANESTHESIOLOGY

## 2018-03-13 PROCEDURE — 84132 ASSAY OF SERUM POTASSIUM: CPT

## 2018-03-13 PROCEDURE — 85014 HEMATOCRIT: CPT

## 2018-03-13 PROCEDURE — 36000063 ZZH SURGERY LEVEL 4 EA 15 ADDTL MIN: Performed by: NEUROLOGICAL SURGERY

## 2018-03-13 PROCEDURE — 62321 NJX INTERLAMINAR CRV/THRC: CPT

## 2018-03-13 PROCEDURE — 4A11X4G MONITORING OF PERIPHERAL NERVOUS ELECTRICAL ACTIVITY, INTRAOPERATIVE, EXTERNAL APPROACH: ICD-10-PCS | Performed by: NEUROLOGICAL SURGERY

## 2018-03-13 PROCEDURE — 63266 EXCISE INTRSPINL LESION THRC: CPT | Performed by: NEUROLOGICAL SURGERY

## 2018-03-13 PROCEDURE — 86901 BLOOD TYPING SEROLOGIC RH(D): CPT | Performed by: ANESTHESIOLOGY

## 2018-03-13 PROCEDURE — 25000125 ZZHC RX 250: Performed by: ANESTHESIOLOGY

## 2018-03-13 PROCEDURE — 82803 BLOOD GASES ANY COMBINATION: CPT

## 2018-03-13 PROCEDURE — 25000125 ZZHC RX 250

## 2018-03-13 PROCEDURE — 00000146 ZZHCL STATISTIC GLUCOSE BY METER IP

## 2018-03-13 PROCEDURE — 40000277 XR SURGERY CARM FLUORO LESS THAN 5 MIN W STILLS

## 2018-03-13 PROCEDURE — 25000566 ZZH SEVOFLURANE, EA 15 MIN: Performed by: NEUROLOGICAL SURGERY

## 2018-03-13 PROCEDURE — 37000008 ZZH ANESTHESIA TECHNICAL FEE, 1ST 30 MIN: Performed by: NEUROLOGICAL SURGERY

## 2018-03-13 PROCEDURE — 25000132 ZZH RX MED GY IP 250 OP 250 PS 637: Performed by: PHYSICIAN ASSISTANT

## 2018-03-13 PROCEDURE — 25000128 H RX IP 250 OP 636

## 2018-03-13 PROCEDURE — 99207 ZZC CONSULT E&M CHANGED TO INITIAL LEVEL: CPT | Performed by: PHYSICIAN ASSISTANT

## 2018-03-13 PROCEDURE — 86900 BLOOD TYPING SEROLOGIC ABO: CPT | Performed by: ANESTHESIOLOGY

## 2018-03-13 PROCEDURE — 36415 COLL VENOUS BLD VENIPUNCTURE: CPT | Performed by: ANESTHESIOLOGY

## 2018-03-13 PROCEDURE — 63266 EXCISE INTRSPINL LESION THRC: CPT | Mod: AS | Performed by: PHYSICIAN ASSISTANT

## 2018-03-13 PROCEDURE — 25000128 H RX IP 250 OP 636: Performed by: PHYSICIAN ASSISTANT

## 2018-03-13 PROCEDURE — 12000000 ZZH R&B MED SURG/OB

## 2018-03-13 PROCEDURE — 25000125 ZZHC RX 250: Performed by: NEUROLOGICAL SURGERY

## 2018-03-13 PROCEDURE — 94660 CPAP INITIATION&MGMT: CPT

## 2018-03-13 PROCEDURE — 40000275 ZZH STATISTIC RCP TIME EA 10 MIN

## 2018-03-13 PROCEDURE — 84295 ASSAY OF SERUM SODIUM: CPT

## 2018-03-13 PROCEDURE — 71000013 ZZH RECOVERY PHASE 1 LEVEL 1 EA ADDTL HR: Performed by: NEUROLOGICAL SURGERY

## 2018-03-13 PROCEDURE — 82565 ASSAY OF CREATININE: CPT | Performed by: ANESTHESIOLOGY

## 2018-03-13 PROCEDURE — 37000009 ZZH ANESTHESIA TECHNICAL FEE, EACH ADDTL 15 MIN: Performed by: NEUROLOGICAL SURGERY

## 2018-03-13 PROCEDURE — 40000171 ZZH STATISTIC PRE-PROCEDURE ASSESSMENT III: Performed by: NEUROLOGICAL SURGERY

## 2018-03-13 PROCEDURE — 71000012 ZZH RECOVERY PHASE 1 LEVEL 1 FIRST HR: Performed by: NEUROLOGICAL SURGERY

## 2018-03-13 PROCEDURE — 27210794 ZZH OR GENERAL SUPPLY STERILE: Performed by: NEUROLOGICAL SURGERY

## 2018-03-13 PROCEDURE — 25000301 ZZH OR RX SURGIFLO W/THROMBIN KIT 2ML 1991 OPNP: Performed by: NEUROLOGICAL SURGERY

## 2018-03-13 PROCEDURE — 82947 ASSAY GLUCOSE BLOOD QUANT: CPT | Performed by: ANESTHESIOLOGY

## 2018-03-13 PROCEDURE — 84132 ASSAY OF SERUM POTASSIUM: CPT | Performed by: ANESTHESIOLOGY

## 2018-03-13 PROCEDURE — 99222 1ST HOSP IP/OBS MODERATE 55: CPT | Performed by: PHYSICIAN ASSISTANT

## 2018-03-13 RX ORDER — CALCIUM CARBONATE 500 MG/1
1000 TABLET, CHEWABLE ORAL 4 TIMES DAILY PRN
Status: DISCONTINUED | OUTPATIENT
Start: 2018-03-13 | End: 2018-03-19 | Stop reason: HOSPADM

## 2018-03-13 RX ORDER — PROPOFOL 10 MG/ML
INJECTION, EMULSION INTRAVENOUS CONTINUOUS PRN
Status: DISCONTINUED | OUTPATIENT
Start: 2018-03-13 | End: 2018-03-13

## 2018-03-13 RX ORDER — LIDOCAINE 50 MG/G
OINTMENT TOPICAL DAILY
Status: DISCONTINUED | OUTPATIENT
Start: 2018-03-13 | End: 2018-03-19 | Stop reason: HOSPADM

## 2018-03-13 RX ORDER — FENTANYL CITRATE 50 UG/ML
INJECTION, SOLUTION INTRAMUSCULAR; INTRAVENOUS PRN
Status: DISCONTINUED | OUTPATIENT
Start: 2018-03-13 | End: 2018-03-13

## 2018-03-13 RX ORDER — PROPOFOL 10 MG/ML
INJECTION, EMULSION INTRAVENOUS PRN
Status: DISCONTINUED | OUTPATIENT
Start: 2018-03-13 | End: 2018-03-13

## 2018-03-13 RX ORDER — SODIUM CHLORIDE 9 MG/ML
INJECTION, SOLUTION INTRAVENOUS CONTINUOUS
Status: DISCONTINUED | OUTPATIENT
Start: 2018-03-13 | End: 2018-03-15

## 2018-03-13 RX ORDER — ONDANSETRON 4 MG/1
4 TABLET, ORALLY DISINTEGRATING ORAL EVERY 30 MIN PRN
Status: DISCONTINUED | OUTPATIENT
Start: 2018-03-13 | End: 2018-03-13 | Stop reason: HOSPADM

## 2018-03-13 RX ORDER — NICOTINE POLACRILEX 4 MG
15-30 LOZENGE BUCCAL
Status: DISCONTINUED | OUTPATIENT
Start: 2018-03-13 | End: 2018-03-19 | Stop reason: HOSPADM

## 2018-03-13 RX ORDER — FENTANYL CITRATE 50 UG/ML
25-50 INJECTION, SOLUTION INTRAMUSCULAR; INTRAVENOUS
Status: DISCONTINUED | OUTPATIENT
Start: 2018-03-13 | End: 2018-03-13 | Stop reason: HOSPADM

## 2018-03-13 RX ORDER — ONDANSETRON 2 MG/ML
INJECTION INTRAMUSCULAR; INTRAVENOUS PRN
Status: DISCONTINUED | OUTPATIENT
Start: 2018-03-13 | End: 2018-03-13

## 2018-03-13 RX ORDER — ACETAMINOPHEN 325 MG/1
650 TABLET ORAL EVERY 4 HOURS PRN
Status: DISCONTINUED | OUTPATIENT
Start: 2018-03-16 | End: 2018-03-19 | Stop reason: HOSPADM

## 2018-03-13 RX ORDER — DULOXETIN HYDROCHLORIDE 30 MG/1
30 CAPSULE, DELAYED RELEASE ORAL 2 TIMES DAILY
Status: DISCONTINUED | OUTPATIENT
Start: 2018-03-13 | End: 2018-03-19 | Stop reason: HOSPADM

## 2018-03-13 RX ORDER — CEFAZOLIN SODIUM 1 G/50ML
3 SOLUTION INTRAVENOUS
Status: COMPLETED | OUTPATIENT
Start: 2018-03-13 | End: 2018-03-13

## 2018-03-13 RX ORDER — PANTOPRAZOLE SODIUM 40 MG/1
40 TABLET, DELAYED RELEASE ORAL DAILY
Status: DISCONTINUED | OUTPATIENT
Start: 2018-03-14 | End: 2018-03-19 | Stop reason: HOSPADM

## 2018-03-13 RX ORDER — METOPROLOL TARTRATE 25 MG/1
25 TABLET, FILM COATED ORAL 2 TIMES DAILY
Status: DISCONTINUED | OUTPATIENT
Start: 2018-03-13 | End: 2018-03-19 | Stop reason: HOSPADM

## 2018-03-13 RX ORDER — LOSARTAN POTASSIUM 25 MG/1
25 TABLET ORAL DAILY
Status: DISCONTINUED | OUTPATIENT
Start: 2018-03-13 | End: 2018-03-13 | Stop reason: CLARIF

## 2018-03-13 RX ORDER — AMOXICILLIN 250 MG
1 CAPSULE ORAL 2 TIMES DAILY
Status: DISCONTINUED | OUTPATIENT
Start: 2018-03-13 | End: 2018-03-17

## 2018-03-13 RX ORDER — ONDANSETRON 2 MG/ML
4 INJECTION INTRAMUSCULAR; INTRAVENOUS EVERY 30 MIN PRN
Status: DISCONTINUED | OUTPATIENT
Start: 2018-03-13 | End: 2018-03-13 | Stop reason: HOSPADM

## 2018-03-13 RX ORDER — METOCLOPRAMIDE 5 MG/1
5 TABLET ORAL EVERY 6 HOURS PRN
Status: DISCONTINUED | OUTPATIENT
Start: 2018-03-13 | End: 2018-03-19 | Stop reason: HOSPADM

## 2018-03-13 RX ORDER — ALBUMIN, HUMAN INJ 5% 5 %
SOLUTION INTRAVENOUS CONTINUOUS PRN
Status: DISCONTINUED | OUTPATIENT
Start: 2018-03-13 | End: 2018-03-13

## 2018-03-13 RX ORDER — DEXTROSE MONOHYDRATE 25 G/50ML
25-50 INJECTION, SOLUTION INTRAVENOUS
Status: DISCONTINUED | OUTPATIENT
Start: 2018-03-13 | End: 2018-03-19 | Stop reason: HOSPADM

## 2018-03-13 RX ORDER — FENTANYL CITRATE 0.05 MG/ML
25-50 INJECTION, SOLUTION INTRAMUSCULAR; INTRAVENOUS
Status: DISCONTINUED | OUTPATIENT
Start: 2018-03-13 | End: 2018-03-13 | Stop reason: HOSPADM

## 2018-03-13 RX ORDER — FERROUS SULFATE 325(65) MG
325 TABLET ORAL DAILY
Status: DISCONTINUED | OUTPATIENT
Start: 2018-03-14 | End: 2018-03-14 | Stop reason: ALTCHOICE

## 2018-03-13 RX ORDER — SODIUM CHLORIDE, SODIUM LACTATE, POTASSIUM CHLORIDE, CALCIUM CHLORIDE 600; 310; 30; 20 MG/100ML; MG/100ML; MG/100ML; MG/100ML
INJECTION, SOLUTION INTRAVENOUS CONTINUOUS
Status: DISCONTINUED | OUTPATIENT
Start: 2018-03-13 | End: 2018-03-13 | Stop reason: HOSPADM

## 2018-03-13 RX ORDER — ONDANSETRON 4 MG/1
4 TABLET, ORALLY DISINTEGRATING ORAL EVERY 6 HOURS PRN
Status: DISCONTINUED | OUTPATIENT
Start: 2018-03-13 | End: 2018-03-19 | Stop reason: HOSPADM

## 2018-03-13 RX ORDER — BUPIVACAINE HYDROCHLORIDE AND EPINEPHRINE 5; 5 MG/ML; UG/ML
INJECTION, SOLUTION PERINEURAL PRN
Status: DISCONTINUED | OUTPATIENT
Start: 2018-03-13 | End: 2018-03-13 | Stop reason: HOSPADM

## 2018-03-13 RX ORDER — HYDROXYZINE HYDROCHLORIDE 10 MG/1
10 TABLET, FILM COATED ORAL EVERY 6 HOURS PRN
Status: DISCONTINUED | OUTPATIENT
Start: 2018-03-13 | End: 2018-03-19 | Stop reason: HOSPADM

## 2018-03-13 RX ORDER — CEFAZOLIN SODIUM 1 G
1 VIAL (EA) INJECTION SEE ADMIN INSTRUCTIONS
Status: DISCONTINUED | OUTPATIENT
Start: 2018-03-13 | End: 2018-03-13 | Stop reason: HOSPADM

## 2018-03-13 RX ORDER — ONDANSETRON 2 MG/ML
4 INJECTION INTRAMUSCULAR; INTRAVENOUS EVERY 6 HOURS PRN
Status: DISCONTINUED | OUTPATIENT
Start: 2018-03-13 | End: 2018-03-19 | Stop reason: HOSPADM

## 2018-03-13 RX ORDER — NALOXONE HYDROCHLORIDE 0.4 MG/ML
.1-.4 INJECTION, SOLUTION INTRAMUSCULAR; INTRAVENOUS; SUBCUTANEOUS
Status: DISCONTINUED | OUTPATIENT
Start: 2018-03-13 | End: 2018-03-19 | Stop reason: HOSPADM

## 2018-03-13 RX ORDER — LIDOCAINE HYDROCHLORIDE 20 MG/ML
INJECTION, SOLUTION INFILTRATION; PERINEURAL PRN
Status: DISCONTINUED | OUTPATIENT
Start: 2018-03-13 | End: 2018-03-13

## 2018-03-13 RX ORDER — PROCHLORPERAZINE MALEATE 5 MG
5 TABLET ORAL EVERY 6 HOURS PRN
Status: DISCONTINUED | OUTPATIENT
Start: 2018-03-13 | End: 2018-03-19 | Stop reason: HOSPADM

## 2018-03-13 RX ORDER — FUROSEMIDE 40 MG
40 TABLET ORAL DAILY
Status: DISCONTINUED | OUTPATIENT
Start: 2018-03-14 | End: 2018-03-13 | Stop reason: CLARIF

## 2018-03-13 RX ORDER — HYDROPHILIC CREAM
1 PASTE (GRAM) TOPICAL DAILY
Status: DISCONTINUED | OUTPATIENT
Start: 2018-03-14 | End: 2018-03-19 | Stop reason: HOSPADM

## 2018-03-13 RX ORDER — METHOCARBAMOL 500 MG/1
500 TABLET, FILM COATED ORAL 4 TIMES DAILY PRN
Status: DISCONTINUED | OUTPATIENT
Start: 2018-03-13 | End: 2018-03-19 | Stop reason: HOSPADM

## 2018-03-13 RX ORDER — SODIUM CHLORIDE, SODIUM LACTATE, POTASSIUM CHLORIDE, CALCIUM CHLORIDE 600; 310; 30; 20 MG/100ML; MG/100ML; MG/100ML; MG/100ML
INJECTION, SOLUTION INTRAVENOUS CONTINUOUS PRN
Status: DISCONTINUED | OUTPATIENT
Start: 2018-03-13 | End: 2018-03-13

## 2018-03-13 RX ORDER — LIDOCAINE 40 MG/G
CREAM TOPICAL
Status: DISCONTINUED | OUTPATIENT
Start: 2018-03-13 | End: 2018-03-19 | Stop reason: HOSPADM

## 2018-03-13 RX ORDER — AMOXICILLIN 250 MG
1 CAPSULE ORAL 2 TIMES DAILY PRN
Status: DISCONTINUED | OUTPATIENT
Start: 2018-03-13 | End: 2018-03-17

## 2018-03-13 RX ORDER — ACETAMINOPHEN 325 MG/1
975 TABLET ORAL EVERY 8 HOURS
Status: COMPLETED | OUTPATIENT
Start: 2018-03-13 | End: 2018-03-16

## 2018-03-13 RX ORDER — AMOXICILLIN 250 MG
2 CAPSULE ORAL 2 TIMES DAILY
Status: DISCONTINUED | OUTPATIENT
Start: 2018-03-13 | End: 2018-03-17

## 2018-03-13 RX ORDER — OXYCODONE HYDROCHLORIDE 5 MG/1
5-10 TABLET ORAL EVERY 4 HOURS PRN
Status: DISCONTINUED | OUTPATIENT
Start: 2018-03-13 | End: 2018-03-19 | Stop reason: HOSPADM

## 2018-03-13 RX ORDER — ROSUVASTATIN CALCIUM 10 MG/1
10 TABLET, COATED ORAL DAILY
Status: DISCONTINUED | OUTPATIENT
Start: 2018-03-14 | End: 2018-03-19 | Stop reason: HOSPADM

## 2018-03-13 RX ORDER — NALOXONE HYDROCHLORIDE 0.4 MG/ML
.1-.4 INJECTION, SOLUTION INTRAMUSCULAR; INTRAVENOUS; SUBCUTANEOUS
Status: DISCONTINUED | OUTPATIENT
Start: 2018-03-13 | End: 2018-03-13

## 2018-03-13 RX ORDER — EPHEDRINE SULFATE 50 MG/ML
INJECTION, SOLUTION INTRAMUSCULAR; INTRAVENOUS; SUBCUTANEOUS PRN
Status: DISCONTINUED | OUTPATIENT
Start: 2018-03-13 | End: 2018-03-13

## 2018-03-13 RX ORDER — METOCLOPRAMIDE HYDROCHLORIDE 5 MG/ML
5 INJECTION INTRAMUSCULAR; INTRAVENOUS EVERY 6 HOURS PRN
Status: DISCONTINUED | OUTPATIENT
Start: 2018-03-13 | End: 2018-03-19 | Stop reason: HOSPADM

## 2018-03-13 RX ORDER — CEFAZOLIN SODIUM 2 G/100ML
2 INJECTION, SOLUTION INTRAVENOUS EVERY 8 HOURS
Status: COMPLETED | OUTPATIENT
Start: 2018-03-14 | End: 2018-03-14

## 2018-03-13 RX ORDER — HYDROMORPHONE HYDROCHLORIDE 1 MG/ML
.3-.5 INJECTION, SOLUTION INTRAMUSCULAR; INTRAVENOUS; SUBCUTANEOUS EVERY 5 MIN PRN
Status: DISCONTINUED | OUTPATIENT
Start: 2018-03-13 | End: 2018-03-13 | Stop reason: HOSPADM

## 2018-03-13 RX ADMIN — LIDOCAINE: 50 OINTMENT TOPICAL at 22:36

## 2018-03-13 RX ADMIN — PHENYLEPHRINE HYDROCHLORIDE 150 MCG: 10 INJECTION INTRAVENOUS at 15:24

## 2018-03-13 RX ADMIN — LIDOCAINE HYDROCHLORIDE 1 ML: 10 INJECTION, SOLUTION EPIDURAL; INFILTRATION; INTRACAUDAL; PERINEURAL at 12:48

## 2018-03-13 RX ADMIN — MINERAL OIL, PETROLATUM 1 INCH: 425; 573 OINTMENT OPHTHALMIC at 15:15

## 2018-03-13 RX ADMIN — PROPOFOL 75 MCG/KG/MIN: 10 INJECTION, EMULSION INTRAVENOUS at 15:37

## 2018-03-13 RX ADMIN — ONDANSETRON 4 MG: 2 INJECTION INTRAMUSCULAR; INTRAVENOUS at 17:33

## 2018-03-13 RX ADMIN — LOSARTAN POTASSIUM 25 MG: 25 TABLET ORAL at 21:52

## 2018-03-13 RX ADMIN — GENTAMICIN SULFATE 1000 ML: 40 INJECTION, SOLUTION INTRAMUSCULAR; INTRAVENOUS at 16:13

## 2018-03-13 RX ADMIN — ALBUMIN (HUMAN): 12.5 SOLUTION INTRAVENOUS at 17:16

## 2018-03-13 RX ADMIN — ALBUMIN (HUMAN): 12.5 SOLUTION INTRAVENOUS at 17:03

## 2018-03-13 RX ADMIN — SODIUM CHLORIDE, POTASSIUM CHLORIDE, SODIUM LACTATE AND CALCIUM CHLORIDE: 600; 310; 30; 20 INJECTION, SOLUTION INTRAVENOUS at 15:15

## 2018-03-13 RX ADMIN — Medication 2.5 MG: at 17:21

## 2018-03-13 RX ADMIN — Medication 5 MG: at 15:20

## 2018-03-13 RX ADMIN — Medication 5 MG: at 16:41

## 2018-03-13 RX ADMIN — FENTANYL CITRATE 25 MCG: 50 INJECTION, SOLUTION INTRAMUSCULAR; INTRAVENOUS at 18:05

## 2018-03-13 RX ADMIN — Medication 5 MG: at 17:05

## 2018-03-13 RX ADMIN — PHENYLEPHRINE HYDROCHLORIDE 100 MCG: 10 INJECTION INTRAVENOUS at 15:20

## 2018-03-13 RX ADMIN — GENTAMICIN SULFATE 1000 ML: 40 INJECTION, SOLUTION INTRAMUSCULAR; INTRAVENOUS at 17:25

## 2018-03-13 RX ADMIN — THROMBIN, TOPICAL (BOVINE) 5000 UNITS: KIT at 16:13

## 2018-03-13 RX ADMIN — Medication 10 MG: at 15:24

## 2018-03-13 RX ADMIN — DEXMEDETOMIDINE HYDROCHLORIDE 0.4 MCG/KG/HR: 100 INJECTION, SOLUTION INTRAVENOUS at 15:37

## 2018-03-13 RX ADMIN — SENNOSIDES AND DOCUSATE SODIUM 1 TABLET: 8.6; 5 TABLET ORAL at 21:47

## 2018-03-13 RX ADMIN — FENTANYL CITRATE 50 MCG: 50 INJECTION, SOLUTION INTRAMUSCULAR; INTRAVENOUS at 16:10

## 2018-03-13 RX ADMIN — LIDOCAINE HYDROCHLORIDE 40 MG: 20 INJECTION, SOLUTION INFILTRATION; PERINEURAL at 15:07

## 2018-03-13 RX ADMIN — PHENYLEPHRINE HYDROCHLORIDE 0.25 MCG/KG/MIN: 10 INJECTION INTRAVENOUS at 15:37

## 2018-03-13 RX ADMIN — DULOXETINE 30 MG: 30 CAPSULE, DELAYED RELEASE ORAL at 21:46

## 2018-03-13 RX ADMIN — PHENYLEPHRINE HYDROCHLORIDE 100 MCG: 10 INJECTION INTRAVENOUS at 17:05

## 2018-03-13 RX ADMIN — HYDROMORPHONE HYDROCHLORIDE: 10 INJECTION, SOLUTION INTRAMUSCULAR; INTRAVENOUS; SUBCUTANEOUS at 19:30

## 2018-03-13 RX ADMIN — Medication 3 G: at 15:45

## 2018-03-13 RX ADMIN — ACETAMINOPHEN 975 MG: 325 TABLET, FILM COATED ORAL at 21:46

## 2018-03-13 RX ADMIN — MIDAZOLAM HYDROCHLORIDE 2 MG: 1 INJECTION, SOLUTION INTRAMUSCULAR; INTRAVENOUS at 14:15

## 2018-03-13 RX ADMIN — FENTANYL CITRATE 25 MCG: 50 INJECTION, SOLUTION INTRAMUSCULAR; INTRAVENOUS at 18:03

## 2018-03-13 RX ADMIN — PROPOFOL 200 MG: 10 INJECTION, EMULSION INTRAVENOUS at 15:07

## 2018-03-13 RX ADMIN — SODIUM CHLORIDE, POTASSIUM CHLORIDE, SODIUM LACTATE AND CALCIUM CHLORIDE: 600; 310; 30; 20 INJECTION, SOLUTION INTRAVENOUS at 13:47

## 2018-03-13 RX ADMIN — BUPIVACAINE HYDROCHLORIDE AND EPINEPHRINE BITARTRATE 10 ML: 5; .005 INJECTION, SOLUTION PERINEURAL at 16:13

## 2018-03-13 RX ADMIN — SUCCINYLCHOLINE CHLORIDE 100 MG: 20 INJECTION, SOLUTION INTRAMUSCULAR; INTRAVENOUS; PARENTERAL at 15:07

## 2018-03-13 RX ADMIN — ALBUMIN (HUMAN): 12.5 SOLUTION INTRAVENOUS at 17:26

## 2018-03-13 RX ADMIN — PHENYLEPHRINE HYDROCHLORIDE 100 MCG: 10 INJECTION INTRAVENOUS at 15:16

## 2018-03-13 RX ADMIN — SODIUM CHLORIDE, POTASSIUM CHLORIDE, SODIUM LACTATE AND CALCIUM CHLORIDE: 600; 310; 30; 20 INJECTION, SOLUTION INTRAVENOUS at 16:35

## 2018-03-13 RX ADMIN — FENTANYL CITRATE 50 MCG: 50 INJECTION, SOLUTION INTRAMUSCULAR; INTRAVENOUS at 16:00

## 2018-03-13 RX ADMIN — SODIUM CHLORIDE: 9 INJECTION, SOLUTION INTRAVENOUS at 21:57

## 2018-03-13 RX ADMIN — FENTANYL CITRATE 50 MCG: 50 INJECTION, SOLUTION INTRAMUSCULAR; INTRAVENOUS at 16:09

## 2018-03-13 RX ADMIN — METOPROLOL TARTRATE 25 MG: 25 TABLET ORAL at 21:53

## 2018-03-13 RX ADMIN — FENTANYL CITRATE 100 MCG: 50 INJECTION, SOLUTION INTRAMUSCULAR; INTRAVENOUS at 15:07

## 2018-03-13 RX ADMIN — Medication 1 G: at 17:49

## 2018-03-13 RX ADMIN — RANITIDINE 150 MG: 150 TABLET ORAL at 21:47

## 2018-03-13 ASSESSMENT — ENCOUNTER SYMPTOMS: DYSRHYTHMIAS: 0

## 2018-03-13 ASSESSMENT — LIFESTYLE VARIABLES: TOBACCO_USE: 0

## 2018-03-13 NOTE — IP AVS SNAPSHOT
MRN:[id number]                      After Visit Summary   3/13/2018    Glenroy Padilla    MRN: [id number]           Thank you!     Thank you for choosing [facility] for your care. Our goal is always to provide you with excellent care. Hearing back from our patients is one way we can continue to improve our services. Please take a few minutes to complete the written survey that you may receive in the mail after you visit with us. Thank you!        Patient Information     Date Of Birth          1950        Designated Caregiver       Most Recent Value    Caregiver    Will someone help with your care after discharge? yes    Name of designated caregiver logan    Phone number of caregiver     Caregiver address [facility]      About your hospital stay     You were admitted on:  March 13, 2018 You last received care in the:  [place] [facility] [address] [facility]    You were discharged on:  March 19, 2018        Reason for your hospital stay       You were in the hospital for back surgery.                  Who to Call     For medical emergencies, please call 911.  For non-urgent questions about your medical care, please call your primary care provider or clinic, [phone]  For questions related to your surgery, please call your surgery clinic        Attending Provider     Provider Hugh Fregoso MD Neurosurgery       Primary Care Provider Office Phone # Fax #    Julio Cesar Esteban PA-C [phone]      After Care Instructions     Activity - Up ad katy           Additional Discharge Instructions       Discharge instructions:  No lifting of more than 10 pounds, no bending, no twisting until follow up visit.    Ok to shampoo hair, shower or bathe, but, do not scrub or submerge incision under water until evaluated post-operatively in clinic.    Ok to walk as tolerated, avoid bed rest and prolonged sitting.    No contact sports until after follow up  visit  No high impact activities such as; running/jogging, snowmobile or 4 benton riding or any other recreational vehicles.    Do not take NSAIDS (ibuprofen, advil, aleve, naproxen, etc) for pain control.    Do NOT drive while taking narcotic pain medication.    Call the [facility] at [phone] for increasing redness, swelling or pus draining from the incision, increased pain or any other questions and concerns.    May resume Plavix on 3/27/2018.            Diet       Follow this diet upon discharge: Pre admission diet            Wound care and dressings       Keep your incision clean and dry at all times.  OK to remove dressing on postop day 2.  OK to shower on postop day 3 and allow water to run over incision, pat dry after shower.  No bathing swimming or submerging in water.  Call immediately or come to ED if any drainage occurs, or you develop new pain, redness, or swelling.            Wound care and dressings         Plan for skin care to BLE and wound care to LLE: every other day and prn:  1.  Cleanse legs and feet with mild cleanser, and apply Sween 24 cream to intact skin.   2.  Cleanse wounds with MicroKlenz, pat dry.  3.  Apply small glob of Triad paste to each wound.    4.  Cover with Mepilex or gauze and John.  If using Mepilex, may need to apply skin prep to periwound first to help dressing adhere.    5.  Label with time/date/initials.   6.  Apply Tubigrip stockings (size F) bilaterally, in double layer from base of toes to knee crease.  Open ends should meet at the knee.  Ensure no bunching or wrinkles, and any excess length should be trimmed off or folded over on the foot (not at the knee).   Hand wash and hang dry prn.   7.  Elevate legs when possible and float heels at all times.                  Follow-up Appointments     Follow-up and recommended labs and tests        Please follow up at the [facility] in 10-14 days for staple removal.  Please call the clinic at  [phone] to schedule your appointment.                  Your next 10 appointments already scheduled     Apr 26, 2018 10:10 AM CDT   [facility] Martha Reddy PA-C   [facility]    [address], [address]  [facility] [id number] [phone]            Jun 14, 2018  9:00 AM CDT   Return Visit with Hugh Mendieta MD   [facility] ([facility])    [address], [address]  [facility] [id number] [phone]              Additional Services     Home Care PT Referral for Hospital Discharge       PT to eval and treat    Your provider has ordered home care - physical therapy. If you have not been contacted within 2 days of your discharge please call the department phone number listed on the top of this document.            Home care nursing referral       RN skilled nursing visit. RN to assess and teach vital signs and weight, pain level and activity tolerance, incision for signs/symptoms of infection and home safety.    Your provider has ordered home care nursing services. If you have not been contacted within 2 days of your discharge please call the inpatient department phone number at [phone] .                  Further instructions from your care team       May restart Plavix in 2 weeks on 3-, please follow put with managing doctor regarding dosing.     [facility]  Dr. Mendieta Discharge Instructions Following Spine Surgery  [phone]  Monday - Friday; 8:00 AM - 4:00 PM    In General:   After you have had surgery on your spine, remember do not twist, or excessively flex or extend the area that you had surgery.  These activities can prevent healing.  Pain is normal and to be expected following surgery.  Please call our office to schedule your appointment follow up appointment.      Bowel Care:  Many people have constipation (hard stools) after surgery.  To  help prevent constipation: Drink plenty of fluid (8-10 glasses/day); Eat more fiber, such as whole grain bread, bran cereal, and fruits and vegetables; Stay active by walking; Over the counter stool softener may also help.      Medications:  Spine surgery and pain management is unique to all patients.  You will generally be given medications for pain, muscle spasms or tightness, and for constipation during the immediate post op period.  It is important that you use these as prescribed.  Please remember to bring your pill bottles to all of your appointments. Avoid alcoholic beverages while taking narcotic pain medications. You can use ice to areas of pain as needed, 20 minutes at a time.  Changing positions and walking will help loosen your muscles as well.    Driving:  No driving while on narcotic pain medications.  It is state law not to drive while under the influence of a drug to a degree which renders you incapable of safely driving.  The narcotic medication you will be taking after surgery falls under this category.     Activity:   After surgery, most people feel less pain than they have had in a long time.  Walking and light activities will help you regain the use of your muscles.  You are encouraged to walk: start with short walks 5-10 minutes at a time for 4-5 times per day and increase as tolerated.  Stair climbing as tolerated, we recommend you use the railing. No lifting greater than 10 pounds: approximately equal to one gallon of milk. No twisting, bending in the area you have had surgery. No housework, vacuuming, laundry, leaf raking, lawn mowing, or snow removal. Wear your brace (if ordered) as directed.    Showers:  If you have sutures or staples you may shower two days after surgery. It is ok to let water run over your incision but do not touch or scrub on the incision. Pat dry immediately after showering. If there is a dressing in place, you may remove it 2 days after surgery. If you were closed  with Derma bond (glue), you may shower without covering the incision. No baths, hot tubs, or pool activity for at least 6 weeks.     Nutrition:  In general, your diet restrictions will not change with your surgery.  You may need to eat small frequent meals initially until your appetite returns.  Eat plenty of high fiber foods and drink plenty of fluids. If you do not have a fluid restriction from or prior to surgery, we recommend 6-8 (8oz) glasses of water per day. Other fluids are fine, but water is best. Nausea is not uncommon; it is a common side effect to many pain medications.  We recommend that you take the pain medications with food, if this does not improve your symptoms, please call us.     Smoking:  For proper healing it is required that you quit using all tobacco products.  This includes smoking, chewing, nicotine gums, and nicotine patches.  Call Dr. Mendieta if these occur: Drainage from your incision, increased pain/redness/swelling, temperatures greater than 101.5, increased leg pain or swelling or unrelieved headaches    Go to the nearest Emergency Room if you experience: chest pain, shortness of breath, neck swelling or swallowing problems            Pending Results     No orders found from 3/11/2018 to 3/14/2018.            Statement of Approval     Ordered          03/19/18 8699  I have reviewed and agree with all the recommendations and orders detailed in this document.  EFFECTIVE NOW     Approved and electronically signed by:  Martha Reddy PA-C           03/16/18 4620  I have reviewed and agree with all the recommendations and orders detailed in this document.  EFFECTIVE NOW     Approved and electronically signed by:  Leilani Adorno APRN CNP             Admission Information     Date & Time Provider Department Dept. Phone    3/13/2018 Hugh Mendieta MD [place] [address] [facility] [phone]      Your Vitals Were     Blood Pressure Temperature Respirations Pulse  "Oximetry          111/56 (BP Location: Right arm) 97.6  F (36.4  C) (Oral) 16 96%        MyChart Information     [employer] lets you send messages to your doctor, view your test results, renew your prescriptions, schedule appointments and more. To sign up, go to [email or web address] . Click on \"Log in\" on the left side of the screen, which will take you to the Welcome page. Then click on \"Sign up Now\" on the right side of the page.     You will be asked to enter the access code listed below, as well as some personal information. Please follow the directions to create your username and password.     Your access code is: [email or web address]  Expires: 3/22/2018 11:22 AM     Your access code will  in 90 days. If you need help or a new code, please call your [facility] clinic or [phone].        Care EveryWhere ID     This is your Care EveryWhere ID. This could be used by other organizations to access your [facility] medical records  [id number]        Equal Access to Services     [facility] ServicesSOFIA AH: Hadii katia Omalley, wastacyda glo, qaybta kaalalessandro wall, adams horton. So [facility] [phone].    ATENCIÓN: Si habla español, tiene a wilkinson disposición servicios gratuitos de asistencia lingüística. Frances al [phone].    We comply with applicable federal civil rights laws and [place] laws. We do not discriminate on the basis of race, color, national origin, age, disability, sex, sexual orientation, or gender identity.               Review of your medicines      START taking        Dose / Directions    methocarbamol 500 MG tablet   Commonly known as:  ROBAXIN        Dose:  500 mg   Take 1 tablet (500 mg) by mouth 4 times daily as needed for muscle spasms   Quantity:  70 tablet   Refills:  1       oxyCODONE IR 5 MG tablet   Commonly known as:  ROXICODONE        Dose:  5-10 mg   Take 1-2 tablets (5-10 mg) by mouth every 4 hours as needed for other (pain control or improvement in " physical function. Hold dose for analgesic side effects.)   Quantity:  70 tablet   Refills:  0         CONTINUE these medicines which have NOT CHANGED        Dose / Directions    ferrous sulfate 325 (65 FE) MG tablet   Commonly known as:  IRON   Used for:  History of anemia        Dose:  325 mg   Take 1 tablet (325 mg) by mouth daily (with breakfast)   Quantity:  90 tablet   Refills:  2       furosemide 40 MG tablet   Commonly known as:  LASIX   Used for:  Hypertension, goal below 140/90        Dose:  40 mg   Take 1 tablet (40 mg) by mouth daily   Quantity:  90 tablet   Refills:  1       GLUCOCARD EXPRESSION MONITOR W/DEVICE Kit        USE TWICE DAILY TO TEST BLOOD GLUCOSE   Refills:  0       lidocaine 5 % ointment   Commonly known as:  XYLOCAINE   Used for:  Chronic pain of right knee        Apply topically daily   Quantity:  50 g   Refills:  3       losartan 25 MG tablet   Commonly known as:  COZAAR   Used for:  Hypertension, goal below 140/90, Type 2 diabetes mellitus with other circulatory complication, without long-term current use of insulin (H)        Dose:  25 mg   Take 1 tablet (25 mg) by mouth daily   Quantity:  90 tablet   Refills:  1       metoprolol tartrate 25 MG tablet   Commonly known as:  LOPRESSOR   Used for:  Type 2 diabetes mellitus with other circulatory complication, without long-term current use of insulin (H), History of coronary artery stent placement, Hypertension, goal below 140/90        Dose:  25 mg   Take 1 tablet (25 mg) by mouth 2 times daily   Quantity:  28 tablet   Refills:  0       order for DME   Used for:  Type 2 diabetes mellitus with other circulatory complication, without long-term current use of insulin (H)        One touch glucose monitoring strip with Glucometer and lancets.   Quantity:  1 Box   Refills:  1       pantoprazole 40 MG EC tablet   Commonly known as:  PROTONIX   Used for:  Gastroesophageal reflux disease without esophagitis        Dose:  40 mg   Take 1 tablet  (40 mg) by mouth daily Take 30-60 minutes before a meal.   Quantity:  1 tablet   Refills:  0       rosuvastatin 10 MG tablet   Commonly known as:  CRESTOR   Used for:  Type 2 diabetes mellitus with other circulatory complication, without long-term current use of insulin (H)        Dose:  10 mg   Take 1 tablet (10 mg) by mouth daily   Quantity:  90 tablet   Refills:  1       senna-docusate 8.6-50 MG per tablet   Commonly known as:  SENOKOT-S;PERICOLACE   Used for:  Spinal stenosis, lumbar region, without neurogenic claudication        Dose:  1 tablet   Take 1 tablet by mouth 2 times daily as needed for constipation   Quantity:  100 tablet   Refills:  0       SitaGLIPtin-MetFORMIN HCl  MG Tb24        Dose:  2 tablet   Take 2 tablets by mouth daily (with breakfast)   Refills:  0       TRIAD HYDROPHILIC WOUND DRESSI Pste   Used for:  Open wound of right lower leg, initial encounter        Dose:  1 applicator   Externally apply 1 g topically daily   Quantity:  1 Tube   Refills:  3         STOP taking     clopidogrel 75 MG tablet   Commonly known as:  PLAVIX           DULoxetine 30 MG EC capsule   Commonly known as:  CYMBALTA           IBUPROFEN PO                Where to get your medicines      These medications were sent to [facility] - [place], [place] - [address]  [address], [facility] [address]     Phone:  [phone]     methocarbamol 500 MG tablet         These medications were sent to [facility] - [place], [place] - [address]  [address], [place] [facility] [address]     Phone:  [phone]     ferrous sulfate 325 (65 FE) MG tablet    furosemide 40 MG tablet    losartan 25 MG tablet    pantoprazole 40 MG EC tablet    rosuvastatin 10 MG tablet         Some of these will need a paper prescription and others can be bought over the counter. Ask your nurse if you have questions.     Bring a paper prescription for each of these medications      oxyCODONE IR 5 MG tablet                Protect others around you: Learn how to safely use, store and throw away your medicines at www.disposemymeds.org.        Information about OPIOIDS     PRESCRIPTION OPIOIDS: WHAT YOU NEED TO KNOW    Prescription opioids can be used to help relieve moderate to severe pain and are often prescribed following a surgery or injury, or for certain health conditions. These medications can be an important part of treatment but also come with serious risks. It is important to work with your health care provider to make sure you are getting the safest, most effective care.    WHAT ARE THE RISKS AND SIDE EFFECTS OF OPIOID USE?  Prescription opioids carry serious risks of addiction and overdose, especially with prolonged use. An opioid overdose, often marked by slowed breathing can cause sudden death. The use of prescription opioids can have a number of side effects as well, even when taken as directed:      Tolerance - meaning you might need to take more of a medication for the same pain relief    Physical dependence - meaning you have symptoms of withdrawal when a medication is stopped    Increased sensitivity to pain    Constipation    Nausea, vomiting, and dry mouth    Sleepiness and dizziness    Confusion    Depression    Low levels of testosterone that can result in lower sex drive, energy, and strength    Itching and sweating    RISKS ARE GREATER WITH:    History of drug misuse, substance use disorder, or overdose    Mental health conditions (such as depression or anxiety)    Sleep apnea    Older age (65 years or older)    Pregnancy    Avoid alcohol while taking prescription opioids.   Also, unless specifically advised by your health care provider, medications to avoid include:    Benzodiazepines (such as Xanax or Valium)    Muscle relaxants (such as Soma or Flexeril)    Hypnotics (such as Ambien or Lunesta)    Other prescription opioids    KNOW YOUR OPTIONS:  Talk to your health  care provider about ways to manage your pain that do not involve prescription opioids. Some of these options may actually work better and have fewer risks and side effects:    Pain relievers such as acetaminophen, ibuprofen, and naproxen    Some medications that are also used for depression or seizures    Physical therapy and exercise    Cognitive behavioral therapy, a psychological, goal-directed approach, in which patients learn how to modify physical, behavioral, and emotional triggers of pain and stress    IF YOU ARE PRESCRIBED OPIOIDS FOR PAIN:    Never take opioids in greater amounts or more often than prescribed    Follow up with your primary health care provider and work together to create a plan on how to manage your pain.    Talk about ways to help manage your pain that do not involve prescription opioids    Talk about all concerns and side effects    Help prevent misuse and abuse    Never sell or share prescription opioids    Never use another person's prescription opioids    Store prescription opioids in a secure place and out of reach of others (this may include visitors, children, friends, and family)    Visit www.cdc.gov/drugoverdose to learn about risks of opioid abuse and overdose    If you believe you may be struggling with addiction, tell your health care provider and ask for guidance or call [facility]'s National Helpline at 9-881-488-HELP    LEARN MORE / www.cdc.gov/drugoverdose/prescribing/guideline.html    Safely dispose of unused prescription opioids: Find your local drug take-back programs and more information about the importance of safe disposal at www.doseofreality.[place].gov             Medication List: This is a list of all your medications and when to take them. Check marks below indicate your daily home schedule. Keep this list as a reference.      Medications           Morning Afternoon Evening Bedtime As Needed    ferrous sulfate 325 (65 FE) MG tablet   Commonly known as:  IRON   Take 1  tablet (325 mg) by mouth daily (with breakfast)   Last time this was given:  325 mg on 3/19/2018 10:15 AM   Next Dose Due:  3/20 AM                                   furosemide 40 MG tablet   Commonly known as:  LASIX   Take 1 tablet (40 mg) by mouth daily   Last time this was given:  40 mg on 3/19/2018 10:15 AM   Next Dose Due:  3/20 AM                                   GLUCOCARD EXPRESSION MONITOR W/DEVICE Kit   USE TWICE DAILY TO TEST BLOOD GLUCOSE                                lidocaine 5 % ointment   Commonly known as:  XYLOCAINE   Apply topically daily   Last time this was given:  3/19/2018 12:39 PM   Next Dose Due:  3/20 once per day                                   losartan 25 MG tablet   Commonly known as:  COZAAR   Take 1 tablet (25 mg) by mouth daily   Last time this was given:  25 mg on 3/19/2018 10:16 AM   Next Dose Due:  3/20 AM                                   methocarbamol 500 MG tablet   Commonly known as:  ROBAXIN   Take 1 tablet (500 mg) by mouth 4 times daily as needed for muscle spasms   Last time this was given:  500 mg on 3/15/2018  4:50 PM                            Up to 4 times daily       metoprolol tartrate 25 MG tablet   Commonly known as:  LOPRESSOR   Take 1 tablet (25 mg) by mouth 2 times daily   Last time this was given:  25 mg on 3/19/2018 10:15 AM   Next Dose Due:  3/19 evening                                      order for DME   One touch glucose monitoring strip with Glucometer and lancets.                                oxyCODONE IR 5 MG tablet   Commonly known as:  ROXICODONE   Take 1-2 tablets (5-10 mg) by mouth every 4 hours as needed for other (pain control or improvement in physical function. Hold dose for analgesic side effects.)   Last time this was given:  10 mg on 3/18/2018  9:26 PM                            Every 4 hours as needed for pain       pantoprazole 40 MG EC tablet   Commonly known as:  PROTONIX   Take 1 tablet (40 mg) by mouth daily Take 30-60 minutes  before a meal.   Last time this was given:  40 mg on 3/19/2018 10:15 AM   Next Dose Due:  3/19 PM                                      rosuvastatin 10 MG tablet   Commonly known as:  CRESTOR   Take 1 tablet (10 mg) by mouth daily   Last time this was given:  10 mg on 3/19/2018 10:16 AM   Next Dose Due:  3/20 AM                                   senna-docusate 8.6-50 MG per tablet   Commonly known as:  SENOKOT-S;PERICOLACE   Take 1 tablet by mouth 2 times daily as needed for constipation   Last time this was given:  2 tablets on 3/19/2018 10:16 AM                            Up to 2 times a day       SitaGLIPtin-MetFORMIN HCl  MG Tb24   Take 2 tablets by mouth daily (with breakfast)   Last time this was given:  2 tablets on 3/19/2018 10:16 AM   Next Dose Due:  3/20 AM                                   TRIAD HYDROPHILIC WOUND DRESSI Pste   Externally apply 1 g topically daily   Last time this was given:  1 g on 3/19/2018 12:39 PM   Next Dose Due:  3/20 once per day

## 2018-03-13 NOTE — PROGRESS NOTES
Admission medication history interview status for the 3/13/2018  admission is complete. See EPIC admission navigator for prior to admission medications     Medication history source reliability:Good    Medication history interview source(s):Patient, Family and Caregiver    Medication history resources (including written lists, pill bottles, clinic record):Spoke with Tanja from [facility], she verified medications given this morning    Primary pharmacy.[facility]    Additional medication history information not noted on PTA med list :None    Time spent in this activity: 60 minutes    Prior to Admission medications    Medication Sig Last Dose Taking? Auth Provider   sitagliptin-metFORMIN (JANUMET)  MG per tablet Take 2 tablets by mouth daily 3/12/2018 at AM Yes Reported, Patient   IBUPROFEN PO Take 800 mg by mouth every 8 hours as needed for moderate pain 3/6/2018 at PRN Yes Reported, Patient   metoprolol tartrate (LOPRESSOR) 25 MG tablet Take 1 tablet (25 mg) by mouth 2 times daily 3/13/2018 at 0730 Yes Julio Cesar Esteban PA-C   pantoprazole (PROTONIX) 40 MG EC tablet Take 1 tablet (40 mg) by mouth daily Take 30-60 minutes before a meal. 3/13/2018 at 0730 Yes Julio Cesar Esteban PA-C   DULoxetine (CYMBALTA) 30 MG EC capsule Take 1 capsule (30 mg) by mouth 2 times daily 3/13/2018 at 0730 Yes Julio Cesar Esteban PA-C   Wound Dressings (TRIAD HYDROPHILIC WOUND DRESSI) PSTE Externally apply 1 g topically daily 3/12/2018 at Unknown time Yes Casey Shine MD   losartan (COZAAR) 25 MG tablet Take 1 tablet (25 mg) by mouth daily 3/12/2018 at AM Yes Julio Cesar Esteban PA-C   rosuvastatin (CRESTOR) 10 MG tablet Take 1 tablet (10 mg) by mouth daily 3/13/2018 at 0730 Yes Julio Cesar Esteban PA-C   furosemide (LASIX) 40 MG tablet Take 1 tablet (40 mg) by mouth daily 3/13/2018 at 0730 Yes Julio Cesar Esteban PA-C   lidocaine (XYLOCAINE) 5 % ointment Apply topically daily 3/12/2018 at AM Yes Julio Cesar Esteban PA-C   ferrous sulfate  (IRON) 325 (65 FE) MG tablet Take 1 tablet (325 mg) by mouth daily (with breakfast) 3/13/2018 at 0730 Yes Hugo Francisco MD   Blood Glucose Monitoring Suppl (GLUCOCARD EXPRESSION MONITOR) W/DEVICE KIT USE TWICE DAILY TO TEST BLOOD GLUCOSE   Reported, Patient   senna-docusate (SENOKOT-S;PERICOLACE) 8.6-50 MG per tablet Take 1 tablet by mouth 2 times daily as needed for constipation Never Needed at PRN  Marc Mancera MD   clopidogrel (PLAVIX) 75 MG tablet Take 1 tablet (75 mg) by mouth daily 3/8/2018 at AM  Julio Cesar Esteban PA-C   order for DME One touch glucose monitoring strip with Glucometer and lancets.   Hugo Francisco MD

## 2018-03-13 NOTE — ANESTHESIA PROCEDURE NOTES
ARTERIAL LINE PROCEDURE NOTE:   Pre-Procedure  Performed by DILAN DICKENS  Location: pre-op      Pre-Anesthestic Checklist: patient identified, IV checked, risks and benefits discussed and informed consent    Timeout  Correct Patient: Yes   Correct Procedure: Yes   Correct Site: Yes   Correct Laterality: N/A   Correct Position: Yes   Site Marked: N/A   .   Procedure Documentation  Procedure: arterial line    Supine  Insertion Site:left, radial.Skin infiltrated with mL of 1% lidocaine. Injection technique: Seldinger Technique  .  .  Patient Prep;chlorhexidine gluconate and isopropyl alcohol, patient draped    Assessment/Narrative    Catheter: 20 gauge, 12 cm     Secured by suture  Tegaderm dressing used.    Arterial waveform: Yes     Comments:  Arterial Line  No complications

## 2018-03-13 NOTE — BRIEF OP NOTE
[facility]   Brief Operative Note    Pre-operative diagnosis: THORACIC EPIDURAL LIPOMATOSIS   Post-operative diagnosis Same   Procedure: Procedure(s):  T5-9 LAMINECTOMIES, RESECTION OF EPIDURAL LIPOMATOSIS - Wound Class: I-Clean   Surgeon(s): Surgeon(s) and Role:     * Hugh Mendieta MD - Primary     * Martha Reddy PA-C - Assisting   Estimated blood loss: 1200 mL    Specimens: * No specimens in log *   Findings: See op note

## 2018-03-13 NOTE — ANESTHESIA PREPROCEDURE EVALUATION
Anesthesia Evaluation     . Pt has had prior anesthetic.     No history of anesthetic complications          ROS/MED HX    ENT/Pulmonary:     (+)sleep apnea, uses CPAP , . .   (-) tobacco use   Neurologic:       Cardiovascular:     (+) Dyslipidemia, hypertension--CAD, --stent,1 . : . . . :. .      (-) angina, arrhythmias, irregular heartbeat/palpitations, valvular problems/murmurs and angina   METS/Exercise Tolerance:     Hematologic:         Musculoskeletal:         GI/Hepatic:     (+) GERD Other,       Renal/Genitourinary:         Endo:     (+) type II DM Obesity, .      Psychiatric:         Infectious Disease:         Malignancy:         Other:                     Physical Exam  Normal systems: pulmonary    Airway   Mallampati: II  TM distance: >3 FB  Neck ROM: full    Dental   (+) chipped    Cardiovascular   Rhythm and rate: regular and normal      Pulmonary                     Anesthesia Plan      History & Physical Review  History and physical reviewed and following examination; no interval change.    ASA Status:  3 .        Plan for General with Propofol induction.   PONV prophylaxis:  Ondansetron (or other 5HT-3) and Dexamethasone or Solumedrol  Additional equipment: Videolaryngoscope and Arterial Line      Postoperative Care      Consents  Anesthetic plan, risks, benefits and alternatives discussed with:  Patient..                          .

## 2018-03-13 NOTE — IP AVS SNAPSHOT
"[facility]: [phone]                                              INTERAGENCY TRANSFER FORM - PHYSICIAN ORDERS   3/13/2018                    Hospital Admission Date: 3/13/2018  MALCOLM SANCHES   : 1950  Sex: Male        Attending Provider: Hugh Mendieta MD     Allergies:  No Known Allergies    Infection:  None   Service:  SURGERY    Ht:  1.778 m (5' 10\")   Wt:  137.4 kg (303 lb)   Admission Wt:  --    BMI:  43.48 kg/m 2   BSA:  2.61 m 2            Patient PCP Information     Provider PCP Type    Julio Cesar Sahu PA-C General      ED Clinical Impression     Diagnosis Description Comment Added By Time Added    S/P spinal surgery [Z98.890] S/P spinal surgery [Z98.890]  Martha Reddy PA-C 3/14/2018 10:12 AM      Hospital Problems as of 3/16/2018              Priority Class Noted POA    S/P spinal surgery Medium  3/13/2018 Yes      Non-Hospital Problems as of 3/16/2018              Priority Class Noted    Hx of coronary artery disease Medium  3/12/2016    Type 2 diabetes mellitus with other circulatory complication, without long-term current use of insulin (H) Medium  2017    Venous stasis ulcer of left lower extremity (H) Medium  2017    Acute pain of left knee Medium  2017    Chronic pain of left knee Medium  2017    Open wound of left lower extremity without complication, initial encounter Medium  2017    Hx of heart artery stent Medium  2017    [place] (obstructive sleep apnea) Medium  2017    Hypertension, goal below 140/90 Medium  2017    Hyperlipidemia LDL goal <100 Medium  2017    History of coronary artery stent placement Medium  2017    Advanced directives, counseling/discussion Medium  10/2/2017    Obesity, morbid, BMI 40.0-49.9 (H) Medium  2017    Neuropathy Medium  2018    Foot drop, right Medium  2018    Cardiomegaly Medium  2018    Gastroesophageal reflux disease without " esophagitis Medium  1/25/2018    Falls frequently Medium  1/27/2018    Weakness of both legs Medium  1/27/2018    Central spinal stenosis L3-4 and L4-5 Medium  1/27/2018    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 Medium  1/27/2018    Lymphedema Medium  1/29/2018      Code Status History     Date Active Date Inactive Code Status Order ID Comments User Context    3/14/2018 10:16 AM  Full Code [id number]  Martha Reddy PA-C Outpatient    3/13/2018  8:28 PM 3/14/2018 10:16 AM Full Code [id number]  Martha Reddy PA-C Inpatient    1/29/2018  1:16 PM 3/13/2018  8:28 PM Full Code [id number]  Marc Mancera MD Outpatient    1/27/2018  8:32 PM 1/29/2018  1:16 PM Full Code [id number]  Linda Massey MD Inpatient         Medication Review      START taking        Dose / Directions Comments    methocarbamol 500 MG tablet   Commonly known as:  ROBAXIN        Dose:  500 mg   Take 1 tablet (500 mg) by mouth 4 times daily as needed for muscle spasms   Quantity:  70 tablet   Refills:  1        oxyCODONE IR 5 MG tablet   Commonly known as:  ROXICODONE        Dose:  5-10 mg   Take 1-2 tablets (5-10 mg) by mouth every 4 hours as needed for other (pain control or improvement in physical function. Hold dose for analgesic side effects.)   Quantity:  70 tablet   Refills:  0          CONTINUE these medications which have NOT CHANGED        Dose / Directions Comments    ferrous sulfate 325 (65 FE) MG tablet   Commonly known as:  IRON   Used for:  History of anemia        Dose:  325 mg   Take 1 tablet (325 mg) by mouth daily (with breakfast)   Quantity:  90 tablet   Refills:  2        furosemide 40 MG tablet   Commonly known as:  LASIX   Used for:  Hypertension, goal below 140/90        Dose:  40 mg   Take 1 tablet (40 mg) by mouth daily   Quantity:  90 tablet   Refills:  1        GLUCOCARD EXPRESSION MONITOR W/DEVICE Kit        USE TWICE DAILY TO TEST BLOOD GLUCOSE   Refills:  0        lidocaine 5 % ointment   Commonly  known as:  XYLOCAINE   Used for:  Chronic pain of right knee        Apply topically daily   Quantity:  50 g   Refills:  3        losartan 25 MG tablet   Commonly known as:  COZAAR   Used for:  Hypertension, goal below 140/90, Type 2 diabetes mellitus with other circulatory complication, without long-term current use of insulin (H)        Dose:  25 mg   Take 1 tablet (25 mg) by mouth daily   Quantity:  90 tablet   Refills:  1        metoprolol tartrate 25 MG tablet   Commonly known as:  LOPRESSOR   Used for:  Type 2 diabetes mellitus with other circulatory complication, without long-term current use of insulin (H), History of coronary artery stent placement, Hypertension, goal below 140/90        Dose:  25 mg   Take 1 tablet (25 mg) by mouth 2 times daily   Quantity:  28 tablet   Refills:  0        order for DME   Used for:  Type 2 diabetes mellitus with other circulatory complication, without long-term current use of insulin (H)        One touch glucose monitoring strip with Glucometer and lancets.   Quantity:  1 Box   Refills:  1        pantoprazole 40 MG EC tablet   Commonly known as:  PROTONIX   Used for:  Gastroesophageal reflux disease without esophagitis        Dose:  40 mg   Take 1 tablet (40 mg) by mouth daily Take 30-60 minutes before a meal.   Quantity:  1 tablet   Refills:  0        rosuvastatin 10 MG tablet   Commonly known as:  CRESTOR   Used for:  Type 2 diabetes mellitus with other circulatory complication, without long-term current use of insulin (H)        Dose:  10 mg   Take 1 tablet (10 mg) by mouth daily   Quantity:  90 tablet   Refills:  1        senna-docusate 8.6-50 MG per tablet   Commonly known as:  SENOKOT-S;PERICOLACE   Used for:  Spinal stenosis, lumbar region, without neurogenic claudication        Dose:  1 tablet   Take 1 tablet by mouth 2 times daily as needed for constipation   Quantity:  100 tablet   Refills:  0        SitaGLIPtin-MetFORMIN HCl  MG Tb24        Dose:  2 tablet    Take 2 tablets by mouth daily (with breakfast)   Refills:  0        TRIAD HYDROPHILIC WOUND DRESSI Pste   Used for:  Open wound of right lower leg, initial encounter        Dose:  1 applicator   Externally apply 1 g topically daily   Quantity:  1 Tube   Refills:  3          STOP taking     clopidogrel 75 MG tablet   Commonly known as:  PLAVIX           DULoxetine 30 MG EC capsule   Commonly known as:  CYMBALTA           IBUPROFEN PO                     Further instructions from your care team       May restart Plavix in 2 weeks on 3-, please follow put with managing doctor regarding dosing.     Summary of Visit     Reason for your hospital stay       You were in the hospital for back surgery.             After Care     Activity - Up ad katy           Additional Discharge Instructions       Discharge instructions:  No lifting of more than 10 pounds, no bending, no twisting until follow up visit.    Ok to shampoo hair, shower or bathe, but, do not scrub or submerge incision under water until evaluated post-operatively in clinic.    Ok to walk as tolerated, avoid bed rest and prolonged sitting.    No contact sports until after follow up visit  No high impact activities such as; running/jogging, snowmobile or 4 benton riding or any other recreational vehicles.    Do not take NSAIDS (ibuprofen, advil, aleve, naproxen, etc) for pain control.    Do NOT drive while taking narcotic pain medication.    Call the [facility] at [phone] for increasing redness, swelling or pus draining from the incision, increased pain or any other questions and concerns.    May resume Plavix on 3/27/2018.       Advance Diet as Tolerated       Follow this diet upon discharge: Pre admission diet       General info for SNF       Length of Stay Estimate: Short Term Care: Estimated # of Days <30  Condition at Discharge: Stable  Level of care:skilled   Rehabilitation Potential: Good  Admission H&P remains valid and up-to-date:  Yes  Recent Chemotherapy: N/A  Use Nursing Home Standing Orders: N/A       Intake and output       Every shift       Mantoux instructions       Give two-step Mantoux (PPD) Per Facility Policy Yes       Wound care       May remove staples at TCU on 3-       Wound care (specify)       Keep your incision clean and dry at all times.  OK to remove dressing on postop day 2.  OK to shower on postop day 3 and allow water to run over incision, pat dry after shower.  No bathing swimming or submerging in water.  Call immediately or come to ED if any drainage occurs, or you develop new pain, redness, or swelling.             Referrals     Occupational Therapy Adult Consult       Evaluate and treat as clinically indicated.    Reason:  S/p thoracic laminectomies       Physical Therapy Adult Consult       Evaluate and treat as clinically indicated.    Reason:  S/p thoracic laminectomies             Your next 10 appointments already scheduled     Apr 26, 2018 10:10 AM CDT   [facility]    [address] [place] [address], [address] [place] [address] [id number] [phone]            Jun 14, 2018  9:00 AM CDT   [facility]    [address] [place] [address], [address]  [facility] [id number] [phone]              Follow-Up Appointment Instructions     Future Labs/Procedures    Follow Up and recommended labs and tests     Comments:    [facility] in 6 weeks.  Please call the clinic at [phone] to schedule your appointment with Rosales Beavers PA-C or Martha Reddy PA-C.     Staple removal may be done at rehab facility.      Follow-Up Appointment Instructions     Follow Up and recommended labs and tests       Please follow up at [facility] in 6 weeks.  Please call the clinic at [phone] to schedule your appointment with Rosales  ÁNGELA Beavers or Martha Reddy PA-C.     Staple removal may be done at rehab facility.             Statement of Approval     Ordered          03/16/18 1253  I have reviewed and agree with all the recommendations and orders detailed in this document.  EFFECTIVE NOW     Approved and electronically signed by:  Leilani Adorno APRN CNP

## 2018-03-13 NOTE — IP AVS SNAPSHOT
[address] [facility]    [address]    [place] [address]    Phone:  [phone]                                       After Visit Summary   3/13/2018    Glenroy Padilla    MRN: [id number]           After Visit Summary Signature Page     I have received my discharge instructions, and my questions have been answered. I have discussed any challenges I see with this plan with the nurse or doctor.    ..........................................................................................................................................  Patient/Patient Representative Signature      ..........................................................................................................................................  Patient Representative Print Name and Relationship to Patient    ..................................................               ................................................  Date                                            Time    ..........................................................................................................................................  Reviewed by Signature/Title    ...................................................              ..............................................  Date                                                            Time

## 2018-03-13 NOTE — ANESTHESIA CARE TRANSFER NOTE
Patient: Glenroy Padilla    Procedure(s):  T5-9 LAMINECTOMIES, RESECTION OF EPIDURAL LIPOMATOSIS - Wound Class: I-Clean    Diagnosis: THORACIC EPIDURAL LIPOMATOSIS  Diagnosis Additional Information: No value filed.    Anesthesia Type:   General     Note:  Airway :Face Mask  Patient transferred to:PACU  Comments: Neuromuscular blockade not used after succinylcholine for intubation, spontaneous return of TOF 4/4 with sustained tetany, spontaneous respirations, adequate tidal volumes, followed commands to voice, oropharynx suctioned with soft flexible catheter, extubated atraumatically, extubated with suction, airway patent after extubation.  Oxygen via facemask at 10 liters per minute to PACU. Oxygen tubing connected to wall O2 in PACU, SpO2, NiBP, and EKG monitors and alarms on and functioning, Carla Hugger warmer connected to patient gown, report on patient's clinical status given to PACU RN. Handoff Report: Identifed the Patient, Identified the Reponsible Provider, Reviewed the pertinent medical history, Discussed the surgical course, Reviewed Intra-OP anesthesia mangement and issues during anesthesia, Set expectations for post-procedure period and Allowed opportunity for questions and acknowledgement of understanding      Vitals: (Last set prior to Anesthesia Care Transfer)    CRNA VITALS  3/13/2018 1751 - 3/13/2018 1830      3/13/2018             Pulse: 79    ART BP: 116/54    ART Mean:     SpO2: 100 %    Resp Rate (observed): 10    Resp Rate (set): 10                Electronically Signed By: ELFEGO Boles CRNA  March 13, 2018  6:30 PM

## 2018-03-13 NOTE — TELEPHONE ENCOUNTER
Form has been faxed, sent to scanning and copy placed in Julio Cesar Mccall's fax folder  Closing encounter    Josselin NATION (R)

## 2018-03-13 NOTE — TELEPHONE ENCOUNTER
Reason for Call:  Form, our goal is to have forms completed with 72 hours, however, some forms may require a visit or additional information.    Type of letter, form or note:  medical    Who is the form from?: [employer] (if other please explain)    Where did the form come from: form was faxed in    What clinic location was the form placed at?: [facility] - [phone]    Where the form was placed: 's Box    What number is listed as a contact on the form?: n/a       Additional comments: n/a    Call taken on 3/13/2018 at 8:29 AM by Diamond Juarez

## 2018-03-13 NOTE — IP AVS SNAPSHOT
"[facility]: [phone]                                              INTERAGENCY TRANSFER FORM - LAB / IMAGING / EKG / EMG RESULTS   3/13/2018                    Hospital Admission Date: 3/13/2018  MALCOLM SANCHES   : 1950  Sex: Male        Attending Provider: Hugh Mendieta MD     Allergies:  No Known Allergies    Infection:  None   Service:  SURGERY    Ht:  1.778 m (5' 10\")   Wt:  137.4 kg (303 lb)   Admission Wt:  --    BMI:  43.48 kg/m 2   BSA:  2.61 m 2            Patient PCP Information     Provider PCP Type    Julio Cesar Sahu PA-C General         Lab Results - 3 Days      Glucose by meter [[id number]] (Abnormal)  Resulted: 18 1307, Result status: Final result    Ordering provider: Hugh Mendieta MD  18 1251 Resulting lab: POINT OF CARE TEST, GLUCOSE    Specimen Information    Type Source Collected On     18 1251          Components       Value Reference Range Flag Lab   Glucose 199 70 - 99 mg/dL H 170            Glucose by meter [[id number]] (Abnormal)  Resulted: 18 0839, Result status: Final result    Ordering provider: Hugh Mendieta MD  18 0759 Resulting lab: POINT OF CARE TEST, GLUCOSE    Specimen Information    Type Source Collected On     18 0759          Components       Value Reference Range Flag Lab   Glucose 175 70 - 99 mg/dL H 170            Glucose by meter [[id number]] (Abnormal)  Resulted: 18 0204, Result status: Final result    Ordering provider: Hugh Mendieta MD  18 0152 Resulting lab: POINT OF CARE TEST, GLUCOSE    Specimen Information    Type Source Collected On     18 0152          Components       Value Reference Range Flag Lab   Glucose 178 70 - 99 mg/dL H 170            Glucose by meter [[id number]] (Abnormal)  Resulted: 03/15/18 2203, Result status: Final result    Ordering provider: Hugh Mendieta MD  03/15/18 215 Resulting lab: POINT OF CARE TEST, GLUCOSE    Specimen " Information    Type Source Collected On     03/15/18 2151          Components       Value Reference Range Flag Lab   Glucose 183 70 - 99 mg/dL H 170            Glucose by meter [[id number]] (Abnormal)  Resulted: 03/15/18 1739, Result status: Final result    Ordering provider: Hugh Mendieta MD  03/15/18 1726 Resulting lab: POINT OF CARE TEST, GLUCOSE    Specimen Information    Type Source Collected On     03/15/18 1726          Components       Value Reference Range Flag Lab   Glucose 185 70 - 99 mg/dL H 170            Glucose by meter [[id number]] (Abnormal)  Resulted: 03/15/18 1205, Result status: Final result    Ordering provider: Hugh Mendieta MD  03/15/18 1139 Resulting lab: POINT OF CARE TEST, GLUCOSE    Specimen Information    Type Source Collected On     03/15/18 1139          Components       Value Reference Range Flag Lab   Glucose 231 70 - 99 mg/dL H 170            Hemoglobin [[id number]] (Abnormal)  Resulted: 03/15/18 0907, Result status: Final result    Ordering provider: Hugh Mendieta MD  03/15/18 0155 Resulting lab: [facility]    Specimen Information    Type Source Collected On   Blood  03/15/18 0853          Components       Value Reference Range Flag Lab   Hemoglobin 8.2 13.3 - 17.7 g/dL L FrStHsLb            Glucose by meter [[id number]] (Abnormal)  Resulted: 03/15/18 0817, Result status: Final result    Ordering provider: Hugh Mendieta MD  03/15/18 0750 Resulting lab: POINT OF CARE TEST, GLUCOSE    Specimen Information    Type Source Collected On     03/15/18 0750          Components       Value Reference Range Flag Lab   Glucose 165 70 - 99 mg/dL H 170            Glucose by meter [[id number]] (Abnormal)  Resulted: 03/15/18 0213, Result status: Final result    Ordering provider: Hugh Mendieta MD  03/15/18 0159 Resulting lab: POINT OF CARE TEST, GLUCOSE    Specimen Information    Type Source Collected On     03/15/18 0159          Components       Value Reference  Range Flag Lab   Glucose 170 70 - 99 mg/dL H 170            Glucose by meter [[id number]] (Abnormal)  Resulted: 03/14/18 2205, Result status: Final result    Ordering provider: Hugh Mendieta MD  03/14/18 2132 Resulting lab: POINT OF CARE TEST, GLUCOSE    Specimen Information    Type Source Collected On     03/14/18 2132          Components       Value Reference Range Flag Lab   Glucose 182 70 - 99 mg/dL H 170            Glucose by meter [[id number]] (Abnormal)  Resulted: 03/14/18 1730, Result status: Final result    Ordering provider: Hugh Mendieta MD  03/14/18 1714 Resulting lab: POINT OF CARE TEST, GLUCOSE    Specimen Information    Type Source Collected On     03/14/18 1714          Components       Value Reference Range Flag Lab   Glucose 153 70 - 99 mg/dL H 170            Glucose by meter [[id number]] (Abnormal)  Resulted: 03/14/18 1226, Result status: Final result    Ordering provider: Hugh Mendieta MD  03/14/18 1208 Resulting lab: POINT OF CARE TEST, GLUCOSE    Specimen Information    Type Source Collected On     03/14/18 1208          Components       Value Reference Range Flag Lab   Glucose 210 70 - 99 mg/dL H 170            Basic metabolic panel [[id number]] (Abnormal)  Resulted: 03/14/18 0837, Result status: Final result    Ordering provider: Apolonia Cabrera PA-C  03/14/18 0001 Resulting lab: [facility]    Specimen Information    Type Source Collected On   Blood  03/14/18 0810          Components       Value Reference Range Flag Lab   Sodium 139 133 - 144 mmol/L  FrStHsLb   Potassium 4.0 3.4 - 5.3 mmol/L  FrStHsLb   Chloride 104 94 - 109 mmol/L  FrStHsLb   Carbon Dioxide 29 20 - 32 mmol/L  FrStHsLb   Anion Gap 6 3 - 14 mmol/L  FrStHsLb   Glucose 167 70 - 99 mg/dL H FrStHsLb   Urea Nitrogen 21 7 - 30 mg/dL  FrStHsLb   Creatinine 0.90 0.66 - 1.25 mg/dL  FrStHsLb   GFR Estimate 84 >60 mL/min/1.7m2  FrStHsLb   Comment:  Non  GFR Calc   GFR Estimate If Black  >90 >60 mL/min/1.7m2  FrStHsLb   Comment:  African American GFR Calc   Calcium 7.7 8.5 - 10.1 mg/dL L FrStHsLb            CBC with platelets differential [[id number]] (Abnormal)  Resulted: 03/14/18 0823, Result status: Final result    Ordering provider: Apolonia Cabrera PA-C  03/14/18 0001 Resulting lab: [facility]    Specimen Information    Type Source Collected On   Blood  03/14/18 0810          Components       Value Reference Range Flag Lab   WBC 8.6 4.0 - 11.0 10e9/L  FrStHsLb   RBC Count 2.83 4.4 - 5.9 10e12/L L FrStHsLb   Hemoglobin 7.8 13.3 - 17.7 g/dL L FrStHsLb   Hematocrit 25.0 40.0 - 53.0 % L FrStHsLb   MCV 88 78 - 100 fl  FrStHsLb   MCH 27.6 26.5 - 33.0 pg  FrStHsLb   MCHC 31.2 31.5 - 36.5 g/dL L FrStHsLb   RDW 16.4 10.0 - 15.0 % H FrStHsLb   Platelet Count 222 150 - 450 10e9/L  FrStHsLb   Diff Method Automated Method   FrStHsLb   % Neutrophils 80.0 %  FrStHsLb   % Lymphocytes 7.7 %  FrStHsLb   % Monocytes 9.4 %  FrStHsLb   % Eosinophils 2.3 %  FrStHsLb   % Basophils 0.3 %  FrStHsLb   % Immature Granulocytes 0.3 %  FrStHsLb   Nucleated RBCs 0 0 /100  FrStHsLb   Absolute Neutrophil 6.9 1.6 - 8.3 10e9/L  FrStHsLb   Absolute Lymphocytes 0.7 0.8 - 5.3 10e9/L L FrStHsLb   Absolute Monocytes 0.8 0.0 - 1.3 10e9/L  FrStHsLb   Absolute Eosinophils 0.2 0.0 - 0.7 10e9/L  FrStHsLb   Absolute Basophils 0.0 0.0 - 0.2 10e9/L  FrStHsLb   Abs Immature Granulocytes 0.0 0 - 0.4 10e9/L  FrStHsLb   Absolute Nucleated RBC 0.0   FrStHsLb            Glucose by meter [[id number]] (Abnormal)  Resulted: 03/14/18 0811, Result status: Final result    Ordering provider: Hugh Mendieta MD  03/14/18 0754 Resulting lab: POINT OF CARE TEST, GLUCOSE    Specimen Information    Type Source Collected On     03/14/18 0754          Components       Value Reference Range Flag Lab   Glucose 154 70 - 99 mg/dL H 170            Glucose by meter [[id number]] (Abnormal)  Resulted: 03/14/18 0350, Result status: Final result     Ordering provider: Hugh Mendieta MD  03/14/18 0337 Resulting lab: POINT OF CARE TEST, GLUCOSE    Specimen Information    Type Source Collected On     03/14/18 0337          Components       Value Reference Range Flag Lab   Glucose 158 70 - 99 mg/dL H 170            Glucose by meter [[id number]] (Abnormal)  Resulted: 03/13/18 2222, Result status: Final result    Ordering provider: Hugh Mendieta MD  03/13/18 2210 Resulting lab: POINT OF CARE TEST, GLUCOSE    Specimen Information    Type Source Collected On     03/13/18 2210          Components       Value Reference Range Flag Lab   Glucose 189 70 - 99 mg/dL H 170            Creatinine [[id number]]  Resulted: 03/13/18 2133, Result status: Final result    Ordering provider: Meme Fernando  03/13/18 1305 Resulting lab: [facility]    Specimen Information    Type Source Collected On     03/13/18 1305          Components       Value Reference Range Flag Lab   Creatinine 0.95 0.66 - 1.25 mg/dL  FrStHsLb   GFR Estimate 79 >60 mL/min/1.7m2  FrStHsLb   Comment:  Non  GFR Calc   GFR Estimate If Black >90 >60 mL/min/1.7m2  FrStHsLb   Comment:   GFR Calc            Glucose by meter [[id number]] (Abnormal)  Resulted: 03/13/18 2012, Result status: Final result    Ordering provider: Hugh Mendieta MD  03/13/18 1959 Resulting lab: POINT OF CARE TEST, GLUCOSE    Specimen Information    Type Source Collected On     03/13/18 1959          Components       Value Reference Range Flag Lab   Glucose 194 70 - 99 mg/dL H 170            Hemoglobin and hematocrit [[id number]] (Abnormal)  Resulted: 03/13/18 1745, Result status: Final result    Ordering provider: Hugh Mendieta MD  03/13/18 1730 Resulting lab: [facility]    Specimen Information    Type Source Collected On     03/13/18 1730          Components       Value Reference Range Flag Lab   Hemoglobin 8.7 13.3 - 17.7 g/dL L FrStHsLb   Hematocrit 27.8 40.0 - 53.0  % L FrStHsLb            Platelet count [[id number]]  Resulted: 03/13/18 1745, Result status: Final result    Ordering provider: Hugh Mendieta MD  03/13/18 1730 Resulting lab: [facility]    Specimen Information    Type Source Collected On     03/13/18 1730          Components       Value Reference Range Flag Lab   Platelet Count 298 150 - 450 10e9/L  FrStHsLb            Glucose by meter [[id number]] (Abnormal)  Resulted: 03/13/18 1658, Result status: Final result    Ordering provider: Hugh Mendieta MD  03/13/18 1646 Resulting lab: POINT OF CARE TEST, GLUCOSE    Specimen Information    Type Source Collected On     03/13/18 1646          Components       Value Reference Range Flag Lab   Glucose 132 70 - 99 mg/dL H 170            Potassium [[id number]]  Resulted: 03/13/18 1338, Result status: Final result    Ordering provider: Meme Fernando  03/13/18 1246 Resulting lab: [facility]    Specimen Information    Type Source Collected On   Blood  03/13/18 1305          Components       Value Reference Range Flag Lab   Potassium 4.3 3.4 - 5.3 mmol/L  FrStHsLb            Glucose [[id number]] (Abnormal)  Resulted: 03/13/18 1338, Result status: Final result    Ordering provider: Meme Fernando  03/13/18 1246 Resulting lab: [facility]    Specimen Information    Type Source Collected On   Blood  03/13/18 1305          Components       Value Reference Range Flag Lab   Glucose 157 70 - 99 mg/dL H FrStHsLb            Testing Performed By     Lab - Abbreviation Name Director Address Valid Date Range    14 - [facility] Unknown [address]  [place] [address] 05/08/15 1057 - Present    170 - Unknown POINT OF CARE TEST, GLUCOSE Unknown Unknown 10/31/11 1114 - Present            Unresulted Labs     None         Imaging Results - 3 Days      XR Surgery MORE L/T 5 Min Fluoro w Stills [[id number]]  Resulted: 03/13/18 1835, Result status: Final result    Ordering  provider: Hugh Mendieta MD  03/13/18 1740 Resulted by: Segun Sullivan MD    Performed: 03/13/18 1515 - 03/13/18 1740 Resulting lab: RADIOLOGY RESULTS    Narrative:       SURGERY C-ARM FLUOROSCOPY LESS THAN FIVE MINUTES WITH STILLS 3/13/2018  5:40 PM     COMPARISON: Fluoroscopic image from earlier the same day.    HISTORY: T5/T6 laminectomy;     NUMBER OF IMAGES ACQUIRED: 2    VIEWS: 1    FLUOROSCOPY TIME: .3 minutes.      Impression:       IMPRESSION: Intraoperative images demonstrated a radiopaque marker  that is presumed to be at the T5 level which was dictated on previous  fluoroscopic exam.    SEGUN SULLIVAN MD      XR Cervical/Thoracic Epidural Inj Incl Imaging [[id number]]  Resulted: 03/13/18 1438, Result status: Final result    Ordering provider: Hugh Mendieta MD  03/13/18 1016 Resulted by: Nnamdi Simon PA    Performed: 03/13/18 1126 - 03/13/18 1249 Resulting lab: RADIOLOGY RESULTS    Narrative:       EXAM:  Thoracic Epidural Steroid Injection (Interlaminar)           3/13/2018 12:49 PM    HISTORY:  Epidermal lipomatosis from T5 to T9, pending thoracic  surgery, T5 fiducial marker placement requested.      PROCEDURE:  The procedure, indications, risks (including the risk of  infection, bleeding and reaction to fiducial marker material and  medications and possibility of marker migration prior to surgical  exploration), and alternatives to therapy were discussed with the  patient and informed consent was obtained for the procedure.  The  thoracic back was prepped and draped in the usual fashion.  1.5 mL of  Lidocaine 1% was used for local anesthesia. Using fluoroscopic  guidance, a  22-gauge Gold anchor needle was advanced to the left  lamina at the T5 level. The fiducial marker was then expelled through  the needle tip under fluoroscopy. The needle was removed.  The patient  tolerated the procedure well and there were no immediate  complications.        Fluoroscopy time: 1.3  minutes.  Medications used: 1mL Local Lidocaine 1%.       Impression:       IMPRESSION:  Technically successful thoracic fiducial marker placement  at  T5. Dr. Ramirez was present for assistance in localizing target and  confirming location of marker.    ZI CARTAGENA PA-C      Testing Performed By     Lab - Abbreviation Name Director Address Valid Date Range    104 - [facility] RADIOLOGY RESULTS Unknown Unknown 02/16/05 1553 - Present            Encounter-Level Documents:     There are no encounter-level documents.      Order-Level Documents:     There are no order-level documents.

## 2018-03-13 NOTE — PROGRESS NOTES
RADIOLOGY PROCEDURE NOTE  Patient name: Glenroy Padilla  MRN: [id number]  : 1950    Pre-procedure diagnosis: Thoracic epidural lipomatosis  Post-procedure diagnosis: Same    Procedure Date/Time: 2018  2:04 PM  Procedure: T5 Fiducial marker placement  Estimated blood loss: None  Specimen(s) collected with description: none  The patient tolerated the procedure well with no immediate complications.  Significant findings:T5 marker placed successfully    See imaging dictation for procedural details.    Provider name: Nnamdi Simon  Assistant(s):None

## 2018-03-13 NOTE — IP AVS SNAPSHOT
` `     [place] [address] [facility]: [phone]                 INTERAGENCY TRANSFER FORM - NOTES (H&P, Discharge Summary, Consults, Procedures, Therapies)   3/13/2018                    Hospital Admission Date: 3/13/2018  MALCOLM SANCHES   : 1950  Sex: Male        Patient PCP Information     Provider PCP Type    Julio Cesar Sahu PA-C General         History & Physicals      Interval H&P Note by Moira Baez at 3/12/2018  6:24 AM     Author:  Moira Baez Service:  (none) Author Type:  LEE    Filed:  3/12/2018  6:24 AM Date of Service:  3/12/2018  6:24 AM Creation Time:  3/12/2018  6:24 AM    Status:  Signed :  Moira Baez ([facility])         This note is for the purpose of making the H & P performed in clinic within the last 30 days available in the hospital surgical encounter.[SS1.1]       Revision History        User Key Date/Time User Provider Type Action    > SS1.1 3/12/2018  6:24 AM Moira Baez [facility] Sign          Source Note     Author:  Julio Cesar Sahu PA-C Service:  (none) Author Type:  Physician Assistant    Filed:  3/9/2018 10:40 AM Date of Service:  3/9/2018  9:10 AM Creation Time:  3/12/2018  6:24 AM    Status:  Signed :  Julio Cesar Sahu PA-C (Physician Assistant)              [address] [address] [phone]  Dept: [phone]    PRE-OP EVALUATION:  Today's date: 3/9/2018    Malcolm BENDER Amaurisilvino (: 1950) presents for pre-operative evaluation assessment as requested by [AM1.1] Anam[AM1.2].  He requires evaluation and anesthesia risk assessment prior to undergoing surgery/procedure for treatment of[AM1.1] back pain[JD1.1] .[AM1.1]    Fax number for surgical facility:   Primary Physician: Julio Cesar Sahu  Type of Anesthesia Anticipated: General    Patient has a Health Care Directive or Living Will:  YES     Preop Questions 3/9/2018   1.  Do you have a history of Heart attack, stroke, stent, coronary bypass  surgery, or other heart surgery? YES  -    2.  Do you ever have any pain or discomfort in your chest? No   3.  Do you have a history of  Heart Failure? No   4.   Are you troubled by shortness of breath when:  walking on a level surface, or up a slight hill, or at night? No   5.  Do you currently have a cold, bronchitis or other respiratory infection? No   6.  Do you have a cough, shortness of breath, or wheezing? No   7.  Do you sometimes get pains in the calves of your legs when you walk? YES -    8. Do you or anyone in your family have previous history of blood clots? No   9.  Do you or does anyone in your family have a serious bleeding problem such as prolonged bleeding following surgeries or cuts? No   10. Have you ever had problems with anemia or been told to take iron pills? YES -    11. Have you had any abnormal blood loss such as black, tarry or bloody stools? No   12. Have you ever had a blood transfusion? No   13. Have you or any of your relatives ever had problems with anesthesia? No   14. Do you have sleep apnea, excessive snoring or daytime drowsiness? YES -    15. Do you have any prosthetic heart valves? No   16. Do you have prosthetic joints? No[AM1.2]         HPI:     HPI related to upcoming procedure:[AM1.1] Back pain treatment      See problem list for active medical problems.  Problems all longstanding and stable, except as noted/documented.  See ROS for pertinent symptoms related to these conditions.                                                                                                  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 01/27/2018     Priority: Medium     Neuropathy  01/25/2018     Priority: Medium     Foot drop, right 01/25/2018     Priority: Medium     Cardiomegaly 01/25/2018     Priority: Medium     Gastroesophageal reflux disease without esophagitis 01/25/2018     Priority: Medium     Obesity, morbid, BMI 40.0-49.9 (H) 11/20/2017     Priority: Medium     Advanced directives, counseling/discussion 10/02/2017     Priority: Medium     Will bring in a copy       Hypertension, goal below 140/90 09/25/2017     Priority: Medium     Hyperlipidemia LDL goal <100 09/25/2017     Priority: Medium     History of coronary artery stent placement 09/25/2017     Priority: Medium     Type 2 diabetes mellitus with other circulatory complication, without long-term current use of insulin (H) 05/22/2017     Priority: Medium     Venous stasis ulcer of left lower extremity (H) 05/22/2017     Priority: Medium     Acute pain of left knee 05/22/2017     Priority: Medium     Chronic pain of left knee 05/22/2017     Priority: Medium     Open wound of left lower extremity without complication, initial encounter 05/22/2017     Priority: Medium     Hx of heart artery stent 05/22/2017     Priority: Medium     DAYTON (obstructive sleep apnea) 05/22/2017     Priority: Medium     Hx of coronary artery disease 03/12/2016     Priority: Medium      Past Medical History:   Diagnosis Date     Acute pain of left knee 5/22/2017     Chronic pain of right knee 5/22/2017     Hx of coronary artery disease 5/22/2017     Hx of heart artery stent 5/22/2017     Obesity, morbid, BMI 40.0-49.9 (H) 11/20/2017     Open wound of left lower extremity without complication, initial encounter 5/22/2017     DAYTON (obstructive sleep apnea) 5/22/2017     Type 2 diabetes mellitus with other circulatory complication, without long-term current use of insulin (H) 5/22/2017     Venous stasis ulcer of left lower extremity (H) 5/22/2017     Venous stasis ulcer of left lower extremity (H) 5/22/2017     No past surgical history on file.  Current  Outpatient Prescriptions   Medication Sig Dispense Refill     HYDROcodone-acetaminophen (NORCO) 5-325 MG per tablet Take 1 tablet by mouth every 4 hours as needed for other (pain control or improvement in physical function.) 20 tablet 0     senna-docusate (SENOKOT-S;PERICOLACE) 8.6-50 MG per tablet Take 1 tablet by mouth 2 times daily as needed for constipation 100 tablet      pantoprazole (PROTONIX) 40 MG EC tablet Take 1 tablet (40 mg) by mouth daily Take 30-60 minutes before a meal. 1 tablet 0     DULoxetine (CYMBALTA) 30 MG EC capsule Take 1 capsule (30 mg) by mouth 2 times daily 1 capsule 0     Wound Dressings (TRIAD HYDROPHILIC WOUND DRESSI) PSTE Externally apply 1 g topically daily 1 Tube 3     losartan (COZAAR) 25 MG tablet Take 1 tablet (25 mg) by mouth daily 90 tablet 1     rosuvastatin (CRESTOR) 10 MG tablet Take 1 tablet (10 mg) by mouth daily 90 tablet 1     furosemide (LASIX) 40 MG tablet Take 1 tablet (40 mg) by mouth daily 90 tablet 1     clopidogrel (PLAVIX) 75 MG tablet Take 1 tablet (75 mg) by mouth daily 90 tablet 1     lidocaine (XYLOCAINE) 5 % ointment Apply topically daily 50 g 3     SitaGLIPtin-MetFORMIN HCl (JANUMET XR) 100-1000 MG TB24 Take 1 tablet by mouth daily 30 tablet 3     ferrous sulfate (IRON) 325 (65 FE) MG tablet Take 1 tablet (325 mg) by mouth daily (with breakfast) 90 tablet 2     order for DME One touch glucose monitoring strip with Glucometer and lancets. 1 Box 1     OTC products:[AM1.1] None, except as noted above[JD1.1]    Allergies   Allergen Reactions     No Known Allergies       Latex Allergy:[AM1.1] NO[JD1.1]    Social History   Substance Use Topics     Smoking status: Current Some Day Smoker     Types: Cigars     Smokeless tobacco: Never Used     Alcohol use No     History   Drug Use No       REVIEW OF SYSTEMS:[AM1.1]   CONSTITUTIONAL: NEGATIVE for fever, chills, change in weight  INTEGUMENTARY/SKIN: NEGATIVE for worrisome rashes, moles or lesions  EYES: NEGATIVE for  vision changes or irritation  ENT/MOUTH: NEGATIVE for ear, mouth and throat problems  RESP: NEGATIVE for significant cough or SOB  BREAST: NEGATIVE for masses, tenderness or discharge  CV: NEGATIVE for chest pain, palpitations or peripheral edema  GI: NEGATIVE for nausea, abdominal pain, heartburn, or change in bowel habits  : NEGATIVE for frequency, dysuria, or hematuria  MUSCULOSKELETAL: NEGATIVE for significant arthralgias or myalgia  NEURO: NEGATIVE for weakness, dizziness or paresthesias  ENDOCRINE: NEGATIVE for temperature intolerance, skin/hair changes  HEME: NEGATIVE for bleeding problems  PSYCHIATRIC: NEGATIVE for changes in mood or affect    EXAM:   The[JD1.1]re were no vitals taken for this visit.[AM1.1]    GENERAL APPEARANCE: healthy, alert and no distress     EYES: EOMI,  PERRL     HENT: ear canals and TM's normal and nose and mouth without ulcers or lesions     NECK: no adenopathy, no asymmetry, masses, or scars and thyroid normal to palpation     RESP: lungs clear to auscultation - no rales, rhonchi or wheezes     CV: regular rates and rhythm, normal S1 S2, no S3 or S4 and no murmur, click or rub     ABDOMEN:  soft, nontender, no HSM or masses and bowel sounds normal     MS: back pain and more recently left shoulder pain noted.     SKIN: no suspicious lesions or rashes     NEURO: Normal strength and tone, sensory exam grossly normal, mentation intact and speech normal     PSYCH: mentation appears normal. and affect normal/bright     LYMPHATICS: No cervical adenopathy    DIAGNOSTI[JD1.1]CS:[AM1.1]     EKG: appears normal, NSR, normal axis, normal intervals, no acute ST/T changes c/w ischemia, no LVH by voltage criteria, unchanged from previous tracings  Labs Resulted Today:[JD1.1]   Results for orders placed or performed during the hospital encounter of 01/27/18   CT Lumbar spine w/o contrast*    Narrative    CT LUMBAR SPINE WITHOUT CONTRAST 1/27/2018 4:25 PM     HISTORY: Fall yesterday, low back  pain with right LE weakness.     TECHNIQUE: Multiplanar multisequence images were obtained through the  lumbar spine without contrast.    FINDINGS: Sagittal images demonstrate normal posterior alignment. Five  lumbar type vertebral bodies are present. There is no evidence for  fracture. No intraosseous lesions are evident. Degenerative changes  are seen between the spinous processes with hypertrophic spinous  processes noted.    T12-L1: Mild facet hypertrophy. There is no evidence for any  significant stenosis.    L1-L2: Broad-based posterior osteophyte formation, disc bulging and  facet hypertrophy is present but there is no significant stenosis.    L2-L3: Broad-based posterior osteophyte formation, disc bulging and  facet hypertrophy is present. There is asymmetric left posterolateral  osteophyte but there is no significant stenosis.    L3-L4: Broad-based disc bulging and moderate facet and ligamentum  flavum hypertrophy is present causing moderate to severe central canal  stenosis and mild to moderate bilateral neural foraminal stenosis.    L4-L5: Broad-based disc bulging or disc protrusion is present along  with moderate to severe facet and ligamentum flavum hypertrophy. There  is moderate to severe central canal and bilateral neural foraminal  stenosis.    L5-S1: Moderate facet hypertrophy and minimal posterior osteophyte  formation and disc bulging is present causing moderate bilateral  neural foraminal stenosis but no significant central canal stenosis.    Paraspinal soft tissues: Remarkable for vascular calcifications.      Impression    IMPRESSION:  1. Multilevel degenerative disc and facet disease most advanced at  L3-L4 and L4-L5 where there is moderate to severe central canal  stenosis. There is also moderate to severe bilateral neural foraminal  stenoses at L4-L5.  2. Hypertrophic spinous processes with degenerative changes between  the spinous processes.  3. No evidence for fracture or any posterior  malalignment.    TASHA GAYTAN MD   XR Knee Right 3 Views    Narrative    KNEE RIGHT THREE VIEW   1/27/2018 4:26 PM     HISTORY: Fall, right knee pain.     COMPARISON: None.      Impression    IMPRESSION: Mild tricompartmental osteoarthritis is present most  advanced in the medial compartment. There is no evidence for fracture.  Vascular calcifications also noted.    TASHA GAYTAN MD   XR Tibia & Fibula Right 2 Views    Narrative    RIGHT TIBIA-FIBULA TWO VIEWS 1/27/2018 4:27 PM     HISTORY: Fall, right knee to ankle pain.     COMPARISON: None.      Impression    IMPRESSION: Negative for fracture. Extensive soft tissue  calcifications noted in the leg raising the possibility of chronic  venous insufficiency or unusual autoimmune diseases such as  dermatomyositis.    TASHA GAYTAN MD   MR Lumbar Spine w/o Contrast    Narrative    MRI LUMBAR SPINE WITHOUT CONTRAST   1/28/2018 10:06 AM     HISTORY: Worsening foot drop, right more than left. Intermittent  incontinence of stool.     TECHNIQUE: Multiplanar multisequence MRI of the lumbar spine without  contrast.    COMPARISON: Lumbar spine CT 1/27/2018.     FINDINGS: There are five lumbar-type vertebral bodies assumed for the  purposes of this dictation. The conus is unremarkable terminating at  the L1-L2 level. The nerve roots of the cauda equina are bunched up  cranial to the L3-L4 level where there is severe spinal canal  stenosis.    Alignment of the lumbar spine is normal. No loss of vertebral body  height. There are no destructive osseous lesions. Mild loss of  intervertebral disc height with disc desiccation throughout all levels  in the lumbar spine aside from the relatively normal-appearing L5-S1  disc. There is anterior osteophytic spurring throughout the lumbar  spine.     Level by level as follows:     T12-L1:  No significant spinal canal or neural foraminal narrowing.     L1-L2:  No significant spinal canal or neural foraminal narrowing.     L2-L3:  Left  subarticular and lateral disc bulge with associated  bilateral endplate osteophytic spurring. There is no significant  spinal canal or neural foraminal narrowing.     L3-L4:  Severe central spinal canal narrowing with loss of T2 signal  around the nerve roots at this level. This is caused by moderate  circumferential disc bulge as well as bilateral facet hypertrophy and  ligamentum flavum infolding and fairly prominent posterior epidural  fat. There is moderate bilateral neural foraminal narrowing due to  disc bulge and facet hypertrophy.     L4-L5:  Moderate to severe central spinal canal narrowing due to  marked bilateral facet hypertrophy and small circumferential disc  bulge. There are bilateral facet joint effusions. Mild bilateral  neural foraminal narrowing due to disc bulge and facet hypertrophy.     L5-S1:  Bilateral facet hypertrophy, left greater than right, without  significant spinal canal or neural foraminal narrowing.     Paraspinous soft tissues:  There is posterior paraspinous muscle  atrophy.      Impression    IMPRESSION:    1. Severe central spinal canal narrowing at the L3-L4 level due to  disc bulge, facet hypertrophy, ligamentum flavum infolding, and  prominent epidural fat.  2. Moderate to severe spinal canal narrowing at the L4-L5 level due to  disc bulge and marked facet hypertrophy.  3. Additional degenerative changes throughout the lumbar spine as  described above.    RENUKA SULLIVAN MD   CBC with platelets differential   Result Value Ref [facility]    WBC 11.3 (H) 4.0 - 11.0 10e9/L    RBC Count 4.05 (L) 4.4 - 5.9 10e12/L    Hemoglobin 11.2 (L) 13.3 - 17.7 g/dL    Hematocrit 36.8 (L) 40.0 - 53.0 %    MCV 91 78 - 100 fl    MCH 27.7 26.5 - 33.0 pg    MCHC 30.4 (L) 31.5 - 36.5 g/dL    RDW 17.2 (H) 10.0 - 15.0 %    Platelet Count 389 150 - 450 10e9/L    Diff Method Automated Method     % Neutrophils 73.0 %    % Lymphocytes 10.6 %    % Monocytes 13.0 %    % Eosinophils 2.9 %    % Basophils 0.5 %     Absolute Neutrophil 8.2 1.6 - 8.3 10e9/L    Absolute Lymphocytes 1.2 0.8 - 5.3 10e9/L    Absolute Monocytes 1.5 (H) 0.0 - 1.3 10e9/L    Absolute Eosinophils 0.3 0.0 - 0.7 10e9/L    Absolute Basophils 0.1 0.0 - 0.2 10e9/L   Basic metabolic panel   Result Value Ref [facility]    Sodium 137 133 - 144 mmol/L    Potassium 3.8 3.4 - 5.3 mmol/L    Chloride 97 94 - 109 mmol/L    Carbon Dioxide 30 20 - 32 mmol/L    Anion Gap 10 3 - 14 mmol/L    Glucose 102 (H) 70 - 99 mg/dL    Urea Nitrogen 16 7 - 30 mg/dL    Creatinine 1.17 0.66 - 1.25 mg/dL    GFR Estimate 62 >60 mL/min/1.7m2    GFR Estimate If Black 75 >60 mL/min/1.7m2    Calcium 9.0 8.5 - 10.1 mg/dL   UA reflex to Microscopic and Culture   Result Value Ref [facility]    Color Urine Yellow     Appearance Urine Slightly Cloudy     Glucose Urine Negative NEG^Negative mg/dL    Bilirubin Urine Negative NEG^Negative    Ketones Urine Negative NEG^Negative mg/dL    Specific Gravity Urine 1.010 1.003 - 1.035    Blood Urine Small (A) NEG^Negative    pH Urine 5.0 5.0 - 7.0 pH    Protein Albumin Urine Negative NEG^Negative mg/dL    Urobilinogen mg/dL 0.0 0.0 - 2.0 mg/dL    Nitrite Urine Negative NEG^Negative    Leukocyte Esterase Urine Small (A) NEG^Negative    Source Unspecified Urine     RBC Urine 28 (H) 0 - 2 /HPF    WBC Urine 72 (H) 0 - 2 /HPF    Yeast Urine Many (A) NEG^Negative /HPF    Mucous Urine Present (A) NEG^Negative /LPF    Hyaline Casts 3 (H) 0 - 2 /LPF   Hemoglobin A1c   Result Value Ref [facility]    Hemoglobin A1C 5.6 4.3 - 6.0 %   Glucose by meter   Result Value Ref [facility]    Glucose 138 (H) 70 - 99 mg/dL   Hemoglobin   Result Value Ref [facility]    Hemoglobin 10.6 (L) 13.3 - 17.7 g/dL   Basic metabolic panel   Result Value Ref [facility]    Sodium 137 133 - 144 mmol/L    Potassium 3.7 3.4 - 5.3 mmol/L    Chloride 98 94 - 109 mmol/L    Carbon Dioxide 31 20 - 32 mmol/L    Anion Gap 8 3 - 14 mmol/L    Glucose 126 (H) 70 - 99 mg/dL    Urea Nitrogen 16 7 - 30 mg/dL    Creatinine 1.04 0.66 -  1.25 mg/dL    GFR Estimate 71 >60 mL/min/1.7m2    GFR Estimate If Black 86 >60 mL/min/1.7m2    Calcium 8.6 8.5 - 10.1 mg/dL   Glucose by meter   Result Value Ref [facility]    Glucose 126 (H) 70 - 99 mg/dL   Glucose by meter   Result Value Ref [facility]    Glucose 134 (H) 70 - 99 mg/dL   Glucose by meter   Result Value Ref [facility]    Glucose 107 (H) 70 - 99 mg/dL   Glucose by meter   Result Value Ref [facility]    Glucose 131 (H) 70 - 99 mg/dL   Glucose by meter   Result Value Ref [facility]    Glucose 128 (H) 70 - 99 mg/dL   Glucose by meter   Result Value Ref [facility]    Glucose 111 (H) 70 - 99 mg/dL   Glucose by meter   Result Value Ref [facility]    Glucose 113 (H) 70 - 99 mg/dL   Glucose by meter   Result Value Ref [facility]    Glucose 94 70 - 99 mg/dL   Glucose by meter   Result Value Ref [facility]    Glucose 156 (H) 70 - 99 mg/dL   Glucose by meter   Result Value Ref [facility]    Glucose 111 (H) 70 - 99 mg/dL   Glucose by meter   Result Value Ref [facility]    Glucose 111 (H) 70 - 99 mg/dL   Glucose by meter   Result Value Ref [facility]    Glucose 134 (H) 70 - 99 mg/dL   Social Work IP Consult    Narrative    Augusta John     1/28/2018 10:25 AM  CARE TRANSITION SOCIAL WORK INITIAL ASSESSMENT:  Reason For Consult: discharge planning   Met with: Patient and Family.    DATA  Active Problems:    Type 2 diabetes mellitus with other circulatory complication,   without long-term current use of insulin (H)    Venous stasis ulcer of left lower extremity (H)    Hx of coronary artery disease    DAYTON (obstructive sleep apnea)    Hypertension, goal below 140/90    Hyperlipidemia LDL goal <100    Obesity, morbid, BMI 40.0-49.9 (H)    Neuropathy    Gastroesophageal reflux disease without esophagitis    Falls frequently    Weakness of both legs    Central spinal stenosis L3-4 and L4-5    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       Primary Care Clinic Name: [facility]  Primary Care MD Name: Julio Cesar Rosen    ASSESSMENT  Cognitive Status: awake, alert and oriented.        Resources List: Skilled Nursing Facility     Lives With: spouse, child(kirsten), adult  Living Arrangements: house  Quality Of Family Relationships: supportive, helpful, involved  Description of Support System: Involved, Supportive   Who is your support system?: Wife, Children       Insurance Concerns: No Insurance issues identified        This writer met with pt introduced self and role. Discussed   discharge planning and medicare guidelines in regards to home   care and SNF benefits.  Also talked with patient's daughter,   Yuko, by phone.  Patient is not able to be cared for safely by   family at this time.  He is in need of TCU placement.  Patient to   have a MRI today.  Unsure of discharge date at this time, pending   MRI results.  Discussed list of options for TCU for patient.    Patient/family would like referrals sent to the   [place] area.  Unsure if patient will have insurance   coverage for TCU.  Facilities receiving referrals will run   financials on Monday.      PLAN    TCU    Discharge Planner   Discharge Plans in progress: TCU  Barriers to discharge plan: possible cost  Follow up plan: PCP    Augusta John [facility] [phone]/ [facility] [phone]         Entered by: Augusta John 01/28/2018 10:14 AM     Referrals sent to [facility], [facility] in [place], and [facility] in [place].       Urine Culture Aerobic Bacterial   Result Value Ref [facility]    Specimen Description Unspecified Urine     Special Requests Specimen received in preservative     Culture Micro (A)      50,000 to 100,000 colonies/mL  Candida tropicalis  Susceptibility testing not routinely done     Influenza A/B antigen   Result Value Ref [facility]    Influenza A/B Agn Specimen Nasal     Influenza A Negative NEG^Negative    Influenza B Negative NEG^Negative[JD1.2]     Labs Drawn and in Process:[JD1.1]   Unresulted Labs Ordered in the Past 30 Days of this Admission     No orders found from 1/8/2018 to 3/10/2018.[JD1.2]           Recent Labs   Lab Test  01/28/18   0530  01/27/18   1825  01/26/18   1920   09/25/17   1216   HGB  10.6*  11.2*  11.5*   < >  11.6*   PLT   --   389  367   < >  340   NA  137  137   --    --   139   POTASSIUM  3.7  3.8   --    --   4.1   CR  1.04  1.17   --    --   0.92   A1C   --   5.6   --    --   6.2*    < > = values in this interval not displayed.        IMPRESSION:[AM1.1]   Reason for surgery/procedure:[JD1.1] back pain treatment[JD1.3]  Diagnosis/reason for consult:[JD1.1] anesthesia / surgical clearance.[JD1.3]    The proposed surgical procedure is considered[AM1.1] INTERMEDIATE[JD1.1] risk.    REVISED CARDIAC RISK INDEX  The patient has the following serious cardiovascular risks for perioperative complications such as (MI, PE, VFib and 3  AV Block):[AM1.1]  No serious cardiac risks[JD1.1]  INTERPRETATION:[AM1.1] 2 risks: Class III (moderate risk - 6.6% complication rate)[JD1.1]    The patient has the following additional risks for perioperative complications:[AM1.1]  No identified additional risks[JD1.1]  The ASCVD Risk score (Warren SHERRELL Jr, et al., 2013) failed to calculate for the following reasons:    The valid total cholesterol range is 130 to 320 mg/dL[JD1.4]      ICD-10-CM    1. Preop general physical exam Z01.818 EKG 12-lead complete w/read - Clinics     CBC with platelets     Comprehensive metabolic panel     PSA, screen     *UA reflex to Microscopic and Culture ([facility] (except [place] and [place])     LDL cholesterol direct   2. Central spinal stenosis L3-4 and L4-5 M48.00 EKG 12-lead complete w/read - Clinics     CBC with platelets     Comprehensive metabolic panel     PSA, screen     *UA reflex to Microscopic and Culture ([facility] (except [place] and [place])     LDL cholesterol direct   3. 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 Type 2 diabetes mellitus with other circulatory complication, without long-term current use of insulin (H) E11.59 EKG 12-lead complete w/read - Clinics     CBC with platelets     Comprehensive metabolic panel     LDL cholesterol direct   5. History of coronary artery stent placement Z95.5 EKG 12-lead complete w/read - Clinics     CBC with platelets     Comprehensive metabolic panel     LDL cholesterol direct   6. Weakness of both legs R29.898 EKG 12-lead complete w/read - Clinics     CBC with platelets     Comprehensive metabolic panel     LDL cholesterol direct   7. Venous stasis ulcer of left lower extremity (H) I83.029 EKG 12-lead complete w/read - Clinics     CBC with platelets     Comprehensive metabolic panel     LDL cholesterol direct   8. Acute pain of left shoulder M25.512 XR Shoulder Left G/E 3 Views     MR Shoulder Left w/o Contrast     LDL cholesterol direct   9. Fall at home, initial encounter W19.XXXA XR Shoulder Left G/E 3 Views    Y92.099 MR Shoulder Left w/o Contrast     LDL cholesterol direct   10. Mixed stress and urge urinary incontinence N39.46 PSA, screen     *UA reflex to Microscopic and Culture ([facility] (except [place] and [place])     LDL cholesterol direct   11. Hyperlipidemia LDL goal <100 E78.5 LDL cholesterol direct   12. Hypertension, goal below 140/90 I10 LDL cholesterol direct[JD1.5]       RECOMMENDATIONS:[AM1.1]     --Consult hospital rounder / IM to assist post-op medical management    Cardiovascular Risk  Beta blockers recommended Metoprolol Tartrate (Lopressor) 25 mg twice daily (low dose)    --Patient is to take all scheduled medications on the day of surgery EXCEPT for modifications listed below.    ACE Inhibitor or Angiotensin Receptor Blocker (ARB) Use  Ace inhibitor or Angiotensin Receptor Blocker (ARB) and should HOLD this  medication for the 24 hours prior to surgery.      APPROVAL GIVEN to proceed with proposed procedure, without further diagnostic evaluation[JD1.1]       Signed Electronically by: Julio Cesar Esteban,[AM1.1] MPAS[JD1.3] ÁNGELA    Copy of this evaluation report is provided to requesting physician.    [facility] Preop Guidelines[AM1.1]      Revision History        User Key Date/Time User Provider Type Action    > JD1.3 3/12/2018  6:24 AM Julio Cesar Esteban PA-C Physician Assistant Sign     JD1.5 3/9/2018  9:57 AM Julio Cesar Esteban PA-C Physician Assistant      JD1.4 3/9/2018  9:53 AM Julio Cesar Esteban PA-C Physician Assistant      JD1.2 3/9/2018  9:52 AM Julio Cesar Esteban PA-C Physician Assistant      JD1.1 3/9/2018  9:51 AM Julio Cesar Esteban PA-C Physician Assistant      AM1.2 3/9/2018  9:16 AM Lisa Zuluaga CMA Medical Assistant      AM1.1 3/9/2018  9:10 AM Lisa Zuluaga CMA Medical Assistant                   H&P (View-Only) by Julio Cesar Esteban PA-C at 3/9/2018  9:10 AM     Author:  Julio Cesar Esteban PA-C Service:  (none) Author Type:  Physician Assistant    Filed:  3/9/2018 10:40 AM Date of Service:  3/9/2018  9:10 AM Creation Time:  3/12/2018  6:24 AM    Status:  Signed :  Julio Cesar Esteban PA-C (Physician Assistant)           [address] [address] [phone]  Dept: [phone]    PRE-OP EVALUATION:  Today's date: 3/9/2018    Glenroy YULIA Baughsilvino (: 1950) presents for pre-operative evaluation assessment as requested by [AM1.1] Anam[AM1.2].  He requires evaluation and anesthesia risk assessment prior to undergoing surgery/procedure for treatment of[AM1.1] back pain[JD1.1] .[AM1.1]    Fax number for surgical facility:   Primary Physician: Julio Cesar Esteban  Type of Anesthesia Anticipated: General    Patient has a Health Care Directive or Living Will:  YES     Preop Questions 3/9/2018   1.  Do you have a history of Heart attack, stroke, stent, coronary bypass surgery, or  other heart surgery? YES  -    2.  Do you ever have any pain or discomfort in your chest? No   3.  Do you have a history of  Heart Failure? No   4.   Are you troubled by shortness of breath when:  walking on a level surface, or up a slight hill, or at night? No   5.  Do you currently have a cold, bronchitis or other respiratory infection? No   6.  Do you have a cough, shortness of breath, or wheezing? No   7.  Do you sometimes get pains in the calves of your legs when you walk? YES -    8. Do you or anyone in your family have previous history of blood clots? No   9.  Do you or does anyone in your family have a serious bleeding problem such as prolonged bleeding following surgeries or cuts? No   10. Have you ever had problems with anemia or been told to take iron pills? YES -    11. Have you had any abnormal blood loss such as black, tarry or bloody stools? No   12. Have you ever had a blood transfusion? No   13. Have you or any of your relatives ever had problems with anesthesia? No   14. Do you have sleep apnea, excessive snoring or daytime drowsiness? YES -    15. Do you have any prosthetic heart valves? No   16. Do you have prosthetic joints? No[AM1.2]         HPI:     HPI related to upcoming procedure:[AM1.1] Back pain treatment      See problem list for active medical problems.  Problems all longstanding and stable, except as noted/documented.  See ROS for pertinent symptoms related to these conditions.                                                                                                  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 01/27/2018     Priority: Medium     Neuropathy 01/25/2018      Priority: Medium     Foot drop, right 01/25/2018     Priority: Medium     Cardiomegaly 01/25/2018     Priority: Medium     Gastroesophageal reflux disease without esophagitis 01/25/2018     Priority: Medium     Obesity, morbid, BMI 40.0-49.9 (H) 11/20/2017     Priority: Medium     Advanced directives, counseling/discussion 10/02/2017     Priority: Medium     Will bring in a copy       Hypertension, goal below 140/90 09/25/2017     Priority: Medium     Hyperlipidemia LDL goal <100 09/25/2017     Priority: Medium     History of coronary artery stent placement 09/25/2017     Priority: Medium     Type 2 diabetes mellitus with other circulatory complication, without long-term current use of insulin (H) 05/22/2017     Priority: Medium     Venous stasis ulcer of left lower extremity (H) 05/22/2017     Priority: Medium     Acute pain of left knee 05/22/2017     Priority: Medium     Chronic pain of left knee 05/22/2017     Priority: Medium     Open wound of left lower extremity without complication, initial encounter 05/22/2017     Priority: Medium     Hx of heart artery stent 05/22/2017     Priority: Medium     DAYTON (obstructive sleep apnea) 05/22/2017     Priority: Medium     Hx of coronary artery disease 03/12/2016     Priority: Medium      Past Medical History:   Diagnosis Date     Acute pain of left knee 5/22/2017     Chronic pain of right knee 5/22/2017     Hx of coronary artery disease 5/22/2017     Hx of heart artery stent 5/22/2017     Obesity, morbid, BMI 40.0-49.9 (H) 11/20/2017     Open wound of left lower extremity without complication, initial encounter 5/22/2017     DAYTON (obstructive sleep apnea) 5/22/2017     Type 2 diabetes mellitus with other circulatory complication, without long-term current use of insulin (H) 5/22/2017     Venous stasis ulcer of left lower extremity (H) 5/22/2017     Venous stasis ulcer of left lower extremity (H) 5/22/2017     No past surgical history on file.  Current Outpatient  Prescriptions   Medication Sig Dispense Refill     HYDROcodone-acetaminophen (NORCO) 5-325 MG per tablet Take 1 tablet by mouth every 4 hours as needed for other (pain control or improvement in physical function.) 20 tablet 0     senna-docusate (SENOKOT-S;PERICOLACE) 8.6-50 MG per tablet Take 1 tablet by mouth 2 times daily as needed for constipation 100 tablet      pantoprazole (PROTONIX) 40 MG EC tablet Take 1 tablet (40 mg) by mouth daily Take 30-60 minutes before a meal. 1 tablet 0     DULoxetine (CYMBALTA) 30 MG EC capsule Take 1 capsule (30 mg) by mouth 2 times daily 1 capsule 0     Wound Dressings (TRIAD HYDROPHILIC WOUND DRESSI) PSTE Externally apply 1 g topically daily 1 Tube 3     losartan (COZAAR) 25 MG tablet Take 1 tablet (25 mg) by mouth daily 90 tablet 1     rosuvastatin (CRESTOR) 10 MG tablet Take 1 tablet (10 mg) by mouth daily 90 tablet 1     furosemide (LASIX) 40 MG tablet Take 1 tablet (40 mg) by mouth daily 90 tablet 1     clopidogrel (PLAVIX) 75 MG tablet Take 1 tablet (75 mg) by mouth daily 90 tablet 1     lidocaine (XYLOCAINE) 5 % ointment Apply topically daily 50 g 3     SitaGLIPtin-MetFORMIN HCl (JANUMET XR) 100-1000 MG TB24 Take 1 tablet by mouth daily 30 tablet 3     ferrous sulfate (IRON) 325 (65 FE) MG tablet Take 1 tablet (325 mg) by mouth daily (with breakfast) 90 tablet 2     order for DME One touch glucose monitoring strip with Glucometer and lancets. 1 Box 1     OTC products:[AM1.1] None, except as noted above[JD1.1]    Allergies   Allergen Reactions     No Known Allergies       Latex Allergy:[AM1.1] NO[JD1.1]    Social History   Substance Use Topics     Smoking status: Current Some Day Smoker     Types: Cigars     Smokeless tobacco: Never Used     Alcohol use No     History   Drug Use No       REVIEW OF SYSTEMS:[AM1.1]   CONSTITUTIONAL: NEGATIVE for fever, chills, change in weight  INTEGUMENTARY/SKIN: NEGATIVE for worrisome rashes, moles or lesions  EYES: NEGATIVE for vision  changes or irritation  ENT/MOUTH: NEGATIVE for ear, mouth and throat problems  RESP: NEGATIVE for significant cough or SOB  BREAST: NEGATIVE for masses, tenderness or discharge  CV: NEGATIVE for chest pain, palpitations or peripheral edema  GI: NEGATIVE for nausea, abdominal pain, heartburn, or change in bowel habits  : NEGATIVE for frequency, dysuria, or hematuria  MUSCULOSKELETAL: NEGATIVE for significant arthralgias or myalgia  NEURO: NEGATIVE for weakness, dizziness or paresthesias  ENDOCRINE: NEGATIVE for temperature intolerance, skin/hair changes  HEME: NEGATIVE for bleeding problems  PSYCHIATRIC: NEGATIVE for changes in mood or affect    EXAM:   The[JD1.1]re were no vitals taken for this visit.[AM1.1]    GENERAL APPEARANCE: healthy, alert and no distress     EYES: EOMI,  PERRL     HENT: ear canals and TM's normal and nose and mouth without ulcers or lesions     NECK: no adenopathy, no asymmetry, masses, or scars and thyroid normal to palpation     RESP: lungs clear to auscultation - no rales, rhonchi or wheezes     CV: regular rates and rhythm, normal S1 S2, no S3 or S4 and no murmur, click or rub     ABDOMEN:  soft, nontender, no HSM or masses and bowel sounds normal     MS: back pain and more recently left shoulder pain noted.     SKIN: no suspicious lesions or rashes     NEURO: Normal strength and tone, sensory exam grossly normal, mentation intact and speech normal     PSYCH: mentation appears normal. and affect normal/bright     LYMPHATICS: No cervical adenopathy    DIAGNOSTI[JD1.1]CS:[AM1.1]     EKG: appears normal, NSR, normal axis, normal intervals, no acute ST/T changes c/w ischemia, no LVH by voltage criteria, unchanged from previous tracings  Labs Resulted Today:[JD1.1]   Results for orders placed or performed during the hospital encounter of 01/27/18   CT Lumbar spine w/o contrast*    Narrative    CT LUMBAR SPINE WITHOUT CONTRAST 1/27/2018 4:25 PM     HISTORY: Fall yesterday, low back pain with  right LE weakness.     TECHNIQUE: Multiplanar multisequence images were obtained through the  lumbar spine without contrast.    FINDINGS: Sagittal images demonstrate normal posterior alignment. Five  lumbar type vertebral bodies are present. There is no evidence for  fracture. No intraosseous lesions are evident. Degenerative changes  are seen between the spinous processes with hypertrophic spinous  processes noted.    T12-L1: Mild facet hypertrophy. There is no evidence for any  significant stenosis.    L1-L2: Broad-based posterior osteophyte formation, disc bulging and  facet hypertrophy is present but there is no significant stenosis.    L2-L3: Broad-based posterior osteophyte formation, disc bulging and  facet hypertrophy is present. There is asymmetric left posterolateral  osteophyte but there is no significant stenosis.    L3-L4: Broad-based disc bulging and moderate facet and ligamentum  flavum hypertrophy is present causing moderate to severe central canal  stenosis and mild to moderate bilateral neural foraminal stenosis.    L4-L5: Broad-based disc bulging or disc protrusion is present along  with moderate to severe facet and ligamentum flavum hypertrophy. There  is moderate to severe central canal and bilateral neural foraminal  stenosis.    L5-S1: Moderate facet hypertrophy and minimal posterior osteophyte  formation and disc bulging is present causing moderate bilateral  neural foraminal stenosis but no significant central canal stenosis.    Paraspinal soft tissues: Remarkable for vascular calcifications.      Impression    IMPRESSION:  1. Multilevel degenerative disc and facet disease most advanced at  L3-L4 and L4-L5 where there is moderate to severe central canal  stenosis. There is also moderate to severe bilateral neural foraminal  stenoses at L4-L5.  2. Hypertrophic spinous processes with degenerative changes between  the spinous processes.  3. No evidence for fracture or any posterior    Resources List: Skilled Nursing Facility     Lives With: spouse, child(kirsten), adult  Living Arrangements: house  Quality Of Family Relationships: supportive, helpful, involved  Description of Support System: Involved, Supportive   Who is your support system?: Wife, Children       Insurance Concerns: No Insurance issues identified        This writer met with pt introduced self and role. Discussed   discharge planning and medicare guidelines in regards to home   care and SNF benefits.  Also talked with patient's daughter,   Yuko, by phone.  Patient is not able to be cared for safely by   family at this time.  He is in need of TCU placement.  Patient to   have a MRI today.  Unsure of discharge date at this time, pending   MRI results.  Discussed list of options for TCU for patient.    Patient/family would like referrals sent to the   [place] area.  Unsure if patient will have insurance   coverage for TCU.  Facilities receiving referrals will run   financials on Monday.      PLAN    TCU    Discharge Planner   Discharge Plans in progress: TCU  Barriers to discharge plan: possible cost  Follow up plan: PCP    Augusta John [facility] [phone]/ [facility] [phone]         Entered by: Augusta John 01/28/2018 10:14 AM     Referrals sent to [facility], [facility] in [place], and [facility] in [place].       Urine Culture Aerobic Bacterial   Result Value Ref [facility]    Specimen Description Unspecified Urine     Special Requests Specimen received in preservative     Culture Micro (A)      50,000 to 100,000 colonies/mL  Candida tropicalis  Susceptibility testing not routinely done     Influenza A/B antigen   Result Value Ref [facility]    Influenza A/B Agn Specimen Nasal     Influenza A Negative NEG^Negative    Influenza B Negative NEG^Negative[JD1.2]     Labs Drawn and in Process:[JD1.1]   Unresulted Labs Ordered in the Past 30 Days of this Admission     No orders found from 1/8/2018 to 3/10/2018.[JD1.2]           Recent Labs   Lab Test  01/28/18   0530  01/27/18   1825  01/26/18   1920   09/25/17   1216   HGB  10.6*  11.2*  11.5*   < >  11.6*   PLT   --   389  367   < >  340   NA  137  137   --    --   139   POTASSIUM  3.7  3.8   --    --   4.1   CR  1.04  1.17   --    --   0.92   A1C   --   5.6   --    --   6.2*    < > = values in this interval not displayed.        IMPRESSION:[AM1.1]   Reason for surgery/procedure:[JD1.1] back pain treatment[JD1.3]  Diagnosis/reason for consult:[JD1.1] anesthesia / surgical clearance.[JD1.3]    The proposed surgical procedure is considered[AM1.1] INTERMEDIATE[JD1.1] risk.    REVISED CARDIAC RISK INDEX  The patient has the following serious cardiovascular risks for perioperative complications such as (MI, PE, VFib and 3  AV Block):[AM1.1]  No serious cardiac risks[JD1.1]  INTERPRETATION:[AM1.1] 2 risks: Class III (moderate risk - 6.6% complication rate)[JD1.1]    The patient has the following additional risks for perioperative complications:[AM1.1]  No identified additional risks[JD1.1]  The ASCVD Risk score (Chrisney SHERRELL Jr, et al., 2013) failed to calculate for the following reasons:    The valid total cholesterol range is 130 to 320 mg/dL[JD1.4]      ICD-10-CM    1. Preop general physical exam Z01.818 EKG 12-lead complete w/read - Clinics     CBC with platelets     Comprehensive metabolic panel     PSA, screen     *UA reflex to Microscopic and Culture ([facility] (except [place] and [place])     LDL cholesterol direct   2. Central spinal stenosis L3-4 and L4-5 M48.00 EKG 12-lead complete w/read - Clinics     CBC with platelets     Comprehensive metabolic panel     PSA, screen     *UA reflex to Microscopic and Culture ([facility] (except [place] and [place])     LDL cholesterol direct   3. 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 Type 2 diabetes mellitus with other circulatory complication, without long-term current use of insulin (H) E11.59 EKG 12-lead complete w/read - Clinics     CBC with platelets     Comprehensive metabolic panel     LDL cholesterol direct   5. History of coronary artery stent placement Z95.5 EKG 12-lead complete w/read - Clinics     CBC with platelets     Comprehensive metabolic panel     LDL cholesterol direct   6. Weakness of both legs R29.898 EKG 12-lead complete w/read - Clinics     CBC with platelets     Comprehensive metabolic panel     LDL cholesterol direct   7. Venous stasis ulcer of left lower extremity (H) I83.029 EKG 12-lead complete w/read - Clinics     CBC with platelets     Comprehensive metabolic panel     LDL cholesterol direct   8. Acute pain of left shoulder M25.512 XR Shoulder Left G/E 3 Views     MR Shoulder Left w/o Contrast     LDL cholesterol direct   9. Fall at home, initial encounter W19.XXXA XR Shoulder Left G/E 3 Views    Y92.099 MR Shoulder Left w/o Contrast     LDL cholesterol direct   10. Mixed stress and urge urinary incontinence N39.46 PSA, screen     *UA reflex to Microscopic and Culture ([facility] (except [place] and [place])     LDL cholesterol direct   11. Hyperlipidemia LDL goal <100 E78.5 LDL cholesterol direct   12. Hypertension, goal below 140/90 I10 LDL cholesterol direct[JD1.5]       RECOMMENDATIONS:[AM1.1]     --Consult hospital rounder / IM to assist post-op medical management    Cardiovascular Risk  Beta blockers recommended Metoprolol Tartrate (Lopressor) 25 mg twice daily (low dose)    --Patient is to take all scheduled medications on the day of surgery EXCEPT for modifications listed below.    ACE Inhibitor or Angiotensin Receptor Blocker (ARB) Use  Ace inhibitor or Angiotensin Receptor Blocker (ARB) and should HOLD this  medication for the 24 hours prior to surgery.      APPROVAL GIVEN to proceed with proposed procedure, without further diagnostic evaluation[JD1.1]       Signed Electronically by: Julio Cesar Sahu,[AM1.1] MPAS[JD1.3] ÁNGELA    Copy of this evaluation report is provided to requesting physician.    [facility] Preop Guidelines[AM1.1]     Revision History        User Key Date/Time User Provider Type Action    > JD1.3 3/12/2018  6:24 AM Julio Cesar Sahu PA-C Physician Assistant Sign     JD1.5 3/9/2018  9:57 AM Julio Cesar Sahu PA-C Physician Assistant      JD1.4 3/9/2018  9:53 AM Julio Cesar Sahu PA-C Physician Assistant      JD1.2 3/9/2018  9:52 AM Julio Cesar Sahu PA-C Physician Assistant      JD1.1 3/9/2018  9:51 AM Julio Cesar Sahu PA-C Physician Assistant      AM1.2 3/9/2018  9:16 AM Lisa Zuluaga CMA Medical Assistant      AM1.1 3/9/2018  9:10 AM Lisa Zuluaga CMA Medical Assistant                      Discharge Summaries      Discharge Summaries by Leilani Adorno APRN CNP at 3/16/2018  1:01 PM     Author:  Leilani Adorno APRN CNP Service:  Neurosurgery Author Type:  Nurse Practitioner    Filed:  3/16/2018  1:08 PM Date of Service:  3/16/2018  1:01 PM Creation Time:  3/16/2018  1:01 PM    Status:  Attested :  Leilani Adorno APRN CNP (Nurse Practitioner)    Cosigner:  Hugh Mendieta MD at 3/16/2018  1:12 PM        Attestation signed by Hugh Mendieta MD at 3/16/2018  1:12 PM        Physician Attestation   IHugh, personally saw and evaluated Glenroy Padilla as part of a shared visit.  I have reviewed and discussed with the advanced practice provider their discharge plan.    My key history or physical exam findings from the day of discharge: Recovering well, improving strength.    Key management decisions made by me: Rehab and routine follow up    Hugh Mendieta  Date of Service (when I saw the patient): 03/16/18                               [facility]  Hospital    Discharge Summary  Neurosurgery    Date of Admission:  3/13/2018  Date of Discharge:[NS1.1]  3/16/2018[NS1.2]  Discharging Provider:[NS1.1] Leilani Adorno[NS1.2]  Date of Service (when I saw the patient):[NS1.1] 03/16/18    Discharge Diagnoses   Active Problems:    S/P spinal surgery      History of Present Illness[NS1.2]   Glenroy Padilla is a 67 year old male who was admitted on 3/13/2018 ith progressive right leg weakness, myelopathy, and inability to walk, becoming wheelchair bound.  Imaging demonstrated epidural lipomatosis in the thoracic spine causeing severe cord compression and cord signal change. Subsequently underwent T5-9 laminectomies and resection of epidural lipomatosis with Dr. Hugh Mendieta. Pt did well post surgery.  It is recommended that the pt DC to TCU for further rehab.  Pt is pending rehab placement.      Pts hospital stay was uneventful.  The pt was also being followed by the hospital service during their stay.  They recommendations were greatly appreciated.  Their summary is as follows:    Glenroy Padilla is a 67 year old male who was admitted on 3/13/2018 for elective T5-9 laminectomies with the neurosurgery team.     S/p T5-9 laminectomies  Epidural lipomatosis 3/13/2018  - postop care per neurosurgery: pain, disposition, dvt ppx  - PT/OT planning to go back to his [facility]  - drains per nsg  - staple removal in 10 days     Anemia:  - repeat hgb in the AM        HTN  - hold losartan, restart the lasix     CAD s/p LELO in 2016  - restart plavix on 3/27 per the nsg team   - continue his PTA medications     Chronic lymphedema  - start lasix  - no overt signs of infection     DM2  - well controlled on home regimen  - sliding scale while inpatient     Gerd  - PPI     DAYTON   - CPAP prn         # Pain Assessment:   Per primary team     DVT Prophylaxis: Defer to primary service  Code Status: Prior            [NS1.1]  Hospital Course[NS1.2]   Glenroy Padilla was admitted on 3/13/2018.  The  following problems were addressed during his hospitalization:    Active Problems:    S/P spinal surgery    Assessment: stable     Plan: DC once placement found at TCU        I have discussed the following assessment and plan with Dr. Hugh Mendieta  who is in agreement with initial plan and will follow up with further consultation recommendations.    Leilani Adorno [facility]  [facility]  [address]  [address]  [place] [address]    Tel [phone]  Pager [phone][NS1.1]        Significant Results and Procedures[NS1.2]   Post thoracic laminectomy[NS1.1]     Pending Results     Unresulted Labs Ordered in the Past 30 Days of this Admission     No orders found from 1/12/2018 to 3/14/2018.          Code Status[NS1.2]   Full Code[NS1.1]    Primary Care Physician   Julio Cesar Sahu    Physical Exam   Temp: 97.8  F (36.6  C) Temp src: Oral BP: 130/60   Heart Rate: 81 Resp: 16 SpO2: 97 % O2 Device: None (Room air)    There were no vitals filed for this visit.[NS1.2]  Vital Signs with [facility][NS1.1]  Temp:  [97.2  F (36.2  C)-98.6  F (37  C)] 97.8  F (36.6  C)  Heart Rate:  [80-92] 81  Resp:  [15-18] 16  BP: (127-152)/(58-68) 130/60  SpO2:  [94 %-97 %] 97 %  I/O last 3 completed shifts:  In: 1580 [P.O.:1580]  Out: -[NS1.2]     Constitutional: Awake, alert, cooperative, no apparent distress, and appears stated age.  Eyes: Lids and lashes normal, pupils equal, round and reactive to light, extra ocular muscles intact  ENT: Normocephalic, without obvious abnormality, atraumatic  Respiratory: No increased work of breathing  Skin: No bruising or bleeding, normal skin color, texture, turgor, no redness, warmth, or swelling.   Musculoskeletal: There is no redness, warmth, or swelling of the joints.  Full range of motion noted.  Motor strength is 5 out of 5 all extremities bilaterally.  Tone is normal.  Neurologic: Awake, alert, oriented to name, place and time.  Cranial nerves II-XII are  grossly intact.  Motor is 5 out of 5 bilaterally.     Neuropsychiatric: Calm, normal eye contact, alert, normal affect, oriented to self, place, time and situation, memory for past and recent events intact and thought process normal.[NS1.1]       Time Spent on this Encounter[NS1.2]   I, Leilani Adorno, discharged this patient today but I did not personally see the patient today and will not be billing for the patient's discharge.[NS1.1]    Discharge Disposition[NS1.2]   Discharged to short-term care facility  Condition at discharge: Stable[NS1.1]      Consultations This Hospital Stay   OCCUPATIONAL THERAPY ADULT IP CONSULT  PHYSICAL THERAPY ADULT IP CONSULT  HOSPITALIST IP CONSULT  WOUND OSTOMY CONTINENCE NURSE  IP CONSULT  PHYSICAL THERAPY ADULT IP CONSULT  OCCUPATIONAL THERAPY ADULT IP CONSULT    Discharge Orders     General info for SNF   Length of Stay Estimate: Short Term Care: Estimated # of Days <30  Condition at Discharge: Stable  Level of care:skilled   Rehabilitation Potential: Good  Admission H&P remains valid and up-to-date: Yes  Recent Chemotherapy: N/A  Use Nursing Home Standing Orders: N/A     Mantoux instructions   Give two-step Mantoux (PPD) Per Facility Policy Yes     Reason for your hospital stay   You were in the hospital for back surgery.     Intake and output   Every shift     Wound care (specify)   Keep your incision clean and dry at all times.  OK to remove dressing on postop day 2.  OK to shower on postop day 3 and allow water to run over incision, pat dry after shower.  No bathing swimming or submerging in water.  Call immediately or come to ED if any drainage occurs, or you develop new pain, redness, or swelling.     Follow Up and recommended labs and tests   [facility].  Please call the clinic at [phone] to schedule your appointment with Rosales Beavers PA-C or Martha Reddy PA-C.     Staple removal may be done at rehab facility.      Activity - Up ad katy     Additional Discharge Instructions   Discharge instructions:  No lifting of more than 10 pounds, no bending, no twisting until follow up visit.    Ok to shampoo hair, shower or bathe, but, do not scrub or submerge incision under water until evaluated post-operatively in clinic.    Ok to walk as tolerated, avoid bed rest and prolonged sitting.    No contact sports until after follow up visit  No high impact activities such as; running/jogging, snowmobile or 4 benton riding or any other recreational vehicles.    Do not take NSAIDS (ibuprofen, advil, aleve, naproxen, etc) for pain control.    Do NOT drive while taking narcotic pain medication.    Call the [facility] at [phone] for increasing redness, swelling or pus draining from the incision, increased pain or any other questions and concerns.    May resume Plavix on 3/27/2018.     Wound care   May remove staples at TCU on 3-     Full Code     Physical Therapy Adult Consult   Evaluate and treat as clinically indicated.    Reason:  S/p thoracic laminectomies     Occupational Therapy Adult Consult   Evaluate and treat as clinically indicated.    Reason:  S/p thoracic laminectomies     Advance Diet as Tolerated   Follow this diet upon discharge: Pre admission diet       Discharge Medications   Current Discharge Medication List      START taking these medications    Details   oxyCODONE IR (ROXICODONE) 5 MG tablet Take 1-2 tablets (5-10 mg) by mouth every 4 hours as needed for other (pain control or improvement in physical function. Hold dose for analgesic side effects.)  Qty: 70 tablet, Refills: 0    Associated Diagnoses: S/P spinal surgery      methocarbamol (ROBAXIN) 500 MG tablet Take 1 tablet (500 mg) by mouth 4 times daily as needed for muscle spasms  Qty: 70 tablet, Refills: 1    Associated Diagnoses: S/P spinal surgery         CONTINUE these medications which have NOT CHANGED    Details   SitaGLIPtin-MetFORMIN  HCl  MG TB24 Take 2 tablets by mouth daily (with breakfast)       metoprolol tartrate (LOPRESSOR) 25 MG tablet Take 1 tablet (25 mg) by mouth 2 times daily  Qty: 28 tablet, Refills: 0    Associated Diagnoses: Type 2 diabetes mellitus with other circulatory complication, without long-term current use of insulin (H); History of coronary artery stent placement; Hypertension, goal below 140/90      Wound Dressings (TRIAD HYDROPHILIC WOUND DRESSI) PSTE Externally apply 1 g topically daily  Qty: 1 Tube, Refills: 3    Associated Diagnoses: Open wound of right lower leg, initial encounter      lidocaine (XYLOCAINE) 5 % ointment Apply topically daily  Qty: 50 g, Refills: 3    Associated Diagnoses: Chronic pain of right knee      pantoprazole (PROTONIX) 40 MG EC tablet Take 1 tablet (40 mg) by mouth daily Take 30-60 minutes before a meal.  Qty: 1 tablet, Refills: 0    Associated Diagnoses: Gastroesophageal reflux disease without esophagitis      ferrous sulfate (IRON) 325 (65 FE) MG tablet Take 1 tablet (325 mg) by mouth daily (with breakfast)  Qty: 90 tablet, Refills: 2    Associated Diagnoses: History of anemia      furosemide (LASIX) 40 MG tablet Take 1 tablet (40 mg) by mouth daily  Qty: 90 tablet, Refills: 1    Associated Diagnoses: Hypertension, goal below 140/90      losartan (COZAAR) 25 MG tablet Take 1 tablet (25 mg) by mouth daily  Qty: 90 tablet, Refills: 1    Associated Diagnoses: Hypertension, goal below 140/90; Type 2 diabetes mellitus with other circulatory complication, without long-term current use of insulin (H)      rosuvastatin (CRESTOR) 10 MG tablet Take 1 tablet (10 mg) by mouth daily  Qty: 90 tablet, Refills: 1    Associated Diagnoses: Type 2 diabetes mellitus with other circulatory complication, without long-term current use of insulin (H)      Blood Glucose Monitoring Suppl (GLUCOCARD EXPRESSION MONITOR) W/DEVICE KIT USE TWICE DAILY TO TEST BLOOD GLUCOSE  Refills: 0      senna-docusate  (SENOKOT-S;PERICOLACE) 8.6-50 MG per tablet Take 1 tablet by mouth 2 times daily as needed for constipation  Qty: 100 tablet    Associated Diagnoses: Spinal stenosis, lumbar region, without neurogenic claudication      order for DME One touch glucose monitoring strip with Glucometer and lancets.  Qty: 1 Box, Refills: 1    Associated Diagnoses: Type 2 diabetes mellitus with other circulatory complication, without long-term current use of insulin (H)         STOP taking these medications       DULoxetine (CYMBALTA) 30 MG EC capsule Comments:   Reason for Stopping:         IBUPROFEN PO Comments:   Reason for Stopping:         clopidogrel (PLAVIX) 75 MG tablet Comments:   Reason for Stopping:             Allergies   Allergies   Allergen Reactions     No Known Allergies[NS1.2]                   Revision History        User Key Date/Time User Provider Type Action    > NS1.2 3/16/2018  1:08 PM Leilani Adorno APRN CNP Nurse Practitioner Sign     NS1.1 3/16/2018  1:01 PM Leilani Adorno APRN CNP Nurse Practitioner                      Consult Notes      Consults by Apolonia Cabrera PA-C at 3/13/2018 10:35 AM     Author:  Apolonia Cabrera PA-C Service:  Hospitalist Author Type:  Physician Assistant - C    Filed:  3/13/2018 11:41 PM Date of Service:  3/13/2018 10:35 AM Creation Time:  3/13/2018 10:43 PM    Status:  Attested :  Apolonia Cabrera PA-C (Physician Assistant - C)    Cosigner:  Eddie Cain MD at 3/14/2018  3:17 PM         Consult Orders:    1. Hospitalist IP Consult: Patient to be seen: Routine - within 24 hours; Medical co management; Consultant may enter orders: Yes [[id number]] ordered by Martha Reddy PA-C at 03/13/18 1811           Attestation signed by Eddie Cain MD at 3/14/2018  3:17 PM        Physician Attestation   I, dEdie Cain, have reviewed and discussed with the advanced practice provider their history, physical and plan for Glenroy BENDER  Valerie. I did not participate in a shared visit by interviewing or examining the patient and this should be billed as an advanced practice provider only visit.    Eddie Cain  Date of Service (when I saw the patient): I did not personally see this patient today.                               [facility]    Hospitalist Consultation    Date of Admission:  3/13/2018    Assessment & Plan   Glenroy Padilla is a 67 year old  male with a history of CAD s/p x1 stent, HTN, DAYTON, dyslipidemia, and chronic BLE wounds who was admitted on 3/13/2018 for planned neurosurgery due to thoracic stenosis, epidural lipomatosis, and myelopathy. The patient is now POD #0 s/p T5-T9 laminectomies with resection of epidural lipomatosis using intraoperative neuro-monitoring and fluoroscopy.     Prior to admission, diabetes was well controlled on Janument, with last HbA1c 5.6% in January 2018. He takes Plavix at home for his cardiac stent, placed x2 years ago and metoprolol and losaratan for his HTN. He reports he has been living at an assisted living facility and rehab since Nov 2017 given frequent falls and bilateral lower extremity weakness. He has been ambulating with a walker and wheelchair most recently. He has chronic stasis wounds and lymphedema on bilateral lower extremities in which he has been seeing general surgery. Noted MINESH from 6/2017 found borderline bilateral arterial insufficiency.     Today the patient feels well without any complaints other than mild pain and feeling tired. He denies headache, blurry vision, severe pain, nausea, or vomiting. No chest pain or dyspnea. I was asked to see the patient for medical co-management.    # Status post T5-T9 laminectomies & resection of epidural lipomatosis, POD #0. EBL unclear, 400 ml vs 1200 ml. Gen anesthesia. Drop in H/H from 11.6 to 8.7 and Hct 37.8 to 27.8. Patient using walker pta due to worsening weakness. Pain controlled, 4/10 with movement.   -  Primary Neurosurgery  - Pain control  - Hold Lasix given drop in H/H  - Continue IV fluids  - CLD, advance per neurosurgery  - Recheck CBC and BMP in AM    # Anemia: H/H postop drop as listed above. Takes ferrous sulfate at home daily. Baseline Hgb 10.6.  - Monitor H/H   - No current need for blood transfusion  - Resume home ferrous sulfate dose    #  HTN  - Hold home losartan given SBP 90s-110s and drop in H/H, monitor to possibly resume tomorrow  - Continue Metoprolol Tartrate 25 mg BID with hold parameters    # CAD s/p coronary stent x1 2016: Takes Plavix at home s/p stent.   - Plavix on hold x2 weeks per neurosurgery note  - Hold Home Lasix tonight given no s/s of fluid overload on physical exam and drop in H/H, reasses to possibly resume tomorrow    # Chronic LE statis with lymphedema: Follows with general surgeon Dr. Casey Shine. Pt was wearing UNNA boot prior to this admission. He reports this wounds look much better. No hx of DVT or pain.   - Wound consult   - Home topical ointment to be brought in by wife tomorrow  - Per outpatient gen surg note: may resume UNNA boot in post op period or after rehab, plan to reassess prior to discharge    # History of DM: Well controlled on Janumet. Last HbA1c 1/2018: 5.6%. Unclear weather patient was given steroids intraoperatively. FSBG ranging 132-194 since admission.   - Hold home Janumet  - SSI- Medium dose  - Monitor FSBG, goal <160 mg/dl    # GERD: Well controlled on home meds per patient.  - Continue home PPI    # DAYTON: Pt reports he used to use CPAP in the past but has not needed it over the past x1 year. Tolerating 2 L NC postop.  - CPAP as needed    # Dyslipidemia  - Resume home statin    # Frequent falls/Bilateral lower extremity weakness: Ambulates with a walker and wheelchair at assisted living facility. Lower extremity weakness with unclear etiology, possibly related to BLE chronic skin wounds.   - PT/OT  - Fall precautions    DVT Prophylaxis: Pneumatic  Compression Devices  Code Status: Full Code    Disposition: Per primary team, neurosurgery.    Reason for Consult  Medical co management.    Primary Care Physician   Julio Cesar Sahu PA-C    History of Present Illness   Glenroy Padilla is a 67 year old male who presents with     Past Medical History    DAYTON, previously on CPAP but no use x1 yr  CAD  HTN  GERD  Obesity  Dyslipidemia  Lymphedema  NIDDM, HbA1c 5.6% 1/2018  Frequent falls  B/l lower extremity weakness  Anemia, baseline Hgb 10.6     Past Surgical History   Coronary Stent x1 2016    Prior to Admission Medications   Prior to Admission Medications   Prescriptions Last Dose Informant Patient Reported? Taking?   Blood Glucose Monitoring Suppl (GLUCOCARD EXPRESSION MONITOR) W/DEVICE KIT   Yes No   Sig: USE TWICE DAILY TO TEST BLOOD GLUCOSE   DULoxetine (CYMBALTA) 30 MG EC capsule 3/13/2018 at 0730  Yes Yes   Sig: Take 1 capsule (30 mg) by mouth 2 times daily   IBUPROFEN PO 3/6/2018 at PRN  Yes Yes   Sig: Take 800 mg by mouth every 8 hours as needed for moderate pain   SitaGLIPtin-MetFORMIN HCl  MG TB24 3/12/2018 at am  Yes Yes   Sig: Take 2 tablets by mouth daily (with breakfast)    Wound Dressings (TRIAD HYDROPHILIC WOUND DRESSI) PSTE 3/12/2018 at Unknown time  No Yes   Sig: Externally apply 1 g topically daily   clopidogrel (PLAVIX) 75 MG tablet 3/8/2018 at AM  No No   Sig: Take 1 tablet (75 mg) by mouth daily   ferrous sulfate (IRON) 325 (65 FE) MG tablet 3/13/2018 at 0730  No Yes   Sig: Take 1 tablet (325 mg) by mouth daily (with breakfast)   furosemide (LASIX) 40 MG tablet 3/13/2018 at 0730  No Yes   Sig: Take 1 tablet (40 mg) by mouth daily   lidocaine (XYLOCAINE) 5 % ointment 3/12/2018 at AM  No Yes   Sig: Apply topically daily   losartan (COZAAR) 25 MG tablet 3/12/2018 at AM  No Yes   Sig: Take 1 tablet (25 mg) by mouth daily   metoprolol tartrate (LOPRESSOR) 25 MG tablet 3/13/2018 at 0730  No Yes   Sig: Take 1 tablet (25 mg) by mouth 2 times daily    order for DME   No No   Sig: One touch glucose monitoring strip with Glucometer and lancets.   pantoprazole (PROTONIX) 40 MG EC tablet 3/13/2018 at 0730  Yes Yes   Sig: Take 1 tablet (40 mg) by mouth daily Take 30-60 minutes before a meal.   rosuvastatin (CRESTOR) 10 MG tablet 3/13/2018 at 0730  No Yes   Sig: Take 1 tablet (10 mg) by mouth daily   senna-docusate (SENOKOT-S;PERICOLACE) 8.6-50 MG per tablet Never Needed at PRN  No No   Sig: Take 1 tablet by mouth 2 times daily as needed for constipation      Facility-Administered Medications: None     Allergies   Allergies   Allergen Reactions     No Known Allergies      Social History   Patient has resided at assisted living or rehab since Nov 2017. He occasionally smokes cigars every few months. He denies tobacco use, illicit drug use, or alcohol use.    Family History   Patient denies a history of medical problems in his immediate family.     Review of Systems   The 10 point Review of Systems is negative other than noted in the HPI or here.     Physical Exam   Temp: 97.5  F (36.4  C) Temp src: Oral BP: 93/47   Heart Rate: 75 Resp: 14 SpO2: 100 % O2 Device: Nasal cannula Oxygen Delivery: 2 LPM  Vital Signs with Ranges  Temp:  [96.6  F (35.9  C)-97.6  F (36.4  C)] 97.5  F (36.4  C)  Heart Rate:  [65-82] 75  Resp:  [12-18] 14  BP: ()/(47-76) 93/47  MAP:  [64 mmHg-80 mmHg] 64 mmHg  Arterial Line BP: ()/(47-57) 98/47  FiO2 (%):  [40 %] 40 %  SpO2:  [95 %-100 %] 100 %  0 lbs 0 oz    Constitutional: Tired postop but easily arousal. Alert, cooperative, no apparent distress.  HEENT: Extraocular movements intact. Moist mucous membranes without exudates, normal dentition.  Respiratory: Clear to auscultation bilaterally, no crackles or wheezing. Unable to appreciate RLL field given patient positioning.  Cardiovascular: Regular rate and rhythm, normal S1 and S2, and no murmur, gallops, or rubs auscultated.  GI: Soft, non-distended, non-tender, normoactive bowel  sounds.  Lymph/Hematologic: No anterior cervical or supraclavicular adenopathy.  Skin: Bilateral lower extremities with chronic stasis changes, lichenification, and scaly hyperpigmentation extending from ankle to tibial tuberosity. x2 NISREEN drains in place at surgical site draining blood tinged liquid. Posterior thorax dressing is clean, dry, and intact.  Musculoskeletal: Limited exam as pt is postop day #0.   Neurologic: Cranial nerves 2-12 grossly intact, normal strength and sensation.  Psychiatric: Alert, oriented to person, place and time, no obvious anxiety or depression.    Data   -Data reviewed today: All pertinent laboratory and imaging results from this encounter were reviewed.     Recent Labs  Lab 03/13/18  1730 03/13/18  1305 03/09/18  1020   WBC  --   --  10.5   HGB 8.7*  --  11.6*   MCV  --   --  90     --  317   NA  --   --  138   POTASSIUM  --  4.3 4.6   CHLORIDE  --   --  98   CO2  --   --  30   BUN  --   --  19   CR  --  0.95 0.91   ANIONGAP  --   --  10   PHAN  --   --  9.3   GLC  --  157* 179*   ALBUMIN  --   --  3.4   PROTTOTAL  --   --  8.5   BILITOTAL  --   --  0.4   ALKPHOS  --   --  73   ALT  --   --  19   AST  --   --  15       Imaging:  Recent Results (from the past 24 hour(s))   XR Cervical/Thoracic Epidural Inj Incl Imaging    Narrative    EXAM:  Thoracic Epidural Steroid Injection (Interlaminar)           3/13/2018 12:49 PM    HISTORY:  Epidermal lipomatosis from T5 to T9, pending thoracic  surgery, T5 fiducial marker placement requested.      PROCEDURE:  The procedure, indications, risks (including the risk of  infection, bleeding and reaction to fiducial marker material and  medications and possibility of marker migration prior to surgical  exploration), and alternatives to therapy were discussed with the  patient and informed consent was obtained for the procedure.  The  thoracic back was prepped and draped in the usual fashion.  1.5 mL of  Lidocaine 1% was used for local  anesthesia. Using fluoroscopic  guidance, a  22-gauge Gold anchor needle was advanced to the left  lamina at the T5 level. The fiducial marker was then expelled through  the needle tip under fluoroscopy. The needle was removed.  The patient  tolerated the procedure well and there were no immediate  complications.        Fluoroscopy time: 1.3 minutes.  Medications used: 1mL Local Lidocaine 1%.       Impression    IMPRESSION:  Technically successful thoracic fiducial marker placement  at  T5. Dr. Ramirez was present for assistance in localizing target and  confirming location of marker.    ZI CARTAGENA PA-C   XR Surgery MORE L/T 5 Min Fluoro w Stills    Narrative    SURGERY C-ARM FLUOROSCOPY LESS THAN FIVE MINUTES WITH STILLS 3/13/2018  5:40 PM     COMPARISON: Fluoroscopic image from earlier the same day.    HISTORY: T5/T6 laminectomy;     NUMBER OF IMAGES ACQUIRED: 2    VIEWS: 1    FLUOROSCOPY TIME: .3 minutes.      Impression    IMPRESSION: Intraoperative images demonstrated a radiopaque marker  that is presumed to be at the T5 level which was dictated on previous  fluoroscopic exam.    MD Apolonia RICKS PA-C[SS1.1]           Revision History        User Key Date/Time User Provider Type Action    > SS1.1 3/13/2018 11:41 PM Apolonia Cabrera PA-C Physician Assistant - C Sign                     Progress Notes - Physician (Notes from 03/13/18 through 03/16/18)      Progress Notes by Bita Fitzgerald LSW at 3/16/2018 10:58 AM     Author:  Bita Fitzgerald LSW Service:  Social Work Author Type:      Filed:  3/16/2018 12:53 PM Date of Service:  3/16/2018 10:58 AM Creation Time:  3/16/2018 10:58 AM    Status:  Addendum :  Bita Fitzgerald LSW ()         Frank Progress Note  Chart Reviewed, Pt discussed in Interdisciplinary Rounds.   Pt has referrals out for TCU placement. Anticipate that pt is ready to discharge once TCU bed confirmed.     Intervention:   FRANK received call  from Maggie at [facility]. On review,RN had questions and questions answered regarding pt's care. Maggie indicates that they are able to accept pt for admission.  Pt indicates that he will need transportation and FRANK contacted HE and ordered wheelchair transportation for 4:45 today.  FRANK updated that pt does have open area on lower leg that was incorrectly reported closed. FRANK contacted Maggie at [facility] and updated. FRANK sent WOC notes via fax for update to [phone]. FRANK requested return call to verify that pt is still accepted for admission.[SC1.1]  FRANK re-faxed SOC notes to [phone].  FRANK received call from [facility] that they are able to accepet pt butt would like to have pt arrive by 15:00. FRANK contacted Bridgette at  to check if any earlier times are available. There are no earlier times available but HE will contact SW if there are any changes that open up a ride for earlier time.  FRANK contacted [email or web address], [employer],[email or web address] and [facility]. None of the other services were able to accommodate pt for today. FRANK updated Danita at [facility] and she is still trying to work with staff to make the 4:45 transport time work.  FRANK holding with tentative discharge scheduled for 4:45 until further updates.    Team[SC1.2] Members notified:   RN,CC,HUC,BB      Plan: Discharge to [facility] via  w/c at 4:4[SC1.1]5[SC1.2]pm today    Follow up plan: RN to RN report to be called to [phone][SC1.1]      Addendum: Upon receiving notes regarding pt's wound on leg, [facility] is not comfortable admitting pt over weekend. Pt has lower B/P and swelling in his leg and they are concerned that potentially pt could develop more or worsening wounds and bears additional monitoring they are not able to provide at this time. Pt would not be arriving to facility until this evening. They will reassess pt on Monday for admission if pt is still in hospital.   FRANK has not received word from any of the other TCU referrals. [facility] is pt's first  choice.  Updated staff of hold on discharge for today.[SC1.3]    SARAH Torres  [facility]  Phone: [phone][SC1.1]       Revision History        User Key Date/Time User Provider Type Action    > SC1.3 3/16/2018 12:53 PM Bita Fitzgerald LSW  Addend     SC1.2 3/16/2018 12:21 PM Bita Fitzgerald LSW  Addend     SC1.1 3/16/2018 11:18 AM Bita Fitzgerald LSW  Sign            Progress Notes by Arnav Thurston DO at 3/16/2018 12:46 PM     Author:  Arnav Thurston DO Service:  Hospitalist Author Type:  Physician    Filed:  3/16/2018 12:47 PM Date of Service:  3/16/2018 12:46 PM Creation Time:  3/16/2018 12:46 PM    Status:  Signed :  Arnav Thurston DO (Physician)         [facility]    Hospitalist Progress Note    Assessment & Plan   Glenroy Padilla is a 67 year old male who was admitted on 3/13/2018 for elective T5-9 laminectomies with the neurosurgery team.    S/p T5-9 laminectomies  Epidural lipomatosis 3/13/2018  - postop care per neurosurgery: pain, disposition, dvt ppx  - PT/OT planning to go back to his group home  - drains removed  - staple removal in 10 days per ns  - ok for discharge from a medical perspective    Anemia:  - fu with PCP      HTN  - can restart the losartan on discharge, already restarted the lasix    CAD s/p LELO in 2016  - ok restart plavix on 3/27 per the nsg team, hold until then  - continue his PTA medications    Chronic lymphedema  - start lasix  - no overt signs of infection    DM2  - well controlled on home regimen  - sliding scale while inpatient    Gerd  - PPI    DAYTON   - CPAP prn       # Pain Assessment:   Per primary team    DVT Prophylaxis: Defer to primary service  Code Status: Prior    Disposition: probably home today per NS    Arnav Thurston DO  Text Page  (7am to 6pm)  Interval History   Feels stronger with better ability to walk on a daily basis  Doing well, no other complaints, no chest  pain, no shortness of breath    -Data reviewed today: I reviewed all new labs and imaging results over the last 24 hours. I personally reviewed no images or EKG's today.    Physical Exam   Temp: 98.6  F (37  C) Temp src: Oral BP: 127/62   Heart Rate: 92 Resp: 16 SpO2: 97 % O2 Device: None (Room air)    There were no vitals filed for this visit.  Vital Signs with Ranges  Temp:  [97.2  F (36.2  C)-98.6  F (37  C)] 98.6  F (37  C)  Heart Rate:  [80-92] 92  Resp:  [15-18] 16  BP: (127-152)/(58-68) 127/62  SpO2:  [94 %-97 %] 97 %  I/O last 3 completed shifts:  In: 1580 [P.O.:1580]  Out: -     Constitutional: Awake, alert, cooperative, no apparent distress  Respiratory: Clear to auscultation bilaterally, no crackles or wheezing  Cardiovascular: Regular rate and rhythm, normal S1 and S2, and no murmur noted  GI: Normal bowel sounds, soft, non-distended, non-tender  Skin/Integumen: No rashes, no cyanosis, 2+ edema, no signs of active infection  Other:     Medications     HYDROmorphone Stopped (03/14/18 0918)       polyethylene glycol  17 g Oral Daily     furosemide  40 mg Oral Daily     ferrous sulfate  325 mg Oral Daily with breakfast     DULoxetine  30 mg Oral BID     lidocaine   Topical Daily     metoprolol tartrate  25 mg Oral BID     pantoprazole  40 mg Oral Daily     rosuvastatin  10 mg Oral Daily     TRIAD HYDROPHILIC WOUND DRESSI  1 applicator Apply externally Daily     sodium chloride (PF)  3 mL Intracatheter Q8H     acetaminophen  975 mg Oral Q8H     senna-docusate  1 tablet Oral BID    Or     senna-docusate  2 tablet Oral BID     insulin aspart  1-7 Units Subcutaneous TID AC     insulin aspart  1-5 Units Subcutaneous At Bedtime       Data     Recent Labs  Lab 03/15/18  0853 03/14/18  0810 03/13/18  1733 03/13/18  1730  03/13/18  1305   WBC  --  8.6  --   --   --   --    HGB 8.2* 7.8* 9.2* 8.7*  < >  --    MCV  --  88  --   --   --   --    PLT  --  222  --  298  --   --    NA  --  139 139  --   --   --     POTASSIUM  --  4.0 3.8  --   --  4.3   CHLORIDE  --  104  --   --   --   --    CO2  --  29  --   --   --   --    BUN  --  21  --   --   --   --    CR  --  0.90  --   --   --  0.95   ANIONGAP  --  6  --   --   --   --    PHAN  --  7.7*  --   --   --   --    GLC  --  167*  --   --   --  157*   < > = values in this interval not displayed.    Imaging:   No results found for this or any previous visit (from the past 24 hour(s)).[ES1.1]       Revision History        User Key Date/Time User Provider Type Action    > ES1.1 3/16/2018 12:47 PM Arnav Thurston, DO Physician Sign            Progress Notes by Angeline Barksdale at 3/15/2018 11:01 AM     Author:  Angeline Barksdale Service:  Social Work Author Type:      Filed:  3/15/2018  4:05 PM Date of Service:  3/15/2018 11:01 AM Creation Time:  3/15/2018 11:01 AM    Status:  Addendum :  Angeline Barksdale ()         CM    I:  FRANK placed call to YAHIR Abraham at [facility] in [place], to review patient's present level of care.  Per Leigh, patient is a one person assist with dressing (lower extremities-shoes/socks) and they are assisting with transfers (although not able to report how often).  FRANK reviewed current therapy notes w/RN who stated her nurse manager, Fany, will need to make a final decision if they can accommodate patient's increased needs (staffing, etc).  Leigh asked FRANK to fax therapy and nursing notes for Fany to review, which was done (Fax # [phone]).  SW[CS1.1] awaiting a call back from Nurse manager[CS1.2] and proceed accordingly.[CS1.1]  Will pursue TCU if [facility] is unable to take patient back.[CS1.2]     P:  Continue to assist with discharge planning.     SARAH Sanderson[CS1.1]    UPDATE@1235: FRANK received call back from Nurse Manager Fany () who stated they would not be able to meet patient's present needs due to staffing.  Per Fany, their in-house therapy would also only be able to see patient  2x per week, which she feels would not be of the greatest benefit to patient. Fany asks SW to update patient his room will be held for him while he transitions to TCU.  SW[CS1.3] updated[CS1.4] patient[CS1.3] with referrals choices and was asked to send referrals to: [facility]-1st choice; [facility]-2nd choice; [facility], which was done thru DOD.[CS1.4]    UPDATE@1457:  SW received a call from Maggie from [facility] in [place] () wondering if patient wants a Semi private or private room and when patient is ready for discharge.  Per Maggie, patient has not been accepted as yet and their nurse is not able to look at referral until tomorrow[CS1.5] but would appreciate a call back with this information.[CS1.6] Patient requests a semi-private room.  Anticipated discharge date is: 3/16/18.  SW left VM for Maggie with this information.[CS1.7]     UPDATE@1602:  SW received VM from [facility] () asking for call back as they do not accept Medicaid.  Patient does have Medicare A, which they stated would cover patient for 20 days.  SW continues to wait for confirmation from [facility] choice, [facility] in [place]; will need to update [facility] accordingly.[CS1.8]     Revision History        User Key Date/Time User Provider Type Action    > CS1.8 3/15/2018  4:05 PM Stovern, Angeline  Addend     CS1.7 3/15/2018  3:10 PM Stovern, Angeline  Addend     [N/A] 3/15/2018  3:00 PM Stovern, Angeline  Addend     CS1.6 3/15/2018  2:59 PM Stovern, Angeline  Addend     CS1.5 3/15/2018  2:57 PM Stovern, Angeline       CS1.4 3/15/2018 12:53 PM Stovern, Angeline  Addend     CS1.3 3/15/2018 12:37 PM Stovern, Angeline  Addend     CS1.2 3/15/2018 11:18 AM Stovern, Angeline  Addend     CS1.1 3/15/2018 11:05 AM Angeline Barksdale  Sign            Progress Notes by  Arnav Thurston DO at 3/15/2018  3:17 PM     Author:  Arnav Thurston DO Service:  Hospitalist Author Type:  Physician    Filed:  3/15/2018  3:18 PM Date of Service:  3/15/2018  3:17 PM Creation Time:  3/15/2018  3:17 PM    Status:  Signed :  Arnav Thurston DO (Physician)         [facility]    Hospitalist Progress Note    Assessment & Plan   Glenroy Padilla is a 67 year old male who was admitted on 3/13/2018 for elective T5-9 laminectomies with the neurosurgery team.    S/p T5-9 laminectomies  Epidural lipomatosis 3/13/2018  - postop care per neurosurgery: pain, disposition, dvt ppx  - PT/OT planning to go back to his shelter  - drains per nsg  - staple removal in 10 days    Anemia:  - repeat hgb in the AM      HTN  - hold losartan, restart the lasix    CAD s/p LELO in 2016  - restart plavix on 3/27 per the nsg team   - continue his PTA medications    Chronic lymphedema  - start lasix  - no overt signs of infection    DM2  - well controlled on home regimen  - sliding scale while inpatient    Gerd  - PPI    DAYTON   - CPAP prn       # Pain Assessment:   Per primary team    DVT Prophylaxis: Defer to primary service  Code Status: Prior    Disposition: Expected discharge in 1-2 days once cleared by nsg.    Arnav Thurston DO  Text Page  (7am to 6pm)  Interval History   Met the patient  Resistant to medication changes    -Data reviewed today: I reviewed all new labs and imaging results over the last 24 hours. I personally reviewed no images or EKG's today.    Physical Exam   Temp: 98.3  F (36.8  C) Temp src: Oral BP: 118/59   Heart Rate: 77 Resp: 18 SpO2: 97 % O2 Device: None (Room air)    There were no vitals filed for this visit.  Vital Signs with Ranges  Temp:  [97.6  F (36.4  C)-98.4  F (36.9  C)] 98.3  F (36.8  C)  Heart Rate:  [] 77  Resp:  [18-20] 18  BP: (113-147)/(57-76) 118/59  SpO2:  [91 %-97 %] 97 %  I/O last 3 completed shifts:  In: 840  [P.O.:840]  Out: 840 [Urine:750; Drains:90]    Constitutional: Awake, alert, cooperative, no apparent distress  Respiratory: Clear to auscultation bilaterally, no crackles or wheezing  Cardiovascular: Regular rate and rhythm, normal S1 and S2, and no murmur noted  GI: Normal bowel sounds, soft, non-distended, non-tender  Skin/Integumen: No rashes, no cyanosis, 2+ edema, no signs of active infection  Other:     Medications     HYDROmorphone Stopped (03/14/18 0918)       furosemide  40 mg Oral Daily     ferrous sulfate  325 mg Oral Daily with breakfast     DULoxetine  30 mg Oral BID     lidocaine   Topical Daily     metoprolol tartrate  25 mg Oral BID     pantoprazole  40 mg Oral Daily     rosuvastatin  10 mg Oral Daily     TRIAD HYDROPHILIC WOUND DRESSI  1 applicator Apply externally Daily     sodium chloride (PF)  3 mL Intracatheter Q8H     acetaminophen  975 mg Oral Q8H     senna-docusate  1 tablet Oral BID    Or     senna-docusate  2 tablet Oral BID     insulin aspart  1-7 Units Subcutaneous TID AC     insulin aspart  1-5 Units Subcutaneous At Bedtime       Data     Recent Labs  Lab 03/15/18  0853 03/14/18  0810 03/13/18  1733 03/13/18  1730 03/13/18  1305 03/09/18  1020   WBC  --  8.6  --   --   --  10.5   HGB 8.2* 7.8* 9.2* 8.7*  --  11.6*   MCV  --  88  --   --   --  90   PLT  --  222  --  298  --  317   NA  --  139 139  --   --  138   POTASSIUM  --  4.0 3.8  --  4.3 4.6   CHLORIDE  --  104  --   --   --  98   CO2  --  29  --   --   --  30   BUN  --  21  --   --   --  19   CR  --  0.90  --   --  0.95 0.91   ANIONGAP  --  6  --   --   --  10   PHAN  --  7.7*  --   --   --  9.3   GLC  --  167*  --   --  157* 179*   ALBUMIN  --   --   --   --   --  3.4   PROTTOTAL  --   --   --   --   --  8.5   BILITOTAL  --   --   --   --   --  0.4   ALKPHOS  --   --   --   --   --  73   ALT  --   --   --   --   --  19   AST  --   --   --   --   --  15       Imaging:   No results found for this or any previous visit (from the past  24 hour(s)).[ES1.1]       Revision History        User Key Date/Time User Provider Type Action    > ES1.1 3/15/2018  3:18 PM Arnav Thurston,  Physician Sign            Progress Notes by Rosales Beavers PA-C at 3/15/2018 11:15 AM     Author:  Rosales Beavers PA-C Service:  Neurosurgery Author Type:  Physician Assistant - C    Filed:  3/15/2018 11:16 AM Date of Service:  3/15/2018 11:15 AM Creation Time:  3/15/2018 11:15 AM    Status:  Signed :  Rosales Beavers PA-C (Physician Assistant - AGNES)         [facility]    Neurosurgery  Daily Post-Op Note    Assessment & Plan   Procedure(s):  LAMINECTOMY LUMBAR THREE+ LEVELS   -2 Days Post-Op  Doing well.  Pain well-controlled.  Tolerating physical therapy and rehabilitation well.  Legs feeling better each day, still with some numbness and weakness. Ambulating with PT without trouble.   Plan:  -Ambulate  -Advance activity as tolerated  -Continue supportive and symptomatic treatment  -probable dc tomorrow. JPs out today.     Rosales Beavers    Interval History   Doing well.  Continues to improve.  Pain is well-controlled.  No fevers.      Physical Exam   Temp: 97.6  F (36.4  C) Temp src: Oral BP: 113/60   Heart Rate: 79 Resp: 20 SpO2: 95 % O2 Device: None (Room air)    There were no vitals filed for this visit.  Vital Signs with Ranges  Temp:  [97.6  F (36.4  C)-98.4  F (36.9  C)] 97.6  F (36.4  C)  Heart Rate:  [] 79  Resp:  [18-20] 20  BP: (113-147)/(57-76) 113/60  SpO2:  [91 %-96 %] 95 %  I/O last 3 completed shifts:  In: 360 [P.O.:360]  Out: 1310 [Urine:1150; Drains:160]    Alert and oriented.  Moves all extremities equally.      Incision: CDI/       Medications     sodium chloride 100 mL/hr at 03/14/18 0749     HYDROmorphone Stopped (03/14/18 0918)        ferrous sulfate  325 mg Oral Daily with breakfast     DULoxetine  30 mg Oral BID     lidocaine   Topical Daily     metoprolol tartrate  25 mg Oral BID     pantoprazole  40 mg Oral  Daily     rosuvastatin  10 mg Oral Daily     TRIAD HYDROPHILIC WOUND DRESSI  1 applicator Apply externally Daily     sodium chloride (PF)  3 mL Intracatheter Q8H     acetaminophen  975 mg Oral Q8H     senna-docusate  1 tablet Oral BID    Or     senna-docusate  2 tablet Oral BID     insulin aspart  1-7 Units Subcutaneous TID AC     insulin aspart  1-5 Units Subcutaneous At Bedtime           Rosales Nolen Neurosurgery[LG1.1]       Revision History        User Key Date/Time User Provider Type Action    > LG1.1 3/15/2018 11:16 AM Rosales Beavers PA-C Physician Assistant - AGNES Sign            Progress Notes by Lacey Bateman PT at 3/14/2018 11:55 AM     Author:  Lacey Bateman PT Service:  Acute IP Rehab Author Type:  Physical Therapist    Filed:  3/14/2018 11:24 PM Date of Service:  3/14/2018 11:55 AM Creation Time:  3/14/2018 11:24 PM    Status:  Signed :  Lacey Batmean PT (Physical Therapist)            03/14/18 1130   Quick Adds   Type of Visit Initial PT Evaluation   Living Environment   Lives With spouse;child(kirsten), adult   Living Arrangements assisted living   Home Accessibility no concerns   Living Environment Comment sleeps in lift chair recliner   Self-Care   Usual Activity Tolerance moderate   Current Activity Tolerance fair   Regular Exercise no   Functional Level Prior   Ambulation 3-->assistive equipment and person   Transferring 3-->assistive equipment and person   Toileting 3-->assistive equipment and person   Bathing 3-->assistive equipment and person   Dressing 3-->assistive equipment and person   Eating 0-->independent   Fall history within last six months yes   Number of times patient has fallen within last six months 10   Which of the above functional risks had a recent onset or change? ambulation;transferring   Prior Functional Level Comment came from [facility] where he had help with some LE dressing, A with transfers in AM, pt admits to amb short diatnaces with WW at  times   General Information   Onset of Illness/Injury or Date of Surgery - Date 03/13/18   Referring Physician Maureen   Patient/Family Goals Statement return to long-term   Pertinent History of Current Problem (include personal factors and/or comorbidities that impact the POC) Pt with progressive right leg weakness, myelopathy, and inability to walk, becoming wheelchair bound.  Imaging demonstrated epidural lipomatosis in the thoracic spine causeing severe cord compression and cord signal change. Subsequently underwent T5-9 laminectomies and resection of epidural lipomatosis     Precautions/Limitations fall precautions   Cognitive Status Examination   Orientation orientation to person, place and time   Level of Consciousness alert   Follows Commands and Answers Questions 100% of the time   Personal Safety and Judgment intact   Memory intact   Range of Motion (ROM)   ROM Comment LE ROM is WFL; dec L shoulder ROM d/t pain   Bed Mobility   Bed Mobility Comments mod A x 2   Transfer Skills   Transfer Comments mod A x 1-2 w/ WW   Gait   Gait Comments min A w/ WW small steps with limited foot clearance   Balance   Balance Comments impaired in standng and with walking,    General Therapy Interventions   Planned Therapy Interventions bed mobility training;strengthening;transfer training;gait training   Clinical Impression   Criteria for Skilled Therapeutic Intervention yes, treatment indicated   PT Diagnosis difficulty walking   Influenced by the following impairments impaired gait, pain, dec indep w/ transfers   Functional limitations due to impairments impaired mobility   Clinical Presentation Evolving/Changing   Clinical Presentation Rationale clinical observation   Clinical Decision Making (Complexity) Moderate complexity   Therapy Frequency` daily   Predicted Duration of Therapy Intervention (days/wks) 3 days   Anticipated Discharge Disposition Home with Assist;Home with Home Therapy   Risk & Benefits of therapy have been  "explained Yes   Patient, Family & other staff in agreement with plan of care Yes   [facility] AM-PAC TM \"6 Clicks\"   2016, Trustees of [facility], under license to [employer].  All rights reserved.   6 Clicks Short Forms Basic Mobility Inpatient Short Form   [facility] AM-PAC  \"6 Clicks\" V.2 Basic Mobility Inpatient Short Form   1. Turning from your back to your side while in a flat bed without using bedrails? 2 - A Lot   2. Moving from lying on your back to sitting on the side of a flat bed without using bedrails? 2 - A Lot   3. Moving to and from a bed to a chair (including a wheelchair)? 2 - A Lot   4. Standing up from a chair using your arms (e.g., wheelchair, or bedside chair)? 2 - A Lot   5. To walk in hospital room? 2 - A Lot   6. Climbing 3-5 steps with a railing? 1 - Total   Basic Mobility Raw Score (Score out of 24.Lower scores equate to lower levels of function) 11   Total Evaluation Time   Total Evaluation Time (Minutes) 15[LC1.1]        Revision History        User Key Date/Time User Provider Type Action    > LC1.1 3/14/2018 11:24 PM Lacey Bateman, PT Physical Therapist Sign            Progress Notes by Mickey Luther RT at 3/14/2018  9:56 PM     Author:  Mickey Luther RT Service:  Respiratory Therapy Author Type:  Respiratory Therapist    Filed:  3/14/2018  9:56 PM Date of Service:  3/14/2018  9:56 PM Creation Time:  3/14/2018  9:56 PM    Status:  Signed :  Mickey Luther RT (Respiratory Therapist)         Pt refused CPAP for the night.   3/14/2018  Mickey Luther[GT1.1]       Revision History        User Key Date/Time User Provider Type Action    > GT1.1 3/14/2018  9:56 PM Mickey Luther RT Respiratory Therapist Sign            Progress Notes by Angeline Barksdale at 3/14/2018  3:26 PM     Author:  Angeline Barksdale Service:  Social Work Author Type:      Filed:  3/14/2018  3:37 PM Date of Service:  3/14/2018  3:26 PM Creation Time:  3/14/2018  3:26 " PM    Status:  Signed :  Angeline Barksdale ()         JACOB    I:  FRANK met with patient to obtain name of long-term facility where he resides. Patient and spouse, state Radha patient lives at [facility] in [place]  ([address] [place] [address] [phone]).    Patient reports receiving toileting and transferring assistance, as well as help with putting on his shoes and stockings.  Patient is aware [facility] must be able to accept him back with the increase in ADL assistance that is being recommended.  Per spouse, if long-term is unable meet patients needs, they may consider [facility] in [place].  FRANK did update patient and spouse that Medicare requires a 3 night inpatient stay.  Patient was made Inpatient on 3/13, thus would need to remain hospitalized until 3/16 for [facility] coverage.  Both parties appeared to understand this.  Patient appeared confident long-term should be able to meet his needs.     P:  Continue to assist with discharge planning as needed.    SARAH Sanderson[CS1.1]       Revision History        User Key Date/Time User Provider Type Action    > CS1.1 3/14/2018  3:37 PM Angeline Barksdale  Sign            Progress Notes by Arnav Thurston DO at 3/14/2018  3:16 PM     Author:  Arnav Thurston DO Service:  Hospitalist Author Type:  Physician    Filed:  3/14/2018  3:24 PM Date of Service:  3/14/2018  3:16 PM Creation Time:  3/14/2018  3:16 PM    Status:  Signed :  Arnav Thurston DO (Physician)         [facility]    Hospitalist Progress Note    Assessment & Plan   Glenroy Padilla is a 67 year old male who was admitted on 3/13/2018 for elective T5-9 laminectomies with the neurosurgery team.    S/p T5-9 laminectomies  Epidural lipomatosis 3/13/2018  - postop care per neurosurgery: pain, disposition, dvt ppx  - PT/OT will be consulted  - drains per nsg  - staple removal in 10 days    Anemia:  - repeat hgb in the AM      HTN  -  hold losartan and lasix in setting of relative hypotensions, pain medications, drain output, etc    CAD s/p LELO in 2016  - restart plavix on 3/27 per the nsg team   - advise increasing his crestor if he tolerates it    Chronic lymphedema  - start lasix as able  - no overt signs of infection    DM2  - well controlled on home regimen  - sliding scale while inpatient    Gerd  - PPI    DAYTON   - CPAP prn       # Pain Assessment:   Per primary team    DVT Prophylaxis: Defer to primary service  Code Status: Prior    Disposition: Expected discharge in 1-2 days once cleared by nsg.    Arnav Thurston, DO  Text Page  (7am to 6pm)  Interval History   Met the patient  Discussed his bp is low    -Data reviewed today: I reviewed all new labs and imaging results over the last 24 hours. I personally reviewed no images or EKG's today.    Physical Exam   Temp: 98.2  F (36.8  C) Temp src: Oral BP: 116/57   Heart Rate: 94 Resp: 18 SpO2: 91 % O2 Device: None (Room air) Oxygen Delivery: 1 LPM  There were no vitals filed for this visit.  Vital Signs with Ranges  Temp:  [96.6  F (35.9  C)-98.2  F (36.8  C)] 98.2  F (36.8  C)  Heart Rate:  [68-94] 94  Resp:  [12-18] 18  BP: ()/(45-76) 116/57  MAP:  [64 mmHg-80 mmHg] 64 mmHg  Arterial Line BP: ()/(47-57) 98/47  FiO2 (%):  [40 %] 40 %  SpO2:  [91 %-100 %] 91 %  I/O last 3 completed shifts:  In: 4399 [P.O.:420; I.V.:3229]  Out: 3800 [Urine:2200; Drains:400; Blood:1200]    Constitutional: Awake, alert, cooperative, no apparent distress  Respiratory: Clear to auscultation bilaterally, no crackles or wheezing  Cardiovascular: Regular rate and rhythm, normal S1 and S2, and no murmur noted  GI: Normal bowel sounds, soft, non-distended, non-tender  Skin/Integumen: No rashes, no cyanosis, 2+ edema, no signs of active infection  Other:     Medications     sodium chloride 100 mL/hr at 03/14/18 0749     HYDROmorphone Stopped (03/14/18 0918)       [START ON 3/15/2018] ferrous sulfate  325  mg Oral Daily with breakfast     rosuvastatin  10 mg Oral Daily     DULoxetine  30 mg Oral BID     lidocaine   Topical Daily     metoprolol tartrate  25 mg Oral BID     pantoprazole  40 mg Oral Daily     rosuvastatin  10 mg Oral Daily     TRIAD HYDROPHILIC WOUND DRESSI  1 applicator Apply externally Daily     sodium chloride (PF)  3 mL Intracatheter Q8H     acetaminophen  975 mg Oral Q8H     senna-docusate  1 tablet Oral BID    Or     senna-docusate  2 tablet Oral BID     insulin aspart  1-7 Units Subcutaneous TID AC     insulin aspart  1-5 Units Subcutaneous At Bedtime     ferrous sulfate  325 mg Oral Daily       Data     Recent Labs  Lab 03/14/18  0810 03/13/18  1733 03/13/18  1730 03/13/18  1305 03/09/18  1020   WBC 8.6  --   --   --  10.5   HGB 7.8* 9.2* 8.7*  --  11.6*   MCV 88  --   --   --  90     --  298  --  317    139  --   --  138   POTASSIUM 4.0 3.8  --  4.3 4.6   CHLORIDE 104  --   --   --  98   CO2 29  --   --   --  30   BUN 21  --   --   --  19   CR 0.90  --   --  0.95 0.91   ANIONGAP 6  --   --   --  10   PHAN 7.7*  --   --   --  9.3   *  --   --  157* 179*   ALBUMIN  --   --   --   --  3.4   PROTTOTAL  --   --   --   --  8.5   BILITOTAL  --   --   --   --  0.4   ALKPHOS  --   --   --   --  73   ALT  --   --   --   --  19   AST  --   --   --   --  15       Imaging:   Recent Results (from the past 24 hour(s))   XR Surgery MORE L/T 5 Min Fluoro w Stills    Narrative    SURGERY C-ARM FLUOROSCOPY LESS THAN FIVE MINUTES WITH STILLS 3/13/2018  5:40 PM     COMPARISON: Fluoroscopic image from earlier the same day.    HISTORY: T5/T6 laminectomy;     NUMBER OF IMAGES ACQUIRED: 2    VIEWS: 1    FLUOROSCOPY TIME: .3 minutes.      Impression    IMPRESSION: Intraoperative images demonstrated a radiopaque marker  that is presumed to be at the T5 level which was dictated on previous  fluoroscopic exam.    RENUKA SULLIVAN MD[ES1.1]          Revision History        User Key Date/Time User Provider  Type Action    > ES1.1 3/14/2018  3:24 PM Arnav Thurston DO Physician Sign            Progress Notes by Lisa Duvall, RN at 3/14/2018 11:19 AM     Author:  Lisa Duvall RN Service:  [facility] Nurse Author Type:  Registered Nurse    Filed:  3/14/2018 11:33 AM Date of Service:  3/14/2018 11:19 AM Creation Time:  3/14/2018 11:19 AM    Status:  Signed :  Lisa Duvall, RN (Registered Nurse)         [facility] Nurse Inpatient Wound Assessment     Initial Assessment of wound(s) on pt's:   LLE        Data:   Patient History:      per MD note(s): Glenroy Padilla is a 67 year old  male with a history of CAD s/p x1 stent, HTN, DAYTON, dyslipidemia, and chronic BLE wounds who was admitted on 3/13/2018 for planned neurosurgery due to thoracic stenosis, epidural lipomatosis, and myelopathy. The patient is now POD #0 s/p T5-T9 laminectomies with resection of epidural lipomatosis using intraoperative neuro-monitoring and fluoroscopy.        Ric Assessment and sub scores:   Ric Score  Avg: 15.3  Min: 14  Max: 18    Positioning: Pillows    Mattress:  Standard , Atmos Air mattress       Moisture Management:  Urinary Catheter    Catheter secured? Yes      Current Diet / Nutrition:           Active Diet Order      Advance Diet as Tolerated: Full Liquid Diet      Advance Diet as Tolerated     Labs:   Recent Labs   Lab Test  03/14/18   0810   03/09/18   1020   01/27/18   1825   05/22/17   1029   ALBUMIN   --    --   3.4   --    --    < >  3.4   HGB  7.8*   < >  11.6*   < >  11.2*   < >  11.9*   WBC  8.6   --   10.5   --   11.3*   < >  8.7   A1C   --    --    --    --   5.6   < >  6.4*   CRP   --    --    --    --    --    --   21.5*    < > = values in this interval not displayed.       Wound Assessment (location):   Left shin and left dorsal ankle  Wound History:  Pt states shin wound is from about a year ago when he banged his leg in [place], has done various  treatments, was in Unna boots but these are done, he saw his wound doctor at [facility] who wants him to use Triad paste and light compression.  Ankle wound more recent and related to pressure/rubbing from prior Unna boot.    Wound Base:   1.  Left anterior shin:  1.5 x 1.5 x +0.1cm, red moist smooth tissue, slightly raised and hypertrophic.  Small dark creamy drainage.  Proximal tiny red dry area where previous additional wound.  Indentation of several cm noted to periwound, where wound used to be a lot bigger and down to bone, per pt.    2.  Left dorsal ankle:  1.5 x 1.5 x 0.2cm, yellow-pink moist tissue, scant slough, scant bleeding with cleansing, small serosang and creamy drainage.      Periwound Skin: clear and intact, leg pink in general, both legs scaly and dry and scattered erythema and old dry scabs at knees from falls.   Mild edema.      Color: pink    Temperature  normal     Pain:  denies          Intervention:     Patient's chart evaluated.      Wound(s) assessed    Wound Care: cleansed and moisturized legs and feet, wounds cleansed and dressed with Triad paste and Mepilex, Tubigrip size F applied in double layer bilaterally, heels floated    Orders  Written    Supplies  at bedside    Discussed plan of care with Patient, nurse, Dr. Thurston who was in room          Assessment:       LLE with small old trauma wound that appears healing well, no s/s infection, tissue slightly hypertrophic.  Ankle has small shallow wound related to rubbing from previous wraps; no s/s infection.  Pt just seen by his doctor for wound check yesterday, so will continue his recommended POC with Triad paste and Tubigrip, this appears to be appropriate.           Plan:     Nursing to notify the Provider(s) and re-consult the WO Nurse if wound(s) deteriorate(s) or if the wound care plan needs reevaluation.      Plan for skin care to BLE and wound care to LLE: every other day and prn:  1.  Cleanse legs and feet with mild  cleanser, and apply Sween 24 cream to intact skin.   2.  Cleanse wounds with MicroKlenz, pat dry.  3.  Apply small glob of Triad paste to each wound.    4.  Cover with Mepilex or gauze and John.  If using Mepilex, may need to apply skin prep to periwound first to help dressing adhere.    5.  Label with time/date/initials.   6.  Apply Tubigrip stockings (size F) bilaterally, in double layer from base of toes to knee crease.  Open ends should meet at the knee.  Ensure no bunching or wrinkles, and any excess length should be trimmed off or folded over on the foot (not at the knee).   Hand wash and hang dry prn.   7.  Elevate legs when possible and float heels at all times.     [facility] Nurse will return: weekly and prn[AC1.1]          Revision History        User Key Date/Time User Provider Type Action    > AC1.1 3/14/2018 11:33 AM Lisa Duvall, RN Registered Nurse Sign            Progress Notes by Martha Reddy PA-C at 3/14/2018 10:04 AM     Author:  Martha Reddy PA-C Service:  Neurosurgery Author Type:  Physician Assistant    Filed:  3/14/2018 10:18 AM Date of Service:  3/14/2018 10:04 AM Creation Time:  3/14/2018 10:04 AM    Status:  Addendum :  Martha Reddy PA-C (Physician Assistant)         [facility]    Neurosurgery Progress Note    Date of Service (when I saw the patient): 03/14/2018     Assessment & Plan   Glenroy Padilla is a 67 year old male who was admitted on 3/13/2018 ith progressive right leg weakness, myelopathy, and inability to walk, becoming wheelchair bound.  Imaging demonstrated epidural lipomatosis in the thoracic spine causeing severe cord compression and cord signal change. Subsequently underwent T5-9 laminectomies and resection of epidural lipomatosis with Dr. Hugh Mendieta. Today, he is lying semi upright in bed and states he is surprised with how well he is feeling. Denies back or leg pain. States he has some mild shoulder pain due to a fall last  weekend. He does continue to have proximal leg weakness R>L. He is able to raise left leg off bed. Good strength to distal LEs. Plan for wound consult today.    Active Problems:    S/P spinal surgery    Assessment: s/p T5-9 laminectomies    Plan:   -PT/OT and ambulation  -Encourage use of IS and deep breathing   -Keep drains in until less than 30 cc's   -Staple removal in 10-14 days[JW1.1]  -May resume Plavix on 3/27/2018[JW1.2]      I have discussed the following assessment and plan with Dr. Mendieta who is in agreement with initial plan and will follow up with further consultation recommendations.      Martha Reddy PA-C  [facility]  [address]  [address] [place] [id number]    Tel [phone]  Pager [phone]      Interval History   Doing well. Pain well controlled.     Physical Exam   Temp: 97.7  F (36.5  C) Temp src: Oral BP: 94/45   Heart Rate: 80 Resp: 18 SpO2: 94 % O2 Device: None (Room air) Oxygen Delivery: 1 LPM  There were no vitals filed for this visit.  Vital Signs with Ranges  Temp:  [96.6  F (35.9  C)-97.7  F (36.5  C)] 97.7  F (36.5  C)  Heart Rate:  [65-82] 80  Resp:  [12-18] 18  BP: ()/(45-76) 94/45  MAP:  [64 mmHg-80 mmHg] 64 mmHg  Arterial Line BP: ()/(47-57) 98/47  FiO2 (%):  [40 %] 40 %  SpO2:  [93 %-100 %] 94 %  I/O last 3 completed shifts:  In: 4279 [P.O.:300; I.V.:3229]  Out: 3330 [Urine:1800; Drains:330; Blood:1200]    Heart Rate: 80, Blood pressure 94/45, temperature 97.7  F (36.5  C), temperature source Oral, resp. rate 18, SpO2 94 %.  0 lbs 0 oz  HEENT:  Normocephalic, atraumatic.  PERRLA.  EOM s intact.    Heart:  No peripheral edema  Lungs:  No SOB  Skin:  Warm and dry. Incision covered with dressing with staples intact. NISREEN x2 in place.  Extremities:  Multiple chronic bruises and skin tears to all extremities.    NEUROLOGICAL EXAMINATION:   Mental status:  Alert and Oriented x 3, speech is fluent.  Cranial nerves:  II-XII  intact.   Motor:  Able to raise left leg off of bed, unable to raise right leg off of bed, 5/5 plantar and dorsi flexion. Wiggles bilateral toes  Sensation:  Decreased to distal LEs  Reflexes:  Negative Babinski.  Negative Clonus.       Medications     sodium chloride 100 mL/hr at 03/14/18 0749     HYDROmorphone Stopped (03/14/18 0918)       DULoxetine  30 mg Oral BID     lidocaine   Topical Daily     metoprolol tartrate  25 mg Oral BID     pantoprazole  40 mg Oral Daily     rosuvastatin  10 mg Oral Daily     TRIAD HYDROPHILIC WOUND DRESSI  1 applicator Apply externally Daily     sodium chloride (PF)  3 mL Intracatheter Q8H     acetaminophen  975 mg Oral Q8H     senna-docusate  1 tablet Oral BID    Or     senna-docusate  2 tablet Oral BID     insulin aspart  1-7 Units Subcutaneous TID AC     insulin aspart  1-5 Units Subcutaneous At Bedtime     ferrous sulfate  325 mg Oral Daily       Data   CBC RESULTS:   Recent Labs   Lab Test  03/14/18   0810   WBC  8.6   RBC  2.83*   HGB  7.8*   HCT  25.0*   MCV  88   MCH  27.6   MCHC  31.2*   RDW  16.4*   PLT  222     Basic Metabolic Panel:  Lab Results   Component Value Date     03/14/2018      Lab Results   Component Value Date    POTASSIUM 4.0 03/14/2018     Lab Results   Component Value Date    CHLORIDE 104 03/14/2018     Lab Results   Component Value Date    PHAN 7.7 03/14/2018     Lab Results   Component Value Date    CO2 29 03/14/2018     Lab Results   Component Value Date    BUN 21 03/14/2018     Lab Results   Component Value Date    CR 0.90 03/14/2018     Lab Results   Component Value Date     03/14/2018     INR:  No results found for: INR[JW1.1]         Revision History        User Key Date/Time User Provider Type Action    > JW1.2 3/14/2018 10:18 AM Martha Reddy PA-C Physician Assistant Addend     JW1.1 3/14/2018 10:11 AM Martha Reddy PA-C Physician Assistant Sign            Progress Notes by Martha Reddy PA-C at 3/13/2018  6:13 PM      Author:  Martha Reddy PA-C Service:  Neurosurgery Author Type:  Physician Assistant    Filed:  3/13/2018  6:13 PM Date of Service:  3/13/2018  6:13 PM Creation Time:  3/13/2018  6:13 PM    Status:  Signed :  Martha Reddy PA-C (Physician Assistant)         Patient may resume Plavix in 2 weeks (3/27/2018).[JW1.1]     Revision History        User Key Date/Time User Provider Type Action    > JW1.1 3/13/2018  6:13 PM Martha Reddy PA-C Physician Assistant Sign            Progress Notes by Nnamdi Simon PA at 3/13/2018  2:04 PM     Author:  Nnamdi Simon PA Service:  (none) Author Type:  Physician Assistant    Filed:  3/13/2018  2:05 PM Date of Service:  3/13/2018  2:04 PM Creation Time:  3/13/2018  2:04 PM    Status:  Signed :  Nnamdi Simon PA (Physician Assistant)         RADIOLOGY PROCEDURE NOTE  Patient name: Glenroy Padilla  MRN: [id number]  : 1950    Pre-procedure diagnosis: Thoracic epidural lipomatosis  Post-procedure diagnosis: Same    Procedure Date/Time: 2018  2:04 PM  Procedure: T5 Fiducial marker placement  Estimated blood loss: None  Specimen(s) collected with description: none  The patient tolerated the procedure well with no immediate complications.  Significant findings:T5 marker placed successfully    See imaging dictation for procedural details.    Provider name: Nnamdi Simon  Assistant(s):None[HL1.1]         Revision History        User Key Date/Time User Provider Type Action    > HL1.1 3/13/2018  2:05 PM Nnamdi Simon PA Physician Assistant Sign            Progress Notes by Jennifer Nguyen at 3/13/2018 10:54 AM     Author:  Jennifer Nguyen Service:  (none) Author Type:  (none)    Filed:  3/13/2018 10:55 AM Date of Service:  3/13/2018 10:54 AM Creation Time:  3/13/2018 10:54 AM    Status:  Signed :  Jennifer Nguyen         Admission medication history interview status for the  3/13/2018  admission is complete. See EPIC admission navigator for prior to admission medications     Medication history source reliability:Good    Medication history interview source(s):Patient, Family and Caregiver    Medication history resources (including written lists, pill bottles, clinic record):Spoke with Tanja from [facility], she verified medications given this morning    Primary pharmacy.[facility]    Additional medication history information not noted on PTA med list :None    Time spent in this activity: 60 minutes    Prior to Admission medications    Medication Sig Last Dose Taking? Auth Provider   sitagliptin-metFORMIN (JANUMET)  MG per tablet Take 2 tablets by mouth daily 3/12/2018 at AM Yes Reported, Patient   IBUPROFEN PO Take 800 mg by mouth every 8 hours as needed for moderate pain 3/6/2018 at PRN Yes Reported, Patient   metoprolol tartrate (LOPRESSOR) 25 MG tablet Take 1 tablet (25 mg) by mouth 2 times daily 3/13/2018 at 0730 Yes Julio Cesar Esteban PA-C   pantoprazole (PROTONIX) 40 MG EC tablet Take 1 tablet (40 mg) by mouth daily Take 30-60 minutes before a meal. 3/13/2018 at 0730 Yes Julio Cesar Esteban PA-C   DULoxetine (CYMBALTA) 30 MG EC capsule Take 1 capsule (30 mg) by mouth 2 times daily 3/13/2018 at 0730 Yes Julio Cesar Esteban PA-C   Wound Dressings (TRIAD HYDROPHILIC WOUND DRESSI) PSTE Externally apply 1 g topically daily 3/12/2018 at Unknown time Yes Casey Shine MD   losartan (COZAAR) 25 MG tablet Take 1 tablet (25 mg) by mouth daily 3/12/2018 at AM Yes Julio Cesar Esteban PA-C   rosuvastatin (CRESTOR) 10 MG tablet Take 1 tablet (10 mg) by mouth daily 3/13/2018 at 0730 Yes Julio Cesar Esteban PA-C   furosemide (LASIX) 40 MG tablet Take 1 tablet (40 mg) by mouth daily 3/13/2018 at 0730 Yes Julio Cesar Esteban PA-C   lidocaine (XYLOCAINE) 5 % ointment Apply topically daily 3/12/2018 at AM Yes Julio Cesar Esteban PA-C   ferrous sulfate (IRON) 325 (65 FE) MG tablet Take 1 tablet (325 mg) by  mouth daily (with breakfast) 3/13/2018 at 0730 Yes Hugo Francisco MD   Blood Glucose Monitoring Suppl (GLUCOCARD EXPRESSION MONITOR) W/DEVICE KIT USE TWICE DAILY TO TEST BLOOD GLUCOSE   Reported, Patient   senna-docusate (SENOKOT-S;PERICOLACE) 8.6-50 MG per tablet Take 1 tablet by mouth 2 times daily as needed for constipation Never Needed at PRN  Marc Mancera MD   clopidogrel (PLAVIX) 75 MG tablet Take 1 tablet (75 mg) by mouth daily 3/8/2018 at AM  Julio Cesar Esteban PA-C   order for DME One touch glucose monitoring strip with Glucometer and lancets.   Hugo Francisco MD[AH1.1]            Revision History        User Key Date/Time User Provider Type Action    > AH1.1 3/13/2018 10:55 AM Jennifer Nguyen (none) Sign                  Procedure Notes     No notes of this type exist for this encounter.         Progress Notes - Therapies (Notes from 03/13/18 through 03/16/18)      Progress Notes by Lacey Bateman PT at 3/14/2018 11:55 AM     Author:  Lacey Bateman PT Service:  Acute IP Rehab Author Type:  Physical Therapist    Filed:  3/14/2018 11:24 PM Date of Service:  3/14/2018 11:55 AM Creation Time:  3/14/2018 11:24 PM    Status:  Signed :  Lacey Bateman PT (Physical Therapist)            03/14/18 1130   Quick Adds   Type of Visit Initial PT Evaluation   Living Environment   Lives With spouse;child(kirsten), adult   Living Arrangements assisted living   Home Accessibility no concerns   Living Environment Comment sleeps in lift chair recliner   Self-Care   Usual Activity Tolerance moderate   Current Activity Tolerance fair   Regular Exercise no   Functional Level Prior   Ambulation 3-->assistive equipment and person   Transferring 3-->assistive equipment and person   Toileting 3-->assistive equipment and person   Bathing 3-->assistive equipment and person   Dressing 3-->assistive equipment and person   Eating 0-->independent   Fall history within last six months yes   Number of times  patient has fallen within last six months 10   Which of the above functional risks had a recent onset or change? ambulation;transferring   Prior Functional Level Comment came from shelter where he had help with some LE dressing, A with transfers in AM, pt admits to [facility] short diatnaces with WW at times   General Information   Onset of Illness/Injury or Date of Surgery - Date 03/13/18   Referring Physician Maureen   Patient/Family Goals Statement return to shelter   Pertinent History of Current Problem (include personal factors and/or comorbidities that impact the POC) Pt with progressive right leg weakness, myelopathy, and inability to walk, becoming wheelchair bound.  Imaging demonstrated epidural lipomatosis in the thoracic spine causeing severe cord compression and cord signal change. Subsequently underwent T5-9 laminectomies and resection of epidural lipomatosis     Precautions/Limitations fall precautions   Cognitive Status Examination   Orientation orientation to person, place and time   Level of Consciousness alert   Follows Commands and Answers Questions 100% of the time   Personal Safety and Judgment intact   Memory intact   Range of Motion (ROM)   ROM Comment LE ROM is WFL; dec L shoulder ROM d/t pain   Bed Mobility   Bed Mobility Comments mod A x 2   Transfer Skills   Transfer Comments mod A x 1-2 w/ WW   Gait   Gait Comments min A w/ WW small steps with limited foot clearance   Balance   Balance Comments impaired in standng and with walking,    General Therapy Interventions   Planned Therapy Interventions bed mobility training;strengthening;transfer training;gait training   Clinical Impression   Criteria for Skilled Therapeutic Intervention yes, treatment indicated   PT Diagnosis difficulty walking   Influenced by the following impairments impaired gait, pain, dec indep w/ transfers   Functional limitations due to impairments impaired mobility   Clinical Presentation Evolving/Changing   Clinical Presentation  "Rationale clinical observation   Clinical Decision Making (Complexity) Moderate complexity   Therapy Frequency` daily   Predicted Duration of Therapy Intervention (days/wks) 3 days   Anticipated Discharge Disposition Home with Assist;Home with Home Therapy   Risk & Benefits of therapy have been explained Yes   Patient, Family & other staff in agreement with plan of care Yes   [facility] TM \"6 Clicks\"   2016, Trustees of [facility], under license to [employer].  All rights reserved.   6 Clicks Short Forms Basic Mobility Inpatient Short Form   [facility]  \"6 Clicks\" V.2 Basic Mobility Inpatient Short Form   1. Turning from your back to your side while in a flat bed without using bedrails? 2 - A Lot   2. Moving from lying on your back to sitting on the side of a flat bed without using bedrails? 2 - A Lot   3. Moving to and from a bed to a chair (including a wheelchair)? 2 - A Lot   4. Standing up from a chair using your arms (e.g., wheelchair, or bedside chair)? 2 - A Lot   5. To walk in hospital room? 2 - A Lot   6. Climbing 3-5 steps with a railing? 1 - Total   Basic Mobility Raw Score (Score out of 24.Lower scores equate to lower levels of function) 11   Total Evaluation Time   Total Evaluation Time (Minutes) 15[LC1.1]        Revision History        User Key Date/Time User Provider Type Action    > LC1.1 3/14/2018 11:24 PM Lacey Bateman, PT Physical Therapist Sign            Progress Notes by Mickey Luther RT at 3/14/2018  9:56 PM     Author:  Mickey Luther RT Service:  Respiratory Therapy Author Type:  Respiratory Therapist    Filed:  3/14/2018  9:56 PM Date of Service:  3/14/2018  9:56 PM Creation Time:  3/14/2018  9:56 PM    Status:  Signed :  Mickey Luther RT (Respiratory Therapist)         Pt refused CPAP for the night.   3/14/2018  Mickey Luther[GT1.1]       Revision History        User Key Date/Time User Provider Type Action    > GT1.1 3/14/2018  9:56 " PM Mickey Luther, RT Respiratory Therapist Sign            Progress Notes by Jennifer Nguyen at 3/13/2018 10:54 AM     Author:  Jennifer Nguyen Service:  (none) Author Type:  (none)    Filed:  3/13/2018 10:55 AM Date of Service:  3/13/2018 10:54 AM Creation Time:  3/13/2018 10:54 AM    Status:  Signed :  Jennifer Nguyen         Admission medication history interview status for the 3/13/2018  admission is complete. See EPIC admission navigator for prior to admission medications     Medication history source reliability:Good    Medication history interview source(s):Patient, Family and Caregiver    Medication history resources (including written lists, pill bottles, clinic record):Spoke with Tanja from [facility], she verified medications given this morning    Primary pharmacy.[facility]    Additional medication history information not noted on PTA med list :None    Time spent in this activity: 60 minutes    Prior to Admission medications    Medication Sig Last Dose Taking? Auth Provider   sitagliptin-metFORMIN (JANUMET)  MG per tablet Take 2 tablets by mouth daily 3/12/2018 at AM Yes Reported, Patient   IBUPROFEN PO Take 800 mg by mouth every 8 hours as needed for moderate pain 3/6/2018 at PRN Yes Reported, Patient   metoprolol tartrate (LOPRESSOR) 25 MG tablet Take 1 tablet (25 mg) by mouth 2 times daily 3/13/2018 at 0730 Yes Julio Cesar Esteban PA-C   pantoprazole (PROTONIX) 40 MG EC tablet Take 1 tablet (40 mg) by mouth daily Take 30-60 minutes before a meal. 3/13/2018 at 0730 Yes Julio Cesar Esteban PA-C   DULoxetine (CYMBALTA) 30 MG EC capsule Take 1 capsule (30 mg) by mouth 2 times daily 3/13/2018 at 0730 Yes Julio Cesar Esteban PA-C   Wound Dressings (TRIAD HYDROPHILIC WOUND DRESSI) PSTE Externally apply 1 g topically daily 3/12/2018 at Unknown time Yes Casey Shine MD   losartan (COZAAR) 25 MG tablet Take 1 tablet (25 mg) by mouth daily 3/12/2018 at AM Yes Julio Cesar Esteban PA-C    rosuvastatin (CRESTOR) 10 MG tablet Take 1 tablet (10 mg) by mouth daily 3/13/2018 at 0730 Yes Julio Cesar Esteban PA-C   furosemide (LASIX) 40 MG tablet Take 1 tablet (40 mg) by mouth daily 3/13/2018 at 0730 Yes Julio Cesar Esteban PA-C   lidocaine (XYLOCAINE) 5 % ointment Apply topically daily 3/12/2018 at AM Yes Julio Cesar Esteban PA-C   ferrous sulfate (IRON) 325 (65 FE) MG tablet Take 1 tablet (325 mg) by mouth daily (with breakfast) 3/13/2018 at 0730 Yes Hugo Francisco MD   Blood Glucose Monitoring Suppl (GLUCOCARD EXPRESSION MONITOR) W/DEVICE KIT USE TWICE DAILY TO TEST BLOOD GLUCOSE   Reported, Patient   senna-docusate (SENOKOT-S;PERICOLACE) 8.6-50 MG per tablet Take 1 tablet by mouth 2 times daily as needed for constipation Never Needed at PRN  Marc Mancera MD   clopidogrel (PLAVIX) 75 MG tablet Take 1 tablet (75 mg) by mouth daily 3/8/2018 at AM  Julio Cesar Esteban PA-C   order for DME One touch glucose monitoring strip with Glucometer and lancets.   Hugo Francisco MD[AH1.1]            Revision History        User Key Date/Time User Provider Type Action    > AH1.1 3/13/2018 10:55 AM Jennifer Nguyen (none) Sign

## 2018-03-13 NOTE — IP AVS SNAPSHOT
` ` Patient Information     Patient Name Sex     Glenroy Padilla ([id number]) Male 1950       Room Bed    [facility] 0713-01      Patient Demographics     Address Phone E-mail Address    [address] [place] [address] [phone] (Home) *Preferred*  [phone] (Work) [email or web address]      Patient Ethnicity & Race     Ethnic Group Patient Race    American White      Emergency Contact(s)     Name Relation Home Work Mobile    Radha Padilla Significant other [phone]    Huyen Macario  Daughter [phone]      Documents on File        Status Date Received Description       Documents for the Patient    Affiliate Privacy placeholder   phase3    Consent for EHR Access Received 17     Insurance Card Received () 17 Medicare- part A    External Medication Information Consent Accepted 17     Patient ID Received () 17     [facility] Specified Other       Consent for Services/Privacy Notice - Hospital/Clinic Received 17     Privacy Notice - [place] Received 17     Insurance Card Received 17 medicare-part A    Consent to Communicate  17 AUTHORIZATION TO DISCUSS PROTECTED HEALTH INFORMATION    Consent to Communicate  17 AUTHORIZATION TO DISCUSS PROTECTED HEALTH INFORMATION - 17    HIM ALEXANDRA Authorization - File Only  17 [facility], 2017    Physical Therapy Certification Received 10/16/17 10/6/2017 to 1/3/2018    Business/Insurance/Care Coordination/Health Form - Patient  17 [facility] HUMAN [facility] WRITTEN REQUEST FOR RECORDS, 10/4/2017    HIM ALEXANDRA Authorization - File Only  17 [facility]    Care Everywhere Prospective Auth Received 17     HIM ALEXANDRA Authorization  17 [facility]    Consent to Communicate  18 AUTHORIZATION TO DISCUSS PROTECTED  HEALTH INFORMATION 18    Insurance Card Received 18 [place]    Consent to Communicate   02/22/18 AUTHORIZATION TO DISCUSS PROTECTED  HEALTH INFORMATION 1/28/18    Business/Insurance/Care Coordination/Health Form - Patient  03/13/18 AUTHORIZATON FOR HEARING EVAL 2/26/18 ON-SITE HEARING    Insurance Card Received (Deleted) 05/17/17 [facility]       Documents for the Encounter    CMS IM for Patient Signature Received 03/14/18       Admission Information     Attending Provider Admitting Provider Admission Type Admission Date/Time    Hugh Mendieta MD Lall, Rohan Rajiv, MD Elective 03/13/18  1014    Discharge Date Hospital Service Auth/Cert Status Service Area     Surgery Incomplete [facility]    Unit Room/Bed Admission Status       [facility] 0713/0713-01 Admission (Confirmed)       Admission     Complaint    THORACIC EPIDURAL LIPOMATOSIS, S/P spinal surgery      Hospital Account     Name Acct ID Class Status Primary Coverage    Glenroy Padilla [id number] Inpatient Open MEDICARE - MEDICARE PT A ONLY            Guarantor Account (for Hospital Account #[id number])     Name Relation to Pt Service Area Active? Acct Type    Glenroy Padilla  FCS Yes Personal/Family    Address Phone          [address], [place] [id number] [phone](H)  [phone](O)              Coverage Information (for Hospital Account #[id number])     1. MEDICARE/MEDICARE PT A ONLY     F/O Payor/Plan Precert #    MEDICARE/MEDICARE PT A ONLY     Subscriber Subscriber Glenroy Flaherty [id number]    Address Phone    ATTN CLAIMS  [address]  [place] [address] [phone]          2. [place]     [place]    [place]     Subscriber Subscriber #    Glenroy Padilla [id number]    Address Phone    [address] [address]  [place] [address] [phone]

## 2018-03-14 ENCOUNTER — TELEPHONE (OUTPATIENT)
Dept: FAMILY MEDICINE | Facility: OTHER | Age: 68
End: 2018-03-14

## 2018-03-14 ENCOUNTER — APPOINTMENT (OUTPATIENT)
Dept: PHYSICAL THERAPY | Facility: CLINIC | Age: 68
DRG: 029 | End: 2018-03-14
Attending: NEUROLOGICAL SURGERY
Payer: MEDICARE

## 2018-03-14 LAB
ANION GAP SERPL CALCULATED.3IONS-SCNC: 6 MMOL/L (ref 3–14)
BASOPHILS # BLD AUTO: 0 10E9/L (ref 0–0.2)
BASOPHILS NFR BLD AUTO: 0.3 %
BUN SERPL-MCNC: 21 MG/DL (ref 7–30)
CALCIUM SERPL-MCNC: 7.7 MG/DL (ref 8.5–10.1)
CHLORIDE SERPL-SCNC: 104 MMOL/L (ref 94–109)
CO2 BLD-SCNC: 29 MMOL/L (ref 21–28)
CO2 SERPL-SCNC: 29 MMOL/L (ref 20–32)
CREAT SERPL-MCNC: 0.9 MG/DL (ref 0.66–1.25)
DIFFERENTIAL METHOD BLD: ABNORMAL
EOSINOPHIL # BLD AUTO: 0.2 10E9/L (ref 0–0.7)
EOSINOPHIL NFR BLD AUTO: 2.3 %
ERYTHROCYTE [DISTWIDTH] IN BLOOD BY AUTOMATED COUNT: 16.4 % (ref 10–15)
GFR SERPL CREATININE-BSD FRML MDRD: 84 ML/MIN/1.7M2
GLUCOSE BLDC GLUCOMTR-MCNC: 153 MG/DL (ref 70–99)
GLUCOSE BLDC GLUCOMTR-MCNC: 154 MG/DL (ref 70–99)
GLUCOSE BLDC GLUCOMTR-MCNC: 158 MG/DL (ref 70–99)
GLUCOSE BLDC GLUCOMTR-MCNC: 182 MG/DL (ref 70–99)
GLUCOSE BLDC GLUCOMTR-MCNC: 210 MG/DL (ref 70–99)
GLUCOSE SERPL-MCNC: 167 MG/DL (ref 70–99)
HCT VFR BLD AUTO: 25 % (ref 40–53)
HCT VFR BLD CALC: 27 %PCV (ref 40–53)
HGB BLD CALC-MCNC: 9.2 G/DL (ref 13.3–17.7)
HGB BLD-MCNC: 7.8 G/DL (ref 13.3–17.7)
IMM GRANULOCYTES # BLD: 0 10E9/L (ref 0–0.4)
IMM GRANULOCYTES NFR BLD: 0.3 %
LYMPHOCYTES # BLD AUTO: 0.7 10E9/L (ref 0.8–5.3)
LYMPHOCYTES NFR BLD AUTO: 7.7 %
MCH RBC QN AUTO: 27.6 PG (ref 26.5–33)
MCHC RBC AUTO-ENTMCNC: 31.2 G/DL (ref 31.5–36.5)
MCV RBC AUTO: 88 FL (ref 78–100)
MONOCYTES # BLD AUTO: 0.8 10E9/L (ref 0–1.3)
MONOCYTES NFR BLD AUTO: 9.4 %
NEUTROPHILS # BLD AUTO: 6.9 10E9/L (ref 1.6–8.3)
NEUTROPHILS NFR BLD AUTO: 80 %
NRBC # BLD AUTO: 0 10*3/UL
NRBC BLD AUTO-RTO: 0 /100
PCO2 BLD: 43 MM HG (ref 35–45)
PH BLD: 7.44 PH (ref 7.35–7.45)
PLATELET # BLD AUTO: 222 10E9/L (ref 150–450)
PO2 BLD: 220 MM HG (ref 80–105)
POTASSIUM BLD-SCNC: 3.8 MMOL/L (ref 3.4–5.3)
POTASSIUM SERPL-SCNC: 4 MMOL/L (ref 3.4–5.3)
RBC # BLD AUTO: 2.83 10E12/L (ref 4.4–5.9)
SAO2 % BLDA FROM PO2: 100 % (ref 92–100)
SODIUM BLD-SCNC: 139 MMOL/L (ref 133–144)
SODIUM SERPL-SCNC: 139 MMOL/L (ref 133–144)
WBC # BLD AUTO: 8.6 10E9/L (ref 4–11)

## 2018-03-14 PROCEDURE — 85025 COMPLETE CBC W/AUTO DIFF WBC: CPT | Performed by: PHYSICIAN ASSISTANT

## 2018-03-14 PROCEDURE — 36415 COLL VENOUS BLD VENIPUNCTURE: CPT | Performed by: PHYSICIAN ASSISTANT

## 2018-03-14 PROCEDURE — A9270 NON-COVERED ITEM OR SERVICE: HCPCS | Mod: GY | Performed by: PHYSICIAN ASSISTANT

## 2018-03-14 PROCEDURE — 25000128 H RX IP 250 OP 636: Performed by: PHYSICIAN ASSISTANT

## 2018-03-14 PROCEDURE — 25000132 ZZH RX MED GY IP 250 OP 250 PS 637: Mod: GY | Performed by: PHYSICIAN ASSISTANT

## 2018-03-14 PROCEDURE — G0463 HOSPITAL OUTPT CLINIC VISIT: HCPCS

## 2018-03-14 PROCEDURE — 00000146 ZZHCL STATISTIC GLUCOSE BY METER IP

## 2018-03-14 PROCEDURE — 40000193 ZZH STATISTIC PT WARD VISIT: Performed by: PHYSICAL THERAPIST

## 2018-03-14 PROCEDURE — 80048 BASIC METABOLIC PNL TOTAL CA: CPT | Performed by: PHYSICIAN ASSISTANT

## 2018-03-14 PROCEDURE — 97530 THERAPEUTIC ACTIVITIES: CPT | Mod: GP | Performed by: PHYSICAL THERAPIST

## 2018-03-14 PROCEDURE — 99232 SBSQ HOSP IP/OBS MODERATE 35: CPT | Performed by: HOSPITALIST

## 2018-03-14 PROCEDURE — 97116 GAIT TRAINING THERAPY: CPT | Mod: GP | Performed by: PHYSICAL THERAPIST

## 2018-03-14 PROCEDURE — 97162 PT EVAL MOD COMPLEX 30 MIN: CPT | Mod: GP | Performed by: PHYSICAL THERAPIST

## 2018-03-14 PROCEDURE — 12000000 ZZH R&B MED SURG/OB

## 2018-03-14 RX ORDER — OXYCODONE HYDROCHLORIDE 5 MG/1
5-10 TABLET ORAL EVERY 4 HOURS PRN
Qty: 70 TABLET | Refills: 0 | Status: SHIPPED | OUTPATIENT
Start: 2018-03-14 | End: 2018-04-04

## 2018-03-14 RX ORDER — FUROSEMIDE 40 MG
40 TABLET ORAL DAILY
Qty: 90 TABLET | Refills: 1 | Status: SHIPPED | OUTPATIENT
Start: 2018-03-14 | End: 2018-10-12

## 2018-03-14 RX ORDER — FERROUS SULFATE 325(65) MG
325 TABLET ORAL
Qty: 90 TABLET | Refills: 2 | Status: SHIPPED | OUTPATIENT
Start: 2018-03-14 | End: 2018-07-23

## 2018-03-14 RX ORDER — FERROUS SULFATE 325(65) MG
325 TABLET ORAL
Status: DISCONTINUED | OUTPATIENT
Start: 2018-03-15 | End: 2018-03-19 | Stop reason: HOSPADM

## 2018-03-14 RX ORDER — LOSARTAN POTASSIUM 25 MG/1
25 TABLET ORAL DAILY
Qty: 90 TABLET | Refills: 1 | Status: SHIPPED | OUTPATIENT
Start: 2018-03-14 | End: 2018-08-22

## 2018-03-14 RX ORDER — ROSUVASTATIN CALCIUM 10 MG/1
10 TABLET, COATED ORAL DAILY
Qty: 90 TABLET | Refills: 1 | Status: SHIPPED | OUTPATIENT
Start: 2018-03-14 | End: 2018-04-30

## 2018-03-14 RX ORDER — METHOCARBAMOL 500 MG/1
500 TABLET, FILM COATED ORAL 4 TIMES DAILY PRN
Qty: 70 TABLET | Refills: 1 | Status: SHIPPED | OUTPATIENT
Start: 2018-03-14 | End: 2019-03-25

## 2018-03-14 RX ORDER — ROSUVASTATIN CALCIUM 10 MG/1
10 TABLET, COATED ORAL DAILY
Status: DISCONTINUED | OUTPATIENT
Start: 2018-03-14 | End: 2018-03-14

## 2018-03-14 RX ADMIN — LIDOCAINE: 50 OINTMENT TOPICAL at 08:36

## 2018-03-14 RX ADMIN — OXYCODONE HYDROCHLORIDE 5 MG: 5 TABLET ORAL at 18:41

## 2018-03-14 RX ADMIN — FERROUS SULFATE TAB 325 MG (65 MG ELEMENTAL FE) 325 MG: 325 (65 FE) TAB at 08:37

## 2018-03-14 RX ADMIN — DULOXETINE 30 MG: 30 CAPSULE, DELAYED RELEASE ORAL at 08:37

## 2018-03-14 RX ADMIN — CEFAZOLIN SODIUM 2 G: 2 INJECTION, SOLUTION INTRAVENOUS at 01:27

## 2018-03-14 RX ADMIN — ROSUVASTATIN CALCIUM 10 MG: 10 TABLET, FILM COATED ORAL at 08:37

## 2018-03-14 RX ADMIN — SENNOSIDES AND DOCUSATE SODIUM 2 TABLET: 8.6; 5 TABLET ORAL at 21:25

## 2018-03-14 RX ADMIN — ACETAMINOPHEN 975 MG: 325 TABLET, FILM COATED ORAL at 21:25

## 2018-03-14 RX ADMIN — OXYCODONE HYDROCHLORIDE 5 MG: 5 TABLET ORAL at 08:36

## 2018-03-14 RX ADMIN — SENNOSIDES AND DOCUSATE SODIUM 2 TABLET: 8.6; 5 TABLET ORAL at 08:37

## 2018-03-14 RX ADMIN — PANTOPRAZOLE SODIUM 40 MG: 40 TABLET, DELAYED RELEASE ORAL at 08:38

## 2018-03-14 RX ADMIN — ACETAMINOPHEN 975 MG: 325 TABLET, FILM COATED ORAL at 13:41

## 2018-03-14 RX ADMIN — OXYCODONE HYDROCHLORIDE 5 MG: 5 TABLET ORAL at 12:53

## 2018-03-14 RX ADMIN — ACETAMINOPHEN 975 MG: 325 TABLET, FILM COATED ORAL at 05:56

## 2018-03-14 RX ADMIN — DULOXETINE 30 MG: 30 CAPSULE, DELAYED RELEASE ORAL at 21:25

## 2018-03-14 RX ADMIN — METOPROLOL TARTRATE 25 MG: 25 TABLET ORAL at 21:36

## 2018-03-14 RX ADMIN — CEFAZOLIN SODIUM 2 G: 2 INJECTION, SOLUTION INTRAVENOUS at 08:36

## 2018-03-14 RX ADMIN — SODIUM CHLORIDE: 9 INJECTION, SOLUTION INTRAVENOUS at 07:49

## 2018-03-14 ASSESSMENT — ACTIVITIES OF DAILY LIVING (ADL)
ADLS_ACUITY_SCORE: 21

## 2018-03-14 NOTE — CONSULTS
[facility]    Hospitalist Consultation    Date of Admission:  3/13/2018    Assessment & Plan   Glenroy Padilla is a 67 year old  male with a history of CAD s/p x1 stent, HTN, DAYTON, dyslipidemia, and chronic BLE wounds who was admitted on 3/13/2018 for planned neurosurgery due to thoracic stenosis, epidural lipomatosis, and myelopathy. The patient is now POD #0 s/p T5-T9 laminectomies with resection of epidural lipomatosis using intraoperative neuro-monitoring and fluoroscopy.     Prior to admission, diabetes was well controlled on Janument, with last HbA1c 5.6% in January 2018. He takes Plavix at home for his cardiac stent, placed x2 years ago and metoprolol and losaratan for his HTN. He reports he has been living at an assisted living facility and rehab since Nov 2017 given frequent falls and bilateral lower extremity weakness. He has been ambulating with a walker and wheelchair most recently. He has chronic stasis wounds and lymphedema on bilateral lower extremities in which he has been seeing general surgery. Noted MINESH from 6/2017 found borderline bilateral arterial insufficiency.     Today the patient feels well without any complaints other than mild pain and feeling tired. He denies headache, blurry vision, severe pain, nausea, or vomiting. No chest pain or dyspnea. I was asked to see the patient for medical co-management.    # Status post T5-T9 laminectomies & resection of epidural lipomatosis, POD #0. EBL unclear, 400 ml vs 1200 ml. Gen anesthesia. Drop in H/H from 11.6 to 8.7 and Hct 37.8 to 27.8. Patient using walker pta due to worsening weakness. Pain controlled, 4/10 with movement.   - Primary Neurosurgery  - Pain control  - Hold Lasix given drop in H/H  - Continue IV fluids  - CLD, advance per neurosurgery  - Recheck CBC and BMP in AM    # Anemia: H/H postop drop as listed above. Takes ferrous sulfate at home daily. Baseline Hgb 10.6.  - Monitor H/H   - No current need for blood  transfusion  - Resume home ferrous sulfate dose    #  HTN  - Hold home losartan given SBP 90s-110s and drop in H/H, monitor to possibly resume tomorrow  - Continue Metoprolol Tartrate 25 mg BID with hold parameters    # CAD s/p coronary stent x1 2016: Takes Plavix at home s/p stent.   - Plavix on hold x2 weeks per neurosurgery note  - Hold Home Lasix tonight given no s/s of fluid overload on physical exam and drop in H/H, reasses to possibly resume tomorrow    # Chronic LE statis with lymphedema: Follows with general surgeon Dr. Casey Shine. Pt was wearing UNNA boot prior to this admission. He reports this wounds look much better. No hx of DVT or pain.   - Wound consult   - Home topical ointment to be brought in by wife tomorrow  - Per outpatient gen surg note: may resume UNNA boot in post op period or after rehab, plan to reassess prior to discharge    # History of DM: Well controlled on Janumet. Last HbA1c 1/2018: 5.6%. Unclear weather patient was given steroids intraoperatively. FSBG ranging 132-194 since admission.   - Hold home Janumet  - SSI- Medium dose  - Monitor FSBG, goal <160 mg/dl    # GERD: Well controlled on home meds per patient.  - Continue home PPI    # DAYTON: Pt reports he used to use CPAP in the past but has not needed it over the past x1 year. Tolerating 2 L NC postop.  - CPAP as needed    # Dyslipidemia  - Resume home statin    # Frequent falls/Bilateral lower extremity weakness: Ambulates with a walker and wheelchair at assisted living facility. Lower extremity weakness with unclear etiology, possibly related to BLE chronic skin wounds.   - PT/OT  - Fall precautions    DVT Prophylaxis: Pneumatic Compression Devices  Code Status: Full Code    Disposition: Per primary team, neurosurgery.    Reason for Consult  Medical co management.    Primary Care Physician   Julio Cesar Sahu PA-C    History of Present Illness   Glenroy Padilla is a 67 year old male who presents with     Past Medical History     DAYTON, previously on CPAP but no use x1 yr  CAD  HTN  GERD  Obesity  Dyslipidemia  Lymphedema  NIDDM, HbA1c 5.6% 1/2018  Frequent falls  B/l lower extremity weakness  Anemia, baseline Hgb 10.6     Past Surgical History   Coronary Stent x1 2016    Prior to Admission Medications   Prior to Admission Medications   Prescriptions Last Dose Informant Patient Reported? Taking?   Blood Glucose Monitoring Suppl (GLUCOCARD EXPRESSION MONITOR) W/DEVICE KIT   Yes No   Sig: USE TWICE DAILY TO TEST BLOOD GLUCOSE   DULoxetine (CYMBALTA) 30 MG EC capsule 3/13/2018 at 0730  Yes Yes   Sig: Take 1 capsule (30 mg) by mouth 2 times daily   IBUPROFEN PO 3/6/2018 at PRN  Yes Yes   Sig: Take 800 mg by mouth every 8 hours as needed for moderate pain   SitaGLIPtin-MetFORMIN HCl  MG TB24 3/12/2018 at am  Yes Yes   Sig: Take 2 tablets by mouth daily (with breakfast)    Wound Dressings (TRIAD HYDROPHILIC WOUND DRESSI) PSTE 3/12/2018 at Unknown time  No Yes   Sig: Externally apply 1 g topically daily   clopidogrel (PLAVIX) 75 MG tablet 3/8/2018 at AM  No No   Sig: Take 1 tablet (75 mg) by mouth daily   ferrous sulfate (IRON) 325 (65 FE) MG tablet 3/13/2018 at 0730  No Yes   Sig: Take 1 tablet (325 mg) by mouth daily (with breakfast)   furosemide (LASIX) 40 MG tablet 3/13/2018 at 0730  No Yes   Sig: Take 1 tablet (40 mg) by mouth daily   lidocaine (XYLOCAINE) 5 % ointment 3/12/2018 at AM  No Yes   Sig: Apply topically daily   losartan (COZAAR) 25 MG tablet 3/12/2018 at AM  No Yes   Sig: Take 1 tablet (25 mg) by mouth daily   metoprolol tartrate (LOPRESSOR) 25 MG tablet 3/13/2018 at 0730  No Yes   Sig: Take 1 tablet (25 mg) by mouth 2 times daily   order for DME   No No   Sig: One touch glucose monitoring strip with Glucometer and lancets.   pantoprazole (PROTONIX) 40 MG EC tablet 3/13/2018 at 0730  Yes Yes   Sig: Take 1 tablet (40 mg) by mouth daily Take 30-60 minutes before a meal.   rosuvastatin (CRESTOR) 10 MG tablet 3/13/2018 at 0730   No Yes   Sig: Take 1 tablet (10 mg) by mouth daily   senna-docusate (SENOKOT-S;PERICOLACE) 8.6-50 MG per tablet Never Needed at PRN  No No   Sig: Take 1 tablet by mouth 2 times daily as needed for constipation      Facility-Administered Medications: None     Allergies   Allergies   Allergen Reactions     No Known Allergies      Social History   Patient has resided at assisted living or rehab since Nov 2017. He occasionally smokes cigars every few months. He denies tobacco use, illicit drug use, or alcohol use.    Family History   Patient denies a history of medical problems in his immediate family.     Review of Systems   The 10 point Review of Systems is negative other than noted in the HPI or here.     Physical Exam   Temp: 97.5  F (36.4  C) Temp src: Oral BP: 93/47   Heart Rate: 75 Resp: 14 SpO2: 100 % O2 Device: Nasal cannula Oxygen Delivery: 2 LPM  Vital Signs with Ranges  Temp:  [96.6  F (35.9  C)-97.6  F (36.4  C)] 97.5  F (36.4  C)  Heart Rate:  [65-82] 75  Resp:  [12-18] 14  BP: ()/(47-76) 93/47  MAP:  [64 mmHg-80 mmHg] 64 mmHg  Arterial Line BP: ()/(47-57) 98/47  FiO2 (%):  [40 %] 40 %  SpO2:  [95 %-100 %] 100 %  0 lbs 0 oz    Constitutional: Tired postop but easily arousal. Alert, cooperative, no apparent distress.  HEENT: Extraocular movements intact. Moist mucous membranes without exudates, normal dentition.  Respiratory: Clear to auscultation bilaterally, no crackles or wheezing. Unable to appreciate RLL field given patient positioning.  Cardiovascular: Regular rate and rhythm, normal S1 and S2, and no murmur, gallops, or rubs auscultated.  GI: Soft, non-distended, non-tender, normoactive bowel sounds.  Lymph/Hematologic: No anterior cervical or supraclavicular adenopathy.  Skin: Bilateral lower extremities with chronic stasis changes, lichenification, and scaly hyperpigmentation extending from ankle to tibial tuberosity. x2 NISREEN drains in place at surgical site draining blood tinged liquid.  Posterior thorax dressing is clean, dry, and intact.  Musculoskeletal: Limited exam as pt is postop day #0.   Neurologic: Cranial nerves 2-12 grossly intact, normal strength and sensation.  Psychiatric: Alert, oriented to person, place and time, no obvious anxiety or depression.    Data   -Data reviewed today: All pertinent laboratory and imaging results from this encounter were reviewed.     Recent Labs  Lab 03/13/18  1730 03/13/18  1305 03/09/18  1020   WBC  --   --  10.5   HGB 8.7*  --  11.6*   MCV  --   --  90     --  317   NA  --   --  138   POTASSIUM  --  4.3 4.6   CHLORIDE  --   --  98   CO2  --   --  30   BUN  --   --  19   CR  --  0.95 0.91   ANIONGAP  --   --  10   PHAN  --   --  9.3   GLC  --  157* 179*   ALBUMIN  --   --  3.4   PROTTOTAL  --   --  8.5   BILITOTAL  --   --  0.4   ALKPHOS  --   --  73   ALT  --   --  19   AST  --   --  15       Imaging:  Recent Results (from the past 24 hour(s))   XR Cervical/Thoracic Epidural Inj Incl Imaging    Narrative    EXAM:  Thoracic Epidural Steroid Injection (Interlaminar)           3/13/2018 12:49 PM    HISTORY:  Epidermal lipomatosis from T5 to T9, pending thoracic  surgery, T5 fiducial marker placement requested.      PROCEDURE:  The procedure, indications, risks (including the risk of  infection, bleeding and reaction to fiducial marker material and  medications and possibility of marker migration prior to surgical  exploration), and alternatives to therapy were discussed with the  patient and informed consent was obtained for the procedure.  The  thoracic back was prepped and draped in the usual fashion.  1.5 mL of  Lidocaine 1% was used for local anesthesia. Using fluoroscopic  guidance, a  22-gauge Gold anchor needle was advanced to the left  lamina at the T5 level. The fiducial marker was then expelled through  the needle tip under fluoroscopy. The needle was removed.  The patient  tolerated the procedure well and there were no  immediate  complications.        Fluoroscopy time: 1.3 minutes.  Medications used: 1mL Local Lidocaine 1%.       Impression    IMPRESSION:  Technically successful thoracic fiducial marker placement  at  T5. Dr. Ramirez was present for assistance in localizing target and  confirming location of marker.    ZI CARTAGENA PA-C   XR Surgery MORE L/T 5 Min Fluoro w Stills    Narrative    SURGERY C-ARM FLUOROSCOPY LESS THAN FIVE MINUTES WITH STILLS 3/13/2018  5:40 PM     COMPARISON: Fluoroscopic image from earlier the same day.    HISTORY: T5/T6 laminectomy;     NUMBER OF IMAGES ACQUIRED: 2    VIEWS: 1    FLUOROSCOPY TIME: .3 minutes.      Impression    IMPRESSION: Intraoperative images demonstrated a radiopaque marker  that is presumed to be at the T5 level which was dictated on previous  fluoroscopic exam.    MD Apolonia RICKS PA-C

## 2018-03-14 NOTE — PROGRESS NOTES
CM    I:  FRANK met with patient to obtain name of residential facility where he resides. Patient and spouse, state Radha patient lives at [facility] in [place]  ([address], [place] [address]  [phone]).    Patient reports receiving toileting and transferring assistance, as well as help with putting on his shoes and stockings.  Patient is aware residential must be able to accept him back with the increase in ADL assistance that is being recommended.  Per spouse, if residential is unable meet patients needs, they may consider [facility] in [place].  SW did update patient and spouse that Medicare requires a 3 night inpatient stay.  Patient was made Inpatient on 3/13, thus would need to remain hospitalized until 3/16 for [facility] coverage.  Both parties appeared to understand this.  Patient appeared confident residential should be able to meet his needs.     P:  Continue to assist with discharge planning as needed.    SARAH Sanderson

## 2018-03-14 NOTE — ANESTHESIA POSTPROCEDURE EVALUATION
Patient: Glenroy Padilla    Procedure(s):  T5-9 LAMINECTOMIES, RESECTION OF EPIDURAL LIPOMATOSIS - Wound Class: I-Clean    Diagnosis:THORACIC EPIDURAL LIPOMATOSIS  Diagnosis Additional Information: No value filed.    Anesthesia Type:  General    Note:  Anesthesia Post Evaluation    Patient location during evaluation: PACU  Patient participation: Able to fully participate in evaluation  Level of consciousness: awake and alert  Pain management: adequate  Airway patency: patent  Cardiovascular status: acceptable  Respiratory status: acceptable  Hydration status: acceptable  PONV: none     Anesthetic complications: None          Last vitals:  Vitals:    03/13/18 1910 03/13/18 1920 03/13/18 1930   BP: 103/54 109/53 112/54   Resp: 16 15 18   Temp:   36.1  C (97  F)   SpO2: 100% 99% 99%         Electronically Signed By: Glenroy Arias MD  March 13, 2018  8:26 PM

## 2018-03-14 NOTE — TELEPHONE ENCOUNTER
Reason for Call:  Form, our goal is to have forms completed with 72 hours, however, some forms may require a visit or additional information.    Type of letter, form or note:  medical    Who is the form from?: [facility] (if other please explain)    Where did the form come from: form was faxed in    What clinic location was the form placed at?: [facility] - [phone]    Where the form was placed: 's Box    What number is listed as a contact on the form?: [phone]       Additional comments: none     Call taken on 3/14/2018 at 9:38 AM by Claire Hinojosa

## 2018-03-14 NOTE — PLAN OF CARE
Problem: Laminectomy/Foraminotomy/Discectomy (Adult)  Goal: Signs and Symptoms of Listed Potential Problems Will be Absent, Minimized or Managed (Laminectomy/Foraminotomy/Discectomy)  Signs and symptoms of listed potential problems will be absent, minimized or managed by discharge/transition of care (reference Laminectomy/Foraminotomy/Discectomy (Adult) CPG).   Outcome: Improving  Pt A/Ox4. VSS. Up 2 assist w/ walker. Reports pain 5/10 using oxycodone with relief. Regular diet tolerated,  and 210. CMS intact except baseline numbness in bilateral LE and tingling with improvement to bilateral UE. Wound nurse changed dressing to LLE. Voiding adequately.

## 2018-03-14 NOTE — TELEPHONE ENCOUNTER
rosuvastatin (CRESTOR) tablet 10 mg  Prescription approved per [facility] Refill Protocol.    pantoprazole (PROTONIX) EC tablet 40 mg  Routing refill request to provider for review/approval because:  A break in medication, given 1 tabled 01/25/2018    losartan (COZAAR) 25 MG tablet  Prescription approved per [facility] Refill Protocol.    furosemide (LASIX) 40 MG tablet  Prescription approved per [facility] Refill Protocol.    ferrous sulfate (IRON) tablet 325 mg  Prescription approved per [facility] Refill Protocol.    DULoxetine (CYMBALTA) EC capsule 30 mg  Routing refill request to provider for review/approval because:  A break in medication, given 1 tabled 01/25/2018  BP Readings from Last 3 Encounters:   03/14/18 94/45   03/09/18 122/76   01/30/18 152/75       clopidogrel (PLAVIX) 75 MG tablet (Discontinued)  Routing refill request to provider for review/approval because:  Drug not active on patient's medication list, discontinued at discharge    Hermelinda Coronado RN, BSN

## 2018-03-14 NOTE — PROGRESS NOTES
[facility] Nurse Inpatient Wound Assessment     Initial Assessment of wound(s) on pt's:   LLE        Data:   Patient History:      per MD note(s): Glenroy Padilla is a 67 year old  male with a history of CAD s/p x1 stent, HTN, DAYTON, dyslipidemia, and chronic BLE wounds who was admitted on 3/13/2018 for planned neurosurgery due to thoracic stenosis, epidural lipomatosis, and myelopathy. The patient is now POD #0 s/p T5-T9 laminectomies with resection of epidural lipomatosis using intraoperative neuro-monitoring and fluoroscopy.        Ric Assessment and sub scores:   Ric Score  Avg: 15.3  Min: 14  Max: 18    Positioning: Pillows    Mattress:  Standard , Atmos Air mattress       Moisture Management:  Urinary Catheter    Catheter secured? Yes      Current Diet / Nutrition:           Active Diet Order      Advance Diet as Tolerated: Full Liquid Diet      Advance Diet as Tolerated     Labs:   Recent Labs   Lab Test  03/14/18   0810   03/09/18   1020   01/27/18   1825   05/22/17   1029   ALBUMIN   --    --   3.4   --    --    < >  3.4   HGB  7.8*   < >  11.6*   < >  11.2*   < >  11.9*   WBC  8.6   --   10.5   --   11.3*   < >  8.7   A1C   --    --    --    --   5.6   < >  6.4*   CRP   --    --    --    --    --    --   21.5*    < > = values in this interval not displayed.       Wound Assessment (location):   Left shin and left dorsal ankle  Wound History:  Pt states shin wound is from about a year ago when he banged his leg in [place], has done various treatments, was in Unna boots but these are done, he saw his wound doctor at [facility] who wants him to use Triad paste and light compression.  Ankle wound more recent and related to pressure/rubbing from prior Unna boot.    Wound Base:   1.  Left anterior shin:  1.5 x 1.5 x +0.1cm, red moist smooth tissue, slightly raised and hypertrophic.  Small dark creamy drainage.  Proximal tiny red dry area where previous additional wound.   Indentation of several cm noted to periwound, where wound used to be a lot bigger and down to bone, per pt.    2.  Left dorsal ankle:  1.5 x 1.5 x 0.2cm, yellow-pink moist tissue, scant slough, scant bleeding with cleansing, small serosang and creamy drainage.      Periwound Skin: clear and intact, leg pink in general, both legs scaly and dry and scattered erythema and old dry scabs at knees from falls.   Mild edema.      Color: pink    Temperature  normal     Pain:  denies          Intervention:     Patient's chart evaluated.      Wound(s) assessed    Wound Care: cleansed and moisturized legs and feet, wounds cleansed and dressed with Triad paste and Mepilex, Tubigrip size F applied in double layer bilaterally, heels floated    Orders  Written    Supplies  at bedside    Discussed plan of care with Patient, nurse, Dr. Thurston who was in room          Assessment:       LLE with small old trauma wound that appears healing well, no s/s infection, tissue slightly hypertrophic.  Ankle has small shallow wound related to rubbing from previous wraps; no s/s infection.  Pt just seen by his doctor for wound check yesterday, so will continue his recommended POC with Triad paste and Tubigrip, this appears to be appropriate.           Plan:     Nursing to notify the Provider(s) and re-consult the [facility] Nurse if wound(s) deteriorate(s) or if the wound care plan needs reevaluation.      Plan for skin care to BLE and wound care to LLE: every other day and prn:  1.  Cleanse legs and feet with mild cleanser, and apply Sween 24 cream to intact skin.   2.  Cleanse wounds with MicroKlenz, pat dry.  3.  Apply small glob of Triad paste to each wound.    4.  Cover with Mepilex or gauze and John.  If using Mepilex, may need to apply skin prep to periwound first to help dressing adhere.    5.  Label with time/date/initials.   6.  Apply Tubigrip stockings (size F) bilaterally, in double layer from base of toes to knee crease.  Open ends  should meet at the knee.  Ensure no bunching or wrinkles, and any excess length should be trimmed off or folded over on the foot (not at the knee).   Hand wash and hang dry prn.   7.  Elevate legs when possible and float heels at all times.     WOC Nurse will return: weekly and prn

## 2018-03-14 NOTE — PROGRESS NOTES
[facility]    Neurosurgery Progress Note    Date of Service (when I saw the patient): 03/14/2018     Assessment & Plan   Glenroy Padilla is a 67 year old male who was admitted on 3/13/2018 ith progressive right leg weakness, myelopathy, and inability to walk, becoming wheelchair bound.  Imaging demonstrated epidural lipomatosis in the thoracic spine causeing severe cord compression and cord signal change. Subsequently underwent T5-9 laminectomies and resection of epidural lipomatosis with Dr. Hugh Mendieta. Today, he is lying semi upright in bed and states he is surprised with how well he is feeling. Denies back or leg pain. States he has some mild shoulder pain due to a fall last weekend. He does continue to have proximal leg weakness R>L. He is able to raise left leg off bed. Good strength to distal LEs. Plan for wound consult today.    Active Problems:    S/P spinal surgery    Assessment: s/p T5-9 laminectomies    Plan:   -PT/OT and ambulation  -Encourage use of IS and deep breathing   -Keep drains in until less than 30 cc's   -Staple removal in 10-14 days  -May resume Plavix on 3/27/2018      I have discussed the following assessment and plan with Dr. Mendieta who is in agreement with initial plan and will follow up with further consultation recommendations.      Martha Reddy PA-C  [facility]  [place] [address]    Tel [phone]  Pager [phone]      Interval History   Doing well. Pain well controlled.     Physical Exam   Temp: 97.7  F (36.5  C) Temp src: Oral BP: 94/45   Heart Rate: 80 Resp: 18 SpO2: 94 % O2 Device: None (Room air) Oxygen Delivery: 1 LPM  There were no vitals filed for this visit.  Vital Signs with Ranges  Temp:  [96.6  F (35.9  C)-97.7  F (36.5  C)] 97.7  F (36.5  C)  Heart Rate:  [65-82] 80  Resp:  [12-18] 18  BP: ()/(45-76) 94/45  MAP:  [64 mmHg-80 mmHg] 64 mmHg  Arterial Line BP: ()/(47-57)  98/47  FiO2 (%):  [40 %] 40 %  SpO2:  [93 %-100 %] 94 %  I/O last 3 completed shifts:  In: 4279 [P.O.:300; I.V.:3229]  Out: 3330 [Urine:1800; Drains:330; Blood:1200]    Heart Rate: 80, Blood pressure 94/45, temperature 97.7  F (36.5  C), temperature source Oral, resp. rate 18, SpO2 94 %.  0 lbs 0 oz  HEENT:  Normocephalic, atraumatic.  PERRLA.  EOM s intact.    Heart:  No peripheral edema  Lungs:  No SOB  Skin:  Warm and dry. Incision covered with dressing with staples intact. NISREEN x2 in place.  Extremities:  Multiple chronic bruises and skin tears to all extremities.    NEUROLOGICAL EXAMINATION:   Mental status:  Alert and Oriented x 3, speech is fluent.  Cranial nerves:  II-XII intact.   Motor:  Able to raise left leg off of bed, unable to raise right leg off of bed, 5/5 plantar and dorsi flexion. Wiggles bilateral toes  Sensation:  Decreased to distal LEs  Reflexes:  Negative Babinski.  Negative Clonus.       Medications     sodium chloride 100 mL/hr at 03/14/18 0749     HYDROmorphone Stopped (03/14/18 0918)       DULoxetine  30 mg Oral BID     lidocaine   Topical Daily     metoprolol tartrate  25 mg Oral BID     pantoprazole  40 mg Oral Daily     rosuvastatin  10 mg Oral Daily     TRIAD HYDROPHILIC WOUND DRESSI  1 applicator Apply externally Daily     sodium chloride (PF)  3 mL Intracatheter Q8H     acetaminophen  975 mg Oral Q8H     senna-docusate  1 tablet Oral BID    Or     senna-docusate  2 tablet Oral BID     insulin aspart  1-7 Units Subcutaneous TID AC     insulin aspart  1-5 Units Subcutaneous At Bedtime     ferrous sulfate  325 mg Oral Daily       Data   CBC RESULTS:   Recent Labs   Lab Test  03/14/18   0810   WBC  8.6   RBC  2.83*   HGB  7.8*   HCT  25.0*   MCV  88   MCH  27.6   MCHC  31.2*   RDW  16.4*   PLT  222     Basic Metabolic Panel:  Lab Results   Component Value Date     03/14/2018      Lab Results   Component Value Date    POTASSIUM 4.0 03/14/2018     Lab Results   Component Value Date     CHLORIDE 104 03/14/2018     Lab Results   Component Value Date    PHAN 7.7 03/14/2018     Lab Results   Component Value Date    CO2 29 03/14/2018     Lab Results   Component Value Date    BUN 21 03/14/2018     Lab Results   Component Value Date    CR 0.90 03/14/2018     Lab Results   Component Value Date     03/14/2018     INR:  No results found for: INR

## 2018-03-14 NOTE — TELEPHONE ENCOUNTER
Reason for Call:  Form, our goal is to have forms completed with 72 hours, however, some forms may require a visit or additional information.    Type of letter, form or note:  medical    Who is the form from?: [facility] (if other please explain)    Where did the form come from: form was faxed in    What clinic location was the form placed at?: [facility] - [phone]    Where the form was placed: 's Box    What number is listed as a contact on the form?: [phone]       Additional comments: none     Call taken on 3/14/2018 at 4:24 PM by Claire Hinojosa

## 2018-03-14 NOTE — PROGRESS NOTES
[facility]    Hospitalist Progress Note    Assessment & Plan   Glenroy Padilla is a 67 year old male who was admitted on 3/13/2018 for elective T5-9 laminectomies with the neurosurgery team.    S/p T5-9 laminectomies  Epidural lipomatosis 3/13/2018  - postop care per neurosurgery: pain, disposition, dvt ppx  - PT/OT will be consulted  - drains per nsg  - staple removal in 10 days    Anemia:  - repeat hgb in the AM      HTN  - hold losartan and lasix in setting of relative hypotensions, pain medications, drain output, etc    CAD s/p LELO in 2016  - restart plavix on 3/27 per the nsg team   - advise increasing his crestor if he tolerates it    Chronic lymphedema  - start lasix as able  - no overt signs of infection    DM2  - well controlled on home regimen  - sliding scale while inpatient    Gerd  - PPI    DAYTON   - CPAP prn       # Pain Assessment:   Per primary team    DVT Prophylaxis: Defer to primary service  Code Status: Prior    Disposition: Expected discharge in 1-2 days once cleared by nsg.    Arnav Thurston, DO  Text Page  (7am to 6pm)  Interval History   Met the patient  Discussed his bp is low    -Data reviewed today: I reviewed all new labs and imaging results over the last 24 hours. I personally reviewed no images or EKG's today.    Physical Exam   Temp: 98.2  F (36.8  C) Temp src: Oral BP: 116/57   Heart Rate: 94 Resp: 18 SpO2: 91 % O2 Device: None (Room air) Oxygen Delivery: 1 LPM  There were no vitals filed for this visit.  Vital Signs with Ranges  Temp:  [96.6  F (35.9  C)-98.2  F (36.8  C)] 98.2  F (36.8  C)  Heart Rate:  [68-94] 94  Resp:  [12-18] 18  BP: ()/(45-76) 116/57  MAP:  [64 mmHg-80 mmHg] 64 mmHg  Arterial Line BP: ()/(47-57) 98/47  FiO2 (%):  [40 %] 40 %  SpO2:  [91 %-100 %] 91 %  I/O last 3 completed shifts:  In: 4399 [P.O.:420; I.V.:3229]  Out: 3800 [Urine:2200; Drains:400; Blood:1200]    Constitutional: Awake, alert, cooperative, no apparent  distress  Respiratory: Clear to auscultation bilaterally, no crackles or wheezing  Cardiovascular: Regular rate and rhythm, normal S1 and S2, and no murmur noted  GI: Normal bowel sounds, soft, non-distended, non-tender  Skin/Integumen: No rashes, no cyanosis, 2+ edema, no signs of active infection  Other:     Medications     sodium chloride 100 mL/hr at 03/14/18 0749     HYDROmorphone Stopped (03/14/18 0918)       [START ON 3/15/2018] ferrous sulfate  325 mg Oral Daily with breakfast     rosuvastatin  10 mg Oral Daily     DULoxetine  30 mg Oral BID     lidocaine   Topical Daily     metoprolol tartrate  25 mg Oral BID     pantoprazole  40 mg Oral Daily     rosuvastatin  10 mg Oral Daily     TRIAD HYDROPHILIC WOUND DRESSI  1 applicator Apply externally Daily     sodium chloride (PF)  3 mL Intracatheter Q8H     acetaminophen  975 mg Oral Q8H     senna-docusate  1 tablet Oral BID    Or     senna-docusate  2 tablet Oral BID     insulin aspart  1-7 Units Subcutaneous TID AC     insulin aspart  1-5 Units Subcutaneous At Bedtime     ferrous sulfate  325 mg Oral Daily       Data     Recent Labs  Lab 03/14/18  0810 03/13/18  1733 03/13/18  1730 03/13/18  1305 03/09/18  1020   WBC 8.6  --   --   --  10.5   HGB 7.8* 9.2* 8.7*  --  11.6*   MCV 88  --   --   --  90     --  298  --  317    139  --   --  138   POTASSIUM 4.0 3.8  --  4.3 4.6   CHLORIDE 104  --   --   --  98   CO2 29  --   --   --  30   BUN 21  --   --   --  19   CR 0.90  --   --  0.95 0.91   ANIONGAP 6  --   --   --  10   PHAN 7.7*  --   --   --  9.3   *  --   --  157* 179*   ALBUMIN  --   --   --   --  3.4   PROTTOTAL  --   --   --   --  8.5   BILITOTAL  --   --   --   --  0.4   ALKPHOS  --   --   --   --  73   ALT  --   --   --   --  19   AST  --   --   --   --  15       Imaging:   Recent Results (from the past 24 hour(s))   XR Surgery MORE L/T 5 Min Fluoro w Stills    Narrative    SURGERY C-ARM FLUOROSCOPY LESS THAN FIVE MINUTES WITH STILLS  3/13/2018  5:40 PM     COMPARISON: Fluoroscopic image from earlier the same day.    HISTORY: T5/T6 laminectomy;     NUMBER OF IMAGES ACQUIRED: 2    VIEWS: 1    FLUOROSCOPY TIME: .3 minutes.      Impression    IMPRESSION: Intraoperative images demonstrated a radiopaque marker  that is presumed to be at the T5 level which was dictated on previous  fluoroscopic exam.    RENUKA SULLIVAN MD

## 2018-03-14 NOTE — PLAN OF CARE
Problem: Patient Care Overview  Goal: Plan of Care/Patient Progress Review  PT:   Discharge Planner PT   Patient plan for discharge: [facility] to [facility] with increased assistance  Current status: Pt with progressive right leg weakness, myelopathy, and inability to walk, becoming wheelchair bound.  Imaging demonstrated epidural lipomatosis in the thoracic spine causeing severe cord compression and cord signal change. Subsequently underwent T5-9 laminectomies and resection of epidural lipomatosis  Previously living at [facility] receiving assistance daily for transfers and some ADLS, pt reports walking short distances in apartment, intermittently needing assistance depending on the day. Pt reports sleeping in lift chair, dependent on chair to assist to come to standing. Pt presently requires mod A x 2 for bed mobility and min to mod A 1-2 for sit to stand with WW, bed elevated to assist. Pt able to take 6-8 steps from EOB to WC with min A and WW, flexed posture, taking small short steps with difficulty. Pt limited by continued LE weakness R>L, pain, decreased L shoulder ROM and strength, impaired gait and decreased indep w/ transfers.   Barriers to return to prior living situation: None if facility is able to provide additional assistance  Recommendations for discharge: return to [facility] with increased assistance and home PT, if [facility] declines pt will need TCU  Rationale for recommendations: Pt is below baseline for all mobility, requiring increased assistance, would benefit from continue PT to improve strength and maximize return of independent function       Entered by: Lacey Bateman 03/14/2018 12:03 PM

## 2018-03-15 ENCOUNTER — APPOINTMENT (OUTPATIENT)
Dept: PHYSICAL THERAPY | Facility: CLINIC | Age: 68
DRG: 029 | End: 2018-03-15
Attending: NEUROLOGICAL SURGERY
Payer: MEDICARE

## 2018-03-15 LAB
GLUCOSE BLDC GLUCOMTR-MCNC: 165 MG/DL (ref 70–99)
GLUCOSE BLDC GLUCOMTR-MCNC: 170 MG/DL (ref 70–99)
GLUCOSE BLDC GLUCOMTR-MCNC: 183 MG/DL (ref 70–99)
GLUCOSE BLDC GLUCOMTR-MCNC: 185 MG/DL (ref 70–99)
GLUCOSE BLDC GLUCOMTR-MCNC: 231 MG/DL (ref 70–99)
HGB BLD-MCNC: 8.2 G/DL (ref 13.3–17.7)

## 2018-03-15 PROCEDURE — A9270 NON-COVERED ITEM OR SERVICE: HCPCS | Mod: GY | Performed by: PHYSICIAN ASSISTANT

## 2018-03-15 PROCEDURE — 97530 THERAPEUTIC ACTIVITIES: CPT | Mod: GP

## 2018-03-15 PROCEDURE — 99232 SBSQ HOSP IP/OBS MODERATE 35: CPT | Performed by: HOSPITALIST

## 2018-03-15 PROCEDURE — A9270 NON-COVERED ITEM OR SERVICE: HCPCS | Mod: GY | Performed by: HOSPITALIST

## 2018-03-15 PROCEDURE — 40000894 ZZH STATISTIC OT IP EVAL DEFER

## 2018-03-15 PROCEDURE — 00000146 ZZHCL STATISTIC GLUCOSE BY METER IP

## 2018-03-15 PROCEDURE — 25000132 ZZH RX MED GY IP 250 OP 250 PS 637: Mod: GY | Performed by: PHYSICIAN ASSISTANT

## 2018-03-15 PROCEDURE — 40000193 ZZH STATISTIC PT WARD VISIT

## 2018-03-15 PROCEDURE — 36415 COLL VENOUS BLD VENIPUNCTURE: CPT | Performed by: NEUROLOGICAL SURGERY

## 2018-03-15 PROCEDURE — 97116 GAIT TRAINING THERAPY: CPT | Mod: GP

## 2018-03-15 PROCEDURE — 12000000 ZZH R&B MED SURG/OB

## 2018-03-15 PROCEDURE — 85018 HEMOGLOBIN: CPT | Performed by: NEUROLOGICAL SURGERY

## 2018-03-15 PROCEDURE — 25000132 ZZH RX MED GY IP 250 OP 250 PS 637: Mod: GY | Performed by: HOSPITALIST

## 2018-03-15 RX ORDER — PANTOPRAZOLE SODIUM 40 MG/1
40 TABLET, DELAYED RELEASE ORAL DAILY
Qty: 1 TABLET | Refills: 0 | Status: SHIPPED | OUTPATIENT
Start: 2018-03-15 | End: 2018-03-28

## 2018-03-15 RX ORDER — FUROSEMIDE 40 MG
40 TABLET ORAL DAILY
Status: DISCONTINUED | OUTPATIENT
Start: 2018-03-15 | End: 2018-03-19 | Stop reason: HOSPADM

## 2018-03-15 RX ORDER — DULOXETIN HYDROCHLORIDE 30 MG/1
30 CAPSULE, DELAYED RELEASE ORAL 2 TIMES DAILY
Qty: 1 CAPSULE | Refills: 0 | Status: SHIPPED | OUTPATIENT
Start: 2018-03-15 | End: 2018-03-16

## 2018-03-15 RX ORDER — CLOPIDOGREL BISULFATE 75 MG/1
75 TABLET ORAL DAILY
Qty: 90 TABLET | Refills: 1 | Status: SHIPPED | OUTPATIENT
Start: 2018-03-15 | End: 2018-03-16

## 2018-03-15 RX ADMIN — ACETAMINOPHEN 975 MG: 325 TABLET, FILM COATED ORAL at 06:02

## 2018-03-15 RX ADMIN — ACETAMINOPHEN 975 MG: 325 TABLET, FILM COATED ORAL at 22:01

## 2018-03-15 RX ADMIN — OXYCODONE HYDROCHLORIDE 5 MG: 5 TABLET ORAL at 04:27

## 2018-03-15 RX ADMIN — OXYCODONE HYDROCHLORIDE 5 MG: 5 TABLET ORAL at 19:30

## 2018-03-15 RX ADMIN — DULOXETINE 30 MG: 30 CAPSULE, DELAYED RELEASE ORAL at 22:01

## 2018-03-15 RX ADMIN — ACETAMINOPHEN 975 MG: 325 TABLET, FILM COATED ORAL at 13:16

## 2018-03-15 RX ADMIN — METOPROLOL TARTRATE 25 MG: 25 TABLET ORAL at 22:01

## 2018-03-15 RX ADMIN — DULOXETINE 30 MG: 30 CAPSULE, DELAYED RELEASE ORAL at 08:32

## 2018-03-15 RX ADMIN — ROSUVASTATIN CALCIUM 10 MG: 10 TABLET, FILM COATED ORAL at 08:32

## 2018-03-15 RX ADMIN — METHOCARBAMOL 500 MG: 500 TABLET ORAL at 16:50

## 2018-03-15 RX ADMIN — SENNOSIDES AND DOCUSATE SODIUM 2 TABLET: 8.6; 5 TABLET ORAL at 08:32

## 2018-03-15 RX ADMIN — LIDOCAINE: 50 OINTMENT TOPICAL at 08:39

## 2018-03-15 RX ADMIN — METOPROLOL TARTRATE 25 MG: 25 TABLET ORAL at 08:32

## 2018-03-15 RX ADMIN — Medication 1 G: at 08:35

## 2018-03-15 RX ADMIN — OXYCODONE HYDROCHLORIDE 5 MG: 5 TABLET ORAL at 12:54

## 2018-03-15 RX ADMIN — FUROSEMIDE 40 MG: 40 TABLET ORAL at 13:17

## 2018-03-15 RX ADMIN — FERROUS SULFATE TAB 325 MG (65 MG ELEMENTAL FE) 325 MG: 325 (65 FE) TAB at 08:32

## 2018-03-15 RX ADMIN — PANTOPRAZOLE SODIUM 40 MG: 40 TABLET, DELAYED RELEASE ORAL at 08:32

## 2018-03-15 RX ADMIN — SENNOSIDES AND DOCUSATE SODIUM 2 TABLET: 8.6; 5 TABLET ORAL at 22:01

## 2018-03-15 ASSESSMENT — ACTIVITIES OF DAILY LIVING (ADL)
ADLS_ACUITY_SCORE: 21

## 2018-03-15 NOTE — TELEPHONE ENCOUNTER
There on Dr. Copeland does for cosignature please feel free to return to me for faxing.  Electronically signed:    Julio Cesar Sahu PA-C

## 2018-03-15 NOTE — PLAN OF CARE
Problem: Patient Care Overview  Goal: Plan of Care/Patient Progress Review  Outcome: Improving  A&O x 4. CMS intact. Bowel sounds active, passing flatus. VSS. Dressing on back marked no change. NISREEN #1 o/p 10cc and #2 35cc. Up with 2 with belt and walker. Regular diet. C/o back pain, decreased with oxycodone and tylenol. Voided 300cc at 1841, was not able to bladder scanned patient at this time because patient was up in chair and does not want to go to bed yet. Patient due to void. Bedtime blood sugar check 182. Plan discharge pending.

## 2018-03-15 NOTE — PLAN OF CARE
Problem: Patient Care Overview  Goal: Plan of Care/Patient Progress Review  PT:   Discharge Planner PT   Patient plan for discharge: Return to [facility] with increased assistance  Current status:  Pt presently requires min to mod A 1-2 for sit to stand with WW. Pt able to ambulate 50' x 2 with FWW and min assist with w/c follow. Pt limited by continued LE weakness R>L, pain, decreased L shoulder ROM and strength, impaired gait and decreased indep w/ transfers.   Barriers to return to prior living situation: None if facility is able to provide additional assistance  Recommendations for [facility] and [facility] PT, if [facility] declines pt will need TCU  Rationale for recommendations: Pt is below baseline for all mobility, requiring increased assistance, would benefit from continue PT to improve strength and maximize return of independent function       Entered by: Huyen Bajwa 03/15/2018 11:32 AM

## 2018-03-15 NOTE — PLAN OF CARE
Problem: Patient Care Overview  Goal: Plan of Care/Patient Progress Review  Outcome: Improving  Patient is A/O x4. VSS on RA. CMS intact. Incision is covered with dressing, dried drainage marked on dressing, no change. NISREEN x2 drains intact. Voiding adequately in urinal with low PVR. C/o mild nausea overnight, controlled with sips of gingerale and crackers, otherwise tolerating regular diet. Pain well controlled with scheduled tylenol and prn oxycodone.

## 2018-03-15 NOTE — PLAN OF CARE
Problem: Patient Care Overview  Goal: Plan of Care/Patient Progress Review  Discharge Planner OT   Patient plan for discharge: Return to [facility]    Current status: Order received and chart reviewed. Per chart, pt lives in [facility] where he was receiving increased A for ADLs prior to admit. PT has been working with pt and currently pt requires A x1-2 for functional transfers at this time.     Barriers to return to prior living situation: Current level of A required; Pain level    Recommendations for discharge: Return to [facility] with continued A from staff and home OT/PT; however, if [facility] is unable to provide required A, pt will need TCU stay    Rationale for recommendations: Pt limited by pain, decreased activity tolerance and balance, and impaired safety. Due to pt's limited tolerance and participation, PT can address current rehab needs. OT would be better suited at next level of care. Defer OT eval and intervention to TCU at discharge.        Entered by: Eliz Mariscal 03/15/2018 3:07 PM

## 2018-03-15 NOTE — PROGRESS NOTES
CM    I:  SW placed call to YAHIR Abraham at [facility] in [place], to review patient's present level of care.  Per Leigh, patient is a one person assist with dressing (lower extremities-shoes/socks) and they are assisting with transfers (although not able to report how often).  FRANK reviewed current therapy notes w/RN who stated her nurse manager, Fany, will need to make a final decision if they can accommodate patient's increased needs (staffing, etc).  Leigh asked SW to fax therapy and nursing notes for Fany to review, which was done (Fax # [phone]).  SW awaiting a call back from Nurse manager and proceed accordingly.  Will pursue TCU if [facility] is unable to take patient back.     P:  Continue to assist with discharge planning.     SARAH Sanderson    UPDATE@7553: SW received call back from Nurse Manager Fany () who stated they would not be able to meet patient's present needs due to staffing.  Per Fany, their in-house therapy would also only be able to see patient 2x per week, which she feels would not be of the greatest benefit to patient. Fany asks SW to update patient his room will be held for him while he transitions to TCU.  SW updated patient with referrals choices and was asked to send referrals to: [facility]-1st choice; [facility]-2nd choice; [facility], which was done thru [facility].    UPDATE@9262:  SW received a call from Maggie from [facility] in [place] () wondering if patient wants a Semi private or private room and when patient is ready for discharge.  Per Maggie, patient has not been accepted as yet and their nurse is not able to look at referral until tomorrow but would appreciate a call back with this information. Patient requests a semi-private room.  Anticipated discharge date is: 3/16/18.  SW left VM for Maggie with this information.     UPDATE@1001:  FRANK received VM from [facility] () asking  for call back as they do not accept Medicaid.  Patient does have Medicare A, which they stated would cover patient for 20 days.  SW continues to wait for confirmation from [address] [place] [address], [facility] in [place]; will need to update [facility] accordingly.

## 2018-03-15 NOTE — PROGRESS NOTES
[facility]    Hospitalist Progress Note    Assessment & Plan   Glenroy Padilla is a 67 year old male who was admitted on 3/13/2018 for elective T5-9 laminectomies with the neurosurgery team.    S/p T5-9 laminectomies  Epidural lipomatosis 3/13/2018  - postop care per neurosurgery: pain, disposition, dvt ppx  - PT/OT planning to go back to his [facility]  - drains per nsg  - staple removal in 10 days    Anemia:  - repeat hgb in the AM      HTN  - hold losartan, restart the lasix    CAD s/p LELO in 2016  - restart plavix on 3/27 per the nsg team   - continue his PTA medications    Chronic lymphedema  - start lasix  - no overt signs of infection    DM2  - well controlled on home regimen  - sliding scale while inpatient    Gerd  - PPI    DAYTON   - CPAP prn       # Pain Assessment:   Per primary team    DVT Prophylaxis: Defer to primary service  Code Status: Prior    Disposition: Expected discharge in 1-2 days once cleared by nsg.    Arnav Thurston, DO  Text Page  (7am to 6pm)  Interval History   Met the patient  Resistant to medication changes    -Data reviewed today: I reviewed all new labs and imaging results over the last 24 hours. I personally reviewed no images or EKG's today.    Physical Exam   Temp: 98.3  F (36.8  C) Temp src: Oral BP: 118/59   Heart Rate: 77 Resp: 18 SpO2: 97 % O2 Device: None (Room air)    There were no vitals filed for this visit.  Vital Signs with Ranges  Temp:  [97.6  F (36.4  C)-98.4  F (36.9  C)] 98.3  F (36.8  C)  Heart Rate:  [] 77  Resp:  [18-20] 18  BP: (113-147)/(57-76) 118/59  SpO2:  [91 %-97 %] 97 %  I/O last 3 completed shifts:  In: 840 [P.O.:840]  Out: 840 [Urine:750; Drains:90]    Constitutional: Awake, alert, cooperative, no apparent distress  Respiratory: Clear to auscultation bilaterally, no crackles or wheezing  Cardiovascular: Regular rate and rhythm, normal S1 and S2, and no murmur noted  GI: Normal bowel sounds, soft, non-distended,  non-tender  Skin/Integumen: No rashes, no cyanosis, 2+ edema, no signs of active infection  Other:     Medications     HYDROmorphone Stopped (03/14/18 0918)       furosemide  40 mg Oral Daily     ferrous sulfate  325 mg Oral Daily with breakfast     DULoxetine  30 mg Oral BID     lidocaine   Topical Daily     metoprolol tartrate  25 mg Oral BID     pantoprazole  40 mg Oral Daily     rosuvastatin  10 mg Oral Daily     TRIAD HYDROPHILIC WOUND DRESSI  1 applicator Apply externally Daily     sodium chloride (PF)  3 mL Intracatheter Q8H     acetaminophen  975 mg Oral Q8H     senna-docusate  1 tablet Oral BID    Or     senna-docusate  2 tablet Oral BID     insulin aspart  1-7 Units Subcutaneous TID AC     insulin aspart  1-5 Units Subcutaneous At Bedtime       Data     Recent Labs  Lab 03/15/18  0853 03/14/18  0810 03/13/18  1733 03/13/18  1730 03/13/18  1305 03/09/18  1020   WBC  --  8.6  --   --   --  10.5   HGB 8.2* 7.8* 9.2* 8.7*  --  11.6*   MCV  --  88  --   --   --  90   PLT  --  222  --  298  --  317   NA  --  139 139  --   --  138   POTASSIUM  --  4.0 3.8  --  4.3 4.6   CHLORIDE  --  104  --   --   --  98   CO2  --  29  --   --   --  30   BUN  --  21  --   --   --  19   CR  --  0.90  --   --  0.95 0.91   ANIONGAP  --  6  --   --   --  10   PHAN  --  7.7*  --   --   --  9.3   GLC  --  167*  --   --  157* 179*   ALBUMIN  --   --   --   --   --  3.4   PROTTOTAL  --   --   --   --   --  8.5   BILITOTAL  --   --   --   --   --  0.4   ALKPHOS  --   --   --   --   --  73   ALT  --   --   --   --   --  19   AST  --   --   --   --   --  15       Imaging:   No results found for this or any previous visit (from the past 24 hour(s)).

## 2018-03-15 NOTE — PROGRESS NOTES
[facility]    Neurosurgery  Daily Post-Op Note    Assessment & Plan   Procedure(s):  LAMINECTOMY LUMBAR THREE+ LEVELS   -2 Days Post-Op  Doing well.  Pain well-controlled.  Tolerating physical therapy and rehabilitation well.  Legs feeling better each day, still with some numbness and weakness. Ambulating with PT without trouble.   Plan:  -Ambulate  -Advance activity as tolerated  -Continue supportive and symptomatic treatment  -probable dc tomorrow. JPs out today.     Rosales Beavers    Interval History   Doing well.  Continues to improve.  Pain is well-controlled.  No fevers.      Physical Exam   Temp: 97.6  F (36.4  C) Temp src: Oral BP: 113/60   Heart Rate: 79 Resp: 20 SpO2: 95 % O2 Device: None (Room air)    There were no vitals filed for this visit.  Vital Signs with Ranges  Temp:  [97.6  F (36.4  C)-98.4  F (36.9  C)] 97.6  F (36.4  C)  Heart Rate:  [] 79  Resp:  [18-20] 20  BP: (113-147)/(57-76) 113/60  SpO2:  [91 %-96 %] 95 %  I/O last 3 completed shifts:  In: 360 [P.O.:360]  Out: 1310 [Urine:1150; Drains:160]    Alert and oriented.  Moves all extremities equally.      Incision: CDI/       Medications     sodium chloride 100 mL/hr at 03/14/18 0749     HYDROmorphone Stopped (03/14/18 0918)        ferrous sulfate  325 mg Oral Daily with breakfast     DULoxetine  30 mg Oral BID     lidocaine   Topical Daily     metoprolol tartrate  25 mg Oral BID     pantoprazole  40 mg Oral Daily     rosuvastatin  10 mg Oral Daily     TRIAD HYDROPHILIC WOUND DRESSI  1 applicator Apply externally Daily     sodium chloride (PF)  3 mL Intracatheter Q8H     acetaminophen  975 mg Oral Q8H     senna-docusate  1 tablet Oral BID    Or     senna-docusate  2 tablet Oral BID     insulin aspart  1-7 Units Subcutaneous TID AC     insulin aspart  1-5 Units Subcutaneous At Bedtime           Rosales Beavers PA-C  [facility]

## 2018-03-15 NOTE — TELEPHONE ENCOUNTER
Orders have been placed on Dr. Copeland desk for cosignature today.  Electronically signed:    Julio Cesar Sahu PA-C

## 2018-03-15 NOTE — PLAN OF CARE
Problem: Laminectomy/Foraminotomy/Discectomy (Adult)  Goal: Signs and Symptoms of Listed Potential Problems Will be Absent, Minimized or Managed (Laminectomy/Foraminotomy/Discectomy)  Signs and symptoms of listed potential problems will be absent, minimized or managed by discharge/transition of care (reference Laminectomy/Foraminotomy/Discectomy (Adult) CPG).   Outcome: Improving  Dressing changed, drains out, incision CDI with staples. Pt states he is moving better, up with strong assist with a walker. CMS intact. Voiding and drinking adequately. Possible d/c tomorrow to TCU.

## 2018-03-15 NOTE — PROGRESS NOTES
03/14/18 1130   Quick Adds   Type of Visit Initial PT Evaluation   Living Environment   Lives With spouse;child(kirsten), adult   Living Arrangements assisted living   Home Accessibility no concerns   Living Environment Comment sleeps in lift chair recliner   Self-Care   Usual Activity Tolerance moderate   Current Activity Tolerance fair   Regular Exercise no   Functional Level Prior   Ambulation 3-->assistive equipment and person   Transferring 3-->assistive equipment and person   Toileting 3-->assistive equipment and person   Bathing 3-->assistive equipment and person   Dressing 3-->assistive equipment and person   Eating 0-->independent   Fall history within last six months yes   Number of times patient has fallen within last six months 10   Which of the above functional risks had a recent onset or change? ambulation;transferring   Prior Functional Level Comment came from [facility] where he had help with some LE dressing, A with transfers in AM, pt admits to [facility] short diatnac with WW at times   General Information   Onset of Illness/Injury or Date of Surgery - Date 03/13/18   Referring Physician Maureen   Patient/Family Goals Statement return to [facility]   Pertinent History of Current Problem (include personal factors and/or comorbidities that impact the POC) Pt with progressive right leg weakness, myelopathy, and inability to walk, becoming wheelchair bound.  Imaging demonstrated epidural lipomatosis in the thoracic spine causeing severe cord compression and cord signal change. Subsequently underwent T5-9 laminectomies and resection of epidural lipomatosis     Precautions/Limitations fall precautions   Cognitive Status Examination   Orientation orientation to person, place and time   Level of Consciousness alert   Follows Commands and Answers Questions 100% of the time   Personal Safety and Judgment intact   Memory intact   Range of Motion (ROM)   ROM Comment LE ROM is WFL; dec L shoulder ROM d/t pain   Bed Mobility   Bed  "Mobility Comments mod A x 2   Transfer Skills   Transfer Comments mod A x 1-2 w/ WW   Gait   Gait Comments min A w/ WW small steps with limited foot clearance   Balance   Balance Comments impaired in standng and with walking,    General Therapy Interventions   Planned Therapy Interventions bed mobility training;strengthening;transfer training;gait training   Clinical Impression   Criteria for Skilled Therapeutic Intervention yes, treatment indicated   PT Diagnosis difficulty walking   Influenced by the following impairments impaired gait, pain, dec indep w/ transfers   Functional limitations due to impairments impaired mobility   Clinical Presentation Evolving/Changing   Clinical Presentation Rationale clinical observation   Clinical Decision Making (Complexity) Moderate complexity   Therapy Frequency` daily   Predicted Duration of Therapy Intervention (days/wks) 3 days   Anticipated Discharge Disposition Home with Assist;Home with Home Therapy   Risk & Benefits of therapy have been explained Yes   Patient, Family & other staff in agreement with plan of care Yes   [facility] TM \"6 Clicks\"   2016, Trustees of [facility], under license to [employer].  All rights reserved.   6 Clicks Short Forms Basic Mobility Inpatient Short Form   [facility]  \"6 Clicks\" V.2 Basic Mobility Inpatient Short Form   1. Turning from your back to your side while in a flat bed without using bedrails? 2 - A Lot   2. Moving from lying on your back to sitting on the side of a flat bed without using bedrails? 2 - A Lot   3. Moving to and from a bed to a chair (including a wheelchair)? 2 - A Lot   4. Standing up from a chair using your arms (e.g., wheelchair, or bedside chair)? 2 - A Lot   5. To walk in hospital room? 2 - A Lot   6. Climbing 3-5 steps with a railing? 1 - Total   Basic Mobility Raw Score (Score out of 24.Lower scores equate to lower levels of function) 11   Total Evaluation Time   Total " Evaluation Time (Minutes) 15

## 2018-03-16 ENCOUNTER — APPOINTMENT (OUTPATIENT)
Dept: PHYSICAL THERAPY | Facility: CLINIC | Age: 68
DRG: 029 | End: 2018-03-16
Attending: NEUROLOGICAL SURGERY
Payer: MEDICARE

## 2018-03-16 ENCOUNTER — TELEPHONE (OUTPATIENT)
Dept: FAMILY MEDICINE | Facility: OTHER | Age: 68
End: 2018-03-16

## 2018-03-16 LAB
GLUCOSE BLDC GLUCOMTR-MCNC: 175 MG/DL (ref 70–99)
GLUCOSE BLDC GLUCOMTR-MCNC: 178 MG/DL (ref 70–99)
GLUCOSE BLDC GLUCOMTR-MCNC: 179 MG/DL (ref 70–99)
GLUCOSE BLDC GLUCOMTR-MCNC: 199 MG/DL (ref 70–99)
GLUCOSE BLDC GLUCOMTR-MCNC: 208 MG/DL (ref 70–99)

## 2018-03-16 PROCEDURE — A9270 NON-COVERED ITEM OR SERVICE: HCPCS | Mod: GY | Performed by: HOSPITALIST

## 2018-03-16 PROCEDURE — 25000132 ZZH RX MED GY IP 250 OP 250 PS 637: Mod: GY | Performed by: PHYSICIAN ASSISTANT

## 2018-03-16 PROCEDURE — 00000146 ZZHCL STATISTIC GLUCOSE BY METER IP

## 2018-03-16 PROCEDURE — 25000132 ZZH RX MED GY IP 250 OP 250 PS 637: Mod: GY | Performed by: HOSPITALIST

## 2018-03-16 PROCEDURE — 99232 SBSQ HOSP IP/OBS MODERATE 35: CPT | Performed by: HOSPITALIST

## 2018-03-16 PROCEDURE — 40000275 ZZH STATISTIC RCP TIME EA 10 MIN

## 2018-03-16 PROCEDURE — A9270 NON-COVERED ITEM OR SERVICE: HCPCS | Mod: GY | Performed by: PHYSICIAN ASSISTANT

## 2018-03-16 PROCEDURE — 40000193 ZZH STATISTIC PT WARD VISIT: Performed by: PHYSICAL THERAPIST

## 2018-03-16 PROCEDURE — 12000000 ZZH R&B MED SURG/OB

## 2018-03-16 PROCEDURE — 97110 THERAPEUTIC EXERCISES: CPT | Mod: GP | Performed by: PHYSICAL THERAPIST

## 2018-03-16 PROCEDURE — 94660 CPAP INITIATION&MGMT: CPT

## 2018-03-16 RX ORDER — POLYETHYLENE GLYCOL 3350 17 G/17G
17 POWDER, FOR SOLUTION ORAL DAILY
Status: DISCONTINUED | OUTPATIENT
Start: 2018-03-16 | End: 2018-03-19 | Stop reason: HOSPADM

## 2018-03-16 RX ADMIN — METOPROLOL TARTRATE 25 MG: 25 TABLET ORAL at 21:15

## 2018-03-16 RX ADMIN — OXYCODONE HYDROCHLORIDE 5 MG: 5 TABLET ORAL at 18:47

## 2018-03-16 RX ADMIN — ROSUVASTATIN CALCIUM 10 MG: 10 TABLET, FILM COATED ORAL at 09:11

## 2018-03-16 RX ADMIN — FUROSEMIDE 40 MG: 40 TABLET ORAL at 09:11

## 2018-03-16 RX ADMIN — DULOXETINE 30 MG: 30 CAPSULE, DELAYED RELEASE ORAL at 09:11

## 2018-03-16 RX ADMIN — SENNOSIDES AND DOCUSATE SODIUM 2 TABLET: 8.6; 5 TABLET ORAL at 09:11

## 2018-03-16 RX ADMIN — METOPROLOL TARTRATE 25 MG: 25 TABLET ORAL at 09:11

## 2018-03-16 RX ADMIN — ACETAMINOPHEN 975 MG: 325 TABLET, FILM COATED ORAL at 13:03

## 2018-03-16 RX ADMIN — Medication 1 G: at 11:04

## 2018-03-16 RX ADMIN — POLYETHYLENE GLYCOL 3350 17 G: 17 POWDER, FOR SOLUTION ORAL at 13:03

## 2018-03-16 RX ADMIN — DULOXETINE 30 MG: 30 CAPSULE, DELAYED RELEASE ORAL at 21:16

## 2018-03-16 RX ADMIN — LIDOCAINE: 50 OINTMENT TOPICAL at 11:00

## 2018-03-16 RX ADMIN — SENNOSIDES AND DOCUSATE SODIUM 2 TABLET: 8.6; 5 TABLET ORAL at 21:15

## 2018-03-16 RX ADMIN — PANTOPRAZOLE SODIUM 40 MG: 40 TABLET, DELAYED RELEASE ORAL at 09:11

## 2018-03-16 RX ADMIN — OXYCODONE HYDROCHLORIDE 5 MG: 5 TABLET ORAL at 04:38

## 2018-03-16 RX ADMIN — FERROUS SULFATE TAB 325 MG (65 MG ELEMENTAL FE) 325 MG: 325 (65 FE) TAB at 09:11

## 2018-03-16 RX ADMIN — OXYCODONE HYDROCHLORIDE 10 MG: 5 TABLET ORAL at 22:35

## 2018-03-16 RX ADMIN — ACETAMINOPHEN 975 MG: 325 TABLET, FILM COATED ORAL at 06:14

## 2018-03-16 ASSESSMENT — ACTIVITIES OF DAILY LIVING (ADL)
ADLS_ACUITY_SCORE: 21

## 2018-03-16 NOTE — PROGRESS NOTES
Frank Progress Note  Chart Reviewed, Pt discussed in Interdisciplinary Rounds.   Pt has referrals out for TCU placement. Anticipate that pt is ready to discharge once TCU bed confirmed.     Intervention:   FRANK received call from Maggie at [facility]. On review,RN had questions and questions answered regarding pt's care. Maggie indicates that they are able to accept pt for admission.  Pt indicates that he will need transportation and FRANK contacted HE and ordered wheelchair transportation for 4:45 today.  FRANK updated that pt does have open area on lower leg that was incorrectly reported closed. FRANK contacted Maggie at [facility] and updated. FRANK sent [facility] notes via fax for update to [phone]. FRANK requested return call to verify that pt is still accepted for admission.  FRANK re-faxed [facility] notes to [phone].  SW received call from Maggie that they are able to accept pt but would like to have pt arrive by 15:00. FRANK contacted Bridgette at  to check if any earlier times are available. There are no earlier times available but HE will contact FRANK if there are any changes that open up a ride for earlier time.  FRANK contacted [employer], [employer],[facility] and [facility]. None of the other services were able to accommodate pt for today. FRANK updated Danita at [facility] and she is still trying to work with staff to make the 4:45 transport time work.  FRANK holding with tentative discharge scheduled for 4:45 until further updates.    Team Members notified:   RN,CC,HUC,BB      Plan: Discharge to [facility] via [facility] w/c at 4:45pm today    Follow up plan: RN to RN report to be called to [phone]      Addendum: Upon receiving notes regarding pt's wound on leg, [facility] is not comfortable admitting pt over weekend. Pt has lower B/P and swelling in his leg and they are concerned that potentially pt could develop more or worsening wounds and bears additional monitoring they are not able to provide at this time. Pt would not be arriving to facility  until this evening. They will reassess pt on Monday for admission if pt is still in hospital.   SW has not received word from any of the other TCU referrals. [facility] is pt's first choice.  Updated staff of hold on discharge for today.    SARAH Torres  [facility]  Phone: [phone]

## 2018-03-16 NOTE — TELEPHONE ENCOUNTER
You placed a referral to [facility]  on 1/25/18.    It is unclear if the patient has scheduled yet, not finding a report showing they were seen.     Please forward to your team if further follow up is needed to see if they have made this appointment.      Thank you!   Maggie/Referral Representative for [facility]

## 2018-03-16 NOTE — LETTER
[facility]  [id number] [place] [id number]  [phone]        March 16, 2018    Glenroy Padilla  [address] [place] [id number]          Dear Glenroy,    We received a notice that you are to be scheduled with a specialty clinic. The referral has been placed by your provider and you can call to schedule an appointment directly.     Enclosed, you will find the referral with the phone number to call to schedule an appointment.    Please call us if you have any questions or concerns.      Sincerely,        MARLO Gonzalez, jrm, cma

## 2018-03-16 NOTE — PLAN OF CARE
Problem: Patient Care Overview  Goal: Plan of Care/Patient Progress Review  Outcome: Improving  A/OX4. Dressing on upper back CDI. CMS intact, except 3+ swelling on BLE. Assist x1, GB, and walker for mobility. No IV access. Voiding. Passing flatus. Afebrile. VSS on RA. Pain well controlled with scheduled tylenol. Discharge pending on TCU placement.

## 2018-03-16 NOTE — DISCHARGE SUMMARY
[facility]    Discharge Summary  Neurosurgery    Date of Admission:  3/13/2018  Date of Discharge:  3/16/2018  Discharging Provider: Leilani Adorno  Date of Service (when I saw the patient): 03/16/18    Discharge Diagnoses   Active Problems:    S/P spinal surgery      History of Present Illness   Glenroy Padilla is a 67 year old male who was admitted on 3/13/2018 ith progressive right leg weakness, myelopathy, and inability to walk, becoming wheelchair bound.  Imaging demonstrated epidural lipomatosis in the thoracic spine causeing severe cord compression and cord signal change. Subsequently underwent T5-9 laminectomies and resection of epidural lipomatosis with Dr. Hugh Mendieta. Pt did well post surgery.  It is recommended that the pt DC to TCU for further rehab.  Pt is pending rehab placement.      Pts hospital stay was uneventful.  The pt was also being followed by the hospital service during their stay.  They recommendations were greatly appreciated.  Their summary is as follows:    Glenroy Padilla is a 67 year old male who was admitted on 3/13/2018 for elective T5-9 laminectomies with the neurosurgery team.     S/p T5-9 laminectomies  Epidural lipomatosis 3/13/2018  - postop care per neurosurgery: pain, disposition, dvt ppx  - PT/OT planning to go back to his [facility]  - drains per nsg  - staple removal in 10 days     Anemia:  - repeat hgb in the AM        HTN  - hold losartan, restart the lasix     CAD s/p LELO in 2016  - restart plavix on 3/27 per the nsg team   - continue his PTA medications     Chronic lymphedema  - start lasix  - no overt signs of infection     DM2  - well controlled on home regimen  - sliding scale while inpatient     Gerd  - PPI     DAYTON   - CPAP prn         # Pain Assessment:   Per primary team     DVT Prophylaxis: Defer to primary service  Code Status: Prior              Hospital Course   Glenroy Padilla was admitted on 3/13/2018.  The following problems were addressed  during his hospitalization:    Active Problems:    S/P spinal surgery    Assessment: stable     Plan: DC once placement found at TCU        I have discussed the following assessment and plan with Dr. Hugh Mendieta  who is in agreement with initial plan and will follow up with further consultation recommendations.    Leilani Adorno [facility]  [facility]  [address]  [address] [place] [id number]    Tel [phone]  Pager [phone]        Significant Results and Procedures   Post thoracic laminectomy     Pending Results     Unresulted Labs Ordered in the Past 30 Days of this Admission     No orders found from 1/12/2018 to 3/14/2018.          Code Status   Full Code    Primary Care Physician   Julio Cesar Sahu    Physical Exam   Temp: 97.8  F (36.6  C) Temp src: Oral BP: 130/60   Heart Rate: 81 Resp: 16 SpO2: 97 % O2 Device: None (Room air)    There were no vitals filed for this visit.  Vital Signs with Ranges  Temp:  [97.2  F (36.2  C)-98.6  F (37  C)] 97.8  F (36.6  C)  Heart Rate:  [80-92] 81  Resp:  [15-18] 16  BP: (127-152)/(58-68) 130/60  SpO2:  [94 %-97 %] 97 %  I/O last 3 completed shifts:  In: 1580 [P.O.:1580]  Out: -     Constitutional: Awake, alert, cooperative, no apparent distress, and appears stated age.  Eyes: Lids and lashes normal, pupils equal, round and reactive to light, extra ocular muscles intact  ENT: Normocephalic, without obvious abnormality, atraumatic  Respiratory: No increased work of breathing  Skin: No bruising or bleeding, normal skin color, texture, turgor, no redness, warmth, or swelling.   Musculoskeletal: There is no redness, warmth, or swelling of the joints.  Full range of motion noted.  Motor strength is 5 out of 5 all extremities bilaterally.  Tone is normal.  Neurologic: Awake, alert, oriented to name, place and time.  Cranial nerves II-XII are grossly intact.  Motor is 5 out of 5 bilaterally.     Neuropsychiatric: Calm, normal eye  contact, alert, normal affect, oriented to self, place, time and situation, memory for past and recent events intact and thought process normal.       Time Spent on this Encounter   I, Leilani Adorno, discharged this patient today but I did not personally see the patient today and will not be billing for the patient's discharge.    Discharge Disposition   Discharged to short-term care facility  Condition at discharge: Stable      Consultations This Hospital Stay   OCCUPATIONAL THERAPY ADULT IP CONSULT  PHYSICAL THERAPY ADULT IP CONSULT  HOSPITALIST IP CONSULT  WOUND OSTOMY CONTINENCE NURSE  IP CONSULT  PHYSICAL THERAPY ADULT IP CONSULT  OCCUPATIONAL THERAPY ADULT IP CONSULT    Discharge Orders     General info for SNF   Length of Stay Estimate: Short Term Care: Estimated # of Days <30  Condition at Discharge: Stable  Level of care:skilled   Rehabilitation Potential: Good  Admission H&P remains valid and up-to-date: Yes  Recent Chemotherapy: N/A  Use Nursing Home Standing Orders: N/A     Mantoux instructions   Give two-step Mantoux (PPD) Per Facility Policy Yes     Reason for your hospital stay   You were in the hospital for back surgery.     Intake and output   Every shift     Wound care (specify)   Keep your incision clean and dry at all times.  OK to remove dressing on postop day 2.  OK to shower on postop day 3 and allow water to run over incision, pat dry after shower.  No bathing swimming or submerging in water.  Call immediately or come to ED if any drainage occurs, or you develop new pain, redness, or swelling.     Follow Up and recommended labs and tests   Please follow up at the [facility] in 6 weeks.  Please call the clinic at [phone] to schedule your appointment with Rosales Beavers PA-C or Martha Reddy PA-C.     Staple removal may be done at rehab facility.     Activity - Up ad katy     Additional Discharge Instructions   Discharge instructions:  No lifting of more than 10 pounds, no  bending, no twisting until follow up visit.    Ok to shampoo hair, shower or bathe, but, do not scrub or submerge incision under water until evaluated post-operatively in clinic.    Ok to walk as tolerated, avoid bed rest and prolonged sitting.    No contact sports until after follow up visit  No high impact activities such as; running/jogging, snowmobile or 4 benton riding or any other recreational vehicles.    Do not take NSAIDS (ibuprofen, advil, aleve, naproxen, etc) for pain control.    Do NOT drive while taking narcotic pain medication.    Call the [facility] at [phone] for increasing redness, swelling or pus draining from the incision, increased pain or any other questions and concerns.    May resume Plavix on 3/27/2018.     Wound care   May remove staples at TCU on 3-     Full Code     Physical Therapy Adult Consult   Evaluate and treat as clinically indicated.    Reason:  S/p thoracic laminectomies     Occupational Therapy Adult Consult   Evaluate and treat as clinically indicated.    Reason:  S/p thoracic laminectomies     Advance Diet as Tolerated   Follow this diet upon discharge: Pre admission diet       Discharge Medications   Current Discharge Medication List      START taking these medications    Details   oxyCODONE IR (ROXICODONE) 5 MG tablet Take 1-2 tablets (5-10 mg) by mouth every 4 hours as needed for other (pain control or improvement in physical function. Hold dose for analgesic side effects.)  Qty: 70 tablet, Refills: 0    Associated Diagnoses: S/P spinal surgery      methocarbamol (ROBAXIN) 500 MG tablet Take 1 tablet (500 mg) by mouth 4 times daily as needed for muscle spasms  Qty: 70 tablet, Refills: 1    Associated Diagnoses: S/P spinal surgery         CONTINUE these medications which have NOT CHANGED    Details   SitaGLIPtin-MetFORMIN HCl  MG TB24 Take 2 tablets by mouth daily (with breakfast)       metoprolol tartrate (LOPRESSOR) 25 MG tablet Take 1  tablet (25 mg) by mouth 2 times daily  Qty: 28 tablet, Refills: 0    Associated Diagnoses: Type 2 diabetes mellitus with other circulatory complication, without long-term current use of insulin (H); History of coronary artery stent placement; Hypertension, goal below 140/90      Wound Dressings (TRIAD HYDROPHILIC WOUND DRESSI) PSTE Externally apply 1 g topically daily  Qty: 1 Tube, Refills: 3    Associated Diagnoses: Open wound of right lower leg, initial encounter      lidocaine (XYLOCAINE) 5 % ointment Apply topically daily  Qty: 50 g, Refills: 3    Associated Diagnoses: Chronic pain of right knee      pantoprazole (PROTONIX) 40 MG EC tablet Take 1 tablet (40 mg) by mouth daily Take 30-60 minutes before a meal.  Qty: 1 tablet, Refills: 0    Associated Diagnoses: Gastroesophageal reflux disease without esophagitis      ferrous sulfate (IRON) 325 (65 FE) MG tablet Take 1 tablet (325 mg) by mouth daily (with breakfast)  Qty: 90 tablet, Refills: 2    Associated Diagnoses: History of anemia      furosemide (LASIX) 40 MG tablet Take 1 tablet (40 mg) by mouth daily  Qty: 90 tablet, Refills: 1    Associated Diagnoses: Hypertension, goal below 140/90      losartan (COZAAR) 25 MG tablet Take 1 tablet (25 mg) by mouth daily  Qty: 90 tablet, Refills: 1    Associated Diagnoses: Hypertension, goal below 140/90; Type 2 diabetes mellitus with other circulatory complication, without long-term current use of insulin (H)      rosuvastatin (CRESTOR) 10 MG tablet Take 1 tablet (10 mg) by mouth daily  Qty: 90 tablet, Refills: 1    Associated Diagnoses: Type 2 diabetes mellitus with other circulatory complication, without long-term current use of insulin (H)      Blood Glucose Monitoring Suppl (GLUCOCARD EXPRESSION MONITOR) W/DEVICE KIT USE TWICE DAILY TO TEST BLOOD GLUCOSE  Refills: 0      senna-docusate (SENOKOT-S;PERICOLACE) 8.6-50 MG per tablet Take 1 tablet by mouth 2 times daily as needed for constipation  Qty: 100 tablet     Associated Diagnoses: Spinal stenosis, lumbar region, without neurogenic claudication      order for DME One touch glucose monitoring strip with Glucometer and lancets.  Qty: 1 Box, Refills: 1    Associated Diagnoses: Type 2 diabetes mellitus with other circulatory complication, without long-term current use of insulin (H)         STOP taking these medications       DULoxetine (CYMBALTA) 30 MG EC capsule Comments:   Reason for Stopping:         IBUPROFEN PO Comments:   Reason for Stopping:         clopidogrel (PLAVIX) 75 MG tablet Comments:   Reason for Stopping:             Allergies   Allergies   Allergen Reactions     No Known Allergies

## 2018-03-16 NOTE — PLAN OF CARE
Problem: Patient Care Overview  Goal: Plan of Care/Patient Progress Review  Outcome: No Change  Patient is A/O x4. VSS on RA. CMS intact. Incision is covered, dressing CDI. Voiding in BR. C/o mild nausea overnight, controlled with crackers, otherwise tolerating regular diet. Pain well controlled with scheduled tylenol and prn oxycodone. Up with ROOPA, walker and FREDDY. Plan on discharge to TCU today.

## 2018-03-16 NOTE — PLAN OF CARE
Problem: Patient Care Overview  Goal: Plan of Care/Patient Progress Review  Discharge Planner PT   Patient plan for discharge: Return to [facility] with increased assistance  Current status: Pt amb 30', 40', 80' w/FWW, Molly and w/c follow.  Pt needed oMlly for sit to stand.   Barriers to return to prior living situation: Low activity tolerance,   Recommendations for discharge: Return to [facility] with increased assistance, if intermediate cannot provide assistance pt may need TCU.  Rationale for recommendations: Pt would continue to progress towards functional independence with skilled physical therapy.        Entered by: Maria Ines Owen 03/16/2018 12:31 PM

## 2018-03-16 NOTE — PROGRESS NOTES
[facility]    Hospitalist Progress Note    Assessment & Plan   Glenroy Padilla is a 67 year old male who was admitted on 3/13/2018 for elective T5-9 laminectomies with the neurosurgery team.    S/p T5-9 laminectomies  Epidural lipomatosis 3/13/2018  - postop care per neurosurgery: pain, disposition, dvt ppx  - PT/OT planning to go back to his [facility]  - drains removed  - staple removal in 10 days per nsg  - ok for discharge from a medical perspective    Anemia:  - fu with PCP      HTN  - can restart the losartan on discharge, already restarted the lasix    CAD s/p LELO in 2016  - ok restart plavix on 3/27 per the nsg team, hold until then  - continue his PTA medications    Chronic lymphedema  - start lasix  - no overt signs of infection    DM2  - well controlled on home regimen  - sliding scale while inpatient    Gerd  - PPI    DAYTON   - CPAP prn       # Pain Assessment:   Per primary team    DVT Prophylaxis: Defer to primary service  Code Status: Prior    Disposition: probably home today per NSG    Arnav Thurston, DO  Text Page  (7am to 6pm)  Interval History   Feels stronger with better ability to walk on a daily basis  Doing well, no other complaints, no chest pain, no shortness of breath    -Data reviewed today: I reviewed all new labs and imaging results over the last 24 hours. I personally reviewed no images or EKG's today.    Physical Exam   Temp: 98.6  F (37  C) Temp src: Oral BP: 127/62   Heart Rate: 92 Resp: 16 SpO2: 97 % O2 Device: None (Room air)    There were no vitals filed for this visit.  Vital Signs with Ranges  Temp:  [97.2  F (36.2  C)-98.6  F (37  C)] 98.6  F (37  C)  Heart Rate:  [80-92] 92  Resp:  [15-18] 16  BP: (127-152)/(58-68) 127/62  SpO2:  [94 %-97 %] 97 %  I/O last 3 completed shifts:  In: 1580 [P.O.:1580]  Out: -     Constitutional: Awake, alert, cooperative, no apparent distress  Respiratory: Clear to auscultation bilaterally, no crackles or wheezing  Cardiovascular:  Regular rate and rhythm, normal S1 and S2, and no murmur noted  GI: Normal bowel sounds, soft, non-distended, non-tender  Skin/Integumen: No rashes, no cyanosis, 2+ edema, no signs of active infection  Other:     Medications     HYDROmorphone Stopped (03/14/18 0918)       polyethylene glycol  17 g Oral Daily     furosemide  40 mg Oral Daily     ferrous sulfate  325 mg Oral Daily with breakfast     DULoxetine  30 mg Oral BID     lidocaine   Topical Daily     metoprolol tartrate  25 mg Oral BID     pantoprazole  40 mg Oral Daily     rosuvastatin  10 mg Oral Daily     TRIAD HYDROPHILIC WOUND DRESSI  1 applicator Apply externally Daily     sodium chloride (PF)  3 mL Intracatheter Q8H     acetaminophen  975 mg Oral Q8H     senna-docusate  1 tablet Oral BID    Or     senna-docusate  2 tablet Oral BID     insulin aspart  1-7 Units Subcutaneous TID AC     insulin aspart  1-5 Units Subcutaneous At Bedtime       Data     Recent Labs  Lab 03/15/18  0853 03/14/18  0810 03/13/18  1733 03/13/18  1730  03/13/18  1305   WBC  --  8.6  --   --   --   --    HGB 8.2* 7.8* 9.2* 8.7*  < >  --    MCV  --  88  --   --   --   --    PLT  --  222  --  298  --   --    NA  --  139 139  --   --   --    POTASSIUM  --  4.0 3.8  --   --  4.3   CHLORIDE  --  104  --   --   --   --    CO2  --  29  --   --   --   --    BUN  --  21  --   --   --   --    CR  --  0.90  --   --   --  0.95   ANIONGAP  --  6  --   --   --   --    PHAN  --  7.7*  --   --   --   --    GLC  --  167*  --   --   --  157*   < > = values in this interval not displayed.    Imaging:   No results found for this or any previous visit (from the past 24 hour(s)).

## 2018-03-17 ENCOUNTER — APPOINTMENT (OUTPATIENT)
Dept: PHYSICAL THERAPY | Facility: CLINIC | Age: 68
DRG: 029 | End: 2018-03-17
Attending: NEUROLOGICAL SURGERY
Payer: MEDICARE

## 2018-03-17 LAB
GLUCOSE BLDC GLUCOMTR-MCNC: 144 MG/DL (ref 70–99)
GLUCOSE BLDC GLUCOMTR-MCNC: 154 MG/DL (ref 70–99)
GLUCOSE BLDC GLUCOMTR-MCNC: 182 MG/DL (ref 70–99)
GLUCOSE BLDC GLUCOMTR-MCNC: 195 MG/DL (ref 70–99)
GLUCOSE BLDC GLUCOMTR-MCNC: 228 MG/DL (ref 70–99)

## 2018-03-17 PROCEDURE — A9270 NON-COVERED ITEM OR SERVICE: HCPCS | Mod: GY | Performed by: PHYSICIAN ASSISTANT

## 2018-03-17 PROCEDURE — 99232 SBSQ HOSP IP/OBS MODERATE 35: CPT | Performed by: INTERNAL MEDICINE

## 2018-03-17 PROCEDURE — A9270 NON-COVERED ITEM OR SERVICE: HCPCS | Mod: GY | Performed by: HOSPITALIST

## 2018-03-17 PROCEDURE — 40000193 ZZH STATISTIC PT WARD VISIT

## 2018-03-17 PROCEDURE — 25000132 ZZH RX MED GY IP 250 OP 250 PS 637: Mod: GY | Performed by: INTERNAL MEDICINE

## 2018-03-17 PROCEDURE — A9270 NON-COVERED ITEM OR SERVICE: HCPCS | Mod: GY | Performed by: INTERNAL MEDICINE

## 2018-03-17 PROCEDURE — 99207 ZZC CDG-MDM COMPONENT: MEETS MODERATE - UP CODED: CPT | Performed by: INTERNAL MEDICINE

## 2018-03-17 PROCEDURE — 94660 CPAP INITIATION&MGMT: CPT

## 2018-03-17 PROCEDURE — 25000132 ZZH RX MED GY IP 250 OP 250 PS 637: Mod: GY | Performed by: PHYSICIAN ASSISTANT

## 2018-03-17 PROCEDURE — 12000000 ZZH R&B MED SURG/OB

## 2018-03-17 PROCEDURE — 40000275 ZZH STATISTIC RCP TIME EA 10 MIN

## 2018-03-17 PROCEDURE — 97530 THERAPEUTIC ACTIVITIES: CPT | Mod: GP

## 2018-03-17 PROCEDURE — 00000146 ZZHCL STATISTIC GLUCOSE BY METER IP

## 2018-03-17 PROCEDURE — 25000132 ZZH RX MED GY IP 250 OP 250 PS 637: Mod: GY | Performed by: HOSPITALIST

## 2018-03-17 RX ORDER — AMOXICILLIN 250 MG
2 CAPSULE ORAL DAILY
Status: DISCONTINUED | OUTPATIENT
Start: 2018-03-17 | End: 2018-03-19 | Stop reason: HOSPADM

## 2018-03-17 RX ORDER — BISACODYL 10 MG
10 SUPPOSITORY, RECTAL RECTAL DAILY PRN
Status: DISCONTINUED | OUTPATIENT
Start: 2018-03-17 | End: 2018-03-19 | Stop reason: HOSPADM

## 2018-03-17 RX ADMIN — POLYETHYLENE GLYCOL 3350 17 G: 17 POWDER, FOR SOLUTION ORAL at 08:22

## 2018-03-17 RX ADMIN — METOPROLOL TARTRATE 25 MG: 25 TABLET ORAL at 21:58

## 2018-03-17 RX ADMIN — LIDOCAINE: 50 OINTMENT TOPICAL at 08:27

## 2018-03-17 RX ADMIN — ACETAMINOPHEN 650 MG: 325 TABLET, FILM COATED ORAL at 21:58

## 2018-03-17 RX ADMIN — OXYCODONE HYDROCHLORIDE 10 MG: 5 TABLET ORAL at 23:28

## 2018-03-17 RX ADMIN — DULOXETINE 30 MG: 30 CAPSULE, DELAYED RELEASE ORAL at 08:22

## 2018-03-17 RX ADMIN — METOPROLOL TARTRATE 25 MG: 25 TABLET ORAL at 08:22

## 2018-03-17 RX ADMIN — ACETAMINOPHEN 650 MG: 325 TABLET, FILM COATED ORAL at 15:36

## 2018-03-17 RX ADMIN — DULOXETINE 30 MG: 30 CAPSULE, DELAYED RELEASE ORAL at 21:58

## 2018-03-17 RX ADMIN — OXYCODONE HYDROCHLORIDE 5 MG: 5 TABLET ORAL at 12:24

## 2018-03-17 RX ADMIN — FERROUS SULFATE TAB 325 MG (65 MG ELEMENTAL FE) 325 MG: 325 (65 FE) TAB at 08:22

## 2018-03-17 RX ADMIN — BISACODYL 10 MG: 10 SUPPOSITORY RECTAL at 14:54

## 2018-03-17 RX ADMIN — PANTOPRAZOLE SODIUM 40 MG: 40 TABLET, DELAYED RELEASE ORAL at 08:22

## 2018-03-17 RX ADMIN — OXYCODONE HYDROCHLORIDE 5 MG: 5 TABLET ORAL at 08:21

## 2018-03-17 RX ADMIN — SENNOSIDES AND DOCUSATE SODIUM 1 TABLET: 8.6; 5 TABLET ORAL at 08:22

## 2018-03-17 RX ADMIN — ROSUVASTATIN CALCIUM 10 MG: 10 TABLET, FILM COATED ORAL at 08:22

## 2018-03-17 RX ADMIN — OXYCODONE HYDROCHLORIDE 5 MG: 5 TABLET ORAL at 17:03

## 2018-03-17 RX ADMIN — FUROSEMIDE 40 MG: 40 TABLET ORAL at 08:22

## 2018-03-17 RX ADMIN — OXYCODONE HYDROCHLORIDE 10 MG: 5 TABLET ORAL at 02:27

## 2018-03-17 ASSESSMENT — ACTIVITIES OF DAILY LIVING (ADL)
ADLS_ACUITY_SCORE: 21

## 2018-03-17 NOTE — PLAN OF CARE
Problem: Patient Care Overview  Goal: Plan of Care/Patient Progress Review  POD 4. A&Ox4. Baseline tingling in pinky fingers bilat, BLE peripheral neuropathy baseline. BLE 4/5 strength. Bowel sounds active, +flatus, -BM, bowel meds ordered, plan to give suppository this afternoon. Regular diet. VSS. Dressing CDI. Up with 1-2, gait belt, walker. C/o back pain, decreased with oxycodone. Plan to d/c when placement found.     [id number]  Neuros unchanged, pain decreased with oxy and tylenol.

## 2018-03-17 NOTE — PROGRESS NOTES
[facility]    Neurosurgery  Daily Post-Op Note    Assessment & Plan   Procedure(s):  LAMINECTOMY LUMBAR THREE+ LEVELS   -4 Days Post-Op  Doing well.  Pain well-controlled.  Tolerating physical therapy and rehabilitation well.  Legs are feeling stronger.     Plan:  -Advance activity as tolerated  -Continue supportive and symptomatic treatment  -awaiting placement for TCU.         Rosales Beavers    Interval History   Doing well.  Continues to improve.  Pain is well-controlled.  No fevers.      Physical Exam   Temp: 98.5  F (36.9  C) Temp src: Oral BP: 121/61   Heart Rate: 82 Resp: 16 SpO2: 94 % O2 Device: None (Room air)    There were no vitals filed for this visit.  Vital Signs with Ranges  Temp:  [97.4  F (36.3  C)-98.6  F (37  C)] 98.5  F (36.9  C)  Heart Rate:  [80-94] 82  Resp:  [16-18] 16  BP: (121-137)/(60-71) 121/61  SpO2:  [94 %-98 %] 94 %  I/O last 3 completed shifts:  In: 720 [P.O.:720]  Out: 500 [Urine:500]    Alert and oriented.  FAITH equally. Sensation returning in LE.     Incision: covered, no discharge noted.       Medications        senna-docusate  2 tablet Oral Daily     polyethylene glycol  17 g Oral Daily     furosemide  40 mg Oral Daily     ferrous sulfate  325 mg Oral Daily with breakfast     DULoxetine  30 mg Oral BID     lidocaine   Topical Daily     metoprolol tartrate  25 mg Oral BID     pantoprazole  40 mg Oral Daily     rosuvastatin  10 mg Oral Daily     TRIAD HYDROPHILIC WOUND DRESSI  1 applicator Apply externally Daily     sodium chloride (PF)  3 mL Intracatheter Q8H     insulin aspart  1-7 Units Subcutaneous TID AC     insulin aspart  1-5 Units Subcutaneous At Bedtime           Rosales Beavers PA-C  [facility]

## 2018-03-17 NOTE — PROGRESS NOTES
[facility]    Hospitalist Progress Note  Harjeet Whaley MD    Assessment & Plan      67 year old male with PmHx of Hypertension, CAD s/p LELO, GERD, Type 2 diabetes, sleep apnea, lymphedema, who was admitted on 3/13/2018 for elective T5-9 laminectomies with the neurosurgery team.     1. POD# 5 T5-T9 laminectomies and resection of epidural lipomatosis: For management per Neurosurgen. Continue PT/OT as tolerated. Continue Oxycodone IR prn and tylenol prn.      2. Acute blood loss anemia: Hb was 8.7-->8.2g/dl on 3/15/18. This is due to recent spine surgeries.     3. Hypertension: SBP is in the 120s/130s. Continue metoprolol po bid and lasix. Restart Losartan from 3/18/18 am.      4. CAD s/p LELO in 2016: continue metoprolol po bid and crestor. Restart plavix on 3/27/18 - per Neurosurgeon.     5. Chronic lymphedema: continue lasix.    6. Bilateral lower extremities wounds: continue local wound care.    7. Constipation: for Senna S bid, miralax po qd, dulcolax suppository prn and fleet enemas.    8. Type 2 diabetes with diabetic neuropathy: BG is ranging 144-->182 today. Continue insulin aspart sliding scale prn. Continue cymbalta. HbA1C was 5.6% on 1/27/18.      9. GERD: Continue oral protonix qd.      10. Sleep apnea: continue CPAP.        DVT Prophylaxis: Ambulate every shift  Code Status: Prior    Disposition: Expected discharge in 2 days, to a TCU      Interval History   The pt's back pain is reduced with the Oxycodone IR. He complained of constipation of 4 days duration.    -Data reviewed today: I reviewed all new labs and imaging results over the last 24 hours. I personally reviewed no images or EKG's today.    Physical Exam   Temp: 98.5  F (36.9  C) Temp src: Oral BP: 121/61   Heart Rate: 82 Resp: 16 SpO2: 94 % O2 Device: None (Room air)    There were no vitals filed for this visit.  Vital Signs with Ranges  Temp:  [97.4  F (36.3  C)-98.6  F (37  C)] 98.5  F (36.9  C)  Heart Rate:   [80-94] 82  Resp:  [16-18] 16  BP: (121-137)/(60-71) 121/61  SpO2:  [94 %-98 %] 94 %  I/O last 3 completed shifts:  In: 720 [P.O.:720]  Out: 500 [Urine:500]    Constitutional: Adult male, awake, cooperative, no apparent distress, O2 Sats 97% on RA  Respiratory: BS vesicular bilaterally, but reduced globally, no crackles or wheezing  Cardiovascular: S1 and S2, reg, no murmur noted  GI: Soft, non-distended, non-tender, no masses, BS present+  Skin/Integumen: Dressings over lower extremities wounds+  Exgtremities: Bilat pedal edema 3+  ALLY: Dressing on upper back    Medications       senna-docusate  2 tablet Oral Daily     polyethylene glycol  17 g Oral Daily     furosemide  40 mg Oral Daily     ferrous sulfate  325 mg Oral Daily with breakfast     DULoxetine  30 mg Oral BID     lidocaine   Topical Daily     metoprolol tartrate  25 mg Oral BID     pantoprazole  40 mg Oral Daily     rosuvastatin  10 mg Oral Daily     TRIAD HYDROPHILIC WOUND DRESSI  1 applicator Apply externally Daily     sodium chloride (PF)  3 mL Intracatheter Q8H     insulin aspart  1-7 Units Subcutaneous TID AC     insulin aspart  1-5 Units Subcutaneous At Bedtime       Data     Recent Labs  Lab 03/15/18  0853 03/14/18  0810 03/13/18  1733 03/13/18  1730  03/13/18  1305   WBC  --  8.6  --   --   --   --    HGB 8.2* 7.8* 9.2* 8.7*  < >  --    MCV  --  88  --   --   --   --    PLT  --  222  --  298  --   --    NA  --  139 139  --   --   --    POTASSIUM  --  4.0 3.8  --   --  4.3   CHLORIDE  --  104  --   --   --   --    CO2  --  29  --   --   --   --    BUN  --  21  --   --   --   --    CR  --  0.90  --   --   --  0.95   ANIONGAP  --  6  --   --   --   --    PHAN  --  7.7*  --   --   --   --    GLC  --  167*  --   --   --  157*   < > = values in this interval not displayed.    No results found for this or any previous visit (from the past 24 hour(s)).

## 2018-03-17 NOTE — PROGRESS NOTES
Sw Progress Note  Chart Reviewed, Pt discussed in Interdisciplinary Rounds.   Pt is clinically ready to discharge per hospitalist. Pt was accepted at [facility] but upon further evaluation, pt has an open area on his leg and they are not able to accept pt for admission until Monday.  Pt and wife updated. Pt and wife are willing to seek alternative TCU.  Intervention:  SW contacted admissions at [facility] and they do not anticipate openings until late next week.  SW contacted [facility] and left a message with admissions to return call.    Team Members notified: CC,RN updated. Do not anticipate discharge to TCU until Monday. [facility] will reassess Monday.    Plan: Discharge to TCU  Anticipated discharge placement:   Barriers: Clinical acceptance and insurance authorization   SARAH Torres  [facility]  Phone: [phone]

## 2018-03-17 NOTE — PLAN OF CARE
Problem: Patient Care Overview  Goal: Plan of Care/Patient Progress Review  Discharge Planner PT   Patient plan for discharge: Per chart, plan for TCU  Current status: Sit to stand with mod A and momentum. Ambulates 20' with FWW and CGA. Distance/tolerance limited by pain this AM.   Barriers to return to prior living situation: Low activity tolerance, level of assist  Recommendations for discharge: Return to [facility] with increased assistance and HHPT; if [facility] cannot provide then patient will need TCU  Rationale for recommendations: Patient would be appropriate for continued PT services to address his strength, balance and activity tolerance deficits. He is below baseline for mobility.        Entered by: Megan Aleman 03/17/2018 9:44 AM

## 2018-03-17 NOTE — PLAN OF CARE
Problem: Patient Care Overview  Goal: Plan of Care/Patient Progress Review  Outcome: No Change  A&Ox4. VSS on RA while awake, overnight on respiratory hooked up CPAP. Dyspnea upon exertion. Back incision dressing CDI. Back pain managed with PRN Oxy. Multiple mepilex dressings in place on skin tears and ulcers, all CDI. Several scabs and bruises on extremities and facial area. Blotchy, shiny skin. BLE +3 edema, Compression tubi  removed overnight, place back on in am. WOC following. Doppler for BLE pulses. Pt sleeps in chair overnight, encourage weight shifts. Assist of 1 with walker and GB. Mod carb diet. Plan: DC pending TCU placement.

## 2018-03-17 NOTE — PROGRESS NOTES
[id number]: A&Ox4. VSS on RA. Up with A1, walker and gait belt. No IV access. Diabetic diet. Oxycodone for pain. Back incision CDI, changed this shift. Multiple mepilex in place, including LUE and LLE. Lung sounds diminished, COVARRUBIAS. BLE edema. Encourage to shift weight while in chair; does not sleep in bed. Discharge pending. Continue to monitor.

## 2018-03-18 LAB
GLUCOSE BLDC GLUCOMTR-MCNC: 146 MG/DL (ref 70–99)
GLUCOSE BLDC GLUCOMTR-MCNC: 148 MG/DL (ref 70–99)
GLUCOSE BLDC GLUCOMTR-MCNC: 178 MG/DL (ref 70–99)
GLUCOSE BLDC GLUCOMTR-MCNC: 206 MG/DL (ref 70–99)
GLUCOSE BLDC GLUCOMTR-MCNC: 228 MG/DL (ref 70–99)
GLUCOSE BLDC GLUCOMTR-MCNC: 262 MG/DL (ref 70–99)

## 2018-03-18 PROCEDURE — 25000132 ZZH RX MED GY IP 250 OP 250 PS 637: Mod: GY | Performed by: HOSPITALIST

## 2018-03-18 PROCEDURE — A9270 NON-COVERED ITEM OR SERVICE: HCPCS | Mod: GY | Performed by: PHYSICIAN ASSISTANT

## 2018-03-18 PROCEDURE — A9270 NON-COVERED ITEM OR SERVICE: HCPCS | Mod: GY | Performed by: HOSPITALIST

## 2018-03-18 PROCEDURE — A9270 NON-COVERED ITEM OR SERVICE: HCPCS | Mod: GY | Performed by: INTERNAL MEDICINE

## 2018-03-18 PROCEDURE — 25000132 ZZH RX MED GY IP 250 OP 250 PS 637: Mod: GY | Performed by: PHYSICIAN ASSISTANT

## 2018-03-18 PROCEDURE — 12000000 ZZH R&B MED SURG/OB

## 2018-03-18 PROCEDURE — 40000275 ZZH STATISTIC RCP TIME EA 10 MIN

## 2018-03-18 PROCEDURE — 94660 CPAP INITIATION&MGMT: CPT

## 2018-03-18 PROCEDURE — 25000132 ZZH RX MED GY IP 250 OP 250 PS 637: Mod: GY | Performed by: INTERNAL MEDICINE

## 2018-03-18 PROCEDURE — 00000146 ZZHCL STATISTIC GLUCOSE BY METER IP

## 2018-03-18 PROCEDURE — 99232 SBSQ HOSP IP/OBS MODERATE 35: CPT | Performed by: INTERNAL MEDICINE

## 2018-03-18 RX ORDER — POLYETHYLENE GLYCOL 3350 17 G/17G
17 POWDER, FOR SOLUTION ORAL DAILY PRN
Status: DISCONTINUED | OUTPATIENT
Start: 2018-03-18 | End: 2018-03-19 | Stop reason: HOSPADM

## 2018-03-18 RX ORDER — LANOLIN ALCOHOL/MO/W.PET/CERES
3 CREAM (GRAM) TOPICAL
Status: DISCONTINUED | OUTPATIENT
Start: 2018-03-18 | End: 2018-03-19 | Stop reason: HOSPADM

## 2018-03-18 RX ADMIN — SENNOSIDES AND DOCUSATE SODIUM 2 TABLET: 8.6; 5 TABLET ORAL at 08:32

## 2018-03-18 RX ADMIN — ACETAMINOPHEN 650 MG: 325 TABLET, FILM COATED ORAL at 12:18

## 2018-03-18 RX ADMIN — DULOXETINE 30 MG: 30 CAPSULE, DELAYED RELEASE ORAL at 20:21

## 2018-03-18 RX ADMIN — OXYCODONE HYDROCHLORIDE 10 MG: 5 TABLET ORAL at 21:26

## 2018-03-18 RX ADMIN — FUROSEMIDE 40 MG: 40 TABLET ORAL at 08:32

## 2018-03-18 RX ADMIN — METOPROLOL TARTRATE 25 MG: 25 TABLET ORAL at 08:32

## 2018-03-18 RX ADMIN — OXYCODONE HYDROCHLORIDE 10 MG: 5 TABLET ORAL at 12:18

## 2018-03-18 RX ADMIN — Medication 1 G: at 08:33

## 2018-03-18 RX ADMIN — ROSUVASTATIN CALCIUM 10 MG: 10 TABLET, FILM COATED ORAL at 08:32

## 2018-03-18 RX ADMIN — PANTOPRAZOLE SODIUM 40 MG: 40 TABLET, DELAYED RELEASE ORAL at 08:32

## 2018-03-18 RX ADMIN — ACETAMINOPHEN 650 MG: 325 TABLET, FILM COATED ORAL at 17:46

## 2018-03-18 RX ADMIN — FERROUS SULFATE TAB 325 MG (65 MG ELEMENTAL FE) 325 MG: 325 (65 FE) TAB at 08:32

## 2018-03-18 RX ADMIN — METOPROLOL TARTRATE 25 MG: 25 TABLET ORAL at 20:21

## 2018-03-18 RX ADMIN — DULOXETINE 30 MG: 30 CAPSULE, DELAYED RELEASE ORAL at 08:32

## 2018-03-18 RX ADMIN — LIDOCAINE: 50 OINTMENT TOPICAL at 08:33

## 2018-03-18 RX ADMIN — POLYETHYLENE GLYCOL 3350 17 G: 17 POWDER, FOR SOLUTION ORAL at 08:12

## 2018-03-18 RX ADMIN — ACETAMINOPHEN 650 MG: 325 TABLET, FILM COATED ORAL at 05:08

## 2018-03-18 ASSESSMENT — ACTIVITIES OF DAILY LIVING (ADL)
ADLS_ACUITY_SCORE: 21

## 2018-03-18 NOTE — PROGRESS NOTES
[facility]    Neurosurgery  Daily Post-Op Note    Assessment & Plan   Procedure(s):  LAMINECTOMY LUMBAR THREE+ LEVELS   -5 Days Post-Op  Doing well.  Pain well-controlled.  Tolerating physical therapy and rehabilitation well.  numbness improving.   Plan:  -Ambulate  -Advance activity as tolerated  -awaiting placement, can go when bed ready.     Rosales Beavers    Interval History   Doing well.  Continues to improve.  Pain is well-controlled.  No fevers.      Physical Exam   Temp: 97.7  F (36.5  C) Temp src: Oral BP: 129/61   Heart Rate: 77 Resp: 16 SpO2: 95 % O2 Device: None (Room air)    There were no vitals filed for this visit.  Vital Signs with Ranges  Temp:  [97.5  F (36.4  C)-98.3  F (36.8  C)] 97.7  F (36.5  C)  Heart Rate:  [77-93] 77  Resp:  [16-18] 16  BP: (116-130)/(46-84) 129/61  SpO2:  [94 %-97 %] 95 %  I/O last 3 completed shifts:  In: 1220 [P.O.:1220]  Out: 1580 [Urine:1580]    Alert and oriented.  Moves all extremities equally.      Incision: Covered, no drainage/discharge.       Medications        senna-docusate  2 tablet Oral Daily     polyethylene glycol  17 g Oral Daily     furosemide  40 mg Oral Daily     ferrous sulfate  325 mg Oral Daily with breakfast     DULoxetine  30 mg Oral BID     lidocaine   Topical Daily     metoprolol tartrate  25 mg Oral BID     pantoprazole  40 mg Oral Daily     rosuvastatin  10 mg Oral Daily     TRIAD HYDROPHILIC WOUND DRESSI  1 applicator Apply externally Daily     sodium chloride (PF)  3 mL Intracatheter Q8H     insulin aspart  1-7 Units Subcutaneous TID AC     insulin aspart  1-5 Units Subcutaneous At Bedtime             Rosales Beavers PA-C  [facility]

## 2018-03-18 NOTE — PLAN OF CARE
Problem: Laminectomy/Foraminotomy/Discectomy (Adult)  Goal: Signs and Symptoms of Listed Potential Problems Will be Absent, Minimized or Managed (Laminectomy/Foraminotomy/Discectomy)  Signs and symptoms of listed potential problems will be absent, minimized or managed by discharge/transition of care (reference Laminectomy/Foraminotomy/Discectomy (Adult) CPG).   Outcome: Improving  A&O x4. POD4. CMS intact, baseline numbness BLE. Bowel sounds active, passing flatus, reports small BM today. VSS. Dressing CDI. Up with 1, GB & W. C/o mild pain, decreased with tylenol, oxycodone. Plan PT, d/c to TCU Monday pending, nrsng cont to monitor.

## 2018-03-18 NOTE — PLAN OF CARE
Problem: Patient Care Overview  Goal: Plan of Care/Patient Progress Review  Outcome: Improving  POD 5.  Pt A/Ox4.  VSS on RA.  Pain managed with PRN Tylenol and oxycodone given x1. CMS intact, baseline numbness BLE.  Pulses with doppler. Up with A1 and GB/walker.  No IV access.  Mod carb diet. BGs upper 200s; hospitalist gave OK for patient to take metformin tablets from home. Medication kept in patients room.  Dry skin.  Back dressing CDI.  Bowel sounds active, + flatus.  Wound care performed on LLE today. Dressing CDI.  Plan to discharge to TCU Monday pending.  Nursing to continue to monitor.

## 2018-03-18 NOTE — PLAN OF CARE
Problem: Patient Care Overview  Goal: Plan of Care/Patient Progress Review  Outcome: No Change    POD 5.  Pt A&Ox4.  VSS on RA.  Pain managed with PRN Tylenol and oxycodone.  CMS intact, baseline numbness BLE.  Pulses with doppler.  Up with A1 and GB/walker.  No IV access.  Mod carb diet.  Dry skin.  Dressing CDI.  Bowel sounds active, + flatus.  Plan to discharge to TCU Monday pending.  Nursing to continue to monitor.

## 2018-03-19 ENCOUNTER — APPOINTMENT (OUTPATIENT)
Dept: PHYSICAL THERAPY | Facility: CLINIC | Age: 68
DRG: 029 | End: 2018-03-19
Attending: NEUROLOGICAL SURGERY
Payer: MEDICARE

## 2018-03-19 VITALS
DIASTOLIC BLOOD PRESSURE: 56 MMHG | TEMPERATURE: 97.6 F | SYSTOLIC BLOOD PRESSURE: 111 MMHG | RESPIRATION RATE: 16 BRPM | OXYGEN SATURATION: 96 %

## 2018-03-19 LAB
GLUCOSE BLDC GLUCOMTR-MCNC: 140 MG/DL (ref 70–99)
GLUCOSE BLDC GLUCOMTR-MCNC: 142 MG/DL (ref 70–99)
GLUCOSE BLDC GLUCOMTR-MCNC: 146 MG/DL (ref 70–99)
GLUCOSE BLDC GLUCOMTR-MCNC: 159 MG/DL (ref 70–99)
GLUCOSE BLDC GLUCOMTR-MCNC: 185 MG/DL (ref 70–99)

## 2018-03-19 PROCEDURE — 25000132 ZZH RX MED GY IP 250 OP 250 PS 637: Mod: GY | Performed by: HOSPITALIST

## 2018-03-19 PROCEDURE — A9270 NON-COVERED ITEM OR SERVICE: HCPCS | Mod: GY | Performed by: PHYSICIAN ASSISTANT

## 2018-03-19 PROCEDURE — 00000146 ZZHCL STATISTIC GLUCOSE BY METER IP

## 2018-03-19 PROCEDURE — A9270 NON-COVERED ITEM OR SERVICE: HCPCS | Mod: GY | Performed by: HOSPITALIST

## 2018-03-19 PROCEDURE — 97116 GAIT TRAINING THERAPY: CPT | Mod: GP | Performed by: PHYSICAL THERAPY ASSISTANT

## 2018-03-19 PROCEDURE — 25000132 ZZH RX MED GY IP 250 OP 250 PS 637: Mod: GY | Performed by: PHYSICIAN ASSISTANT

## 2018-03-19 PROCEDURE — 99232 SBSQ HOSP IP/OBS MODERATE 35: CPT | Performed by: INTERNAL MEDICINE

## 2018-03-19 PROCEDURE — A9270 NON-COVERED ITEM OR SERVICE: HCPCS | Mod: GY | Performed by: INTERNAL MEDICINE

## 2018-03-19 PROCEDURE — 25000132 ZZH RX MED GY IP 250 OP 250 PS 637: Mod: GY | Performed by: INTERNAL MEDICINE

## 2018-03-19 PROCEDURE — 40000447 ZZH STATISTIC PT NICU VISIT: Performed by: PHYSICAL THERAPY ASSISTANT

## 2018-03-19 PROCEDURE — 40000193 ZZH STATISTIC PT WARD VISIT: Performed by: PHYSICAL THERAPY ASSISTANT

## 2018-03-19 PROCEDURE — 99207 ZZC CDG-MDM COMPONENT: MEETS MODERATE - UP CODED: CPT | Performed by: INTERNAL MEDICINE

## 2018-03-19 RX ORDER — LOSARTAN POTASSIUM 25 MG/1
25 TABLET ORAL DAILY
Status: DISCONTINUED | OUTPATIENT
Start: 2018-03-19 | End: 2018-03-19 | Stop reason: HOSPADM

## 2018-03-19 RX ADMIN — ROSUVASTATIN CALCIUM 10 MG: 10 TABLET, FILM COATED ORAL at 10:16

## 2018-03-19 RX ADMIN — Medication 1 G: at 12:39

## 2018-03-19 RX ADMIN — METOPROLOL TARTRATE 25 MG: 25 TABLET ORAL at 10:15

## 2018-03-19 RX ADMIN — FUROSEMIDE 40 MG: 40 TABLET ORAL at 10:15

## 2018-03-19 RX ADMIN — ACETAMINOPHEN 650 MG: 325 TABLET, FILM COATED ORAL at 12:50

## 2018-03-19 RX ADMIN — ACETAMINOPHEN 650 MG: 325 TABLET, FILM COATED ORAL at 01:45

## 2018-03-19 RX ADMIN — FERROUS SULFATE TAB 325 MG (65 MG ELEMENTAL FE) 325 MG: 325 (65 FE) TAB at 10:15

## 2018-03-19 RX ADMIN — LOSARTAN POTASSIUM 25 MG: 25 TABLET ORAL at 10:16

## 2018-03-19 RX ADMIN — PANTOPRAZOLE SODIUM 40 MG: 40 TABLET, DELAYED RELEASE ORAL at 10:15

## 2018-03-19 RX ADMIN — DULOXETINE 30 MG: 30 CAPSULE, DELAYED RELEASE ORAL at 10:16

## 2018-03-19 RX ADMIN — POLYETHYLENE GLYCOL 3350 17 G: 17 POWDER, FOR SOLUTION ORAL at 10:18

## 2018-03-19 RX ADMIN — LIDOCAINE: 50 OINTMENT TOPICAL at 12:39

## 2018-03-19 RX ADMIN — SENNOSIDES AND DOCUSATE SODIUM 2 TABLET: 8.6; 5 TABLET ORAL at 10:16

## 2018-03-19 ASSESSMENT — ACTIVITIES OF DAILY LIVING (ADL)
ADLS_ACUITY_SCORE: 21
ADLS_ACUITY_SCORE: 22

## 2018-03-19 NOTE — PLAN OF CARE
"Problem: Patient Care Overview  Goal: Plan of Care/Patient Progress Review  Outcome: No Change  A&0x4, VSS, CMS with baseline BLE edema and neuropathy. Good strength in all extremities. Skin is pale/blotchy, lots of wounds on arms and legs, bandages on, CDI. Up with Ax1/GB/W, has difficulty going from sit to stand but ambulates well when up. Showered this evening, incision redressed, CDI. Tolerating regular diet, patient is concerned about constipation, took miralax and senna this a.m, encouraged fluids and ambulation as well, passing gas. BG in the 140s, declined insulin at dinner because it \"wasn't helping\", says the metformin works better. Plan is for ARU tomorrow.       "

## 2018-03-19 NOTE — PLAN OF CARE
Problem: Patient Care Overview  Goal: Plan of Care/Patient Progress Review  Outcome: Improving  Pt alert and oriented. Up with 1. CMS intact. Reports minimal pain, tylenol given x1. Blood sugars 159 and 142. Mod carb diet. VSS. Pt has mepilex dressings on arms and legs due to scabs. Reports constipation but declined suppository overnight. Anticipate discharge to ARU today.

## 2018-03-19 NOTE — PLAN OF CARE
Problem: Patient Care Overview  Goal: Plan of Care/Patient Progress Review  Discharge Planner PT   Patient plan for discharge: Back to [facility]  Current status: Pt amb 40' and 50' x2 w/FWW and CGA.  Pt required a seated rest break between each distance. Pt was able to perform sit to stand x3 w/SBA. Pt was able to recall 3/3 spinal percautions.  Barriers to return to prior living situation: Low activity tolerance.   Recommendations for discharge: Return to [facility] with increased assistance and Home PT.   Rationale for recommendations: Pt would benefit from continued skilled home physical therapy to address decreased strength and activity tolerance.    Pt going back to [facility], goals partially met         Entered by: Maria Ines Owen 03/19/2018 3:18 PM       Physical Therapy Discharge Summary    Reason for therapy discharge:    Discharged to home with home therapy.    Progress towards therapy goal(s). See goals on Care Plan in [facility] electronic health record for goal details.  Goals partially met.  Barriers to achieving goals:   discharge from facility.    Therapy recommendation(s):    Continued therapy is recommended.  Rationale/Recommendations:  Home PT to progress mobility.

## 2018-03-19 NOTE — DISCHARGE INSTRUCTIONS
May restart Plavix in 2 weeks on 3-, please follow put with managing doctor regarding dosing.     [facility]  Dr. Mendieta Discharge Instructions Following Spine Surgery  [phone]  Monday - Friday; 8:00 AM - 4:00 PM    In General:   After you have had surgery on your spine, remember do not twist, or excessively flex or extend the area that you had surgery.  These activities can prevent healing.  Pain is normal and to be expected following surgery.  Please call our office to schedule your appointment follow up appointment.      Bowel Care:  Many people have constipation (hard stools) after surgery.  To help prevent constipation: Drink plenty of fluid (8-10 glasses/day); Eat more fiber, such as whole grain bread, bran cereal, and fruits and vegetables; Stay active by walking; Over the counter stool softener may also help.      Medications:  Spine surgery and pain management is unique to all patients.  You will generally be given medications for pain, muscle spasms or tightness, and for constipation during the immediate post op period.  It is important that you use these as prescribed.  Please remember to bring your pill bottles to all of your appointments. Avoid alcoholic beverages while taking narcotic pain medications. You can use ice to areas of pain as needed, 20 minutes at a time.  Changing positions and walking will help loosen your muscles as well.    Driving:  No driving while on narcotic pain medications.  It is state law not to drive while under the influence of a drug to a degree which renders you incapable of safely driving.  The narcotic medication you will be taking after surgery falls under this category.     Activity:   After surgery, most people feel less pain than they have had in a long time.  Walking and light activities will help you regain the use of your muscles.  You are encouraged to walk: start with short walks 5-10 minutes at a time for 4-5  times per day and increase as tolerated.  Stair climbing as tolerated, we recommend you use the railing. No lifting greater than 10 pounds: approximately equal to one gallon of milk. No twisting, bending in the area you have had surgery. No housework, vacuuming, laundry, leaf raking, lawn mowing, or snow removal. Wear your brace (if ordered) as directed.    Showers:  If you have sutures or staples you may shower two days after surgery. It is ok to let water run over your incision but do not touch or scrub on the incision. Pat dry immediately after showering. If there is a dressing in place, you may remove it 2 days after surgery. If you were closed with Derma ng (glue), you may shower without covering the incision. No baths, hot tubs, or pool activity for at least 6 weeks.     Nutrition:  In general, your diet restrictions will not change with your surgery.  You may need to eat small frequent meals initially until your appetite returns.  Eat plenty of high fiber foods and drink plenty of fluids. If you do not have a fluid restriction from or prior to surgery, we recommend 6-8 (8oz) glasses of water per day. Other fluids are fine, but water is best. Nausea is not uncommon; it is a common side effect to many pain medications.  We recommend that you take the pain medications with food, if this does not improve your symptoms, please call us.     Smoking:  For proper healing it is required that you quit using all tobacco products.  This includes smoking, chewing, nicotine gums, and nicotine patches.  Call Dr. Mendieta if these occur: Drainage from your incision, increased pain/redness/swelling, temperatures greater than 101.5, increased leg pain or swelling or unrelieved headaches    Go to the nearest Emergency Room if you experience: chest pain, shortness of breath, neck swelling or swallowing problems

## 2018-03-19 NOTE — PLAN OF CARE
Problem: Patient Care Overview  Goal: Plan of Care/Patient Progress Review  Outcome: Improving  A/O, VSS, O2sats 99% on RA. Given tylenol and repositioned for back, shoulder pain with decrease in pain. Ax1 c gait belt, walker. LLE wound care completed. Plan for likely d/c this afternoon to assisted living facility pending facility approval.

## 2018-03-19 NOTE — PROGRESS NOTES
[facility]    Hospitalist Progress Note  Harjeet Whaley MD    Assessment & Plan      67 year old male with PmHx of Hypertension, CAD s/p LELO, GERD, Type 2 diabetes, sleep apnea, lymphedema, who was admitted on 3/13/2018 for elective T5-9 laminectomies with the neurosurgery team.     1. POD# 6 T5-T9 laminectomies and resection of epidural lipomatosis: For management per Neurosurgeon. Continue PT/OT as tolerated. Continue Oxycodone IR prn and tylenol prn.      2. Acute blood loss anemia: Hb was 8.7-->8.2g/dl on 3/15/18. This is due to recent spine surgeries.     3. Hypertension: Continue metoprolol po bid, lasix and Losartan .      4. CAD s/p LELO in 2016: continue metoprolol po bid and crestor. Restart plavix on 3/27/18 - per Neurosurgeon.     5. Chronic lymphedema: continue lasix.    6. Bilateral lower extremities wounds: continue local wound care.    7. Constipation: for Senna S bid, miralax po qd, dulcolax suppository prn and fleet enemas.    8. Type 2 diabetes with diabetic neuropathy: BG is ranging in the 200s today. Continue insulin aspart sliding scale prn. Restarted Sitgliptin-Metformin. Continue cymbalta. HbA1C was 5.6% on 1/27/18.      9. GERD: Continue oral protonix qd.      10. Sleep apnea: continue CPAP.        DVT Prophylaxis: Ambulate every shift  Code Status: Prior    Disposition: Expected discharge in 1 day.      Interval History   The pt's back pain is controlled on his current therapy. He was concerned that his blood glucose is running in the 200 today.     -Data reviewed today: I reviewed all new labs and imaging results over the last 24 hours. I personally reviewed no images or EKG's today.    Physical Exam   Temp: 98  F (36.7  C) Temp src: Oral BP: 129/63   Heart Rate: 81 Resp: 14 SpO2: 100 % O2 Device: BiPAP/CPAP    There were no vitals filed for this visit.  Vital Signs with Ranges  Temp:  [97.4  F (36.3  C)-98.2  F (36.8  C)] 98  F (36.7  C)  Heart Rate:  [77-87]  81  Resp:  [14-16] 14  BP: (117-129)/(61-84) 129/63  FiO2 (%):  [21 %] 21 %  SpO2:  [94 %-100 %] 100 %  I/O last 3 completed shifts:  In: 360 [P.O.:360]  Out: 2750 [Urine:2750]    Constitutional: Adult male, awake, cooperative, no apparent distress, O2 Sats 98% on RA  Respiratory: BS vesicular bilaterally, but reduced globally, no crackles or wheezing  Cardiovascular: S1 and S2, reg, no murmur noted  GI: Soft, non-distended, non-tender, no masses, BS present+  Skin/Integumen: Dressings over lower extremities wounds+  Exgtremities: Bilat pedal edema 2+. Bilateral lymphedema wraps  ALLY: Surgical dressing on upper back    Medications       SitaGLIPtin-MetFORMIN HCl  2 tablet Oral Daily with breakfast     senna-docusate  2 tablet Oral Daily     polyethylene glycol  17 g Oral Daily     furosemide  40 mg Oral Daily     ferrous sulfate  325 mg Oral Daily with breakfast     DULoxetine  30 mg Oral BID     lidocaine   Topical Daily     metoprolol tartrate  25 mg Oral BID     pantoprazole  40 mg Oral Daily     rosuvastatin  10 mg Oral Daily     TRIAD HYDROPHILIC WOUND DRESSI  1 applicator Apply externally Daily     sodium chloride (PF)  3 mL Intracatheter Q8H     insulin aspart  1-7 Units Subcutaneous TID AC     insulin aspart  1-5 Units Subcutaneous At Bedtime       Data     Recent Labs  Lab 03/15/18  0853 03/14/18  0810 03/13/18  1733 03/13/18  1730  03/13/18  1305   WBC  --  8.6  --   --   --   --    HGB 8.2* 7.8* 9.2* 8.7*  < >  --    MCV  --  88  --   --   --   --    PLT  --  222  --  298  --   --    NA  --  139 139  --   --   --    POTASSIUM  --  4.0 3.8  --   --  4.3   CHLORIDE  --  104  --   --   --   --    CO2  --  29  --   --   --   --    BUN  --  21  --   --   --   --    CR  --  0.90  --   --   --  0.95   ANIONGAP  --  6  --   --   --   --    PHAN  --  7.7*  --   --   --   --    GLC  --  167*  --   --   --  157*   < > = values in this interval not displayed.    No results found for this or any previous visit (from  the past 24 hour(s)).

## 2018-03-19 NOTE — PLAN OF CARE
Patient discharged at 5:15 PM to home with home health. IV was discontinued. Pain at time of discharge was 0/10. Belongings returned to patient.  Discharge instructions and medications reviewed with patient.  Patient verbalized understanding and all questions were answered.  Prescriptions given to patient.  At time of discharge, patient condition was stable and left the unit in a wheelchair escorted by [facility].

## 2018-03-19 NOTE — PROGRESS NOTES
[facility]    Hospitalist Progress Note  Harjeet Whaley MD    Assessment & Plan      67 year old male with PmHx of Hypertension, CAD s/p LELO, GERD, Type 2 diabetes, sleep apnea, lymphedema, who was admitted on 3/13/2018 for elective T5-9 laminectomies with the neurosurgery team.     1. POD# 7 T5-T9 laminectomies and resection of epidural lipomatosis: For management per Neurosurgeon. Continue PT/OT as tolerated. Continue Oxycodone IR prn and tylenol prn.      2. Acute blood loss anemia: Hb was 8.7-->8.2g/dl on 3/15/18. This is due to recent spine surgeries.     3. Hypertension: Continue metoprolol po bid, lasix and Losartan .      4. CAD s/p LELO in 2016: continue metoprolol po bid and crestor. Restart plavix on 3/27/18 - per Neurosurgeon.     5. Chronic lymphedema: continue lasix.    6. Left lower extremity wounds: continue local wound care.    7. Constipation: continue Senna S bid, miralax po qd, dulcolax suppository prn and fleet enemas.    8. Type 2 diabetes with diabetic neuropathy: BG was 146 this am. Continue insulin aspart sliding scale prn and Sitgliptin-Metformin. Continue cymbalta. HbA1C was 5.6% on 1/27/18.      9. GERD: Continue oral protonix 40mg qd.      10. Sleep apnea: continue CPAP.        DVT Prophylaxis: Ambulate every shift  Code Status: Prior    Disposition: Expected discharge today to assisted living facility with Home Health referral.      Interval History   The pt's mid back pain is controlled on his current pain therapy. He has no complaints today. He is happy that, his BG is better controlled with initiation of Sitagliptin-Metformin.     -Data reviewed today: I reviewed all new labs and imaging results over the last 24 hours. I personally reviewed no images or EKG's today.    Physical Exam   Temp: 97.6  F (36.4  C) Temp src: Oral BP: 111/56   Heart Rate: 84 Resp: 16 SpO2: 96 % O2 Device: None (Room air)    There were no vitals filed for this visit.  Vital Signs  with Ranges  Temp:  [97.6  F (36.4  C)-98.5  F (36.9  C)] 97.6  F (36.4  C)  Heart Rate:  [79-90] 84  Resp:  [14-16] 16  BP: (111-129)/(56-65) 111/56  FiO2 (%):  [21 %] 21 %  SpO2:  [96 %-100 %] 96 %  I/O last 3 completed shifts:  In: 120 [P.O.:120]  Out: 700 [Urine:700]    Constitutional: Adult male, awake, cooperative, no apparent distress, O2 Sats 96% on RA  Respiratory: BS vesicular bilaterally, but reduced globally, no crackles or wheezing  Cardiovascular: S1 and S2, reg, no murmur noted  GI: Soft, non-distended, non-tender, no masses, BS present+  Skin/Integumen: Dressings over lower extremities wounds+  Exgtremities: Bilat pedal edema 2+. Bilateral lymphedema wraps  ALLY: Surgical dressing on upper back    Medications       losartan  25 mg Oral Daily     SitaGLIPtin-MetFORMIN HCl  2 tablet Oral Daily with breakfast     senna-docusate  2 tablet Oral Daily     polyethylene glycol  17 g Oral Daily     furosemide  40 mg Oral Daily     ferrous sulfate  325 mg Oral Daily with breakfast     DULoxetine  30 mg Oral BID     lidocaine   Topical Daily     metoprolol tartrate  25 mg Oral BID     pantoprazole  40 mg Oral Daily     rosuvastatin  10 mg Oral Daily     TRIAD HYDROPHILIC WOUND DRESSI  1 applicator Apply externally Daily     sodium chloride (PF)  3 mL Intracatheter Q8H     insulin aspart  1-7 Units Subcutaneous TID AC     insulin aspart  1-5 Units Subcutaneous At Bedtime       Data     Recent Labs  Lab 03/15/18  0853 03/14/18  0810 03/13/18  1733 03/13/18  1730  03/13/18  1305   WBC  --  8.6  --   --   --   --    HGB 8.2* 7.8* 9.2* 8.7*  < >  --    MCV  --  88  --   --   --   --    PLT  --  222  --  298  --   --    NA  --  139 139  --   --   --    POTASSIUM  --  4.0 3.8  --   --  4.3   CHLORIDE  --  104  --   --   --   --    CO2  --  29  --   --   --   --    BUN  --  21  --   --   --   --    CR  --  0.90  --   --   --  0.95   ANIONGAP  --  6  --   --   --   --    PHAN  --  7.7*  --   --   --   --    GLC  --  167*   --   --   --  157*   < > = values in this interval not displayed.    No results found for this or any previous visit (from the past 24 hour(s)).

## 2018-03-20 ENCOUNTER — TELEPHONE (OUTPATIENT)
Dept: FAMILY MEDICINE | Facility: OTHER | Age: 68
End: 2018-03-20

## 2018-03-20 ENCOUNTER — CARE COORDINATION (OUTPATIENT)
Dept: CARE COORDINATION | Facility: CLINIC | Age: 68
End: 2018-03-20

## 2018-03-20 NOTE — TELEPHONE ENCOUNTER
Care Coordination to call for hospital follow up:    Reason for follow up: Glenroy Padilla appeared on our list for being seen in an Emergency Room or a recent Hospital discharge.    Admitting date: 3/13/18  Discharge date: 3/19/18  Location: [facility]  Reason for visit:   Chief Complaint   Patient presents with     Hospital F/U     S/P Spinal Surgery     Rossi Pérez RN, BSN

## 2018-03-20 NOTE — PROGRESS NOTES
Clinic Care Coordination Contact  OUTREACH    Referral Information:     Reason for follow up: Glenroy Padilla appeared on our list for being seen in an Emergency Room or a recent Hospital discharge.     Admitting date: 3/13/18  Discharge date: 3/19/18  Location: [facility]  Reason for visit:        Chief Complaint   Patient presents with     Hospital F/U       S/P Spinal Surgery        Multiple Providers or Specialists: neuro surg    Clinical Concerns:  Current Medical Concerns: Patient is s/p T5-9 Laminectomies. Today patient denies any N/T. Feels he has good LE strength. We began discussion of staple removal plan. He is unaware of where to go for this. He agreed to me finding out this plan and calling him back. He has not heard from [facility]. Writer agreed to call and check SOC status.  Medication Management:  Assisted living and home care oversight.    Functional Status:  Mobility Status: Independent     Psychosocial:  Current living arrangement:: I live in assisted living  Resources and Interventions:  Current Resources: Assisted Living-[facility]-has RN availability an attendants available to assist with hygiene and dressing, Home Care [facility] RN-wound care PT-eval and treat. Writer contacted [facility]. SOC is planned for 3/21. They have yet to notify Rinku. RN will be Neda Aguilar. I requested a return call from her to discuss the followin.PLan for staple removal-Neurosurgery in 10-14 days on or about 3/28  2. Restart Plavix 3/27  3.LLE wound care orders-see hospital discharge AVS       Plan: As above. Writer attempted to call patient back with the above noted information but zero answer-left message to return my call. Will  contact patient in 1-2 business days.    Grant Briseno RN  Clinic Care Coordinator  [facility]  [phone]

## 2018-03-21 ENCOUNTER — PATIENT OUTREACH (OUTPATIENT)
Dept: CARE COORDINATION | Facility: CLINIC | Age: 68
End: 2018-03-21

## 2018-03-21 ENCOUNTER — MEDICAL CORRESPONDENCE (OUTPATIENT)
Dept: HEALTH INFORMATION MANAGEMENT | Facility: CLINIC | Age: 68
End: 2018-03-21

## 2018-03-21 ENCOUNTER — TELEPHONE (OUTPATIENT)
Dept: FAMILY MEDICINE | Facility: OTHER | Age: 68
End: 2018-03-21

## 2018-03-21 NOTE — TELEPHONE ENCOUNTER
Reason for Call: Request for an order or referral:    Order or referral being requested: skilled nursing    Date needed: as soon as possible    Has the patient been seen by the PCP for this problem? YES    Additional comments: skilled nursing 2 times a week for 1 week, 5 times a week for 1 week, 3 times a week for 1 week, 4 times a week for 1 week and 3 times a week for 2 weeks.    Phone number Patient can be reached at:  Other phone number:  [phone]    Best Time:  any    Can we leave a detailed message on this number?  YES    Call taken on 3/21/2018 at 1:27 PM by Leigh Gamboa

## 2018-03-21 NOTE — TELEPHONE ENCOUNTER
Reason for Call:  Form, our goal is to have forms completed with 72 hours, however, some forms may require a visit or additional information.    Type of letter, form or note:  medical    Who is the form from?: [employer] (if other please explain)    Where did the form come from: form was faxed in    What clinic location was the form placed at?: [facility] - [phone]    Where the form was placed: 's Box    What number is listed as a contact on the form?: [phone]       Additional comments: please complete and fax back [phone]    Call taken on 3/21/2018 at 11:22 AM by Olivia Mayfield

## 2018-03-21 NOTE — PROGRESS NOTES
Clinic Care Coordination Contact    OUTREACH    Referral Information:  Referral Source: IP Handoff         Chief Complaint   Patient presents with     Clinic Care Coordination - Follow-up     Care Team        Universal Utilization:   Utilization    Last refreshed: 3/21/2018 12:17 PM:  No Show Count (past year) 2       Last refreshed: 3/21/2018 12:17 PM:  ED visits 2       Last refreshed: 3/21/2018 12:17 PM:  Hospital admissions 2          Current as of: 3/21/2018 12:17 PM             Clinical Concerns:  Called for follow up on yesterday's phone call. Reviewed the following with wife. She will call and schedule appoint for staple removal  1.PLan for staple removal-Neurosurgery in 10-14 days on or about 3/28  2. Restart Plavix 3/27  3.LLE wound care orders-see hospital discharge AVS-home care is doing as instructed     Transportation:  Transportation means:: Family        Resources and Interventions:  Current Resources: [facility]              Future Appointments              In 1 week Nurse, [facility], [facility]    In 1 month Martha Reddy PA-C [facility]    In 2 months Hugh Mendieta MD [facility]           Plan: Home Care in place. No further outreach plan    Grant Briseno RN  Clinic Care Coordinator  [facility]  [phone]

## 2018-03-22 NOTE — TELEPHONE ENCOUNTER
I agree with the current request please contact caller.  Electronically signed:    Julio Cesar Sahu PA-C

## 2018-03-22 NOTE — TELEPHONE ENCOUNTER
Spoke to [facility] informed that provider is approving skilled nursing. Was informed that During the week of 5 visits they are planning staple removal per hospital.   [facility]

## 2018-03-22 NOTE — TELEPHONE ENCOUNTER
Forms have been completed, signed, faxed/mailed, and sent to scanning.  Lisa Zuluaga CMA ([facility])

## 2018-03-23 ENCOUNTER — TELEPHONE (OUTPATIENT)
Dept: FAMILY MEDICINE | Facility: OTHER | Age: 68
End: 2018-03-23

## 2018-03-23 DIAGNOSIS — E11.59 TYPE 2 DIABETES MELLITUS WITH OTHER CIRCULATORY COMPLICATION, WITHOUT LONG-TERM CURRENT USE OF INSULIN (H): Primary | ICD-10-CM

## 2018-03-23 NOTE — TELEPHONE ENCOUNTER
Forms have been completed, signed, faxed/mailed, and sent to scanning.  Lisa Zuluaga CMA ([facility])

## 2018-03-23 NOTE — TELEPHONE ENCOUNTER
Hemoglobin A1c drawn as soon as practical.  Patient is to have an 1800-calorie ADA diet effective immediately.  Follow-up in clinic in 4 weeks.  Paperwork is in the MA box for completion.  Electronically signed:    Julio Cesar Sahu PA-C

## 2018-03-23 NOTE — TELEPHONE ENCOUNTER
Reason for Call:  Form, our goal is to have forms completed with 72 hours, however, some forms may require a visit or additional information.    Type of letter, form or note:  medical    Who is the form from?: [employer] (if other please explain)    Where did the form come from: form was faxed in    What clinic location was the form placed at?: [facility] - [phone]    Where the form was placed: 's Box    What number is listed as a contact on the form?: [phone]       Additional comments: n/a    Call taken on 3/23/2018 at 1:57 PM by Diamond Juarez

## 2018-03-23 NOTE — TELEPHONE ENCOUNTER
Reason for Call:  Form, our goal is to have forms completed with 72 hours, however, some forms may require a visit or additional information.    Type of letter, form or note:  medical     Who is the form from?: [facility] (if other please explain)    Where did the form come from: form was faxed in    What clinic location was the form placed at?: [facility] - [phone]    Where the form was placed: 's Box    What number is listed as a contact on the form?: [phone]       Additional comments: none     Call taken on 3/23/2018 at 8:41 AM by Claire Hinojosa

## 2018-03-26 ENCOUNTER — TELEPHONE (OUTPATIENT)
Dept: FAMILY MEDICINE | Facility: OTHER | Age: 68
End: 2018-03-26

## 2018-03-26 ENCOUNTER — MEDICAL CORRESPONDENCE (OUTPATIENT)
Dept: HEALTH INFORMATION MANAGEMENT | Facility: CLINIC | Age: 68
End: 2018-03-26

## 2018-03-26 ENCOUNTER — TRANSFERRED RECORDS (OUTPATIENT)
Dept: HEALTH INFORMATION MANAGEMENT | Facility: CLINIC | Age: 68
End: 2018-03-26

## 2018-03-26 LAB — HBA1C MFR BLD: 5.3 % (ref 4–5.6)

## 2018-03-26 NOTE — TELEPHONE ENCOUNTER
Check MA box, have signed / co-signed 2 forms already today.  Electronically signed:    Julio Cesar Sahu PA-C

## 2018-03-26 NOTE — TELEPHONE ENCOUNTER
Reason for Call:  Form, our goal is to have forms completed with 72 hours, however, some forms may require a visit or additional information.    Type of letter, form or note:  medical    Who is the form from?: [facility]     Where did the form come from: form was faxed in    What clinic location was the form placed at?: [facility] - [phone]    Where the form was placed: Dr's Box    What number is listed as a contact on the form?: [phone]       Additional comments: none     Call taken on 3/26/2018 at 3:47 PM by Claire Hinojosa

## 2018-03-26 NOTE — TELEPHONE ENCOUNTER
Sona from [facility] is calling to add an OK for OT EVAL and TREAT to his plan of care. She is just looking for a verbal okay. Can call with questions or leave a message at: [phone].

## 2018-03-26 NOTE — TELEPHONE ENCOUNTER
Reason for Call:  Form, our goal is to have forms completed with 72 hours, however, some forms may require a visit or additional information.    Type of letter, form or note:  medical    Who is the form from?: [employer] (if other please explain)    Where did the form come from: form was faxed in    What clinic location was the form placed at?: [facility] - [phone]    Where the form was placed: 's Box    What number is listed as a contact on the form?: [phone]       Additional comments: n/a    Call taken on 3/26/2018 at 1:47 PM by Diamond Juarez

## 2018-03-26 NOTE — TELEPHONE ENCOUNTER
Form faxed and sent to scanning as well as abstracting. Copy in provider's faxed file.    Catina Varma CMA ([facility])

## 2018-03-26 NOTE — TELEPHONE ENCOUNTER
Left a message on the collars ID that we need to have a conversation with respect to this year volume of paperwork we are having to fill out for this resident on almost daily basis.  I would approve of occupational therapy at this point in time.  Electronically signed:    Julio Cesar Sahu PA-C

## 2018-03-26 NOTE — TELEPHONE ENCOUNTER
Forms have been completed, signed, faxed/mailed, and sent to scanning.  Lisa Zuluaga CMA ([facility])

## 2018-03-26 NOTE — TELEPHONE ENCOUNTER
Hemoglobin A1c recheck in 3 months current lab value is at 5.3.   No changes in medication are needed at this point in time.      This needs to be abstracted into this chart.  Paperwork is in the MA box for completion.  Electronically signed:    Julio Cesar Sahu PA-C

## 2018-03-27 NOTE — TELEPHONE ENCOUNTER
Sona home health nurse from [facility] is returning your call, Sona states she doesn't have anything to do with the paper work that gets sent to the clinic please call Jc James at [phone] she is the manager at [facility]. Sona did forward Julio Cesar Sahu's message to Jc so she is aware of it.    Thank you Diamond

## 2018-03-28 ENCOUNTER — OFFICE VISIT (OUTPATIENT)
Dept: NEUROSURGERY | Facility: CLINIC | Age: 68
End: 2018-03-28
Attending: PHYSICIAN ASSISTANT
Payer: MEDICAID

## 2018-03-28 ENCOUNTER — TELEPHONE (OUTPATIENT)
Dept: FAMILY MEDICINE | Facility: OTHER | Age: 68
End: 2018-03-28

## 2018-03-28 ENCOUNTER — HOSPITAL ENCOUNTER (OUTPATIENT)
Dept: MRI IMAGING | Facility: CLINIC | Age: 68
Discharge: HOME | End: 2018-03-28
Attending: PHYSICIAN ASSISTANT | Admitting: PHYSICIAN ASSISTANT
Payer: MEDICAID

## 2018-03-28 VITALS
HEART RATE: 79 BPM | TEMPERATURE: 97.2 F | SYSTOLIC BLOOD PRESSURE: 110 MMHG | OXYGEN SATURATION: 98 % | DIASTOLIC BLOOD PRESSURE: 67 MMHG

## 2018-03-28 DIAGNOSIS — K21.9 GASTROESOPHAGEAL REFLUX DISEASE WITHOUT ESOPHAGITIS: ICD-10-CM
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
## 2018-03-28 DIAGNOSIS — Z98.890 S/P LAMINECTOMY: Primary | ICD-10-CM

## 2018-03-28 DIAGNOSIS — M25.512 ACUTE PAIN OF LEFT SHOULDER: ICD-10-CM

## 2018-03-28 PROCEDURE — 40000269 ZZH STATISTIC NO CHARGE FACILITY FEE

## 2018-03-28 PROCEDURE — 73221 MRI JOINT UPR EXTREM W/O DYE: CPT | Mod: LT

## 2018-03-28 ASSESSMENT — PAIN SCALES - GENERAL: PAINLEVEL: NO PAIN (0)

## 2018-03-28 NOTE — MR AVS SNAPSHOT
After Visit Summary   3/28/2018    Glenroy Padilla    MRN: [id number]           Patient Information     Date Of Birth          1950        Visit Information        Provider Department      3/28/2018 11:20 AM NurseCarmen [facility]        Care Instructions    - Keep your incision clean and dry at all times. No bathing, swimming, or submerging in water until incision is well healed.  Call clinic or go to Emergency Room if you develop any new pain, drainage, swelling, or fever.    - Return on 4/26/18 for post op follow up appointment    - No lifting greater than 10-15 pounds. Limited bending, twisting, and overhead reaching.    -Please call our clinic with questions or concerns: [phone]          Follow-ups after your visit        Your next 10 appointments already scheduled     Mar 28, 2018 12:15 PM CDT   (Arrive by 12:00 PM)   MR SHOULDER LEFT W/O CONTRAST with [id number]   [facility] ([facility])    [address] [place] [address] [phone]           Take your medicines as usual, unless your doctor tells you not to. Bring a list of your current medicines to your exam (including vitamins, minerals and over-the-counter drugs). Also bring the results of similar scans you may have had.  Please remove any body piercings and hair extensions before you arrive.  Follow your doctor s orders. If you do not, we may have to postpone your exam.  You may or may not receive IV contrast for this exam pending the discretion of the Radiologist.  You do not need to do anything special to prepare.  The MRI machine uses a strong magnet. Please wear clothes without metal (snaps, zippers). A sweatsuit works well, or we may give you a hospital gown.   **IMPORTANT** THE INSTRUCTIONS BELOW ARE ONLY FOR THOSE PATIENTS WHO HAVE BEEN PRESCRIBED SEDATION OR GENERAL ANESTHESIA DURING THEIR MRI PROCEDURE:  IF YOUR DOCTOR PRESCRIBED ORAL SEDATION (take  medicine to help you relax during your exam):   You must get the medicine from your doctor (oral medication) before you arrive. Bring the medicine to the exam. Do not take it at home. You ll be told when to take it upon arriving for your exam.   Arrive one hour early. Bring someone who can take you home after the test. Your medicine will make you sleepy. After the exam, you may not drive, take a bus or take a taxi by yourself.  IF YOUR DOCTOR PRESCRIBED IV SEDATION:   Arrive one hour early. Bring someone who can take you home after the test. Your medicine will make you sleepy. After the exam, you may not drive, take a bus or take a taxi by yourself.   No eating 6 hours before your exam. You may have clear liquids up until 4 hours before your exam. (Clear liquids include water, clear tea, black coffee and fruit juice without pulp.)  IF YOUR DOCTOR PRESCRIBED ANESTHESIA (be asleep for your exam):   Arrive 1 1/2 hours early. Bring someone who can take you home after the test. You may not drive, take a bus or take a taxi by yourself.   No eating 8 hours before your exam. You may have clear liquids up until 4 hours before your exam. (Clear liquids include water, clear tea, black coffee and fruit juice without pulp.)   You will spend four to five hours in the recovery room.  Please call the Imaging Department at your exam site with any questions.            Apr 20, 2018 10:00 AM CDT   Office Visit with Julio Cesar Esteban PA-C   [facility] ([facility]    [address], [place] [address] [phone]           Bring a current list of meds and any records pertaining to this visit. For Physicals, please bring immunization records and any forms needing to be filled out. Please arrive 10 minutes early to complete paperwork.            Apr 26, 2018 10:10 AM CDT   Return Visit with Martha Reddy PA-C   [facility]    [address],  [address]  [place] [address]   [phone]            2018  9:00 AM CDT   Return Visit with Hugh Mendieta MD   [facility]    [address]  [place] [address]   [phone]              Who to contact     If you have questions or need follow up information about today's clinic visit or your schedule please contact [facility] directly at [phone].  Normal or non-critical lab and imaging results will be communicated to you by [employer], letter or phone within 4 business days after the clinic has received the results. If you do not hear from us within 7 days, please contact the clinic through [employer] or phone. If you have a critical or abnormal lab result, we will notify you by phone as soon as possible.  Submit refill requests through [employer] or call your pharmacy and they will forward the refill request to us. Please allow 3 business days for your refill to be completed.          Additional Information About Your Visit        MyChart Information     [employer] lets you send messages to your doctor, view your test results, renew your prescriptions, schedule appointments and more. To sign up, go to [email or web address] . Click on \"Log in\" on the left side of the screen, which will take you to the Welcome page. Then click on \"Sign up Now\" on the right side of the page.     You will be asked to enter the access code listed below, as well as some personal information. Please follow the directions to create your username and password.     Your access code is: [email or web address]  Expires: 2018 11:55 AM     Your access code will  in 90 days. If you need help or a new code, please call your [facility] or [phone].        Care EveryWhere ID     This is your Care EveryWhere ID. This could be used by other organizations to access your [facility] medical records  [id number]        Your Vitals Were     " Pulse Temperature Pulse Oximetry             79 97.2  F (36.2  C) (Oral) 98%          Blood Pressure from Last 3 Encounters:   03/28/18 110/67   03/19/18 111/56   03/09/18 122/76    Weight from Last 3 Encounters:   03/09/18 (!) 303 lb (137.4 kg)   03/01/18 299 lb (135.6 kg)   02/15/18 (!) 303 lb (137.4 kg)              Today, you had the following     No orders found for display       Primary Care Provider Office Phone # Fax #    Julio Cesar Esteban PA-C [phone]       [place] [address]        Equal Access to Services     JAZ TOBAR : Pietro Omalley, wathierno cody, qaybta kaalmada duke, adams horton. So [facility] [phone].    ATENCIÓN: Si habla español, tiene a wilkinson disposición servicios gratuitos de asistencia lingüística. Llame al [phone].    We comply with applicable federal civil rights laws and [place] laws. We do not discriminate on the basis of race, color, national origin, age, disability, sex, sexual orientation, or gender identity.            Thank you!     Thank you for choosing [facility]  for your care. Our goal is always to provide you with excellent care. Hearing back from our patients is one way we can continue to improve our services. Please take a few minutes to complete the written survey that you may receive in the mail after your visit with us. Thank you!             Your Updated Medication List - Protect others around you: Learn how to safely use, store and throw away your medicines at www.disposemymeds.org.          This list is accurate as of 3/28/18 11:55 AM.  Always use your most recent med list.                   Brand Name Dispense Instructions for use Diagnosis    ferrous sulfate 325 (65 FE) MG tablet    IRON    90 tablet    Take 1 tablet (325 mg) by mouth daily (with breakfast)    History of anemia       furosemide 40 MG tablet    LASIX    90 tablet     Take 1 tablet (40 mg) by mouth daily    Hypertension, goal below 140/90       GLUCOCARD EXPRESSION MONITOR W/DEVICE Kit      USE TWICE DAILY TO TEST BLOOD GLUCOSE        lidocaine 5 % ointment    XYLOCAINE    50 g    Apply topically daily    Chronic pain of right knee       losartan 25 MG tablet    COZAAR    90 tablet    Take 1 tablet (25 mg) by mouth daily    Hypertension, goal below 140/90, Type 2 diabetes mellitus with other circulatory complication, without long-term current use of insulin (H)       methocarbamol 500 MG tablet    ROBAXIN    70 tablet    Take 1 tablet (500 mg) by mouth 4 times daily as needed for muscle spasms    S/P spinal surgery       metoprolol tartrate 25 MG tablet    LOPRESSOR    28 tablet    Take 1 tablet (25 mg) by mouth 2 times daily    Type 2 diabetes mellitus with other circulatory complication, without long-term current use of insulin (H), History of coronary artery stent placement, Hypertension, goal below 140/90       order for DME     1 Box    One touch glucose monitoring strip with Glucometer and lancets.    Type 2 diabetes mellitus with other circulatory complication, without long-term current use of insulin (H)       oxyCODONE IR 5 MG tablet    ROXICODONE    70 tablet    Take 1-2 tablets (5-10 mg) by mouth every 4 hours as needed for other (pain control or improvement in physical function. Hold dose for analgesic side effects.)    S/P spinal surgery       pantoprazole 40 MG EC tablet    PROTONIX    1 tablet    Take 1 tablet (40 mg) by mouth daily Take 30-60 minutes before a meal.    Gastroesophageal reflux disease without esophagitis       rosuvastatin 10 MG tablet    CRESTOR    90 tablet    Take 1 tablet (10 mg) by mouth daily    Type 2 diabetes mellitus with other circulatory complication, without long-term current use of insulin (H)       senna-docusate 8.6-50 MG per tablet    SENOKOT-S;PERICOLACE    100 tablet    Take 1 tablet by mouth 2 times daily as needed for  constipation    Spinal stenosis, lumbar region, without neurogenic claudication       SitaGLIPtin-MetFORMIN HCl  MG Tb24      Take 2 tablets by mouth daily (with breakfast)        TRIAD HYDROPHILIC WOUND DRESSI Pste     1 Tube    Externally apply 1 g topically daily    Open wound of right lower leg, initial encounter

## 2018-03-28 NOTE — PROGRESS NOTES
Suture/Staple removal visit note:    S:  Patient s/p T5-9 laminectomies, resection of epidural lipomatosis on 3/13/18. Patient has minimal pain today. He has weaned off narcotics and taking tylenol as needed to manage pain. Reviewed incision care, activity restrictions, and follow up appts.   O:  [place] removed by Kymberly HERRON. Wound is healing well without erythema, fluctuation, induration, drainage or fever.  A: Patient returned to clinic post-op for staple removal.  Patient tolerated procedure without incident.  P:    - Keep your incision clean and dry at all times. No bathing, swimming, or submerging in water until incision is well healed.  Call clinic or go to Emergency Room if you develop any new pain, drainage, swelling, or fever.    - Return on 4/26/18 for follow up appt    - No lifting greater than 10-15 pounds. Limited bending, twisting, and overhead reaching.    Tara Mock RN  [facility]  [address] [address]    Tel [phone]

## 2018-03-28 NOTE — LETTER
3/28/2018         RE: Glenroy Padilla  [address] [address]        Dear Colleague,    Thank you for referring your patient, Glenroy Padilla, to the [facility]. Please see a copy of my visit note below.    Suture/Staple removal visit note:    S:  Patient s/p T5-9 laminectomies, resection of epidural lipomatosis on 3/13/18. Patient has minimal pain today. He has weaned off narcotics and taking tylenol as needed to manage pain. Reviewed incision care, activity restrictions, and follow up appts.   O:  Macie removed by Kymberly HERRON. Wound is healing well without erythema, fluctuation, induration, drainage or fever.  A: Patient returned to clinic post-op for staple removal.  Patient tolerated procedure without incident.  P:    - Keep your incision clean and dry at all times. No bathing, swimming, or submerging in water until incision is well healed.  Call clinic or go to Emergency Room if you develop any new pain, drainage, swelling, or fever.    - Return on 4/26/18 for follow up appt    - No lifting greater than 10-15 pounds. Limited bending, twisting, and overhead reaching.    Tara Mock RN  [facility]  [address] [address]    Tel [phone]    Again, thank you for allowing me to participate in the care of your patient.        Sincerely,         Neurosurgery Nurse

## 2018-03-28 NOTE — PATIENT INSTRUCTIONS
- Keep your incision clean and dry at all times. No bathing, swimming, or submerging in water until incision is well healed.  Call clinic or go to Emergency Room if you develop any new pain, drainage, swelling, or fever.    - Return on 4/26/18 for post op follow up appointment    - No lifting greater than 10-15 pounds. Limited bending, twisting, and overhead reaching.    -Please call our clinic with questions or concerns: [phone]

## 2018-03-28 NOTE — TELEPHONE ENCOUNTER
Josselin Montes RT (R) contacted Glenroy on 03/28/18 and left a message. If patient calls back please inform patient of results below, thanks    Notes Recorded by Julio Cesar Sahu PA-C on 3/28/2018 at 2:56 PM  Left rotator cuff tear that may benefit from surgical consultation.  Please inform patient and see if he would be willing to see our orthopedic specialists in [place].  Electronically signed:    Julio Cesar Sahu PA-C

## 2018-03-29 NOTE — TELEPHONE ENCOUNTER
Attempted to reach the patient with the following results:  Left message on voicemail for the patient to call back.   Meagan Palacios MA

## 2018-03-29 NOTE — TELEPHONE ENCOUNTER
Patient's wife returning call. Informed of message below. They are scheduled for consult on April 4  Meagan Palacios MA

## 2018-03-29 NOTE — TELEPHONE ENCOUNTER
pantoprazole (PROTONIX) 40 MG EC tablet  Routing refill request to provider for review/approval because:  A break in medication, only receiving one tablet in the past 3 months.     DULoxetine (CYMBALTA) 30 MG EC capsule (Discontinued)  Routing refill request to provider for review/approval because:  Drug not active on patient's medication list, discontinued 03/16/2018  A break in medication, only receiving one tablet in the past 3 months.     Please review and advise on quantity to be administered.   Hermelinda Coronado, RN, BSN

## 2018-03-30 ENCOUNTER — TELEPHONE (OUTPATIENT)
Dept: FAMILY MEDICINE | Facility: OTHER | Age: 68
End: 2018-03-30

## 2018-03-30 ENCOUNTER — MEDICAL CORRESPONDENCE (OUTPATIENT)
Dept: HEALTH INFORMATION MANAGEMENT | Facility: CLINIC | Age: 68
End: 2018-03-30

## 2018-03-30 NOTE — TELEPHONE ENCOUNTER
Reason for Call:  Form, our goal is to have forms completed with 72 hours, however, some forms may require a visit or additional information.     Type of letter, form or note:  medical     Who is the form from?: [facility] (if other please explain)     Where did the form come from: form was faxed in     What clinic location was the form placed at?: [facility] - [phone]     Where the form was placed: 's Box     What number is listed as a contact on the form?: [phone]     Additional comments: none      Call taken on 3/26/2018 at 8:39 AM by Claire Hinojosa

## 2018-04-01 RX ORDER — PANTOPRAZOLE SODIUM 40 MG/1
40 TABLET, DELAYED RELEASE ORAL DAILY
Qty: 30 TABLET | Refills: 0 | Status: SHIPPED | OUTPATIENT
Start: 2018-04-01 | End: 2019-02-21

## 2018-04-02 NOTE — PROGRESS NOTES
SUBJECTIVE:   Glenroy Padilla is a 67 year old male who presents to clinic today for the following health issues:    History of Present Illness     Diabetes:     Frequency of checking blood sugars::  2 times a day    Diabetic concerns::  None    Hypoglycemia symptoms::  None    Paraesthesia present::  No    Eye Exam in the last year::  Yes    may 2017    Diet:  Diabetic  Frequency of exercise:  2-3 days/week  Duration of exercise:  15-30 minutes  Taking medications regularly:  Yes  Medication side effects:  Not applicable  Additional concerns today:  No    Problem list and histories reviewed & adjusted, as indicated.  Additional history: as documented      Patient Active Problem List   Diagnosis     Type 2 diabetes mellitus with other circulatory complication, without long-term current use of insulin (H)     Venous stasis ulcer of left lower extremity (H)     Acute pain of left knee     Chronic pain of left knee     Open wound of left lower extremity without complication, initial encounter     Hx of coronary artery disease     Hx of heart artery stent     [place] (obstructive sleep apnea)     Hypertension, goal below 140/90     Hyperlipidemia LDL goal <100     History of coronary artery stent placement     Advanced directives, counseling/discussion     Obesity, morbid, BMI 40.0-49.9 (H)     Neuropathy     Foot drop, right     Cardiomegaly     Gastroesophageal reflux disease without esophagitis     Falls frequently     Weakness of both legs     Central spinal stenosis L3-4 and L4-5     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     Lymphedema     S/P spinal surgery     Complete tear of left rotator cuff     Past Surgical History:   Procedure Laterality Date     CARDIAC SURGERY      stent placement     CHOLECYSTECTOMY       LAMINECTOMY LUMBAR THREE+ LEVELS N/A 3/13/2018    Procedure: LAMINECTOMY LUMBAR THREE+ LEVELS;  T5-9 LAMINECTOMIES, RESECTION OF EPIDURAL LIPOMATOSIS;  Surgeon: Hugh Mendieta MD;  Location:  OR        Social History   Substance Use Topics     Smoking status: Former Smoker     Types: Cigars     Smokeless tobacco: Never Used      Comment: exposure to second hand smoke     Alcohol use No     History reviewed. No pertinent family history.      Current Outpatient Prescriptions   Medication Sig Dispense Refill     clopidogrel (PLAVIX) 75 MG tablet Take 1 tablet (75 mg) by mouth daily 90 tablet 1     acetaminophen (TYLENOL) 500 MG tablet Take 2 tablets (1,000 mg) by mouth 2 times daily 100 tablet 0     pantoprazole (PROTONIX) 40 MG EC tablet Take 1 tablet (40 mg) by mouth daily Take 30-60 minutes before a meal. 30 tablet 0     ferrous sulfate (IRON) 325 (65 FE) MG tablet Take 1 tablet (325 mg) by mouth daily (with breakfast) 90 tablet 2     furosemide (LASIX) 40 MG tablet Take 1 tablet (40 mg) by mouth daily 90 tablet 1     losartan (COZAAR) 25 MG tablet Take 1 tablet (25 mg) by mouth daily 90 tablet 1     rosuvastatin (CRESTOR) 10 MG tablet Take 1 tablet (10 mg) by mouth daily 90 tablet 1     methocarbamol (ROBAXIN) 500 MG tablet Take 1 tablet (500 mg) by mouth 4 times daily as needed for muscle spasms 70 tablet 1     SitaGLIPtin-MetFORMIN HCl  MG TB24 Take 2 tablets by mouth daily (with breakfast)        Blood Glucose Monitoring Suppl (GLUCOCARD EXPRESSION MONITOR) W/DEVICE KIT USE TWICE DAILY TO TEST BLOOD GLUCOSE  0     metoprolol tartrate (LOPRESSOR) 25 MG tablet Take 1 tablet (25 mg) by mouth 2 times daily 28 tablet 0     senna-docusate (SENOKOT-S;PERICOLACE) 8.6-50 MG per tablet Take 1 tablet by mouth 2 times daily as needed for constipation 100 tablet      Wound Dressings (TRIAD HYDROPHILIC WOUND DRESSI) PSTE Externally apply 1 g topically daily 1 Tube 3     lidocaine (XYLOCAINE) 5 % ointment Apply topically daily 50 g 3     order for DME One touch glucose monitoring strip with Glucometer and lancets. 1 Box 1     Allergies   Allergen Reactions     No Known Allergies      Recent Labs   Lab Test   03/14/18   0810  03/13/18   1733  03/13/18   1305  03/09/18   1020   01/27/18   1825  09/25/17   1216  05/22/17   1029   A1C   --    --    --    --    --   5.6  6.2*  6.4*   LDL   --    --    --   76   --    --   57  64   HDL   --    --    --    --    --    --   43  42   TRIG   --    --    --    --    --    --   140  118   ALT   --    --    --   19   --    --   19  20   CR  0.90   --   0.95  0.91   < >  1.17  0.92  0.91   GFRESTIMATED  84   --   79  82   < >  62  81  82   GFRESTBLACK  >90   --   >90  >90   < >  75  >90  >90   GFR Calc     POTASSIUM  4.0  3.8  4.3  4.6   < >  3.8  4.1  4.0   TSH   --    --    --    --    --    --   2.07   --     < > = values in this interval not displayed.      BP Readings from Last 3 Encounters:   04/05/18 120/62   04/04/18 131/77   03/28/18 110/67    Wt Readings from Last 3 Encounters:   04/04/18 298 lb (135.2 kg)   03/09/18 (!) 303 lb (137.4 kg)   03/01/18 299 lb (135.6 kg)                  Labs reviewed in EPIC    ROS:  Constitutional, HEENT, cardiovascular, pulmonary, gi and gu systems are negative, except as otherwise noted.    OBJECTIVE:     /62 (Cuff Size: Adult Large)  Pulse 79  Temp 97.8  F (36.6  C) (Temporal)  Resp 20  SpO2 98%  There is no height or weight on file to calculate BMI.  GENERAL: healthy, alert and no distress  RESP: lungs clear to auscultation - no rales, rhonchi or wheezes  CV: regular rate and rhythm, normal S1 S2, no S3 or S4, no murmur, click or rub, no peripheral edema and peripheral pulses strong  MS: He is making good progress with respect to healing from his back surgery.  Right now is only using the wheelchair as an aid for transportation.  He is able to walk to the dining center at his extended care facility.  I encouraged him to walk as much as he possibly can.  He still is struggling with left knee pain and would like to have further evaluation with orthopedics for possible replacement.  He is not satisfied with his  "previous orthopedic specialist earlier this week with respect to the left shoulder would like a second opinion.  SKIN: no suspicious lesions or rashes  NEURO: Normal strength and tone, mentation intact and speech normal  PSYCH: mentation appears normal, affect normal/bright      ASSESSMENT/PLAN:     BMI:   Estimated body mass index is 42.76 kg/(m^2) as calculated from the following:    Height as of 4/4/18: 5' 10\" (1.778 m).    Weight as of 4/4/18: 298 lb (135.2 kg).   Weight management plan: Discussed healthy diet and exercise guidelines and patient will follow up in 6 months in clinic to re-evaluate.      1. Type 2 diabetes mellitus with other circulatory complication, without long-term current use of insulin (H)  2. S/P spinal surgery  3. Hypertension, goal below 140/90  4. Hyperlipidemia LDL goal <100  Good control at this point in time continue current medications updated med list.  - clopidogrel (PLAVIX) 75 MG tablet; Take 1 tablet (75 mg) by mouth daily  Dispense: 90 tablet; Refill: 1      5. Complete tear of left rotator cuff  Advised an opinion with respect to this again.  - ORTHOPEDICS ADULT REFERRAL  - acetaminophen (TYLENOL) 500 MG tablet; Take 2 tablets (1,000 mg) by mouth 2 times daily  Dispense: 100 tablet; Refill: 0    6. Chronic pain of left knee  Advised an opinion in consultation with respect to this.  - ORTHOPEDICS ADULT REFERRAL  - acetaminophen (TYLENOL) 500 MG tablet; Take 2 tablets (1,000 mg) by mouth 2 times daily  Dispense: 100 tablet; Refill: 0    Follow-up in 6 months.  We will have them schedule his Tylenol on a regular basis.  Continue all other medications.    Julio Cesar Sahu PA-C  [facility]  Answers for HPI/ROS submitted by the patient on 4/5/2018   PHQ-2 Score: 0    "

## 2018-04-03 NOTE — PROGRESS NOTES
ORTHOPEDIC CONSULT      Chief Complaint: Glenroy Padilla is a 67 year old right hand dominant male who works as a currently in assisted living.        He is being seen for   Chief Complaints and History of Present Illnesses   Patient presents with     Consult     left shoulder pain per Julio Cesar Esteban PA-C          History of Present Illness:   Glenroy Padilla is a 67 year old male who is seen in consultation at the request of Julio Cesar Esteban PA-C .  History of Present illness:  Glenroy presents for evaluation of:  1.) left   Onset: 3 wks out  Symptoms brought on by fall.   Character:  throbbing and radiation of pain down arm.    Progression of symptoms:  same.    Previous similar pain: no .   Pain Level:  6/10.   Previous treatments:  ice and acetaminophen.  Currently on Blood thinners? yes  Diagnosis of Diabetes? Yes   H/o of multiple falls and found out to have what sounds like spinal stenosis, had back surgery recently, in  and assisted living  Getting PT/OT      Patient's past medical, surgical, social and family histories reviewed.       Past Medical History:   Diagnosis Date     Acute pain of left knee 5/22/2017     Chronic pain of right knee 5/22/2017     Hx of coronary artery disease 5/22/2017     Hx of heart artery stent 5/22/2017     Obesity, morbid, BMI 40.0-49.9 (H) 11/20/2017     Open wound of left lower extremity without complication, initial encounter 5/22/2017     [place] (obstructive sleep apnea) 5/22/2017     Type 2 diabetes mellitus with other circulatory complication, without long-term current use of insulin (H) 5/22/2017     Venous stasis ulcer of left lower extremity (H) 5/22/2017     Venous stasis ulcer of left lower extremity (H) 5/22/2017         Past Surgical History:   Procedure Laterality Date     CARDIAC SURGERY      stent placement     CHOLECYSTECTOMY       LAMINECTOMY LUMBAR THREE+ LEVELS N/A 3/13/2018    Procedure: LAMINECTOMY LUMBAR THREE+ LEVELS;  T5-9 LAMINECTOMIES, RESECTION OF  EPIDURAL LIPOMATOSIS;  Surgeon: Hugh Mendieta MD;  Location:  OR         Medications:    Current Outpatient Prescriptions on File Prior to Visit:  pantoprazole (PROTONIX) 40 MG EC tablet Take 1 tablet (40 mg) by mouth daily Take 30-60 minutes before a meal.   ferrous sulfate (IRON) 325 (65 FE) MG tablet Take 1 tablet (325 mg) by mouth daily (with breakfast)   furosemide (LASIX) 40 MG tablet Take 1 tablet (40 mg) by mouth daily   losartan (COZAAR) 25 MG tablet Take 1 tablet (25 mg) by mouth daily   rosuvastatin (CRESTOR) 10 MG tablet Take 1 tablet (10 mg) by mouth daily   methocarbamol (ROBAXIN) 500 MG tablet Take 1 tablet (500 mg) by mouth 4 times daily as needed for muscle spasms   SitaGLIPtin-MetFORMIN HCl  MG TB24 Take 2 tablets by mouth daily (with breakfast)    Blood Glucose Monitoring Suppl (GLUCOCARD EXPRESSION MONITOR) W/DEVICE KIT USE TWICE DAILY TO TEST BLOOD GLUCOSE   metoprolol tartrate (LOPRESSOR) 25 MG tablet Take 1 tablet (25 mg) by mouth 2 times daily   senna-docusate (SENOKOT-S;PERICOLACE) 8.6-50 MG per tablet Take 1 tablet by mouth 2 times daily as needed for constipation   Wound Dressings (TRIAD HYDROPHILIC WOUND DRESSI) PSTE Externally apply 1 g topically daily   lidocaine (XYLOCAINE) 5 % ointment Apply topically daily   order for DME One touch glucose monitoring strip with Glucometer and lancets.     No current facility-administered medications on file prior to visit.       Allergies   Allergen Reactions     No Known Allergies          Social History     Occupational History     Not on file.     Social History Main Topics     Smoking status: Former Smoker     Types: Cigars     Smokeless tobacco: Never Used      Comment: exposure to second hand smoke     Alcohol use No     Drug use: No     Sexual activity: No       Patient does not use Tobacco products.      No pertinent family history    REVIEW OF SYSTEMS  10 point review systems performed otherwise negative as noted as per history  "of present illness.      Physical Exam:  Vitals: /77  Temp 97  F (36.1  C)  Ht 1.778 m (5' 10\")  Wt 135.2 kg (298 lb)  BMI 42.76 kg/m2  BMI= Body mass index is 42.76 kg/(m^2).    Constitutional: healthy, alert and no acute distress   Psychiatric: mentation appears normal and affect normal/bright  NEURO: no focal deficits  RESP: Normal with easy respirations and no use of accessory muscles to breathe, no audible wheezing or retractions  CV: regular pulse  SKIN: No erythema, rashes, excoriation, or breakdown. No evidence of infection.   JOINT/EXTREMITIES:left shoulder - FROM compared with other side, well-compensated strength in rotator cuff, positive impingement  GAIT: not tested   Lymph: no palpable lymph nodes    Diagnostic Modalities:  left shoulder X-ray: Well preserved glenohumeral articular space. No fracture, dislocation and or lesion. , AC joint narrowing and irregularity   left shoulder MRI:  Rotator cuff full thickness tears: Supraspinatus, Infraspinatus and large tear.  Independent visualization of the images was performed.      Impression: left Rotator cuff tear-full thickness: Supraspinatus, Infraspinatus and large tear - plan nonop tx given recent back surgery, in wheelchair, shoulder well-compensated, and pt not interested in surgery    Plan:  All of the above pertinent physical exam and imaging modalities findings was reviewed with Glenroy and his wife.                                          CONSERVATIVE CARE:  I recommend conservative care for the patient to include NSAIDs, Tylenol, focused self directed physical therapy, formal physical therapy, steroid injections, activity modifications, flexeril. Today I provided or dispensed physical therapy, info, hep, flexeril rx.                                        INJECTION PROCEDURE:  The patient was counseled about an  injection, including discussion of risks (including infection), contents of the injection, rationale for performing the " injection, and expected benefits of the injection. The skin was prepped with alcohol and betadine and then utilizing sterile technique an injection of the left shoulder subacromial space from the anterolateral approach  was performed. The injection consisted 1ml of Kenalog (40mg per 1ml) with 8ml 1% lidocaine plain. The patient tolerated the injection well, and there were no complications. The injection site was covered with a Band-Aid. The injection was performed by Claire Quintanilla M.D.    BP Readings from Last 1 Encounters:   04/04/18 131/77       BP noted to be well controlled today in office.     Return to clinic 6, week(s), or sooner as needed for changes.  Re-x-ray on return: No    Claire Quintanilla M.D.

## 2018-04-04 ENCOUNTER — RADIANT APPOINTMENT (OUTPATIENT)
Dept: GENERAL RADIOLOGY | Facility: OTHER | Age: 68
End: 2018-04-04
Attending: ORTHOPAEDIC SURGERY
Payer: COMMERCIAL

## 2018-04-04 ENCOUNTER — OFFICE VISIT (OUTPATIENT)
Dept: ORTHOPEDICS | Facility: OTHER | Age: 68
End: 2018-04-04
Payer: COMMERCIAL

## 2018-04-04 VITALS
TEMPERATURE: 97 F | BODY MASS INDEX: 42.66 KG/M2 | HEIGHT: 70 IN | DIASTOLIC BLOOD PRESSURE: 77 MMHG | WEIGHT: 298 LBS | SYSTOLIC BLOOD PRESSURE: 131 MMHG

## 2018-04-04 DIAGNOSIS — M25.512 SHOULDER PAIN, LEFT: ICD-10-CM

## 2018-04-04 DIAGNOSIS — M75.122 COMPLETE TEAR OF LEFT ROTATOR CUFF: Primary | ICD-10-CM

## 2018-04-04 PROCEDURE — 73030 X-RAY EXAM OF SHOULDER: CPT | Mod: LT

## 2018-04-04 PROCEDURE — 20610 DRAIN/INJ JOINT/BURSA W/O US: CPT | Mod: LT | Performed by: ORTHOPAEDIC SURGERY

## 2018-04-04 PROCEDURE — 99204 OFFICE O/P NEW MOD 45 MIN: CPT | Mod: 25 | Performed by: ORTHOPAEDIC SURGERY

## 2018-04-04 RX ORDER — CYCLOBENZAPRINE HCL 10 MG
10 TABLET ORAL 2 TIMES DAILY PRN
Qty: 30 TABLET | Refills: 0 | Status: SHIPPED | OUTPATIENT
Start: 2018-04-04 | End: 2018-04-05

## 2018-04-04 ASSESSMENT — PAIN SCALES - GENERAL: PAINLEVEL: SEVERE PAIN (6)

## 2018-04-04 NOTE — MR AVS SNAPSHOT
After Visit Summary   4/4/2018    Glenroy Padilla    MRN: [id number]           Patient Information     Date Of Birth          1950        Visit Information        Provider Department      4/4/2018 10:30 AM Claire Quintanilla MD [facility]        Today's Diagnoses     Complete tear of left rotator cuff    -  1      Care Instructions      Rotator Cuff Tear  The rotator cuff is a group of muscles and tendons that surround the shoulder joint. These muscles and tendons hold the arm in its joint. They also help the shoulder to rotate. The rotator cuff can be torn from overuse or injury. Gradual wear and tear can lead to inflammation of these tendons. This can progress to gradual or sudden tears.  Symptoms of a torn rotator cuff include:    Shoulder pain that gets worse when you raise your arm overhead    Weakness of the shoulder muscles with overhead activity    Popping and clicking when you move your shoulder    Shoulder pain that wakes you up at night when sleeping on the hurt shoulder  Diagnosis is made by an MRI or arthroscopy. This is a surgical procedure to look inside the joint through a small tube. Partial rotator cuff tears can be treated by first resting, then strengthening the rotator cuff muscles.  Anti-inflammatory medicines, such as ibuprofen or naproxen, are useful. A limited number of steroid injections can be given. Surgery may be recommended for complete tears and partial tears that do not respond to medical treatment.  Home care    Avoid activities that make your pain worse. This includes overhead activities, doing the same motion over and over, and heavy lifting.    You may use over-the-counter pain medicines to control pain, unless another medicine was prescribed. If you have chronic liver or kidney disease or ever had a stomach ulcer or GI bleeding, talk with your healthcare provider before using these medicines.    If you were given a sling, use it for comfort.  After your pain decreases, don t keep your arm in the sling all the time. Take your arm out several times a day and move the shoulder joint, as you are able.    Your healthcare provider may recommend gentle pendulum exercises. Stand or sit with your arm vertical and close to your side. Relax your shoulder muscles and gently swing the arm forward and back, side to side, and in small circles for about 5 minutes. Do this once or twice a day. There should be only slight pain with this exercise.    You may benefit from physical therapy or a home exercise program to strengthen your shoulder muscles. This will also increase your pain-free range of motion. Applying heat prior to exercises can help prepare the muscles and joint for activity. Talk to your healthcare provider about what is best for your condition.  Follow-up care  Follow up with your healthcare provider, or as advised.  When to seek medical advice  Call your healthcare provider right away if any of the following occur:    Increasing shoulder pain    Rapid swelling in the involved shoulder or arm    Numbness, tingling, or pain radiating down the arm to the hand    Loss of strength in the affected arm  Date Last Reviewed: 11/23/2015 2000-2017 [employer]. [address] [place] [address]. All rights reserved. This information is not intended as a substitute for professional medical care. Always follow your healthcare professional's instructions.        Understanding a Rotator Cuff Tendon Tear    The rotator cuff is a group of 4 muscles and their tendons in the shoulder. The muscles are located in the front, back, and top of the shoulder joint. They each have a strong band of tissue (tendon) that attaches to the top of the upper arm bone. This helps keep the arm bone firmly in place in the socket of the shoulder joint. The muscles and tendons of the rotator cuff also help the shoulder joint with certain movements. These include reaching  the arm over the head and rotating the arm.  Any one of the rotator cuff tendons can fray or tear from causes such as injury and overuse. A tear may be partial, with some of the tendon still intact. Or it may be a complete tear, with the tendon fully torn. Both types can cause pain and weakness, and limit arm and shoulder movement. A rotator cuff tear often needs treatment to heal properly.  Causes of a rotator cuff tendon tear  Causes can include:    Wear and tear of the tendons from aging or normal use over time    Overuse of the tendons from sports or work activities, especially those that involve repeated overhead movements    Injury to the tendons from a fall or other accident  Symptoms of a rotator cuff tendon tear  Some people with a rotator cuff tendon tear have few or no symptoms. Others may have symptoms that range from mild to severe. Possible symptoms include:    Pain in your shoulder, which may be worse with overhead movements or at night from lying on the affected side    Weakness in your arm and shoulder    Trouble lifting your arm up or rotating your arm    Clicking or crackling sounds when moving or using your arm and shoulder  Treating a rotator cuff tendon tear  Treatment for a rotator cuff tendon tear depends on several factors. These include the severity of the tear and your symptoms. Options may include:    Resting your arm and shoulder. This involves limiting certain movements, such as reaching above your head or lifting your arm up. These can slow healing and worsen symptoms. You may also need to avoid certain sports and types of work for a time.    Cold packs or heat packs. These help reduce pain and swelling.    Prescription or over-the-counter pain medicines. These help reduce pain and swelling.    Injections of medicine into your shoulder. These help relieve pain and swelling for a time.    Physical therapy and exercises.  These help improve strength, flexibility, and range of motion in  your arm and shoulder.     Surgery. You may need surgery if your tendon is completely torn or if other treatments don t relieve your symptoms. Different options are available. In many cases, the damaged tendon is repaired. It is then reattached to your arm bone.  Possible complications    If a partial tear isn t given time to heal, it may get larger or tear completely. You may then need more treatment.    Even with treatment, a partial or complete tear may sometimes have trouble healing. The problem may become long-term (chronic). This can cause ongoing pain, weakness, and limited movement of your arm and shoulder.     When to call your healthcare provider  Call your healthcare provider right away if you have any of these:    Fever of 100.4 F (38 C) or higher, or as directed    Symptoms that don t get better with treatment, or get worse    After surgery, symptoms at the incision sites such as redness, warmth, swelling, bleeding, or drainage    New symptoms   Date Last Reviewed: 3/10/2016    4639-6813 [employer]. [address] [place] [address]. All rights reserved. This information is not intended as a substitute for professional medical care. Always follow your healthcare professional's instructions.        Pendulum (Flexibility)    1. Lean over next to a table, with your left arm supporting your weight on the table.  2. Relax your right arm and let it hang straight down.  3. Slowly begin to swing your right arm in a small [place]. Gradually make the [place] bigger if you can. Change direction after 1 minute of motion.  4. Next, swing your right arm backward and forward. Then move it side to side. Change direction after 1 minute of motion.  5. Repeat these movements for about 5 minutes.  6. Do this exercise 3 times a day, or as often as instructed.  Date Last Reviewed: 3/10/2016    3600-4759 [employer]. [address] [place] [address]. All rights reserved. This  information is not intended as a substitute for professional medical care. Always follow your healthcare professional's instructions.        Shoulder Flexion (Flexibility)    7. Sit in a chair sideways next to a table. Lay your right arm on the table, pointed forward.  8. Slowly lean forward.  Slide your arm forward on the table. Feel the stretch in your right shoulder.  9. Hold for 5 seconds. Slowly sit back up.  10. Repeat 5 times.  11. Repeat this exercise 3 times a day, or as instructed.  Date Last Reviewed: 3/10/2016    5405-5269 [employer]. [address] [place] [address]. All rights reserved. This information is not intended as a substitute for professional medical care. Always follow your healthcare professional's instructions.        Shoulder Exercises: Side Raise    This exercise stretches and strengthens your shoulders. Before starting, read through all the instructions. During the exercise, breathe normally and use smooth movements. Stop if you feel any pain. If pain persists, call your healthcare provider.    Stand straight, holding a ____ pound weight in each hand, arms at sides, feet shoulder-width apart.    Slowly extend your arms up and out until weights are at shoulder level. Slowly return to starting position.    Repeat ____ times. Do ____ sets ____ times a day.     CAUTION: Don t swing the weights or raise weights above shoulder level.   Date Last Reviewed: 8/16/2015 2000-2017 [employer]. [address] [place] [address]. All rights reserved. This information is not intended as a substitute for professional medical care. Always follow your healthcare professional's instructions.        Shoulder Exercises: Internal Rotation    Strengthening exercises help make your injured shoulder more stable. To warm up, do flexibility (stretching) exercises first. Your healthcare provider will tell you what size hand weights to use for the strengthening  exercise below. If you don t have hand weights, try using cans of soup instead:    With knees bent, lie on a firm surface. Using the hand on the same side as your injured shoulder, grasp a weight. Bend that arm to a right angle (90 degrees).    Rest your elbow on the floor.    Keeping your elbow next to your side, lower your forearm toward the floor, away from your body. Do not lower your hand all the way to the floor.    Slowly return your forearm to your side. Repeat.    Work up to 5 to 15 lifts.     Note: Support your head and neck with a pillow.   Date Last Reviewed: 9/30/2015 2000-2017 [employer]. [address] [place] [address]. All rights reserved. This information is not intended as a substitute for professional medical care. Always follow your healthcare professional's instructions.        Shoulder External Rotation, Side-Lying (Strength)    This exercise is for your right shoulder joint. Switch sides if the problem is in your left shoulder joint.  12. Lie on your left side with your head supported by a pillow. Place a small rolled-up towel under your right elbow.  13. Hold a hand weight in your right hand. Your healthcare provider will tell you what size hand weight to use.  14. Bend your arm in front of you at a right angle. Then bend it down and bring the hand weight to the floor. Rest your forearm on your stomach.  15. Keep your elbow on the towel and slowly lift the hand weight up in front of you. Stop when the weight is raised slightly higher than your elbow.  16. Lower the weight back down.  17. Repeat 5 to 15 times, or as instructed.  Date Last Reviewed: 3/10/2016    3281-6549 [employer]. [address] [place] [address]. All rights reserved. This information is not intended as a substitute for professional medical care. Always follow your healthcare professional's instructions.                Follow-ups after your visit        Your next 10  appointments already scheduled     Apr 05, 2018  1:00 PM CDT   Office Visit [facility] Julio Cesar Esteban [facility]-C   [facility]    [address] [facility] [address] [phone]           Bring a current list of meds and any records pertaining to this visit. For Physicals, please bring immunization records and any forms needing to be filled out. Please arrive 10 minutes early to complete paperwork.            Apr 20, 2018 10:00 AM CDT   Office Visit with Julio Cesar Esteban PA-C   [facility]    [address] [facility] [address]   [phone]           Bring a current list of meds and any records pertaining to this visit. For Physicals, please bring immunization records and any forms needing to be filled out. Please arrive 10 minutes early to complete paperwork.            Apr 26, 2018 10:10 AM CDT   [facility] with Martha Reddy PA-C   [facility]    [address] [place] [address], [address]  [facility] [address]   [phone]            Jun 14, 2018  9:00 AM CDT   Return Visit with Hugh Mendieta MD   [facility]    [address] [place] [address], [address]  [facility] [address]   [phone]              Who to contact     If you have questions or need follow up information about today's clinic visit or your schedule please contact [facility] directly at [phone].  Normal or non-critical lab and imaging results will be communicated to you by MyChart, letter or phone within 4 business days after the clinic has received the results. If you do not hear from us within 7 days, please contact the clinic through MyChart or phone. If you have a critical or abnormal lab result, we will notify you by phone as soon as possible.  Submit refill requests through [employer] or call your pharmacy and they will forward the refill request to  "us. Please allow 3 business days for your refill to be completed.          Additional Information About Your Visit        MyChart Information     [employer] lets you send messages to your doctor, view your test results, renew your prescriptions, schedule appointments and more. To sign up, go to [email or web address] . Click on \"Log in\" on the left side of the screen, which will take you to the Welcome page. Then click on \"Sign up Now\" on the right side of the page.     You will be asked to enter the access code listed below, as well as some personal information. Please follow the directions to create your username and password.     Your access code is: [email or web address]  Expires: 2018 11:55 AM     Your access code will  in 90 days. If you need help or a new code, please call your [facility] clinic or [phone].        Care EveryWhere ID     This is your [facility] EveryWhere ID. This could be used by other organizations to access your [facility] medical records  [id number]        Your Vitals Were     Temperature Height BMI (Body Mass Index)             97  F (36.1  C) 1.778 m (5' 10\") 42.76 kg/m2          Blood Pressure from Last 3 Encounters:   18 131/77   18 110/67   18 111/56    Weight from Last 3 Encounters:   18 135.2 kg (298 lb)   18 (!) 137.4 kg (303 lb)   18 135.6 kg (299 lb)              We Performed the Following     Kenalog 40 MG  []     Large Joint/Bursa injection and/or drainage (Shoulder, Knee)          Today's Medication Changes          These changes are accurate as of 18 11:30 AM.  If you have any questions, ask your nurse or doctor.               Start taking these medicines.        Dose/Directions    cyclobenzaprine 10 MG tablet   Commonly known as:  FLEXERIL   Used for:  Complete tear of left rotator cuff   Started by:  Claire Quintanilla MD        Dose:  10 mg   Take 1 tablet (10 mg) by mouth 2 times daily as needed for muscle spasms   Quantity:  30 " tablet   Refills:  0         Stop taking these medicines if you haven't already. Please contact your care team if you have questions.     oxyCODONE IR 5 MG tablet   Commonly known as:  ROXICODONE   Stopped by:  Claire Quintanilla MD                Where to get your medicines      Some of these will need a paper prescription and others can be bought over the counter.  Ask your nurse if you have questions.     Bring a paper prescription for each of these medications     cyclobenzaprine 10 MG tablet                Primary Care Provider Office Phone # Fax #    Julio Cesar Esteban PA-C [phone]       [facility] [id number] [place] [address]        Equal Access to Services     [facility] EKATERINA : Hadii katia bernalo Sogloria, waaxda luqadaha, qaybta kaalmada duke, adams horton. So [facility] [phone].    ATENCIÓN: Si habla español, tiene a wilkinson disposición servicios gratuitos de asistencia lingüística. Ll[facility] [phone].    We comply with applicable federal civil rights laws and [place] laws. We do not discriminate on the basis of race, color, national origin, age, disability, sex, sexual orientation, or gender identity.            Thank you!     Thank you for choosing [facility]  for your care. Our goal is always to provide you with excellent care. Hearing back from our patients is one way we can continue to improve our services. Please take a few minutes to complete the written survey that you may receive in the mail after your visit with us. Thank you!             Your Updated Medication List - Protect others around you: Learn how to safely use, store and throw away your medicines at www.disposemymeds.org.          This list is accurate as of 4/4/18 11:30 AM.  Always use your most recent med list.                   Brand Name Dispense Instructions for use Diagnosis    cyclobenzaprine 10 MG tablet    FLEXERIL    30 tablet    Take 1 tablet (10  mg) by mouth 2 times daily as needed for muscle spasms    Complete tear of left rotator cuff       ferrous sulfate 325 (65 FE) MG tablet    IRON    90 tablet    Take 1 tablet (325 mg) by mouth daily (with breakfast)    History of anemia       furosemide 40 MG tablet    LASIX    90 tablet    Take 1 tablet (40 mg) by mouth daily    Hypertension, goal below 140/90       GLUCOCARD EXPRESSION MONITOR W/DEVICE Kit      USE TWICE DAILY TO TEST BLOOD GLUCOSE        lidocaine 5 % ointment    XYLOCAINE    50 g    Apply topically daily    Chronic pain of right knee       losartan 25 MG tablet    COZAAR    90 tablet    Take 1 tablet (25 mg) by mouth daily    Hypertension, goal below 140/90, Type 2 diabetes mellitus with other circulatory complication, without long-term current use of insulin (H)       methocarbamol 500 MG tablet    ROBAXIN    70 tablet    Take 1 tablet (500 mg) by mouth 4 times daily as needed for muscle spasms    S/P spinal surgery       metoprolol tartrate 25 MG tablet    LOPRESSOR    28 tablet    Take 1 tablet (25 mg) by mouth 2 times daily    Type 2 diabetes mellitus with other circulatory complication, without long-term current use of insulin (H), History of coronary artery stent placement, Hypertension, goal below 140/90       order for DME     1 Box    One touch glucose monitoring strip with Glucometer and lancets.    Type 2 diabetes mellitus with other circulatory complication, without long-term current use of insulin (H)       pantoprazole 40 MG EC tablet    PROTONIX    30 tablet    Take 1 tablet (40 mg) by mouth daily Take 30-60 minutes before a meal.    Gastroesophageal reflux disease without esophagitis       rosuvastatin 10 MG tablet    CRESTOR    90 tablet    Take 1 tablet (10 mg) by mouth daily    Type 2 diabetes mellitus with other circulatory complication, without long-term current use of insulin (H)       senna-docusate 8.6-50 MG per tablet    SENOKOT-S;PERICOLACE    100 tablet    Take 1  tablet by mouth 2 times daily as needed for constipation    Spinal stenosis, lumbar region, without neurogenic claudication       SitaGLIPtin-MetFORMIN HCl  MG Tb24      Take 2 tablets by mouth daily (with breakfast)        TRIAD HYDROPHILIC WOUND DRESSI Pste     1 Tube    Externally apply 1 g topically daily    Open wound of right lower leg, initial encounter

## 2018-04-04 NOTE — NURSING NOTE
"Chief Complaint   Patient presents with     Consult     left shoulder pain per Julio Cesar Esteban PA-C        Initial /77  Temp 97  F (36.1  C)  Ht 1.778 m (5' 10\")  Wt 135.2 kg (298 lb)  BMI 42.76 kg/m2 Estimated body mass index is 42.76 kg/(m^2) as calculated from the following:    Height as of this encounter: 1.778 m (5' 10\").    Weight as of this encounter: 135.2 kg (298 lb).  Medication Reconciliation: complete    BP completed using cuff size: NA (Not Taken)    Deysi Abarca MA      "

## 2018-04-04 NOTE — LETTER
4/4/2018         RE: Glenroy Padilla  [address] [facility] [address]        Dear Colleague,    Thank you for referring your patient, Glenroy Padilla, to the [facility]. Please see a copy of my visit note below.    ORTHOPEDIC CONSULT      Chief Complaint: Glenroy Padilla is a 67 year old right hand dominant male who works as a currently in assisted living.        He is being seen for   Chief Complaints and History of Present Illnesses   Patient presents with     Consult     left shoulder pain per Julio Cesar Esteban PA-C          History of Present Illness:   Glenroy Padilla is a 67 year old male who is seen in consultation at the request of Julio Cesar Esteban PA-C .  History of Present illness:  Glenroy presents for evaluation of:  1.) left   Onset: 3 wks out  Symptoms brought on by fall.   Character:  throbbing and radiation of pain down arm.    Progression of symptoms:  same.    Previous similar pain: no .   Pain Level:  6/10.   Previous treatments:  ice and acetaminophen.  Currently on Blood thinners? yes  Diagnosis of Diabetes? Yes   H/o of multiple falls and found out to have what sounds like spinal stenosis, had back surgery recently, in  and assisted living  Getting PT/OT      Patient's past medical, surgical, social and family histories reviewed.       Past Medical History:   Diagnosis Date     Acute pain of left knee 5/22/2017     Chronic pain of right knee 5/22/2017     Hx of coronary artery disease 5/22/2017     Hx of heart artery stent 5/22/2017     Obesity, morbid, BMI 40.0-49.9 (H) 11/20/2017     Open wound of left lower extremity without complication, initial encounter 5/22/2017     [place] (obstructive sleep apnea) 5/22/2017     Type 2 diabetes mellitus with other circulatory complication, without long-term current use of insulin (H) 5/22/2017     Venous stasis ulcer of left lower extremity (H) 5/22/2017     Venous stasis ulcer of left lower extremity (H) 5/22/2017         Past  Surgical History:   Procedure Laterality Date     CARDIAC SURGERY      stent placement     CHOLECYSTECTOMY       LAMINECTOMY LUMBAR THREE+ LEVELS N/A 3/13/2018    Procedure: LAMINECTOMY LUMBAR THREE+ LEVELS;  T5-9 LAMINECTOMIES, RESECTION OF EPIDURAL LIPOMATOSIS;  Surgeon: Hugh Mendieta MD;  Location:  OR         Medications:    Current Outpatient Prescriptions on File Prior to Visit:  pantoprazole (PROTONIX) 40 MG EC tablet Take 1 tablet (40 mg) by mouth daily Take 30-60 minutes before a meal.   ferrous sulfate (IRON) 325 (65 FE) MG tablet Take 1 tablet (325 mg) by mouth daily (with breakfast)   furosemide (LASIX) 40 MG tablet Take 1 tablet (40 mg) by mouth daily   losartan (COZAAR) 25 MG tablet Take 1 tablet (25 mg) by mouth daily   rosuvastatin (CRESTOR) 10 MG tablet Take 1 tablet (10 mg) by mouth daily   methocarbamol (ROBAXIN) 500 MG tablet Take 1 tablet (500 mg) by mouth 4 times daily as needed for muscle spasms   SitaGLIPtin-MetFORMIN HCl  MG TB24 Take 2 tablets by mouth daily (with breakfast)    Blood Glucose Monitoring Suppl (GLUCOCARD EXPRESSION MONITOR) W/DEVICE KIT USE TWICE DAILY TO TEST BLOOD GLUCOSE   metoprolol tartrate (LOPRESSOR) 25 MG tablet Take 1 tablet (25 mg) by mouth 2 times daily   senna-docusate (SENOKOT-S;PERICOLACE) 8.6-50 MG per tablet Take 1 tablet by mouth 2 times daily as needed for constipation   Wound Dressings (TRIAD HYDROPHILIC WOUND DRESSI) PSTE Externally apply 1 g topically daily   lidocaine (XYLOCAINE) 5 % ointment Apply topically daily   order for DME One touch glucose monitoring strip with Glucometer and lancets.     No current facility-administered medications on file prior to visit.       Allergies   Allergen Reactions     No Known Allergies          Social History     Occupational History     Not on file.     Social History Main Topics     Smoking status: Former Smoker     Types: Cigars     Smokeless tobacco: Never Used      Comment: exposure to second hand  "smoke     Alcohol use No     Drug use: No     Sexual activity: No       Patient does not use Tobacco products.      No pertinent family history    REVIEW OF SYSTEMS  10 point review systems performed otherwise negative as noted as per history of present illness.      Physical Exam:  Vitals: /77  Temp 97  F (36.1  C)  Ht 1.778 m (5' 10\")  Wt 135.2 kg (298 lb)  BMI 42.76 kg/m2  BMI= Body mass index is 42.76 kg/(m^2).    Constitutional: healthy, alert and no acute distress   Psychiatric: mentation appears normal and affect normal/bright  NEURO: no focal deficits  RESP: Normal with easy respirations and no use of accessory muscles to breathe, no audible wheezing or retractions  CV: regular pulse  SKIN: No erythema, rashes, excoriation, or breakdown. No evidence of infection.   JOINT/EXTREMITIES:left shoulder - FROM compared with other side, well-compensated strength in rotator cuff, positive impingement  GAIT: not tested   Lymph: no palpable lymph nodes    Diagnostic Modalities:  left shoulder X-ray: Well preserved glenohumeral articular space. No fracture, dislocation and or lesion. , AC joint narrowing and irregularity   left shoulder MRI:  Rotator cuff full thickness tears: Supraspinatus, Infraspinatus and large tear.  Independent visualization of the images was performed.      Impression: left Rotator cuff tear-full thickness: Supraspinatus, Infraspinatus and large tear - plan nonop tx given recent back surgery, in wheelchair, shoulder well-compensated, and pt not interested in surgery    Plan:  All of the above pertinent physical exam and imaging modalities findings was reviewed with Glenroy and his wife.                                          CONSERVATIVE CARE:  I recommend conservative care for the patient to include NSAIDs, Tylenol, focused self directed physical therapy, formal physical therapy, steroid injections, activity modifications, flexeril. Today I provided or dispensed physical therapy, " info, hep, flexeril rx.                                        INJECTION PROCEDURE:  The patient was counseled about an  injection, including discussion of risks (including infection), contents of the injection, rationale for performing the injection, and expected benefits of the injection. The skin was prepped with alcohol and betadine and then utilizing sterile technique an injection of the left shoulder subacromial space from the anterolateral approach  was performed. The injection consisted 1ml of Kenalog (40mg per 1ml) with 8ml 1% lidocaine plain. The patient tolerated the injection well, and there were no complications. The injection site was covered with a Band-Aid. The injection was performed by Claire Quintanilla M.D.    BP Readings from Last 1 Encounters:   04/04/18 131/77       BP noted to be well controlled today in office.     Return to clinic 6, week(s), or sooner as needed for changes.  Re-x-ray on return: No    Claire Quintanilla M.D.      Again, thank you for allowing me to participate in the care of your patient.        Sincerely,        Claire Quintanilla MD

## 2018-04-04 NOTE — PATIENT INSTRUCTIONS
Rotator Cuff Tear  The rotator cuff is a group of muscles and tendons that surround the shoulder joint. These muscles and tendons hold the arm in its joint. They also help the shoulder to rotate. The rotator cuff can be torn from overuse or injury. Gradual wear and tear can lead to inflammation of these tendons. This can progress to gradual or sudden tears.  Symptoms of a torn rotator cuff include:    Shoulder pain that gets worse when you raise your arm overhead    Weakness of the shoulder muscles with overhead activity    Popping and clicking when you move your shoulder    Shoulder pain that wakes you up at night when sleeping on the hurt shoulder  Diagnosis is made by an MRI or arthroscopy. This is a surgical procedure to look inside the joint through a small tube. Partial rotator cuff tears can be treated by first resting, then strengthening the rotator cuff muscles.  Anti-inflammatory medicines, such as ibuprofen or naproxen, are useful. A limited number of steroid injections can be given. Surgery may be recommended for complete tears and partial tears that do not respond to medical treatment.  Home care    Avoid activities that make your pain worse. This includes overhead activities, doing the same motion over and over, and heavy lifting.    You may use over-the-counter pain medicines to control pain, unless another medicine was prescribed. If you have chronic liver or kidney disease or ever had a stomach ulcer or GI bleeding, talk with your healthcare provider before using these medicines.    If you were given a sling, use it for comfort. After your pain decreases, don t keep your arm in the sling all the time. Take your arm out several times a day and move the shoulder joint, as you are able.    Your healthcare provider may recommend gentle pendulum exercises. Stand or sit with your arm vertical and close to your side. Relax your shoulder muscles and gently swing the arm forward and back, side to side, and  in small circles for about 5 minutes. Do this once or twice a day. There should be only slight pain with this exercise.    You may benefit from physical therapy or a home exercise program to strengthen your shoulder muscles. This will also increase your pain-free range of motion. Applying heat prior to exercises can help prepare the muscles and joint for activity. Talk to your healthcare provider about what is best for your condition.  Follow-up care  Follow up with your healthcare provider, or as advised.  When to seek medical advice  Call your healthcare provider right away if any of the following occur:    Increasing shoulder pain    Rapid swelling in the involved shoulder or arm    Numbness, tingling, or pain radiating down the arm to the hand    Loss of strength in the affected arm  Date Last Reviewed: 11/23/2015 2000-2017 [employer]. [address] [place] [address]. All rights reserved. This information is not intended as a substitute for professional medical care. Always follow your healthcare professional's instructions.        Understanding a Rotator Cuff Tendon Tear    The rotator cuff is a group of 4 muscles and their tendons in the shoulder. The muscles are located in the front, back, and top of the shoulder joint. They each have a strong band of tissue (tendon) that attaches to the top of the upper arm bone. This helps keep the arm bone firmly in place in the socket of the shoulder joint. The muscles and tendons of the rotator cuff also help the shoulder joint with certain movements. These include reaching the arm over the head and rotating the arm.  Any one of the rotator cuff tendons can fray or tear from causes such as injury and overuse. A tear may be partial, with some of the tendon still intact. Or it may be a complete tear, with the tendon fully torn. Both types can cause pain and weakness, and limit arm and shoulder movement. A rotator cuff tear often needs treatment  to heal properly.  Causes of a rotator cuff tendon tear  Causes can include:    Wear and tear of the tendons from aging or normal use over time    Overuse of the tendons from sports or work activities, especially those that involve repeated overhead movements    Injury to the tendons from a fall or other accident  Symptoms of a rotator cuff tendon tear  Some people with a rotator cuff tendon tear have few or no symptoms. Others may have symptoms that range from mild to severe. Possible symptoms include:    Pain in your shoulder, which may be worse with overhead movements or at night from lying on the affected side    Weakness in your arm and shoulder    Trouble lifting your arm up or rotating your arm    Clicking or crackling sounds when moving or using your arm and shoulder  Treating a rotator cuff tendon tear  Treatment for a rotator cuff tendon tear depends on several factors. These include the severity of the tear and your symptoms. Options may include:    Resting your arm and shoulder. This involves limiting certain movements, such as reaching above your head or lifting your arm up. These can slow healing and worsen symptoms. You may also need to avoid certain sports and types of work for a time.    Cold packs or heat packs. These help reduce pain and swelling.    Prescription or over-the-counter pain medicines. These help reduce pain and swelling.    Injections of medicine into your shoulder. These help relieve pain and swelling for a time.    Physical therapy and exercises.  These help improve strength, flexibility, and range of motion in your arm and shoulder.     Surgery. You may need surgery if your tendon is completely torn or if other treatments don t relieve your symptoms. Different options are available. In many cases, the damaged tendon is repaired. It is then reattached to your arm bone.  Possible complications    If a partial tear isn t given time to heal, it may get larger or tear completely. You  may then need more treatment.    Even with treatment, a partial or complete tear may sometimes have trouble healing. The problem may become long-term (chronic). This can cause ongoing pain, weakness, and limited movement of your arm and shoulder.     When to call your healthcare provider  Call your healthcare provider right away if you have any of these:    Fever of 100.4 F (38 C) or higher, or as directed    Symptoms that don t get better with treatment, or get worse    After surgery, symptoms at the incision sites such as redness, warmth, swelling, bleeding, or drainage    New symptoms   Date Last Reviewed: 3/10/2016    4234-4222 [employer]. [address] [place] [address]. All rights reserved. This information is not intended as a substitute for professional medical care. Always follow your healthcare professional's instructions.        Pendulum (Flexibility)    1. Lean over next to a table, with your left arm supporting your weight on the table.  2. Relax your right arm and let it hang straight down.  3. Slowly begin to swing your right arm in a small [place]. Gradually make the [place] bigger if you can. Change direction after 1 minute of motion.  4. Next, swing your right arm backward and forward. Then move it side to side. Change direction after 1 minute of motion.  5. Repeat these movements for about 5 minutes.  6. Do this exercise 3 times a day, or as often as instructed.  Date Last Reviewed: 3/10/2016    7031-8716 [employer]. [address] [place] [address]. All rights reserved. This information is not intended as a substitute for professional medical care. Always follow your healthcare professional's instructions.        Shoulder Flexion (Flexibility)    7. Sit in a chair sideways next to a table. Lay your right arm on the table, pointed forward.  8. Slowly lean forward.  Slide your arm forward on the table. Feel the stretch in your right shoulder.  9. Hold  for 5 seconds. Slowly sit back up.  10. Repeat 5 times.  11. Repeat this exercise 3 times a day, or as instructed.  Date Last Reviewed: 3/10/2016    5936-6325 [employer]. [address] [place] [address]. All rights reserved. This information is not intended as a substitute for professional medical care. Always follow your healthcare professional's instructions.        Shoulder Exercises: Side Raise    This exercise stretches and strengthens your shoulders. Before starting, read through all the instructions. During the exercise, breathe normally and use smooth movements. Stop if you feel any pain. If pain persists, call your healthcare provider.    Stand straight, holding a ____ pound weight in each hand, arms at sides, feet shoulder-width apart.    Slowly extend your arms up and out until weights are at shoulder level. Slowly return to starting position.    Repeat ____ times. Do ____ sets ____ times a day.     CAUTION: Don t swing the weights or raise weights above shoulder level.   Date Last Reviewed: 8/16/2015 2000-2017 [employer]. [address] [place] [address]. All rights reserved. This information is not intended as a substitute for professional medical care. Always follow your healthcare professional's instructions.        Shoulder Exercises: Internal Rotation    Strengthening exercises help make your injured shoulder more stable. To warm up, do flexibility (stretching) exercises first. Your healthcare provider will tell you what size hand weights to use for the strengthening exercise below. If you don t have hand weights, try using cans of soup instead:    With knees bent, lie on a firm surface. Using the hand on the same side as your injured shoulder, grasp a weight. Bend that arm to a right angle (90 degrees).    Rest your elbow on the floor.    Keeping your elbow next to your side, lower your forearm toward the floor, away from your body. Do not lower  your hand all the way to the floor.    Slowly return your forearm to your side. Repeat.    Work up to 5 to 15 lifts.     Note: Support your head and neck with a pillow.   Date Last Reviewed: 9/30/2015 2000-2017 [employer]. [address] [place] [address]. All rights reserved. This information is not intended as a substitute for professional medical care. Always follow your healthcare professional's instructions.        Shoulder External Rotation, Side-Lying (Strength)    This exercise is for your right shoulder joint. Switch sides if the problem is in your left shoulder joint.  12. Lie on your left side with your head supported by a pillow. Place a small rolled-up towel under your right elbow.  13. Hold a hand weight in your right hand. Your healthcare provider will tell you what size hand weight to use.  14. Bend your arm in front of you at a right angle. Then bend it down and bring the hand weight to the floor. Rest your forearm on your stomach.  15. Keep your elbow on the towel and slowly lift the hand weight up in front of you. Stop when the weight is raised slightly higher than your elbow.  16. Lower the weight back down.  17. Repeat 5 to 15 times, or as instructed.  Date Last Reviewed: 3/10/2016    6988-4919 [employer]. [address] [place] [id number]. All rights reserved. This information is not intended as a substitute for professional medical care. Always follow your healthcare professional's instructions.

## 2018-04-05 ENCOUNTER — OFFICE VISIT (OUTPATIENT)
Dept: FAMILY MEDICINE | Facility: OTHER | Age: 68
End: 2018-04-05
Payer: COMMERCIAL

## 2018-04-05 ENCOUNTER — MEDICAL CORRESPONDENCE (OUTPATIENT)
Dept: HEALTH INFORMATION MANAGEMENT | Facility: CLINIC | Age: 68
End: 2018-04-05

## 2018-04-05 VITALS
DIASTOLIC BLOOD PRESSURE: 62 MMHG | SYSTOLIC BLOOD PRESSURE: 120 MMHG | OXYGEN SATURATION: 98 % | RESPIRATION RATE: 20 BRPM | HEART RATE: 79 BPM | TEMPERATURE: 97.8 F

## 2018-04-05 DIAGNOSIS — E78.5 HYPERLIPIDEMIA LDL GOAL <100: ICD-10-CM

## 2018-04-05 DIAGNOSIS — M25.562 CHRONIC PAIN OF LEFT KNEE: ICD-10-CM

## 2018-04-05 DIAGNOSIS — Z98.890 S/P SPINAL SURGERY: ICD-10-CM

## 2018-04-05 DIAGNOSIS — G89.29 CHRONIC PAIN OF LEFT KNEE: ICD-10-CM

## 2018-04-05 DIAGNOSIS — E11.59 TYPE 2 DIABETES MELLITUS WITH OTHER CIRCULATORY COMPLICATION, WITHOUT LONG-TERM CURRENT USE OF INSULIN (H): Primary | ICD-10-CM

## 2018-04-05 DIAGNOSIS — I10 HYPERTENSION, GOAL BELOW 140/90: ICD-10-CM

## 2018-04-05 DIAGNOSIS — M75.122 COMPLETE TEAR OF LEFT ROTATOR CUFF: ICD-10-CM

## 2018-04-05 PROCEDURE — 99214 OFFICE O/P EST MOD 30 MIN: CPT | Performed by: PHYSICIAN ASSISTANT

## 2018-04-05 RX ORDER — ACETAMINOPHEN 500 MG
1000 TABLET ORAL 2 TIMES DAILY
Qty: 100 TABLET | Refills: 0 | Status: SHIPPED | OUTPATIENT
Start: 2018-04-05 | End: 2018-04-19

## 2018-04-05 RX ORDER — CLOPIDOGREL BISULFATE 75 MG/1
75 TABLET ORAL DAILY
Qty: 90 TABLET | Refills: 1 | Status: SHIPPED | OUTPATIENT
Start: 2018-04-05 | End: 2018-09-27

## 2018-04-05 ASSESSMENT — PAIN SCALES - GENERAL: PAINLEVEL: MODERATE PAIN (4)

## 2018-04-05 NOTE — MR AVS SNAPSHOT
After Visit Summary   4/5/2018    Glenroy Padilla    MRN: [id number]           Patient Information     Date Of Birth          1950        Visit Information        Provider Department      4/5/2018 1:00 PM Julio Cesar Esteban PA-C [facility]        Today's Diagnoses     Type 2 diabetes mellitus with other circulatory complication, without long-term current use of insulin (H)    -  1    S/P spinal surgery        Hypertension, goal below 140/90        Hyperlipidemia LDL goal <100        Complete tear of left rotator cuff        Chronic pain of left knee           Follow-ups after your visit        Additional Services     ORTHOPEDICS ADULT REFERRAL       Your provider has referred you to: FMG: [facility] [phone]   [email or web address]  FMG: [facility] [phone]   [email or web address]    Dr Thakur to render opinion.      Please be aware that coverage of these services is subject to the terms and limitations of your health insurance plan.  Call member services at your health plan with any benefit or coverage questions.      Please bring the following to your appointment:    >>   Any x-rays, CTs or MRIs which have been performed.  Contact the facility where they were done to arrange for  prior to your scheduled appointment.    >>   List of current medications   >>   This referral request   >>   Any documents/labs given to you for this referral                  Follow-up notes from your care team     Return in about 6 months (around 10/5/2018) for Medication Recheck.      Your next 10 appointments already scheduled     Apr 09, 2018 10:15 AM CDT   Return Visit with Casey Shine MD   [facility] ([facility])    [address] [place] [address] [phone]            Apr 20, 2018 10:00 AM CDT   Office Visit with Julio Cesar Esteban  "ÁNGELA   [facility] ([facility])    [address] [address] [phone]           Bring a current list of meds and any records pertaining to this visit. For Physicals, please bring immunization records and any forms needing to be filled out. Please arrive 10 minutes early to complete paperwork.            Apr 26, 2018 10:10 AM CDT   Return Visit with Martha Reddy PA-C   [facility] ([facility])    [address], [address]  [place] [id number] [phone]            Jun 14, 2018  9:00 AM CDT   Return Visit with Hugh Mendieta MD   [facility] ([facility])    [address]  [place] [id number] [phone]              Who to contact     If you have questions or need follow up information about today's clinic visit or your schedule please contact [facility] directly at [phone].  Normal or non-critical lab and imaging results will be communicated to you by Techlicious[employer], letter or phone within 4 business days after the clinic has received the results. If you do not hear from us within 7 days, please contact the clinic through Tech[facility]ious[employer] or phone. If you have a critical or abnormal lab result, we will notify you by phone as soon as possible.  Submit refill requests through [facility] or call your pharmacy and they will forward the refill request to us. Please allow 3 business days for your refill to be completed.          Additional Information About Your Visit        [facility] Information     [facility] lets you send messages to your doctor, view your test results, renew your prescriptions, schedule appointments and more. To sign up, go to [email or web address] . Click on \"Log in\" on the left side of the screen, which will take you to the Welcome page. Then click on \"Sign up Now\" on the right side of the page.     You will be asked to enter the access code " listed below, as well as some personal information. Please follow the directions to create your username and password.     Your access code is: [email or web address]  Expires: 2018 11:55 AM     Your access code will  in 90 days. If you need help or a new code, please call your [facility] clinic or [phone].        Care EveryWhere ID     This is your Care EveryWhere ID. This could be used by other organizations to access your [facility] medical records  [id number]        Your Vitals Were     Pulse Temperature Respirations Pulse Oximetry          79 97.8  F (36.6  C) (Temporal) 20 98%         Blood Pressure from Last 3 Encounters:   18 120/62   18 131/77   18 110/67    Weight from Last 3 Encounters:   18 298 lb (135.2 kg)   18 (!) 303 lb (137.4 kg)   18 299 lb (135.6 kg)              We Performed the Following     ORTHOPEDICS ADULT REFERRAL          Today's Medication Changes          These changes are accurate as of 18  1:14 PM.  If you have any questions, ask your nurse or doctor.               Start taking these medicines.        Dose/Directions    acetaminophen 500 MG tablet   Commonly known as:  TYLENOL   Used for:  Chronic pain of left knee, Complete tear of left rotator cuff   Started by:  Julio Cesar Esteban PA-C        Dose:  1000 mg   Take 2 tablets (1,000 mg) by mouth 2 times daily   Quantity:  100 tablet   Refills:  0       clopidogrel 75 MG tablet   Commonly known as:  PLAVIX   Used for:  Type 2 diabetes mellitus with other circulatory complication, without long-term current use of insulin (H), S/P spinal surgery, Hypertension, goal below 140/90, Hyperlipidemia LDL goal <100   Started by:  Julio Cesar Esteban PA-C        Dose:  75 mg   Take 1 tablet (75 mg) by mouth daily   Quantity:  90 tablet   Refills:  1         Stop taking these medicines if you haven't already. Please contact your care team if you have questions.     cyclobenzaprine 10 MG tablet   Commonly known as:   FLEXERIL   Stopped by:  Julio Cesar Esteban PA-C                Where to get your medicines      These medications were sent to [facility] - [place], [place] - [address], [facility] [address]     Phone:  [phone]     acetaminophen 500 MG tablet    clopidogrel 75 MG tablet                Primary Care Provider Office Phone # Fax #    Julio Cesar Esteban PA-C [phone]       [facility] [address] [address] [address]        Equal Access to Services     [facility] HospitalSOFIA : Hadii aad ku hadasho Soomaali, waaxda luqadaha, qaybta kaalmada adeegyada, waxay idiin hayaan adeeg dru batres . So [facility] [phone].    ATENCIÓN: Si habla español, tiene a wilkinson disposición servicios gratuitos de asistencia lingüística. [facility] [phone].    We comply with applicable federal civil rights laws and [place] laws. We do not discriminate on the basis of race, color, national origin, age, disability, sex, sexual orientation, or gender identity.            Thank you!     Thank you for choosing [facility]  for your care. Our goal is always to provide you with excellent care. Hearing back from our patients is one way we can continue to improve our services. Please take a few minutes to complete the written survey that you may receive in the mail after your visit with us. Thank you!             Your Updated Medication List - Protect others around you: Learn how to safely use, store and throw away your medicines at www.disposemymeds.org.          This list is accurate as of 4/5/18  1:14 PM.  Always use your most recent med list.                   Brand Name Dispense Instructions for use Diagnosis    acetaminophen 500 MG tablet    TYLENOL    100 tablet    Take 2 tablets (1,000 mg) by mouth 2 times daily    Chronic pain of left knee, Complete tear of left rotator cuff       clopidogrel 75 MG tablet    PLAVIX    90 tablet    Take 1 tablet (75 mg)  by mouth daily    Type 2 diabetes mellitus with other circulatory complication, without long-term current use of insulin (H), S/P spinal surgery, Hypertension, goal below 140/90, Hyperlipidemia LDL goal <100       ferrous sulfate 325 (65 FE) MG tablet    IRON    90 tablet    Take 1 tablet (325 mg) by mouth daily (with breakfast)    History of anemia       furosemide 40 MG tablet    LASIX    90 tablet    Take 1 tablet (40 mg) by mouth daily    Hypertension, goal below 140/90       GLUCOCARD EXPRESSION MONITOR W/DEVICE Kit      USE TWICE DAILY TO TEST BLOOD GLUCOSE        lidocaine 5 % ointment    XYLOCAINE    50 g    Apply topically daily    Chronic pain of right knee       losartan 25 MG tablet    COZAAR    90 tablet    Take 1 tablet (25 mg) by mouth daily    Hypertension, goal below 140/90, Type 2 diabetes mellitus with other circulatory complication, without long-term current use of insulin (H)       methocarbamol 500 MG tablet    ROBAXIN    70 tablet    Take 1 tablet (500 mg) by mouth 4 times daily as needed for muscle spasms    S/P spinal surgery       metoprolol tartrate 25 MG tablet    LOPRESSOR    28 tablet    Take 1 tablet (25 mg) by mouth 2 times daily    Type 2 diabetes mellitus with other circulatory complication, without long-term current use of insulin (H), History of coronary artery stent placement, Hypertension, goal below 140/90       order for DME     1 Box    One touch glucose monitoring strip with Glucometer and lancets.    Type 2 diabetes mellitus with other circulatory complication, without long-term current use of insulin (H)       pantoprazole 40 MG EC tablet    PROTONIX    30 tablet    Take 1 tablet (40 mg) by mouth daily Take 30-60 minutes before a meal.    Gastroesophageal reflux disease without esophagitis       rosuvastatin 10 MG tablet    CRESTOR    90 tablet    Take 1 tablet (10 mg) by mouth daily    Type 2 diabetes mellitus with other circulatory complication, without long-term current  use of insulin (H)       senna-docusate 8.6-50 MG per tablet    SENOKOT-S;PERICOLACE    100 tablet    Take 1 tablet by mouth 2 times daily as needed for constipation    Spinal stenosis, lumbar region, without neurogenic claudication       SitaGLIPtin-MetFORMIN HCl  MG Tb24      Take 2 tablets by mouth daily (with breakfast)        TRIAD HYDROPHILIC WOUND DRESSI Pste     1 Tube    Externally apply 1 g topically daily    Open wound of right lower leg, initial encounter

## 2018-04-05 NOTE — NURSING NOTE
"Chief Complaint   Patient presents with     Hypertension     Lipids     Diabetes     Panel Management     eye exam, urine       Initial /62 (Cuff Size: Adult Large)  Pulse 79  Temp 97.8  F (36.6  C) (Temporal)  Resp 20  SpO2 98% Estimated body mass index is 42.76 kg/(m^2) as calculated from the following:    Height as of 4/4/18: 5' 10\" (1.778 m).    Weight as of 4/4/18: 298 lb (135.2 kg).  Medication Reconciliation: complete  "

## 2018-04-06 ENCOUNTER — TELEPHONE (OUTPATIENT)
Dept: FAMILY MEDICINE | Facility: OTHER | Age: 68
End: 2018-04-06

## 2018-04-06 NOTE — TELEPHONE ENCOUNTER
Reason for Call:  Form, our goal is to have forms completed with 72 hours, however, some forms may require a visit or additional information.    Type of letter, form or note:  medical    Who is the form from?: [employer] (if other please explain)    Where did the form come from: form was faxed in    What clinic location was the form placed at?: [facility] - [phone]    Where the form was placed: 's Box    What number is listed as a contact on the form?: [phone]       Additional comments: please complete and fax  FAx [phone]    Call taken on 4/6/2018 at 1:27 PM by Olivia Mayfield

## 2018-04-08 NOTE — PROGRESS NOTES
Outpatient Physical Therapy Discharge Note     Patient: Glenroy Padilla  : 1950    Beginning/End Dates of Reporting Period:  10/6/2017 to 10/6/2017    Referring Provider:  Dr. Casey Shine    Therapy Diagnosis: lymphedema both legs     Client Self Report:  Pt did not return after eval  Goals:  Not met    Plan:  Discharge from therapy.    Discharge:    Reason for Discharge: Medicare G-code: Patient did not attend a final scheduled session prior to discharge. Unable to determine discharge functional status.    Equipment Issued: none    Discharge Plan: Other services: Pt did not return for Rx.

## 2018-04-08 NOTE — ADDENDUM NOTE
Encounter addended by: Zoya Harkins, PT on: 4/8/2018  3:28 PM<BR>     Actions taken: Sign clinical note, Episode resolved

## 2018-04-09 ENCOUNTER — OFFICE VISIT (OUTPATIENT)
Dept: SURGERY | Facility: CLINIC | Age: 68
End: 2018-04-09
Payer: COMMERCIAL

## 2018-04-09 VITALS
BODY MASS INDEX: 43.58 KG/M2 | WEIGHT: 303.7 LBS | DIASTOLIC BLOOD PRESSURE: 60 MMHG | HEART RATE: 86 BPM | SYSTOLIC BLOOD PRESSURE: 142 MMHG | OXYGEN SATURATION: 97 %

## 2018-04-09 DIAGNOSIS — S81.802D OPEN WOUND OF LEFT LOWER EXTREMITY WITHOUT COMPLICATION, SUBSEQUENT ENCOUNTER: Primary | ICD-10-CM

## 2018-04-09 PROCEDURE — 99213 OFFICE O/P EST LOW 20 MIN: CPT | Mod: 25 | Performed by: SPECIALIST

## 2018-04-09 PROCEDURE — 17250 CHEM CAUT OF GRANLTJ TISSUE: CPT | Performed by: SPECIALIST

## 2018-04-09 NOTE — PROGRESS NOTES
F/U for leg wound    Subjective:  Patient is a 67-year-old white male presenting history of a left leg wound. He hit a coffee table  the wound is slow to heal. He was initially treated in [place] and has a 15 year history of bilateral lower extremity edema. He has never been treated with any form of compression therapy and was told not to based on an ultrasound results in [place]. Those results are not available to me at this time. He denies any prior DVT, nonhealing wounds, leg trauma, claudication or rest pain. He was told in [place] to treat the leg with Betadine. [place] came [place] the [place] for second opinion.      Last seen by me in June and was tx with UNNA boot and silvercel.  Returned to [place] and was treated with hydroferra blue.       Had UNNA boot on when was last seen.  Has been in Assisted living since last seen by me in January for leg weakness.  TX with TRIAD, Foam and UNNA.   Laceration on right healed.    Had his back surgery at [facility]. Doing well.       Reports wound almost closed.    Objective:  B/P: 142/60, T: Data Unavailable, P: 86, R: Data Unavailable  Ext; Warm,  No edema. Bilateral venous stasis changes with thickened skin.   (+)FEM pulses.  No distal pulses.  LLE wound - 0.2x0.2cm  - AGNO4 Applied.    New ant ankle wound - Healed    U/S -   ULTRASOUND VENOUS COMPETENCY BILATERAL   6/1/2017 3:29 PM      HISTORY: Localized edema. Chronic venous hypertension (idiopathic)  without complications of bilateral lower extremity. Unspecified open  wound, left lower leg, initial encounter.     COMPARISON: None.     TECHNIQUE: Color Doppler and spectral waveform analysis performed.     FINDINGS: Bilateral common femoral veins, femoral veins, and popliteal  veins are patent and demonstrate no evidence of reflux.     Other than questionable 1 second reflux at the right saphenofemoral  junction, the right great saphenous vein is competent throughout its  length. Diameter of the right great saphenous  vein is 3-7 mm.     The left great saphenous vein is competent throughout its length.  Diameter of the left great saphenous vein is 2-7 mm.     Visualized portions of bilateral small saphenous veins appear to be  competent, without evidence of reflux.     No prominent perforators were demonstrated. Bilateral Giacomini veins  were competent.         IMPRESSION: The deep and superficial venous systems of both legs are  patent and competent.     PATRICK SCHERER MD    [facility] -   ULTRASOUND ANKLE-BRACHIAL INDEX DOPPLER NO EXERCISE   6/1/2017 3:16 PM        HISTORY: Localized edema. Chronic venous hypertension (idiopathic)  without complications of bilateral lower extremity.     COMPARISON: None.     FINDINGS: Ankle-brachial index is 0.96 on the right and 0.91 on the  left. Waveforms appear biphasic/monophasic bilaterally.         IMPRESSION: Borderline bilateral arterial insufficiency.     PATRICK SCHERER MD      Assessment/plan:  This is a 67 year old gentleman with bilateral chronic stasis changes and lymphedema. He has a new right leg wound as well as a result of a fall - closed.   Left leg wound much smaller.   Edema now resolved.    Will cont TRIAD and ACE.  Will hold UNNA to left.   F/U2 weeks.      Patient to follow up with Primary Care provider regarding elevated blood pressure.      Casey Shine MD, FACS.

## 2018-04-09 NOTE — LETTER
4/9/2018         RE: Glenroy Padilla  [address] [address]        Dear Colleague,    Thank you for referring your patient, Glenroy Padilla, to the [facility]. Please see a copy of my visit note below.    F/U for leg wound    Subjective:  Patient is a 67-year-old white male presenting history of a left leg wound. He hit a coffee table  the wound is slow to heal. He was initially treated in [place] and has a 15 year history of bilateral lower extremity edema. He has never been treated with any form of compression therapy and was told not to based on an ultrasound results in [place]. Those results are not available to me at this time. He denies any prior DVT, nonhealing wounds, leg trauma, claudication or rest pain. He was told in [place] to treat the leg with Betadine. [place] the [place] opinion.      Last seen by me in June and was tx with UNNA boot and silvercel.  Returned to [place] and was treated with hydroferra blue.       Had UNNA boot on when was last seen.  Has been in Assisted living since last seen by me in January for leg weakness.  TX with TRIAD, Foam and UNNA.   Laceration on right healed.    Had his back surgery at [facility]. Doing well.       Reports wound almost closed.    Objective:  B/P: 142/60, T: Data Unavailable, P: 86, R: Data Unavailable  Ext; Warm,  No edema. Bilateral venous stasis changes with thickened skin.   (+)FEM pulses.  No distal pulses.  LLE wound - 0.2x0.2cm  - AGNO4 Applied.    New ant ankle wound - Healed    U/S -   ULTRASOUND VENOUS COMPETENCY BILATERAL   6/1/2017 3:29 PM      HISTORY: Localized edema. Chronic venous hypertension (idiopathic)  without complications of bilateral lower extremity. Unspecified open  wound, left lower leg, initial encounter.     COMPARISON: None.     TECHNIQUE: Color Doppler and spectral waveform analysis performed.     FINDINGS: Bilateral common femoral veins, femoral veins, and popliteal  veins  are patent and demonstrate no evidence of reflux.     Other than questionable 1 second reflux at the right saphenofemoral  junction, the right great saphenous vein is competent throughout its  length. Diameter of the right great saphenous vein is 3-7 mm.     The left great saphenous vein is competent throughout its length.  Diameter of the left great saphenous vein is 2-7 mm.     Visualized portions of bilateral small saphenous veins appear to be  competent, without evidence of reflux.     No prominent perforators were demonstrated. Bilateral Giacomini veins  were competent.         IMPRESSION: The deep and superficial venous systems of both legs are  patent and competent.     PATRICK SCHERER MD    [facility] -   ULTRASOUND ANKLE-BRACHIAL INDEX DOPPLER NO EXERCISE   6/1/2017 3:16 PM        HISTORY: Localized edema. Chronic venous hypertension (idiopathic)  without complications of bilateral lower extremity.     COMPARISON: None.     FINDINGS: Ankle-brachial index is 0.96 on the right and 0.91 on the  left. Waveforms appear biphasic/monophasic bilaterally.         IMPRESSION: Borderline bilateral arterial insufficiency.     PATRICK SCHERER MD      Assessment/plan:  This is a 67 year old gentleman with bilateral chronic stasis changes and lymphedema. He has a new right leg wound as well as a result of a fall - closed.   Left leg wound much smaller.   Edema now resolved.    Will cont TRIAD and ACE.  Will hold UNNA to left.   F/U2 weeks.      Patient to follow up with Primary Care provider regarding elevated blood pressure.      Casey Shine MD, FACS.    Again, thank you for allowing me to participate in the care of your patient.        Sincerely,        Casey Shine MD

## 2018-04-09 NOTE — NURSING NOTE
"Chief Complaint   Patient presents with     RECHECK     WOUND CARE       Initial /60 (BP Location: Right arm, Patient Position: Sitting, Cuff Size: Adult Large)  Pulse 86  Wt 137.8 kg (303 lb 11.2 oz)  SpO2 97%  BMI 43.58 kg/m2 Estimated body mass index is 43.58 kg/(m^2) as calculated from the following:    Height as of 4/4/18: 1.778 m (5' 10\").    Weight as of this encounter: 137.8 kg (303 lb 11.2 oz).  Medication Reconciliation: complete    "

## 2018-04-09 NOTE — MR AVS SNAPSHOT
After Visit Summary   4/9/2018    Glenroy Padilla    MRN: [id number]           Patient Information     Date Of Birth          1950        Visit Information        Provider Department      4/9/2018 10:45 AM Casey Shine MD [facility]        Today's Diagnoses     Open wound of left lower extremity without complication, subsequent encounter    -  1       Follow-ups after your visit        Your next 10 appointments already scheduled     Apr 16, 2018  2:10 PM CDT   New Visit with Janes Thakur MD   [facility] ([facility])    [address] [place] [id number]   [phone]            Apr 20, 2018 10:00 AM CDT   Office Visit with Julio Cesar Esteban PA-C   [facility] ([address] [address] [phone]           Bring a current list of meds and any records pertaining to this visit. For Physicals, please bring immunization records and any forms needing to be filled out. Please arrive 10 minutes early to complete paperwork.            Apr 23, 2018  1:45 PM CDT   Return Visit with Casey Shine MD   [facility] ([facility])    [address] [place] [id number] [phone]            Apr 26, 2018 10:10 AM CDT   [facility] Visit with Martha Reddy PA-C   [facility]    [address], [address]  [facility] [id number] [phone]            Jun 14, 2018  9:00 AM CDT   Return Visit with Hugh Mendieta MD   [facility]    [address], [address]  [facility] [id number] [phone]              Who to contact     If you have questions or need follow up information about today's clinic visit or your schedule please contact [facility] directly at [phone].  Normal or non-critical lab and imaging results  "will be communicated to you by MyChart, letter or phone within 4 business days after the clinic has received the results. If you do not hear from us within 7 days, please contact the clinic through [employer] or phone. If you have a critical or abnormal lab result, we will notify you by phone as soon as possible.  Submit refill requests through [employer] or call your pharmacy and they will forward the refill request to us. Please allow 3 business days for your refill to be completed.          Additional Information About Your Visit        [employer] Information     [employer] lets you send messages to your doctor, view your test results, renew your prescriptions, schedule appointments and more. To sign up, go to [email or web address] . Click on \"Log in\" on the left side of the screen, which will take you to the Welcome page. Then click on \"Sign up Now\" on the right side of the page.     You will be asked to enter the access code listed below, as well as some personal information. Please follow the directions to create your username and password.     Your access code is: [email or web address]  Expires: 2018 11:55 AM     Your access code will  in 90 days. If you need help or a new code, please call your [place] clinic or [phone].        Care EveryWhere ID     This is your Care EveryWhere ID. This could be used by other organizations to access your [place] medical records  [id number]        Your Vitals Were     Pulse Pulse Oximetry BMI (Body Mass Index)             86 97% 43.58 kg/m2          Blood Pressure from Last 3 Encounters:   18 142/60   18 120/62   18 131/77    Weight from Last 3 Encounters:   18 137.8 kg (303 lb 11.2 oz)   18 135.2 kg (298 lb)   18 (!) 137.4 kg (303 lb)              We Performed the Following     CHEM CAUTERY GRANULATION TISSUE        Primary Care Provider Office Phone # Fax #    Julio Cesar Esteban PA-C [phone]       [facility] [id number] " [facility] [id number]        Equal Access to Services     JAZ TOBAR : Hadshea aad ku hadaltagraciaranjeet Omalley, wastacyda luqadaha, qaybta nitishmaadams vaughn. So [facility] [phone].    ATENCIÓN: Si habla español, tiene a wilkinson disposición servicios gratuitos de asistencia lingüística. Frances al [phone].    We comply with applicable federal civil rights laws and [place] laws. We do not discriminate on the basis of race, color, national origin, age, disability, sex, sexual orientation, or gender identity.            Thank you!     Thank you for choosing [facility]  for your care. Our goal is always to provide you with excellent care. Hearing back from our patients is one way we can continue to improve our services. Please take a few minutes to complete the written survey that you may receive in the mail after your visit with us. Thank you!             Your Updated Medication List - Protect others around you: Learn how to safely use, store and throw away your medicines at www.disposemymeds.org.          This list is accurate as of 4/9/18 11:02 AM.  Always use your most recent med list.                   Brand Name Dispense Instructions for use Diagnosis    acetaminophen 500 MG tablet    TYLENOL    100 tablet    Take 2 tablets (1,000 mg) by mouth 2 times daily    Chronic pain of left knee, Complete tear of left rotator cuff       clopidogrel 75 MG tablet    PLAVIX    90 tablet    Take 1 tablet (75 mg) by mouth daily    Type 2 diabetes mellitus with other circulatory complication, without long-term current use of insulin (H), S/P spinal surgery, Hypertension, goal below 140/90, Hyperlipidemia LDL goal <100       ferrous sulfate 325 (65 FE) MG tablet    IRON    90 tablet    Take 1 tablet (325 mg) by mouth daily (with breakfast)    History of anemia       furosemide 40 MG tablet    LASIX    90 tablet    Take 1 tablet (40 mg) by mouth daily    Hypertension, goal  below 140/90       GLUCOCARD EXPRESSION MONITOR W/DEVICE Kit      USE TWICE DAILY TO TEST BLOOD GLUCOSE        lidocaine 5 % ointment    XYLOCAINE    50 g    Apply topically daily    Chronic pain of right knee       losartan 25 MG tablet    COZAAR    90 tablet    Take 1 tablet (25 mg) by mouth daily    Hypertension, goal below 140/90, Type 2 diabetes mellitus with other circulatory complication, without long-term current use of insulin (H)       methocarbamol 500 MG tablet    ROBAXIN    70 tablet    Take 1 tablet (500 mg) by mouth 4 times daily as needed for muscle spasms    S/P spinal surgery       metoprolol tartrate 25 MG tablet    LOPRESSOR    28 tablet    Take 1 tablet (25 mg) by mouth 2 times daily    Type 2 diabetes mellitus with other circulatory complication, without long-term current use of insulin (H), History of coronary artery stent placement, Hypertension, goal below 140/90       order for DME     1 Box    One touch glucose monitoring strip with Glucometer and lancets.    Type 2 diabetes mellitus with other circulatory complication, without long-term current use of insulin (H)       pantoprazole 40 MG EC tablet    PROTONIX    30 tablet    Take 1 tablet (40 mg) by mouth daily Take 30-60 minutes before a meal.    Gastroesophageal reflux disease without esophagitis       rosuvastatin 10 MG tablet    CRESTOR    90 tablet    Take 1 tablet (10 mg) by mouth daily    Type 2 diabetes mellitus with other circulatory complication, without long-term current use of insulin (H)       senna-docusate 8.6-50 MG per tablet    SENOKOT-S;PERICOLACE    100 tablet    Take 1 tablet by mouth 2 times daily as needed for constipation    Spinal stenosis, lumbar region, without neurogenic claudication       SitaGLIPtin-MetFORMIN HCl  MG Tb24      Take 2 tablets by mouth daily (with breakfast)        TRIAD HYDROPHILIC WOUND DRESSI Pste     1 Tube    Externally apply 1 g topically daily    Open wound of right lower leg,  initial encounter

## 2018-04-11 NOTE — PROGRESS NOTES
ORTHOPEDIC CLINIC CONSULT      Glenroy Padilla is a 67 year old male who is seen in consultation at the request of Julio Cesar Esteban.  History of Present illness:  Glenroy presents for evaluation of:   1.) Left knee pain    Onset:  at age 49    Symptoms brought on by Arthritis.     Character:  sharp and dull ache.    Progression of symptoms:  worse.    Previous similar pain: no .   Pain Level:  3/10.   Previous treatments:  rest/inactivity.  Currently on Blood thinners? No  Diagnosis of Diabetes? Yes, Type 2      Orthopedic Consult        Patient presents with:  Consult      The above note was reviewed and verified.      This patient has a very interesting process with his lower extremities.  He has had verified knee arthritis for quite some time.  This has been treated with cortisone injections in the past.  He recently was identified as having spinal stenosis.  One month ago he underwent a decompression surgery on his back.  Since his back surgery he has had a substantial improvement in the function of his lower extremities.  He has had an improvement in sensation.  This is actually resulted in him having a greater amount of neuropathy pain.  However it has resulted in an improved motor function in both lower extremities.  This has improved his gait to the degree that he now able to ambulate and get in and out of a truck which she could not do before his spine surgery.    Today his complaint is that of left greater than right knee pain.  His pain with ambulation.  He also has a problem with lower extremity edema.  He has been seen and treated for a left lower extremity ulcer.  This is described in our manic notes as a venous stasis ulcer.    He is obese.  However he reports having lost at least 50 pounds over the last year.  His present BMI is hovering around 43.    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  He reports that he would be able to get back to some  level of employment if he was more mobile.      Prior history of related problems: See above.    Patient's past medical, surgical, social and family histories reviewed.     Past Medical History:   Diagnosis Date     Acute pain of left knee 5/22/2017     Chronic pain of right knee 5/22/2017     Hx of coronary artery disease 5/22/2017     Hx of heart artery stent 5/22/2017     Obesity, morbid, BMI 40.0-49.9 (H) 11/20/2017     Open wound of left lower extremity without complication, initial encounter 5/22/2017     DAYTON (obstructive sleep apnea) 5/22/2017     Type 2 diabetes mellitus with other circulatory complication, without long-term current use of insulin (H) 5/22/2017     Venous stasis ulcer of left lower extremity (H) 5/22/2017     Venous stasis ulcer of left lower extremity (H) 5/22/2017       Past Surgical History:   Procedure Laterality Date     CARDIAC SURGERY      stent placement     CHOLECYSTECTOMY       LAMINECTOMY LUMBAR THREE+ LEVELS N/A 3/13/2018    Procedure: LAMINECTOMY LUMBAR THREE+ LEVELS;  T5-9 LAMINECTOMIES, RESECTION OF EPIDURAL LIPOMATOSIS;  Surgeon: Hugh Mendieta MD;  Location:  OR       Medications:    Current Outpatient Prescriptions on File Prior to Visit:  clopidogrel (PLAVIX) 75 MG tablet Take 1 tablet (75 mg) by mouth daily   acetaminophen (TYLENOL) 500 MG tablet Take 2 tablets (1,000 mg) by mouth 2 times daily   pantoprazole (PROTONIX) 40 MG EC tablet Take 1 tablet (40 mg) by mouth daily Take 30-60 minutes before a meal.   ferrous sulfate (IRON) 325 (65 FE) MG tablet Take 1 tablet (325 mg) by mouth daily (with breakfast)   furosemide (LASIX) 40 MG tablet Take 1 tablet (40 mg) by mouth daily   losartan (COZAAR) 25 MG tablet Take 1 tablet (25 mg) by mouth daily   rosuvastatin (CRESTOR) 10 MG tablet Take 1 tablet (10 mg) by mouth daily   methocarbamol (ROBAXIN) 500 MG tablet Take 1 tablet (500 mg) by mouth 4 times daily as needed for muscle spasms   SitaGLIPtin-MetFORMIN HCl   MG TB24 Take 2 tablets by mouth daily (with breakfast)    Blood Glucose Monitoring Suppl (GLUCOCARD EXPRESSION MONITOR) W/DEVICE KIT USE TWICE DAILY TO TEST BLOOD GLUCOSE   metoprolol tartrate (LOPRESSOR) 25 MG tablet Take 1 tablet (25 mg) by mouth 2 times daily   senna-docusate (SENOKOT-S;PERICOLACE) 8.6-50 MG per tablet Take 1 tablet by mouth 2 times daily as needed for constipation   Wound Dressings (TRIAD HYDROPHILIC WOUND DRESSI) PSTE Externally apply 1 g topically daily   lidocaine (XYLOCAINE) 5 % ointment Apply topically daily   order for DME One touch glucose monitoring strip with Glucometer and lancets.     No current facility-administered medications on file prior to visit.     Allergies   Allergen Reactions     No Known Allergies        Social History     Occupational History     Not on file.     Social History Main Topics     Smoking status: Former Smoker     Types: Cigars     Smokeless tobacco: Never Used      Comment: exposure to second hand smoke     Alcohol use No     Drug use: No     Sexual activity: No       History reviewed. No pertinent family history.    REVIEW OF SYSTEMS    General: negative for fever or fatigue    Psych:  negative for anxiety or depression     Ophthalmic:  Corrective lenses?  YES    ENT:  Hearing difficulty? No    CV: He is negative for chest pain, but positive for a previous history of venous insuffiencey he has been fitted with LIANNE stockings    Endocrine:  positive for diabetes     Urology:  negative for kidney disease    Resp:  Normal respiratory effort     Skin: We did not assess his lower extremity for cuts/sores or redness today see below    Musculoskeletal: as above    Neurologic:positive for numbness/tingling    Hematologic: negative for bleeding disorder, does not use of prescription anticoagulants         Physical Exam:    Vitals: /74 (BP Location: Left arm, Patient Position: Chair, Cuff Size: Adult Large)  Pulse 79  Temp 96.8  F (36  C) (Temporal)  Ht 1.778  "m (5' 10\")  Wt 137.8 kg (303 lb 11.2 oz)  SpO2 96%  BMI 43.58 kg/m2  BMI= Body mass index is 43.58 kg/(m^2).    GENERAL APPEARANCE: Obese but healthy, alert, no distress    SKIN:  negative for suspicious lesions or rashes    NEURO: Abnormal strength and tone however some of this is related to his recent spine surgery, mentation intact and speech normal    PSYCH:   Mentation appears Normal and affect normal/bright    RESPIRATORY: negative for respiratory distress.    EYES: negative for Conjunctivitis    Cardiovascular: +2 Edema                dorsalis pedis Present                    GAIT: The patient is in a motorized wheelchair that he inherited from his father-in-law.  This simply allows him to walk longer distances.  He is able to arise from the chair and walks with a broad-based gait with very slow progression.  He reports that he was not able to do this before his spine surgery.  His wife who accompanies him today concurs with this.    JOINT/EXTREMITIES:    He had his slacks on and his long leg LIANNE stockings.  On today's visit I did not remove these.  Ulcers were felt through the stockings.  He lacks about 5  of full extension of both knees.  His flexion is to at least 120  which is limited a little bit by his physique.  He has mild pseudolaxity to valgus stress of both knees.  He has easily identifiable medial joint pain worse on the right than on the left.  Is not appear to have irritability of his hip joints.      Radiographs: X-rays were taken today.  Our standard 4 views.  He has significant narrowing of the medial joint space on both his right and left knees.  On his left knee in the jumpers view there is complete obliteration of the medial joint space.  There does not appear to be any significant bone erosion at this time.  Rucker femoral joint is still reasonably well-maintained.    Independent visualization of the images was performed.    Impression:     ICD-10-CM    1. Primary osteoarthritis of left " knee M17.12    2. Primary osteoarthritis of right knee M17.11        Patient has by history of long-standing problem with knee arthritis.  He has significant radiographic evidence of progressive knee arthritis.  He has recently regained ambulatory status because of recent successful back surgery.  He also appears to have a potential for increased work capabilities as well.            Plan:  The above was reviewed with Glenroy and his wife.  We reviewed with him the fact that his knee situation would best be addressed with total joint replacement.  This is given his previous history of cortisone injections leading him to where he is now.  There is a problem with his obesity however it appears as though he has begun to address this.  He also has a problem with peripheral edema probably related to several issues include his knee arthritis and obesity.    I therefore request that he lose another 15 pounds.  That he continues to work on edema control.  He will return in 1 month's time.  This will allow him additional time to recover from his back surgery.    He must be weighed at that point.  I would also like to have him placed into shorts.  This will allow me to fully assess the integrity of his lower extremity skin condition.    If he loses additional weight in the skin condition is good then we will consider him a candidate for joint replacement.    Return to clinic 1, months, he should be re-weighed prior to be assessed and should be placed into a short prior to being assessed.    Janes Thakur MD

## 2018-04-12 ENCOUNTER — TELEPHONE (OUTPATIENT)
Dept: FAMILY MEDICINE | Facility: OTHER | Age: 68
End: 2018-04-12

## 2018-04-12 NOTE — TELEPHONE ENCOUNTER
Reason for Call:  Home Health Care    Sona with [facility] called regarding (reason for call): orders to decrease     Orders are needed for this patient. Skilled nursing     PT: n/a    OT: n/a    Skilled Nursing: patient keeps cancelling visits need new orders to decrease to 2 x a week for 3 week    Pt Provider: Dr Copeland    Phone Number Homecare Nurse can be reached at: [phone]    Can we leave a detailed message on this number? YES        Call taken on 4/12/2018 at 1:37 PM by Diamond Juarez

## 2018-04-16 ENCOUNTER — OFFICE VISIT (OUTPATIENT)
Dept: ORTHOPEDICS | Facility: CLINIC | Age: 68
End: 2018-04-16
Payer: COMMERCIAL

## 2018-04-16 ENCOUNTER — OFFICE VISIT (OUTPATIENT)
Dept: SURGERY | Facility: CLINIC | Age: 68
End: 2018-04-16
Payer: COMMERCIAL

## 2018-04-16 ENCOUNTER — TELEPHONE (OUTPATIENT)
Dept: FAMILY MEDICINE | Facility: OTHER | Age: 68
End: 2018-04-16

## 2018-04-16 ENCOUNTER — RADIANT APPOINTMENT (OUTPATIENT)
Dept: GENERAL RADIOLOGY | Facility: CLINIC | Age: 68
End: 2018-04-16
Attending: ORTHOPAEDIC SURGERY
Payer: COMMERCIAL

## 2018-04-16 VITALS
DIASTOLIC BLOOD PRESSURE: 74 MMHG | HEIGHT: 70 IN | SYSTOLIC BLOOD PRESSURE: 136 MMHG | HEART RATE: 79 BPM | WEIGHT: 303.7 LBS | BODY MASS INDEX: 43.48 KG/M2 | OXYGEN SATURATION: 96 % | TEMPERATURE: 96.8 F

## 2018-04-16 VITALS
HEART RATE: 91 BPM | OXYGEN SATURATION: 97 % | SYSTOLIC BLOOD PRESSURE: 136 MMHG | BODY MASS INDEX: 44.68 KG/M2 | WEIGHT: 311.4 LBS | DIASTOLIC BLOOD PRESSURE: 74 MMHG

## 2018-04-16 DIAGNOSIS — M17.11 PRIMARY OSTEOARTHRITIS OF RIGHT KNEE: ICD-10-CM

## 2018-04-16 DIAGNOSIS — M17.12 PRIMARY OSTEOARTHRITIS OF LEFT KNEE: Primary | ICD-10-CM

## 2018-04-16 DIAGNOSIS — M25.562 LEFT KNEE PAIN, UNSPECIFIED CHRONICITY: ICD-10-CM

## 2018-04-16 DIAGNOSIS — R60.0 EDEMA OF BOTH LEGS: ICD-10-CM

## 2018-04-16 DIAGNOSIS — S81.802D OPEN WOUND OF LEFT LOWER EXTREMITY WITHOUT COMPLICATION, SUBSEQUENT ENCOUNTER: Primary | ICD-10-CM

## 2018-04-16 PROCEDURE — 73564 X-RAY EXAM KNEE 4 OR MORE: CPT | Mod: TC

## 2018-04-16 PROCEDURE — 99213 OFFICE O/P EST LOW 20 MIN: CPT | Performed by: SPECIALIST

## 2018-04-16 PROCEDURE — 99213 OFFICE O/P EST LOW 20 MIN: CPT | Performed by: ORTHOPAEDIC SURGERY

## 2018-04-16 ASSESSMENT — PAIN SCALES - GENERAL: PAINLEVEL: MILD PAIN (3)

## 2018-04-16 NOTE — NURSING NOTE
WOUND CARE    Location: right anterior leg. Upon mepilex removal, noted opaque, brown drainage  Measures: 1.5 x 0.5 x 0.1 cm  Wound Base: pink with black calcium deposit (removed by MD)  Surrounding Tissue: Intact  Exudate: Small, brown  Odor: No  Pain:  No  Action & Treatment order per doctor: Writer cleansed wound, md applied silver nitrate, writer applied triad, gauze, and ACE bandage.  Patient verbalized understanding of treatment plan.    Follow up: 2 weeks, sent to  to schedule.     Meme Beth RN  [facility]  [phone]  4/16/2018 3:27 PM

## 2018-04-16 NOTE — TELEPHONE ENCOUNTER
Forms have been faxed and a copy has been put into scanning/abstracting. Closing this encounter.     Alecia Ashford MA

## 2018-04-16 NOTE — PROGRESS NOTES
F/U for leg wound    Subjective:  Patient is a 67-year-old white male presenting history of a left leg wound. He hit a coffee table  the wound is slow to heal. He was initially treated in [place] and has a 15 year history of bilateral lower extremity edema. He has never been treated with any form of compression therapy and was told not to based on an ultrasound results in [place]. Those results are not available to me at this time. He denies any prior DVT, nonhealing wounds, leg trauma, claudication or rest pain. He was told in [place] to treat the leg with Betadine. He came to the [place] for second opinion.      Last seen by me in June and was tx with UNNA boot and silvercel.  Returned to [place] and was treated with hydroferra blue.       Had UNNA boot on when was last seen.  Has been in Assisted living since last seen by me in January for leg weakness.  TX with TRIAD, Foam and UNNA.   Laceration on right healed.    Had his back surgery [facility]. Doing well.       Reports wound almost closed.    Objective:  B/P: 136/74, T: Data Unavailable, P: 91, R: Data Unavailable  Ext; Warm,  No edema. Bilateral venous stasis changes with thickened skin.   (+)FEM pulses.  No distal pulses.  LLE wound - 1.5x0.5x0.1cm  - AGNO4 Applied.   Large piece of calcium removed   New ant ankle wound - Healed    [facility] -   ULTRASOUND VENOUS COMPETENCY BILATERAL   6/1/2017 3:29 PM      HISTORY: Localized edema. Chronic venous hypertension (idiopathic)  without complications of bilateral lower extremity. Unspecified open  wound, left lower leg, initial encounter.     COMPARISON: None.     TECHNIQUE: Color Doppler and spectral waveform analysis performed.     FINDINGS: Bilateral common femoral veins, femoral veins, and popliteal  veins are patent and demonstrate no evidence of reflux.     Other than questionable 1 second reflux at the right saphenofemoral  junction, the right great saphenous vein is competent throughout its  length.  Diameter of the right great saphenous vein is 3-7 mm.     The left great saphenous vein is competent throughout its length.  Diameter of the left great saphenous vein is 2-7 mm.     Visualized portions of bilateral small saphenous veins appear to be  competent, without evidence of reflux.     No prominent perforators were demonstrated. Bilateral Giacomini veins  were competent.         IMPRESSION: The deep and superficial venous systems of both legs are  patent and competent.     PATRICK SCHERER MD    [facility] -   ULTRASOUND ANKLE-BRACHIAL INDEX DOPPLER NO EXERCISE   6/1/2017 3:16 PM        HISTORY: Localized edema. Chronic venous hypertension (idiopathic)  without complications of bilateral lower extremity.     COMPARISON: None.     FINDINGS: Ankle-brachial index is 0.96 on the right and 0.91 on the  left. Waveforms appear biphasic/monophasic bilaterally.         IMPRESSION: Borderline bilateral arterial insufficiency.     PATRICK SCHERER MD      Assessment/plan:  This is a 67 year old gentleman with bilateral chronic stasis changes and lymphedema. He has a new right leg wound as well as a result of a fall - closed.   Left leg wound with large piece of calcium removed - was piercing skin preventing wound from healing.  Edema now resolved.    Will cont TRIAD and ACE.  Will hold UNNA to left.   F/U 2 weeks.    Casey Shine MD, FACS.

## 2018-04-16 NOTE — LETTER
4/16/2018         RE: Glenroy Padilla  [address] [facility] [address]        Dear Colleague,    Thank you for referring your patient, Glenroy Padilla, to the [facility]. Please see a copy of my visit note below.    ORTHOPEDIC CLINIC CONSULT      Glenroy Padilla is a 67 year old male who is seen in consultation at the request of Julio Cesar Esteban.  History of Present illness:  Glenroy presents for evaluation of:   1.) Left knee pain    Onset:  at age 49    Symptoms brought on by Arthritis.     Character:  sharp and dull ache.    Progression of symptoms:  worse.    Previous similar pain: no .   Pain Level:  3/10.   Previous treatments:  rest/inactivity.  Currently on Blood thinners? No  Diagnosis of Diabetes? Yes, Type 2      Orthopedic Consult        Patient presents with:  Consult      The above note was reviewed and verified.      This patient has a very interesting process with his lower extremities.  He has had verified knee arthritis for quite some time.  This has been treated with cortisone injections in the past.  He recently was identified as having spinal stenosis.  One month ago he underwent a decompression surgery on his back.  Since his back surgery he has had a substantial improvement in the function of his lower extremities.  He has had an improvement in sensation.  This is actually resulted in him having a greater amount of neuropathy pain.  However it has resulted in an improved motor function in both lower extremities.  This has improved his gait to the degree that he now able to ambulate and get in and out of a truck which she could not do before his spine surgery.    Today his complaint is that of left greater than right knee pain.  His pain with ambulation.  He also has a problem with lower extremity edema.  He has been seen and treated for a left lower extremity ulcer.  This is described in our manic notes as a venous stasis ulcer.    He is obese.  However he reports having  lost at least 50 pounds over the last year.  His present BMI is hovering around 43.    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  He reports that he would be able to get back to some level of employment if he was more mobile.      Prior history of related problems: See above.    Patient's past medical, surgical, social and family histories reviewed.     Past Medical History:   Diagnosis Date     Acute pain of left knee 5/22/2017     Chronic pain of right knee 5/22/2017     Hx of coronary artery disease 5/22/2017     Hx of heart artery stent 5/22/2017     Obesity, morbid, BMI 40.0-49.9 (H) 11/20/2017     Open wound of left lower extremity without complication, initial encounter 5/22/2017     DAYTON (obstructive sleep apnea) 5/22/2017     Type 2 diabetes mellitus with other circulatory complication, without long-term current use of insulin (H) 5/22/2017     Venous stasis ulcer of left lower extremity (H) 5/22/2017     Venous stasis ulcer of left lower extremity (H) 5/22/2017       Past Surgical History:   Procedure Laterality Date     CARDIAC SURGERY      stent placement     CHOLECYSTECTOMY       LAMINECTOMY LUMBAR THREE+ LEVELS N/A 3/13/2018    Procedure: LAMINECTOMY LUMBAR THREE+ LEVELS;  T5-9 LAMINECTOMIES, RESECTION OF EPIDURAL LIPOMATOSIS;  Surgeon: Hugh Mendieta MD;  Location:  OR       Medications:    Current Outpatient Prescriptions on File Prior to Visit:  clopidogrel (PLAVIX) 75 MG tablet Take 1 tablet (75 mg) by mouth daily   acetaminophen (TYLENOL) 500 MG tablet Take 2 tablets (1,000 mg) by mouth 2 times daily   pantoprazole (PROTONIX) 40 MG EC tablet Take 1 tablet (40 mg) by mouth daily Take 30-60 minutes before a meal.   ferrous sulfate (IRON) 325 (65 FE) MG tablet Take 1 tablet (325 mg) by mouth daily (with breakfast)   furosemide (LASIX) 40 MG tablet Take 1 tablet (40 mg) by mouth daily   losartan (COZAAR) 25 MG tablet Take 1 tablet (25 mg)  by mouth daily   rosuvastatin (CRESTOR) 10 MG tablet Take 1 tablet (10 mg) by mouth daily   methocarbamol (ROBAXIN) 500 MG tablet Take 1 tablet (500 mg) by mouth 4 times daily as needed for muscle spasms   SitaGLIPtin-MetFORMIN HCl  MG TB24 Take 2 tablets by mouth daily (with breakfast)    Blood Glucose Monitoring Suppl (GLUCOCARD EXPRESSION MONITOR) W/DEVICE KIT USE TWICE DAILY TO TEST BLOOD GLUCOSE   metoprolol tartrate (LOPRESSOR) 25 MG tablet Take 1 tablet (25 mg) by mouth 2 times daily   senna-docusate (SENOKOT-S;PERICOLACE) 8.6-50 MG per tablet Take 1 tablet by mouth 2 times daily as needed for constipation   Wound Dressings (TRIAD HYDROPHILIC WOUND DRESSI) PSTE Externally apply 1 g topically daily   lidocaine (XYLOCAINE) 5 % ointment Apply topically daily   order for DME One touch glucose monitoring strip with Glucometer and lancets.     No current facility-administered medications on file prior to visit.     Allergies   Allergen Reactions     No Known Allergies        Social History     Occupational History     Not on file.     Social History Main Topics     Smoking status: Former Smoker     Types: Cigars     Smokeless tobacco: Never Used      Comment: exposure to second hand smoke     Alcohol use No     Drug use: No     Sexual activity: No       History reviewed. No pertinent family history.    REVIEW OF SYSTEMS    General: negative for fever or fatigue    Psych:  negative for anxiety or depression     Ophthalmic:  Corrective lenses?  YES    ENT:  Hearing difficulty? No    CV: He is negative for chest pain, but positive for a previous history of venous insuffiencey he has been fitted with LIANNE stockings    Endocrine:  positive for diabetes     Urology:  negative for kidney disease    Resp:  Normal respiratory effort     Skin: We did not assess his lower extremity for cuts/sores or redness today see below    Musculoskeletal: as above    Neurologic:positive for numbness/tingling    Hematologic: negative  "for bleeding disorder, does not use of prescription anticoagulants         Physical Exam:    Vitals: /74 (BP Location: Left arm, Patient Position: Chair, Cuff Size: Adult Large)  Pulse 79  Temp 96.8  F (36  C) (Temporal)  Ht 1.778 m (5' 10\")  Wt 137.8 kg (303 lb 11.2 oz)  SpO2 96%  BMI 43.58 kg/m2  BMI= Body mass index is 43.58 kg/(m^2).    GENERAL APPEARANCE: Obese but healthy, alert, no distress    SKIN:  negative for suspicious lesions or rashes    NEURO: Abnormal strength and tone however some of this is related to his recent spine surgery, mentation intact and speech normal    PSYCH:   Mentation appears Normal and affect normal/bright    RESPIRATORY: negative for respiratory distress.    EYES: negative for Conjunctivitis    Cardiovascular: +2 Edema                dorsalis pedis Present                    GAIT: The patient is in a motorized wheelchair that he inherited from his father-in-law.  This simply allows him to walk longer distances.  He is able to arise from the chair and walks with a broad-based gait with very slow progression.  He reports that he was not able to do this before his spine surgery.  His wife who accompanies him today concurs with this.    JOINT/EXTREMITIES:    He had his slacks on and his long leg LIANNE stockings.  On today's visit I did not remove these.  Ulcers were felt through the stockings.  He lacks about 5  of full extension of both knees.  His flexion is to at least 120  which is limited a little bit by his physique.  He has mild pseudolaxity to valgus stress of both knees.  He has easily identifiable medial joint pain worse on the right than on the left.  Is not appear to have irritability of his hip joints.      Radiographs: X-rays were taken today.  Our standard 4 views.  He has significant narrowing of the medial joint space on both his right and left knees.  On his left knee in the jumpers view there is complete obliteration of the medial joint space.  There does " not appear to be any significant bone erosion at this time.  Rucker femoral joint is still reasonably well-maintained.    Independent visualization of the images was performed.    Impression:     ICD-10-CM    1. Primary osteoarthritis of left knee M17.12    2. Primary osteoarthritis of right knee M17.11        Patient has by history of long-standing problem with knee arthritis.  He has significant radiographic evidence of progressive knee arthritis.  He has recently regained ambulatory status because of recent successful back surgery.  He also appears to have a potential for increased work capabilities as well.            Plan:  The above was reviewed with Glenroy and his wife.  We reviewed with him the fact that his knee situation would best be addressed with total joint replacement.  This is given his previous history of cortisone injections leading him to where he is now.  There is a problem with his obesity however it appears as though he has begun to address this.  He also has a problem with peripheral edema probably related to several issues include his knee arthritis and obesity.    I therefore request that he lose another 15 pounds.  That he continues to work on edema control.  He will return in 1 month's time.  This will allow him additional time to recover from his back surgery.    He must be weighed at that point.  I would also like to have him placed into shorts.  This will allow me to fully assess the integrity of his lower extremity skin condition.    If he loses additional weight in the skin condition is good then we will consider him a candidate for joint replacement.    Return to clinic 1, months, he should be re-weighed prior to be assessed and should be placed into a short prior to being assessed.    Janes Thakur MD                    Again, thank you for allowing me to participate in the care of your patient.        Sincerely,        Janes Thakur MD

## 2018-04-16 NOTE — NURSING NOTE
"Chief Complaint   Patient presents with     Consult       Initial /74 (BP Location: Left arm, Patient Position: Chair, Cuff Size: Adult Large)  Pulse 79  Temp 96.8  F (36  C) (Temporal)  Ht 1.778 m (5' 10\")  Wt 137.8 kg (303 lb 11.2 oz)  SpO2 96%  BMI 43.58 kg/m2 Estimated body mass index is 43.58 kg/(m^2) as calculated from the following:    Height as of this encounter: 1.778 m (5' 10\").    Weight as of this encounter: 137.8 kg (303 lb 11.2 oz).  Medication Reconciliation: complete   CARLOS A Burks  "

## 2018-04-16 NOTE — MR AVS SNAPSHOT
After Visit Summary   4/16/2018    Glenroy Padilla    MRN: [id number]           Patient Information     Date Of Birth          1950        Visit Information        Provider Department      4/16/2018 2:10 PM Janes Thakur MD [facility]        Today's Diagnoses     Left knee pain, unspecified chronicity    -  1       Follow-ups after your visit        Your next 10 appointments already scheduled     Apr 16, 2018  2:00 PM CDT   XR KNEE LEFT G/E 4 VIEWS with [id number]   [facility])    [address] [place] [phone]              Please bring a list of your current medicines to your exam. (Include vitamins, minerals and over-thecounter medicines.) Leave your valuables at home.  Tell your doctor if there is a chance you may be pregnant.  You do not need to do anything special for this exam.            Apr 16, 2018  2:10 PM CDT   New Visit with Janes Thakur MD   [facility]    [address] [place] [id number] [phone]            Apr 20, 2018 10:00 AM CDT   Office Visit with Julio Cesar Esteban PA-C   [facility]    [address] [place] [id number] [phone]           Bring a current list of meds and any records pertaining to this visit. For Physicals, please bring immunization records and any forms needing to be filled out. Please arrive 10 minutes early to complete paperwork.            Apr 23, 2018  1:45 PM CDT   Return Visit with Casey Shine MD   [facility]    [address] [place] [phone]   [phone]            Apr 26, 2018 10:10 AM CDT   [facility] with Martha Reddy PA-C   [facility]    [address]  [facility] [id number] [phone]            Jun 14, 2018   "9:00 AM CDT   Return Visit with Hugh Mendieta MD   [facility] ([facility])    [address]  [facility] [id number] [phone]              Who to contact     If you have questions or need follow up information about today's clinic visit or your schedule please contact [facility] directly at [phone].  Normal or non-critical lab and imaging results will be communicated to you by [employer], letter or phone within 4 business days after the clinic has received the results. If you do not hear from us within 7 days, please contact the clinic through [employer] or phone. If you have a critical or abnormal lab result, we will notify you by phone as soon as possible.  Submit refill requests through [employer] or call your pharmacy and they will forward the refill request to us. Please allow 3 business days for your refill to be completed.          Additional Information About Your Visit        Zooomr Information     [employer]r lets you send messages to your doctor, view your test results, renew your prescriptions, schedule appointments and more. To sign up, go to [email or web address] . Click on \"Log in\" on the left side of the screen, which will take you to the Welcome page. Then click on \"Sign up Now\" on the right side of the page.     You will be asked to enter the access code listed below, as well as some personal information. Please follow the directions to create your username and password.     Your access code is: [email or web address]  Expires: 2018 11:55 AM     Your access code will  in 90 days. If you need help or a new code, please call your [place] clinic or [phone].        Care EveryWhere ID     This is your Care EveryWhere ID. This could be used by other organizations to access your [place] medical records  [id number]        Your Vitals Were     Pulse Temperature Height Pulse Oximetry BMI (Body Mass Index)       79 96.8  F (36  C) " "(Temporal) 1.778 m (5' 10\") 96% 43.58 kg/m2        Blood Pressure from Last 3 Encounters:   04/16/18 136/74   04/09/18 142/60   04/05/18 120/62    Weight from Last 3 Encounters:   04/16/18 137.8 kg (303 lb 11.2 oz)   04/09/18 137.8 kg (303 lb 11.2 oz)   04/04/18 135.2 kg (298 lb)               Primary Care Provider Office Phone # Fax #    Julio Cesar Esteban PA-C [phone]       [facility] [id number] [place] [address]        Equal Access to Services     JAZ TOBAR : Pietro bolden Sogloria, wathierno cody, qashantita kaalmada adegerardo, adams horton. So [facility] [phone].    ATENCIÓN: Si habla español, tiene a wilkinson disposición servicios gratuitos de asistencia lingüística. Llame al [phone].    We comply with applicable federal civil rights laws and [place] laws. We do not discriminate on the basis of race, color, national origin, age, disability, sex, sexual orientation, or gender identity.            Thank you!     Thank you for choosing [facility]  for your care. Our goal is always to provide you with excellent care. Hearing back from our patients is one way we can continue to improve our services. Please take a few minutes to complete the written survey that you may receive in the mail after your visit with us. Thank you!             Your Updated Medication List - Protect others around you: Learn how to safely use, store and throw away your medicines at www.disposemymeds.org.          This list is accurate as of 4/16/18  1:37 PM.  Always use your most recent med list.                   Brand Name Dispense Instructions for use Diagnosis    acetaminophen 500 MG tablet    TYLENOL    100 tablet    Take 2 tablets (1,000 mg) by mouth 2 times daily    Chronic pain of left knee, Complete tear of left rotator cuff       clopidogrel 75 MG tablet    PLAVIX    90 tablet    Take 1 tablet (75 mg) by mouth daily    Type 2 diabetes mellitus with " other circulatory complication, without long-term current use of insulin (H), S/P spinal surgery, Hypertension, goal below 140/90, Hyperlipidemia LDL goal <100       ferrous sulfate 325 (65 Fe) MG tablet    IRON    90 tablet    Take 1 tablet (325 mg) by mouth daily (with breakfast)    History of anemia       furosemide 40 MG tablet    LASIX    90 tablet    Take 1 tablet (40 mg) by mouth daily    Hypertension, goal below 140/90       GLUCOCARD EXPRESSION MONITOR w/Device Kit      USE TWICE DAILY TO TEST BLOOD GLUCOSE        lidocaine 5 % ointment    XYLOCAINE    50 g    Apply topically daily    Chronic pain of right knee       losartan 25 MG tablet    COZAAR    90 tablet    Take 1 tablet (25 mg) by mouth daily    Hypertension, goal below 140/90, Type 2 diabetes mellitus with other circulatory complication, without long-term current use of insulin (H)       methocarbamol 500 MG tablet    ROBAXIN    70 tablet    Take 1 tablet (500 mg) by mouth 4 times daily as needed for muscle spasms    S/P spinal surgery       metoprolol tartrate 25 MG tablet    LOPRESSOR    28 tablet    Take 1 tablet (25 mg) by mouth 2 times daily    Type 2 diabetes mellitus with other circulatory complication, without long-term current use of insulin (H), History of coronary artery stent placement, Hypertension, goal below 140/90       order for DME     1 Box    One touch glucose monitoring strip with Glucometer and lancets.    Type 2 diabetes mellitus with other circulatory complication, without long-term current use of insulin (H)       pantoprazole 40 MG EC tablet    PROTONIX    30 tablet    Take 1 tablet (40 mg) by mouth daily Take 30-60 minutes before a meal.    Gastroesophageal reflux disease without esophagitis       rosuvastatin 10 MG tablet    CRESTOR    90 tablet    Take 1 tablet (10 mg) by mouth daily    Type 2 diabetes mellitus with other circulatory complication, without long-term current use of insulin (H)       senna-docusate 8.6-50  MG per tablet    SENOKOT-S;PERICOLACE    100 tablet    Take 1 tablet by mouth 2 times daily as needed for constipation    Spinal stenosis, lumbar region, without neurogenic claudication       SitaGLIPtin-MetFORMIN HCl  MG Tb24      Take 2 tablets by mouth daily (with breakfast)        TRIAD HYDROPHILIC WOUND DRESSI Pste     1 Tube    Externally apply 1 g topically daily    Open wound of right lower leg, initial encounter

## 2018-04-16 NOTE — TELEPHONE ENCOUNTER
Reason for Call:  Form, our goal is to have forms completed with 72 hours, however, some forms may require a visit or additional information.    Type of letter, form or note:  medical    Who is the form from?: [employer] (if other please explain)    Where did the form come from: form was faxed in    What clinic location was the form placed at?: [facility] - [phone]    Where the form was placed: 's Box    What number is listed as a contact on the form?: [phone]       Additional comments: n/a    Call taken on 4/16/2018 at 3:24 PM by Diamond Juarez

## 2018-04-16 NOTE — LETTER
4/16/2018         RE: Glenroy Padilla  [address] [address]        Dear Colleague,    Thank you for referring your patient, Glenroy Padilla, to the [facility]. Please see a copy of my visit note below.    F/U for leg wound    Subjective:  Patient is a 67-year-old white male presenting history of a left leg wound. He hit a coffee table  the wound is slow to heal. He was initially treated in [place] and has a 15 year history of bilateral lower extremity edema. He has never been treated with any form of compression therapy and was told not to based on an ultrasound results in [place]. Those results are not available to me at this time. He denies any prior DVT, nonhealing wounds, leg trauma, claudication or rest pain. He was told in [place] to treat the leg with Betadine. He came to the [place] for second opinion.      Last seen by me in June and was tx with UNNA boot and silvercel.  Returned to [place] and was treated with hydroferra blue.       Had UNNA boot on when was last seen.  Has been in Assisted living since last seen by me in January for leg weakness.  TX with TRIAD, Foam and UNNA.   Laceration on right healed.    Had his back surgery at [facility]. Doing well.       Reports wound almost closed.    Objective:  B/P: 136/74, T: Data Unavailable, P: 91, R: Data Unavailable  Ext; Warm,  No edema. Bilateral venous stasis changes with thickened skin.   (+)FEM pulses.  No distal pulses.  LLE wound - 1.5x0.5x0.1cm  - AGNO4 Applied.   Large piece of calcium removed   New ant ankle wound - Healed    U/S -   ULTRASOUND VENOUS COMPETENCY BILATERAL   6/1/2017 3:29 PM      HISTORY: Localized edema. Chronic venous hypertension (idiopathic)  without complications of bilateral lower extremity. Unspecified open  wound, left lower leg, initial encounter.     COMPARISON: None.     TECHNIQUE: Color Doppler and spectral waveform analysis performed.     FINDINGS: Bilateral common femoral veins,  femoral veins, and popliteal  veins are patent and demonstrate no evidence of reflux.     Other than questionable 1 second reflux at the right saphenofemoral  junction, the right great saphenous vein is competent throughout its  length. Diameter of the right great saphenous vein is 3-7 mm.     The left great saphenous vein is competent throughout its length.  Diameter of the left great saphenous vein is 2-7 mm.     Visualized portions of bilateral small saphenous veins appear to be  competent, without evidence of reflux.     No prominent perforators were demonstrated. Bilateral Giacomini veins  were competent.         IMPRESSION: The deep and superficial venous systems of both legs are  patent and competent.     PATRICK SCHERER MD    [facility] -   ULTRASOUND ANKLE-BRACHIAL INDEX DOPPLER NO EXERCISE   6/1/2017 3:16 PM        HISTORY: Localized edema. Chronic venous hypertension (idiopathic)  without complications of bilateral lower extremity.     COMPARISON: None.     FINDINGS: Ankle-brachial index is 0.96 on the right and 0.91 on the  left. Waveforms appear biphasic/monophasic bilaterally.         IMPRESSION: Borderline bilateral arterial insufficiency.     PATRICK SCHERER MD      Assessment/plan:  This is a 67 year old gentleman with bilateral chronic stasis changes and lymphedema. He has a new right leg wound as well as a result of a fall - closed.   Left leg wound with large piece of calcium removed - was piercing skin preventing wound from healing.  Edema now resolved.    Will cont TRIAD and ACE.  Will hold UNNA to left.   F/U 2 weeks.    Casey Shine MD, FACS.    Again, thank you for allowing me to participate in the care of your patient.        Sincerely,        Casey Shine MD

## 2018-04-16 NOTE — MR AVS SNAPSHOT
After Visit Summary   4/16/2018    Glenroy Padilla    MRN: [id number]           Patient Information     Date Of Birth          1950        Visit Information        Provider Department      4/16/2018 3:15 PM Casey Shine MD [facility]         Follow-ups after your visit        Your next 10 appointments already scheduled     Apr 16, 2018  3:15 PM CDT   Return Visit with Casey Shine MD   [facility] ([address] [address]   [phone]            Apr 20, 2018 10:00 AM CDT   Office Visit with Julio Cesar Esteban PA-C   [facility] ([address] [address] [phone]           Bring a current list of meds and any records pertaining to this visit. For Physicals, please bring immunization records and any forms needing to be filled out. Please arrive 10 minutes early to complete paperwork.            Apr 23, 2018  1:45 PM CDT   Return Visit with Casey Shine MD   [facility] ([facility])    [address] [place] [address]   [phone]            Apr 26, 2018 10:10 AM CDT   Return Visit with Martha Reddy PA-C   [facility]    [address] [facility], [address] [address] [phone]            Jun 14, 2018  9:00 AM CDT   Return Visit with Hugh Mendieta MD   [facility] ([facility])    [address] [facility], [address] [address] [phone]              Who to contact     If you have questions or need follow up information about today's clinic visit or your schedule please contact [facility] directly at [phone].  Normal or non-critical lab and imaging results will be communicated to you by MyChart, letter or phone within 4 business days after the clinic has received the  "results. If you do not hear from us within 7 days, please contact the clinic through [employer] or phone. If you have a critical or abnormal lab result, we will notify you by phone as soon as possible.  Submit refill requests through [employer] or call your pharmacy and they will forward the refill request to us. Please allow 3 business days for your refill to be completed.          Additional Information About Your Visit        [employer] Information     [employer] lets you send messages to your doctor, view your test results, renew your prescriptions, schedule appointments and more. To sign up, go to [email or web address] . Click on \"Log in\" on the left side of the screen, which will take you to the Welcome page. Then click on \"Sign up Now\" on the right side of the page.     You will be asked to enter the access code listed below, as well as some personal information. Please follow the directions to create your username and password.     Your access code is: [email or web address]  Expires: 2018 11:55 AM     Your access code will  in 90 days. If you need help or a new code, please call your [facility] clinic or [phone].        Care EveryWhere ID     This is your Care EveryWhere ID. This could be used by other organizations to access your [facility] medical records  [id number]        Your Vitals Were     Pulse Pulse Oximetry BMI (Body Mass Index)             91 97% 44.68 kg/m2          Blood Pressure from Last 3 Encounters:   18 136/74   18 136/74   18 142/60    Weight from Last 3 Encounters:   18 141.3 kg (311 lb 6.4 oz)   18 137.8 kg (303 lb 11.2 oz)   18 137.8 kg (303 lb 11.2 oz)              Today, you had the following     No orders found for display       Primary Care Provider Office Phone # Fax #    Julio Cesar Esteban PA-C [phone]       [facility] [id number] [place] [id number]        Equal Access to Services     JAZ TOBAR AH: Pietro bolden " Shahram, jossueda lusteveadaha, qashantita kamigue wall, adams pinedain hayaan aleabrady kenroywhitney laGaspergabriella clifford. So [facility] [phone].    ATENCIÓN: Si brennan delaney, tiene a wilkinson disposición servicios gratuitos de asistencia lingüística. Frances al [phone].    We comply with applicable federal civil rights laws and [place] laws. We do not discriminate on the basis of race, color, national origin, age, disability, sex, sexual orientation, or gender identity.            Thank you!     Thank you for choosing [facility]  for your care. Our goal is always to provide you with excellent care. Hearing back from our patients is one way we can continue to improve our services. Please take a few minutes to complete the written survey that you may receive in the mail after your visit with us. Thank you!             Your Updated Medication List - Protect others around you: Learn how to safely use, store and throw away your medicines at www.disposemymeds.org.          This list is accurate as of 4/16/18  2:36 PM.  Always use your most recent med list.                   Brand Name Dispense Instructions for use Diagnosis    acetaminophen 500 MG tablet    TYLENOL    100 tablet    Take 2 tablets (1,000 mg) by mouth 2 times daily    Chronic pain of left knee, Complete tear of left rotator cuff       clopidogrel 75 MG tablet    PLAVIX    90 tablet    Take 1 tablet (75 mg) by mouth daily    Type 2 diabetes mellitus with other circulatory complication, without long-term current use of insulin (H), S/P spinal surgery, Hypertension, goal below 140/90, Hyperlipidemia LDL goal <100       ferrous sulfate 325 (65 Fe) MG tablet    IRON    90 tablet    Take 1 tablet (325 mg) by mouth daily (with breakfast)    History of anemia       furosemide 40 MG tablet    LASIX    90 tablet    Take 1 tablet (40 mg) by mouth daily    Hypertension, goal below 140/90       GLUCOCARD EXPRESSION MONITOR w/Device Kit      USE TWICE DAILY TO TEST BLOOD GLUCOSE         lidocaine 5 % ointment    XYLOCAINE    50 g    Apply topically daily    Chronic pain of right knee       losartan 25 MG tablet    COZAAR    90 tablet    Take 1 tablet (25 mg) by mouth daily    Hypertension, goal below 140/90, Type 2 diabetes mellitus with other circulatory complication, without long-term current use of insulin (H)       methocarbamol 500 MG tablet    ROBAXIN    70 tablet    Take 1 tablet (500 mg) by mouth 4 times daily as needed for muscle spasms    S/P spinal surgery       metoprolol tartrate 25 MG tablet    LOPRESSOR    28 tablet    Take 1 tablet (25 mg) by mouth 2 times daily    Type 2 diabetes mellitus with other circulatory complication, without long-term current use of insulin (H), History of coronary artery stent placement, Hypertension, goal below 140/90       order for DME     1 Box    One touch glucose monitoring strip with Glucometer and lancets.    Type 2 diabetes mellitus with other circulatory complication, without long-term current use of insulin (H)       pantoprazole 40 MG EC tablet    PROTONIX    30 tablet    Take 1 tablet (40 mg) by mouth daily Take 30-60 minutes before a meal.    Gastroesophageal reflux disease without esophagitis       rosuvastatin 10 MG tablet    CRESTOR    90 tablet    Take 1 tablet (10 mg) by mouth daily    Type 2 diabetes mellitus with other circulatory complication, without long-term current use of insulin (H)       senna-docusate 8.6-50 MG per tablet    SENOKOT-S;PERICOLACE    100 tablet    Take 1 tablet by mouth 2 times daily as needed for constipation    Spinal stenosis, lumbar region, without neurogenic claudication       SitaGLIPtin-MetFORMIN HCl  MG Tb24      Take 2 tablets by mouth daily (with breakfast)        TRIAD HYDROPHILIC WOUND DRESSI Pste     1 Tube    Externally apply 1 g topically daily    Open wound of right lower leg, initial encounter

## 2018-04-16 NOTE — NURSING NOTE
"Chief Complaint   Patient presents with     WOUND CARE     Triad /ACe-lower leg wound       Initial /74 (BP Location: Left arm, Patient Position: Sitting, Cuff Size: Adult Large)  Pulse 91  Wt 141.3 kg (311 lb 6.4 oz)  SpO2 97%  BMI 44.68 kg/m2 Estimated body mass index is 44.68 kg/(m^2) as calculated from the following:    Height as of an earlier encounter on 4/16/18: 1.778 m (5' 10\").    Weight as of this encounter: 141.3 kg (311 lb 6.4 oz).  Medication Reconciliation: complete    "

## 2018-04-16 NOTE — TELEPHONE ENCOUNTER
Forms for patient have been signed by Dr. Copeland. This form has been faxed and a copy has been sent to scanning/abstracting. Closing this encounter.     Alecia Ashford MA

## 2018-04-16 NOTE — PROGRESS NOTES
SUBJECTIVE:   Glenroy Padilla is a 67 year old male who presents to clinic today for the following health issues:    Patient comes in for recheck of his diabetes.  We have multiple forms to fill out for him today again for his extended care facility.  He is growing frustrated with his spending time there.  Advised that he continue to work hard at getting home so that he does not have spending more time there than necessary.    The ASCVD Risk score ([facility] SHERRELL Jr, et al., 2013) failed to calculate for the following reasons:    The valid total cholesterol range is 130 to 320 mg/dL  Patient is eligible for use of low-dose aspirin for primary prevention of heart attack and stroke.  Provider has discussed aspirin with patient and our decision was:     Prescribe:  Daily low-dose aspirin recommended for primary prevention, patient agrees with plan. use with caution in the presence of plavix        HPI    Problem list and histories reviewed & adjusted, as indicated.  Additional history: as documented    Patient Active Problem List   Diagnosis     Type 2 diabetes mellitus with other circulatory complication, without long-term current use of insulin (H)     Venous stasis ulcer of left lower extremity (H)     Acute pain of left knee     Chronic pain of left knee     Open wound of left lower extremity without complication, initial encounter     Hx of coronary artery disease     Hx of heart artery stent     DAYTON (obstructive sleep apnea)     Hypertension, goal below 140/90     Hyperlipidemia LDL goal <100     History of coronary artery stent placement     Advanced directives, counseling/discussion     Obesity, morbid, BMI 40.0-49.9 (H)     Neuropathy     Foot drop, right     Cardiomegaly     Gastroesophageal reflux disease without esophagitis     Falls frequently     Weakness of both legs     Central spinal stenosis L3-4 and L4-5     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     Lymphedema     S/P spinal surgery     Complete tear  of left rotator cuff     Past Surgical History:   Procedure Laterality Date     CARDIAC SURGERY      stent placement     CHOLECYSTECTOMY       LAMINECTOMY LUMBAR THREE+ LEVELS N/A 3/13/2018    Procedure: LAMINECTOMY LUMBAR THREE+ LEVELS;  T5-9 LAMINECTOMIES, RESECTION OF EPIDURAL LIPOMATOSIS;  Surgeon: Hugh Mendieta MD;  Location:  OR       Social History   Substance Use Topics     Smoking status: Former Smoker     Types: Cigars     Smokeless tobacco: Never Used      Comment: exposure to second hand smoke     Alcohol use No     History reviewed. No pertinent family history.      Current Outpatient Prescriptions   Medication Sig Dispense Refill     acetaminophen (TYLENOL) 500 MG tablet Take 1-2 tablets (500-1,000 mg) by mouth every 6 hours as needed for mild pain 120 tablet 4     Blood Glucose Monitoring Suppl (GLUCOCARD EXPRESSION MONITOR) W/DEVICE KIT USE TWICE DAILY TO TEST BLOOD GLUCOSE  0     clopidogrel (PLAVIX) 75 MG tablet Take 1 tablet (75 mg) by mouth daily 90 tablet 1     cyclobenzaprine (FLEXERIL) 10 MG tablet TAKE 1 TAB BY MOUTH TWICE DAILY AS NEEDED FOR ROTATOR CUFF TEAR  0     ferrous sulfate (IRON) 325 (65 FE) MG tablet Take 1 tablet (325 mg) by mouth daily (with breakfast) 90 tablet 2     furosemide (LASIX) 40 MG tablet Take 1 tablet (40 mg) by mouth daily 90 tablet 1     lidocaine (XYLOCAINE) 5 % ointment Apply topically daily 50 g 3     losartan (COZAAR) 25 MG tablet Take 1 tablet (25 mg) by mouth daily 90 tablet 1     methocarbamol (ROBAXIN) 500 MG tablet Take 1 tablet (500 mg) by mouth 4 times daily as needed for muscle spasms 70 tablet 1     metoprolol tartrate (LOPRESSOR) 25 MG tablet Take 1 tablet (25 mg) by mouth 2 times daily 60 tablet 4     order for DME One touch glucose monitoring strip with Glucometer and lancets. 1 Box 1     pantoprazole (PROTONIX) 40 MG EC tablet Take 1 tablet (40 mg) by mouth daily Take 30-60 minutes before a meal. 30 tablet 0     rosuvastatin (CRESTOR) 10 MG  "tablet Take 1 tablet (10 mg) by mouth daily 90 tablet 1     senna-docusate (SENOKOT-S;PERICOLACE) 8.6-50 MG per tablet Take 1 tablet by mouth 2 times daily as needed for constipation 100 tablet      SitaGLIPtin-MetFORMIN HCl  MG TB24 Take 2 tablets by mouth daily (with breakfast)        Wound Dressings (TRIAD HYDROPHILIC WOUND DRESSI) PSTE Externally apply 1 g topically daily 1 Tube 3     Allergies   Allergen Reactions     No Known Allergies      Recent Labs   Lab Test 03/26/18 03/14/18   0810  03/13/18   1733  03/13/18   1305  03/09/18   1020   01/27/18   1825  09/25/17   1216  05/22/17   1029   A1C  5.3   --    --    --    --    --   5.6  6.2*  6.4*   LDL   --    --    --    --   76   --    --   57  64   HDL   --    --    --    --    --    --    --   43  42   TRIG   --    --    --    --    --    --    --   140  118   ALT   --    --    --    --   19   --    --   19  20   CR   --   0.90   --   0.95  0.91   < >  1.17  0.92  0.91   GFRESTIMATED   --   84   --   79  82   < >  62  81  82   GFRESTBLACK   --   >90   --   >90  >90   < >  75  >90  >90   GFR Calc     POTASSIUM   --   4.0  3.8  4.3  4.6   < >  3.8  4.1  4.0   TSH   --    --    --    --    --    --    --   2.07   --     < > = values in this interval not displayed.      BP Readings from Last 3 Encounters:   04/20/18 134/66   04/16/18 136/74   04/16/18 136/74    Wt Readings from Last 3 Encounters:   04/20/18 292 lb 3.2 oz (132.5 kg)   04/16/18 311 lb 6.4 oz (141.3 kg)   04/16/18 303 lb 11.2 oz (137.8 kg)                  Labs reviewed in EPIC    ROS:  Constitutional, HEENT, cardiovascular, pulmonary, gi and gu systems are negative, except as otherwise noted.    OBJECTIVE:     /66 (Cuff Size: Adult Large)  Pulse 76  Temp 97  F (36.1  C) (Temporal)  Resp 20  Ht 5' 10\" (1.778 m)  Wt 292 lb 3.2 oz (132.5 kg)  BMI 41.93 kg/m2  Body mass index is 41.93 kg/(m^2).  GENERAL: healthy, alert and no distress  NECK: no adenopathy, no " asymmetry, masses, or scars and thyroid normal to palpation  RESP: lungs clear to auscultation - no rales, rhonchi or wheezes  CV: regular rate and rhythm, normal S1 S2, no S3 or S4, no murmur, click or rub, no peripheral edema and peripheral pulses strong  ABDOMEN: obese, soft, nontender, no hepatosplenomegaly, no masses and bowel sounds normal  MS: no gross musculoskeletal defects noted, no edema    Diagnostic Test Results:  No results found for this or any previous visit (from the past 24 hour(s)).    ASSESSMENT/PLAN:     1. Hypertension, goal below 140/90  Well within goals at this point time.    2. Type 2 diabetes mellitus with other circulatory complication, without long-term current use of insulin (H)  He has been doing well with this in the past we are rechecking his hemoglobin A1c again today.  Follow-up in 3 months.  - Hemoglobin A1c    3. Chronic pain of left knee  Continue to follow with orthopedic specialist in regards to this.  Take their advice and move towards knee replacement as needed.  - acetaminophen (TYLENOL) 500 MG tablet; Take 1-2 tablets (500-1,000 mg) by mouth every 6 hours as needed for mild pain  Dispense: 120 tablet; Refill: 4    4. Complete tear of left rotator cuff  Historically noted continue to follow with orthopedics.  - acetaminophen (TYLENOL) 500 MG tablet; Take 1-2 tablets (500-1,000 mg) by mouth every 6 hours as needed for mild pain  Dispense: 120 tablet; Refill: 4    5. DAYTON (obstructive sleep apnea)  He is having his machine looked at today.  Encouraged him to continue to use his appliance for obstructive sleep apnea treatment and observe shortness of breath at nighttime.    Follow-up in 3 months advised.  Sooner as needed.  Advised that he schedule his preop as soon as he knows when surgery is going to happen.    Julio Cesar Sauh PA-C  [facility]

## 2018-04-17 ENCOUNTER — TELEPHONE (OUTPATIENT)
Dept: FAMILY MEDICINE | Facility: OTHER | Age: 68
End: 2018-04-17

## 2018-04-17 NOTE — TELEPHONE ENCOUNTER
Reason for Call:  Form, our goal is to have forms completed with 72 hours, however, some forms may require a visit or additional information.    Type of letter, form or note:  medical    Who is the form from?: [employer] (if other please explain)    Where did the form come from: form was faxed in    What clinic location was the form placed at?: [facility] - [phone]    Where the form was placed: 's Box    What number is listed as a contact on the form?: [phone]       Additional comments: none     Call taken on 4/17/2018 at 10:05 AM by Claire Hinojosa

## 2018-04-19 ENCOUNTER — MEDICAL CORRESPONDENCE (OUTPATIENT)
Dept: HEALTH INFORMATION MANAGEMENT | Facility: CLINIC | Age: 68
End: 2018-04-19

## 2018-04-19 DIAGNOSIS — G89.29 CHRONIC PAIN OF LEFT KNEE: ICD-10-CM

## 2018-04-19 DIAGNOSIS — E11.59 TYPE 2 DIABETES MELLITUS WITH OTHER CIRCULATORY COMPLICATION, WITHOUT LONG-TERM CURRENT USE OF INSULIN (H): ICD-10-CM

## 2018-04-19 DIAGNOSIS — M25.562 CHRONIC PAIN OF LEFT KNEE: ICD-10-CM

## 2018-04-19 DIAGNOSIS — Z95.5 HISTORY OF CORONARY ARTERY STENT PLACEMENT: ICD-10-CM

## 2018-04-19 DIAGNOSIS — I10 HYPERTENSION, GOAL BELOW 140/90: ICD-10-CM

## 2018-04-19 DIAGNOSIS — M75.122 COMPLETE TEAR OF LEFT ROTATOR CUFF: ICD-10-CM

## 2018-04-19 RX ORDER — ACETAMINOPHEN 500 MG
1000 TABLET ORAL 2 TIMES DAILY
Qty: 100 TABLET | Refills: 0 | Status: CANCELLED | OUTPATIENT
Start: 2018-04-19

## 2018-04-19 RX ORDER — METOPROLOL TARTRATE 25 MG/1
25 TABLET, FILM COATED ORAL 2 TIMES DAILY
Qty: 60 TABLET | Refills: 4 | Status: SHIPPED | OUTPATIENT
Start: 2018-04-19 | End: 2018-10-12

## 2018-04-19 RX ORDER — ACETAMINOPHEN 500 MG
1000 TABLET ORAL 2 TIMES DAILY
Qty: 120 TABLET | Refills: 4 | Status: SHIPPED | OUTPATIENT
Start: 2018-04-19 | End: 2018-04-20

## 2018-04-19 NOTE — TELEPHONE ENCOUNTER
acetaminophen (TYLENOL) 500 MG tablet  Prescription approved per [facility] Refill Protocol.    metoprolol tartrate (LOPRESSOR) 25 MG tablet  Prescription approved per [facility] Refill Protocol.    Hermelinda Coronado, RN, BSN

## 2018-04-20 ENCOUNTER — TELEPHONE (OUTPATIENT)
Dept: FAMILY MEDICINE | Facility: OTHER | Age: 68
End: 2018-04-20

## 2018-04-20 ENCOUNTER — OFFICE VISIT (OUTPATIENT)
Dept: FAMILY MEDICINE | Facility: OTHER | Age: 68
End: 2018-04-20
Payer: COMMERCIAL

## 2018-04-20 VITALS
HEIGHT: 70 IN | HEART RATE: 76 BPM | BODY MASS INDEX: 41.83 KG/M2 | RESPIRATION RATE: 20 BRPM | WEIGHT: 292.2 LBS | SYSTOLIC BLOOD PRESSURE: 134 MMHG | DIASTOLIC BLOOD PRESSURE: 66 MMHG | TEMPERATURE: 97 F

## 2018-04-20 DIAGNOSIS — M25.562 CHRONIC PAIN OF LEFT KNEE: ICD-10-CM

## 2018-04-20 DIAGNOSIS — G47.33 OSA (OBSTRUCTIVE SLEEP APNEA): ICD-10-CM

## 2018-04-20 DIAGNOSIS — G89.29 CHRONIC PAIN OF LEFT KNEE: ICD-10-CM

## 2018-04-20 DIAGNOSIS — E11.59 TYPE 2 DIABETES MELLITUS WITH OTHER CIRCULATORY COMPLICATION, WITHOUT LONG-TERM CURRENT USE OF INSULIN (H): ICD-10-CM

## 2018-04-20 DIAGNOSIS — I10 HYPERTENSION, GOAL BELOW 140/90: Primary | ICD-10-CM

## 2018-04-20 DIAGNOSIS — M75.122 COMPLETE TEAR OF LEFT ROTATOR CUFF: ICD-10-CM

## 2018-04-20 LAB — HBA1C MFR BLD: 6 % (ref 0–6.4)

## 2018-04-20 PROCEDURE — 83036 HEMOGLOBIN GLYCOSYLATED A1C: CPT | Performed by: PHYSICIAN ASSISTANT

## 2018-04-20 PROCEDURE — 99214 OFFICE O/P EST MOD 30 MIN: CPT | Performed by: PHYSICIAN ASSISTANT

## 2018-04-20 PROCEDURE — 36415 COLL VENOUS BLD VENIPUNCTURE: CPT | Performed by: PHYSICIAN ASSISTANT

## 2018-04-20 RX ORDER — ACETAMINOPHEN 500 MG
500-1000 TABLET ORAL EVERY 6 HOURS PRN
Qty: 120 TABLET | Refills: 4 | Status: SHIPPED | OUTPATIENT
Start: 2018-04-20 | End: 2019-02-05

## 2018-04-20 RX ORDER — CYCLOBENZAPRINE HCL 10 MG
TABLET ORAL
Refills: 0 | COMMUNITY
Start: 2018-04-04 | End: 2019-04-18

## 2018-04-20 ASSESSMENT — PAIN SCALES - GENERAL: PAINLEVEL: MILD PAIN (3)

## 2018-04-20 NOTE — NURSING NOTE
"Chief Complaint   Patient presents with     High blood sugars     Panel Management     mychart, asa, eye exam       Initial /66 (Cuff Size: Adult Large)  Pulse 76  Temp 97  F (36.1  C) (Temporal)  Resp 20  Ht 5' 10\" (1.778 m)  Wt 292 lb 3.2 oz (132.5 kg)  BMI 41.93 kg/m2 Estimated body mass index is 41.93 kg/(m^2) as calculated from the following:    Height as of this encounter: 5' 10\" (1.778 m).    Weight as of this encounter: 292 lb 3.2 oz (132.5 kg).  Medication Reconciliation: complete  "

## 2018-04-20 NOTE — TELEPHONE ENCOUNTER
We received forms for OT from [employer]. The forms have been faxed. A copy has been placed in Dr. Copeland's faxed folder and original has been sent to scanning and abstracting. Closing encounter.     Alecia Ashford MA

## 2018-04-20 NOTE — MR AVS SNAPSHOT
After Visit Summary   4/20/2018    Glenroy Padilla    MRN: [id number]           Patient Information     Date Of Birth          1950        Visit Information        Provider Department      4/20/2018 10:00 AM Julio Cesar Esteban PA-C [facility]        Today's Diagnoses     Hypertension, goal below 140/90    -  1    Type 2 diabetes mellitus with other circulatory complication, without long-term current use of insulin (H)        Chronic pain of left knee        Complete tear of left rotator cuff        DAYTON (obstructive sleep apnea)           Follow-ups after your visit        Your next 10 appointments already scheduled     Apr 23, 2018  1:45 PM CDT   [facility]    [address] [place] [id number] [phone]            Apr 26, 2018 10:10 AM CDT   [facility]    [address] [place] [address], [address]  [facility] [id number] [phone]            Apr 30, 2018  1:30 PM CDT   [facility]   [facility]    [address] [place] [id number] [phone]            May 14, 2018  3:10 PM CDT   [facility] with Janes [facility]ur [facility]   [facility]    [address] [place] [address]   [phone]            Jun 14, 2018  9:00 AM CDT   [facility]    [address], [address]  [facility] [id number] [phone]              Who to contact     If you have questions or need follow up information about today's clinic visit or your schedule please contact [place] [facility] directly at [phone].  Normal or non-critical lab and imaging results will be communicated to you by Jose  "letter or phone within 4 business days after the clinic has received the results. If you do not hear from us within 7 days, please contact the clinic through [employer] or phone. If you have a critical or abnormal lab result, we will notify you by phone as soon as possible.  Submit refill requests through [employer] or call your pharmacy and they will forward the refill request to us. Please allow 3 business days for your refill to be completed.          Additional Information About Your Visit        [employer] Information     [employer] lets you send messages to your doctor, view your test results, renew your prescriptions, schedule appointments and more. To sign up, go to [email or web address] . Click on \"Log in\" on the left side of the screen, which will take you to the Welcome page. Then click on \"Sign up Now\" on the right side of the page.     You will be asked to enter the access code listed below, as well as some personal information. Please follow the directions to create your username and password.     Your access code is: [email or web address]  Expires: 2018 11:55 AM     Your access code will  in 90 days. If you need help or a new code, please call your [place] clinic or [phone].        Care EveryWhere ID     This is your Care EveryWhere ID. This could be used by other organizations to access your [place] medical records  [id number]        Your Vitals Were     Pulse Temperature Respirations Height BMI (Body Mass Index)       76 97  F (36.1  C) (Temporal) 20 5' 10\" (1.778 m) 41.93 kg/m2        Blood Pressure from Last 3 Encounters:   18 134/66   18 136/74   18 136/74    Weight from Last 3 Encounters:   18 292 lb 3.2 oz (132.5 kg)   18 311 lb 6.4 oz (141.3 kg)   18 303 lb 11.2 oz (137.8 kg)              We Performed the Following     Hemoglobin A1c          Today's Medication Changes          These changes are accurate as of 18 10:08 AM.  If you have any questions, " ask your nurse or doctor.               These medicines have changed or have updated prescriptions.        Dose/Directions    acetaminophen 500 MG tablet   Commonly known as:  TYLENOL   This may have changed:    - how much to take  - when to take this  - reasons to take this   Used for:  Chronic pain of left knee, Complete tear of left rotator cuff   Changed by:  Julio Cesar Esteban PA-C        Dose:  500-1000 mg   Take 1-2 tablets (500-1,000 mg) by mouth every 6 hours as needed for mild pain   Quantity:  120 tablet   Refills:  4            Where to get your medicines      These medications were sent to [id number] - [place], [place] - [address]  [address], [facility] [address]     Phone:  [phone]     acetaminophen 500 MG tablet                Primary Care Provider Office Phone # Fax #    Julio Cesar Esteban PA-C [phone]       [address] [address]        Equal Access to Services     JAZ TOBAR AH: Hadii aad ku hadasho Soomaali, waaxda luqadaha, qaybta kaalmada adeegyada, waxay idiin hayluisn bianca batres . So [facility] [phone].    ATENCIÓN: Si habla español, tiene a wilkinson disposición servicios gratuitos de asistencia lingüística. [facility] [phone].    We comply with applicable federal civil rights laws and [place] laws. We do not discriminate on the basis of race, color, national origin, age, disability, sex, sexual orientation, or gender identity.            Thank you!     Thank you for choosing [facility]  for your care. Our goal is always to provide you with excellent care. Hearing back from our patients is one way we can continue to improve our services. Please take a few minutes to complete the written survey that you may receive in the mail after your visit with us. Thank you!             Your Updated Medication List - Protect others around you: Learn how to safely use, store and throw away  your medicines at www.disposemymeds.org.          This list is accurate as of 4/20/18 10:08 AM.  Always use your most recent med list.                   Brand Name Dispense Instructions for use Diagnosis    acetaminophen 500 MG tablet    TYLENOL    120 tablet    Take 1-2 tablets (500-1,000 mg) by mouth every 6 hours as needed for mild pain    Chronic pain of left knee, Complete tear of left rotator cuff       clopidogrel 75 MG tablet    PLAVIX    90 tablet    Take 1 tablet (75 mg) by mouth daily    Type 2 diabetes mellitus with other circulatory complication, without long-term current use of insulin (H), S/P spinal surgery, Hypertension, goal below 140/90, Hyperlipidemia LDL goal <100       cyclobenzaprine 10 MG tablet    FLEXERIL     TAKE 1 TAB BY MOUTH TWICE DAILY AS NEEDED FOR ROTATOR CUFF TEAR        ferrous sulfate 325 (65 Fe) MG tablet    IRON    90 tablet    Take 1 tablet (325 mg) by mouth daily (with breakfast)    History of anemia       furosemide 40 MG tablet    LASIX    90 tablet    Take 1 tablet (40 mg) by mouth daily    Hypertension, goal below 140/90       GLUCOCARD EXPRESSION MONITOR w/Device Kit      USE TWICE DAILY TO TEST BLOOD GLUCOSE        lidocaine 5 % ointment    XYLOCAINE    50 g    Apply topically daily    Chronic pain of right knee       losartan 25 MG tablet    COZAAR    90 tablet    Take 1 tablet (25 mg) by mouth daily    Hypertension, goal below 140/90, Type 2 diabetes mellitus with other circulatory complication, without long-term current use of insulin (H)       methocarbamol 500 MG tablet    ROBAXIN    70 tablet    Take 1 tablet (500 mg) by mouth 4 times daily as needed for muscle spasms    S/P spinal surgery       metoprolol tartrate 25 MG tablet    LOPRESSOR    60 tablet    Take 1 tablet (25 mg) by mouth 2 times daily    Type 2 diabetes mellitus with other circulatory complication, without long-term current use of insulin (H), History of coronary artery stent placement,  Hypertension, goal below 140/90       order for DME     1 Box    One touch glucose monitoring strip with Glucometer and lancets.    Type 2 diabetes mellitus with other circulatory complication, without long-term current use of insulin (H)       pantoprazole 40 MG EC tablet    PROTONIX    30 tablet    Take 1 tablet (40 mg) by mouth daily Take 30-60 minutes before a meal.    Gastroesophageal reflux disease without esophagitis       rosuvastatin 10 MG tablet    CRESTOR    90 tablet    Take 1 tablet (10 mg) by mouth daily    Type 2 diabetes mellitus with other circulatory complication, without long-term current use of insulin (H)       senna-docusate 8.6-50 MG per tablet    SENOKOT-S;PERICOLACE    100 tablet    Take 1 tablet by mouth 2 times daily as needed for constipation    Spinal stenosis, lumbar region, without neurogenic claudication       SitaGLIPtin-MetFORMIN HCl  MG Tb24      Take 2 tablets by mouth daily (with breakfast)        TRIAD HYDROPHILIC WOUND DRESSI Pste     1 Tube    Externally apply 1 g topically daily    Open wound of right lower leg, initial encounter

## 2018-04-23 NOTE — TELEPHONE ENCOUNTER
Forms have been faxed and original has been sent to scanning/abstracting and a copy has been placed is Julio Cesar's faxed folder. Closing encounter.     Alecia Ashford MA

## 2018-04-26 ENCOUNTER — OFFICE VISIT (OUTPATIENT)
Dept: NEUROSURGERY | Facility: OTHER | Age: 68
End: 2018-04-26
Payer: COMMERCIAL

## 2018-04-26 VITALS — HEIGHT: 70 IN | BODY MASS INDEX: 41.8 KG/M2 | WEIGHT: 292 LBS | TEMPERATURE: 97.6 F

## 2018-04-26 DIAGNOSIS — Z98.890 S/P LAMINECTOMY: Primary | ICD-10-CM

## 2018-04-26 PROCEDURE — 99024 POSTOP FOLLOW-UP VISIT: CPT | Performed by: PHYSICIAN ASSISTANT

## 2018-04-26 NOTE — PATIENT INSTRUCTIONS
- followup in 6 weeks with Dr. Mendieta  - Call the clinic at [phone] for  increased pain or any other questions and concerns.

## 2018-04-26 NOTE — LETTER
4/26/2018         RE: Glenroy Padilla  [address] [address]        Dear Colleague,    Thank you for referring your patient, Glenroy Padilla, to the [facility]. Please see a copy of my visit note below.    [facility]  Neurosurgery followup:    HPI: 6 weeks s/p T5-9 laminectomies and resection of epidural lipomatosis. Doing very well post operatively and states his back pain has completely resolved and he has regained his strength in his legs, which he is very happy with. He is now able to ambulate. He plans to get TKA when cleared by orthopedics. Denies radicular pain.  Exam:  Constitutional:  Alert, well nourished, NAD.  HEENT: Normocephalic, atraumatic.   Pulm:  Without shortness of breath   CV:  No pitting edema of BLE.      Neurological:  Awake  Alert  Oriented x 3  Motor exam:        IP Q DF PF EHL  R   5  5   5   5    5  L   5  5   5   5    5     Reflexes are 2+ in the patellar and Achilles. There is no clonus. Downgoing Babinski.    Able to spontaneously move L/E bilaterally  Sensation intact throughout all L/E dermatomes     Incision: Healing nicely.  A/P: 6 weeks s/p T5-9 laminectomies and resection of epidural lipomatosis. Doing well post operatively. Back pain and leg weakness improved significantly. Incision nicely healed. He is now able to ambulate. Advised OK to get TKA when approved by ortho. Will have him return in 6 weeks for routine post op. Patient voiced understanding and agreement.  - followup in 6 weeks with Dr. Mendieta  - Call the clinic at [phone] for  increased pain or any other questions and concerns.      Martha Reddy PA-C  [facility]  [address]  [address] [place] [address]    Tel [phone]  Pager [phone]      Again, thank you for allowing me to participate in the care of your patient.        Sincerely,        Martha Reddy PA-C

## 2018-04-26 NOTE — NURSING NOTE
"Glenroy Padilla is a 67 year old male who presents for:  Chief Complaint   Patient presents with     Neurologic Problem     T5-9 laminectomies DOS 03/13/18        Initial Vitals:  Temp 97.6  F (36.4  C) (Temporal)  Ht 5' 10\" (1.778 m)  Wt 292 lb (132.5 kg)  BMI 41.9 kg/m2 Estimated body mass index is 41.9 kg/(m^2) as calculated from the following:    Height as of this encounter: 5' 10\" (1.778 m).    Weight as of this encounter: 292 lb (132.5 kg).. Body surface area is 2.56 meters squared. BP completed using cuff size: regular  Data Unavailable    Do you feel safe in your environment?  Yes  Do you need any refills today?  no    Nursing Comments:         Micah Jackson    "

## 2018-04-26 NOTE — MR AVS SNAPSHOT
After Visit Summary   4/26/2018    Glenroy Padilla    MRN: [id number]           Patient Information     Date Of Birth          1950        Visit Information        Provider Department      4/26/2018 10:10 AM Martha Reddy PA-C [facility]        Today's Diagnoses     S/P laminectomy    -  1      Care Instructions    - followup in 6 weeks with Dr. Mendieta  - Call the clinic at [phone] for  increased pain or any other questions and concerns.          Follow-ups after your visit        Your next 10 appointments already scheduled     Apr 30, 2018  2:45 PM CDT   [facility]    [address] [place] [address]   [phone]            May 14, 2018  3:10 PM CDT   Return Visit with Janes Thakur MD   [facility]    [address] [place] [address]   [phone]            Jun 14, 2018  9:00 AM CDT   [facility] Visit with Hugh [facility]eta MD   [facility]    [address] [place] [address], [address]  [facility] [id number] [phone]              Who to contact     If you have questions or need follow up information about today's clinic visit or your schedule please contact [facility] directly at [phone].  Normal or non-critical lab and imaging results will be communicated to you by MyChart, letter or phone within 4 business days after the clinic has received the results. If you do not hear from us within 7 days, please contact the clinic through MyChart or phone. If you have a critical or abnormal lab result, we will notify you by phone as soon as possible.  Submit refill requests through [employer] or call your pharmacy and they will forward the refill request to us. Please allow 3 business days for your refill to be completed.          Additional Information About Your  "Visit        [email or web address] Information     [employer] lets you send messages to your doctor, view your test results, renew your prescriptions, schedule appointments and more. To sign up, go to [email or web address] . Click on \"Log in\" on the left side of the screen, which will take you to the Welcome page. Then click on \"Sign up Now\" on the right side of the page.     You will be asked to enter the access code listed below, as well as some personal information. Please follow the directions to create your username and password.     Your access code is: [email or web address]  Expires: 2018 11:55 AM     Your access code will  in 90 days. If you need help or a new code, please call your [place] clinic or [phone].        Care EveryWhere ID     This is your Care EveryWhere ID. This could be used by other organizations to access your [place] medical records  [id number]        Your Vitals Were     Temperature Height BMI (Body Mass Index)             97.6  F (36.4  C) (Temporal) 5' 10\" (1.778 m) 41.9 kg/m2          Blood Pressure from Last 3 Encounters:   18 134/66   18 136/74   18 136/74    Weight from Last 3 Encounters:   18 292 lb (132.5 kg)   18 292 lb 3.2 oz (132.5 kg)   18 311 lb 6.4 oz (141.3 kg)              Today, you had the following     No orders found for display       Primary Care Provider Office Phone # Fax #    Julio Cesar Esteban PA-C [phone]       [facility] [address] [facility] [address]        Equal Access to Services     DEBBI TOBAR AH: Hadshea Omalley, wastacyda ludidi, qashantita kaalmada duke, adams horton. So [facility] [phone].    ATENCIÓN: Si habla español, tiene a wilkinson disposición servicios gratuitos de asistencia lingüística. Llame al [phone].    We comply with applicable federal civil rights laws and [place] laws. We do not discriminate on the basis of race, color, national origin, " age, disability, sex, sexual orientation, or gender identity.            Thank you!     Thank you for choosing [facility]  for your care. Our goal is always to provide you with excellent care. Hearing back from our patients is one way we can continue to improve our services. Please take a few minutes to complete the written survey that you may receive in the mail after your visit with us. Thank you!             Your Updated Medication List - Protect others around you: Learn how to safely use, store and throw away your medicines at www.disposemymeds.org.          This list is accurate as of 4/26/18 10:23 AM.  Always use your most recent med list.                   Brand Name Dispense Instructions for use Diagnosis    acetaminophen 500 MG tablet    TYLENOL    120 tablet    Take 1-2 tablets (500-1,000 mg) by mouth every 6 hours as needed for mild pain    Chronic pain of left knee, Complete tear of left rotator cuff       clopidogrel 75 MG tablet    PLAVIX    90 tablet    Take 1 tablet (75 mg) by mouth daily    Type 2 diabetes mellitus with other circulatory complication, without long-term current use of insulin (H), S/P spinal surgery, Hypertension, goal below 140/90, Hyperlipidemia LDL goal <100       cyclobenzaprine 10 MG tablet    FLEXERIL     TAKE 1 TAB BY MOUTH TWICE DAILY AS NEEDED FOR ROTATOR CUFF TEAR        ferrous sulfate 325 (65 Fe) MG tablet    IRON    90 tablet    Take 1 tablet (325 mg) by mouth daily (with breakfast)    History of anemia       furosemide 40 MG tablet    LASIX    90 tablet    Take 1 tablet (40 mg) by mouth daily    Hypertension, goal below 140/90       GLUCOCARD EXPRESSION MONITOR w/Device Kit      USE TWICE DAILY TO TEST BLOOD GLUCOSE        lidocaine 5 % ointment    XYLOCAINE    50 g    Apply topically daily    Chronic pain of right knee       losartan 25 MG tablet    COZAAR    90 tablet    Take 1 tablet (25 mg) by mouth daily    Hypertension, goal below 140/90, Type 2  diabetes mellitus with other circulatory complication, without long-term current use of insulin (H)       methocarbamol 500 MG tablet    ROBAXIN    70 tablet    Take 1 tablet (500 mg) by mouth 4 times daily as needed for muscle spasms    S/P spinal surgery       metoprolol tartrate 25 MG tablet    LOPRESSOR    60 tablet    Take 1 tablet (25 mg) by mouth 2 times daily    Type 2 diabetes mellitus with other circulatory complication, without long-term current use of insulin (H), History of coronary artery stent placement, Hypertension, goal below 140/90       order for DME     1 Box    One touch glucose monitoring strip with Glucometer and lancets.    Type 2 diabetes mellitus with other circulatory complication, without long-term current use of insulin (H)       pantoprazole 40 MG EC tablet    PROTONIX    30 tablet    Take 1 tablet (40 mg) by mouth daily Take 30-60 minutes before a meal.    Gastroesophageal reflux disease without esophagitis       rosuvastatin 10 MG tablet    CRESTOR    90 tablet    Take 1 tablet (10 mg) by mouth daily    Type 2 diabetes mellitus with other circulatory complication, without long-term current use of insulin (H)       senna-docusate 8.6-50 MG per tablet    SENOKOT-S;PERICOLACE    100 tablet    Take 1 tablet by mouth 2 times daily as needed for constipation    Spinal stenosis, lumbar region, without neurogenic claudication       SitaGLIPtin-MetFORMIN HCl  MG Tb24      Take 2 tablets by mouth daily (with breakfast)        TRIAD HYDROPHILIC WOUND DRESSI Pste     1 Tube    Externally apply 1 g topically daily    Open wound of right lower leg, initial encounter

## 2018-04-26 NOTE — PROGRESS NOTES
[facility]  Neurosurgery followup:    HPI: 6 weeks s/p T5-9 laminectomies and resection of epidural lipomatosis. Doing very well post operatively and states his back pain has completely resolved and he has regained his strength in his legs, which he is very happy with. He is now able to ambulate. He plans to get TKA when cleared by orthopedics. Denies radicular pain.  Exam:  Constitutional:  Alert, well nourished, NAD.  HEENT: Normocephalic, atraumatic.   Pulm:  Without shortness of breath   CV:  No pitting edema of BLE.      Neurological:  Awake  Alert  Oriented x 3  Motor exam:        IP Q DF PF EHL  R   5  5   5   5    5  L   5  5   5   5    5     Reflexes are 2+ in the patellar and Achilles. There is no clonus. Downgoing Babinski.    Able to spontaneously move L/E bilaterally  Sensation intact throughout all L/E dermatomes     Incision: Healing nicely.  A/P: 6 weeks s/p T5-9 laminectomies and resection of epidural lipomatosis. Doing well post operatively. Back pain and leg weakness improved significantly. Incision nicely healed. He is now able to ambulate. Advised OK to get TKA when approved by ortho. Will have him return in 6 weeks for routine post op. Patient voiced understanding and agreement.  - followup in 6 weeks with Dr. Mendieta  - Call the clinic at [phone] for  increased pain or any other questions and concerns.      Martha Reddy PA-C  [facility]  [address] [address]    Tel [phone]  Pager [phone]

## 2018-04-27 ENCOUNTER — TELEPHONE (OUTPATIENT)
Dept: FAMILY MEDICINE | Facility: OTHER | Age: 68
End: 2018-04-27

## 2018-04-27 DIAGNOSIS — E11.59 TYPE 2 DIABETES MELLITUS WITH OTHER CIRCULATORY COMPLICATION, WITHOUT LONG-TERM CURRENT USE OF INSULIN (H): ICD-10-CM

## 2018-04-27 DIAGNOSIS — E78.5 HYPERLIPIDEMIA LDL GOAL <100: Primary | ICD-10-CM

## 2018-04-27 NOTE — TELEPHONE ENCOUNTER
This prescription will need clarification either from the pharmacy if they have dispensed what they are looking for before or a call to the patient to find out exactly what he is looking for.  I suspect that he is looking for test strips and lancets?    Electronically signed:    Julio Cesar Sahu PA-C

## 2018-04-27 NOTE — TELEPHONE ENCOUNTER
[facility] fax: [phone] once we are ready to send a Rx.  Pt has been having all of his medications transferred over to this pharmacy.    Spoke with [facility] pt [facility].  Was going to talk to a nurse about what lancets and test strips that pt uses so that we can send the correct items.  They were gone for the day.  Please try again on Monday.  Phone: [phone]    Kingsley Zarco, [facility]

## 2018-04-27 NOTE — TELEPHONE ENCOUNTER
Reason for Call:  Medication or medication refill:    Do you use a [facility]?  Name of the pharmacy and phone number for the [facility] [place]    Name of the medication requested: test strips and one shot lantus     Other request:     Can we leave a detailed message on this number? YES    Phone number patient can be reached at: Other phone number:  [phone]    Best Time: any    Call taken on 4/27/2018 at 9:34 AM by Audrey Last

## 2018-04-27 NOTE — TELEPHONE ENCOUNTER
Reason for Call:  Home Health Care    Sona with [facility] called regarding (reason for call): verbal orders    Orders are needed for this patient. Skilled nursing    PT: n/a    OT: n/a    Skilled Nursing: 3 x a week for 4 weeks for wound care    Pt Provider: Dr Copeland    Phone Number Homecare Nurse can be reached at: [phone]    Can we leave a detailed message on this number? YES        Call taken on 4/27/2018 at 12:33 PM by Diamond Juarez

## 2018-04-30 ENCOUNTER — OFFICE VISIT (OUTPATIENT)
Dept: SURGERY | Facility: CLINIC | Age: 68
End: 2018-04-30
Payer: COMMERCIAL

## 2018-04-30 VITALS
TEMPERATURE: 97.6 F | DIASTOLIC BLOOD PRESSURE: 69 MMHG | HEART RATE: 92 BPM | SYSTOLIC BLOOD PRESSURE: 122 MMHG | WEIGHT: 307.6 LBS | OXYGEN SATURATION: 95 % | BODY MASS INDEX: 44.14 KG/M2

## 2018-04-30 DIAGNOSIS — S81.802D OPEN WOUND OF LEFT LOWER EXTREMITY WITHOUT COMPLICATION, SUBSEQUENT ENCOUNTER: Primary | ICD-10-CM

## 2018-04-30 DIAGNOSIS — R60.0 EDEMA OF BOTH LEGS: ICD-10-CM

## 2018-04-30 PROCEDURE — 99213 OFFICE O/P EST LOW 20 MIN: CPT | Performed by: SPECIALIST

## 2018-04-30 RX ORDER — ROSUVASTATIN CALCIUM 10 MG/1
10 TABLET, COATED ORAL DAILY
Qty: 90 TABLET | Refills: 1 | Status: SHIPPED | OUTPATIENT
Start: 2018-04-30 | End: 2018-10-12

## 2018-04-30 RX ORDER — LANCING DEVICE
EACH MISCELLANEOUS
Qty: 1 EACH | Refills: 0 | Status: SHIPPED | OUTPATIENT
Start: 2018-04-30

## 2018-04-30 NOTE — PROGRESS NOTES
F/U for leg wound    Subjective:  Patient is a 67-year-old white male presenting history of a left leg wound. He hit a coffee table  the wound is slow to heal. He was initially treated in [place] and has a 15 year history of bilateral lower extremity edema. He has never been treated with any form of compression therapy and was told not to based on an ultrasound results in [place]. Those results are not available to me at this time. He denies any prior DVT, nonhealing wounds, leg trauma, claudication or rest pain. He was told in [place] to treat the leg with Betadine. [place] the [place].      Last seen by me in June and was tx with UNNA boot and silvercel.  Returned to [place] and was treated with hydroferra blue.       Had UNNA boot on when was last seen.  Has been in Assisted living since last seen by me in January for leg weakness.  TX with TRIAD, Foam and UNNA.   Laceration on right healed.    Had his back surgery at [facility]. Doing well.       Reports wound almost closed.    Objective:  B/P: 136/74, T: Data Unavailable, P: 91, R: Data Unavailable  Ext; Warm,  No edema. Bilateral venous stasis changes with thickened skin.   (+)FEM pulses.  No distal pulses.  LLE wound - 1.2x0.3x0.1cm  -    New ant ankle wound - Healed    U/S -   ULTRASOUND VENOUS COMPETENCY BILATERAL   6/1/2017 3:29 PM      HISTORY: Localized edema. Chronic venous hypertension (idiopathic)  without complications of bilateral lower extremity. Unspecified open  wound, left lower leg, initial encounter.     COMPARISON: None.     TECHNIQUE: Color Doppler and spectral waveform analysis performed.     FINDINGS: Bilateral common femoral veins, femoral veins, and popliteal  veins are patent and demonstrate no evidence of reflux.     Other than questionable 1 second reflux at the right saphenofemoral  junction, the right great saphenous vein is competent throughout its  length. Diameter of the right great saphenous vein is 3-7  mm.     The left great saphenous vein is competent throughout its length.  Diameter of the left great saphenous vein is 2-7 mm.     Visualized portions of bilateral small saphenous veins appear to be  competent, without evidence of reflux.     No prominent perforators were demonstrated. Bilateral Giacomini veins  were competent.         IMPRESSION: The deep and superficial venous systems of both legs are  patent and competent.     PATRICK SCHERER MD    [facility] -   ULTRASOUND ANKLE-BRACHIAL INDEX DOPPLER NO EXERCISE   6/1/2017 3:16 PM        HISTORY: Localized edema. Chronic venous hypertension (idiopathic)  without complications of bilateral lower extremity.     COMPARISON: None.     FINDINGS: Ankle-brachial index is 0.96 on the right and 0.91 on the  left. Waveforms appear biphasic/monophasic bilaterally.         IMPRESSION: Borderline bilateral arterial insufficiency.     PATRICK SCHERER MD      Assessment/plan:  This is a 67 year old gentleman with bilateral chronic stasis changes and lymphedema. He has a new right leg wound as well as a result of a fall - closed.   Left leg wound Smaller.    Edema now resolved currently.    Will cont TRIAD and ACE.  Will hold UNNA to left.   F/U 2 weeks.    Casey Shine MD, FACS.

## 2018-04-30 NOTE — TELEPHONE ENCOUNTER
Spoke to patient who was unsure what meter he has.   Called [facility] pharmacy they stated patient has a Gluco-Card expressions meter. Patient states he needs strips for this and lancets.   Patient requested that Crestor be refilled. This has been transferred to [facility] in [place]; patient should have refills.   Lisa Zuluaga CMA ([facility])

## 2018-04-30 NOTE — NURSING NOTE
Pt here for f/u of left lower leg wounds. One is open on lower lateral calf, this is pink, MD measured and scraped small amt of slough from wound bed. Anterior ankle is closed but appears very fragile.  Adhesive foam  Placed over anterior ankle and triad to other wound with dry gauze to cover and then tubigrip. YAHIR Wilkins

## 2018-04-30 NOTE — NURSING NOTE
"Chief Complaint   Patient presents with     WOUND CARE     lower leg wound       Initial /69 (BP Location: Right arm, Patient Position: Sitting, Cuff Size: Adult Large)  Pulse 92  Temp 97.6  F (36.4  C) (Temporal)  Wt 139.5 kg (307 lb 9.6 oz)  SpO2 95%  BMI 44.14 kg/m2 Estimated body mass index is 44.14 kg/(m^2) as calculated from the following:    Height as of 4/26/18: 1.778 m (5' 10\").    Weight as of this encounter: 139.5 kg (307 lb 9.6 oz).  Medication Reconciliation: complete    "

## 2018-04-30 NOTE — TELEPHONE ENCOUNTER
Spoke to patient informed we will take care of refills.   [facility] told patient he needed a new prescription for crestor even though he should have had refills transferred.   Lisa Zuluaga CMA ([facility])

## 2018-04-30 NOTE — MR AVS SNAPSHOT
After Visit Summary   4/30/2018    Glenroy Padilla    MRN: [id number]           Patient Information     Date Of Birth          1950        Visit Information        Provider Department      4/30/2018 2:45 PM Casey Shine MD [facility]         Follow-ups after your visit        Your next 10 appointments already scheduled     Apr 30, 2018  2:45 PM CDT   Return Visit with Casey Shine MD   [facility] ([facility]    [address] [place] [id number]   [phone]            May 11, 2018  9:00 AM CDT   Return Visit with Casey Shine MD   [facility])    [address] [place] [id number] [phone]            May 14, 2018  3:10 PM CDT   Return Visit with Janes Thakur MD   [facility]    [address] [place] [id number]   [phone]            Jun 14, 2018  9:00 AM CDT   [facility]    [address] [place] [address], [address]  [facility] [id number] [phone]              Who to contact     If you have questions or need follow up information about today's clinic visit or your schedule please contact [facility] directly at [phone].  Normal or non-critical lab and imaging results will be communicated to you by MyChart, letter or phone within 4 business days after the clinic has received the results. If you do not hear from us within 7 days, please contact the clinic through MyChart or phone. If you have a critical or abnormal lab result, we will notify you by phone as soon as possible.  Submit refill requests through [employer] or call your pharmacy and they will forward the refill request to us. Please allow 3 business days for your refill to be completed.          Additional Information About Your Visit       "  MyChart Information     [employer] lets you send messages to your doctor, view your test results, renew your prescriptions, schedule appointments and more. To sign up, go to [email or web address] . Click on \"Log in\" on the left side of the screen, which will take you to the Welcome page. Then click on \"Sign up Now\" on the right side of the page.     You will be asked to enter the access code listed below, as well as some personal information. Please follow the directions to create your username and password.     Your access code is: [email or web address]  Expires: 2018 11:55 AM     Your access code will  in 90 days. If you need help or a new code, please call your [facility] clinic or [phone].        Care EveryWhere ID     This is your Care EveryWhere ID. This could be used by other organizations to access your [facility] medical records  [id number]        Your Vitals Were     Pulse Temperature Pulse Oximetry BMI (Body Mass Index)          92 97.6  F (36.4  C) (Temporal) 95% 44.14 kg/m2         Blood Pressure from Last 3 Encounters:   18 122/69   18 134/66   18 136/74    Weight from Last 3 Encounters:   18 139.5 kg (307 lb 9.6 oz)   18 132.5 kg (292 lb)   18 132.5 kg (292 lb 3.2 oz)              Today, you had the following     No orders found for display       Primary Care Provider Office Phone # Fax #    Julio Cesar Esteban PA-C [phone]       [facility] [id number] [place] [address]        Equal Access to Services     JAZ TOBAR AH: Hadii katia bolden Sogloria, waaxda luqadaha, qaybta kaalmada adebradyyada, adams horton. So [facility] [phone].    ATENCIÓN: Si habla español, tiene a wilkinson disposición servicios gratuitos de asistencia lingüística. Llame al [phone].    We comply with applicable federal civil rights laws and [place] laws. We do not discriminate on the basis of race, color, national origin, age, " disability, sex, sexual orientation, or gender identity.            Thank you!     Thank you for choosing [facility]  for your care. Our goal is always to provide you with excellent care. Hearing back from our patients is one way we can continue to improve our services. Please take a few minutes to complete the written survey that you may receive in the mail after your visit with us. Thank you!             Your Updated Medication List - Protect others around you: Learn how to safely use, store and throw away your medicines at www.disposemymeds.org.          This list is accurate as of 4/30/18  2:12 PM.  Always use your most recent med list.                   Brand Name Dispense Instructions for use Diagnosis    acetaminophen 500 MG tablet    TYLENOL    120 tablet    Take 1-2 tablets (500-1,000 mg) by mouth every 6 hours as needed for mild pain    Chronic pain of left knee, Complete tear of left rotator cuff       clopidogrel 75 MG tablet    PLAVIX    90 tablet    Take 1 tablet (75 mg) by mouth daily    Type 2 diabetes mellitus with other circulatory complication, without long-term current use of insulin (H), S/P spinal surgery, Hypertension, goal below 140/90, Hyperlipidemia LDL goal <100       cyclobenzaprine 10 MG tablet    FLEXERIL     TAKE 1 TAB BY MOUTH TWICE DAILY AS NEEDED FOR ROTATOR CUFF TEAR        ferrous sulfate 325 (65 Fe) MG tablet    IRON    90 tablet    Take 1 tablet (325 mg) by mouth daily (with breakfast)    History of anemia       furosemide 40 MG tablet    LASIX    90 tablet    Take 1 tablet (40 mg) by mouth daily    Hypertension, goal below 140/90       GLUCOCARD EXPRESSION MONITOR w/Device Kit      USE TWICE DAILY TO TEST BLOOD GLUCOSE        lidocaine 5 % ointment    XYLOCAINE    50 g    Apply topically daily    Chronic pain of right knee       losartan 25 MG tablet    COZAAR    90 tablet    Take 1 tablet (25 mg) by mouth daily    Hypertension, goal below 140/90, Type 2  diabetes mellitus with other circulatory complication, without long-term current use of insulin (H)       methocarbamol 500 MG tablet    ROBAXIN    70 tablet    Take 1 tablet (500 mg) by mouth 4 times daily as needed for muscle spasms    S/P spinal surgery       metoprolol tartrate 25 MG tablet    LOPRESSOR    60 tablet    Take 1 tablet (25 mg) by mouth 2 times daily    Type 2 diabetes mellitus with other circulatory complication, without long-term current use of insulin (H), History of coronary artery stent placement, Hypertension, goal below 140/90       order for DME     1 Box    One touch glucose monitoring strip with Glucometer and lancets.    Type 2 diabetes mellitus with other circulatory complication, without long-term current use of insulin (H)       pantoprazole 40 MG EC tablet    PROTONIX    30 tablet    Take 1 tablet (40 mg) by mouth daily Take 30-60 minutes before a meal.    Gastroesophageal reflux disease without esophagitis       rosuvastatin 10 MG tablet    CRESTOR    90 tablet    Take 1 tablet (10 mg) by mouth daily    Type 2 diabetes mellitus with other circulatory complication, without long-term current use of insulin (H)       senna-docusate 8.6-50 MG per tablet    SENOKOT-S;PERICOLACE    100 tablet    Take 1 tablet by mouth 2 times daily as needed for constipation    Spinal stenosis, lumbar region, without neurogenic claudication       SitaGLIPtin-MetFORMIN HCl  MG Tb24      Take 2 tablets by mouth daily (with breakfast)        TRIAD HYDROPHILIC WOUND DRESSI Pste     1 Tube    Externally apply 1 g topically daily    Open wound of right lower leg, initial encounter

## 2018-04-30 NOTE — LETTER
4/30/2018         RE: Glenroy Padilla  [address] [address]        Dear Colleague,    Thank you for referring your patient, Glenroy Padilla, to the [facility]. Please see a copy of my visit note below.    F/U for leg wound    Subjective:  Patient is a 67-year-old white male presenting history of a left leg wound. He hit a coffee table  the wound is slow to heal. He was initially treated in [place] and has a 15 year history of bilateral lower extremity edema. He has never been treated with any form of compression therapy and was told not to based on an ultrasound results in [place]. Those results are not available to me at this time. He denies any prior DVT, nonhealing wounds, leg trauma, claudication or rest pain. He was told in [place] to treat the leg with Betadine. [place].      Last seen by me in June and was tx with UNNA boot and silvercel.  Returned to [place] and was treated with hydroferra blue.       Had UNNA boot on when was last seen.  Has been in Assisted living since last seen by me in January for leg weakness.  TX with TRIAD, Foam and UNNA.   Laceration on right healed.    Had his [facility] surgery at [facility]. Doing well.       Reports wound almost closed.    Objective:  B/P: 136/74, T: Data Unavailable, P: 91, R: Data Unavailable  Ext; Warm,  No edema. Bilateral venous stasis changes with thickened skin.   (+)FEM pulses.  No distal pulses.  LLE wound - 1.2x0.3x0.1cm  -    New ant ankle wound - Healed    U/S -   ULTRASOUND VENOUS COMPETENCY BILATERAL   6/1/2017 3:29 PM      HISTORY: Localized edema. Chronic venous hypertension (idiopathic)  without complications of bilateral lower extremity. Unspecified open  wound, left lower leg, initial encounter.     COMPARISON: None.     TECHNIQUE: Color Doppler and spectral waveform analysis performed.     FINDINGS: Bilateral common femoral veins, femoral veins, and popliteal  veins are patent  and demonstrate no evidence of reflux.     Other than questionable 1 second reflux at the right saphenofemoral  junction, the right great saphenous vein is competent throughout its  length. Diameter of the right great saphenous vein is 3-7 mm.     The left great saphenous vein is competent throughout its length.  Diameter of the left great saphenous vein is 2-7 mm.     Visualized portions of bilateral small saphenous veins appear to be  competent, without evidence of reflux.     No prominent perforators were demonstrated. Bilateral Giacomini veins  were competent.         IMPRESSION: The deep and superficial venous systems of both legs are  patent and competent.     PATRICK SCHERER MD    [facility] -   ULTRASOUND ANKLE-BRACHIAL INDEX DOPPLER NO EXERCISE   6/1/2017 3:16 PM        HISTORY: Localized edema. Chronic venous hypertension (idiopathic)  without complications of bilateral lower extremity.     COMPARISON: None.     FINDINGS: Ankle-brachial index is 0.96 on the right and 0.91 on the  left. Waveforms appear biphasic/monophasic bilaterally.         IMPRESSION: Borderline bilateral arterial insufficiency.     PATRICK SCHERER MD      Assessment/plan:  This is a 67 year old gentleman with bilateral chronic stasis changes and lymphedema. He has a new right leg wound as well as a result of a fall - closed.   Left leg wound Smaller.    Edema now resolved currently.    Will cont TRIAD and ACE.  Will hold UNNA to left.   F/U 2 weeks.    Casey Shine MD, FACS.      Again, thank you for allowing me to participate in the care of your patient.        Sincerely,        Casey Shine MD

## 2018-05-01 ENCOUNTER — TELEPHONE (OUTPATIENT)
Dept: FAMILY MEDICINE | Facility: OTHER | Age: 68
End: 2018-05-01

## 2018-05-01 NOTE — TELEPHONE ENCOUNTER
Reason for Call:  Form, our goal is to have forms completed with 72 hours, however, some forms may require a visit or additional information.    Type of letter, form or note:  medical    Who is the form from?: [employer] (if other please explain)    Where did the form come from: form was faxed in    What clinic location was the form placed at?: [facility] - [phone]    Where the form was placed: 's Box    What number is listed as a contact on the form?: [phone]       Additional comments: n/a    Call taken on 5/1/2018 at 10:03 AM by Diamond Juarez

## 2018-05-01 NOTE — TELEPHONE ENCOUNTER
Per fax from [facility] pharmacy a PRIOR AUTHORIZATION is needed for CRESTOR 10 MG,  Please start a PA    [employer] [place]  Member ID#[id number]  Ph#[phone]    Thank you  Josselin NATION (R)

## 2018-05-02 NOTE — TELEPHONE ENCOUNTER
PA Initiation    Medication: CRESTOR 10MG  Insurance Company: APR - Phone [phone] Fax [phone]  Pharmacy Filling the Rx: [address]  Filling Pharmacy Phone: [phone]  Filling Pharmacy Fax:    Start Date: 5/2/2018

## 2018-05-02 NOTE — TELEPHONE ENCOUNTER
Form has been faxed, sent to scanning and copy placed in Dr Copeland fax folder  Closing encounter    Josselin Montes RT (R)

## 2018-05-03 ENCOUNTER — TELEPHONE (OUTPATIENT)
Dept: FAMILY MEDICINE | Facility: OTHER | Age: 68
End: 2018-05-03

## 2018-05-03 DIAGNOSIS — Z53.9 DIAGNOSIS NOT YET DEFINED: Primary | ICD-10-CM

## 2018-05-03 PROCEDURE — G0180 MD CERTIFICATION HHA PATIENT: HCPCS | Performed by: FAMILY MEDICINE

## 2018-05-03 NOTE — TELEPHONE ENCOUNTER
Reason for Call:  Form, our goal is to have forms completed with 72 hours, however, some forms may require a visit or additional information.    Type of letter, form or note:  medical    Who is the form from?: [employer] (if other please explain)    Where did the form come from: form was faxed in    What clinic location was the form placed at?: [facility] - [phone]    Where the form was placed: 's Box    What number is listed as a contact on the form?: [phone]       Additional comments: n/a    Call taken on 5/3/2018 at 11:46 AM by Diamond Juarez

## 2018-05-03 NOTE — TELEPHONE ENCOUNTER
Forms have been completed, signed, faxed/mailed, and sent to scanning.  Lisa Zuluaga CMA ([facility])

## 2018-05-04 NOTE — TELEPHONE ENCOUNTER
Catie Alvarez is his [facility] coordinator and she is just going to have Julio Cesar prescribe the medication, meter and the test strips that are covered for now annamarieue he is down to his last. The [facility] pharmacy is going to be faxing the request. Please advise.

## 2018-05-04 NOTE — TELEPHONE ENCOUNTER
"Spoke with pt, he will absolutely not try another medication. He has only been on Crestor (even in [place]) and will not talk about trying something new or different. He doesn't want to risk side effects or his cholesterol changing. He will \"never\" take Lipitor as his wife and friends have had bad experiences with it.  He wants to call some other pharmacies because he thinks that will make the difference. Once he thinks about it he may change his mind. He said he would call us back   "

## 2018-05-04 NOTE — TELEPHONE ENCOUNTER
PRIOR AUTHORIZATION DENIED    Medication: CRESTOR 10MG - DENIED    Denial Date: 5/2/2018    Denial Rational: PT NEEDS TO TRY/FAIL ATORVASTATIN, SIMVASTATIN, PRAVASTATIN, AND LOVASTATIN.      Appeal Information:

## 2018-05-04 NOTE — TELEPHONE ENCOUNTER
Please contact patient and ask him about medications that he may have used in the past for his cholesterol.  He comes to us from [place] and I am not certain about how many different medications he is used for cholesterol in the past.  If he has not used Lipitor then please place a prescription for Lipitor 20 mg nightly #90 with one refill in 2 his take action medication and orders section for my approval to his pharmacy.  Please inquire as to the pharmacy for him as well.  Electronically signed:    Julio Cesar Sahu PA-C

## 2018-05-10 NOTE — TELEPHONE ENCOUNTER
Patient called back and had Catie Hensond his [facility] coordinator speak with me.  She was wanting to know why the medication was denied and also that there is going to be a fax from the pharmacy for a new meter and test strips.  Informed her why it was denied and she was going to discuss this with the patient.      Meme Maki, IDA  May 10, 2018

## 2018-05-11 ENCOUNTER — OFFICE VISIT (OUTPATIENT)
Dept: SURGERY | Facility: CLINIC | Age: 68
End: 2018-05-11
Payer: COMMERCIAL

## 2018-05-11 ENCOUNTER — TELEPHONE (OUTPATIENT)
Dept: FAMILY MEDICINE | Facility: OTHER | Age: 68
End: 2018-05-11

## 2018-05-11 VITALS
HEART RATE: 90 BPM | TEMPERATURE: 96.6 F | OXYGEN SATURATION: 97 % | DIASTOLIC BLOOD PRESSURE: 60 MMHG | SYSTOLIC BLOOD PRESSURE: 142 MMHG | BODY MASS INDEX: 44.29 KG/M2 | WEIGHT: 308.7 LBS

## 2018-05-11 DIAGNOSIS — S81.802D OPEN WOUND OF LEFT LOWER EXTREMITY WITHOUT COMPLICATION, SUBSEQUENT ENCOUNTER: Primary | ICD-10-CM

## 2018-05-11 DIAGNOSIS — R60.0 EDEMA OF BOTH LEGS: ICD-10-CM

## 2018-05-11 DIAGNOSIS — E11.59 TYPE 2 DIABETES MELLITUS WITH OTHER CIRCULATORY COMPLICATION, WITHOUT LONG-TERM CURRENT USE OF INSULIN (H): Primary | ICD-10-CM

## 2018-05-11 PROCEDURE — 99213 OFFICE O/P EST LOW 20 MIN: CPT | Performed by: SPECIALIST

## 2018-05-11 RX ORDER — LANCETS
EACH MISCELLANEOUS
Qty: 100 EACH | Refills: 1 | Status: SHIPPED | OUTPATIENT
Start: 2018-05-11 | End: 2020-01-14

## 2018-05-11 NOTE — TELEPHONE ENCOUNTER
Reason for Call:  Medication or medication refill:    Do you use a [facility]?  Name of the pharmacy and phone number for the current request:  [facility]    Name of the medication requested: pt is stating that [facility] never got his refill for rosuvastatin (CRESTOR) 10 MG tablet, please refax. He is also looking for a meter.     Other request:     Can we leave a detailed message on this number? YES    Phone number patient can be reached at: Home number on file [phone] (home)    Best Time: any    Call taken on 5/11/2018 at 10:07 AM by Audrey Last

## 2018-05-11 NOTE — MR AVS SNAPSHOT
"              After Visit Summary   5/11/2018    Glenroy Padilla    MRN: [id number]           Patient Information     Date Of Birth          1950        Visit Information        Provider Department      5/11/2018 9:00 AM Casey Shine MD [facility] [place]         Follow-ups after your visit        Your next 10 appointments already scheduled     May 14, 2018  3:10 PM CDT   Return Visit with Janes Thakur MD   [facility] [place] ([facility] [place])    [address] [address] [phone]            Jun 14, 2018  9:00 AM CDT   [facility] with [facility]Hgh Mend[facility]eta [facility]   [facility]    [address], [address]  [place] [address] [phone]              Who to contact     If you have questions or need follow up information about today's clinic visit or your schedule please contact [facility] directly at [phone].  Normal or non-critical lab and imaging results will be communicated to you by [employer] [employer], letter or phone within 4 business days after the clinic has received the results. If you do not hear from us within 7 days, please contact the clinic through At[employer] or phone. If you have a critical or abnormal lab result, we will notify you by phone as soon as possible.  Submit refill requests through [employer] or call your pharmacy and they will forward the refill request to us. Please allow 3 business days for your refill to be completed.          Additional Information About Your Visit        [employer] [employer] [facility] Information     [employer] lets you send messages to your doctor, view your test results, renew your prescriptions, schedule appointments and more. To sign up, go to [email or web address] . Click on \"Log in\" on the left side of the screen, which will take you to the Welcome page. Then click on \"Sign up Now\" on the right side of the page.     You will be asked to enter the access code listed " below, as well as some personal information. Please follow the directions to create your username and password.     Your access code is: [email or web address]  Expires: 2018 11:55 AM     Your access code will  in 90 days. If you need help or a new code, please call your [facility] clinic or [phone].        Care EveryWhere ID     This is your Care EveryWhere ID. This could be used by other organizations to access your [facility] medical records  [id number]        Your Vitals Were     Pulse Temperature Pulse Oximetry BMI (Body Mass Index)          90 96.6  F (35.9  C) (Temporal) 97% 44.29 kg/m2         Blood Pressure from Last 3 Encounters:   18 142/60   18 122/69   18 134/66    Weight from Last 3 Encounters:   18 140 kg (308 lb 11.2 oz)   18 139.5 kg (307 lb 9.6 oz)   18 132.5 kg (292 lb)              Today, you had the following     No orders found for display       Primary Care Provider Office Phone # Fax #    Juilo Cesar Esteban PA-C [phone] [address] [address]        Equal Access to Services     JAZ TOBAR AH: Hadii katia ku hadasho Soomaali, waaxda luqadaha, qaybta kaalmada adeegyada, waxay avi horton. So [facility] [phone].    ATENCIÓN: Si habla español, tiene a wilkinson disposición servicios gratuitos de asistencia lingüística. Llame al [phone].    We comply with applicable federal civil rights laws and [place] laws. We do not discriminate on the basis of race, color, national origin, age, disability, sex, sexual orientation, or gender identity.            Thank you!     Thank you for choosing [facility]  for your care. Our goal is always to provide you with excellent care. Hearing back from our patients is one way we can continue to improve our services. Please take a few minutes to complete the written survey that you may receive in the mail after your visit with us. Thank you!              Your Updated Medication List - Protect others around you: Learn how to safely use, store and throw away your medicines at www.disposemymeds.org.          This list is accurate as of 5/11/18  9:24 AM.  Always use your most recent med list.                   Brand Name Dispense Instructions for use Diagnosis    acetaminophen 500 MG tablet    TYLENOL    120 tablet    Take 1-2 tablets (500-1,000 mg) by mouth every 6 hours as needed for mild pain    Chronic pain of left knee, Complete tear of left rotator cuff       blood glucose lancing device    no brand specified    1 each    Use to test blood sugars 2 times daily or as directed.    Type 2 diabetes mellitus with other circulatory complication, without long-term current use of insulin (H)       blood glucose monitoring test strip    no brand specified    100 strip    Use to test blood sugars 2 times daily or as directed    Type 2 diabetes mellitus with other circulatory complication, without long-term current use of insulin (H)       clopidogrel 75 MG tablet    PLAVIX    90 tablet    Take 1 tablet (75 mg) by mouth daily    Type 2 diabetes mellitus with other circulatory complication, without long-term current use of insulin (H), S/P spinal surgery, Hypertension, goal below 140/90, Hyperlipidemia LDL goal <100       cyclobenzaprine 10 MG tablet    FLEXERIL     TAKE 1 TAB BY MOUTH TWICE DAILY AS NEEDED FOR ROTATOR CUFF TEAR        ferrous sulfate 325 (65 Fe) MG tablet    IRON    90 tablet    Take 1 tablet (325 mg) by mouth daily (with breakfast)    History of anemia       furosemide 40 MG tablet    LASIX    90 tablet    Take 1 tablet (40 mg) by mouth daily    Hypertension, goal below 140/90       GLUCOCARD EXPRESSION MONITOR w/Device Kit      USE TWICE DAILY TO TEST BLOOD GLUCOSE        lidocaine 5 % ointment    XYLOCAINE    50 g    Apply topically daily    Chronic pain of right knee       losartan 25 MG tablet    COZAAR    90 tablet    Take 1 tablet (25 mg) by mouth  daily    Hypertension, goal below 140/90, Type 2 diabetes mellitus with other circulatory complication, without long-term current use of insulin (H)       methocarbamol 500 MG tablet    ROBAXIN    70 tablet    Take 1 tablet (500 mg) by mouth 4 times daily as needed for muscle spasms    S/P spinal surgery       metoprolol tartrate 25 MG tablet    LOPRESSOR    60 tablet    Take 1 tablet (25 mg) by mouth 2 times daily    Type 2 diabetes mellitus with other circulatory complication, without long-term current use of insulin (H), History of coronary artery stent placement, Hypertension, goal below 140/90       order for DME     1 Box    One touch glucose monitoring strip with Glucometer and lancets.    Type 2 diabetes mellitus with other circulatory complication, without long-term current use of insulin (H)       pantoprazole 40 MG EC tablet    PROTONIX    30 tablet    Take 1 tablet (40 mg) by mouth daily Take 30-60 minutes before a meal.    Gastroesophageal reflux disease without esophagitis       rosuvastatin 10 MG tablet    CRESTOR    90 tablet    Take 1 tablet (10 mg) by mouth daily    Hyperlipidemia LDL goal <100       senna-docusate 8.6-50 MG per tablet    SENOKOT-S;PERICOLACE    100 tablet    Take 1 tablet by mouth 2 times daily as needed for constipation    Spinal stenosis, lumbar region, without neurogenic claudication       SitaGLIPtin-MetFORMIN HCl  MG Tb24      Take 2 tablets by mouth daily (with breakfast)        TRIAD HYDROPHILIC WOUND DRESSI Pste     1 Tube    Externally apply 1 g topically daily    Open wound of right lower leg, initial encounter

## 2018-05-11 NOTE — LETTER
5/11/2018         RE: Glenroy Padilla  [address] [address]        Dear Colleague,    Thank you for referring your patient, Glenroy Padilla, to the [facility]. Please see a copy of my visit note below.    F/U for leg wound    Subjective:  Patient is a 67-year-old white male presenting history of a left leg wound. He hit a coffee table  the wound is slow to heal. He was initially treated in [place] and has a 15 year history of bilateral lower extremity edema. He has never been treated with any form of compression therapy and was told not to based on an ultrasound results in [place]. Those results are not available to me at this time. He denies any prior DVT, nonhealing wounds, leg trauma, claudication or rest pain. He was told in [place] to treat the leg with Betadine. [place] the [place] opinion.      Last seen by me in June and was tx with UNNA boot and silvercel.  Returned to [place] and was treated with hydroferra blue.       Had UNNA boot on when was last seen.  Has been in Assisted living since last seen by me in January for leg weakness.  TX with TRIAD, Foam and UNNA.   Laceration on right healed.    Had his back surgery at [facility]. Doing well.       Reports wound almost closed.    Objective:  B/P: 142/60, T: 96.6, P: 90, R: Data Unavailable  Ext; Warm,  No edema. Bilateral venous stasis changes with thickened skin.   (+)FEM pulses.  No distal pulses.  LLE wound - Inf -  1x0.3x0.1cm  Sup - Ca eroded through skin - removed with rongeur.   -    New ant ankle wound - Healed      U/S -   ULTRASOUND VENOUS COMPETENCY BILATERAL   6/1/2017 3:29 PM      HISTORY: Localized edema. Chronic venous hypertension (idiopathic)  without complications of bilateral lower extremity. Unspecified open  wound, left lower leg, initial encounter.     COMPARISON: None.     TECHNIQUE: Color Doppler and spectral waveform analysis performed.     FINDINGS: Bilateral common femoral  veins, femoral veins, and popliteal  veins are patent and demonstrate no evidence of reflux.     Other than questionable 1 second reflux at the right saphenofemoral  junction, the right great saphenous vein is competent throughout its  length. Diameter of the right great saphenous vein is 3-7 mm.     The left great saphenous vein is competent throughout its length.  Diameter of the left great saphenous vein is 2-7 mm.     Visualized portions of bilateral small saphenous veins appear to be  competent, without evidence of reflux.     No prominent perforators were demonstrated. Bilateral Giacomini veins  were competent.         IMPRESSION: The deep and superficial venous systems of both legs are  patent and competent.     PATRICK SCHERER MD    [facility] -   ULTRASOUND ANKLE-BRACHIAL INDEX DOPPLER NO EXERCISE   6/1/2017 3:16 PM        HISTORY: Localized edema. Chronic venous hypertension (idiopathic)  without complications of bilateral lower extremity.     COMPARISON: None.     FINDINGS: Ankle-brachial index is 0.96 on the right and 0.91 on the  left. Waveforms appear biphasic/monophasic bilaterally.         IMPRESSION: Borderline bilateral arterial insufficiency.     PATRICK SCHERER MD      Assessment/plan:  This is a 67 year old gentleman with bilateral chronic stasis changes and lymphedema. He has a new right leg wound as well as a result of a fall - closed.   Original Left leg wound Smaller.   New wound from CA deposit - removed with Rongeur.   Edema now resolved currently.    Will cont TRIAD and ACE.  Will hold UNNA to left.   F/U 2 weeks.    Patient to follow up with Primary Care provider regarding elevated blood pressure.    Casey Shine MD, FACS.      Again, thank you for allowing me to participate in the care of your patient.        Sincerely,        Casey Shine MD

## 2018-05-11 NOTE — TELEPHONE ENCOUNTER
Tomer from [facility] in [place] called clinic wanting to speak with Jessika or his nurse about patient's glucose meter and strips. It will not go through insurance and she has things she would like to discuss. Please call pharmacy back.

## 2018-05-11 NOTE — TELEPHONE ENCOUNTER
"Julio Cesar please advise if you think we should try to do an appeal for Crestor 10 mg. [employer] would need you to write up a letter stating why you feel patient is unable to try one of the formulary alternatives listed.   Patient states he does not want to try other medications he has been on Crestor for a long time and does not want to deal with new medication and possible side effects. He does not want to be a \"guinea pig\"   [employer] denied the PA because they would like him to try and fail :  Atorvastatin  Lovastatin  Pravastatin  Simvastatin  Rosuvastatin     Please advise Please and thanks  .willie  "

## 2018-05-11 NOTE — TELEPHONE ENCOUNTER
Spoke with pharmacy.  States that they will not cover test strips unless he is on a diabetic med.  RN to follow up with patient and on Monday.    Yonny Meyers, RN, BSN

## 2018-05-11 NOTE — NURSING NOTE
"Glenroy Padilla is a 67 year old male who presents for:  Chief Complaint   Patient presents with     WOUND CARE     lower leg wound-Triad         Initial Vitals:  /60 (BP Location: Right arm, Patient Position: Sitting, Cuff Size: Adult Large)  Pulse 90  Temp 96.6  F (35.9  C) (Temporal)  Wt 140 kg (308 lb 11.2 oz)  SpO2 97%  BMI 44.29 kg/m2 Estimated body mass index is 44.29 kg/(m^2) as calculated from the following:    Height as of 4/26/18: 1.778 m (5' 10\").    Weight as of this encounter: 140 kg (308 lb 11.2 oz).. Body surface area is 2.63 meters squared. BP completed using cuff size: large  Data Unavailable    Do you feel safe in your environment?  Yes  Do you need any refills today? No    Nursing Comments:         Hui Ashraf CMA  "

## 2018-05-11 NOTE — TELEPHONE ENCOUNTER
Spoke with Tomer from [facility] pharmacy, Insurance is denying test strips to be covered---is patient taking diabetic medication?  Please send new script if appropriate   Thanks  Josselin NATION (R)

## 2018-05-11 NOTE — TELEPHONE ENCOUNTER
"Spoke with [facility] pharmacy in [place], gave them verbal Rx for Lancet, test strips dispense [id number],  1 refill and meter to cover whichever brand is covered by his insurance.  Also informed pharmacy that PA was denied for Crestor.     Spoke with patient informed him that we have called in new Rx for Lancets, test strips and meter, also informed him that we did Prior Authorization for Crestor 10 mg but this was denied due to patient needs to try other medications that are covered through his insurance.  Patient states that he does not want to switch his medications he has been taking the Crestor for a long time and this works well for him he does not want to be a \"guinea pig\" and have to deal with all the side effects that other medications can have.  Patient is very frustrated with his insurance company and the run around he has gotten with [facility] pharmacy.   Thanks   Josselin Montes RT (R)         "

## 2018-05-11 NOTE — NURSING NOTE
Pt here for f/u of left lower leg wounds. The original wound measures 1 x 0.3cm. Pink and clean.  Superior to the original wound has pinpoint spot that is raised, MD removed calcium deposits from this site, wound measures 2.5 x 0.5 x 0.2cm after this.  Triad drsg placed with dry gauze over then tubigrip placed on lower leg. YAHIR Wilkins

## 2018-05-11 NOTE — PROGRESS NOTES
F/U for leg wound    Subjective:  Patient is a 67-year-old white male presenting history of a left leg wound. He hit a coffee table  the wound is slow to heal. He was initially treated in [place] and has a 15 year history of bilateral lower extremity edema. He has never been treated with any form of compression therapy and was told not to based on an ultrasound results in [place]. Those results are not available to me at this time. He denies any prior DVT, nonhealing wounds, leg trauma, claudication or rest pain. He was told in [place] to treat the leg with Betadine. [place].      Last seen by me in June and was tx with UNNA boot and silvercel.  Returned to [place] and was treated with hydroferra blue.       Had UNNA boot on when was last seen.  Has been in Assisted living since last seen by me in January for leg weakness.  TX with TRIAD, Foam and UNNA.   Laceration on right healed.    Had his back surgery at [facility]. Doing well.       Reports wound almost closed.    Objective:  B/P: 142/60, T: 96.6, P: 90, R: Data Unavailable  Ext; Warm,  No edema. Bilateral venous stasis changes with thickened skin.   (+)FEM pulses.  No distal pulses.  LLE wound - Inf -  1x0.3x0.1cm  Sup - Ca eroded through skin - removed with rongeur.   -    New ant ankle wound - Healed      U/S -   ULTRASOUND VENOUS COMPETENCY BILATERAL   6/1/2017 3:29 PM      HISTORY: Localized edema. Chronic venous hypertension (idiopathic)  without complications of bilateral lower extremity. Unspecified open  wound, left lower leg, initial encounter.     COMPARISON: None.     TECHNIQUE: Color Doppler and spectral waveform analysis performed.     FINDINGS: Bilateral common femoral veins, femoral veins, and popliteal  veins are patent and demonstrate no evidence of reflux.     Other than questionable 1 second reflux at the right saphenofemoral  junction, the right great saphenous vein is competent throughout its  length.  Diameter of the right great saphenous vein is 3-7 mm.     The left great saphenous vein is competent throughout its length.  Diameter of the left great saphenous vein is 2-7 mm.     Visualized portions of bilateral small saphenous veins appear to be  competent, without evidence of reflux.     No prominent perforators were demonstrated. Bilateral Giacomini veins  were competent.         IMPRESSION: The deep and superficial venous systems of both legs are  patent and competent.     PATRICK SCHERER MD    [facility] -   ULTRASOUND ANKLE-BRACHIAL INDEX DOPPLER NO EXERCISE   6/1/2017 3:16 PM        HISTORY: Localized edema. Chronic venous hypertension (idiopathic)  without complications of bilateral lower extremity.     COMPARISON: None.     FINDINGS: Ankle-brachial index is 0.96 on the right and 0.91 on the  left. Waveforms appear biphasic/monophasic bilaterally.         IMPRESSION: Borderline bilateral arterial insufficiency.     PATRICK SCHERER MD      Assessment/plan:  This is a 67 year old gentleman with bilateral chronic stasis changes and lymphedema. He has a new right leg wound as well as a result of a fall - closed.   Original Left leg wound Smaller.   New wound from CA deposit - removed with Rongeur.   Edema now resolved currently.    Will cont TRIAD and ACE.  Will hold UNNA to left.   F/U 2 weeks.    Patient to follow up with Primary Care provider regarding elevated blood pressure.    Casey Shine MD, FACS.

## 2018-05-12 NOTE — TELEPHONE ENCOUNTER
"I doubt an appeal would be successful.  Please contact his [employer] representative and ask.  We don't have a leg to stand on based on the patient not wanting to be \"a guinea pig\"...  Electronically signed:    Julio Cesar Sahu PA-C   "

## 2018-05-12 NOTE — TELEPHONE ENCOUNTER
His medication list indicates that he's taking metformin/sitagliptin.  This is 2 diabetes medications in one.    This is also listed on a recent home health form.

## 2018-05-14 ENCOUNTER — OFFICE VISIT (OUTPATIENT)
Dept: ORTHOPEDICS | Facility: CLINIC | Age: 68
End: 2018-05-14
Payer: COMMERCIAL

## 2018-05-14 VITALS — HEIGHT: 70 IN | BODY MASS INDEX: 44.81 KG/M2 | WEIGHT: 313 LBS

## 2018-05-14 DIAGNOSIS — I89.0 LYMPHEDEMA OF RIGHT LOWER EXTREMITY: ICD-10-CM

## 2018-05-14 DIAGNOSIS — M17.11 PRIMARY OSTEOARTHRITIS OF RIGHT KNEE: Primary | ICD-10-CM

## 2018-05-14 PROCEDURE — 99213 OFFICE O/P EST LOW 20 MIN: CPT | Performed by: ORTHOPAEDIC SURGERY

## 2018-05-14 NOTE — TELEPHONE ENCOUNTER
Spoke with patient informed him that PA has been denied and that he needs to try the list of medications below, patient states he does not want to try anything else. He states that he has already contacted [place] and they are sending him his medications.  He was going to call his W Catie Andersen to discuss this to see if he can get anything resolved.  He appreciates our help in the matter  Closing encounter  Josselin Montes RT (R)

## 2018-05-14 NOTE — TELEPHONE ENCOUNTER
Needs DM meds to sent to the pharmacy, even is if just on file, in order for test strips to be covered (RX pending). Or due PA.     Yonny Meyers, RN, BSN

## 2018-05-14 NOTE — TELEPHONE ENCOUNTER
Patient calling on status of refill of crestor?  He states the pharmacy still does not have it?  He has been out, please advise.  Thank you

## 2018-05-14 NOTE — MR AVS SNAPSHOT
"              After Visit Summary   5/14/2018    Glenroy Padilla    MRN: [id number]           Patient Information     Date Of Birth          1950        Visit Information        Provider Department      5/14/2018 3:10 PM Janes Thakur MD [facility]         Follow-ups after your visit        Your next 10 appointments already scheduled     May 25, 2018  9:30 AM CDT   Return Visit with Casey Shine MD   [facility] ([facility])    [address] [id number] [phone]            Jun 14, 2018  9:00 AM CDT   Return Visit with Hugh Mendieta MD   [facility]    [address], [address]  [place] [id number] [phone]              Who to contact     If you have questions or need follow up information about today's clinic visit or your schedule please contact [facility] directly at [phone].  Normal or non-critical lab and imaging results will be communicated to you by Soundflavorhart, letter or phone within 4 business days after the clinic has received the results. If you do not hear from us within 7 days, please contact the clinic through Instructure[employer] or phone. If you have a critical or abnormal lab result, we will notify you by phone as soon as possible.  Submit refill requests through [employer] or call your pharmacy and they will forward the refill request to us. Please allow 3 business days for your refill to be completed.          Additional Information About Your Visit        SoundflavorharMEETiiN Information     [employer] lets you send messages to your doctor, view your test results, renew your prescriptions, schedule appointments and more. To sign up, go to [email or web address] . Click on \"Log in\" on the left side of the screen, which will take you to the Welcome page. Then click on \"Sign up Now\" on the right side of the page.     You will be asked to enter the access code listed " "below, as well as some personal information. Please follow the directions to create your username and password.     Your access code is: [email or web address]  Expires: 2018 11:55 AM     Your access code will  in 90 days. If you need help or a new code, please call your [place] clinic or [phone].        Care EveryWhere ID     This is your Care EveryWhere ID. This could be used by other organizations to access your [place] medical records  [id number]        Your Vitals Were     Height BMI (Body Mass Index)                1.778 m (5' 10\") 44.91 kg/m2           Blood Pressure from Last 3 Encounters:   18 142/60   18 122/69   18 134/66    Weight from Last 3 Encounters:   18 142 kg (313 lb)   18 140 kg (308 lb 11.2 oz)   18 139.5 kg (307 lb 9.6 oz)              Today, you had the following     No orders found for display       Primary Care Provider Office Phone # Fax #    Julio Cesar Esteban PA-C [phone] [address] [address] [address]        Equal Access to Services     JAZ OTBAR AH: Hadii katia ku hadasho Soomaali, waaxda luqadaha, qaybta kaalmada adeegyada, adams horton. So [facility] [phone].    ATENCIÓN: Si habla español, tiene a wilkinson disposición servicios gratuitos de asistencia lingüística. Llame al [phone].    We comply with applicable federal civil rights laws and [place] laws. We do not discriminate on the basis of race, color, national origin, age, disability, sex, sexual orientation, or gender identity.            Thank you!     Thank you for choosing [facility]  for your care. Our goal is always to provide you with excellent care. Hearing back from our patients is one way we can continue to improve our services. Please take a few minutes to complete the written survey that you may receive in the mail after your visit with us. Thank you!             Your Updated Medication List - Protect " others around you: Learn how to safely use, store and throw away your medicines at www.disposemymeds.org.          This list is accurate as of 5/14/18  3:14 PM.  Always use your most recent med list.                   Brand Name Dispense Instructions for use Diagnosis    acetaminophen 500 MG tablet    TYLENOL    120 tablet    Take 1-2 tablets (500-1,000 mg) by mouth every 6 hours as needed for mild pain    Chronic pain of left knee, Complete tear of left rotator cuff       blood glucose calibration solution    NO BRAND SPECIFIED    1 Bottle    To accompany: Blood Glucose Monitor Brands: per insurance.    Type 2 diabetes mellitus with other circulatory complication, without long-term current use of insulin (H)       blood glucose lancing device    no brand specified    1 each    Use to test blood sugars 2 times daily or as directed.    Type 2 diabetes mellitus with other circulatory complication, without long-term current use of insulin (H)       * blood glucose monitoring test strip    no brand specified    100 strip    Use to test blood sugars 2 times daily or as directed    Type 2 diabetes mellitus with other circulatory complication, without long-term current use of insulin (H)       * blood glucose monitoring test strip    no brand specified    100 strip    Use to test blood sugar 2x  daily or as directed. To accompany: Blood Glucose Monitor Brands: per insurance.    Type 2 diabetes mellitus with other circulatory complication, without long-term current use of insulin (H)       clopidogrel 75 MG tablet    PLAVIX    90 tablet    Take 1 tablet (75 mg) by mouth daily    Type 2 diabetes mellitus with other circulatory complication, without long-term current use of insulin (H), S/P spinal surgery, Hypertension, goal below 140/90, Hyperlipidemia LDL goal <100       cyclobenzaprine 10 MG tablet    FLEXERIL     TAKE 1 TAB BY MOUTH TWICE DAILY AS NEEDED FOR ROTATOR CUFF TEAR        ferrous sulfate 325 (65 Fe) MG tablet     IRON    90 tablet    Take 1 tablet (325 mg) by mouth daily (with breakfast)    History of anemia       furosemide 40 MG tablet    LASIX    90 tablet    Take 1 tablet (40 mg) by mouth daily    Hypertension, goal below 140/90       * GLUCOCARD EXPRESSION MONITOR w/Device Kit      USE TWICE DAILY TO TEST BLOOD GLUCOSE        * blood glucose monitoring meter device kit    no brand specified    1 kit    test blood sugar 2 xdaily or as directed.  Blood Glucose Monitor Brands: per insurance.    Type 2 diabetes mellitus with other circulatory complication, without long-term current use of insulin (H)       lidocaine 5 % ointment    XYLOCAINE    50 g    Apply topically daily    Chronic pain of right knee       losartan 25 MG tablet    COZAAR    90 tablet    Take 1 tablet (25 mg) by mouth daily    Hypertension, goal below 140/90, Type 2 diabetes mellitus with other circulatory complication, without long-term current use of insulin (H)       methocarbamol 500 MG tablet    ROBAXIN    70 tablet    Take 1 tablet (500 mg) by mouth 4 times daily as needed for muscle spasms    S/P spinal surgery       metoprolol tartrate 25 MG tablet    LOPRESSOR    60 tablet    Take 1 tablet (25 mg) by mouth 2 times daily    Type 2 diabetes mellitus with other circulatory complication, without long-term current use of insulin (H), History of coronary artery stent placement, Hypertension, goal below 140/90       order for DME     1 Box    One touch glucose monitoring strip with Glucometer and lancets.    Type 2 diabetes mellitus with other circulatory complication, without long-term current use of insulin (H)       pantoprazole 40 MG EC tablet    PROTONIX    30 tablet    Take 1 tablet (40 mg) by mouth daily Take 30-60 minutes before a meal.    Gastroesophageal reflux disease without esophagitis       rosuvastatin 10 MG tablet    CRESTOR    90 tablet    Take 1 tablet (10 mg) by mouth daily    Hyperlipidemia LDL goal <100       senna-docusate  8.6-50 MG per tablet    SENOKOT-S;PERICOLACE    100 tablet    Take 1 tablet by mouth 2 times daily as needed for constipation    Spinal stenosis, lumbar region, without neurogenic claudication       SitaGLIPtin-MetFORMIN HCl  MG Tb24      Take 2 tablets by mouth daily (with breakfast)        thin lancets    NO BRAND SPECIFIED    100 each    Test 2 x daily or as directed.  Brands: per insurance.    Type 2 diabetes mellitus with other circulatory complication, without long-term current use of insulin (H)       TRIAD HYDROPHILIC WOUND DRESSI Pste     1 Tube    Externally apply 1 g topically daily    Open wound of right lower leg, initial encounter       * Notice:  This list has 4 medication(s) that are the same as other medications prescribed for you. Read the directions carefully, and ask your doctor or other care provider to review them with you.

## 2018-05-14 NOTE — PROGRESS NOTES
"HISTORY OF PRESENT ILLNESS:    Glenroy Padilla is a 67 year old male who is seen in follow up for   Chief Complaint   Patient presents with     RECHECK     Primary osteoarthritis of left knee   Present symptoms: Patient returns for repeat weight and evaluation of lower extremities.  Patient reports that he is getting evaluation for his right knee first and not his left as stated above.  He has been undergoing some wound treatment therapies through Dr. Shine.  Patient states he has not fallen since his back operation.  Treatments tried to this point: Patient recently had spinal surgery so is now able to walk with some improvement.  Family present: Daughter  Patient evaluation done with Dr. Thakur    Physical Exam:  Vitals: Ht 1.778 m (5' 10\")  Wt 142 kg (313 lb)  BMI 44.91 kg/m2 repeat measurements after patient handed off his phone was 311 pounds.  On May 11, 2018 patient was weighed at 308 pounds.  April 16 visit patient was listed at 303 pounds.  On April 16 was our initial consultation with the patient.  BMI= Body mass index is 44.91 kg/(m^2).  Constitutional: healthy, alert and no acute distress   Psychiatric: mentation appears normal and affect normal.  Patient comes to the appointment in a motorized wheelchair  NEURO: no focal deficits  SKIN: no excoriation or erythema. No signs of infection.  JOINT/EXTREMITIES:  Affected extremity pulses are easily palpable.  Right Knee Exam: Inspection: Right knee with a small area of redness anterior to the knee.  This appears more closely related to a rash of slight psoriasis.  Patient does have compression tubing on his right lower extremity up to the knee.  This was rolled down to reveal darkened skin with a excoriated region at the anterior shin proximal one third without drainage.  Patient does have a moderate amount of dependent swelling.  Pulses are faint but palpable at the dorsal aspect of the foot.  The left lower extremity appears smaller but still some " swelling present and discoloration.  There is open lesion on the left lower leg as well.  Tender: Patient with significant tenderness even with light touch on both the medial and lateral compartments/joint lines  Active Range of Motion: Patient is able to extend at the knee however this causes much discomfort.  GAIT: Not evaluated today.  Patient comes in a motorized wheelchair}    IMAGING INTERPRETATION: 3 images bilaterally were reviewed.  Patient does have significant near bone-on-bone medial compartment loss of space as well as patellofemoral compartment osteophytes and narrowing.  Independent visualization of the images was performed.     ASSESSMENT:    Chief Complaint   Patient presents with     RECHECK     Primary osteoarthritis of left knee       ICD-10-CM    1. Primary osteoarthritis of right knee M17.11 PHYSICAL THERAPY REFERRAL     order for DME   2. Lymphedema of right lower extremity I89.0 PHYSICAL THERAPY REFERRAL     order for DME     Patient's skin condition is still not to the level of safety for surgery.  Also patient has gained weight in the last interval period versus loss.  Patient's visit with orthopedics on April 16, 2018 had a recommendation of a 15 pound weight loss and improvement of skin condition prior to scheduling of total knee arthroplasty.    Plan:   Patient lives in an assisted living facility.  They do have services nearby for physical rehabilitation.  Patient reports the therapist did state that if he has an order for compression therapy as they would be able to work with him on this.  We did write a prescription for physical therapy for lymphedema protocol.  This was printed so that patient may take this to his facility.  He does live in [place] so he would like to go to physical therapy closer to home.  Patient's daughter was in attendance for today's visit.  She reports that she is diabetic type I.  She is familiar with the diet plans needed for her dad to decrease weight.   "Glenroy did state difficulty with recent medication changes in the loss of weight.  Daughter states that she does get her dad snacks and has been getting him \"healthier snacks\" versus the cakes and treats he was provided before.  She will help as she can so that her dad can accomplish a level to obtain surgery.  Patient's daughter did state to her father that now he has to lose 23 pounds versus the 15 from his initial visit.  She does plan to hold her father accountable so that he can obtain surgery.  Daughter also reassured her father that it is more diet that he needs to concentrate on since he does not mobilize well and is not very active.  Return to clinic: This is recommended to be approximately 2 months time for realistic weight loss and recheck of skin condition.  Patient should wear shorts or be placed in shorts for the next visit.    BP Readings from Last 1 Encounters:   05/11/18 142/60     BP noted to be well controlled today in office.     Patient given handout regarding smoking cessation    Scribed by  Le Rosenbaum PA-C   5/14/2018  4:18 PM      I attest I have seen and evaluated the patient.  I agree with above impression and plan.    Janes Thakur MD  "

## 2018-05-14 NOTE — LETTER
"    5/14/2018         RE: Glenroy Padilla  [address] [address]        Dear Colleague,    Thank you for referring your patient, Glenroy Padilla, to the [facility]. Please see a copy of my visit note below.    HISTORY OF PRESENT ILLNESS:    Glenroy Padilla is a 67 year old male who is seen in follow up for   Chief Complaint   Patient presents with     RECHECK     Primary osteoarthritis of left knee   Present symptoms: Patient returns for repeat weight and evaluation of lower extremities.  Patient reports that he is getting evaluation for his right knee first and not his left as stated above.  He has been undergoing some wound treatment therapies through Dr. Shine.  Patient states he has not fallen since his back operation.  Treatments tried to this point: Patient recently had spinal surgery so is now able to walk with some improvement.  Family present: Daughter  Patient evaluation done with Dr. Thakur    Physical Exam:  Vitals: Ht 1.778 m (5' 10\")  Wt 142 kg (313 lb)  BMI 44.91 kg/m2 repeat measurements after patient handed off his phone was 311 pounds.  On May 11, 2018 patient was weighed at 308 pounds.  April 16 visit patient was listed at 303 pounds.  On April 16 was our initial consultation with the patient.  BMI= Body mass index is 44.91 kg/(m^2).  Constitutional: healthy, alert and no acute distress   Psychiatric: mentation appears normal and affect normal.  Patient comes to the appointment in a motorized wheelchair  NEURO: no focal deficits  SKIN: no excoriation or erythema. No signs of infection.  JOINT/EXTREMITIES:  Affected extremity pulses are easily palpable.  Right Knee Exam: Inspection: Right knee with a small area of redness anterior to the knee.  This appears more closely related to a rash of slight psoriasis.  Patient does have compression tubing on his right lower extremity up to the knee.  This was rolled down to reveal darkened skin with a excoriated region at " the anterior shin proximal one third without drainage.  Patient does have a moderate amount of dependent swelling.  Pulses are faint but palpable at the dorsal aspect of the foot.  The left lower extremity appears smaller but still some swelling present and discoloration.  There is open lesion on the left lower leg as well.  Tender: Patient with significant tenderness even with light touch on both the medial and lateral compartments/joint lines  Active Range of Motion: Patient is able to extend at the knee however this causes much discomfort.  GAIT: Not evaluated today.  Patient comes in a motorized wheelchair}    IMAGING INTERPRETATION: 3 images bilaterally were reviewed.  Patient does have significant near bone-on-bone medial compartment loss of space as well as patellofemoral compartment osteophytes and narrowing.  Independent visualization of the images was performed.     ASSESSMENT:    Chief Complaint   Patient presents with     RECHECK     Primary osteoarthritis of left knee       ICD-10-CM    1. Primary osteoarthritis of right knee M17.11 PHYSICAL THERAPY REFERRAL     order for DME   2. Lymphedema of right lower extremity I89.0 PHYSICAL THERAPY REFERRAL     order for DME     Patient's skin condition is still not to the level of safety for surgery.  Also patient has gained weight in the last interval period versus loss.  Patient's visit with orthopedics on April 16, 2018 had a recommendation of a 15 pound weight loss and improvement of skin condition prior to scheduling of total knee arthroplasty.    Plan:   Patient lives in an assisted living facility.  They do have services nearby for physical rehabilitation.  Patient reports the therapist did state that if he has an order for compression therapy as they would be able to work with him on this.  We did write a prescription for physical therapy for lymphedema protocol.  This was printed so that patient may take this to his facility.  He does live in [place] so  "he would like to go to physical therapy closer to home.  Patient's daughter was in attendance for today's visit.  She reports that she is diabetic type I.  She is familiar with the diet plans needed for her dad to decrease weight.  Glenroy did state difficulty with recent medication changes in the loss of weight.  Daughter states that she does get her dad snacks and has been getting him \"healthier snacks\" versus the cakes and treats he was provided before.  She will help as she can so that her dad can accomplish a level to obtain surgery.  Patient's daughter did state to her father that now he has to lose 23 pounds versus the 15 from his initial visit.  She does plan to hold her father accountable so that he can obtain surgery.  Daughter also reassured her father that it is more diet that he needs to concentrate on since he does not mobilize well and is not very active.  Return to clinic: This is recommended to be approximately 2 months time for realistic weight loss and recheck of skin condition.  Patient should wear shorts or be placed in shorts for the next visit.    BP Readings from Last 1 Encounters:   05/11/18 142/60     BP noted to be well controlled today in office.     Patient given handout regarding smoking cessation    Scribed by  Le Rosenbaum PA-C   5/14/2018  4:18 PM      I attest I have seen and evaluated the patient.  I agree with above impression and plan.    Janes Tahkur MD    Again, thank you for allowing me to participate in the care of your patient.        Sincerely,        Janes Thakur MD    "

## 2018-05-14 NOTE — TELEPHONE ENCOUNTER
Script for crestor was sent 04/30/2018 INS denied.  Needs PA.  Provider would you like to start a PA.  Yonny Meyers, RN, BSN

## 2018-05-15 NOTE — TELEPHONE ENCOUNTER
Spoke with [facility] pharmacy, they confirmed that they did receive this medication and that the claim did go through  Closing encounter  Josselin Montes RT (R)

## 2018-05-15 NOTE — TELEPHONE ENCOUNTER
Josselin MCKENNA has been working diligently on this.  I am not sure where we stand but let me know.  Electronically signed:    Julio Cesar Sahu PA-C

## 2018-05-15 NOTE — TELEPHONE ENCOUNTER
"PA has been denied due to patient needs to try/fail formulary alternatives, patient has been informed he states he does not want to be a \"guinea pig\" and try all different medications and deal with the side effects when he knows which medication works best for him.  He states he has been talking with a representative from [facility], he will talk with her again to see if there is anything she can do.  He appreciates our help, and is not frustrated with us he is frustrated with his insurance company.  He also states that he has medication coming to him from [facility].   Closing encounter  Josselin Montes RT (R)         "

## 2018-05-16 ENCOUNTER — TELEPHONE (OUTPATIENT)
Dept: FAMILY MEDICINE | Facility: OTHER | Age: 68
End: 2018-05-16

## 2018-05-16 NOTE — TELEPHONE ENCOUNTER
Reason for Call:  Form, our goal is to have forms completed with 72 hours, however, some forms may require a visit or additional information.    Type of letter, form or note:  medical    Who is the form from?: [employer] (if other please explain)    Where did the form come from: form was faxed in    What clinic location was the form placed at?: [facility] - [phone]    Where the form was placed: 's Box    What number is listed as a contact on the form?:  [phone]       Additional comments: please complete and fax  Fax [phone]    Call taken on 5/16/2018 at 11:29 AM by Olivia Mayfield

## 2018-05-17 ENCOUNTER — TELEPHONE (OUTPATIENT)
Dept: FAMILY MEDICINE | Facility: OTHER | Age: 68
End: 2018-05-17

## 2018-05-17 DIAGNOSIS — M17.11 PRIMARY OSTEOARTHRITIS OF RIGHT KNEE: ICD-10-CM

## 2018-05-17 DIAGNOSIS — M48.061 FORAMINAL STENOSIS OF LUMBAR REGION: ICD-10-CM

## 2018-05-17 DIAGNOSIS — M48.00 CENTRAL SPINAL STENOSIS: Primary | ICD-10-CM

## 2018-05-17 DIAGNOSIS — R29.6 FALLS FREQUENTLY: ICD-10-CM

## 2018-05-17 DIAGNOSIS — M21.371 FOOT DROP, RIGHT: ICD-10-CM

## 2018-05-17 DIAGNOSIS — M25.562 CHRONIC PAIN OF LEFT KNEE: ICD-10-CM

## 2018-05-17 DIAGNOSIS — E11.59 TYPE 2 DIABETES MELLITUS WITH OTHER CIRCULATORY COMPLICATION, WITHOUT LONG-TERM CURRENT USE OF INSULIN (H): ICD-10-CM

## 2018-05-17 DIAGNOSIS — G89.29 CHRONIC PAIN OF LEFT KNEE: ICD-10-CM

## 2018-05-17 DIAGNOSIS — Z98.890 S/P SPINAL SURGERY: ICD-10-CM

## 2018-05-17 NOTE — TELEPHONE ENCOUNTER
Reason for call:  Other  Reason for Call: Request for an order or referral:    Order or referral being requested: OT eval and treat     Date needed: as soon as possible    Has the patient been seen by the PCP for this problem? YES    Additional comments: none    Phone number Patient can be reached at:  Home number on file [phone] (home)    Best Time:  amnytime    Can we leave a detailed message on this number?  YES    Call taken on 5/17/2018 at 3:01 PM by Jeromy Abarca

## 2018-05-17 NOTE — TELEPHONE ENCOUNTER
Home care calling to request assessment for OT for lymphedema therapy for patient. Verbal order given per provider. Home Care will send form for providers signature.   Lisa Zuluaga CMA (MA)

## 2018-05-17 NOTE — TELEPHONE ENCOUNTER
Spoke with pt and he states that he will be having OT with [facility] and they come out to his assisted living home.

## 2018-05-17 NOTE — TELEPHONE ENCOUNTER
In MA box for completion and scan of the document into the medical record.  Electronically signed:    Julio Cesar Sahu PA-C

## 2018-05-17 NOTE — TELEPHONE ENCOUNTER
Forms have been completed, signed, faxed/mailed, and sent to scanning.  Lisa Zuluaga CMA ([facility])

## 2018-05-17 NOTE — TELEPHONE ENCOUNTER
Generic order placed.  Location of OT in question.  Electronically signed:    Julio Cesar Sahu PA-C

## 2018-05-22 ENCOUNTER — TELEPHONE (OUTPATIENT)
Dept: FAMILY MEDICINE | Facility: OTHER | Age: 68
End: 2018-05-22

## 2018-05-22 NOTE — TELEPHONE ENCOUNTER
Reason for Call: Request for an order or referral:    Order or referral being requested: occupational therapy orders for lymphedema management 2 x a week for 2 weeks and 1 time a week for 2 weeks.       Date needed: as soon as possible    Has the patient been seen by the PCP for this problem? no    Additional comments: Please call home care    Phone number Patient can be reached at:  Other phone number:  [facility] [phone]    Best Time:      Can we leave a detailed message on this number?  YES    Call taken on 5/22/2018 at 12:58 PM by Neda Cortez

## 2018-05-24 ENCOUNTER — TELEPHONE (OUTPATIENT)
Dept: FAMILY MEDICINE | Facility: OTHER | Age: 68
End: 2018-05-24

## 2018-05-24 NOTE — TELEPHONE ENCOUNTER
Reason for call:  Kellie is the home care occupational therapist and she would like to change frequency for OT   The week of the 20th she wants it to be two times a week for one week and the 27th 3 times a week for 3 weeks

## 2018-05-24 NOTE — TELEPHONE ENCOUNTER
Reason for Call:  Form, our goal is to have forms completed with 72 hours, however, some forms may require a visit or additional information.    Type of letter, form or note:  medical    Who is the form from?: [employer] (if other please explain)    Where did the form come from: form was faxed in    What clinic location was the form placed at?: [facility] - [phone]    Where the form was placed: 's Box    What number is listed as a contact on the form?: [phone]       Additional comments: n/a    Call taken on 5/24/2018 at 4:42 PM by Diamond Juarez

## 2018-05-24 NOTE — TELEPHONE ENCOUNTER
Reason for Call: Request for an order or referral:    Order or referral being requested: additional skilled nursing visits. 3 x per week for 4 weeks. 2 x per week for 1 week. For wound management and medication management    Date needed: as soon as possible    Has the patient been seen by the PCP for this problem? YES    Additional comments: will need orders    Phone number Patient can be reached at:  Other phone number:  [facility] [phone]    Best Time:  any    Can we leave a detailed message on this number?  YES    Call taken on 5/24/2018 at 9:23 AM by Neda Cortez

## 2018-05-25 ENCOUNTER — TELEPHONE (OUTPATIENT)
Dept: FAMILY MEDICINE | Facility: OTHER | Age: 68
End: 2018-05-25

## 2018-05-25 ENCOUNTER — OFFICE VISIT (OUTPATIENT)
Dept: SURGERY | Facility: CLINIC | Age: 68
End: 2018-05-25
Payer: COMMERCIAL

## 2018-05-25 VITALS
SYSTOLIC BLOOD PRESSURE: 128 MMHG | WEIGHT: 315 LBS | DIASTOLIC BLOOD PRESSURE: 72 MMHG | BODY MASS INDEX: 45.47 KG/M2 | HEART RATE: 103 BPM | TEMPERATURE: 96.5 F | OXYGEN SATURATION: 93 %

## 2018-05-25 DIAGNOSIS — R60.0 EDEMA OF BOTH LEGS: ICD-10-CM

## 2018-05-25 DIAGNOSIS — S81.802D OPEN WOUND OF LEFT LOWER EXTREMITY WITHOUT COMPLICATION, SUBSEQUENT ENCOUNTER: Primary | ICD-10-CM

## 2018-05-25 PROCEDURE — 99213 OFFICE O/P EST LOW 20 MIN: CPT | Performed by: SPECIALIST

## 2018-05-25 NOTE — LETTER
5/25/2018         RE: Glenroy Padilla  [address] [address]        Dear Colleague,    Thank you for referring your patient, Glenroy Padilla, to the [facility]. Please see a copy of my visit note below.    F/U for leg wound    Subjective:  Patient is a 67-year-old white male presenting history of a left leg wound. He hit a coffee table  the wound is slow to heal. He was initially treated in [place] and has a 15 year history of bilateral lower extremity edema. He has never been treated with any form of compression therapy and was told not to based on an ultrasound results in [place]. Those results are not available to me at this time. He denies any prior DVT, nonhealing wounds, leg trauma, claudication or rest pain. He was told in [place] to treat the leg with Betadine. He came to the [place] for second opinion.      Last seen by me in June and was tx with UNNA boot and silvercel.  Returned to [place] and was treated with hydroferra blue.       Had UNNA boot on when was last seen.  Has been in Assisted living since last seen by me in January for leg weakness.  TX with TRIAD, Foam and UNNA.   Laceration on right healed.    Had his back surgery at [facility]. Doing well.       Reports wound almost closed.  New wound from CA removal last week.    Objective:  B/P: 128/72, T: 96.5, P: 103, R: Data Unavailable  Ext; Warm,  No edema. Bilateral venous stasis changes with thickened skin.   (+)FEM pulses.  No distal pulses.  LLE wound - Inf -  Closed  Sup - Ca eroded through skin 3.5x1.8x0.4.   -    New ant ankle wound - Healed      U/S -   ULTRASOUND VENOUS COMPETENCY BILATERAL   6/1/2017 3:29 PM      HISTORY: Localized edema. Chronic venous hypertension (idiopathic)  without complications of bilateral lower extremity. Unspecified open  wound, left lower leg, initial encounter.     COMPARISON: None.     TECHNIQUE: Color Doppler and spectral waveform analysis performed.     FINDINGS:  Bilateral common femoral veins, femoral veins, and popliteal  veins are patent and demonstrate no evidence of reflux.     Other than questionable 1 second reflux at the right saphenofemoral  junction, the right great saphenous vein is competent throughout its  length. Diameter of the right great saphenous vein is 3-7 mm.     The left great saphenous vein is competent throughout its length.  Diameter of the left great saphenous vein is 2-7 mm.     Visualized portions of bilateral small saphenous veins appear to be  competent, without evidence of reflux.     No prominent perforators were demonstrated. Bilateral Giacomini veins  were competent.         IMPRESSION: The deep and superficial venous systems of both legs are  patent and competent.     PATRICK SCHERER MD    [facility] -   ULTRASOUND ANKLE-BRACHIAL INDEX DOPPLER NO EXERCISE   6/1/2017 3:16 PM        HISTORY: Localized edema. Chronic venous hypertension (idiopathic)  without complications of bilateral lower extremity.     COMPARISON: None.     FINDINGS: Ankle-brachial index is 0.96 on the right and 0.91 on the  left. Waveforms appear biphasic/monophasic bilaterally.         IMPRESSION: Borderline bilateral arterial insufficiency.     PATRICK SCHERER MD      Assessment/plan:  This is a 67 year old gentleman with bilateral chronic stasis changes and lymphedema. He has a new right leg wound as well as a result of a fall - closed.   Original Left leg wound healed   New wound from CA deposit - removed with Rongeur clean.   Edema now resolved currently.    Will cont TRIAD and ACE.  Will hold UNNA to left.   F/U 2 weeks.      Casey Shine MD, FACS.      Again, thank you for allowing me to participate in the care of your patient.        Sincerely,        Casey Shine MD

## 2018-05-25 NOTE — TELEPHONE ENCOUNTER
Reason for Call:  Form, our goal is to have forms completed with 72 hours, however, some forms may require a visit or additional information.    Type of letter, form or note:  medical    Who is the form from?: [employer] (if other please explain)    Where did the form come from: form was faxed in    What clinic location was the form placed at?: [facility] - [phone]    Where the form was placed: 's Box    What number is listed as a contact on the form?:  FAx [phone]       Additional comments: please complete and fax back    Call taken on 5/25/2018 at 4:11 PM by Olivia Mayfield

## 2018-05-25 NOTE — NURSING NOTE
"Glenroy Padilla is a 67 year old male who presents for:  Chief Complaint   Patient presents with     WOUND CARE     lower leg wound        Initial Vitals:  /72 (BP Location: Right arm, Patient Position: Sitting, Cuff Size: Adult Large)  Pulse 103  Temp 96.5  F (35.8  C) (Temporal)  Wt 143.7 kg (316 lb 14.4 oz)  SpO2 93%  BMI 45.47 kg/m2 Estimated body mass index is 45.47 kg/(m^2) as calculated from the following:    Height as of 5/14/18: 1.778 m (5' 10\").    Weight as of this encounter: 143.7 kg (316 lb 14.4 oz).. Body surface area is 2.66 meters squared. BP completed using cuff size: large  Data Unavailable    Do you feel safe in your environment?  Yes  Do you need any refills today? No    Nursing Comments:         Hui Ashraf CMA  "

## 2018-05-25 NOTE — MR AVS SNAPSHOT
"              After Visit Summary   5/25/2018    Glenroy Padilla    MRN: [id number]           Patient Information     Date Of Birth          1950        Visit Information        Provider Department      5/25/2018 9:30 AM Casey Shine MD [facility]        Today's Diagnoses     Open wound of left lower extremity without complication, subsequent encounter    -  1    Edema of both legs           Follow-ups after your visit        Your next 10 appointments already scheduled     Jun 14, 2018  9:00 AM CDT   [facility] with Hugh Mendieta MD   [facility]    [address], [address]  [place] [id number] [phone]              Who to contact     If you have questions or need follow up information about today's clinic visit or your schedule please contact [facility] directly at [phone].  Normal or non-critical lab and imaging results will be communicated to you by MyChart, letter or phone within 4 business days after the clinic has received the results. If you do not hear from us within 7 days, please contact the clinic through MyChart or phone. If you have a critical or abnormal lab result, we will notify you by phone as soon as possible.  Submit refill requests through [facility] or call your pharmacy and they will forward the refill request to us. Please allow 3 business days for your refill to be completed.          Additional Information About Your Visit        MyChart Information     [facility] lets you send messages to your doctor, view your test results, renew your prescriptions, schedule appointments and more. To sign up, go to [email or web address] . Click on \"Log in\" on the left side of the screen, which will take you to the Welcome page. Then click on \"Sign up Now\" on the right side of the page.     You will be asked to enter the access code listed below, as well as some personal information. Please follow " the directions to create your username and password.     Your access code is: [email or web address]  Expires: 2018 11:55 AM     Your access code will  in 90 days. If you need help or a new code, please call your [facility] clinic or [phone].        Care EveryWhere ID     This is your Care EveryWhere ID. This could be used by other organizations to access your [facility] medical records  [id number]        Your Vitals Were     Pulse Temperature Pulse Oximetry BMI (Body Mass Index)          103 96.5  F (35.8  C) (Temporal) 93% 45.47 kg/m2         Blood Pressure from Last 3 Encounters:   18 128/72   18 142/60   18 122/69    Weight from Last 3 Encounters:   18 143.7 kg (316 lb 14.4 oz)   18 142 kg (313 lb)   18 140 kg (308 lb 11.2 oz)              Today, you had the following     No orders found for display       Primary Care Provider Office Phone # Fax #    Julio Cesar Esteban PA-C [phone] [address] [address] [address]        Equal Access to Services     JAZ TOBAR AH: Hadii katia ku hadasho Soomaali, waaxda luqadaha, qaybta kaalmada adeegyada, adams chairezn bianca horton. So [facility] [phone].    ATENCIÓN: Si habla español, tiene a wilkinson disposición servicios gratuitos de asistencia lingüística. Llame al [phone].    We comply with applicable federal civil rights laws and [place] laws. We do not discriminate on the basis of race, color, national origin, age, disability, sex, sexual orientation, or gender identity.            Thank you!     Thank you for choosing [facility]  for your care. Our goal is always to provide you with excellent care. Hearing back from our patients is one way we can continue to improve our services. Please take a few minutes to complete the written survey that you may receive in the mail after your visit with us. Thank you!             Your Updated Medication List - Protect others around you:  Learn how to safely use, store and throw away your medicines at www.disposemymeds.org.          This list is accurate as of 5/25/18 10:37 AM.  Always use your most recent med list.                   Brand Name Dispense Instructions for use Diagnosis    acetaminophen 500 MG tablet    TYLENOL    120 tablet    Take 1-2 tablets (500-1,000 mg) by mouth every 6 hours as needed for mild pain    Chronic pain of left knee, Complete tear of left rotator cuff       blood glucose calibration solution    NO BRAND SPECIFIED    1 Bottle    To accompany: Blood Glucose Monitor Brands: per insurance.    Type 2 diabetes mellitus with other circulatory complication, without long-term current use of insulin (H)       blood glucose lancing device    no brand specified    1 each    Use to test blood sugars 2 times daily or as directed.    Type 2 diabetes mellitus with other circulatory complication, without long-term current use of insulin (H)       * blood glucose monitoring test strip    no brand specified    100 strip    Use to test blood sugars 2 times daily or as directed    Type 2 diabetes mellitus with other circulatory complication, without long-term current use of insulin (H)       * blood glucose monitoring test strip    no brand specified    100 strip    Use to test blood sugar 2x  daily or as directed. To accompany: Blood Glucose Monitor Brands: per insurance.    Type 2 diabetes mellitus with other circulatory complication, without long-term current use of insulin (H)       clopidogrel 75 MG tablet    PLAVIX    90 tablet    Take 1 tablet (75 mg) by mouth daily    Type 2 diabetes mellitus with other circulatory complication, without long-term current use of insulin (H), S/P spinal surgery, Hypertension, goal below 140/90, Hyperlipidemia LDL goal <100       cyclobenzaprine 10 MG tablet    FLEXERIL     TAKE 1 TAB BY MOUTH TWICE DAILY AS NEEDED FOR ROTATOR CUFF TEAR        ferrous sulfate 325 (65 Fe) MG tablet    IRON    90 tablet     Take 1 tablet (325 mg) by mouth daily (with breakfast)    History of anemia       furosemide 40 MG tablet    LASIX    90 tablet    Take 1 tablet (40 mg) by mouth daily    Hypertension, goal below 140/90       * GLUCOCARD EXPRESSION MONITOR w/Device Kit      USE TWICE DAILY TO TEST BLOOD GLUCOSE        * blood glucose monitoring meter device kit    no brand specified    1 kit    test blood sugar 2 xdaily or as directed.  Blood Glucose Monitor Brands: per insurance.    Type 2 diabetes mellitus with other circulatory complication, without long-term current use of insulin (H)       lidocaine 5 % ointment    XYLOCAINE    50 g    Apply topically daily    Chronic pain of right knee       losartan 25 MG tablet    COZAAR    90 tablet    Take 1 tablet (25 mg) by mouth daily    Hypertension, goal below 140/90, Type 2 diabetes mellitus with other circulatory complication, without long-term current use of insulin (H)       methocarbamol 500 MG tablet    ROBAXIN    70 tablet    Take 1 tablet (500 mg) by mouth 4 times daily as needed for muscle spasms    S/P spinal surgery       metoprolol tartrate 25 MG tablet    LOPRESSOR    60 tablet    Take 1 tablet (25 mg) by mouth 2 times daily    Type 2 diabetes mellitus with other circulatory complication, without long-term current use of insulin (H), History of coronary artery stent placement, Hypertension, goal below 140/90       order for DME     1 Box    One touch glucose monitoring strip with Glucometer and lancets.    Type 2 diabetes mellitus with other circulatory complication, without long-term current use of insulin (H)       order for DME     1 Units    Equipment being ordered: compression stockings below knee and above knee if available.  Medium compression.    Primary osteoarthritis of right knee, Lymphedema of right lower extremity       pantoprazole 40 MG EC tablet    PROTONIX    30 tablet    Take 1 tablet (40 mg) by mouth daily Take 30-60 minutes before a meal.     Gastroesophageal reflux disease without esophagitis       rosuvastatin 10 MG tablet    CRESTOR    90 tablet    Take 1 tablet (10 mg) by mouth daily    Hyperlipidemia LDL goal <100       senna-docusate 8.6-50 MG per tablet    SENOKOT-S;PERICOLACE    100 tablet    Take 1 tablet by mouth 2 times daily as needed for constipation    Spinal stenosis, lumbar region, without neurogenic claudication       SitaGLIPtin-MetFORMIN HCl  MG Tb24     180 tablet    Take 2 tablets by mouth daily (with breakfast)    Type 2 diabetes mellitus with other circulatory complication, without long-term current use of insulin (H)       thin lancets    NO BRAND SPECIFIED    100 each    Test 2 x daily or as directed.  Brands: per insurance.    Type 2 diabetes mellitus with other circulatory complication, without long-term current use of insulin (H)       TRIAD HYDROPHILIC WOUND DRESSI Pste     1 Tube    Externally apply 1 g topically daily    Open wound of right lower leg, initial encounter       * Notice:  This list has 4 medication(s) that are the same as other medications prescribed for you. Read the directions carefully, and ask your doctor or other care provider to review them with you.

## 2018-05-25 NOTE — PROGRESS NOTES
F/U for leg wound    Subjective:  Patient is a 67-year-old white male presenting history of a left leg wound. He hit a coffee table  the wound is slow to heal. He was initially treated in [place] and has a 15 year history of bilateral lower extremity edema. He has never been treated with any form of compression therapy and was told not to based on an ultrasound results in [place]. Those results are not available to me at this time. He denies any prior DVT, nonhealing wounds, leg trauma, claudication or rest pain. He was told in [place] to treat the leg with Betadine. [place].      Last seen by me in June and was tx with UNNA boot and silvercel.  Returned to [place] and was treated with hydroferra blue.       Had UNNA boot on when was last seen.  Has been in Assisted living since last seen by me in January for leg weakness.  TX with TRIAD, Foam and UNNA.   Laceration on right healed.    Had his back surgery at [facility]. Doing well.       Reports wound almost closed.  New wound from CA removal last week.    Objective:  B/P: 128/72, T: 96.5, P: 103, R: Data Unavailable  Ext; Warm,  No edema. Bilateral venous stasis changes with thickened skin.   (+)FEM pulses.  No distal pulses.  LLE wound - Inf -  Closed  Sup - Ca eroded through skin 3.5x1.8x0.4.   -    New ant ankle wound - Healed      U/S -   ULTRASOUND VENOUS COMPETENCY BILATERAL   6/1/2017 3:29 PM      HISTORY: Localized edema. Chronic venous hypertension (idiopathic)  without complications of bilateral lower extremity. Unspecified open  wound, left lower leg, initial encounter.     COMPARISON: None.     TECHNIQUE: Color Doppler and spectral waveform analysis performed.     FINDINGS: Bilateral common femoral veins, femoral veins, and popliteal  veins are patent and demonstrate no evidence of reflux.     Other than questionable 1 second reflux at the right saphenofemoral  junction, the right great saphenous vein is competent  throughout its  length. Diameter of the right great saphenous vein is 3-7 mm.     The left great saphenous vein is competent throughout its length.  Diameter of the left great saphenous vein is 2-7 mm.     Visualized portions of bilateral small saphenous veins appear to be  competent, without evidence of reflux.     No prominent perforators were demonstrated. Bilateral Giacomini veins  were competent.         IMPRESSION: The deep and superficial venous systems of both legs are  patent and competent.     PATRICK SCHERER MD    [facility] -   ULTRASOUND ANKLE-BRACHIAL INDEX DOPPLER NO EXERCISE   6/1/2017 3:16 PM        HISTORY: Localized edema. Chronic venous hypertension (idiopathic)  without complications of bilateral lower extremity.     COMPARISON: None.     FINDINGS: Ankle-brachial index is 0.96 on the right and 0.91 on the  left. Waveforms appear biphasic/monophasic bilaterally.         IMPRESSION: Borderline bilateral arterial insufficiency.     PATRICK SCHERER MD      Assessment/plan:  This is a 67 year old gentleman with bilateral chronic stasis changes and lymphedema. He has a new right leg wound as well as a result of a fall - closed.   Original Left leg wound healed   New wound from CA deposit - removed with Rongeur clean.   Edema now resolved currently.    Will cont TRIAD and ACE.  Will hold UNNA to left.   F/U 2 weeks.      Casey Shine MD, FACS.

## 2018-05-25 NOTE — NURSING NOTE
WOUND CARE    Location: left leg  Measures: 3.5 x 1.8 x 0.4 cm  Wound Base: red. 75% Slough with calcium deposits picked out by MD  Surrounding Tissue: Intact  Exudate: Small, Sero-Sang.  Odor: No  Pain:  Yes - to touch  Action & Treatment order per doctor: Writer cleansed wound, triad, gauze, mepilex, tubigriip. In addition placed mepilex to right shin area.  Patient verbalized understanding of treatment plan.    Follow up: per provider     Meme Beth RN  [facility]  [phone]  5/25/2018 5:39 PM

## 2018-05-29 ENCOUNTER — TRANSFERRED RECORDS (OUTPATIENT)
Dept: HEALTH INFORMATION MANAGEMENT | Facility: CLINIC | Age: 68
End: 2018-05-29

## 2018-06-04 ENCOUNTER — TELEPHONE (OUTPATIENT)
Dept: SURGERY | Facility: CLINIC | Age: 68
End: 2018-06-04

## 2018-06-04 ENCOUNTER — TELEPHONE (OUTPATIENT)
Dept: FAMILY MEDICINE | Facility: OTHER | Age: 68
End: 2018-06-04

## 2018-06-04 NOTE — TELEPHONE ENCOUNTER
On Dr. Copeland disc for cosignature.  Once it is signed please fax back to the care facility.  Electronically signed:    Julio Cesar Sahu PA-C

## 2018-06-04 NOTE — TELEPHONE ENCOUNTER
[employer] form, patient requesting  switching Home care company. Form signed and faxed back, copy sent to scanning..................................Hui Ashraf CMA  ([facility])

## 2018-06-04 NOTE — TELEPHONE ENCOUNTER
Reason for Call:  Form, our goal is to have forms completed with 72 hours, however, some forms may require a visit or additional information.    Type of letter, form or note:  medical    Who is the form from?: [employer] (if other please explain)    Where did the form come from: form was faxed in    What clinic location was the form placed at?: [facility] - [phone]    Where the form was placed: 's Box    What number is listed as a contact on the form?: [phone]       Additional comments: n/a    Call taken on 6/4/2018 at 10:09 AM by Diamond Juarez

## 2018-06-05 ENCOUNTER — TELEPHONE (OUTPATIENT)
Dept: SURGERY | Facility: CLINIC | Age: 68
End: 2018-06-05

## 2018-06-05 ENCOUNTER — MEDICAL CORRESPONDENCE (OUTPATIENT)
Dept: HEALTH INFORMATION MANAGEMENT | Facility: CLINIC | Age: 68
End: 2018-06-05

## 2018-06-05 NOTE — TELEPHONE ENCOUNTER
Reason for Call:  Other call back    Detailed comments: Patient has cancelled his old Home care agency and has asked [facility] care to take over and they are looking for notes regarding the wound care that was currently being done. Last 2-3 office notes regarding the wound care, also, current wound care orders.     Phone Number Patient can be reached at: [facility] nurse - [phone] please fax today if possible they are seeing the patient tomorrow -  FAX - [phone].  Best Time: ASAP     Can we leave a detailed message on this number? YES    Call taken on 6/5/2018 at 3:26 PM by Marcela Abarca

## 2018-06-06 ENCOUNTER — TELEPHONE (OUTPATIENT)
Dept: ORTHOPEDICS | Facility: CLINIC | Age: 68
End: 2018-06-06

## 2018-06-06 ENCOUNTER — TELEPHONE (OUTPATIENT)
Dept: FAMILY MEDICINE | Facility: OTHER | Age: 68
End: 2018-06-06

## 2018-06-06 NOTE — TELEPHONE ENCOUNTER
Reason for Call: Request for an order or referral:    Order or referral being requested: orders    Date needed: as soon as possible    Has the patient been seen by the PCP for this problem? YES    Additional comments: [facility] is calling and wants orders for home care and OT and skilled nursing.    Phone number Patient can be reached at:  Other phone number:  [phone]*    Best Time:  any    Can we leave a detailed message on this number?  YES    Call taken on 6/6/2018 at 2:16 PM by Olivia Mayfield

## 2018-06-06 NOTE — TELEPHONE ENCOUNTER
Reason for Call:  Other call back    Detailed comments: Martha is calling concerning his future care and the type of wraps that he has been receiving. Martha is from [facility] her number is [phone].    Phone Number Patient can be reached at: Other phone number:   [phone]    Best Time: asap    Can we leave a detailed message on this number? YES    Call taken on 6/6/2018 at 10:15 AM by Zoya Guerra

## 2018-06-06 NOTE — TELEPHONE ENCOUNTER
Pt is now getting lymphedema wraps 3x/week. His former home care agency was doing this after the nurse would do the drsg change. Pt got upset and that is why he is going to [facility]. [facility] does not have lymphedema therapist so they are asking if pt can go back to [facility](?). If not then they will contact us and we will need to schedule him in therapy here for changes. YAHIR Wilkins

## 2018-06-07 ENCOUNTER — MEDICAL CORRESPONDENCE (OUTPATIENT)
Dept: HEALTH INFORMATION MANAGEMENT | Facility: CLINIC | Age: 68
End: 2018-06-07

## 2018-06-07 ENCOUNTER — TELEPHONE (OUTPATIENT)
Dept: FAMILY MEDICINE | Facility: OTHER | Age: 68
End: 2018-06-07

## 2018-06-07 NOTE — TELEPHONE ENCOUNTER
[facility] would like to discontinue home care, patient is going back to [facility]  Kellie [phone]

## 2018-06-08 ENCOUNTER — OFFICE VISIT (OUTPATIENT)
Dept: SURGERY | Facility: CLINIC | Age: 68
End: 2018-06-08
Payer: COMMERCIAL

## 2018-06-08 VITALS
HEART RATE: 82 BPM | SYSTOLIC BLOOD PRESSURE: 160 MMHG | TEMPERATURE: 97.8 F | BODY MASS INDEX: 45.1 KG/M2 | DIASTOLIC BLOOD PRESSURE: 60 MMHG | WEIGHT: 314.3 LBS | OXYGEN SATURATION: 95 %

## 2018-06-08 DIAGNOSIS — R60.0 EDEMA OF BOTH LEGS: ICD-10-CM

## 2018-06-08 DIAGNOSIS — S81.802D OPEN WOUND OF LEFT LOWER EXTREMITY WITHOUT COMPLICATION, SUBSEQUENT ENCOUNTER: Primary | ICD-10-CM

## 2018-06-08 PROCEDURE — 99213 OFFICE O/P EST LOW 20 MIN: CPT | Performed by: SPECIALIST

## 2018-06-08 NOTE — NURSING NOTE
"Glenroy Padilla is a 67 year old male who presents for:  Chief Complaint   Patient presents with     RECHECK     WOUND CARE     lower leg wound        Initial Vitals:  /60 (BP Location: Right arm, Patient Position: Sitting, Cuff Size: Adult Large)  Pulse 82  Temp 97.8  F (36.6  C) (Temporal)  Wt 142.6 kg (314 lb 4.8 oz)  SpO2 95%  BMI 45.1 kg/m2 Estimated body mass index is 45.1 kg/(m^2) as calculated from the following:    Height as of 5/14/18: 1.778 m (5' 10\").    Weight as of this encounter: 142.6 kg (314 lb 4.8 oz).. Body surface area is 2.65 meters squared. BP completed using cuff size: large  Data Unavailable    Do you feel safe in your environment?  Yes  Do you need any refills today? No    Nursing Comments:         Hui Ashraf  "

## 2018-06-08 NOTE — MR AVS SNAPSHOT
"              After Visit Summary   6/8/2018    Glenroy Padilla    MRN: [id number]           Patient Information     Date Of Birth          1950        Visit Information        Provider Department      6/8/2018 9:30 AM Casey Shine MD [facility]        Today's Diagnoses     Open wound of left lower extremity without complication, subsequent encounter    -  1    Edema of both legs           Follow-ups after your visit        Your next 10 appointments already scheduled     Jun 14, 2018  9:00 AM CDT   Return Visit with Hugh Mendieta MD   [facility] ([facility]    [address]  [facility] [address]   [phone]            Jun 21, 2018 10:00 AM CDT   New Visit with  WOUND EXAM ROOM   [facility] ([facility])    [address] [place] [address] [id number] [phone]              Who to contact     If you have questions or need follow up information about today's clinic visit or your schedule please contact [facility] directly at [phone].  Normal or non-critical lab and imaging results will be communicated to you by [employer], letter or phone within 4 business days after the clinic has received the results. If you do not hear from us within 7 days, please contact the clinic through [employer] or phone. If you have a critical or abnormal lab result, we will notify you by phone as soon as possible.  Submit refill requests through [employer] or call your pharmacy and they will forward the refill request to us. Please allow 3 business days for your refill to be completed.          Additional Information About Your Visit        MyChart Information     [employer] lets you send messages to your doctor, view your test results, renew your prescriptions, schedule appointments and more. To sign up, go to [email or web address] . Click on \"Log in\" on the left side of the screen, which will take you " "to the Welcome page. Then click on \"Sign up Now\" on the right side of the page.     You will be asked to enter the access code listed below, as well as some personal information. Please follow the directions to create your username and password.     Your access code is: [email or web address]  Expires: 2018 11:55 AM     Your access code will  in 90 days. If you need help or a new code, please call your [facility] clinic or [phone].        Care EveryWhere ID     This is your Care EveryWhere ID. This could be used by other organizations to access your [facility] medical records  [id number]        Your Vitals Were     Pulse Temperature Pulse Oximetry BMI (Body Mass Index)          82 97.8  F (36.6  C) (Temporal) 95% 45.1 kg/m2         Blood Pressure from Last 3 Encounters:   18 160/60   18 128/72   18 142/60    Weight from Last 3 Encounters:   18 142.6 kg (314 lb 4.8 oz)   18 143.7 kg (316 lb 14.4 oz)   18 142 kg (313 lb)              Today, you had the following     No orders found for display       Primary Care Provider Office Phone # Fax #    Julio Cesar Esteban PA-C [phone] [address] [facility] [address]        Equal Access to Services     [facility] HospitalSOFIA AH: Hadii katia benedict hadasho Someeraali, waaxda luqadaha, qaybta kaalmada adebradyyada, adams batres . So [place] -[phone].    ATENCIÓN: Si habla español, tiene a wilkinson disposición servicios gratuitos de asistencia lingüística. Llame al [phone].    We comply with applicable federal civil rights laws and [place] laws. We do not discriminate on the basis of race, color, national origin, age, disability, sex, sexual orientation, or gender identity.            Thank you!     Thank you for choosing [facility]  for your care. Our goal is always to provide you with excellent care. Hearing back from our patients is one way we can continue to improve our services. Please " take a few minutes to complete the written survey that you may receive in the mail after your visit with us. Thank you!             Your Updated Medication List - Protect others around you: Learn how to safely use, store and throw away your medicines at www.disposemymeds.org.          This list is accurate as of 6/8/18 10:22 AM.  Always use your most recent med list.                   Brand Name Dispense Instructions for use Diagnosis    acetaminophen 500 MG tablet    TYLENOL    120 tablet    Take 1-2 tablets (500-1,000 mg) by mouth every 6 hours as needed for mild pain    Chronic pain of left knee, Complete tear of left rotator cuff       blood glucose calibration solution    NO BRAND SPECIFIED    1 Bottle    To accompany: Blood Glucose Monitor Brands: per insurance.    Type 2 diabetes mellitus with other circulatory complication, without long-term current use of insulin (H)       blood glucose lancing device    no brand specified    1 each    Use to test blood sugars 2 times daily or as directed.    Type 2 diabetes mellitus with other circulatory complication, without long-term current use of insulin (H)       * blood glucose monitoring test strip    no brand specified    100 strip    Use to test blood sugars 2 times daily or as directed    Type 2 diabetes mellitus with other circulatory complication, without long-term current use of insulin (H)       * blood glucose monitoring test strip    no brand specified    100 strip    Use to test blood sugar 2x  daily or as directed. To accompany: Blood Glucose Monitor Brands: per insurance.    Type 2 diabetes mellitus with other circulatory complication, without long-term current use of insulin (H)       clopidogrel 75 MG tablet    PLAVIX    90 tablet    Take 1 tablet (75 mg) by mouth daily    Type 2 diabetes mellitus with other circulatory complication, without long-term current use of insulin (H), S/P spinal surgery, Hypertension, goal below 140/90, Hyperlipidemia LDL  goal <100       cyclobenzaprine 10 MG tablet    FLEXERIL     TAKE 1 TAB BY MOUTH TWICE DAILY AS NEEDED FOR ROTATOR CUFF TEAR        ferrous sulfate 325 (65 Fe) MG tablet    IRON    90 tablet    Take 1 tablet (325 mg) by mouth daily (with breakfast)    History of anemia       furosemide 40 MG tablet    LASIX    90 tablet    Take 1 tablet (40 mg) by mouth daily    Hypertension, goal below 140/90       * GLUCOCARD EXPRESSION MONITOR w/Device Kit      USE TWICE DAILY TO TEST BLOOD GLUCOSE        * blood glucose monitoring meter device kit    no brand specified    1 kit    test blood sugar 2 xdaily or as directed.  Blood Glucose Monitor Brands: per insurance.    Type 2 diabetes mellitus with other circulatory complication, without long-term current use of insulin (H)       lidocaine 5 % ointment    XYLOCAINE    50 g    Apply topically daily    Chronic pain of right knee       losartan 25 MG tablet    COZAAR    90 tablet    Take 1 tablet (25 mg) by mouth daily    Hypertension, goal below 140/90, Type 2 diabetes mellitus with other circulatory complication, without long-term current use of insulin (H)       methocarbamol 500 MG tablet    ROBAXIN    70 tablet    Take 1 tablet (500 mg) by mouth 4 times daily as needed for muscle spasms    S/P spinal surgery       metoprolol tartrate 25 MG tablet    LOPRESSOR    60 tablet    Take 1 tablet (25 mg) by mouth 2 times daily    Type 2 diabetes mellitus with other circulatory complication, without long-term current use of insulin (H), History of coronary artery stent placement, Hypertension, goal below 140/90       order for DME     1 Box    One touch glucose monitoring strip with Glucometer and lancets.    Type 2 diabetes mellitus with other circulatory complication, without long-term current use of insulin (H)       order for DME     1 Units    Equipment being ordered: compression stockings below knee and above knee if available.  Medium compression.    Primary osteoarthritis of  right knee, Lymphedema of right lower extremity       pantoprazole 40 MG EC tablet    PROTONIX    30 tablet    Take 1 tablet (40 mg) by mouth daily Take 30-60 minutes before a meal.    Gastroesophageal reflux disease without esophagitis       rosuvastatin 10 MG tablet    CRESTOR    90 tablet    Take 1 tablet (10 mg) by mouth daily    Hyperlipidemia LDL goal <100       senna-docusate 8.6-50 MG per tablet    SENOKOT-S;PERICOLACE    100 tablet    Take 1 tablet by mouth 2 times daily as needed for constipation    Spinal stenosis, lumbar region, without neurogenic claudication       SitaGLIPtin-MetFORMIN HCl  MG Tb24     180 tablet    Take 2 tablets by mouth daily (with breakfast)    Type 2 diabetes mellitus with other circulatory complication, without long-term current use of insulin (H)       thin lancets    NO BRAND SPECIFIED    100 each    Test 2 x daily or as directed.  Brands: per insurance.    Type 2 diabetes mellitus with other circulatory complication, without long-term current use of insulin (H)       TRIAD HYDROPHILIC WOUND DRESSI Pste     1 Tube    Externally apply 1 g topically daily    Open wound of right lower leg, initial encounter       * Notice:  This list has 4 medication(s) that are the same as other medications prescribed for you. Read the directions carefully, and ask your doctor or other care provider to review them with you.

## 2018-06-08 NOTE — LETTER
6/8/2018         RE: Glenroy Padilla  [address] [facility] [address]        Dear Colleague,    Thank you for referring your patient, Glenroy Padilla, to the [facility]. Please see a copy of my visit note below.    F/U for leg wound    Subjective:  Patient is a 67-year-old white male presenting history of a left leg wound. He hit a coffee table  the wound is slow to heal. He was initially treated in [place] and has a 15 year history of bilateral lower extremity edema. He has never been treated with any form of compression therapy and was told not to based on an ultrasound results in [place]. Those results are not available to me at this time. He denies any prior DVT, nonhealing wounds, leg trauma, claudication or rest pain. He was told in [place] to treat the leg with Betadine. [place] came [place] the [place] for second opinion.      Last seen by me in June and was tx with UNNA boot and silvercel.  Returned to [place] and was treated with hydroferra blue.       Had UNNA boot on when was last seen.  Has been in Assisted living since last seen by me in January for leg weakness.  TX with TRIAD, Foam and UNNA.   Laceration on right healed.    Had his back surgery at [facility]. Doing well.       Reports wound doing well      Objective:  B/P: 160/60, T: 97.8, P: 82, R: Data Unavailable  Ext; Warm,  No edema. Bilateral venous stasis changes with thickened skin.   (+)FEM pulses.  No distal pulses.  LLE wound - Inf -  Closed  Sup - Ca eroded through skin 3.0x1.8x0.4.   -    New ant ankle wound - Healed      U/S -   ULTRASOUND VENOUS COMPETENCY BILATERAL   6/1/2017 3:29 PM      HISTORY: Localized edema. Chronic venous hypertension (idiopathic)  without complications of bilateral lower extremity. Unspecified open  wound, left lower leg, initial encounter.     COMPARISON: None.     TECHNIQUE: Color Doppler and spectral waveform analysis performed.     FINDINGS: Bilateral common femoral veins, femoral veins,  and popliteal  veins are patent and demonstrate no evidence of reflux.     Other than questionable 1 second reflux at the right saphenofemoral  junction, the right great saphenous vein is competent throughout its  length. Diameter of the right great saphenous vein is 3-7 mm.     The left great saphenous vein is competent throughout its length.  Diameter of the left great saphenous vein is 2-7 mm.     Visualized portions of bilateral small saphenous veins appear to be  competent, without evidence of reflux.     No prominent perforators were demonstrated. Bilateral Giacomini veins  were competent.         IMPRESSION: The deep and superficial venous systems of both legs are  patent and competent.     PATRICK SCHERER MD    [facility] -   ULTRASOUND ANKLE-BRACHIAL INDEX DOPPLER NO EXERCISE   6/1/2017 3:16 PM        HISTORY: Localized edema. Chronic venous hypertension (idiopathic)  without complications of bilateral lower extremity.     COMPARISON: None.     FINDINGS: Ankle-brachial index is 0.96 on the right and 0.91 on the  left. Waveforms appear biphasic/monophasic bilaterally.         IMPRESSION: Borderline bilateral arterial insufficiency.     PATRICK SCHERER MD      Assessment/plan:  This is a 67 year old gentleman with bilateral chronic stasis changes and lymphedema. He has a new right leg wound as well as a result of a fall - closed.   Original Left leg wound healed   New wound from CA deposit - smaller.   Edema now resolved currently.    Will cont TRIAD and ACE.  Will hold UNNA to left.   F/U 2 weeks with wound nurse.    Patient to follow up with Primary Care provider regarding elevated blood pressure.    Casey Shine MD, FACS.      Again, thank you for allowing me to participate in the care of your patient.        Sincerely,        Casey Shine MD

## 2018-06-08 NOTE — PROGRESS NOTES
F/U for leg wound    Subjective:  Patient is a 67-year-old white male presenting history of a left leg wound. He hit a coffee table  the wound is slow to heal. He was initially treated in [place] and has a 15 year history of bilateral lower extremity edema. He has never been treated with any form of compression therapy and was told not to based on an ultrasound results in [place]. Those results are not available to me at this time. He denies any prior DVT, nonhealing wounds, leg trauma, claudication or rest pain. He was told in [place] to treat the leg with Betadine. [place] the [place] opinion.      Last seen by me in June and was tx with UNNA boot and silvercel.  Returned to [place] and was treated with hydroferra blue.       Had UNNA boot on when was last seen.  Has been in Assisted living since last seen by me in January for leg weakness.  TX with TRIAD, Foam and UNNA.   Laceration on right healed.    Had his back surgery at [facility]. Doing well.       Reports wound doing well      Objective:  B/P: 160/60, T: 97.8, P: 82, R: Data Unavailable  Ext; Warm,  No edema. Bilateral venous stasis changes with thickened skin.   (+)FEM pulses.  No distal pulses.  LLE wound - Inf -  Closed  Sup - Ca eroded through skin 3.0x1.8x0.4.   -    New ant ankle wound - Healed      U/S -   ULTRASOUND VENOUS COMPETENCY BILATERAL   6/1/2017 3:29 PM      HISTORY: Localized edema. Chronic venous hypertension (idiopathic)  without complications of bilateral lower extremity. Unspecified open  wound, left lower leg, initial encounter.     COMPARISON: None.     TECHNIQUE: Color Doppler and spectral waveform analysis performed.     FINDINGS: Bilateral common femoral veins, femoral veins, and popliteal  veins are patent and demonstrate no evidence of reflux.     Other than questionable 1 second reflux at the right saphenofemoral  junction, the right great saphenous vein is competent throughout its  length. Diameter of the  right great saphenous vein is 3-7 mm.     The left great saphenous vein is competent throughout its length.  Diameter of the left great saphenous vein is 2-7 mm.     Visualized portions of bilateral small saphenous veins appear to be  competent, without evidence of reflux.     No prominent perforators were demonstrated. Bilateral Giacomini veins  were competent.         IMPRESSION: The deep and superficial venous systems of both legs are  patent and competent.     PATRICK SCHERER MD    [facility] -   ULTRASOUND ANKLE-BRACHIAL INDEX DOPPLER NO EXERCISE   6/1/2017 3:16 PM        HISTORY: Localized edema. Chronic venous hypertension (idiopathic)  without complications of bilateral lower extremity.     COMPARISON: None.     FINDINGS: Ankle-brachial index is 0.96 on the right and 0.91 on the  left. Waveforms appear biphasic/monophasic bilaterally.         IMPRESSION: Borderline bilateral arterial insufficiency.     PATRICK SCHERER MD      Assessment/plan:  This is a 67 year old gentleman with bilateral chronic stasis changes and lymphedema. He has a new right leg wound as well as a result of a fall - closed.   Original Left leg wound healed   New wound from CA deposit - smaller.   Edema now resolved currently.    Will cont TRIAD and ACE.  Will hold UNNA to left.   F/U 2 weeks with wound nurse.    Patient to follow up with Primary Care provider regarding elevated blood pressure.    Casey Shine MD, FACS.

## 2018-06-08 NOTE — NURSING NOTE
LC Pickens applied to lower leg wound, covered with foam dressing, patient supplied compression stocknette which was placed over dressing.................................

## 2018-06-12 ENCOUNTER — MEDICAL CORRESPONDENCE (OUTPATIENT)
Dept: HEALTH INFORMATION MANAGEMENT | Facility: CLINIC | Age: 68
End: 2018-06-12

## 2018-06-13 ENCOUNTER — TELEPHONE (OUTPATIENT)
Dept: FAMILY MEDICINE | Facility: OTHER | Age: 68
End: 2018-06-13

## 2018-06-13 ENCOUNTER — MEDICAL CORRESPONDENCE (OUTPATIENT)
Dept: HEALTH INFORMATION MANAGEMENT | Facility: CLINIC | Age: 68
End: 2018-06-13

## 2018-06-13 NOTE — TELEPHONE ENCOUNTER
Please call Hui at [facility] at [phone]  She would like orders for skilled nursing 2 times a week for 1 week and the 3 time a week for 8 weeks.  This is for weight and wound management.

## 2018-06-14 ENCOUNTER — OFFICE VISIT (OUTPATIENT)
Dept: NEUROSURGERY | Facility: OTHER | Age: 68
End: 2018-06-14
Payer: COMMERCIAL

## 2018-06-14 VITALS
SYSTOLIC BLOOD PRESSURE: 124 MMHG | TEMPERATURE: 97.8 F | DIASTOLIC BLOOD PRESSURE: 66 MMHG | HEIGHT: 70 IN | WEIGHT: 308 LBS | BODY MASS INDEX: 44.09 KG/M2

## 2018-06-14 DIAGNOSIS — Z98.890 S/P SPINAL SURGERY: Primary | ICD-10-CM

## 2018-06-14 PROCEDURE — 99213 OFFICE O/P EST LOW 20 MIN: CPT | Performed by: PHYSICIAN ASSISTANT

## 2018-06-14 NOTE — LETTER
6/14/2018         RE: Glenroy Padilla  [address] [facility] [address]        Dear Colleague,    Thank you for referring your patient, Glenroy Padilla, to the [facility]. Please see a copy of my visit note below.    [facility]  Neurosurgery followup:    HPI: 3 months s/p T5-9 laminectomies and resection of epidural lipomatosis. Doing well post operatively and states his BLE weakness has improved significantly and he has not had any falls since after surgery. States his knees are what are mainly bothering him now, which he is seeing ortho for. Overall, very happy with his recovery.  Exam:  Constitutional:  Alert, well nourished, NAD.  HEENT: Normocephalic, atraumatic.   Pulm:  Without shortness of breath   CV:  No pitting edema of BLE.      Neurological:  Awake  Alert  Oriented x 3  Motor exam:        IP Q DF PF EHL  R   5  5   5   5    5  L   5  5   5   5    5     Reflexes are 2+ in the patellar and Achilles. There is no clonus. Downgoing Babinski.    Able to spontaneously move L/E bilaterally  Sensation intact throughout all L/E dermatomes     Incision: Nicely healed.  A/P: 3 months s/p T5-9 laminectomies and resection of epidural lipomatosis. Doing well post operatively. Incision nicely healed. Happy with how well he is progressing. Advised to continue following up with ortho for his knees. Will have him follow up in 3 months for routine post op. Patient voiced understanding and agreement.  - followup in 3 months for routine post op  - Call the clinic at [phone] for increased pain or any other questions and concerns.      Martha Reddy PA-C  [facility]  [address] [address]    Tel [phone]  Pager [phone]      Again, thank you for allowing me to participate in the care of your patient.        Sincerely,        Martha Reddy PA-C

## 2018-06-14 NOTE — MR AVS SNAPSHOT
"              After Visit Summary   6/14/2018    Glenroy Padilla    MRN: [id number]           Patient Information     Date Of Birth          1950        Visit Information        Provider Department      6/14/2018 9:00 AM Martha Reddy PA-C [facility]        Today's Diagnoses     S/P spinal surgery    -  1      Care Instructions    - followup in 3 months for routine post op  - Call the clinic at [phone] for increased pain or any other questions and concerns.          Follow-ups after your visit        Your next 10 appointments already scheduled     Jun 21, 2018 10:00 AM CDT   New Visit with PH WOUND EXAM ROOM   [facility]    [address] [place] [id number] [phone]              Who to contact     If you have questions or need follow up information about today's clinic visit or your schedule please contact [facility] directly at [phone].  Normal or non-critical lab and imaging results will be communicated to you by Wokuphart, letter or phone within 4 business days after the clinic has received the results. If you do not hear from us within 7 days, please contact the clinic through Wokuphart or phone. If you have a critical or abnormal lab result, we will notify you by phone as soon as possible.  Submit refill requests through Microsaic or call your pharmacy and they will forward the refill request to us. Please allow 3 business days for your refill to be completed.          Additional Information About Your Visit        Wokuphart Information     Microsaic lets you send messages to your doctor, view your test results, renew your prescriptions, schedule appointments and more. To sign up, go to [email or web address] . Click on \"Log in\" on the left side of the screen, which will take you to the Welcome page. Then click on \"Sign up Now\" on the right side of the page.     You will be asked to enter the access code " "listed below, as well as some personal information. Please follow the directions to create your username and password.     Your access code is: [email or web address]  Expires: 2018 11:55 AM     Your access code will  in 90 days. If you need help or a new code, please call your [place] clinic or [phone].        Care EveryWhere ID     This is your Care EveryWhere ID. This could be used by other organizations to access your [place] medical records  [id number]        Your Vitals Were     Temperature Height BMI (Body Mass Index)             97.8  F (36.6  C) (Temporal) 5' 10\" (1.778 m) 44.19 kg/m2          Blood Pressure from Last 3 Encounters:   18 124/66   18 160/60   18 128/72    Weight from Last 3 Encounters:   18 308 lb (139.7 kg)   18 314 lb 4.8 oz (142.6 kg)   18 316 lb 14.4 oz (143.7 kg)              Today, you had the following     No orders found for display       Primary Care Provider Office Phone # Fax #    Julio Cesar Esteban PA-C [phone] [address] [facility] [address]        Equal Access to Services     JAZ TOBAR AH: Hadii katia benedict hadasho Soomaali, waaxda luqadaha, qaybta kaalmada adeegyada, adams cárdenas haygabriella batres . So [facility] [phone].    ATENCIÓN: Si habla español, tiene a wilkinson disposición servicios gratuitos de asistencia lingüística. Llame al [phone].    We comply with applicable federal civil rights laws and [place] laws. We do not discriminate on the basis of race, color, national origin, age, disability, sex, sexual orientation, or gender identity.            Thank you!     Thank you for choosing [facility]  for your care. Our goal is always to provide you with excellent care. Hearing back from our patients is one way we can continue to improve our services. Please take a few minutes to complete the written survey that you may receive in the mail after your visit with us. Thank you!      "        Your Updated Medication List - Protect others around you: Learn how to safely use, store and throw away your medicines at www.disposemymeds.org.          This list is accurate as of 6/14/18  9:12 AM.  Always use your most recent med list.                   Brand Name Dispense Instructions for use Diagnosis    acetaminophen 500 MG tablet    TYLENOL    120 tablet    Take 1-2 tablets (500-1,000 mg) by mouth every 6 hours as needed for mild pain    Chronic pain of left knee, Complete tear of left rotator cuff       blood glucose calibration solution    NO BRAND SPECIFIED    1 Bottle    To accompany: Blood Glucose Monitor Brands: per insurance.    Type 2 diabetes mellitus with other circulatory complication, without long-term current use of insulin (H)       blood glucose lancing device    no brand specified    1 each    Use to test blood sugars 2 times daily or as directed.    Type 2 diabetes mellitus with other circulatory complication, without long-term current use of insulin (H)       * blood glucose monitoring test strip    no brand specified    100 strip    Use to test blood sugars 2 times daily or as directed    Type 2 diabetes mellitus with other circulatory complication, without long-term current use of insulin (H)       * blood glucose monitoring test strip    no brand specified    100 strip    Use to test blood sugar 2x  daily or as directed. To accompany: Blood Glucose Monitor Brands: per insurance.    Type 2 diabetes mellitus with other circulatory complication, without long-term current use of insulin (H)       clopidogrel 75 MG tablet    PLAVIX    90 tablet    Take 1 tablet (75 mg) by mouth daily    Type 2 diabetes mellitus with other circulatory complication, without long-term current use of insulin (H), S/P spinal surgery, Hypertension, goal below 140/90, Hyperlipidemia LDL goal <100       cyclobenzaprine 10 MG tablet    FLEXERIL     TAKE 1 TAB BY MOUTH TWICE DAILY AS NEEDED FOR ROTATOR CUFF TEAR         ferrous sulfate 325 (65 Fe) MG tablet    IRON    90 tablet    Take 1 tablet (325 mg) by mouth daily (with breakfast)    History of anemia       furosemide 40 MG tablet    LASIX    90 tablet    Take 1 tablet (40 mg) by mouth daily    Hypertension, goal below 140/90       * GLUCOCARD EXPRESSION MONITOR w/Device Kit      USE TWICE DAILY TO TEST BLOOD GLUCOSE        * blood glucose monitoring meter device kit    no brand specified    1 kit    test blood sugar 2 xdaily or as directed.  Blood Glucose Monitor Brands: per insurance.    Type 2 diabetes mellitus with other circulatory complication, without long-term current use of insulin (H)       lidocaine 5 % ointment    XYLOCAINE    50 g    Apply topically daily    Chronic pain of right knee       losartan 25 MG tablet    COZAAR    90 tablet    Take 1 tablet (25 mg) by mouth daily    Hypertension, goal below 140/90, Type 2 diabetes mellitus with other circulatory complication, without long-term current use of insulin (H)       methocarbamol 500 MG tablet    ROBAXIN    70 tablet    Take 1 tablet (500 mg) by mouth 4 times daily as needed for muscle spasms    S/P spinal surgery       metoprolol tartrate 25 MG tablet    LOPRESSOR    60 tablet    Take 1 tablet (25 mg) by mouth 2 times daily    Type 2 diabetes mellitus with other circulatory complication, without long-term current use of insulin (H), History of coronary artery stent placement, Hypertension, goal below 140/90       order for DME     1 Box    One touch glucose monitoring strip with Glucometer and lancets.    Type 2 diabetes mellitus with other circulatory complication, without long-term current use of insulin (H)       order for DME     1 Units    Equipment being ordered: compression stockings below knee and above knee if available.  Medium compression.    Primary osteoarthritis of right knee, Lymphedema of right lower extremity       pantoprazole 40 MG EC tablet    PROTONIX    30 tablet    Take 1 tablet  (40 mg) by mouth daily Take 30-60 minutes before a meal.    Gastroesophageal reflux disease without esophagitis       rosuvastatin 10 MG tablet    CRESTOR    90 tablet    Take 1 tablet (10 mg) by mouth daily    Hyperlipidemia LDL goal <100       senna-docusate 8.6-50 MG per tablet    SENOKOT-S;PERICOLACE    100 tablet    Take 1 tablet by mouth 2 times daily as needed for constipation    Spinal stenosis, lumbar region, without neurogenic claudication       SitaGLIPtin-MetFORMIN HCl  MG Tb24     180 tablet    Take 2 tablets by mouth daily (with breakfast)    Type 2 diabetes mellitus with other circulatory complication, without long-term current use of insulin (H)       thin lancets    NO BRAND SPECIFIED    100 each    Test 2 x daily or as directed.  Brands: per insurance.    Type 2 diabetes mellitus with other circulatory complication, without long-term current use of insulin (H)       TRIAD HYDROPHILIC WOUND DRESSI Pste     1 Tube    Externally apply 1 g topically daily    Open wound of right lower leg, initial encounter       * Notice:  This list has 4 medication(s) that are the same as other medications prescribed for you. Read the directions carefully, and ask your doctor or other care provider to review them with you.

## 2018-06-14 NOTE — PROGRESS NOTES
[facility]  Neurosurgery followup:    HPI: 3 months s/p T5-9 laminectomies and resection of epidural lipomatosis. Doing well post operatively and states his BLE weakness has improved significantly and he has not had any falls since after surgery. States his knees are what are mainly bothering him now, which he is seeing ortho for. Overall, very happy with his recovery.  Exam:  Constitutional:  Alert, well nourished, NAD.  HEENT: Normocephalic, atraumatic.   Pulm:  Without shortness of breath   CV:  No pitting edema of BLE.      Neurological:  Awake  Alert  Oriented x 3  Motor exam:        IP Q DF PF EHL  R   5  5   5   5    5  L   5  5   5   5    5     Reflexes are 2+ in the patellar and Achilles. There is no clonus. Downgoing Babinski.    Able to spontaneously move L/E bilaterally  Sensation intact throughout all L/E dermatomes     Incision: Nicely healed.  A/P: 3 months s/p T5-9 laminectomies and resection of epidural lipomatosis. Doing well post operatively. Incision nicely healed. Happy with how well he is progressing. Advised to continue following up with ortho for his knees. Will have him follow up in 3 months for routine post op. Patient voiced understanding and agreement.  - followup in 3 months for routine post op  - Call the clinic at [phone] for increased pain or any other questions and concerns.      Martha Reddy PA-C  [facility]  [address] [address]    Tel [phone]  Pager [phone]

## 2018-06-14 NOTE — PATIENT INSTRUCTIONS
- followup in 3 months for routine post op  - Call the clinic at [phone] for increased pain or any other questions and concerns.

## 2018-06-18 ENCOUNTER — TELEPHONE (OUTPATIENT)
Dept: FAMILY MEDICINE | Facility: OTHER | Age: 68
End: 2018-06-18

## 2018-06-18 NOTE — TELEPHONE ENCOUNTER
Forms have been completed, signed, faxed/mailed, and sent to scanning.  Lisa Zuluaga CMA ([facility])

## 2018-06-18 NOTE — TELEPHONE ENCOUNTER
Reason for Call:  Form, our goal is to have forms completed with 72 hours, however, some forms may require a visit or additional information.    Type of letter, form or note:  medical    Who is the form from?: [facility] (if other please explain)    Where did the form come from: form was faxed in    What clinic location was the form placed at?: [facility] - [phone]    Where the form was placed: 's Box    What number is listed as a contact on the form?: [phone]       Additional comments: please complete and fax  Fax [phone]    Call taken on 6/18/2018 at 2:47 PM by Olivia Mayfield

## 2018-06-21 ENCOUNTER — HOSPITAL ENCOUNTER (OUTPATIENT)
Dept: WOUND CARE | Facility: CLINIC | Age: 68
Discharge: HOME | End: 2018-06-21
Attending: SPECIALIST | Admitting: SPECIALIST
Payer: COMMERCIAL

## 2018-06-21 PROCEDURE — G0463 HOSPITAL OUTPT CLINIC VISIT: HCPCS

## 2018-06-21 NOTE — PROGRESS NOTES
Reason For Visit: Glenroy Padilla, 67 year old male, seen as outpatient to evaluate and treat left lower leg ulcers. Referred by Dr Shine. Patient presents by himself today.      History: Pt who I have seen in the past for LE wounds returns after being treated by Dr Shine in the specialty clinic.  Pt has had open ulcer of the left lower leg, original injury was related to trauma.  Course of healing was slow due to venous insuffiencey.  When wound was nearly closed new calcium deposits were found and Dr Shine removed these.      Personal/social history: Pt is currently living in an AL in [place] with home care from a non  provider.      Objective:   Physical appearance: alert and oriented times three  Ambulation: limited, using electric scooter, able to take few steps to transfer.    Current treatment plan: Triad wound paste to the wound, covered with gentle adhesive foam dressing.  Dressing changes Mon Wed and Fridays, also has short stretch lymphedema wraps changed on the same days.  Last changed: yesterday.    Wound #1 Left lower leg, proximal of two open wounds.  Venous stasis ulcer.  Stage/tissue depth: full thickness  3.2 cm L x 2 cm W x 0.3 cm D  Tunneling: no  Undermining: no  Wound bed type/amount: approximately 90 % granular tissue and 10 % yellow slough, located in the proximal most aspect of the wound bed; NA fluctuant  Wound Edges: open  Periwound: hemosiderin staining, nonpitting edema and scar tissue.  Drainage: Moderate amount  Odor: no  Pain: with direct cares small amount of short lived pain.    Wound #2 Left lower leg, distal of two open wound.  Venous stasis ulcer.  Stage/tissue depth: full thickness  2 cm L x 0.4 cm W x 0 cm D  Tunneling: no  Undermining: no  Wound bed type/amount: mix of granular tissue and new epithelial/scar tissue; NA fluctuant  Wound Edges: NA wound has no depth, wound bed is flush with surrounding skin.  Periwound: hemosiderin staining, nonpitting edema and scar  tissue.  Drainage: Moderate amount  Odor: no  Pain: with direct cares small amount of short lived pain.    Dorsalis Pedal Pulse: weak but palpable: NA doppler: NA phasic  Hair growth: none noted below the knees bilaterally  Capillary Refill: less than 3 seconds  Feet/toes color: pink with some hemosiderin staining  Nails: thick but wnl  R Leg: Edema nonpitting. Ankle circumference NA cm. Calf circumference NA cm.  L Leg: Edema nonpitting. Ankle circumference Na cm. Calf circumference NA cm.    Mobility: limited   Current offloading/footwear: well fitting sneakers  Sensation: peripheral neuropathy in the legs and feet has resolved since his spinal surgery.    HgbA1C: 6.0 Date: 4/20/18  Checks Blood Glucose?:  Once daily, Average Readings: 130s to 150s  Other callousing/areas of concern: none noted today    Diet: regular with awareness of effects on diabetes control.  Smoking: former cigar smoker    Discussed: etiology of wound (venous stasis ulcers), pathophysiology and patient specific goals for wound healing.   Education: Wound status, role of Triad paste and rational for use.  Plans for follow up in the Wound and Ostomy clinic.      Assessment:  Wound 1 Left lower leg, proximal of two open wounds.  Wound bed is nearly all free of slough aside from small amount in the proximal inner edge.  Granular tissue is filling in and is red and beefy.  Wound edges are currently open but deep and could be prone for epibole.  Periwound skin shows no signs of infection today.  Wound 2 Left lower leg, distal of two open wound. Wound is nearly resolved.  Though length is 2 cm it is not one solid area of granular tissue, there is a lot of new epithelial and scar tissue advancing in from the edges so the remaining open tissue is more scattered.  No signs of infection noted.      Barriers to wound healing:   Poor nutrition: inadequate supply of protein, carbohydrates, fatty acids, and trace elements essential for all phases of wound  healing.  Pt aware of the need for increased protein in diet for healing.   Reduced Blood Supply: inadequate perfusion to heal wound, NA  Medication: NA  Chemotherapy: suppresses the immune system and inflammatory response, NA  Radiotherapy: increases production of free radical which damage cells, NA  Psychological stress: Related to being apart from his wife who is currently in [place] with her own significant health concerns, and for fact pt can not have knee replacement surgery till the wound is healed, long course of healing.  Obesity: decreases tissue perfusion  Infection: prolongs inflammatory phase, uses vital nutrients, impairs epithelialization and releases toxins.  None noted at this time.  Underlying Disease: diabetes mellitis and autoimmune disorders.  Currently diabetes is well control as evidenced by recent HgbA1C.  Maceration: reduces wound tensile strength and inhibits epithelial migration.  None noted at today.  Will monitor at each visit, use of adhesive foam is controlling drainage well.  Patient compliance, NA  Unrelieved pressure, NA  Immobility, NA  Substance abuse: NA    Plan: No change in the plan of care at this time.  Continue with the triad paste on MWF schedule along with short stretch lymphedema wraps.  Pt has home care services going to his AL for three times weekly wound and lymphedema wrap changes.      Topical care:Triad paste  Offloading: NA  Additional recommendations: none at this time.    Wound Care: Wound cleansed with Microklenz and gauze, patted dry. Periwound protected with NA. Wound base filled with Triad wound paste. Covered with gentle adhesive foam dressing, followed by NA. Secured with NA. To be changed MWF.    Discussed plan of care with patient. Teaching done with patient for dressing changes; pt is unable, home care is able and willing to perform.    The following discharge instructions were reviewed with and sent home with the patient: See discharge  instructions.    The following supplies were sent home with the patient:  Remains of the tube of Triad paste opened but not used up today.  Will reassess care plan in 5 and a half weeks and order patient supplies as needed    Return visit: July 30th    Verbal, written, & demonstrative education provided.  Face to face time (excluding procedure): approximately 50 minutes.  Procedure: NA  Care plan was not changed.    [phone]

## 2018-06-21 NOTE — IP AVS SNAPSHOT
[address] [address]    Phone:  [phone]    Fax:  [phone]                                       After Visit Summary   6/21/2018    Glenroy Padilla    MRN: [id number]           After Visit Summary Signature Page     I have received my discharge instructions, and my questions have been answered. I have discussed any challenges I see with this plan with the nurse or doctor.    ..........................................................................................................................................  Patient/Patient Representative Signature      ..........................................................................................................................................  Patient Representative Print Name and Relationship to Patient    ..................................................               ................................................  Date                                            Time    ..........................................................................................................................................  Reviewed by Signature/Title    ...................................................              ..............................................  Date                                                            Time

## 2018-06-21 NOTE — IP AVS SNAPSHOT
MRN:[id number]                      After Visit Summary   6/21/2018    Glenroy Padilla    MRN: [id number]           Visit Information        Provider Department      6/21/2018 10:00 AM PH WOUND EXAM ROOM [facility]           Review of your medicines      UNREVIEWED medicines. Ask your doctor about these medicines        Dose / Directions    acetaminophen 500 MG tablet   Commonly known as:  TYLENOL   Used for:  Chronic pain of left knee, Complete tear of left rotator cuff        Dose:  500-1000 mg   Take 1-2 tablets (500-1,000 mg) by mouth every 6 hours as needed for mild pain   Quantity:  120 tablet   Refills:  4       clopidogrel 75 MG tablet   Commonly known as:  PLAVIX   Used for:  Type 2 diabetes mellitus with other circulatory complication, without long-term current use of insulin (H), S/P spinal surgery, Hypertension, goal below 140/90, Hyperlipidemia LDL goal <100        Dose:  75 mg   Take 1 tablet (75 mg) by mouth daily   Quantity:  90 tablet   Refills:  1       cyclobenzaprine 10 MG tablet   Commonly known as:  FLEXERIL        TAKE 1 TAB BY MOUTH TWICE DAILY AS NEEDED FOR ROTATOR CUFF TEAR   Refills:  0       ferrous sulfate 325 (65 Fe) MG tablet   Commonly known as:  IRON   Used for:  History of anemia        Dose:  325 mg   Take 1 tablet (325 mg) by mouth daily (with breakfast)   Quantity:  90 tablet   Refills:  2       furosemide 40 MG tablet   Commonly known as:  LASIX   Used for:  Hypertension, goal below 140/90        Dose:  40 mg   Take 1 tablet (40 mg) by mouth daily   Quantity:  90 tablet   Refills:  1       lidocaine 5 % ointment   Commonly known as:  XYLOCAINE   Used for:  Chronic pain of right knee        Apply topically daily   Quantity:  50 g   Refills:  3       losartan 25 MG tablet   Commonly known as:  COZAAR   Used for:  Hypertension, goal below 140/90, Type 2 diabetes mellitus with other circulatory complication, without long-term current use of insulin  (H)        Dose:  25 mg   Take 1 tablet (25 mg) by mouth daily   Quantity:  90 tablet   Refills:  1       methocarbamol 500 MG tablet   Commonly known as:  ROBAXIN   Used for:  S/P spinal surgery        Dose:  500 mg   Take 1 tablet (500 mg) by mouth 4 times daily as needed for muscle spasms   Quantity:  70 tablet   Refills:  1       metoprolol tartrate 25 MG tablet   Commonly known as:  LOPRESSOR   Used for:  Type 2 diabetes mellitus with other circulatory complication, without long-term current use of insulin (H), History of coronary artery stent placement, Hypertension, goal below 140/90        Dose:  25 mg   Take 1 tablet (25 mg) by mouth 2 times daily   Quantity:  60 tablet   Refills:  4       pantoprazole 40 MG EC tablet   Commonly known as:  PROTONIX   Used for:  Gastroesophageal reflux disease without esophagitis        Dose:  40 mg   Take 1 tablet (40 mg) by mouth daily Take 30-60 minutes before a meal.   Quantity:  30 tablet   Refills:  0       rosuvastatin 10 MG tablet   Commonly known as:  CRESTOR   Used for:  Hyperlipidemia LDL goal <100        Dose:  10 mg   Take 1 tablet (10 mg) by mouth daily   Quantity:  90 tablet   Refills:  1       senna-docusate 8.6-50 MG per tablet   Commonly known as:  SENOKOT-S;PERICOLACE   Used for:  Spinal stenosis, lumbar region, without neurogenic claudication        Dose:  1 tablet   Take 1 tablet by mouth 2 times daily as needed for constipation   Quantity:  100 tablet   Refills:  0       SitaGLIPtin-MetFORMIN HCl  MG Tb24   Used for:  Type 2 diabetes mellitus with other circulatory complication, without long-term current use of insulin (H)        Dose:  2 tablet   Take 2 tablets by mouth daily (with breakfast)   Quantity:  180 tablet   Refills:  1       TRIAD HYDROPHILIC WOUND DRESSI Pste   Used for:  Open wound of right lower leg, initial encounter        Dose:  1 applicator   Externally apply 1 g topically daily   Quantity:  1 Tube   Refills:  3          CONTINUE these medicines which have NOT CHANGED        Dose / Directions    blood glucose calibration solution   Commonly known as:  NO BRAND SPECIFIED   Used for:  Type 2 diabetes mellitus with other circulatory complication, without long-term current use of insulin (H)        To accompany: Blood Glucose Monitor Brands: per insurance.   Quantity:  1 Bottle   Refills:  3       blood glucose lancing device   Commonly known as:  no brand specified   Used for:  Type 2 diabetes mellitus with other circulatory complication, without long-term current use of insulin (H)        Use to test blood sugars 2 times daily or as directed.   Quantity:  1 each   Refills:  0       * blood glucose monitoring test strip   Commonly known as:  no brand specified   Used for:  Type 2 diabetes mellitus with other circulatory complication, without long-term current use of insulin (H)        Use to test blood sugars 2 times daily or as directed   Quantity:  100 strip   Refills:  0       * blood glucose monitoring test strip   Commonly known as:  no brand specified   Used for:  Type 2 diabetes mellitus with other circulatory complication, without long-term current use of insulin (H)        Use to test blood sugar 2x  daily or as directed. To accompany: Blood Glucose Monitor Brands: per insurance.   Quantity:  100 strip   Refills:  1       * GLUCOCARD EXPRESSION MONITOR w/Device Kit        USE TWICE DAILY TO TEST BLOOD GLUCOSE   Refills:  0       * blood glucose monitoring meter device kit   Commonly known as:  no brand specified   Used for:  Type 2 diabetes mellitus with other circulatory complication, without long-term current use of insulin (H)        test blood sugar 2 xdaily or as directed.  Blood Glucose Monitor Brands: per insurance.   Quantity:  1 kit   Refills:  0       order for DME   Used for:  Type 2 diabetes mellitus with other circulatory complication, without long-term current use of insulin (H)        One touch glucose  monitoring strip with Glucometer and lancets.   Quantity:  1 Box   Refills:  1       order for DME   Used for:  Primary osteoarthritis of right knee, Lymphedema of right lower extremity        Equipment being ordered: compression stockings below knee and above knee if available.  Medium compression.   Quantity:  1 Units   Refills:  1       thin lancets   Commonly known as:  NO BRAND SPECIFIED   Used for:  Type 2 diabetes mellitus with other circulatory complication, without long-term current use of insulin (H)        Test 2 x daily or as directed.  Brands: per insurance.   Quantity:  100 each   Refills:  1       * Notice:  This list has 4 medication(s) that are the same as other medications prescribed for you. Read the directions carefully, and ask your doctor or other care provider to review them with you.             Protect others around you: Learn how to safely use, store and throw away your medicines at www.disposemymeds.org.         Follow-ups after your visit        Your next 10 appointments already scheduled     Jul 30, 2018  9:00 AM CDT   Return Visit with  WOUND EXAM ROOM   [facility] ([facility])    [address] [place] [id number]   [phone]            Sep 20, 2018 10:10 AM CDT   Return Visit with Martha Reddy PA-C   [facility] ([facility])    [address] [place] [address], [address]  [facility] [id number] [phone]               Care Instructions        Further instructions from your care team       Today we did the same cares as Dr Shine.    Monday Wednesday and Friday dressing changes with the use of the Triad paste to the wound beds and cover with a gentle adhesive foam dressing.    Continue with the short stretch compression wraps to both legs schedule on the same days as your wound cares.    I will have you back to the Wound and Ostomy clinic in a few weeks.  Next visit scheduled for July 30 th at 9:00am.    If  you have any new concerns with the wound call our scheduling department number at [phone].  I am here on Monday and Thursdays.  If they call on those days they will route the call to me.  Any other days of the week they will route your call to the wound pool nurses or triage nurse.    Jett Garza RN cwocn     Additional Information About Your Visit        Care EveryWhere ID     This is your Care EveryWhere ID. This could be used by other organizations to access your [facility] medical records  [id number]         Primary Care Provider Office Phone # Fax #    Julio Cesar Esteban PA-C [phone]      Equal Access to Services     [facility] : Hadii katia Omalley, waaxda lusteveadaha, qaybta kaalmada duke, adams batres . So [facility] [phone].    ATENCIÓN: Si habla español, tiene a wilkinson disposición servicios gratuitos de asistencia lingüística. [facility] [phone].    We comply with applicable federal civil rights laws and [place] laws. We do not discriminate on the basis of race, color, national origin, age, disability, sex, sexual orientation, or gender identity.            Thank you!     Thank you for choosing [facility] for your care. Our goal is always to provide you with excellent care. Hearing back from our patients is one way we can continue to improve our services. Please take a few minutes to complete the written survey that you may receive in the mail after you visit with us. Thank you!             Medication List: This is a list of all your medications and when to take them. Check marks below indicate your daily home schedule. Keep this list as a reference.      Medications           Morning Afternoon Evening Bedtime As Needed    acetaminophen 500 MG tablet   Commonly known as:  TYLENOL   Take 1-2 tablets (500-1,000 mg) by mouth every 6 hours as needed for mild pain                                blood glucose calibration solution   Commonly known as:   NO BRAND SPECIFIED   To accompany: Blood Glucose Monitor Brands: per insurance.                                blood glucose lancing device   Commonly known as:  no brand specified   Use to test blood sugars 2 times daily or as directed.                                * blood glucose monitoring test strip   Commonly known as:  no brand specified   Use to test blood sugars 2 times daily or as directed                                * blood glucose monitoring test strip   Commonly known as:  no brand specified   Use to test blood sugar 2x  daily or as directed. To accompany: Blood Glucose Monitor Brands: per insurance.                                clopidogrel 75 MG tablet   Commonly known as:  PLAVIX   Take 1 tablet (75 mg) by mouth daily                                cyclobenzaprine 10 MG tablet   Commonly known as:  FLEXERIL   TAKE 1 TAB BY MOUTH TWICE DAILY AS NEEDED FOR ROTATOR CUFF TEAR                                ferrous sulfate 325 (65 Fe) MG tablet   Commonly known as:  IRON   Take 1 tablet (325 mg) by mouth daily (with breakfast)                                furosemide 40 MG tablet   Commonly known as:  LASIX   Take 1 tablet (40 mg) by mouth daily                                * GLUCOCARD EXPRESSION MONITOR w/Device Kit   USE TWICE DAILY TO TEST BLOOD GLUCOSE                                * blood glucose monitoring meter device kit   Commonly known as:  no brand specified   test blood sugar 2 xdaily or as directed.  Blood Glucose Monitor Brands: per insurance.                                lidocaine 5 % ointment   Commonly known as:  XYLOCAINE   Apply topically daily                                losartan 25 MG tablet   Commonly known as:  COZAAR   Take 1 tablet (25 mg) by mouth daily                                methocarbamol 500 MG tablet   Commonly known as:  ROBAXIN   Take 1 tablet (500 mg) by mouth 4 times daily as needed for muscle spasms                                metoprolol tartrate  25 MG tablet   Commonly known as:  LOPRESSOR   Take 1 tablet (25 mg) by mouth 2 times daily                                order for DME   One touch glucose monitoring strip with Glucometer and lancets.                                order for DME   Equipment being ordered: compression stockings below knee and above knee if available.  Medium compression.                                pantoprazole 40 MG EC tablet   Commonly known as:  PROTONIX   Take 1 tablet (40 mg) by mouth daily Take 30-60 minutes before a meal.                                rosuvastatin 10 MG tablet   Commonly known as:  CRESTOR   Take 1 tablet (10 mg) by mouth daily                                senna-docusate 8.6-50 MG per tablet   Commonly known as:  SENOKOT-S;PERICOLACE   Take 1 tablet by mouth 2 times daily as needed for constipation                                SitaGLIPtin-MetFORMIN HCl  MG Tb24   Take 2 tablets by mouth daily (with breakfast)                                thin lancets   Commonly known as:  NO BRAND SPECIFIED   Test 2 x daily or as directed.  Brands: per insurance.                                TRIAD HYDROPHILIC WOUND DRESSI Pste   Externally apply 1 g topically daily                                * Notice:  This list has 4 medication(s) that are the same as other medications prescribed for you. Read the directions carefully, and ask your doctor or other care provider to review them with you.

## 2018-06-21 NOTE — DISCHARGE INSTRUCTIONS
Today we did the same cares as Dr Shine.    Monday Wednesday and Friday dressing changes with the use of the Triad paste to the wound beds and cover with a gentle adhesive foam dressing.    Continue with the short stretch compression wraps to both legs schedule on the same days as your wound cares.    I will have you back to the Wound and Ostomy clinic in a few weeks.  Next visit scheduled for July 30 th at 9:00am.    If you have any new concerns with the wound call our scheduling department number at [phone].  I am here on Monday and Thursdays.  If they call on those days they will route the call to me.  Any other days of the week they will route your call to the wound pool nurses or triage nurse.    Jett Garza RN cwocn

## 2018-06-22 ENCOUNTER — TELEPHONE (OUTPATIENT)
Dept: FAMILY MEDICINE | Facility: OTHER | Age: 68
End: 2018-06-22

## 2018-06-22 NOTE — TELEPHONE ENCOUNTER
Reason for Call:  Form, our goal is to have forms completed with 72 hours, however, some forms may require a visit or additional information.    Type of letter, form or note:  medical    Who is the form from?: [employer] (if other please explain)    Where did the form come from: form was faxed in    What clinic location was the form placed at?: [facility] - [phone]    Where the form was placed: 's Box    What number is listed as a contact on the form?: [phone]       Additional comments: Please complete and fax back  Fax [phone]    Call taken on 6/22/2018 at 3:55 PM by Olivia Mayfield

## 2018-06-25 DIAGNOSIS — Z53.9 DIAGNOSIS NOT YET DEFINED: Primary | ICD-10-CM

## 2018-06-25 PROCEDURE — G0180 MD CERTIFICATION HHA PATIENT: HCPCS | Performed by: FAMILY MEDICINE

## 2018-06-25 NOTE — TELEPHONE ENCOUNTER
In my MA box for completion and scan of the document into the medical record.  Electronically signed:    Julio Cesar Sahu PA-C

## 2018-06-25 NOTE — TELEPHONE ENCOUNTER
Forms have been completed, signed, faxed/mailed, and sent to scanning.  Lisa Zuluaga CMA ([facility])

## 2018-06-27 NOTE — PROGRESS NOTES
"  SUBJECTIVE:   Glenroy Padilla is a 67 year old male who presents to clinic today for the following health issues:      HPI  Concern - Chafing   Onset: Ongoign    Description:   Chafing in butt crack    Therapies Tried and outcome: \"White Cream\"  Diabetes Follow-up    Patient is checking blood sugars: twice daily.    Blood sugar testing frequency justification: Patient modifying lifestyle changes (diet, exercise) with blood sugars  Results are as follows:         145-155    Diabetic concerns: None     Symptoms of hypoglycemia (low blood sugar): none     Paresthesias (numbness or burning in feet) or sores: Yes improved since back surgery but still problematic     Date of last diabetic eye exam: \"last year\"    Diabetes Management Resources    Hyperlipidemia Follow-Up      Rate your low fat/cholesterol diet?: good    Taking statin?  Yes, no muscle aches from statin    Other lipid medications/supplements?:  none    Hypertension Follow-up      Outpatient blood pressures are being checked at home.  Results are \"Low\" less than 130 .    Low Salt Diet: no added salt    BP Readings from Last 2 Encounters:   06/29/18 122/64   06/14/18 124/66     Hemoglobin A1C (%)   Date Value   04/20/2018 6.0   03/26/2018 5.3     LDL Cholesterol Calculated (mg/dL)   Date Value   09/25/2017 57   05/22/2017 64     LDL Cholesterol Direct (mg/dL)   Date Value   03/09/2018 76     Ongoing pain in knees.   Needs to get to 275 lb before he will be a Candidate  for surgery.    Needs order for \"sleep Chair\"  Problem list and histories reviewed & adjusted, as indicated.  Additional history: as documented    Patient Active Problem List   Diagnosis     Type 2 diabetes mellitus with other circulatory complication, without long-term current use of insulin (H)     Venous stasis ulcer of left lower extremity (H)     Acute pain of left knee     Chronic pain of right knee     Open wound of left lower extremity without complication, initial encounter     Hx of " coronary artery disease     Hx of heart artery stent     DAYTON (obstructive sleep apnea)     Hypertension, goal below 140/90     Hyperlipidemia LDL goal <100     History of coronary artery stent placement     Advanced directives, counseling/discussion     Obesity, morbid, BMI 40.0-49.9 (H)     Neuropathy     Foot drop, right     Cardiomegaly     Gastroesophageal reflux disease without esophagitis     Falls frequently     Weakness of both legs     Central spinal stenosis L3-4 and L4-5     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     Lymphedema     S/P spinal surgery     Complete tear of left rotator cuff     Lymphedema of right lower extremity     Primary osteoarthritis of right knee     Past Surgical History:   Procedure Laterality Date     CARDIAC SURGERY      stent placement     CHOLECYSTECTOMY       LAMINECTOMY LUMBAR THREE+ LEVELS N/A 3/13/2018    Procedure: LAMINECTOMY LUMBAR THREE+ LEVELS;  T5-9 LAMINECTOMIES, RESECTION OF EPIDURAL LIPOMATOSIS;  Surgeon: Hugh Mendieta MD;  Location:  OR       Social History   Substance Use Topics     Smoking status: Former Smoker     Types: Cigars     Smokeless tobacco: Never Used      Comment: exposure to second hand smoke     Alcohol use No     History reviewed. No pertinent family history.      Current Outpatient Prescriptions   Medication Sig Dispense Refill     acetaminophen (TYLENOL) 500 MG tablet Take 1-2 tablets (500-1,000 mg) by mouth every 6 hours as needed for mild pain 120 tablet 4     blood glucose (NO BRAND SPECIFIED) lancing device Use to test blood sugars 2 times daily or as directed. 1 each 0     blood glucose calibration (NO BRAND SPECIFIED) solution To accompany: Blood Glucose Monitor Brands: per insurance. 1 Bottle 3     blood glucose monitoring (NO BRAND SPECIFIED) meter device kit test blood sugar 2 xdaily or as directed.  Blood Glucose Monitor Brands: per insurance. 1 kit 0     blood glucose monitoring (NO BRAND SPECIFIED) test strip Use to test  blood sugar 2x  daily or as directed. To accompany: Blood Glucose Monitor Brands: per insurance. 100 strip 1     blood glucose monitoring (NO BRAND SPECIFIED) test strip Use to test blood sugars 2 times daily or as directed 100 strip 0     Blood Glucose Monitoring Suppl (GLUCOCARD EXPRESSION MONITOR) W/DEVICE KIT USE TWICE DAILY TO TEST BLOOD GLUCOSE  0     clopidogrel (PLAVIX) 75 MG tablet Take 1 tablet (75 mg) by mouth daily 90 tablet 1     cyclobenzaprine (FLEXERIL) 10 MG tablet TAKE 1 TAB BY MOUTH TWICE DAILY AS NEEDED FOR ROTATOR CUFF TEAR  0     ferrous sulfate (IRON) 325 (65 FE) MG tablet Take 1 tablet (325 mg) by mouth daily (with breakfast) 90 tablet 2     furosemide (LASIX) 40 MG tablet Take 1 tablet (40 mg) by mouth daily 90 tablet 1     lidocaine (XYLOCAINE) 5 % ointment Apply topically daily 50 g 3     losartan (COZAAR) 25 MG tablet Take 1 tablet (25 mg) by mouth daily 90 tablet 1     methocarbamol (ROBAXIN) 500 MG tablet Take 1 tablet (500 mg) by mouth 4 times daily as needed for muscle spasms 70 tablet 1     metoprolol tartrate (LOPRESSOR) 25 MG tablet Take 1 tablet (25 mg) by mouth 2 times daily 60 tablet 4     order for DME Equipment being ordered: Needs to have a sleep chair for home use. 1 Device 0     order for DME Equipment being ordered: compression stockings below knee and above knee if available.  Medium compression. 1 Units 1     order for DME One touch glucose monitoring strip with Glucometer and lancets. 1 Box 1     pantoprazole (PROTONIX) 40 MG EC tablet Take 1 tablet (40 mg) by mouth daily Take 30-60 minutes before a meal. 30 tablet 0     rosuvastatin (CRESTOR) 10 MG tablet Take 1 tablet (10 mg) by mouth daily 90 tablet 1     senna-docusate (SENOKOT-S;PERICOLACE) 8.6-50 MG per tablet Take 1 tablet by mouth 2 times daily as needed for constipation 100 tablet      SitaGLIPtin-MetFORMIN HCl  MG TB24 Take 2 tablets by mouth daily (with breakfast) 180 tablet 1     thin (NO BRAND  SPECIFIED) lancets Test 2 x daily or as directed.  Brands: per insurance. 100 each 1     Wound Dressings (TRIAD HYDROPHILIC WOUND DRESSI) PSTE Externally apply 1 g topically daily 1 Tube 3     Allergies   Allergen Reactions     No Known Allergies      Recent Labs   Lab Test  04/20/18   1010 03/26/18 03/14/18   0810  03/13/18   1733  03/13/18   1305  03/09/18   1020   01/27/18   1825  09/25/17   1216  05/22/17   1029   A1C  6.0  5.3   --    --    --    --    --   5.6  6.2*  6.4*   LDL   --    --    --    --    --   76   --    --   57  64   HDL   --    --    --    --    --    --    --    --   43  42   TRIG   --    --    --    --    --    --    --    --   140  118   ALT   --    --    --    --    --   19   --    --   19  20   CR   --    --   0.90   --   0.95  0.91   < >  1.17  0.92  0.91   GFRESTIMATED   --    --   84   --   79  82   < >  62  81  82   GFRESTBLACK   --    --   >90   --   >90  >90   < >  75  >90  >90   GFR Calc     POTASSIUM   --    --   4.0  3.8  4.3  4.6   < >  3.8  4.1  4.0   TSH   --    --    --    --    --    --    --    --   2.07   --     < > = values in this interval not displayed.      BP Readings from Last 3 Encounters:   06/29/18 122/64   06/14/18 124/66   06/08/18 160/60    Wt Readings from Last 3 Encounters:   06/29/18 290 lb 14.4 oz (132 kg)   06/14/18 308 lb (139.7 kg)   06/08/18 314 lb 4.8 oz (142.6 kg)                    ROS:  Constitutional, HEENT, cardiovascular, pulmonary, gi and gu systems are negative, except as otherwise noted.    OBJECTIVE:     /64 (Cuff Size: Adult Large)  Pulse 72  Temp 97.8  F (36.6  C) (Temporal)  Resp 20  Wt 290 lb 14.4 oz (132 kg)  BMI 41.74 kg/m2  Body mass index is 41.74 kg/(m^2).  GENERAL: healthy, alert and no distress  RESP: lungs clear to auscultation - no rales, rhonchi or wheezes  CV: regular rate and rhythm, normal S1 S2, no S3 or S4, no murmur, click or rub, no peripheral edema and peripheral pulses strong  MS: no gross  musculoskeletal defects noted, no edema  PSYCH: mentation appears normal, affect normal/bright    Diagnostic Test Results:  No results found for this or any previous visit (from the past 24 hour(s)).    ASSESSMENT/PLAN:     1. Screening for diabetic retinopathy  2. Screening for diabetic peripheral neuropathy  3. Type 2 diabetes mellitus with other circulatory complication, without long-term current use of insulin (H)  rov late September for labs  - order for DME; Equipment being ordered: Needs to have a sleep chair for home use.  Dispense: 1 Device; Refill: 0    4. Morbid obesity due to excess calories (H)  Diet changes stressed  - order for DME; Equipment being ordered: Needs to have a sleep chair for home use.  Dispense: 1 Device; Refill: 0    5. History of coronary artery stent placement  6. Gastroesophageal reflux disease without esophagitis  7. Chronic pain of left knee  8. DAYTON (obstructive sleep apnea)  Historically noted  - order for DME; Equipment being ordered: Needs to have a sleep chair for home use.  Dispense: 1 Device; Refill: 0    ROV late September.    Julio Cesar Sahu PA-C  [facility]

## 2018-06-29 ENCOUNTER — TELEPHONE (OUTPATIENT)
Dept: FAMILY MEDICINE | Facility: OTHER | Age: 68
End: 2018-06-29

## 2018-06-29 ENCOUNTER — OFFICE VISIT (OUTPATIENT)
Dept: FAMILY MEDICINE | Facility: OTHER | Age: 68
End: 2018-06-29
Payer: COMMERCIAL

## 2018-06-29 ENCOUNTER — TELEPHONE (OUTPATIENT)
Dept: SURGERY | Facility: CLINIC | Age: 68
End: 2018-06-29

## 2018-06-29 VITALS
DIASTOLIC BLOOD PRESSURE: 64 MMHG | RESPIRATION RATE: 20 BRPM | WEIGHT: 290.9 LBS | BODY MASS INDEX: 41.74 KG/M2 | TEMPERATURE: 97.8 F | SYSTOLIC BLOOD PRESSURE: 122 MMHG | HEART RATE: 72 BPM

## 2018-06-29 DIAGNOSIS — G89.29 CHRONIC PAIN OF LEFT KNEE: ICD-10-CM

## 2018-06-29 DIAGNOSIS — E66.01 MORBID OBESITY DUE TO EXCESS CALORIES (H): ICD-10-CM

## 2018-06-29 DIAGNOSIS — M25.562 CHRONIC PAIN OF LEFT KNEE: ICD-10-CM

## 2018-06-29 DIAGNOSIS — E11.59 TYPE 2 DIABETES MELLITUS WITH OTHER CIRCULATORY COMPLICATION, WITHOUT LONG-TERM CURRENT USE OF INSULIN (H): Primary | ICD-10-CM

## 2018-06-29 DIAGNOSIS — Z13.5 SCREENING FOR DIABETIC RETINOPATHY: ICD-10-CM

## 2018-06-29 DIAGNOSIS — Z95.5 HISTORY OF CORONARY ARTERY STENT PLACEMENT: ICD-10-CM

## 2018-06-29 DIAGNOSIS — G47.33 OSA (OBSTRUCTIVE SLEEP APNEA): ICD-10-CM

## 2018-06-29 DIAGNOSIS — Z13.89 SCREENING FOR DIABETIC PERIPHERAL NEUROPATHY: ICD-10-CM

## 2018-06-29 DIAGNOSIS — K21.9 GASTROESOPHAGEAL REFLUX DISEASE WITHOUT ESOPHAGITIS: ICD-10-CM

## 2018-06-29 PROCEDURE — 99214 OFFICE O/P EST MOD 30 MIN: CPT | Performed by: PHYSICIAN ASSISTANT

## 2018-06-29 ASSESSMENT — PAIN SCALES - GENERAL: PAINLEVEL: MODERATE PAIN (5)

## 2018-06-29 NOTE — TELEPHONE ENCOUNTER
Sunita OT asking if it's ok to do vasodilation lymph pump with wounds? Rl will be back in clinic Monday and someone will call back then.  Meme Beth RN on 6/29/2018 at 9:21 AM

## 2018-06-29 NOTE — MR AVS SNAPSHOT
After Visit Summary   6/29/2018    Glenroy Padilla    MRN: [id number]           Patient Information     Date Of Birth          1950        Visit Information        Provider Department      6/29/2018 10:20 AM Julio Cesar Esteban PA-C [facility]        Today's Diagnoses     Type 2 diabetes mellitus with other circulatory complication, without long-term current use of insulin (H)    -  1    Screening for diabetic retinopathy        Screening for diabetic peripheral neuropathy        Morbid obesity due to excess calories (H)        History of coronary artery stent placement        Gastroesophageal reflux disease without esophagitis        Chronic pain of left knee        DAYTON (obstructive sleep apnea)           Follow-ups after your visit        Follow-up notes from your care team     Return in about 3 months (around 9/29/2018).      Your next 10 appointments already scheduled     Jul 30, 2018  9:00 AM CDT   Return Visit with PH WOUND EXAM ROOM   [facility] ([facility])    [address] [place] [id number] [phone]            Sep 20, 2018 10:10 AM CDT   Return Visit with Martha Reddy PA-C   [facility] ([facility]    [address] [place] [address], [address]  [facility] [address] [phone]              Who to contact     If you have questions or need follow up information about today's clinic visit or your schedule please contact [facility] directly at [phone].  Normal or non-critical lab and imaging results will be communicated to you by MyChart, letter or phone within 4 business days after the clinic has received the results. If you do not hear from us within 7 days, please contact the clinic through MyChart or phone. If you have a critical or abnormal lab result, we will notify you by phone as soon as possible.  Submit refill requests through [employer] or call your pharmacy and they  will forward the refill request to us. Please allow 3 business days for your refill to be completed.          Additional Information About Your Visit        Care EveryWhere ID     This is your Care EveryWhere ID. This could be used by other organizations to access your [facility] medical records  [id number]        Your Vitals Were     Pulse Temperature Respirations BMI (Body Mass Index)          72 97.8  F (36.6  C) (Temporal) 20 41.74 kg/m2         Blood Pressure from Last 3 Encounters:   06/29/18 122/64   06/14/18 124/66   06/08/18 160/60    Weight from Last 3 Encounters:   06/29/18 290 lb 14.4 oz (132 kg)   06/14/18 308 lb (139.7 kg)   06/08/18 314 lb 4.8 oz (142.6 kg)              Today, you had the following     No orders found for display         Today's Medication Changes          These changes are accurate as of 6/29/18 11:03 AM.  If you have any questions, ask your nurse or doctor.               Start taking these medicines.        Dose/Directions    order for DME   Used for:  Type 2 diabetes mellitus with other circulatory complication, without long-term current use of insulin (H), Morbid obesity due to excess calories (H), History of coronary artery stent placement, Gastroesophageal reflux disease without esophagitis, Chronic pain of left knee, DAYTON (obstructive sleep apnea)   Started by:  Julio Cesar Esteban PA-C        Equipment being ordered: Needs to have a sleep chair for home use.   Quantity:  1 Device   Refills:  0            Where to get your medicines      Some of these will need a paper prescription and others can be bought over the counter.  Ask your nurse if you have questions.     Bring a paper prescription for each of these medications     order for DME                Primary Care Provider Office Phone # Fax #    Julio Cesar Esteban PA-C [phone] [address] [facility] [address]        Equal Access to Services     JAZ TOBAR AH: rudi Thurman  glo dustinjaneth tylermigue aleaadams carrillo ah. So [facility] [phone].    ATENCIÓN: Si brennan delaney, tiene a wilkinson disposición servicios gratuitos de asistencia lingüística. Frances al [phone].    We comply with applicable federal civil rights laws and [place] laws. We do not discriminate on the basis of race, color, national origin, age, disability, sex, sexual orientation, or gender identity.            Thank you!     Thank you for choosing [facility]  for your care. Our goal is always to provide you with excellent care. Hearing back from our patients is one way we can continue to improve our services. Please take a few minutes to complete the written survey that you may receive in the mail after your visit with us. Thank you!             Your Updated Medication List - Protect others around you: Learn how to safely use, store and throw away your medicines at www.disposemymeds.org.          This list is accurate as of 6/29/18 11:03 AM.  Always use your most recent med list.                   Brand Name Dispense Instructions for use Diagnosis    acetaminophen 500 MG tablet    TYLENOL    120 tablet    Take 1-2 tablets (500-1,000 mg) by mouth every 6 hours as needed for mild pain    Chronic pain of left knee, Complete tear of left rotator cuff       blood glucose calibration solution    NO BRAND SPECIFIED    1 Bottle    To accompany: Blood Glucose Monitor Brands: per insurance.    Type 2 diabetes mellitus with other circulatory complication, without long-term current use of insulin (H)       blood glucose lancing device    no brand specified    1 each    Use to test blood sugars 2 times daily or as directed.    Type 2 diabetes mellitus with other circulatory complication, without long-term current use of insulin (H)       * blood glucose monitoring test strip    no brand specified    100 strip    Use to test blood sugars 2 times daily or as directed    Type 2 diabetes  mellitus with other circulatory complication, without long-term current use of insulin (H)       * blood glucose monitoring test strip    no brand specified    100 strip    Use to test blood sugar 2x  daily or as directed. To accompany: Blood Glucose Monitor Brands: per insurance.    Type 2 diabetes mellitus with other circulatory complication, without long-term current use of insulin (H)       clopidogrel 75 MG tablet    PLAVIX    90 tablet    Take 1 tablet (75 mg) by mouth daily    Type 2 diabetes mellitus with other circulatory complication, without long-term current use of insulin (H), S/P spinal surgery, Hypertension, goal below 140/90, Hyperlipidemia LDL goal <100       cyclobenzaprine 10 MG tablet    FLEXERIL     TAKE 1 TAB BY MOUTH TWICE DAILY AS NEEDED FOR ROTATOR CUFF TEAR        ferrous sulfate 325 (65 Fe) MG tablet    IRON    90 tablet    Take 1 tablet (325 mg) by mouth daily (with breakfast)    History of anemia       furosemide 40 MG tablet    LASIX    90 tablet    Take 1 tablet (40 mg) by mouth daily    Hypertension, goal below 140/90       * GLUCOCARD EXPRESSION MONITOR w/Device Kit      USE TWICE DAILY TO TEST BLOOD GLUCOSE        * blood glucose monitoring meter device kit    no brand specified    1 kit    test blood sugar 2 xdaily or as directed.  Blood Glucose Monitor Brands: per insurance.    Type 2 diabetes mellitus with other circulatory complication, without long-term current use of insulin (H)       lidocaine 5 % ointment    XYLOCAINE    50 g    Apply topically daily    Chronic pain of right knee       losartan 25 MG tablet    COZAAR    90 tablet    Take 1 tablet (25 mg) by mouth daily    Hypertension, goal below 140/90, Type 2 diabetes mellitus with other circulatory complication, without long-term current use of insulin (H)       methocarbamol 500 MG tablet    ROBAXIN    70 tablet    Take 1 tablet (500 mg) by mouth 4 times daily as needed for muscle spasms    S/P spinal surgery        metoprolol tartrate 25 MG tablet    LOPRESSOR    60 tablet    Take 1 tablet (25 mg) by mouth 2 times daily    Type 2 diabetes mellitus with other circulatory complication, without long-term current use of insulin (H), History of coronary artery stent placement, Hypertension, goal below 140/90       order for DME     1 Box    One touch glucose monitoring strip with Glucometer and lancets.    Type 2 diabetes mellitus with other circulatory complication, without long-term current use of insulin (H)       order for DME     1 Units    Equipment being ordered: compression stockings below knee and above knee if available.  Medium compression.    Primary osteoarthritis of right knee, Lymphedema of right lower extremity       order for DME     1 Device    Equipment being ordered: Needs to have a sleep chair for home use.    Type 2 diabetes mellitus with other circulatory complication, without long-term current use of insulin (H), Morbid obesity due to excess calories (H), History of coronary artery stent placement, Gastroesophageal reflux disease without esophagitis, Chronic pain of left knee, DAYTON (obstructive sleep apnea)       pantoprazole 40 MG EC tablet    PROTONIX    30 tablet    Take 1 tablet (40 mg) by mouth daily Take 30-60 minutes before a meal.    Gastroesophageal reflux disease without esophagitis       rosuvastatin 10 MG tablet    CRESTOR    90 tablet    Take 1 tablet (10 mg) by mouth daily    Hyperlipidemia LDL goal <100       senna-docusate 8.6-50 MG per tablet    SENOKOT-S;PERICOLACE    100 tablet    Take 1 tablet by mouth 2 times daily as needed for constipation    Spinal stenosis, lumbar region, without neurogenic claudication       SitaGLIPtin-MetFORMIN HCl  MG Tb24     180 tablet    Take 2 tablets by mouth daily (with breakfast)    Type 2 diabetes mellitus with other circulatory complication, without long-term current use of insulin (H)       thin lancets    NO BRAND SPECIFIED    100 each    Test 2  x daily or as directed.  Brands: per insurance.    Type 2 diabetes mellitus with other circulatory complication, without long-term current use of insulin (H)       TRIAD HYDROPHILIC WOUND DRESSI Pste     1 Tube    Externally apply 1 g topically daily    Open wound of right lower leg, initial encounter       * Notice:  This list has 4 medication(s) that are the same as other medications prescribed for you. Read the directions carefully, and ask your doctor or other care provider to review them with you.

## 2018-06-29 NOTE — TELEPHONE ENCOUNTER
Reason for Call:  Other call back    Detailed comments: Sunita from [facility] occupational therapy would like to ask about using a pump for patients treatment.  Asked to speak with wound care.  Please call    Phone Number Patient can be reached at: Other phone number:  [phone]    Best Time: day    Can we leave a detailed message on this number? YES    Call taken on 6/29/2018 at 8:14 AM by Stephanie Leone

## 2018-06-29 NOTE — TELEPHONE ENCOUNTER
Our goal is to have forms completed with 72 hours, however some forms may require a visit or additional information.    Who is the form from?: [employer] (if other please explain)  Where the form came from: form was faxed in  What clinic location was the form placed at?: [place]  Where the form was placed: 's Box  What number is listed as a contact on the form?: [phone]    Phone call message- patient request for a letter, form or note:    Date needed: as soon as possible  Please fax to [phone]  Has the patient signed a consent form for release of information? NO    Additional comments:     Call taken on 6/29/2018 at 4:32 PM by Leigh Tidwell    Type of letter, form or note: medical

## 2018-07-03 NOTE — TELEPHONE ENCOUNTER
Dr. Shine approves the use of lymph pumps.  Sunita called, then she wanted an order sent. I asked her to connect with [facility] where the unit would be rented from and have them send us the form that is needed. YAHIR Wilkins

## 2018-07-05 NOTE — TELEPHONE ENCOUNTER
Forms have been completed, signed, faxed/mailed, and sent to scanning.  Lisa Zuluaga CMA ([facility])

## 2018-07-12 ENCOUNTER — HOSPITAL ENCOUNTER (OUTPATIENT)
Dept: WOUND CARE | Facility: CLINIC | Age: 68
Discharge: HOME | End: 2018-07-12
Admitting: SPECIALIST
Payer: MEDICARE

## 2018-07-12 PROCEDURE — G0463 HOSPITAL OUTPT CLINIC VISIT: HCPCS

## 2018-07-12 NOTE — DISCHARGE INSTRUCTIONS
Today the wounds on your left lower leg need a change in the plan of care.    Continue with three times weekly dressing changes Monday, Wednesday and Friday to be done at same time as the lymphedema wraps are being changed.  Cleanse wounds with soap and water, normal saline or a no rinse dermal cleanser and dry well.  For the proximal of the two wounds, apply a cut to fit piece of Silvercel silver calcium alginate to the wound bed so it fits into the wound.  Cover both the proximal and distal wounds with 1/2 of an ABD pad.  Secure with dressing with the first layer of the lymphedema wraps.      I will see you again in about two weeks on 7/30/18    Any questions call the scheduling line at [phone].    Jett Garza RN [email or web address]

## 2018-07-12 NOTE — PROGRESS NOTES
Reason For Visit: Glenroy Padilla, 67 year old male, seen as outpatient to re evaluate and treat left lower leg ulcers. Referred by Dr Shine. Patient presents by himself today.       History: Pt who was referred to me initially back in June of 2017 by Dr Shine for ongoing wound cares.  He returned to his home in [place] soon after and returned to [place] later in 2017.  Pt has had open ulcer of the left lower leg, original injury was related to trauma.  Course of healing was slow due to venous insuffiencey.  When wound was nearly closed new calcium deposits were found and Dr Shine removed these.  He was referred back to me for on going wound care, initial visit this time being 6/21/18.  He was scheduled to return on 7/30/18 for revisit with me but felt the wounds were not improving or were deteriorating with current plan of care being performed by his current home care agency.       Personal/social history: Pt is currently living in an AL in [place] with home care from a non  provider.       Objective:   Physical appearance: alert and oriented times three  Ambulation: limited, using electric scooter, able to take few steps to transfer.    Current treatment plan: Triad wound paste to the wound, covered with gentle adhesive foam dressing.  Dressing changes Mon Wed and Fridays, also has short stretch lymphedema wraps changed on the same days.  Last changed: yesterday.     Wound #1 Left lower leg, proximal of two open wounds.  Venous stasis ulcer.  Stage/tissue depth: full thickness  3.2 cm L x 2 cm W x 0.3 cm D  Tunneling: no  Undermining: no  Wound bed type/amount: approximately 40 % granular tissue, 40 % hypergranular tissue and 20 % new fragile scar tissue (some of which appears well adhered, some appears non viable and loose); NA fluctuant  Wound Edges: open  Periwound: hemosiderin staining, nonpitting edema and scar tissue.  Drainage: Moderate amount  Odor: no  Pain: with direct cares small amount of short lived  pain.     Wound #2 Left lower leg, distal of two open wound.  Venous stasis ulcer.  Stage/tissue depth: full thickness  2.4 cm L x 2 cm W x 0 cm D, hypergranular tissue protrudes approximately 0.1 cm.  Tunneling: no  Undermining: no  Wound bed type/amount: mix of hypergranular tissue and fragile scar tissue; NA fluctuant  Wound Edges: flush or lower than wound bed.  Periwound: hemosiderin staining, nonpitting edema and scar tissue.  Drainage: Small amount  Odor: no  Pain: with direct cares small amount of short lived pain.     Dorsalis Pedal Pulse: weak but palpable: NA doppler: NA phasic  Hair growth: none noted below the knees bilaterally  Capillary Refill: less than 3 seconds  Feet/toes color: pink with some hemosiderin staining  Nails: thick but wnl, note toe nail on right 4th toe was loose, only holding on small portion, was able to gently remove with slight pressure, no bleeding, new nail has already started coming in below it.    R Leg: Edema nonpitting. Ankle circumference NA cm. Calf circumference NA cm.  L Leg: Edema nonpitting. Ankle circumference Na cm. Calf circumference NA cm.     Mobility: limited   Current offloading/footwear: well fitting sneakers  Sensation: peripheral neuropathy in the legs and feet has resolved since his spinal surgery.    HgbA1C: 6.0 Date: 4/20/18  Checks Blood Glucose?:  Once daily, Average Readings: 150s, most recent high of 170s  Other callousing/areas of concern: none noted today     Diet: regular with awareness of effects on diabetes control.  Smoking: former cigar smoker     Discussed: etiology of wound (venous stasis ulcers), pathophysiology and patient specific goals for wound healing.   Education: Wound status, agree with pts assessment that wound are too moist, rational for new plan of care for both wounds and role of dressing in use in each, Plans for follow up in the Wound and Ostomy clinic.     Assessment:  Wound 1 Left lower leg, proximal of two open wounds.  Wound  bed after cleansing is free of all slough.  Wound bed is scattered mix of granular, hypergranular and fragile new viable and nonviable scar tissue.  No change in size noted.  Hypergranular tissue and nonviable loose scar tissue supports concern of over moisture.    Wound 2 Left lower leg, distal of two open wound. Wound as nearly resolved but appears triad paste and Mepilex kept it too moist, causing hypergranular tissue formation and and an increase in size in width.  Wound bed remains scattered between hypergranular and new viable scar tissue.     Barriers to wound healing:   Poor nutrition: inadequate supply of protein, carbohydrates, fatty acids, and trace elements essential for all phases of wound healing.  Pt aware of the need for increased protein in diet for healing.   Reduced Blood Supply: inadequate perfusion to heal wound, NA  Medication: NA  Chemotherapy: suppresses the immune system and inflammatory response, NA  Radiotherapy: increases production of free radical which damage cells, NA  Psychological stress: Not assessed this visit  Obesity: decreases tissue perfusion  Infection: prolongs inflammatory phase, uses vital nutrients, impairs epithelialization and releases toxins.  None noted at this time.   Underlying Disease: diabetes mellitis and autoimmune disorders.  Currently diabetes is well control as evidenced by recent HgbA1C.  Maceration: reduces wound tensile strength and inhibits epithelial migration.  None noted at today.  Will monitor at each visit.  Patient compliance, NA  Unrelieved pressure, NA  Immobility, NA  Substance abuse: NA     Plan: Each of the two wounds needs less moisture but proximal wound also need greater absorbency of its dressing.  Will start separate primary dressing for proximal wound with silver alginate to absorb, keep wound med semi moist and provide an antimicrobial element.  Covering both wounds with the same ABD pad will provide absorbency for both.  Continued use of  the short stretch lymphedema wraps is appropriate.  Pt had left a bag of short stretch wraps here at his last visit, these were returned with him today along with the Ace wraps that were removed from the legs today.       Topical care: See above Plan for details.  Offloading: NA  Additional recommendations: none at this time.     Wound Care: Wound 1 Proximal left lower leg, cleansed with Microklenz and gauze, patted dry. Periwound protected with NA. Wound base filled with Silvercel. Covered with ABD pad, followed by NA. Secured with roll gauze. To be changed MWF.  Cares for re wrapping are to be done with short stretch lymphedema wraps, can secure ABD with first layer of lymph wraps instead of with additional roll gauze.  Wound 2 Distal left lower leg, cleansed with Microklenz and gauze, patted dry. Periwound protected with NA. Wound base filled with NA. Covered with ABD pad, followed by NA. Secured with roll gauze. To be changed MWF.  Cares for re wraping are to be done with short stretch lymphedema wraps, can secure ABD with first layer of lymph wraps instead of with additional roll gauze.    Discussed plan of care with patient. Teaching done with patient for dressing changes; pt is unable, home care is able and willing to perform.     The following discharge instructions were reviewed with and sent home with the patient: See discharge instructions.     The following supplies were sent home with the patient:  One full silvercel sheet and the remains of the one opened and not used up today.  Will reassess care plan in 2 and a half weeks and order patient supplies as needed     Return visit: July 30th     Verbal, written, & demonstrative education provided.  Face to face time (excluding procedure): approximately 55 minutes.  Procedure: NA  Care plan was changed.     [phone]

## 2018-07-12 NOTE — IP AVS SNAPSHOT
[address] [address]    Phone:  [phone]    Fax:  [phone]                                       After Visit Summary   7/12/2018    Glenroy Padilla    MRN: [id number]           After Visit Summary Signature Page     I have received my discharge instructions, and my questions have been answered. I have discussed any challenges I see with this plan with the nurse or doctor.    ..........................................................................................................................................  Patient/Patient Representative Signature      ..........................................................................................................................................  Patient Representative Print Name and Relationship to Patient    ..................................................               ................................................  Date                                            Time    ..........................................................................................................................................  Reviewed by Signature/Title    ...................................................              ..............................................  Date                                                            Time

## 2018-07-12 NOTE — IP AVS SNAPSHOT
MRN:[id number]                      After Visit Summary   7/12/2018    Glenroy Padilla    MRN: [id number]           Visit Information        Provider Department      7/12/2018 11:00 AM PH WOUND EXAM ROOM [facility]           Review of your medicines      UNREVIEWED medicines. Ask your doctor about these medicines        Dose / Directions    acetaminophen 500 MG tablet   Commonly known as:  TYLENOL   Used for:  Chronic pain of left knee, Complete tear of left rotator cuff        Dose:  500-1000 mg   Take 1-2 tablets (500-1,000 mg) by mouth every 6 hours as needed for mild pain   Quantity:  120 tablet   Refills:  4       clopidogrel 75 MG tablet   Commonly known as:  PLAVIX   Used for:  Type 2 diabetes mellitus with other circulatory complication, without long-term current use of insulin (H), S/P spinal surgery, Hypertension, goal below 140/90, Hyperlipidemia LDL goal <100        Dose:  75 mg   Take 1 tablet (75 mg) by mouth daily   Quantity:  90 tablet   Refills:  1       cyclobenzaprine 10 MG tablet   Commonly known as:  FLEXERIL        TAKE 1 TAB BY MOUTH TWICE DAILY AS NEEDED FOR ROTATOR CUFF TEAR   Refills:  0       ferrous sulfate 325 (65 Fe) MG tablet   Commonly known as:  IRON   Used for:  History of anemia        Dose:  325 mg   Take 1 tablet (325 mg) by mouth daily (with breakfast)   Quantity:  90 tablet   Refills:  2       furosemide 40 MG tablet   Commonly known as:  LASIX   Used for:  Hypertension, goal below 140/90        Dose:  40 mg   Take 1 tablet (40 mg) by mouth daily   Quantity:  90 tablet   Refills:  1       lidocaine 5 % ointment   Commonly known as:  XYLOCAINE   Used for:  Chronic pain of right knee        Apply topically daily   Quantity:  50 g   Refills:  3       losartan 25 MG tablet   Commonly known as:  COZAAR   Used for:  Hypertension, goal below 140/90, Type 2 diabetes mellitus with other circulatory complication, without long-term current use of insulin  (H)        Dose:  25 mg   Take 1 tablet (25 mg) by mouth daily   Quantity:  90 tablet   Refills:  1       methocarbamol 500 MG tablet   Commonly known as:  ROBAXIN   Used for:  S/P spinal surgery        Dose:  500 mg   Take 1 tablet (500 mg) by mouth 4 times daily as needed for muscle spasms   Quantity:  70 tablet   Refills:  1       metoprolol tartrate 25 MG tablet   Commonly known as:  LOPRESSOR   Used for:  Type 2 diabetes mellitus with other circulatory complication, without long-term current use of insulin (H), History of coronary artery stent placement, Hypertension, goal below 140/90        Dose:  25 mg   Take 1 tablet (25 mg) by mouth 2 times daily   Quantity:  60 tablet   Refills:  4       pantoprazole 40 MG EC tablet   Commonly known as:  PROTONIX   Used for:  Gastroesophageal reflux disease without esophagitis        Dose:  40 mg   Take 1 tablet (40 mg) by mouth daily Take 30-60 minutes before a meal.   Quantity:  30 tablet   Refills:  0       rosuvastatin 10 MG tablet   Commonly known as:  CRESTOR   Used for:  Hyperlipidemia LDL goal <100        Dose:  10 mg   Take 1 tablet (10 mg) by mouth daily   Quantity:  90 tablet   Refills:  1       senna-docusate 8.6-50 MG per tablet   Commonly known as:  SENOKOT-S;PERICOLACE   Used for:  Spinal stenosis, lumbar region, without neurogenic claudication        Dose:  1 tablet   Take 1 tablet by mouth 2 times daily as needed for constipation   Quantity:  100 tablet   Refills:  0       SitaGLIPtin-MetFORMIN HCl  MG Tb24   Used for:  Type 2 diabetes mellitus with other circulatory complication, without long-term current use of insulin (H)        Dose:  2 tablet   Take 2 tablets by mouth daily (with breakfast)   Quantity:  180 tablet   Refills:  1       TRIAD HYDROPHILIC WOUND DRESSI Pste   Used for:  Open wound of right lower leg, initial encounter        Dose:  1 applicator   Externally apply 1 g topically daily   Quantity:  1 Tube   Refills:  3          CONTINUE these medicines which have NOT CHANGED        Dose / Directions    blood glucose calibration solution   Commonly known as:  NO BRAND SPECIFIED   Used for:  Type 2 diabetes mellitus with other circulatory complication, without long-term current use of insulin (H)        To accompany: Blood Glucose Monitor Brands: per insurance.   Quantity:  1 Bottle   Refills:  3       blood glucose lancing device   Commonly known as:  no brand specified   Used for:  Type 2 diabetes mellitus with other circulatory complication, without long-term current use of insulin (H)        Use to test blood sugars 2 times daily or as directed.   Quantity:  1 each   Refills:  0       * blood glucose monitoring test strip   Commonly known as:  no brand specified   Used for:  Type 2 diabetes mellitus with other circulatory complication, without long-term current use of insulin (H)        Use to test blood sugars 2 times daily or as directed   Quantity:  100 strip   Refills:  0       * blood glucose monitoring test strip   Commonly known as:  no brand specified   Used for:  Type 2 diabetes mellitus with other circulatory complication, without long-term current use of insulin (H)        Use to test blood sugar 2x  daily or as directed. To accompany: Blood Glucose Monitor Brands: per insurance.   Quantity:  100 strip   Refills:  1       * GLUCOCARD EXPRESSION MONITOR w/Device Kit        USE TWICE DAILY TO TEST BLOOD GLUCOSE   Refills:  0       * blood glucose monitoring meter device kit   Commonly known as:  no brand specified   Used for:  Type 2 diabetes mellitus with other circulatory complication, without long-term current use of insulin (H)        test blood sugar 2 xdaily or as directed.  Blood Glucose Monitor Brands: per insurance.   Quantity:  1 kit   Refills:  0       order for DME   Used for:  Type 2 diabetes mellitus with other circulatory complication, without long-term current use of insulin (H)        One touch glucose  monitoring strip with Glucometer and lancets.   Quantity:  1 Box   Refills:  1       order for DME   Used for:  Primary osteoarthritis of right knee, Lymphedema of right lower extremity        Equipment being ordered: compression stockings below knee and above knee if available.  Medium compression.   Quantity:  1 Units   Refills:  1       order for DME   Used for:  Type 2 diabetes mellitus with other circulatory complication, without long-term current use of insulin (H), Morbid obesity due to excess calories (H), History of coronary artery stent placement, Gastroesophageal reflux disease without esophagitis, Chronic pain of left knee, [place] (obstructive sleep apnea)        Equipment being ordered: Needs to have a sleep chair for home use.   Quantity:  1 Device   Refills:  0       thin lancets   Commonly known as:  NO BRAND SPECIFIED   Used for:  Type 2 diabetes mellitus with other circulatory complication, without long-term current use of insulin (H)        Test 2 x daily or as directed.  Brands: per insurance.   Quantity:  100 each   Refills:  1       * Notice:  This list has 4 medication(s) that are the same as other medications prescribed for you. Read the directions carefully, and ask your doctor or other care provider to review them with you.             Protect others around you: Learn how to safely use, store and throw away your medicines at www.disposemymeds.org.         Follow-ups after your visit        Your next 10 appointments already scheduled     Jul 30, 2018  9:00 AM CDT   Return Visit with PH WOUND EXAM ROOM   [facility]    [address] [place] [id number]   [phone]            Sep 20, 2018 10:10 AM CDT   Return Visit with Martha Reddy PA-C   [facility] ([facility]    [address] [place] [address], [address]  [facility] [id number] [phone]               Care Instructions        Further instructions from  your care team       Today the wounds on your left lower leg need a change in the plan of care.    Continue with three times weekly dressing changes Monday, Wednesday and Friday to be done at same time as the lymphedema wraps are being changed.  Cleanse wounds with soap and water, normal saline or a no rinse dermal cleanser and dry well.  For the proximal of the two wounds, apply a cut to fit piece of Silvercel silver calcium alginate to the wound bed so it fits into the wound.  Cover both the proximal and distal wounds with 1/2 of an ABD pad.  Secure with dressing with the first layer of the lymphedema wraps.      I will see you again in about two weeks on 7/30/18    Any questions call the scheduling line at [phone].    Jett Garza RN cwocn     Additional Information About Your Visit        Care EveryWhere ID     This is your Care EveryWhere ID. This could be used by other organizations to access your [facility] medical records  [id number]         Primary Care Provider Office Phone # Fax #    Julio Cesar Esteban PA-C [phone]      Equal Access to Services     [facility] : Hadii katia bolden Sogloria, waaxda luqadaha, qaybta kaalmayolis wall, adams batres . So [facility] [phone].    ATENCIÓN: Si habla español, tiene a wilkinson disposición servicios gratuitos de asistencia lingüística. Frances al [phone].    We comply with applicable federal civil rights laws and [place] laws. We do not discriminate on the basis of race, color, national origin, age, disability, sex, sexual orientation, or gender identity.            Thank you!     Thank you for choosing [facility] for your care. Our goal is always to provide you with excellent care. Hearing back from our patients is one way we can continue to improve our services. Please take a few minutes to complete the written survey that you may receive in the mail after you visit with us. Thank you!             Medication List:  This is a list of all your medications and when to take them. Check marks below indicate your daily home schedule. Keep this list as a reference.      Medications           Morning Afternoon Evening Bedtime As Needed    acetaminophen 500 MG tablet   Commonly known as:  TYLENOL   Take 1-2 tablets (500-1,000 mg) by mouth every 6 hours as needed for mild pain                                blood glucose calibration solution   Commonly known as:  NO BRAND SPECIFIED   To accompany: Blood Glucose Monitor Brands: per insurance.                                blood glucose lancing device   Commonly known as:  no brand specified   Use to test blood sugars 2 times daily or as directed.                                * blood glucose monitoring test strip   Commonly known as:  no brand specified   Use to test blood sugars 2 times daily or as directed                                * blood glucose monitoring test strip   Commonly known as:  no brand specified   Use to test blood sugar 2x  daily or as directed. To accompany: Blood Glucose Monitor Brands: per insurance.                                clopidogrel 75 MG tablet   Commonly known as:  PLAVIX   Take 1 tablet (75 mg) by mouth daily                                cyclobenzaprine 10 MG tablet   Commonly known as:  FLEXERIL   TAKE 1 TAB BY MOUTH TWICE DAILY AS NEEDED FOR ROTATOR CUFF TEAR                                ferrous sulfate 325 (65 Fe) MG tablet   Commonly known as:  IRON   Take 1 tablet (325 mg) by mouth daily (with breakfast)                                furosemide 40 MG tablet   Commonly known as:  LASIX   Take 1 tablet (40 mg) by mouth daily                                * GLUCOCARD EXPRESSION MONITOR w/Device Kit   USE TWICE DAILY TO TEST BLOOD GLUCOSE                                * blood glucose monitoring meter device kit   Commonly known as:  no brand specified   test blood sugar 2 xdaily or as directed.  Blood Glucose Monitor Brands: per insurance.                                 lidocaine 5 % ointment   Commonly known as:  XYLOCAINE   Apply topically daily                                losartan 25 MG tablet   Commonly known as:  COZAAR   Take 1 tablet (25 mg) by mouth daily                                methocarbamol 500 MG tablet   Commonly known as:  ROBAXIN   Take 1 tablet (500 mg) by mouth 4 times daily as needed for muscle spasms                                metoprolol tartrate 25 MG tablet   Commonly known as:  LOPRESSOR   Take 1 tablet (25 mg) by mouth 2 times daily                                order for DME   One touch glucose monitoring strip with Glucometer and lancets.                                order for DME   Equipment being ordered: compression stockings below knee and above knee if available.  Medium compression.                                order for DME   Equipment being ordered: Needs to have a sleep chair for home use.                                pantoprazole 40 MG EC tablet   Commonly known as:  PROTONIX   Take 1 tablet (40 mg) by mouth daily Take 30-60 minutes before a meal.                                rosuvastatin 10 MG tablet   Commonly known as:  CRESTOR   Take 1 tablet (10 mg) by mouth daily                                senna-docusate 8.6-50 MG per tablet   Commonly known as:  SENOKOT-S;PERICOLACE   Take 1 tablet by mouth 2 times daily as needed for constipation                                SitaGLIPtin-MetFORMIN HCl  MG Tb24   Take 2 tablets by mouth daily (with breakfast)                                thin lancets   Commonly known as:  NO BRAND SPECIFIED   Test 2 x daily or as directed.  Brands: per insurance.                                TRIAD HYDROPHILIC WOUND DRESSI Pste   Externally apply 1 g topically daily                                * Notice:  This list has 4 medication(s) that are the same as other medications prescribed for you. Read the directions carefully, and ask your doctor or other  care provider to review them with you.

## 2018-07-16 ENCOUNTER — TELEPHONE (OUTPATIENT)
Dept: FAMILY MEDICINE | Facility: OTHER | Age: 68
End: 2018-07-16

## 2018-07-16 DIAGNOSIS — I89.0 LYMPHEDEMA: Primary | ICD-10-CM

## 2018-07-16 DIAGNOSIS — G62.9 NEUROPATHY: ICD-10-CM

## 2018-07-16 DIAGNOSIS — I89.0 LYMPHEDEMA OF RIGHT LOWER EXTREMITY: ICD-10-CM

## 2018-07-16 NOTE — TELEPHONE ENCOUNTER
----- Message from Jett Garza sent at 7/16/2018  2:50 PM CDT -----  Regarding: need script for lyphedema garments  Hello  I message was passed around today and came to me about getting maintenance compression garments for this pt.  I am seeing him in clinic for his wounds, [facility] is handling his wound and lymphedema cares.  They have been using short stretch lymphedema wraps but the home care therapist would like to change him over to premade compression garments so they are more consistent in their application and thusly more effective.  I can not write out or sign a script but I have all the info listed below that per the therapist the pt will need within the script.    Pt needs script to state the following  Compression garments toe to knee.  Specific brand is Compraflex Light Compression garment.  Diagnosis codes for venous stasis ulcer I83.029 and for  Lymphoedema is I89.0  Goal is for compression of about 30 to 40 mm of mercury  measurements are right ankle 28.5 cm and right calf is 45.5 cm.  Left ankle is 27.5 cm and the calf is 45 cm   Tall for length.  Script needs to be faxed to [facility]  Any way else I can assist let me know.  This pt has been seen by Dr Shine in some time, I saw him last on Thursday of last week and feel the switch to a premade garment would benefit the pt for more consistent application and steady level of compression.  Thanks  Jett Garza RN cwocn

## 2018-07-16 NOTE — TELEPHONE ENCOUNTER
Reason for Call:  Medication or medication refill:    Do you use a [facility]?  Name of the pharmacy and phone number for the current request:  [facility]    Name of the medication requested: Compression garments toe to knee -diag needs to be on there: Lymphoedema -34 mm of mercury- ankle measurements are (right lower) 28.5 cm and the calf is 45.5 cm on the left leg the ankle is 27.5 cm and the calf is 45 cm   Other request: Tall for length, and if we can put preferred Compraflex Light Compression garment. If they don't have the preferred brand they will do equivalent     Can we leave a detailed message on this number? YES    Phone number patient can be reached at: Home number on file [phone] (home)    Best Time: any    Call taken on 7/16/2018 at 8:48 AM by Barbara Mahoney

## 2018-07-16 NOTE — TELEPHONE ENCOUNTER
I made contact with therapist Sunita who was the initial caller related to the request for script for maintenence garments.  I will send more detailed message via Epic to MARLO Sahu for specifics of what the script will need to say in order for her to obtain the garments.  Also gave her my contact secure iphone with [facility] for follow up if needed.    Jett Garza RN cwocn

## 2018-07-16 NOTE — TELEPHONE ENCOUNTER
Forwarded message to wound care nurse. Maria to address.  Lisa Zuluaga CMA ([facility])

## 2018-07-19 ENCOUNTER — MEDICAL CORRESPONDENCE (OUTPATIENT)
Dept: HEALTH INFORMATION MANAGEMENT | Facility: CLINIC | Age: 68
End: 2018-07-19

## 2018-07-23 DIAGNOSIS — Z86.2 HISTORY OF ANEMIA: ICD-10-CM

## 2018-07-24 ENCOUNTER — MEDICAL CORRESPONDENCE (OUTPATIENT)
Dept: HEALTH INFORMATION MANAGEMENT | Facility: CLINIC | Age: 68
End: 2018-07-24

## 2018-07-24 RX ORDER — FERROUS SULFATE 325(65) MG
325 TABLET ORAL
Qty: 90 TABLET | Refills: 1 | Status: SHIPPED | OUTPATIENT
Start: 2018-07-24 | End: 2019-04-24

## 2018-07-24 NOTE — TELEPHONE ENCOUNTER
Iron:  Alternate pharmacy requesting.  Prescription approved per [facility] Refill Protocol.    Rossi Pérez, RN, BSN

## 2018-07-25 ENCOUNTER — TELEPHONE (OUTPATIENT)
Dept: FAMILY MEDICINE | Facility: OTHER | Age: 68
End: 2018-07-25

## 2018-07-27 NOTE — TELEPHONE ENCOUNTER
Form faxed and sent to scanning. Copy in provider's faxed file.    Catina Varma CMA ([facility])

## 2018-07-30 ENCOUNTER — HOSPITAL ENCOUNTER (OUTPATIENT)
Dept: WOUND CARE | Facility: CLINIC | Age: 68
Discharge: HOME | End: 2018-07-30
Attending: SPECIALIST | Admitting: SPECIALIST
Payer: MEDICARE

## 2018-07-30 ENCOUNTER — TELEPHONE (OUTPATIENT)
Dept: FAMILY MEDICINE | Facility: OTHER | Age: 68
End: 2018-07-30

## 2018-07-30 ENCOUNTER — MEDICAL CORRESPONDENCE (OUTPATIENT)
Dept: HEALTH INFORMATION MANAGEMENT | Facility: CLINIC | Age: 68
End: 2018-07-30

## 2018-07-30 PROCEDURE — G0463 HOSPITAL OUTPT CLINIC VISIT: HCPCS

## 2018-07-30 NOTE — IP AVS SNAPSHOT
[address] [address]    Phone:  [phone]    Fax:  [phone]                                       After Visit Summary   7/30/2018    Glenroy Padilla    MRN: [id number]           After Visit Summary Signature Page     I have received my discharge instructions, and my questions have been answered. I have discussed any challenges I see with this plan with the nurse or doctor.    ..........................................................................................................................................  Patient/Patient Representative Signature      ..........................................................................................................................................  Patient Representative Print Name and Relationship to Patient    ..................................................               ................................................  Date                                            Time    ..........................................................................................................................................  Reviewed by Signature/Title    ...................................................              ..............................................  Date                                                            Time

## 2018-07-30 NOTE — PROGRESS NOTES
Reason For Visit: Glenroy Padilla, 67 year old male, seen as outpatient to re evaluate and treat left lower leg ulcers. Referred by Dr Shine. Patient presents by himself today.        History: Pt who was referred to me initially back in June of 2017 by Dr Shine for ongoing wound cares.  He returned to his home in [place] soon after and returned to [place] later in 2017.  Pt has had open ulcer of the left lower leg, original injury was related to trauma.  Course of healing was slow due to venous insuffiencey.  When wound was nearly closed new calcium deposits were found and Dr Shine removed these.  He was referred back to me for on going wound care, initial visit this time being 6/21/18.        Personal/social history: Pt is currently living in an AL in [place] with home care from a non  provider.        Objective:   Physical appearance: alert and oriented times three  Ambulation: limited, using electric scooter, able to take few steps to transfer.    Current treatment plan: Wound 1 Silver Alginate and ABD pad, wound 2 just ABD pad, secured with roll gauze and tape.  Dressing changes Mon Wed and Fridays, also has short stretch lymphedema wraps changed on the same days.  Last changed: yesterday.      Wound #1 Left lower leg, proximal of two open wounds.  Venous stasis ulcer.  Stage/tissue depth: full thickness  3.4 cm L x 1.5 cm W x 0.2 cm D  Tunneling: no  Undermining: no  Wound bed type/amount: approximately 40 % granular tissue, 10 % hypergranular tissue and 50 % new fragile scar tissue (some of which appears well adhered, some appears non viable and loose); NA fluctuant  Wound Edges: open  Periwound: hemosiderin staining, nonpitting edema and scar tissue.  Drainage: Moderate amount  Odor: no  Pain: with direct cares small amount of short lived pain.      Wound #2 Left lower leg, distal of two open wound.  Venous stasis ulcer.  Stage/tissue depth: full thickness  3.4 cm L x 2 cm W x 0 cm D,   Tunneling:  no  Undermining: no  Wound bed type/amount: 100 % granular tissue; NA fluctuant  Wound Edges: flush or lower than wound bed.  Periwound: hemosiderin staining, nonpitting edema and scar tissue.  Drainage: Small amount  Odor: no  Pain: with direct cares small amount of short lived pain.      Dorsalis Pedal Pulse: weak but palpable: NA doppler: NA phasic  Hair growth: none noted below the knees bilaterally  Capillary Refill: less than 3 seconds  Feet/toes color: pink with some hemosiderin staining  Nails: thick but wnl  R Leg: Edema nonpitting. Ankle circumference NA cm. Calf circumference NA cm.  L Leg: Edema nonpitting. Ankle circumference Na cm. Calf circumference NA cm.      Mobility: limited   Current offloading/footwear: well fitting sneakers  Sensation: peripheral neuropathy in the legs and feet has resolved since his spinal surgery.    HgbA1C: 6.0 Date: 4/20/18  Checks Blood Glucose?:  Once daily, Average Readings: per pt most in the mid 120's to 140's  Other callousing/areas of concern: none noted today      Diet: regular with awareness of effects on diabetes control.  Smoking: former cigar smoker      Discussed: etiology of wound (venous stasis ulcers), pathophysiology and patient specific goals for wound healing.   Education: Wound status, rational for continued plan of care for both wounds and role of dressing in use in each, Plans for follow up in the Wound and Ostomy clinic.  Discussed pre made velcro compression garments order requested by his home care Lymphedema therapist and how these will be more consistent for staff to apply.        Assessment:  Wound 1 Left lower leg, proximal of two open wounds.  Wound bed is all granular and scar tissue with only a small amount of hypergranular tissue remaining.  Wound is 2 mm longer but 5 cm less in width.  No other significant changes noted  Wound 2 Left lower leg, distal of two open wound. Wound is larger in length by a full cm this week but it appears more  balanced in its moisture, no longer any hypergranular tissue present, wound bed is all clean granular tissue.       Barriers to wound healing:   Poor nutrition: inadequate supply of protein, carbohydrates, fatty acids, and trace elements essential for all phases of wound healing.  Pt aware of the need for increased protein in diet for healing.   Reduced Blood Supply: inadequate perfusion to heal wound, NA  Medication: NA  Chemotherapy: suppresses the immune system and inflammatory response, NA  Radiotherapy: increases production of free radical which damage cells, NA  Psychological stress: Not assessed this visit  Obesity: decreases tissue perfusion  Infection: prolongs inflammatory phase, uses vital nutrients, impairs epithelialization and releases toxins.  None noted at this time.   Underlying Disease: diabetes mellitis and autoimmune disorders. No new concerns noted, pt will discuss with PCP getting labs drawn for concerns of A1C level and other concerns.    Maceration: reduces wound tensile strength and inhibits epithelial migration.  None noted at today.  Will monitor at each visit.  Patient compliance, NA  Unrelieved pressure, NA  Immobility, NA  Substance abuse: NA      Plan: The wounds seem to be more stable today but they have nearly merged into two wounds today as inferior of the two increases in length.  Continued use of the silver alginate is appropriate to continue to balance moisture, reduce small amount of remaining hypergranular tissue and provide an antimicrobial element.  Will contact Sunita the lymphedema therapist with his home care agency to f/u on request for scripts for compression garments which it appears PCP faxed as requested.        Topical care: Silver alginate  Offloading: NA  Additional recommendations: none at this time.      Wound Care: Wounds 1 and 2 Proximal and distal left lower leg, cleansed with Microklenz and gauze, patted dry. Periwound protected with NA. Wound base filled  with Silvercel. Covered with ABD pad, followed by NA. Secured with roll gauze. To be changed MWF.  Cares for re wrapping are to be done with short stretch lymphedema wraps, can secure ABD with first layer of lymph wraps instead of with additional roll gauze.     Discussed plan of care with patient. Teaching done with patient for dressing changes; pt is unable, home care is able and willing to perform.      The following discharge instructions were reviewed with and sent home with the patient: See discharge instructions.      The following supplies were sent home with the patient:  One full silvercel sheet and the remains of the one opened and not used up today.  Will reassess care plan in 3 weeks and order patient supplies as needed      Return visit: 8/20/18      Verbal, written, & demonstrative education provided.  Face to face time (excluding procedure): approximately 45 minutes.  Procedure: NA  Care plan was not changed.      [phone]

## 2018-07-30 NOTE — TELEPHONE ENCOUNTER
Spoke to [facility] nurse Robin Becker. Message below was given. Labs will be drawn on Wednesday and results faxed to provider.  Lisa Zuluaga CMA ([facility])

## 2018-07-30 NOTE — TELEPHONE ENCOUNTER
In my opinion he should have a CBC with differential as well as a comprehensive metabolic profile.  Iron supplement was done for him on 24 July.

## 2018-07-30 NOTE — IP AVS SNAPSHOT
MRN:[id number]                      After Visit Summary   7/30/2018    Glenroy Padilla    MRN: [id number]           Visit Information        Provider Department      7/30/2018  9:00 AM PH WOUND EXAM ROOM [facility]           Review of your medicines      UNREVIEWED medicines. Ask your doctor about these medicines        Dose / Directions    acetaminophen 500 MG tablet   Commonly known as:  TYLENOL   Used for:  Chronic pain of left knee, Complete tear of left rotator cuff        Dose:  500-1000 mg   Take 1-2 tablets (500-1,000 mg) by mouth every 6 hours as needed for mild pain   Quantity:  120 tablet   Refills:  4       clopidogrel 75 MG tablet   Commonly known as:  PLAVIX   Used for:  Type 2 diabetes mellitus with other circulatory complication, without long-term current use of insulin (H), S/P spinal surgery, Hypertension, goal below 140/90, Hyperlipidemia LDL goal <100        Dose:  75 mg   Take 1 tablet (75 mg) by mouth daily   Quantity:  90 tablet   Refills:  1       cyclobenzaprine 10 MG tablet   Commonly known as:  FLEXERIL        TAKE 1 TAB BY MOUTH TWICE DAILY AS NEEDED FOR ROTATOR CUFF TEAR   Refills:  0       ferrous sulfate 325 (65 Fe) MG tablet   Commonly known as:  IRON   Used for:  History of anemia        Dose:  325 mg   Take 1 tablet (325 mg) by mouth daily (with breakfast)   Quantity:  90 tablet   Refills:  1       furosemide 40 MG tablet   Commonly known as:  LASIX   Used for:  Hypertension, goal below 140/90        Dose:  40 mg   Take 1 tablet (40 mg) by mouth daily   Quantity:  90 tablet   Refills:  1       lidocaine 5 % ointment   Commonly known as:  XYLOCAINE   Used for:  Chronic pain of right knee        Apply topically daily   Quantity:  50 g   Refills:  3       losartan 25 MG tablet   Commonly known as:  COZAAR   Used for:  Hypertension, goal below 140/90, Type 2 diabetes mellitus with other circulatory complication, without long-term current use of insulin  (H)        Dose:  25 mg   Take 1 tablet (25 mg) by mouth daily   Quantity:  90 tablet   Refills:  1       methocarbamol 500 MG tablet   Commonly known as:  ROBAXIN   Used for:  S/P spinal surgery        Dose:  500 mg   Take 1 tablet (500 mg) by mouth 4 times daily as needed for muscle spasms   Quantity:  70 tablet   Refills:  1       metoprolol tartrate 25 MG tablet   Commonly known as:  LOPRESSOR   Used for:  Type 2 diabetes mellitus with other circulatory complication, without long-term current use of insulin (H), History of coronary artery stent placement, Hypertension, goal below 140/90        Dose:  25 mg   Take 1 tablet (25 mg) by mouth 2 times daily   Quantity:  60 tablet   Refills:  4       pantoprazole 40 MG EC tablet   Commonly known as:  PROTONIX   Used for:  Gastroesophageal reflux disease without esophagitis        Dose:  40 mg   Take 1 tablet (40 mg) by mouth daily Take 30-60 minutes before a meal.   Quantity:  30 tablet   Refills:  0       rosuvastatin 10 MG tablet   Commonly known as:  CRESTOR   Used for:  Hyperlipidemia LDL goal <100        Dose:  10 mg   Take 1 tablet (10 mg) by mouth daily   Quantity:  90 tablet   Refills:  1       senna-docusate 8.6-50 MG per tablet   Commonly known as:  SENOKOT-S;PERICOLACE   Used for:  Spinal stenosis, lumbar region, without neurogenic claudication        Dose:  1 tablet   Take 1 tablet by mouth 2 times daily as needed for constipation   Quantity:  100 tablet   Refills:  0       SitaGLIPtin-MetFORMIN HCl  MG Tb24   Used for:  Type 2 diabetes mellitus with other circulatory complication, without long-term current use of insulin (H)        Dose:  2 tablet   Take 2 tablets by mouth daily (with breakfast)   Quantity:  180 tablet   Refills:  1       TRIAD HYDROPHILIC WOUND DRESSI Pste   Used for:  Open wound of right lower leg, initial encounter        Dose:  1 applicator   Externally apply 1 g topically daily   Quantity:  1 Tube   Refills:  3          CONTINUE these medicines which have NOT CHANGED        Dose / Directions    blood glucose calibration solution   Commonly known as:  NO BRAND SPECIFIED   Used for:  Type 2 diabetes mellitus with other circulatory complication, without long-term current use of insulin (H)        To accompany: Blood Glucose Monitor Brands: per insurance.   Quantity:  1 Bottle   Refills:  3       blood glucose lancing device   Commonly known as:  no brand specified   Used for:  Type 2 diabetes mellitus with other circulatory complication, without long-term current use of insulin (H)        Use to test blood sugars 2 times daily or as directed.   Quantity:  1 each   Refills:  0       * blood glucose monitoring test strip   Commonly known as:  no brand specified   Used for:  Type 2 diabetes mellitus with other circulatory complication, without long-term current use of insulin (H)        Use to test blood sugars 2 times daily or as directed   Quantity:  100 strip   Refills:  0       * blood glucose monitoring test strip   Commonly known as:  no brand specified   Used for:  Type 2 diabetes mellitus with other circulatory complication, without long-term current use of insulin (H)        Use to test blood sugar 2x  daily or as directed. To accompany: Blood Glucose Monitor Brands: per insurance.   Quantity:  100 strip   Refills:  1       * GLUCOCARD EXPRESSION MONITOR w/Device Kit        USE TWICE DAILY TO TEST BLOOD GLUCOSE   Refills:  0       * blood glucose monitoring meter device kit   Commonly known as:  no brand specified   Used for:  Type 2 diabetes mellitus with other circulatory complication, without long-term current use of insulin (H)        test blood sugar 2 xdaily or as directed.  Blood Glucose Monitor Brands: per insurance.   Quantity:  1 kit   Refills:  0       order for DME   Used for:  Type 2 diabetes mellitus with other circulatory complication, without long-term current use of insulin (H)        One touch glucose  monitoring strip with Glucometer and lancets.   Quantity:  1 Box   Refills:  1       order for DME   Used for:  Primary osteoarthritis of right knee, Lymphedema of right lower extremity        Equipment being ordered: compression stockings below knee and above knee if available.  Medium compression.   Quantity:  1 Units   Refills:  1       order for DME   Used for:  Type 2 diabetes mellitus with other circulatory complication, without long-term current use of insulin (H), Morbid obesity due to excess calories (H), History of coronary artery stent placement, Gastroesophageal reflux disease without esophagitis, Chronic pain of left knee, [place] (obstructive sleep apnea)        Equipment being ordered: Needs to have a sleep chair for home use.   Quantity:  1 Device   Refills:  0       order for DME   Used for:  Neuropathy, Lymphedema of right lower extremity, Lymphedema        Compression garments toe to knee. ?Specific brand is Compraflex Light Compression garment.  Diagnosis codes for venous stasis ulcer I83.029 and for ?Lymphoedema is I89.0  Goal is for compression of about 30 to 40 mm of mercury  measurements are right ankle 28.5 cm and right calf is 45.5 cm. ?Left ankle is 27.5 cm and the calf is 45 cm  Tall for length.   Quantity:  2 Device   Refills:  1       thin lancets   Commonly known as:  NO BRAND SPECIFIED   Used for:  Type 2 diabetes mellitus with other circulatory complication, without long-term current use of insulin (H)        Test 2 x daily or as directed.  Brands: per insurance.   Quantity:  100 each   Refills:  1       * Notice:  This list has 4 medication(s) that are the same as other medications prescribed for you. Read the directions carefully, and ask your doctor or other care provider to review them with you.             Protect others around you: Learn how to safely use, store and throw away your medicines at www.disposemymeds.org.         Follow-ups after your visit        Your next 10  appointments already scheduled     Aug 20, 2018  9:00 AM CDT   Return Visit with  WOUND EXAM ROOM   [facility]    [address] [id number] [phone]            Aug 28, 2018  3:00 PM CDT   New Visit with Arnav Kohli MD   [facility] ([facility])    [address] [id number] [phone]            Sep 20, 2018 10:10 AM CDT   Return Visit with Martha Reddy PA-C   [facility] ([facility])    [address]  [place] [id number] [phone]               Care Instructions        Further instructions from your care team       Today the wounds on your left lower leg has increased in size of length and there is some decrease in width.  Only true improvement I note is that the majority of the hypergranular tissue has resolved and the wound beds are mostly all granular tissue.  Continue with three times weekly dressing changes Monday, Wednesday and Friday to be done at same time as the lymphedema wraps are being changed.  I will talk with Sunita, the lymphedema therapist with your home care agency, to find out if she has all the information needed to order you premade velcrow compression garments.  Once these are available your wounds should improve more consistently as the garments will be more easily applied in a more consistent fashion.    anse wounds with soap and water, normal saline or a no rinse dermal cleanser and dry well.  For both of the wounds on the left lower leg continue to apply Silver alginate to the wound beds, cover with ABD pad secured with roll gauze and tape.  Continue with the Dressing changes and lymphedema wraps Monday Wed and Fridays.    I will see you again in about three weeks on 8/20/18    Any questions call the scheduling line at [phone].    Jett Garza RN [email or web address]      Additional Information About Your Visit        Care EveryWhere ID     This is your Care EveryWhere ID. This could be used by other organizations to access your [facility] medical records  [id number]         Primary Care Provider Office Phone # Fax #    Julio Cesar Esteban PA-C [phone]      Equal Access to Services     KATDEBBI EKATERINA : Hadii aad ku hadasho Soomaali, waaxda luqadaha, qaybta kaalmada adeegyada, adams pinedain contrerasn adebrady gonsales laGaspergabriella . So [facility] [phone].    ATENCIÓN: Si habla español, tiene a wilkinson disposición servicios gratuitos de asistencia lingüística. Frances al [phone].    We comply with applicable federal civil rights laws and [place] laws. We do not discriminate on the basis of race, color, national origin, age, disability, sex, sexual orientation, or gender identity.            Thank you!     Thank you for choosing [facility] for your care. Our goal is always to provide you with excellent care. Hearing back from our patients is one way we can continue to improve our services. Please take a few minutes to complete the written survey that you may receive in the mail after you visit with us. Thank you!             Medication List: This is a list of all your medications and when to take them. Check marks below indicate your daily home schedule. Keep this list as a reference.      Medications           Morning Afternoon Evening Bedtime As Needed    acetaminophen 500 MG tablet   Commonly known as:  TYLENOL   Take 1-2 tablets (500-1,000 mg) by mouth every 6 hours as needed for mild pain                                blood glucose calibration solution   Commonly known as:  NO BRAND SPECIFIED   To accompany: Blood Glucose Monitor Brands: per insurance.                                blood glucose lancing device   Commonly known as:  no brand specified   Use to test blood sugars 2 times daily or as directed.                                * blood glucose monitoring test strip   Commonly  known as:  no brand specified   Use to test blood sugars 2 times daily or as directed                                * blood glucose monitoring test strip   Commonly known as:  no brand specified   Use to test blood sugar 2x  daily or as directed. To accompany: Blood Glucose Monitor Brands: per insurance.                                clopidogrel 75 MG tablet   Commonly known as:  PLAVIX   Take 1 tablet (75 mg) by mouth daily                                cyclobenzaprine 10 MG tablet   Commonly known as:  FLEXERIL   TAKE 1 TAB BY MOUTH TWICE DAILY AS NEEDED FOR ROTATOR CUFF TEAR                                ferrous sulfate 325 (65 Fe) MG tablet   Commonly known as:  IRON   Take 1 tablet (325 mg) by mouth daily (with breakfast)                                furosemide 40 MG tablet   Commonly known as:  LASIX   Take 1 tablet (40 mg) by mouth daily                                * GLUCOCARD EXPRESSION MONITOR w/Device Kit   USE TWICE DAILY TO TEST BLOOD GLUCOSE                                * blood glucose monitoring meter device kit   Commonly known as:  no brand specified   test blood sugar 2 xdaily or as directed.  Blood Glucose Monitor Brands: per insurance.                                lidocaine 5 % ointment   Commonly known as:  XYLOCAINE   Apply topically daily                                losartan 25 MG tablet   Commonly known as:  COZAAR   Take 1 tablet (25 mg) by mouth daily                                methocarbamol 500 MG tablet   Commonly known as:  ROBAXIN   Take 1 tablet (500 mg) by mouth 4 times daily as needed for muscle spasms                                metoprolol tartrate 25 MG tablet   Commonly known as:  LOPRESSOR   Take 1 tablet (25 mg) by mouth 2 times daily                                order for DME   One touch glucose monitoring strip with Glucometer and lancets.                                order for DME   Equipment being ordered: compression stockings below knee and above  knee if available.  Medium compression.                                order for DME   Equipment being ordered: Needs to have a sleep chair for home use.                                order for DME   Compression garments toe to knee. ?Specific brand is Compraflex Light Compression garment.  Diagnosis codes for venous stasis ulcer I83.029 and for ?Lymphoedema is I89.0  Goal is for compression of about 30 to 40 mm of mercury  measurements are right ankle 28.5 cm and right calf is 45.5 cm. ?Left ankle is 27.5 cm and the calf is 45 cm  Tall for length.                                pantoprazole 40 MG EC tablet   Commonly known as:  PROTONIX   Take 1 tablet (40 mg) by mouth daily Take 30-60 minutes before a meal.                                rosuvastatin 10 MG tablet   Commonly known as:  CRESTOR   Take 1 tablet (10 mg) by mouth daily                                senna-docusate 8.6-50 MG per tablet   Commonly known as:  SENOKOT-S;PERICOLACE   Take 1 tablet by mouth 2 times daily as needed for constipation                                SitaGLIPtin-MetFORMIN HCl  MG Tb24   Take 2 tablets by mouth daily (with breakfast)                                thin lancets   Commonly known as:  NO BRAND SPECIFIED   Test 2 x daily or as directed.  Brands: per insurance.                                TRIAD HYDROPHILIC WOUND DRESSI Pste   Externally apply 1 g topically daily                                * Notice:  This list has 4 medication(s) that are the same as other medications prescribed for you. Read the directions carefully, and ask your doctor or other care provider to review them with you.

## 2018-07-30 NOTE — TELEPHONE ENCOUNTER
homecare nurse calling to report she received a call from the pt stating he was recently switched to a new iron pill and since then he has had intermittent SOB and diarrhea. The homecare nurse would liek to get an order for the pt to have his HGB drawn.  She states no other sx.  Please advise.  thanks

## 2018-07-31 ENCOUNTER — TELEPHONE (OUTPATIENT)
Dept: FAMILY MEDICINE | Facility: OTHER | Age: 68
End: 2018-07-31

## 2018-07-31 NOTE — TELEPHONE ENCOUNTER
Reason for Call:  Form, our goal is to have forms completed with 72 hours, however, some forms may require a visit or additional information.    Type of letter, form or note:  medical    Who is the form from?: [employer] (if other please explain)    Where did the form come from: form was faxed in    What clinic location was the form placed at?: [facility] - [phone]    Where the form was placed: 's Box    What number is listed as a contact on the form?: fax [phone]       Additional comments: please complete and fax back    Call taken on 7/31/2018 at 4:13 PM by Olivia Mayfield

## 2018-07-31 NOTE — TELEPHONE ENCOUNTER
In my MA box (overhead file over my desk) for completion and scan of the document into the medical record.  Electronically signed:    Julio Cesar Sahu PA-C

## 2018-07-31 NOTE — TELEPHONE ENCOUNTER
Form has been faxed, sent to scanning and copy placed in Julio Cesar Esteban's  fax folder  Closing encounter  Josselin Montes RT (R)

## 2018-08-01 ENCOUNTER — TRANSFERRED RECORDS (OUTPATIENT)
Dept: HEALTH INFORMATION MANAGEMENT | Facility: CLINIC | Age: 68
End: 2018-08-01

## 2018-08-01 LAB
ALT SERPL-CCNC: 17 IU/L (ref 12–68)
AST SERPL-CCNC: 13 IU/L (ref 12–37)
CREAT SERPL-MCNC: 1.1 MG/DL (ref 0.7–1.3)
GFR SERPL CREATININE-BSD FRML MDRD: >60 ML/MIN/1.73M2
GLUCOSE SERPL-MCNC: 114 MG/DL (ref 74–106)
POTASSIUM SERPL-SCNC: 4.6 MMOL/L (ref 3.5–5.1)

## 2018-08-03 ENCOUNTER — TELEPHONE (OUTPATIENT)
Dept: FAMILY MEDICINE | Facility: OTHER | Age: 68
End: 2018-08-03

## 2018-08-03 NOTE — TELEPHONE ENCOUNTER
Reason for Call:  Form, our goal is to have forms completed with 72 hours, however, some forms may require a visit or additional information.    Type of letter, form or note:  medical    Who is the form from?: [employer] (if other please explain)    Where did the form come from: form was faxed in    What clinic location was the form placed at?: [facility] - [phone]    Where the form was placed: 's Box    What number is listed as a contact on the form?: [phone]       Additional comments: n/a    Call taken on 8/3/2018 at 7:04 AM by Diamond Juarez

## 2018-08-06 ENCOUNTER — TELEPHONE (OUTPATIENT)
Dept: SURGERY | Facility: CLINIC | Age: 68
End: 2018-08-06

## 2018-08-06 ENCOUNTER — TELEPHONE (OUTPATIENT)
Dept: FAMILY MEDICINE | Facility: OTHER | Age: 68
End: 2018-08-06

## 2018-08-06 NOTE — TELEPHONE ENCOUNTER
"Pt calling in concerned about his left lower leg wounds. States that home care was there today and nurse nor he thought his wounds were looking as good as they were. \"wet\" mostly on the upper part.  I told pt that I would let Jett Garza know. YAHIR Wilkins     "

## 2018-08-06 NOTE — TELEPHONE ENCOUNTER
WO nurse, returned call to pt via his mobile phone listed in Epic, no answer, was able to leave message.  Pt is not due to return to the wound and ostomy clinic for three more weeks.  Instructed pt if new concerns are noted with the wound to schedule return visit for next available opening that will work with his schedule.  Scheduling department phone line number given in message

## 2018-08-06 NOTE — TELEPHONE ENCOUNTER
Spoke with pt and relayed information. Pt is going to trust his gut and ask to do it in the hospital setting.

## 2018-08-06 NOTE — TELEPHONE ENCOUNTER
If the patient is truly seeing an orthodontist for this work my suspicion is that they have seen this in the past as well.  His risk factors are low at this point in time and Plavix is not is problematic with bleeding as other medications in that class would be.  I would have him trust the orthodontist judgment at this point time.  I tried to call the due to phone system problems today I only got a busy signal.  Please pass this information along to the patient as we are able.  Electronically signed:    Julio Cesar Sahu PA-C

## 2018-08-06 NOTE — TELEPHONE ENCOUNTER
"Reason for Call:  Other pt is concerned about plavix    Detailed comments: dental work is being done and he is concerned about his plavix and being on this during dental work,wants to know how long to go off this, he may choose to do this in a hospital setting so he can be \"put out\" (anesthesia)     Phone Number Patient can be reached at: Home number on file [phone] (home)    Best Time: any    Can we leave a detailed message on this number? YES    Call taken on 8/6/2018 at 12:12 PM by Barbara Mahoney      "

## 2018-08-06 NOTE — TELEPHONE ENCOUNTER
Spoke with pt.     Orthodontist wants pt to stay on Plavix and do this procedure in office instead of in a hospital setting. Pt is VERY concerned that staying on the Plavix will cause to much bleeding for dentist to control in an office setting. I gave pt the message and he also thought it was ok to go off it for the procedure. He would like Replaced by [facility] thoughts on staying on the medication AND doing it an in office procedure (3 times) as pt is very concerned and conflicted about the dentist wanting to do this.

## 2018-08-07 ENCOUNTER — TELEPHONE (OUTPATIENT)
Dept: FAMILY MEDICINE | Facility: OTHER | Age: 68
End: 2018-08-07

## 2018-08-07 NOTE — TELEPHONE ENCOUNTER
Reason for Call:  Form, our goal is to have forms completed with 72 hours, however, some forms may require a visit or additional information.    Type of letter, form or note:  [place]    Who is the form from?: Home care    Where did the form come from: form was faxed in    What clinic location was the form placed at?: [facility] - [phone]    Where the form was placed: 's Box    What number is listed as a contact on the form?: [phone]       Additional comments: fax to [phone]    Call taken on 8/7/2018 at 4:30 PM by Maggie Morgan

## 2018-08-07 NOTE — TELEPHONE ENCOUNTER
Please see message below, patient called back and would like a call regarding his wound concerns.

## 2018-08-09 ENCOUNTER — TELEPHONE (OUTPATIENT)
Dept: FAMILY MEDICINE | Facility: OTHER | Age: 68
End: 2018-08-09

## 2018-08-09 NOTE — TELEPHONE ENCOUNTER
Reason for Call:  Form, our goal is to have forms completed with 72 hours, however, some forms may require a visit or additional information.    Type of letter, form or note:  medical    Who is the form from?: [facility] (if other please explain)    Where did the form come from: form was faxed in    What clinic location was the form placed at?: [facility] - [phone]    Where the form was placed: 's Box    What number is listed as a contact on the form?:  Fax [phone]       Additional comments: Please complete and fax     Call taken on 8/9/2018 at 3:47 PM by Olivia Mayfield

## 2018-08-13 PROBLEM — N39.46 MIXED INCONTINENCE: Status: ACTIVE | Noted: 2018-08-13

## 2018-08-13 NOTE — TELEPHONE ENCOUNTER
Forms have been completed, signed, faxed/mailed, and sent to scanning.  Lisa Zuluaga CMA ([facility])

## 2018-08-14 DIAGNOSIS — E11.59 TYPE 2 DIABETES MELLITUS WITH OTHER CIRCULATORY COMPLICATION, WITHOUT LONG-TERM CURRENT USE OF INSULIN (H): ICD-10-CM

## 2018-08-14 NOTE — TELEPHONE ENCOUNTER
"Requested Prescriptions   Pending Prescriptions Disp Refills     blood glucose monitoring (NO BRAND SPECIFIED) test strip 100 strip 0     Sig: Use to test blood sugars 2 times daily or as directed    Diabetic Supplies Protocol Passed    8/14/2018 10:40 AM       Passed - Patient is 18 years of age or older       Passed - Recent (6 mo) or future (30 days) visit within the authorizing provider's specialty    Patient had office visit in the last 6 months or has a visit in the next 30 days with authorizing provider.  See \"Patient Info\" tab in inbasket, or \"Choose Columns\" in Meds & Orders section of the refill encounter.          blood glucose monitoring (NO BRAND SPECIFIED) test strip  Prescription approved per [facility] Refill Protocol.  Due for next OV 09/29/2018.  Please assist with scheduling.    Hermelinda Coronado RN, BSN   "

## 2018-08-14 NOTE — TELEPHONE ENCOUNTER
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 Pt uses the GlucoCard Expression Test Strips and needs to have the section of the sig that says \"or as needed removed\" for insurance to cover this. Please reorder and pharmacy will call pt when ready to .    Catina Varma CMA ([facility])    "

## 2018-08-15 ENCOUNTER — TELEPHONE (OUTPATIENT)
Dept: FAMILY MEDICINE | Facility: OTHER | Age: 68
End: 2018-08-15

## 2018-08-15 NOTE — TELEPHONE ENCOUNTER
Reason for Call:  Home Health Care    Arnav with [facility] called regarding (reason for call): rectification orders     Orders are needed for this patient. Skilled nursing     PT: n/a    OT: n/a    Skilled Nursing: 3 x a week for up to 8 weeks max for wound care on left leg    Pt Provider: Dr Copeland    Phone Number Homecare Nurse can be reached at: [phone]    Can we leave a detailed message on this number? YES        Call taken on 8/15/2018 at 9:49 AM by Diamond Juarez

## 2018-08-15 NOTE — TELEPHONE ENCOUNTER
Reason for Call:  Form, our goal is to have forms completed with 72 hours, however, some forms may require a visit or additional information.    Type of letter, form or note:  medical    Who [facility] the [facility] (if other please explain)    Where did the form come from: form was faxed in    What clinic location was the form placed at?: [facility] - [phone]    Where the form was placed: Dr's Box    What number is listed as a contact on the form?: [phone]       Additional comments: n/a    Call taken on 8/15/2018 at 4:23 PM by Diamond Juarez

## 2018-08-15 NOTE — TELEPHONE ENCOUNTER
Left message for Arnav to return call. Please inform of message below.    Meme Maki, [facility]  August 15, 2018

## 2018-08-16 ENCOUNTER — HOSPITAL ENCOUNTER (OUTPATIENT)
Dept: WOUND CARE | Facility: CLINIC | Age: 68
Discharge: HOME | End: 2018-08-16
Attending: PHYSICIAN ASSISTANT | Admitting: PHYSICIAN ASSISTANT
Payer: MEDICARE

## 2018-08-16 PROCEDURE — G0463 HOSPITAL OUTPT CLINIC VISIT: HCPCS

## 2018-08-16 NOTE — IP AVS SNAPSHOT
[address] [address]    Phone:  [phone]    Fax:  [phone]                                       After Visit Summary   8/16/2018    Glenroy Padilla    MRN: [id number]           After Visit Summary Signature Page     I have received my discharge instructions, and my questions have been answered. I have discussed any challenges I see with this plan with the nurse or doctor.    ..........................................................................................................................................  Patient/Patient Representative Signature      ..........................................................................................................................................  Patient Representative Print Name and Relationship to Patient    ..................................................               ................................................  Date                                            Time    ..........................................................................................................................................  Reviewed by Signature/Title    ...................................................              ..............................................  Date                                                            Time

## 2018-08-16 NOTE — DISCHARGE INSTRUCTIONS
Today the two wounds on your left lower leg have merged into one larger wound.  We will start a new type of treatment on the wound with the use of Iodosorb wound paste.  The goal of the paste is to kill off bacteria and absorb fluid.    New Wound care orders are as follows.  1 Remove old dressing and wash with soap and water, saline or a no rinse wound cleanser.  2 Take the Iodosorb paste and smear it onto a sterile gauze 2x2.  3 Apply the paste side of the gauze to the wound bed, limiting the amount it contacts the surrounding skin.  4 Cover with a 4x4 gauze pad and an ABD pad.  5 Ok to secure the dressing with either roll gauze and tape or just tape.    6 Start the use of your ready wraps for compression as soon as they are available as directed by therapist.  7 Change the dressing three times weekly, first change to be done tomorrow (8/17/18) by your home care staff.    I will see you in clinic on Monday of next week on Monday 8/20/18 at 9:00 am.    Jett Garza RN [email or web address]

## 2018-08-16 NOTE — PROGRESS NOTES
Reason For Visit: Glenroy Padilla, 67 year old male, seen as outpatient to re evaluate and treat left lower leg ulcers. Referred by Dr Shine. Patient presents by himself today.        History: Pt who was referred to me initially back in June of 2017 by Dr Shine for ongoing wound cares.  He returned to his home in [place] soon after and returned to [place] later in 2017.  Pt has had open ulcer of the left lower leg, original injury was related to trauma.  Course of healing was slow due to venous insuffiencey.  When wound was nearly closed new calcium deposits were found and Dr Shine removed these.  He was referred back to me for on going wound care, initial visit this time being 6/21/18.   Recent history: pt had been having a home care agency doing his lymphedema wrap and wound cares three times weekly.  He asked to have the wraps stopped this past week as he did not feel the consistency of the application was helping his wounds progress and might possibly be making them worse.       Personal/social history: Pt is currently living in an AL in [place] with home care from a non  provider.        Objective:   Physical appearance: alert and oriented times three  Ambulation: limited, using electric scooter, able to take few steps to transfer.    Current treatment plan: Silver alginate over wounds covered with ABD pad secured with tape.  Last changed: Monday of this week 8/13/18.      Wound #1 Left lower leg.  Venous stasis ulcer. (Note wound 1, the proximal and wound 2, the distal from last, visit have merged into one larger wound.  Stage/tissue depth: full thickness  8 cm L x 4.5 cm W x 0.3 cm D  Tunneling: no  Undermining: no  Wound bed type/amount: approximately 95 % granular tissue, and 5 % slough, slough located in proximal wound bed edges; NA fluctuant  Wound Edges: open  Periwound: hemosiderin staining, nonpitting edema and scar tissue.  Drainage: Moderate amount  Odor: no  Pain: yes wound hurts most of the  time      Dorsalis Pedal Pulse: weak but palpable: NA doppler: NA phasic  Hair growth: none noted below the knees bilaterally  Capillary Refill: less than 3 seconds  Feet/toes color: pink with some hemosiderin staining  Nails: thick but wnl  R Leg: Edema nonpitting. Ankle circumference NA cm. Calf circumference NA cm.  L Leg: Edema nonpitting. Ankle circumference Na cm. Calf circumference NA cm.      Mobility: limited   Current offloading/footwear: well fitting sneakers  Sensation: peripheral neuropathy in the legs and feet has resolved since his spinal surgery.    HgbA1C: 6.0 Date: 4/20/18  Checks Blood Glucose?:  Once daily, Average Readings: Not assessed this visit  Other callousing/areas of concern: none noted today      Diet: regular with awareness of effects on diabetes control.  Smoking: former cigar smoker      Discussed: etiology of wound (venous stasis ulcers), pathophysiology and patient specific goals for wound healing.   Education: Wound status, role of compression in wound healing and wound prevention, need to start use of Ready Wrap compression garments as soon as available.  Role of new wound care primary dressing and new wound care instructions, f/u plans for return visit.        Assessment:  The two wounds noted at last visit have merged into one larger wound that still has two distinct aspects, the proximal aspect is deeper overall and more well defined.  The distal aspect is much less deep and some aspect of the wound bed are flush with the surrounding skin.  Both areas had serous weeping noted equally.  There is small amount of slough noted well adhered to the inner proximal most edge of the wound bed.        Barriers to wound healing:   Poor nutrition: inadequate supply of protein, carbohydrates, fatty acids, and trace elements essential for all phases of wound healing.  Pt aware of the need for increased protein in diet for healing.   Reduced Blood Supply: inadequate perfusion to heal wound,  NA  Medication: NA  Chemotherapy: suppresses the immune system and inflammatory response, NA  Radiotherapy: increases production of free radical which damage cells, NA  Psychological stress: Not assessed this visit  Obesity: decreases tissue perfusion  Infection: prolongs inflammatory phase, uses vital nutrients, impairs epithelialization and releases toxins.  None noted at this time.   Underlying Disease: diabetes mellitis and autoimmune disorders.    Maceration: reduces wound tensile strength and inhibits epithelial migration.  None noted at today.  Will monitor at each visit.  Patient compliance, NA  Unrelieved pressure, NA  Immobility, NA  Substance abuse: NA      Plan: The wounds have deteriorated as manifest by them now connecting to form one larger wound.  Pt states he is picking up his Ready Wrap compression garments today in therapy department and will start wearing them as directed per Lymphedema therapist.  We changed the plan of care for the wound today, stopping the silver alginate and starting use of Iodosorb paste smeared onto sterile gauze and applied to the wound bed.  The goal would be to reduce/resolve and remaining bacteria not addressed by the silver alginate, promote autolytic debridement of the slough remaining and promoting new scar tissue covering distal aspect and granular tissue to fill in proximal aspect.      Topical care: Iodosorb paste  Offloading: NA  Additional recommendations: none at this time.      Wound Care: cleansed with Microklenz and gauze, patted dry. Periwound protected with NA. Wound base filled with Iodosorb impregnated sterile gauze 2x2. Covered with gauze pads, followed by ABD pad. Secured with roll gauze and tape. To be changed MWF.        Discussed plan of care with patient. Teaching done with patient for dressing changes; pt is unable, home care is able and willing to perform.      The following discharge instructions were reviewed with and sent home with the  patient: See discharge instructions.      The following supplies were sent home with the patient:  Remains of the Iodosorb paste opened but not used up today plus one additional tube.    Will reassess care plan in: pt has prior set appointment for this up coming Monday 8/20/18 and he will keep that appointment for f/u of new plan of care and new Ready Wrap garments.      Return visit: 8/20/18      Verbal, written, & demonstrative education provided.  Face to face time (excluding procedure): approximately 50 minutes.  Procedure: NA  Care plan was changed.      [phone]

## 2018-08-16 NOTE — IP AVS SNAPSHOT
MRN:[id number]                      After Visit Summary   8/16/2018    Glenroy Padilla    MRN: [id number]           Visit Information        Provider Department      8/16/2018  9:00 AM PH WOUND EXAM ROOM [facility]           Review of your medicines      UNREVIEWED medicines. Ask your doctor about these medicines        Dose / Directions    acetaminophen 500 MG tablet   Commonly known as:  TYLENOL   Used for:  Chronic pain of left knee, Complete tear of left rotator cuff        Dose:  500-1000 mg   Take 1-2 tablets (500-1,000 mg) by mouth every 6 hours as needed for mild pain   Quantity:  120 tablet   Refills:  4       clopidogrel 75 MG tablet   Commonly known as:  PLAVIX   Used for:  Type 2 diabetes mellitus with other circulatory complication, without long-term current use of insulin (H), S/P spinal surgery, Hypertension, goal below 140/90, Hyperlipidemia LDL goal <100        Dose:  75 mg   Take 1 tablet (75 mg) by mouth daily   Quantity:  90 tablet   Refills:  1       cyclobenzaprine 10 MG tablet   Commonly known as:  FLEXERIL        TAKE 1 TAB BY MOUTH TWICE DAILY AS NEEDED FOR ROTATOR CUFF TEAR   Refills:  0       ferrous sulfate 325 (65 Fe) MG tablet   Commonly known as:  IRON   Used for:  History of anemia        Dose:  325 mg   Take 1 tablet (325 mg) by mouth daily (with breakfast)   Quantity:  90 tablet   Refills:  1       furosemide 40 MG tablet   Commonly known as:  LASIX   Used for:  Hypertension, goal below 140/90        Dose:  40 mg   Take 1 tablet (40 mg) by mouth daily   Quantity:  90 tablet   Refills:  1       lidocaine 5 % ointment   Commonly known as:  XYLOCAINE   Used for:  Chronic pain of right knee        Apply topically daily   Quantity:  50 g   Refills:  3       losartan 25 MG tablet   Commonly known as:  COZAAR   Used for:  Hypertension, goal below 140/90, Type 2 diabetes mellitus with other circulatory complication, without long-term current use of insulin  (H)        Dose:  25 mg   Take 1 tablet (25 mg) by mouth daily   Quantity:  90 tablet   Refills:  1       methocarbamol 500 MG tablet   Commonly known as:  ROBAXIN   Used for:  S/P spinal surgery        Dose:  500 mg   Take 1 tablet (500 mg) by mouth 4 times daily as needed for muscle spasms   Quantity:  70 tablet   Refills:  1       metoprolol tartrate 25 MG tablet   Commonly known as:  LOPRESSOR   Used for:  Type 2 diabetes mellitus with other circulatory complication, without long-term current use of insulin (H), History of coronary artery stent placement, Hypertension, goal below 140/90        Dose:  25 mg   Take 1 tablet (25 mg) by mouth 2 times daily   Quantity:  60 tablet   Refills:  4       pantoprazole 40 MG EC tablet   Commonly known as:  PROTONIX   Used for:  Gastroesophageal reflux disease without esophagitis        Dose:  40 mg   Take 1 tablet (40 mg) by mouth daily Take 30-60 minutes before a meal.   Quantity:  30 tablet   Refills:  0       rosuvastatin 10 MG tablet   Commonly known as:  CRESTOR   Used for:  Hyperlipidemia LDL goal <100        Dose:  10 mg   Take 1 tablet (10 mg) by mouth daily   Quantity:  90 tablet   Refills:  1       senna-docusate 8.6-50 MG per tablet   Commonly known as:  SENOKOT-S;PERICOLACE   Used for:  Spinal stenosis, lumbar region, without neurogenic claudication        Dose:  1 tablet   Take 1 tablet by mouth 2 times daily as needed for constipation   Quantity:  100 tablet   Refills:  0       SitaGLIPtin-MetFORMIN HCl  MG Tb24   Used for:  Type 2 diabetes mellitus with other circulatory complication, without long-term current use of insulin (H)        Dose:  2 tablet   Take 2 tablets by mouth daily (with breakfast)   Quantity:  180 tablet   Refills:  1       TRIAD HYDROPHILIC WOUND DRESSI Pste   Used for:  Open wound of right lower leg, initial encounter        Dose:  1 applicator   Externally apply 1 g topically daily   Quantity:  1 Tube   Refills:  3          CONTINUE these medicines which have NOT CHANGED        Dose / Directions    blood glucose calibration solution   Commonly known as:  NO BRAND SPECIFIED   Used for:  Type 2 diabetes mellitus with other circulatory complication, without long-term current use of insulin (H)        To accompany: Blood Glucose Monitor Brands: per insurance.   Quantity:  1 Bottle   Refills:  3       blood glucose lancing device   Commonly known as:  no brand specified   Used for:  Type 2 diabetes mellitus with other circulatory complication, without long-term current use of insulin (H)        Use to test blood sugars 2 times daily or as directed.   Quantity:  1 each   Refills:  0       * blood glucose monitoring test strip   Commonly known as:  no brand specified   Used for:  Type 2 diabetes mellitus with other circulatory complication, without long-term current use of insulin (H)        Use to test blood sugar 2x  daily or as directed. To accompany: Blood Glucose Monitor Brands: per insurance.   Quantity:  100 strip   Refills:  1       * blood glucose monitoring test strip   Commonly known as:  no brand specified   Used for:  Type 2 diabetes mellitus with other circulatory complication, without long-term current use of insulin (H)        Use to test blood sugar 2 times daily Blood Glucose Monitor Brands: Glucocard Expression   Quantity:  100 strip   Refills:  6       * GLUCOCARD EXPRESSION MONITOR w/Device Kit        USE TWICE DAILY TO TEST BLOOD GLUCOSE   Refills:  0       * blood glucose monitoring meter device kit   Commonly known as:  no brand specified   Used for:  Type 2 diabetes mellitus with other circulatory complication, without long-term current use of insulin (H)        test blood sugar 2 xdaily or as directed.  Blood Glucose Monitor Brands: per insurance.   Quantity:  1 kit   Refills:  0       order for DME   Used for:  Type 2 diabetes mellitus with other circulatory complication, without long-term current use of insulin  (H)        One touch glucose monitoring strip with Glucometer and lancets.   Quantity:  1 Box   Refills:  1       order for DME   Used for:  Primary osteoarthritis of right knee, Lymphedema of right lower extremity        Equipment being ordered: compression stockings below knee and above knee if available.  Medium compression.   Quantity:  1 Units   Refills:  1       order for DME   Used for:  Type 2 diabetes mellitus with other circulatory complication, without long-term current use of insulin (H), Morbid obesity due to excess calories (H), History of coronary artery stent placement, Gastroesophageal reflux disease without esophagitis, Chronic pain of left knee, [place] (obstructive sleep apnea)        Equipment being ordered: Needs to have a sleep chair for home use.   Quantity:  1 Device   Refills:  0       order for DME   Used for:  Neuropathy, Lymphedema of right lower extremity, Lymphedema        Compression garments toe to knee. ?Specific brand is Compraflex Light Compression garment.  Diagnosis codes for venous stasis ulcer I83.029 and for ?Lymphoedema is I89.0  Goal is for compression of about 30 to 40 mm of mercury  measurements are right ankle 28.5 cm and right calf is 45.5 cm. ?Left ankle is 27.5 cm and the calf is 45 cm  Tall for length.   Quantity:  2 Device   Refills:  1       thin lancets   Commonly known as:  NO BRAND SPECIFIED   Used for:  Type 2 diabetes mellitus with other circulatory complication, without long-term current use of insulin (H)        Test 2 x daily or as directed.  Brands: per insurance.   Quantity:  100 each   Refills:  1       * Notice:  This list has 4 medication(s) that are the same as other medications prescribed for you. Read the directions carefully, and ask your doctor or other care provider to review them with you.             Protect others around you: Learn how to safely use, store and throw away your medicines at www.disposemymeds.org.         Follow-ups after your  visit        Your next 10 appointments already scheduled     Aug 20, 2018  9:00 AM CDT   Return Visit with  WOUND EXAM ROOM   [facility]    [address] [address]   [phone]            Aug 28, 2018  3:00 PM CDT   New Visit with Arnav Kohli MD   [facility]    [address] [address]   [phone]            Sep 20, 2018 10:10 AM CDT   [facility]-C   [facility]    [address], [address]  [facility] [address] [phone]            Sep 27, 2018  9:00 AM CDT   Pre-Op physical with Julio Cesar Esteban PA-C   [facility]    [address] [id number] [phone]               Care Instructions        Further instructions from your care team       Today the two wounds on your left lower leg have merged into one larger wound.  We will start a new type of treatment on the wound with the use of Iodosorb wound paste.  The goal of the paste is to kill off bacteria and absorb fluid.    New Wound care orders are as follows.  1 Remove old dressing and wash with soap and water, saline or a no rinse wound cleanser.  2 Take the Iodosorb paste and smear it onto a sterile gauze 2x2.  3 Apply the paste side of the gauze to the wound bed, limiting the amount it contacts the surrounding skin.  4 Cover with a 4x4 gauze pad and an ABD pad.  5 Ok to secure the dressing with either roll gauze and tape or just tape.    6 Start the use of your ready wraps for compression as soon as they are available as directed by therapist.  7 Change the dressing three times weekly, first change to be done tomorrow (8/17/18) by your home care staff.    I will see you in clinic on Monday of next week on Monday 8/20/18 at 9:00 am.    Jett  Greg SINCLAIR [email or web address]         Additional Information About Your Visit        Care EveryWhere ID     This is your Care EveryWhere ID. This could be used by other organizations to access your [facility] medical records  [id number]         Primary Care Provider Office Phone # Fax #    Julio Cesar Esteban PA-C [phone]      Equal Access to Services     [facility]: Hadii aad ku hadasho Soomaali, waaxda luqadaha, qaybta kaalmada adeegyada, waxay idiin hayaan adeeg khrenzosh laGasperaan . So [facility] [phone].    ATENCIÓN: Si habla español, tiene a wilkinson disposición servicios gratuitos de asistencia lingüística. Llame al [phone].    We comply with applicable federal civil rights laws and [place] laws. We do not discriminate on the basis of race, color, national origin, age, disability, sex, sexual orientation, or gender identity.            Thank you!     Thank you for choosing [facility] for your care. Our goal is always to provide you with excellent care. Hearing back from our patients is one way we can continue to improve our services. Please take a few minutes to complete the written survey that you may receive in the mail after you visit with us. Thank you!             Medication List: This is a list of all your medications and when to take them. Check marks below indicate your daily home schedule. Keep this list as a reference.      Medications           Morning Afternoon Evening Bedtime As Needed    acetaminophen 500 MG tablet   Commonly known as:  TYLENOL   Take 1-2 tablets (500-1,000 mg) by mouth every 6 hours as needed for mild pain                                blood glucose calibration solution   Commonly known as:  NO BRAND SPECIFIED   To accompany: Blood Glucose Monitor Brands: per insurance.                                blood glucose lancing device   Commonly known as:  no brand specified   Use to test blood sugars 2 times daily or as directed.                                * blood glucose  monitoring test strip   Commonly known as:  no brand specified   Use to test blood sugar 2x  daily or as directed. To accompany: Blood Glucose Monitor Brands: per insurance.                                * blood glucose monitoring test strip   Commonly known as:  no brand specified   Use to test blood sugar 2 times daily Blood Glucose Monitor Brands: Glucocard Expression                                clopidogrel 75 MG tablet   Commonly known as:  PLAVIX   Take 1 tablet (75 mg) by mouth daily                                cyclobenzaprine 10 MG tablet   Commonly known as:  FLEXERIL   TAKE 1 TAB BY MOUTH TWICE DAILY AS NEEDED FOR ROTATOR CUFF TEAR                                ferrous sulfate 325 (65 Fe) MG tablet   Commonly known as:  IRON   Take 1 tablet (325 mg) by mouth daily (with breakfast)                                furosemide 40 MG tablet   Commonly known as:  LASIX   Take 1 tablet (40 mg) by mouth daily                                * GLUCOCARD EXPRESSION MONITOR w/Device Kit   USE TWICE DAILY TO TEST BLOOD GLUCOSE                                * blood glucose monitoring meter device kit   Commonly known as:  no brand specified   test blood sugar 2 xdaily or as directed.  Blood Glucose Monitor Brands: per insurance.                                lidocaine 5 % ointment   Commonly known as:  XYLOCAINE   Apply topically daily                                losartan 25 MG tablet   Commonly known as:  COZAAR   Take 1 tablet (25 mg) by mouth daily                                methocarbamol 500 MG tablet   Commonly known as:  ROBAXIN   Take 1 tablet (500 mg) by mouth 4 times daily as needed for muscle spasms                                metoprolol tartrate 25 MG tablet   Commonly known as:  LOPRESSOR   Take 1 tablet (25 mg) by mouth 2 times daily                                order for DME   One touch glucose monitoring strip with Glucometer and lancets.                                order for DME    Equipment being ordered: compression stockings below knee and above knee if available.  Medium compression.                                order for DME   Equipment being ordered: Needs to have a sleep chair for home use.                                order for DME   Compression garments toe to knee. ?Specific brand is Compraflex Light Compression garment.  Diagnosis codes for venous stasis ulcer I83.029 and for ?Lymphoedema is I89.0  Goal is for compression of about 30 to 40 mm of mercury  measurements are right ankle 28.5 cm and right calf is 45.5 cm. ?Left ankle is 27.5 cm and the calf is 45 cm  Tall for length.                                pantoprazole 40 MG EC tablet   Commonly known as:  PROTONIX   Take 1 tablet (40 mg) by mouth daily Take 30-60 minutes before a meal.                                rosuvastatin 10 MG tablet   Commonly known as:  CRESTOR   Take 1 tablet (10 mg) by mouth daily                                senna-docusate 8.6-50 MG per tablet   Commonly known as:  SENOKOT-S;PERICOLACE   Take 1 tablet by mouth 2 times daily as needed for constipation                                SitaGLIPtin-MetFORMIN HCl  MG Tb24   Take 2 tablets by mouth daily (with breakfast)                                thin lancets   Commonly known as:  NO BRAND SPECIFIED   Test 2 x daily or as directed.  Brands: per insurance.                                TRIAD HYDROPHILIC WOUND DRESSI Pste   Externally apply 1 g topically daily                                * Notice:  This list has 4 medication(s) that are the same as other medications prescribed for you. Read the directions carefully, and ask your doctor or other care provider to review them with you.

## 2018-08-17 ENCOUNTER — TELEPHONE (OUTPATIENT)
Dept: FAMILY MEDICINE | Facility: OTHER | Age: 68
End: 2018-08-17

## 2018-08-17 NOTE — TELEPHONE ENCOUNTER
I spoke with patient.   He has been having symptoms for about a month since the pharmacy changed manufacturers   Symptoms include change in bowel movements, intermittent black stools, fatigue, and no energy.   Patient recently had blood work done through [facility].   These symptoms have not changed in the last month.   He only wants to see [facility].   Scheduled, but advised to call or be seen with worsening symptoms.     Next 5 appointments (look out 90 days)     Aug 20, 2018  9:00 AM CDT   [facility]   [facility]    [address] [address]   [phone]            Aug 23, 2018 10:20 AM CDT   Office Visit with Julio Cesar Esteban PA-C   [facility]    [address] [address]   [phone]            Sep 20, 2018 10:10 AM CDT   Return Visit with Martha Reddy PA-C   [facility]    [address] [place] [address], [address]  [facility] [address]   [phone]            Sep 25, 2018  8:40 AM CDT   Pre-Op physical with Julio Cesar Esteban PA-C   [facility]    [address] [address]   [phone]            Sep 27, 2018  9:00 AM CDT   Pre-Op physical with Julio Cesar Esteban PA-C   [facility] ([facility])    [address] [address]   [phone]                Rossi Pérez, RN, BSN

## 2018-08-17 NOTE — TELEPHONE ENCOUNTER
Patient informed of the message below.  He says that something is off since [employer] changed his iron pill and his crestor was changed he said. BM problems, SOB, appetite isn't really good, can hardly eat.  A lot of discomfort.  RN please triage.  Kingsley Zarco, [facility]

## 2018-08-17 NOTE — TELEPHONE ENCOUNTER
Other than slight elevation of glucose labs are normal.  Electronically signed:    Julio Cesar Sahu PA-C

## 2018-08-17 NOTE — TELEPHONE ENCOUNTER
Reason for Call:  Request for results:    Name of test or procedure: labs    Date of test of procedure: 8-1    Location of the test or procedure: [facility] to leave the result message on voice mail or with a family member? YES    Phone number Patient can be reached at:  Home number on file [phone] (home)    Additional comments: call with results.    Call taken on 8/17/2018 at 11:56 AM by Neda Cortez

## 2018-08-20 ENCOUNTER — TELEPHONE (OUTPATIENT)
Dept: FAMILY MEDICINE | Facility: OTHER | Age: 68
End: 2018-08-20

## 2018-08-20 ENCOUNTER — HOSPITAL ENCOUNTER (OUTPATIENT)
Dept: WOUND CARE | Facility: CLINIC | Age: 68
Discharge: HOME | End: 2018-08-20
Attending: PHYSICIAN ASSISTANT | Admitting: PHYSICIAN ASSISTANT
Payer: MEDICARE

## 2018-08-20 PROCEDURE — G0179 MD RECERTIFICATION HHA PT: HCPCS | Performed by: FAMILY MEDICINE

## 2018-08-20 PROCEDURE — G0463 HOSPITAL OUTPT CLINIC VISIT: HCPCS

## 2018-08-20 NOTE — PROGRESS NOTES
SUBJECTIVE:   Glenroy Padilla is a 68 year old male who presents to clinic today for the following health issues:      HPI  Concern - Patient says something is wrong.  Onset: Ongoing    Description:   Patient states he is not sure what is going on. He is fatigued and having bowel problems, Just not feeling well.     The patient has been more active recently and I would consider him out of shape however he thinks that there is something more to this.  Denies any chest pain or pressure.  Denies any shortness of breath at rest.  He has been having more back pain and notes that his right knee has been bothering him a little bit less since he had the rooster comb injections.  He states that overall he just does not feel right.  He struggles to describe this any more than what we have noted above and below this documentation.  When we discussed sleep he notes that things are not going well he is not able to get comfortable in his current sleeping chair.  He is unable to tolerate a hospital bed or regular bed due to discomfort and back pain reasons.    Problem list and histories reviewed & adjusted, as indicated.  Additional history:   Past Medical History:   Diagnosis Date     Acute pain of left knee 5/22/2017     Chronic pain of right knee 5/22/2017     Hx of coronary artery disease 5/22/2017     Hx of heart artery stent 5/22/2017     Mixed incontinence 8/13/2018     Obesity, morbid, BMI 40.0-49.9 (H) 11/20/2017     Open wound of left lower extremity without complication, initial encounter 5/22/2017     DAYTON (obstructive sleep apnea) 5/22/2017     Type 2 diabetes mellitus with other circulatory complication, without long-term current use of insulin (H) 5/22/2017     Venous stasis ulcer of left lower extremity (H) 5/22/2017     Venous stasis ulcer of left lower extremity (H) 5/22/2017       Patient Active Problem List   Diagnosis     Type 2 diabetes mellitus with other circulatory complication, without long-term current  use of insulin (H)     Venous stasis ulcer of left lower extremity (H)     Acute pain of left knee     Chronic pain of right knee     Open wound of left lower extremity without complication, initial encounter     Hx of coronary artery disease     Hx of heart artery stent     DAYTON (obstructive sleep apnea)     Hypertension, goal below 140/90     Hyperlipidemia LDL goal <100     History of coronary artery stent placement     Advanced directives, counseling/discussion     Obesity, morbid, BMI 40.0-49.9 (H)     Neuropathy     Foot drop, right     Cardiomegaly     Gastroesophageal reflux disease without esophagitis     Falls frequently     Weakness of both legs     Central spinal stenosis L3-4 and L4-5     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     Lymphedema     S/P spinal surgery     Complete tear of left rotator cuff     Lymphedema of right lower extremity     Primary osteoarthritis of right knee     Mixed incontinence     Iron deficiency anemia secondary to inadequate dietary iron intake     Past Surgical History:   Procedure Laterality Date     CARDIAC SURGERY      stent placement     CHOLECYSTECTOMY       LAMINECTOMY LUMBAR THREE+ LEVELS N/A 3/13/2018    Procedure: LAMINECTOMY LUMBAR THREE+ LEVELS;  T5-9 LAMINECTOMIES, RESECTION OF EPIDURAL LIPOMATOSIS;  Surgeon: Hugh Mendieta MD;  Location:  OR       Social History   Substance Use Topics     Smoking status: Former Smoker     Types: Cigars     Smokeless tobacco: Never Used      Comment: exposure to second hand smoke     Alcohol use No     History reviewed. No pertinent family history.      Current Outpatient Prescriptions   Medication Sig Dispense Refill     acetaminophen (TYLENOL) 500 MG tablet Take 1-2 tablets (500-1,000 mg) by mouth every 6 hours as needed for mild pain 120 tablet 4     blood glucose (NO BRAND SPECIFIED) lancing device Use to test blood sugars 2 times daily or as directed. 1 each 0     blood glucose calibration (NO BRAND SPECIFIED)  solution To accompany: Blood Glucose Monitor Brands: per insurance. 1 Bottle 3     blood glucose monitoring (NO BRAND SPECIFIED) meter device kit test blood sugar 2 xdaily or as directed.  Blood Glucose Monitor Brands: per insurance. 1 kit 0     blood glucose monitoring (NO BRAND SPECIFIED) test strip Use to test blood sugar 2 times daily Blood Glucose Monitor Brands: Glucocard Expression 100 strip 6     blood glucose monitoring (NO BRAND SPECIFIED) test strip Use to test blood sugar 2x  daily or as directed. To accompany: Blood Glucose Monitor Brands: per insurance. 100 strip 1     Blood Glucose Monitoring Suppl (GLUCOCARD EXPRESSION MONITOR) W/DEVICE KIT USE TWICE DAILY TO TEST BLOOD GLUCOSE  0     clopidogrel (PLAVIX) 75 MG tablet Take 1 tablet (75 mg) by mouth daily 90 tablet 1     cyclobenzaprine (FLEXERIL) 10 MG tablet TAKE 1 TAB BY MOUTH TWICE DAILY AS NEEDED FOR ROTATOR CUFF TEAR  0     ferrous sulfate (IRON) 325 (65 Fe) MG tablet Take 1 tablet (325 mg) by mouth daily (with breakfast) 90 tablet 1     furosemide (LASIX) 40 MG tablet Take 1 tablet (40 mg) by mouth daily 90 tablet 1     lidocaine (XYLOCAINE) 5 % ointment Apply topically daily 50 g 3     losartan (COZAAR) 25 MG tablet Take 1 tablet (25 mg) by mouth daily 90 tablet 1     methocarbamol (ROBAXIN) 500 MG tablet Take 1 tablet (500 mg) by mouth 4 times daily as needed for muscle spasms 70 tablet 1     metoprolol tartrate (LOPRESSOR) 25 MG tablet Take 1 tablet (25 mg) by mouth 2 times daily 60 tablet 4     order for DME Equipment being ordered: electric chair for sleeping and adjustment to allow for elevation of legs above the heart.  Zero gravity type heavy duty sleep chair advised. 1 Units 0     order for DME Compression garments toe to knee.  Specific brand is Compraflex Light Compression garment.   Diagnosis codes for venous stasis ulcer I83.029 and for  Lymphoedema is I89.0   Goal is for compression of about 30 to 40 mm of mercury   measurements  are right ankle 28.5 cm and right calf is 45.5 cm.  Left ankle is 27.5 cm and the calf is 45 cm   Tall for length. 2 Device 1     order for DME Equipment being ordered: Needs to have a sleep chair for home use. 1 Device 0     order for DME Equipment being ordered: compression stockings below knee and above knee if available.  Medium compression. 1 Units 1     order for DME One touch glucose monitoring strip with Glucometer and lancets. 1 Box 1     pantoprazole (PROTONIX) 40 MG EC tablet Take 1 tablet (40 mg) by mouth daily Take 30-60 minutes before a meal. 30 tablet 0     rosuvastatin (CRESTOR) 10 MG tablet Take 1 tablet (10 mg) by mouth daily 90 tablet 1     senna-docusate (SENOKOT-S;PERICOLACE) 8.6-50 MG per tablet Take 1 tablet by mouth 2 times daily as needed for constipation 100 tablet      SitaGLIPtin-MetFORMIN HCl  MG TB24 Take 2 tablets by mouth daily (with breakfast) 180 tablet 1     thin (NO BRAND SPECIFIED) lancets Test 2 x daily or as directed.  Brands: per insurance. 100 each 1     Wound Dressings (TRIAD HYDROPHILIC WOUND DRESSI) PSTE Externally apply 1 g topically daily 1 Tube 3     [DISCONTINUED] SitaGLIPtin-MetFORMIN HCl  MG TB24 Take 2 tablets by mouth daily (with breakfast) 180 tablet 1     Allergies   Allergen Reactions     No Known Allergies      Recent Labs   Lab Test 08/01/18 04/20/18   1010 03/26/18 03/14/18   0810   03/09/18   1020   01/27/18   1825  09/25/17   1216  05/22/17   1029   A1C   --   6.0  5.3   --    --    --    --   5.6  6.2*  6.4*   LDL   --    --    --    --    --   76   --    --   57  64   HDL   --    --    --    --    --    --    --    --   43  42   TRIG   --    --    --    --    --    --    --    --   140  118   ALT  17   --    --    --    --   19   --    --   19  20   CR  1.10   --    --   0.90   < >  0.91   < >  1.17  0.92  0.91   GFRESTIMATED  >60   --    --   84   < >  82   < >  62  81  82   GFRESTBLACK  >60   --    --   >90   < >  >90   < >  75  >90   ">90   GFR Calc     POTASSIUM  4.6   --    --   4.0   < >  4.6   < >  3.8  4.1  4.0   TSH   --    --    --    --    --    --    --    --   2.07   --     < > = values in this interval not displayed.      BP Readings from Last 3 Encounters:   08/23/18 124/70   06/29/18 122/64   06/14/18 124/66    Wt Readings from Last 3 Encounters:   08/23/18 313 lb (142 kg)   06/29/18 290 lb 14.4 oz (132 kg)   06/14/18 308 lb (139.7 kg)                  Labs reviewed in EPIC    ROS:  Constitutional, HEENT, cardiovascular, pulmonary, gi and gu systems are negative, except as otherwise noted.    OBJECTIVE:     /70 (Cuff Size: Adult Large)  Pulse 76  Temp 98.3  F (36.8  C) (Temporal)  Resp 20  Ht 5' 10\" (1.778 m)  Wt 313 lb (142 kg)  SpO2 94%  BMI 44.91 kg/m2  Body mass index is 44.91 kg/(m^2).  GENERAL: healthy, alert and no distress  NECK: no adenopathy, no asymmetry, masses, or scars and trachea midline and normal to palpation  RESP: lungs clear to auscultation - no rales, rhonchi or wheezes  CV: regular rate and rhythm, normal S1 S2, no S3 or S4, no murmur, click or rub, no peripheral edema and peripheral pulses strong  ABDOMEN: soft, nontender, without hepatosplenomegaly or masses, bowel sounds normal and obese  MS: no gross musculoskeletal defects noted, no edema  NEURO: Normal strength and tone, mentation intact and speech normal  PSYCH: mentation appears normal, affect normal/bright    Diagnostic Test Results:  No results found for this or any previous visit (from the past 24 hour(s)).    ASSESSMENT/PLAN:     1. Screening for diabetic peripheral neuropathy  - FOOT EXAM  NO CHARGE [38660.114]  - order for DME; Equipment being ordered: electric chair for sleeping and adjustment to allow for elevation of legs above the heart.  Zero gravity type heavy duty sleep chair advised.  Dispense: 1 Units; Refill: 0    2. History of coronary artery stent placement  - order for DME; Equipment being ordered: electric " chair for sleeping and adjustment to allow for elevation of legs above the heart.  Zero gravity type heavy duty sleep chair advised.  Dispense: 1 Units; Refill: 0    3. Hypertension, goal below 140/90  - CBC with platelets  - **Iron and iron binding capacity FUTURE 14d  - Hemoglobin A1c  - Comprehensive metabolic panel  - order for DME; Equipment being ordered: electric chair for sleeping and adjustment to allow for elevation of legs above the heart.  Zero gravity type heavy duty sleep chair advised.  Dispense: 1 Units; Refill: 0    4. Open wound of left lower extremity without complication, initial encounter  - order for DME; Equipment being ordered: electric chair for sleeping and adjustment to allow for elevation of legs above the heart.  Zero gravity type heavy duty sleep chair advised.  Dispense: 1 Units; Refill: 0    5. Type 2 diabetes mellitus with other circulatory complication, without long-term current use of insulin (H)  - CBC with platelets  - Hemoglobin A1c  - Comprehensive metabolic panel  - order for DME; Equipment being ordered: electric chair for sleeping and adjustment to allow for elevation of legs above the heart.  Zero gravity type heavy duty sleep chair advised.  Dispense: 1 Units; Refill: 0    6. Weakness of both legs  7. Iron deficiency anemia secondary to inadequate dietary iron intake  8. DAYTON (obstructive sleep apnea)  9. Central spinal stenosis L3-4 and L4-5  10. 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  Since he cannot sleep in a normal bed he has been sleeping in a chair for quite some time.  He states that his obstructive sleep apnea is treated well with his CPAP but he is having a hard time getting comfortable in his sleeping chair.  Advised that he have this problem remedied prior to us looking into anything further for the future.  - order for DME; Equipment being ordered: electric  "chair for sleeping and adjustment to allow for elevation of legs above the heart.  Zero gravity type heavy duty sleep chair advised.  Dispense: 1 Units; Refill: 0    11. Gastroenteritis  Still struggling with a little bit of stomach upset and malaise.  Advised that he have ova and parasites done to check for source back behind reported occasional diarrhea.  Follow-up as needed.  - Ova and Parasite Exam Routine; Future  - Giardia antigen; Future  - Enteric Bacteria and Virus Panel by [facility] Stool; Future    Advise follow-up in 3 months for diabetes etc.  Patient needs to be seen on a regular basis.    I consider this a face-to-face with respect to durable medical equipment related to his sleeping chair.  He will need a sleeping chair to help him get the rest that he needs and so that he feels better.  Since he cannot get comfortable in a regular bed and has not been able to get comfortable in a hospital bed it is recommended that his insurance replaces sleeping chair to help with his overall fatigue.  The chair that he is granted should have provision for increasing the level of his feet above the level of his heart.  I am told that there are \"0 gravity chair's\" that fit this description well.    Julio Cesar Sahu PA-C  [facility]  "

## 2018-08-20 NOTE — DISCHARGE INSTRUCTIONS
Today the two wounds on your left lower leg still remain one wound but they have improved well.  The more proximal area (closer to the knee) is now clean of slough and new granular tissue is forming and filling in the depths.  The distal area (closer to the ankle) where the wound has been more shallow it now has new scar tissue forming from the edges, the open areas are now scattered within the scar tissue.    We will continue with the use of Iodosorb wound paste.     Wound care orders are as follows.  1 Remove old dressing and wash with soap and water, saline or a no rinse wound cleanser.  2 Take the Iodosorb paste and smear it onto a sterile gauze 2x2.  3 Apply the paste side of the gauze to the wound bed, limiting the amount it contacts the surrounding skin.  4 Cover with a 4x4 gauze pad and an ABD pad.  5 Ok to secure the dressing with either roll gauze and tape or just tape.    6 Continue to wear the ready wrap compression garments.  7 Change the dressing three times weekly.    Once the distal area of the wound bed is fully closed, no raw tissue present, nursing please stop the use of the Iodosorb paste,on that area.    The proximal open area will remain open longer due to its depth so continue with the iodosorb on gauze on raw tissue and cover near by dry intact scar tissue with just the ABD pad or similar absorbent dressing.  I will see you in clinic on Monday Sept 10 th at 9:00 am.    Jett Garza RN [email or web address]

## 2018-08-20 NOTE — TELEPHONE ENCOUNTER
Reason for Call:  Form, our goal is to have forms completed with 72 hours, however, some forms may require a visit or additional information.    Type of letter, form or note:  medical    Who is the form from?: [facility] (if other please explain)    Where did the form come from: form was faxed in    What clinic location was the form placed at?: [facility] - [phone]    Where the form was placed: 's Box    What number is listed as a contact on the form?: [phone]         Additional comments: Please complete and fax  Fax [phone]    Call taken on 8/20/2018 at 2:46 PM by Olivia Mayfield

## 2018-08-20 NOTE — IP AVS SNAPSHOT
[address] [address]    Phone:  [phone]    Fax:  [phone]                                       After Visit Summary   8/20/2018    Glenroy Padilla    MRN: [id number]           After Visit Summary Signature Page     I have received my discharge instructions, and my questions have been answered. I have discussed any challenges I see with this plan with the nurse or doctor.    ..........................................................................................................................................  Patient/Patient Representative Signature      ..........................................................................................................................................  Patient Representative Print Name and Relationship to Patient    ..................................................               ................................................  Date                                            Time    ..........................................................................................................................................  Reviewed by Signature/Title    ...................................................              ..............................................  Date                                                            Time

## 2018-08-20 NOTE — PROGRESS NOTES
Reason For Visit: Glenroy Padilla, 67 year old male, seen as outpatient to re evaluate and treat left lower leg ulcers. Referred by Dr Shine. Patient presents by himself today.        History: Pt who was referred to me initially back in June of 2017 by Dr Shine for ongoing wound cares.  He returned to his home in [place] soon after and returned to [place] later in 2017.  Pt has had open ulcer of the left lower leg, original injury was related to trauma.  Course of healing was slow due to venous insuffiencey.  When wound was nearly closed new calcium deposits were found and Dr Shine removed these.  He was referred back to me for on going wound care, initial visit this time being 6/21/18.   Recent history: pt picked up his new ready wrap style compression garments last week and is using.      Personal/social history: Pt is currently living in an AL in [place] with home care from a non  provider.        Objective:   Physical appearance: alert and oriented times three  Ambulation: limited, using electric scooter, able to take few steps to transfer.    Current treatment plan: Iodosorb as primary dressing.  Last changed: Friday 8/17/18.      Wound #1 Left lower leg.  Venous stasis ulcer.  Stage/tissue depth: full thickness  7.5 cm L x 2.8 cm W x 0.3 cm D  Tunneling: no  Undermining: no  Wound bed type/amount: approximately 95 % granular tissue, and 5 % new scar and epithelial tissue, slough has all debrided; NA fluctuant  Wound Edges: open  Periwound: hemosiderin staining, nonpitting edema and scar tissue.  Drainage: Moderate amount  Odor: no  Pain: less painful over past few days      Dorsalis Pedal Pulse: weak but palpable: NA doppler: NA phasic  Hair growth: none noted below the knees bilaterally  Capillary Refill: less than 3 seconds  Feet/toes color: pink with some hemosiderin staining  Nails: thick but wnl  R Leg: Edema nonpitting. Ankle circumference NA cm. Calf circumference NA cm.  L Leg: Edema nonpitting.  Ankle circumference Na cm. Calf circumference NA cm.      Mobility: limited   Current offloading/footwear: well fitting sneakers  Sensation: peripheral neuropathy in the legs and feet has resolved since his spinal surgery.    HgbA1C: 6.0 Date: 4/20/18  Checks Blood Glucose?:  Once daily, Average Readings: Not assessed this visit  Other callousing/areas of concern: none noted today      Diet: regular with awareness of effects on diabetes control.  Smoking: former cigar smoker      Discussed: etiology of wound (venous stasis ulcers), pathophysiology and patient specific goals for wound healing.   Education: Wound status, role of compression in wound healing and wound prevention, use of Ready Wrap compression garments daily on in the am and remove at HS. Continued use of the Iodosorb paste and plans to limit its contact with intact tissue once areas close.    Assessment:  The two wounds still remain one larger wound but there is a large amount of improvement noted in both of the two area and the two areas are less distinct than last week also.  The more proximal area (closer to the knee) is now clean of slough and new granular tissue is forming and filling in the depths.  The distal area (closer to the ankle) where the wound has been more shallow it now has new scar tissue forming from the edges, the open areas are now scattered within the scar tissue.      Barriers to wound healing:   Poor nutrition: inadequate supply of protein, carbohydrates, fatty acids, and trace elements essential for all phases of wound healing.  Pt aware of the need for increased protein in diet for healing.   Reduced Blood Supply: inadequate perfusion to heal wound, NA  Medication: NA  Chemotherapy: suppresses the immune system and inflammatory response, NA  Radiotherapy: increases production of free radical which damage cells, NA  Psychological stress: Not assessed this visit  Obesity: decreases tissue perfusion  Infection: prolongs  inflammatory phase, uses vital nutrients, impairs epithelialization and releases toxins.  None noted at this time.   Underlying Disease: diabetes mellitis and autoimmune disorders.    Maceration: reduces wound tensile strength and inhibits epithelial migration.  None noted at today.  Will monitor at each visit.  Patient compliance, NA  Unrelieved pressure, NA  Immobility, NA  Substance abuse: NA      Plan:   We will continue with the use of Iodosorb wound paste.     Wound care orders are as follows.  1 Remove old dressing and wash with soap and water, saline or a no rinse wound cleanser.  2 Take the Iodosorb paste and smear it onto a sterile gauze 2x2.  3 Apply the paste side of the gauze to the wound bed, limiting the amount it contacts the surrounding skin.  4 Cover with a 4x4 gauze pad and an ABD pad.  5 Ok to secure the dressing with either roll gauze and tape or just tape.    6 Continue to wear the ready wrap compression garments.  7 Change the dressing three times weekly.  Once the distal area of the wound bed is fully closed, no raw tissue present, nursing please stop the use of the Iodosorb paste,on that area.  The proximal open area will remain open longer due to its depth so continue with the iodosorb on gauze on raw tissue and cover near by dry intact scar tissue with just the ABD pad or similar absorbent dressing.      Topical care: Iodosorb paste  Offloading: NA  Additional recommendations: none at this time.      Wound Care: cleansed with Microklenz and gauze, patted dry. Periwound protected with NA. Wound base filled with Iodosorb impregnated sterile gauze 2x2. Covered with gauze pads, followed by ABD pad. Secured with roll gauze and tape. To be changed MWF.        Discussed plan of care with patient. Teaching done with patient for dressing changes; pt is unable, home care is able and willing to perform.      The following discharge instructions were reviewed with and sent home with the patient: See  discharge instructions.      The following supplies were sent home with the patient:  Remains of the Iodosorb paste opened but not used up today plus two additional tube.      Will reassess care plan in 3 weeks and order supplies as needed.      Return visit: 9/10/18      Verbal, written, & demonstrative education provided.  Face to face time (excluding procedure): approximately 45 minutes.  Procedure: NA  Care plan was not changed.      [phone]

## 2018-08-20 NOTE — IP AVS SNAPSHOT
MRN:[id number]                      After Visit Summary   8/20/2018    Glenroy Padilla    MRN: [id number]           Visit Information        Provider Department      8/20/2018  9:00 AM PH WOUND EXAM ROOM [facility]           Review of your medicines      UNREVIEWED medicines. Ask your doctor about these medicines        Dose / Directions    acetaminophen 500 MG tablet   Commonly known as:  TYLENOL   Used for:  Chronic pain of left knee, Complete tear of left rotator cuff        Dose:  500-1000 mg   Take 1-2 tablets (500-1,000 mg) by mouth every 6 hours as needed for mild pain   Quantity:  120 tablet   Refills:  4       clopidogrel 75 MG tablet   Commonly known as:  PLAVIX   Used for:  Type 2 diabetes mellitus with other circulatory complication, without long-term current use of insulin (H), S/P spinal surgery, Hypertension, goal below 140/90, Hyperlipidemia LDL goal <100        Dose:  75 mg   Take 1 tablet (75 mg) by mouth daily   Quantity:  90 tablet   Refills:  1       cyclobenzaprine 10 MG tablet   Commonly known as:  FLEXERIL        TAKE 1 TAB BY MOUTH TWICE DAILY AS NEEDED FOR ROTATOR CUFF TEAR   Refills:  0       ferrous sulfate 325 (65 Fe) MG tablet   Commonly known as:  IRON   Used for:  History of anemia        Dose:  325 mg   Take 1 tablet (325 mg) by mouth daily (with breakfast)   Quantity:  90 tablet   Refills:  1       furosemide 40 MG tablet   Commonly known as:  LASIX   Used for:  Hypertension, goal below 140/90        Dose:  40 mg   Take 1 tablet (40 mg) by mouth daily   Quantity:  90 tablet   Refills:  1       lidocaine 5 % ointment   Commonly known as:  XYLOCAINE   Used for:  Chronic pain of right knee        Apply topically daily   Quantity:  50 g   Refills:  3       losartan 25 MG tablet   Commonly known as:  COZAAR   Used for:  Hypertension, goal below 140/90, Type 2 diabetes mellitus with other circulatory complication, without long-term current use of insulin  (H)        Dose:  25 mg   Take 1 tablet (25 mg) by mouth daily   Quantity:  90 tablet   Refills:  1       methocarbamol 500 MG tablet   Commonly known as:  ROBAXIN   Used for:  S/P spinal surgery        Dose:  500 mg   Take 1 tablet (500 mg) by mouth 4 times daily as needed for muscle spasms   Quantity:  70 tablet   Refills:  1       metoprolol tartrate 25 MG tablet   Commonly known as:  LOPRESSOR   Used for:  Type 2 diabetes mellitus with other circulatory complication, without long-term current use of insulin (H), History of coronary artery stent placement, Hypertension, goal below 140/90        Dose:  25 mg   Take 1 tablet (25 mg) by mouth 2 times daily   Quantity:  60 tablet   Refills:  4       pantoprazole 40 MG EC tablet   Commonly known as:  PROTONIX   Used for:  Gastroesophageal reflux disease without esophagitis        Dose:  40 mg   Take 1 tablet (40 mg) by mouth daily Take 30-60 minutes before a meal.   Quantity:  30 tablet   Refills:  0       rosuvastatin 10 MG tablet   Commonly known as:  CRESTOR   Used for:  Hyperlipidemia LDL goal <100        Dose:  10 mg   Take 1 tablet (10 mg) by mouth daily   Quantity:  90 tablet   Refills:  1       senna-docusate 8.6-50 MG per tablet   Commonly known as:  SENOKOT-S;PERICOLACE   Used for:  Spinal stenosis, lumbar region, without neurogenic claudication        Dose:  1 tablet   Take 1 tablet by mouth 2 times daily as needed for constipation   Quantity:  100 tablet   Refills:  0       SitaGLIPtin-MetFORMIN HCl  MG Tb24   Used for:  Type 2 diabetes mellitus with other circulatory complication, without long-term current use of insulin (H)        Dose:  2 tablet   Take 2 tablets by mouth daily (with breakfast)   Quantity:  180 tablet   Refills:  1       TRIAD HYDROPHILIC WOUND DRESSI Pste   Used for:  Open wound of right lower leg, initial encounter        Dose:  1 applicator   Externally apply 1 g topically daily   Quantity:  1 Tube   Refills:  3          CONTINUE these medicines which have NOT CHANGED        Dose / Directions    blood glucose calibration solution   Commonly known as:  NO BRAND SPECIFIED   Used for:  Type 2 diabetes mellitus with other circulatory complication, without long-term current use of insulin (H)        To accompany: Blood Glucose Monitor Brands: per insurance.   Quantity:  1 Bottle   Refills:  3       blood glucose lancing device   Commonly known as:  no brand specified   Used for:  Type 2 diabetes mellitus with other circulatory complication, without long-term current use of insulin (H)        Use to test blood sugars 2 times daily or as directed.   Quantity:  1 each   Refills:  0       * blood glucose monitoring test strip   Commonly known as:  no brand specified   Used for:  Type 2 diabetes mellitus with other circulatory complication, without long-term current use of insulin (H)        Use to test blood sugar 2x  daily or as directed. To accompany: Blood Glucose Monitor Brands: per insurance.   Quantity:  100 strip   Refills:  1       * blood glucose monitoring test strip   Commonly known as:  no brand specified   Used for:  Type 2 diabetes mellitus with other circulatory complication, without long-term current use of insulin (H)        Use to test blood sugar 2 times daily Blood Glucose Monitor Brands: Glucocard Expression   Quantity:  100 strip   Refills:  6       * GLUCOCARD EXPRESSION MONITOR w/Device Kit        USE TWICE DAILY TO TEST BLOOD GLUCOSE   Refills:  0       * blood glucose monitoring meter device kit   Commonly known as:  no brand specified   Used for:  Type 2 diabetes mellitus with other circulatory complication, without long-term current use of insulin (H)        test blood sugar 2 xdaily or as directed.  Blood Glucose Monitor Brands: per insurance.   Quantity:  1 kit   Refills:  0       order for DME   Used for:  Type 2 diabetes mellitus with other circulatory complication, without long-term current use of insulin  (H)        One touch glucose monitoring strip with Glucometer and lancets.   Quantity:  1 Box   Refills:  1       order for DME   Used for:  Primary osteoarthritis of right knee, Lymphedema of right lower extremity        Equipment being ordered: compression stockings below knee and above knee if available.  Medium compression.   Quantity:  1 Units   Refills:  1       order for DME   Used for:  Type 2 diabetes mellitus with other circulatory complication, without long-term current use of insulin (H), Morbid obesity due to excess calories (H), History of coronary artery stent placement, Gastroesophageal reflux disease without esophagitis, Chronic pain of left knee, [place] (obstructive sleep apnea)        Equipment being ordered: Needs to have a sleep chair for home use.   Quantity:  1 Device   Refills:  0       order for DME   Used for:  Neuropathy, Lymphedema of right lower extremity, Lymphedema        Compression garments toe to knee. ?Specific brand is Compraflex Light Compression garment.  Diagnosis codes for venous stasis ulcer I83.029 and for ?Lymphoedema is I89.0  Goal is for compression of about 30 to 40 mm of mercury  measurements are right ankle 28.5 cm and right calf is 45.5 cm. ?Left ankle is 27.5 cm and the calf is 45 cm  Tall for length.   Quantity:  2 Device   Refills:  1       thin lancets   Commonly known as:  NO BRAND SPECIFIED   Used for:  Type 2 diabetes mellitus with other circulatory complication, without long-term current use of insulin (H)        Test 2 x daily or as directed.  Brands: per insurance.   Quantity:  100 each   Refills:  1       * Notice:  This list has 4 medication(s) that are the same as other medications prescribed for you. Read the directions carefully, and ask your doctor or other care provider to review them with you.             Protect others around you: Learn how to safely use, store and throw away your medicines at www.disposemymeds.org.         Follow-ups after your  visit        Your next 10 appointments already scheduled     Aug 23, 2018 10:20 AM CDT   Office Visit with Julio Cesar Esteban PA-C   [facility]    [address] [facility] [address] [phone]           Bring a current list of meds and any records pertaining to this visit. For Physicals, please bring immunization records and any forms needing to be filled out. Please arrive 10 minutes early to complete paperwork.            Aug 28, 2018  3:00 PM CDT   New Visit with Arnav Kohli MD   [facility] ([facility]    [address] [place] [id number]   [phone]            Sep 10, 2018  9:00 AM CDT   [facility] Visit with  WOUND EXAM ROOM   [facility]    [address] [place] [address]   [phone]            Sep 20, 2018 10:10 AM CDT   [facility] with Martha Reddy PA-C   [facility]    [address] [place] [address], [address]  [facility] [address]   [phone]            Sep 25, 2018  8:40 AM CDT   Pre-Op physical with Julio Cesar Esteban PA-C   [facility]    [address] [facility] [address]   [phone]            Sep 27, 2018  9:00 AM CDT   Pre-Op physical with Julio Cesar Esteban PA-C   [facility]    [address] [facility] [address] [phone]               Care Instructions        Further instructions from your care team       Today the two wounds on your left lower leg still remain one wound but they have improved well.  The more proximal area (closer to the knee) is now clean of slough and new granular tissue is forming and filling in the depths.  The distal area (closer to the ankle) where the wound has been more shallow it now has new scar tissue forming from the edges, the  open areas are now scattered within the scar tissue.    We will continue with the use of Iodosorb wound paste.     Wound care orders are as follows.  1 Remove old dressing and wash with soap and water, saline or a no rinse wound cleanser.  2 Take the Iodosorb paste and smear it onto a sterile gauze 2x2.  3 Apply the paste side of the gauze to the wound bed, limiting the amount it contacts the surrounding skin.  4 Cover with a 4x4 gauze pad and an ABD pad.  5 Ok to secure the dressing with either roll gauze and tape or just tape.    6 Continue to wear the ready wrap compression garments.  7 Change the dressing three times weekly.    Once the distal area of the wound bed is fully closed, no raw tissue present, nursing please stop the use of the Iodosorb paste,on that area.    The proximal open area will remain open longer due to its depth so continue with the iodosorb on gauze on raw tissue and cover near by dry intact scar tissue with just the ABD pad or similar absorbent dressing.  I will see you in clinic on Monday Sept 10 th at 9:00 am.    Jett Garza RN [email or web address]     Additional Information About Your Visit        Care EveryWhere ID     This is your Care EveryWhere ID. This could be used by other organizations to access your [place] medical records  [id number]         Primary Care Provider Office Phone # Fax #    Julio Cesar Esteban PA-C [phone]      Equal Access to Services     [facility]: Hadii katia bernalo Sogloria, waaxda luqadaha, qaybta kaalmada duke, adams batres . So [facility] [phone].    ATENCIÓN: Si habla español, tiene a wilkinson disposición servicios gratuitos de asistencia lingüística. Llame al [phone].    We comply with applicable federal civil rights laws and [place] laws. We do not discriminate on the basis of race, color, national origin, age, disability, sex, sexual orientation, or gender identity.            Thank you!     Thank you for  choosing [facility] for your care. Our goal is always to provide you with excellent care. Hearing back from our patients is one way we can continue to improve our services. Please take a few minutes to complete the written survey that you may receive in the mail after you visit with us. Thank you!             Medication List: This is a list of all your medications and when to take them. Check marks below indicate your daily home schedule. Keep this list as a reference.      Medications           Morning Afternoon Evening Bedtime As Needed    acetaminophen 500 MG tablet   Commonly known as:  TYLENOL   Take 1-2 tablets (500-1,000 mg) by mouth every 6 hours as needed for mild pain                                blood glucose calibration solution   Commonly known as:  NO BRAND SPECIFIED   To accompany: Blood Glucose Monitor Brands: per insurance.                                blood glucose lancing device   Commonly known as:  no brand specified   Use to test blood sugars 2 times daily or as directed.                                * blood glucose monitoring test strip   Commonly known as:  no brand specified   Use to test blood sugar 2x  daily or as directed. To accompany: Blood Glucose Monitor Brands: per insurance.                                * blood glucose monitoring test strip   Commonly known as:  no brand specified   Use to test blood sugar 2 times daily Blood Glucose Monitor Brands: Glucocard Expression                                clopidogrel 75 MG tablet   Commonly known as:  PLAVIX   Take 1 tablet (75 mg) by mouth daily                                cyclobenzaprine 10 MG tablet   Commonly known as:  FLEXERIL   TAKE 1 TAB BY MOUTH TWICE DAILY AS NEEDED FOR ROTATOR CUFF TEAR                                ferrous sulfate 325 (65 Fe) MG tablet   Commonly known as:  IRON   Take 1 tablet (325 mg) by mouth daily (with breakfast)                                furosemide 40 MG tablet   Commonly known as:  LASIX    Take 1 tablet (40 mg) by mouth daily                                * GLUCOCARD EXPRESSION MONITOR w/Device Kit   USE TWICE DAILY TO TEST BLOOD GLUCOSE                                * blood glucose monitoring meter device kit   Commonly known as:  no brand specified   test blood sugar 2 xdaily or as directed.  Blood Glucose Monitor Brands: per insurance.                                lidocaine 5 % ointment   Commonly known as:  XYLOCAINE   Apply topically daily                                losartan 25 MG tablet   Commonly known as:  COZAAR   Take 1 tablet (25 mg) by mouth daily                                methocarbamol 500 MG tablet   Commonly known as:  ROBAXIN   Take 1 tablet (500 mg) by mouth 4 times daily as needed for muscle spasms                                metoprolol tartrate 25 MG tablet   Commonly known as:  LOPRESSOR   Take 1 tablet (25 mg) by mouth 2 times daily                                order for DME   One touch glucose monitoring strip with Glucometer and lancets.                                order for DME   Equipment being ordered: compression stockings below knee and above knee if available.  Medium compression.                                order for DME   Equipment being ordered: Needs to have a sleep chair for home use.                                order for DME   Compression garments toe to knee. ?Specific brand is Compraflex Light Compression garment.  Diagnosis codes for venous stasis ulcer I83.029 and for ?Lymphoedema is I89.0  Goal is for compression of about 30 to 40 mm of mercury  measurements are right ankle 28.5 cm and right calf is 45.5 cm. ?Left ankle is 27.5 cm and the calf is 45 cm  Tall for length.                                pantoprazole 40 MG EC tablet   Commonly known as:  PROTONIX   Take 1 tablet (40 mg) by mouth daily Take 30-60 minutes before a meal.                                rosuvastatin 10 MG tablet   Commonly known as:  CRESTOR   Take 1 tablet  (10 mg) by mouth daily                                senna-docusate 8.6-50 MG per tablet   Commonly known as:  SENOKOT-S;PERICOLACE   Take 1 tablet by mouth 2 times daily as needed for constipation                                SitaGLIPtin-MetFORMIN HCl  MG Tb24   Take 2 tablets by mouth daily (with breakfast)                                thin lancets   Commonly known as:  NO BRAND SPECIFIED   Test 2 x daily or as directed.  Brands: per insurance.                                TRIAD HYDROPHILIC WOUND DRESSI Pste   Externally apply 1 g topically daily                                * Notice:  This list has 4 medication(s) that are the same as other medications prescribed for you. Read the directions carefully, and ask your doctor or other care provider to review them with you.

## 2018-08-22 ENCOUNTER — TELEPHONE (OUTPATIENT)
Dept: FAMILY MEDICINE | Facility: OTHER | Age: 68
End: 2018-08-22

## 2018-08-22 DIAGNOSIS — I10 HYPERTENSION, GOAL BELOW 140/90: ICD-10-CM

## 2018-08-22 DIAGNOSIS — E11.59 TYPE 2 DIABETES MELLITUS WITH OTHER CIRCULATORY COMPLICATION, WITHOUT LONG-TERM CURRENT USE OF INSULIN (H): ICD-10-CM

## 2018-08-22 DIAGNOSIS — E11.59 TYPE 2 DIABETES MELLITUS WITH OTHER CIRCULATORY COMPLICATION, WITHOUT LONG-TERM CURRENT USE OF INSULIN (H): Primary | ICD-10-CM

## 2018-08-22 NOTE — TELEPHONE ENCOUNTER
Janumet XR not on current medlist, patient is wanting to get this refilled at [facility] , please send a new RX if appropriate   Thanks  Josselin Montes RT (R)

## 2018-08-23 ENCOUNTER — TELEPHONE (OUTPATIENT)
Dept: FAMILY MEDICINE | Facility: OTHER | Age: 68
End: 2018-08-23

## 2018-08-23 ENCOUNTER — OFFICE VISIT (OUTPATIENT)
Dept: FAMILY MEDICINE | Facility: OTHER | Age: 68
End: 2018-08-23
Payer: MEDICARE

## 2018-08-23 VITALS
BODY MASS INDEX: 44.81 KG/M2 | TEMPERATURE: 98.3 F | OXYGEN SATURATION: 94 % | RESPIRATION RATE: 20 BRPM | DIASTOLIC BLOOD PRESSURE: 70 MMHG | HEIGHT: 70 IN | HEART RATE: 76 BPM | SYSTOLIC BLOOD PRESSURE: 124 MMHG | WEIGHT: 313 LBS

## 2018-08-23 DIAGNOSIS — Z13.89 SCREENING FOR DIABETIC PERIPHERAL NEUROPATHY: Primary | ICD-10-CM

## 2018-08-23 DIAGNOSIS — K52.9 GASTROENTERITIS: ICD-10-CM

## 2018-08-23 DIAGNOSIS — M48.00 CENTRAL SPINAL STENOSIS: ICD-10-CM

## 2018-08-23 DIAGNOSIS — S81.802A OPEN WOUND OF LEFT LOWER EXTREMITY WITHOUT COMPLICATION, INITIAL ENCOUNTER: ICD-10-CM

## 2018-08-23 DIAGNOSIS — I10 HYPERTENSION, GOAL BELOW 140/90: ICD-10-CM

## 2018-08-23 DIAGNOSIS — R29.898 WEAKNESS OF BOTH LEGS: ICD-10-CM

## 2018-08-23 DIAGNOSIS — D50.8 IRON DEFICIENCY ANEMIA SECONDARY TO INADEQUATE DIETARY IRON INTAKE: ICD-10-CM

## 2018-08-23 DIAGNOSIS — G47.33 OSA (OBSTRUCTIVE SLEEP APNEA): ICD-10-CM

## 2018-08-23 DIAGNOSIS — E11.59 TYPE 2 DIABETES MELLITUS WITH OTHER CIRCULATORY COMPLICATION, WITHOUT LONG-TERM CURRENT USE OF INSULIN (H): ICD-10-CM

## 2018-08-23 DIAGNOSIS — Z95.5 HISTORY OF CORONARY ARTERY STENT PLACEMENT: ICD-10-CM

## 2018-08-23 DIAGNOSIS — M48.061 FORAMINAL STENOSIS OF LUMBAR REGION: ICD-10-CM

## 2018-08-23 LAB
ALBUMIN SERPL-MCNC: 3.4 G/DL (ref 3.4–5)
ALP SERPL-CCNC: 61 U/L (ref 40–150)
ALT SERPL W P-5'-P-CCNC: 13 U/L (ref 0–70)
ANION GAP SERPL CALCULATED.3IONS-SCNC: 9 MMOL/L (ref 3–14)
AST SERPL W P-5'-P-CCNC: 11 U/L (ref 0–45)
BILIRUB SERPL-MCNC: 0.6 MG/DL (ref 0.2–1.3)
BUN SERPL-MCNC: 20 MG/DL (ref 7–30)
CALCIUM SERPL-MCNC: 8.9 MG/DL (ref 8.5–10.1)
CHLORIDE SERPL-SCNC: 100 MMOL/L (ref 94–109)
CO2 SERPL-SCNC: 30 MMOL/L (ref 20–32)
CREAT SERPL-MCNC: 1.13 MG/DL (ref 0.66–1.25)
ERYTHROCYTE [DISTWIDTH] IN BLOOD BY AUTOMATED COUNT: 18 % (ref 10–15)
GFR SERPL CREATININE-BSD FRML MDRD: 64 ML/MIN/1.7M2
GLUCOSE SERPL-MCNC: 137 MG/DL (ref 70–99)
HBA1C MFR BLD: 5.7 % (ref 0–5.6)
HCT VFR BLD AUTO: 35.7 % (ref 40–53)
HGB BLD-MCNC: 10.5 G/DL (ref 13.3–17.7)
IRON SATN MFR SERPL: 12 % (ref 15–46)
IRON SERPL-MCNC: 37 UG/DL (ref 35–180)
MCH RBC QN AUTO: 25.4 PG (ref 26.5–33)
MCHC RBC AUTO-ENTMCNC: 29.4 G/DL (ref 31.5–36.5)
MCV RBC AUTO: 86 FL (ref 78–100)
PLATELET # BLD AUTO: 314 10E9/L (ref 150–450)
POTASSIUM SERPL-SCNC: 4 MMOL/L (ref 3.4–5.3)
PROT SERPL-MCNC: 8.3 G/DL (ref 6.8–8.8)
RBC # BLD AUTO: 4.13 10E12/L (ref 4.4–5.9)
SODIUM SERPL-SCNC: 139 MMOL/L (ref 133–144)
TIBC SERPL-MCNC: 297 UG/DL (ref 240–430)
WBC # BLD AUTO: 9.7 10E9/L (ref 4–11)

## 2018-08-23 PROCEDURE — 80053 COMPREHEN METABOLIC PANEL: CPT | Performed by: PHYSICIAN ASSISTANT

## 2018-08-23 PROCEDURE — 83550 IRON BINDING TEST: CPT | Performed by: PHYSICIAN ASSISTANT

## 2018-08-23 PROCEDURE — 85027 COMPLETE CBC AUTOMATED: CPT | Performed by: PHYSICIAN ASSISTANT

## 2018-08-23 PROCEDURE — 99214 OFFICE O/P EST MOD 30 MIN: CPT | Performed by: PHYSICIAN ASSISTANT

## 2018-08-23 PROCEDURE — 83036 HEMOGLOBIN GLYCOSYLATED A1C: CPT | Performed by: PHYSICIAN ASSISTANT

## 2018-08-23 PROCEDURE — 36415 COLL VENOUS BLD VENIPUNCTURE: CPT | Performed by: PHYSICIAN ASSISTANT

## 2018-08-23 PROCEDURE — 99207 C FOOT EXAM  NO CHARGE: CPT | Performed by: PHYSICIAN ASSISTANT

## 2018-08-23 PROCEDURE — 83540 ASSAY OF IRON: CPT | Performed by: PHYSICIAN ASSISTANT

## 2018-08-23 RX ORDER — LOSARTAN POTASSIUM 25 MG/1
25 TABLET ORAL DAILY
Qty: 90 TABLET | Refills: 1 | Status: SHIPPED | OUTPATIENT
Start: 2018-08-23 | End: 2019-03-25

## 2018-08-23 ASSESSMENT — PAIN SCALES - GENERAL: PAINLEVEL: NO PAIN (0)

## 2018-08-23 NOTE — TELEPHONE ENCOUNTER
Spoke to patient message below given. No questions at this time.  Lisa Zuluaga CMA ([facility])    Notes Recorded by Julio Cesar Esteban PA-C on 8/23/2018 at 3:28 PM  He should decrease his dose of SitaGLIPtin-MetFORMIN HCl  MG TB24 to 1 tablet daily and recheck his diabetes in 3 months.  Electronically signed:    Julio Cesar Esteban PA-C

## 2018-08-23 NOTE — MR AVS SNAPSHOT
After Visit Summary   8/23/2018    Glenroy Padilla    MRN: [id number]           Patient Information     Date Of Birth          1950        Visit Information        Provider Department      8/23/2018 10:20 AM Julio Cesar Esteban PA-C [facility]        Today's Diagnoses     Screening for diabetic peripheral neuropathy    -  1    History of coronary artery stent placement        Hypertension, goal below 140/90        Open wound of left lower extremity without complication, initial encounter        Type 2 diabetes mellitus with other circulatory complication, without long-term current use of insulin (H)        Weakness of both legs        Iron deficiency anemia secondary to inadequate dietary iron intake        DAYTON (obstructive sleep apnea)        Central spinal stenosis L3-4 and L4-5        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           Follow-ups after your visit        Your next 10 appointments already scheduled     Aug 28, 2018  3:00 PM CDT   New Visit with Arnav Kohli MD   [facility] ([facility])    [address] [place] [id number]   [phone]            Sep 10, 2018  9:00 AM CDT   Return Visit with PH WOUND EXAM ROOM   [facility]    [address] [place] [id number]   [phone]            Sep 20, 2018 10:10 AM CDT   Return Visit with Martha Reddy PA-C   [facility] ([facility]    [address], [address]  [facility] [id number] [phone]            Sep 27, 2018  9:00 AM CDT   Pre-Op physical with Julio Cesar Esteban PA-C   [facility] ([facility])    [id number] [id number] [phone]              Who to contact     If you have questions or need follow up information about today's clinic visit or your schedule please contact [facility]  "[facility]WAIN directly at [phone].  Normal or non-critical lab and imaging results will be communicated to you by MyChart, letter or phone within 4 business days after the clinic has received the results. If you do not hear from us within 7 days, please contact the clinic through MyChart or phone. If you have a critical or abnormal lab result, we will notify you by phone as soon as possible.  Submit refill requests through [employer] or call your pharmacy and they will forward the refill request to us. Please allow 3 business days for your refill to be completed.          Additional Information About Your Visit        Care EveryWhere ID     This is your Care EveryWhere ID. This could be used by other organizations to access your [facility] medical records  [id number]        Your Vitals Were     Pulse Temperature Respirations Height Pulse Oximetry BMI (Body Mass Index)    76 98.3  F (36.8  C) (Temporal) 20 5' 10\" (1.778 m) 94% 44.91 kg/m2       Blood Pressure from Last 3 Encounters:   08/23/18 124/70   06/29/18 122/64   06/14/18 124/66    Weight from Last 3 Encounters:   08/23/18 313 lb (142 kg)   06/29/18 290 lb 14.4 oz (132 kg)   06/14/18 308 lb (139.7 kg)              We Performed the Following     **Iron and iron binding capacity FUTURE 14d     CBC with platelets     Comprehensive metabolic panel     FOOT EXAM  NO CHARGE [83530.114]     Hemoglobin A1c          Today's Medication Changes          These changes are accurate as of 8/23/18 10:46 AM.  If you have any questions, ask your nurse or doctor.               Start taking these medicines.        Dose/Directions    order for DME   Used for:  Screening for diabetic peripheral neuropathy, History of coronary artery stent placement, Hypertension, goal below 140/90, Open wound of left lower extremity without complication, initial encounter, Type 2 diabetes mellitus with other circulatory complication, without long-term current use of insulin (H), Weakness of both " legs, Iron deficiency anemia secondary to inadequate dietary iron intake, DAYTON (obstructive sleep apnea), Central spinal stenosis, Foraminal stenosis of lumbar region   Started by:  Julio Cesar Esteban PA-C        Equipment being ordered: electric chair for sleeping and adjustment to allow for elevation of legs above the heart.  Zero gravity type heavy duty sleep chair advised.   Quantity:  1 Units   Refills:  0            Where to get your medicines      Some of these will need a paper prescription and others can be bought over the counter.  Ask your nurse if you have questions.     Bring a paper prescription for each of these medications     order for DME                Primary Care Provider Office Phone # Fax #    Julio Cesar Esteban PA-C [phone] [address] [facility] [address]        Equal Access to Services     JAZ TOBAR : Pietro bernalo Sogloria, waaxda luqadaha, qaybta kaalmada adeegyada, adams horton. So [facility] [phone].    ATENCIÓN: Si habla español, tiene a wilkinson disposición servicios gratuitos de asistencia lingüística. [facility] [phone].    We comply with applicable federal civil rights laws and [place] laws. We do not discriminate on the basis of race, color, national origin, age, disability, sex, sexual orientation, or gender identity.            Thank you!     Thank you for choosing [facility]  for your care. Our goal is always to provide you with excellent care. Hearing back from our patients is one way we can continue to improve our services. Please take a few minutes to complete the written survey that you may receive in the mail after your visit with us. Thank you!             Your Updated Medication List - Protect others around you: Learn how to safely use, store and throw away your medicines at www.disposemymeds.org.          This list is accurate as of 8/23/18 10:46 AM.  Always use your most recent med list.                    Brand Name Dispense Instructions for use Diagnosis    acetaminophen 500 MG tablet    TYLENOL    120 tablet    Take 1-2 tablets (500-1,000 mg) by mouth every 6 hours as needed for mild pain    Chronic pain of left knee, Complete tear of left rotator cuff       blood glucose calibration solution    NO BRAND SPECIFIED    1 Bottle    To accompany: Blood Glucose Monitor Brands: per insurance.    Type 2 diabetes mellitus with other circulatory complication, without long-term current use of insulin (H)       blood glucose lancing device    no brand specified    1 each    Use to test blood sugars 2 times daily or as directed.    Type 2 diabetes mellitus with other circulatory complication, without long-term current use of insulin (H)       * blood glucose monitoring test strip    no brand specified    100 strip    Use to test blood sugar 2x  daily or as directed. To accompany: Blood Glucose Monitor Brands: per insurance.    Type 2 diabetes mellitus with other circulatory complication, without long-term current use of insulin (H)       * blood glucose monitoring test strip    no brand specified    100 strip    Use to test blood sugar 2 times daily Blood Glucose Monitor Brands: Glucocard Expression    Type 2 diabetes mellitus with other circulatory complication, without long-term current use of insulin (H)       clopidogrel 75 MG tablet    PLAVIX    90 tablet    Take 1 tablet (75 mg) by mouth daily    Type 2 diabetes mellitus with other circulatory complication, without long-term current use of insulin (H), S/P spinal surgery, Hypertension, goal below 140/90, Hyperlipidemia LDL goal <100       cyclobenzaprine 10 MG tablet    FLEXERIL     TAKE 1 TAB BY MOUTH TWICE DAILY AS NEEDED FOR ROTATOR CUFF TEAR        ferrous sulfate 325 (65 Fe) MG tablet    IRON    90 tablet    Take 1 tablet (325 mg) by mouth daily (with breakfast)    History of anemia       furosemide 40 MG tablet    LASIX    90 tablet    Take 1 tablet (40  mg) by mouth daily    Hypertension, goal below 140/90       * GLUCOCARD EXPRESSION MONITOR w/Device Kit      USE TWICE DAILY TO TEST BLOOD GLUCOSE        * blood glucose monitoring meter device kit    no brand specified    1 kit    test blood sugar 2 xdaily or as directed.  Blood Glucose Monitor Brands: per insurance.    Type 2 diabetes mellitus with other circulatory complication, without long-term current use of insulin (H)       lidocaine 5 % ointment    XYLOCAINE    50 g    Apply topically daily    Chronic pain of right knee       losartan 25 MG tablet    COZAAR    90 tablet    Take 1 tablet (25 mg) by mouth daily    Hypertension, goal below 140/90, Type 2 diabetes mellitus with other circulatory complication, without long-term current use of insulin (H)       methocarbamol 500 MG tablet    ROBAXIN    70 tablet    Take 1 tablet (500 mg) by mouth 4 times daily as needed for muscle spasms    S/P spinal surgery       metoprolol tartrate 25 MG tablet    LOPRESSOR    60 tablet    Take 1 tablet (25 mg) by mouth 2 times daily    Type 2 diabetes mellitus with other circulatory complication, without long-term current use of insulin (H), History of coronary artery stent placement, Hypertension, goal below 140/90       order for DME     1 Box    One touch glucose monitoring strip with Glucometer and lancets.    Type 2 diabetes mellitus with other circulatory complication, without long-term current use of insulin (H)       order for DME     1 Units    Equipment being ordered: compression stockings below knee and above knee if available.  Medium compression.    Primary osteoarthritis of right knee, Lymphedema of right lower extremity       order for DME     1 Device    Equipment being ordered: Needs to have a sleep chair for home use.    Type 2 diabetes mellitus with other circulatory complication, without long-term current use of insulin (H), Morbid obesity due to excess calories (H), History of coronary artery stent  placement, Gastroesophageal reflux disease without esophagitis, Chronic pain of left knee, DAYTON (obstructive sleep apnea)       order for DME     2 Device    Compression garments toe to knee. ?Specific brand is Compraflex Light Compression garment.  Diagnosis codes for venous stasis ulcer I83.029 and for ?Lymphoedema is I89.0  Goal is for compression of about 30 to 40 mm of mercury  measurements are right ankle 28.5 cm and right calf is 45.5 cm. ?Left ankle is 27.5 cm and the calf is 45 cm  Tall for length.    Neuropathy, Lymphedema of right lower extremity, Lymphedema       order for DME     1 Units    Equipment being ordered: electric chair for sleeping and adjustment to allow for elevation of legs above the heart.  Zero gravity type heavy duty sleep chair advised.    Screening for diabetic peripheral neuropathy, History of coronary artery stent placement, Hypertension, goal below 140/90, Open wound of left lower extremity without complication, initial encounter, Type 2 diabetes mellitus with other circulatory complication, without long-term current use of insulin (H), Weakness of both legs, Iron deficiency anemia secondary to inadequate dietary iron intake, DAYTON (obstructive sleep apnea), Central spinal stenosis, Foraminal stenosis of lumbar region       pantoprazole 40 MG EC tablet    PROTONIX    30 tablet    Take 1 tablet (40 mg) by mouth daily Take 30-60 minutes before a meal.    Gastroesophageal reflux disease without esophagitis       rosuvastatin 10 MG tablet    CRESTOR    90 tablet    Take 1 tablet (10 mg) by mouth daily    Hyperlipidemia LDL goal <100       senna-docusate 8.6-50 MG per tablet    SENOKOT-S;PERICOLACE    100 tablet    Take 1 tablet by mouth 2 times daily as needed for constipation    Spinal stenosis, lumbar region, without neurogenic claudication       SitaGLIPtin-MetFORMIN HCl  MG Tb24     180 tablet    Take 2 tablets by mouth daily (with breakfast)    Type 2 diabetes mellitus with  other circulatory complication, without long-term current use of insulin (H)       thin lancets    NO BRAND SPECIFIED    100 each    Test 2 x daily or as directed.  Brands: per insurance.    Type 2 diabetes mellitus with other circulatory complication, without long-term current use of insulin (H)       TRIAD HYDROPHILIC WOUND DRESSI Pste     1 Tube    Externally apply 1 g topically daily    Open wound of right lower leg, initial encounter       * Notice:  This list has 4 medication(s) that are the same as other medications prescribed for you. Read the directions carefully, and ask your doctor or other care provider to review them with you.

## 2018-08-23 NOTE — TELEPHONE ENCOUNTER
Cozaar:  Prescription approved per [facility] Refill Protocol.    Next 5 appointments (look out 90 days)     Sep 10, 2018  9:00 AM CDT   Return Visit with PH WOUND EXAM ROOM   [facility] ([facility])    [address] [address]   [phone]            Sep 20, 2018 10:10 AM CDT   Return Visit with Martha Reddy PA-C   [facility]    [address]  [facility] [address]   [phone]            Sep 27, 2018  9:00 AM CDT   Pre-Op physical with Julio Cesar Esteban PA-C   [facility] ([facility])    [address] [address] [phone]                Rossi Pérez, RN, BSN

## 2018-08-24 ENCOUNTER — TELEPHONE (OUTPATIENT)
Dept: FAMILY MEDICINE | Facility: OTHER | Age: 68
End: 2018-08-24

## 2018-08-24 DIAGNOSIS — E11.59 TYPE 2 DIABETES MELLITUS WITH OTHER CIRCULATORY COMPLICATION, WITHOUT LONG-TERM CURRENT USE OF INSULIN (H): Primary | ICD-10-CM

## 2018-08-24 NOTE — TELEPHONE ENCOUNTER
Clarified medication dosage related to his sitagliptin-metformin  mg tablets.  He is only to take 1 per day.  Recheck hemoglobin A1c 12 weeks.  Electronically signed:    Julio Cesar Sahu PA-C

## 2018-08-24 NOTE — TELEPHONE ENCOUNTER
Forwarding to Prior Authorization pool, please start a PA for SitaGLIPtin-MetFORMIN HCl  MG TB24    [employer] part D [id number]  Ph#[phone]  BIN [id number]  N [id number]  ID#[id number]    Thank you  Josselin Montes RT (R)

## 2018-08-24 NOTE — TELEPHONE ENCOUNTER
Reason for Call:  Medication or medication refill:    Do you use a [facility]?  Name of the pharmacy and phone number for the current request:  [facility]    Name of the medication requested:  Diabetes medication    Other request: patient states he was told to cut this down to one, but on the papers it says two. Please call him to clarify    Can we leave a detailed message on this number? YES    Phone number patient can be reached at: Home number on file [phone] (home)    Best Time: any    Call taken on 8/24/2018 at 3:10 PM by Neda Cortez

## 2018-08-24 NOTE — TELEPHONE ENCOUNTER
Reason for Call:  Other Orders    Detailed comments: Jennifer from [facility] calling, they received new orders for Glenroy, but are needing a written copy. Please fax written orders over to [phone]    Phone Number Patient can be reached at:     Best Time: any     Can we leave a detailed message on this number? YES    Call taken on 8/24/2018 at 1:22 PM by Maggie Morgan

## 2018-08-27 ENCOUNTER — TELEPHONE (OUTPATIENT)
Dept: FAMILY MEDICINE | Facility: OTHER | Age: 68
End: 2018-08-27

## 2018-08-27 NOTE — TELEPHONE ENCOUNTER
Forms have been completed, signed, faxed/mailed, and sent to scanning.  Lisa Zuluaga CMA ([facility])

## 2018-08-27 NOTE — TELEPHONE ENCOUNTER
PA Initiation    Medication: Janumet  Insurance Company: [employer] ([facility]) - Phone [phone] Fax [phone]  Pharmacy Filling the Rx: [id number] - [place], [place] - [address] [place] [address]  Filling Pharmacy Phone: [phone]  Filling Pharmacy Fax:    Start Date: 8/27/2018    THIS HAS BEEN SUBMITTED BY THE PRIOR-AUTHORIZATION TEAM. ANY QUESTIONS PLEASE CALL [phone]. THANK YOU

## 2018-08-27 NOTE — TELEPHONE ENCOUNTER
Reason for Call:  Form, our goal is to have forms completed with 72 hours, however, some forms may require a visit or additional information.    Type of letter, form or note:  Syeda    Who is the form from?: Home care    Where did the form come from: form was faxed in    What clinic location was the form placed at?: [facility] - [phone]    Where the form was placed: 's Box    What number is listed as a contact on the form?: [phone]       Additional comments: sign and fax back to [phone]    Call taken on 8/27/2018 at 11:21 AM by Maggie Morgan

## 2018-08-28 ENCOUNTER — MEDICAL CORRESPONDENCE (OUTPATIENT)
Dept: HEALTH INFORMATION MANAGEMENT | Facility: CLINIC | Age: 68
End: 2018-08-28

## 2018-08-28 ENCOUNTER — HOSPITAL ENCOUNTER (OUTPATIENT)
Dept: CARDIOLOGY | Facility: CLINIC | Age: 68
Discharge: HOME | End: 2018-08-28
Attending: INTERNAL MEDICINE | Admitting: INTERNAL MEDICINE
Payer: MEDICARE

## 2018-08-28 ENCOUNTER — OFFICE VISIT (OUTPATIENT)
Dept: CARDIOLOGY | Facility: CLINIC | Age: 68
End: 2018-08-28
Payer: MEDICARE

## 2018-08-28 VITALS
BODY MASS INDEX: 44.92 KG/M2 | HEIGHT: 70 IN | DIASTOLIC BLOOD PRESSURE: 58 MMHG | WEIGHT: 313.8 LBS | SYSTOLIC BLOOD PRESSURE: 102 MMHG | OXYGEN SATURATION: 96 % | HEART RATE: 78 BPM

## 2018-08-28 DIAGNOSIS — I25.10 CORONARY ARTERY DISEASE INVOLVING NATIVE CORONARY ARTERY OF NATIVE HEART WITHOUT ANGINA PECTORIS: ICD-10-CM

## 2018-08-28 DIAGNOSIS — E11.00 TYPE 2 DIABETES MELLITUS WITH HYPEROSMOLARITY WITHOUT COMA, WITHOUT LONG-TERM CURRENT USE OF INSULIN (H): ICD-10-CM

## 2018-08-28 DIAGNOSIS — S81.802D OPEN WOUND OF LEFT LOWER EXTREMITY, SUBSEQUENT ENCOUNTER: ICD-10-CM

## 2018-08-28 DIAGNOSIS — R06.02 SHORTNESS OF BREATH: Primary | ICD-10-CM

## 2018-08-28 DIAGNOSIS — I73.9 PAD (PERIPHERAL ARTERY DISEASE) (H): ICD-10-CM

## 2018-08-28 DIAGNOSIS — I10 HYPERTENSION, GOAL BELOW 140/90: ICD-10-CM

## 2018-08-28 DIAGNOSIS — E78.5 HYPERLIPIDEMIA LDL GOAL <100: ICD-10-CM

## 2018-08-28 DIAGNOSIS — Z95.5 HISTORY OF CORONARY ARTERY STENT PLACEMENT: ICD-10-CM

## 2018-08-28 PROCEDURE — 93005 ELECTROCARDIOGRAM TRACING: CPT

## 2018-08-28 PROCEDURE — 99204 OFFICE O/P NEW MOD 45 MIN: CPT | Mod: 25 | Performed by: INTERNAL MEDICINE

## 2018-08-28 PROCEDURE — 93010 ELECTROCARDIOGRAM REPORT: CPT | Performed by: INTERNAL MEDICINE

## 2018-08-28 NOTE — TELEPHONE ENCOUNTER
PRIOR AUTHORIZATION DENIED    Medication: Janumet denied     Denial Date: 8/28/2018    Denial Rational: needs try/fail at least four covered medications Jentadueto IR or XR, Kombiglaze XR, Synjardy or Synjardy XR, Xigduo XR      Appeal Information: IF YOU WOULD LIKE TO APPEAL, PLEASE SUPPLY PA TEAM WITH A LETTER OF MEDICAL NECESSITY.

## 2018-08-28 NOTE — LETTER
8/28/2018      Julio Cesar Sahu PA-C  [id number] [facility] [id number]      RE: Glenroy Padilla       Dear Colleague,    I had the pleasure of seeing Glenroy Padilla in the [facility].    Service Date: 08/28/2018      HISTORY OF PRESENT ILLNESS:  I had the opportunity to see Mr. Glenroy Padilla in Cardiology Clinic today at the [facility] in [place] for consultation regarding a history of coronary artery disease and stenting of the mid LAD in [place] in 03/2016.  He has cardiac risk factors including hypertension, dyslipidemia and type 2 diabetes.  Those risk factors appear to be well controlled.  He tells me that he did not really have any symptoms of heart disease when he went into the hospital and was found to have coronary artery disease.  He had multiple falling events.  As part of his evaluation, he had a stress test with nuclear imaging which suggested anterior ischemia.  He went on to have an angiogram which revealed an 80%-90% mid LAD stenosis which was treated with a Promus drug-eluting stent.  He had a 80%-90% stenosis within a small diagonal branch.  That could not be treated with stenting as well as a terminal portion of the posterior descending coronary artery which could not be stented either.  Those issues have been managed medically and he has not had any anginal symptoms.        His symptoms now include shortness of breath, but this sounds somewhat chronic.  He is overweight with severe osteoarthritis of the knees and can walk only very short distances due to knee pain primarily.  He does get short of breath doing this.  He does not get chest pain.      He is also dealing with chronic bilateral lower extremity lymphedema problems and nonhealing distal left lower extremity skin ulcers.  He had some skin ulcers on the right which healed, but on the left they have not and he is concerned about his circulation.      I reviewed his coronary  angiogram report and the last stress test that was done while he was in [place] using Persantine and nuclear imaging which reportedly was normal, although I would not put much stock in that one.  I am sure the test was challenging.  His left ventricular function has been normal.  His last echo was in 2016.  He has not had prior infarction.        He has chronic anemia with a hemoglobin as low as 7.6  earlier this year and now up to 10.5.  His renal function is normal.  His LDL is 57, total cholesterol 128, HDL 43.      PHYSICAL EXAMINATION:  His weight is 313 pounds, blood pressure 102/58, heart rate 78.  His lungs are clear.  His heart rhythm is regular.  I do not hear any cardiac murmurs.  He has bilateral lower extremity lymphedema, but I did not unwrap his legs to look at his skin wounds today.      His electrocardiogram was repeated today and I reviewed that personally.  It demonstrates sinus rhythm with first-degree AV block and isolated Q-wave in lead V3, but is otherwise normal.      IMPRESSIONS:  Mr. Glenroy Padilla is a 68-year-old gentleman with hypertension, dyslipidemia and type 2 diabetes who has coronary artery disease and has undergone stenting of the LAD in [place] in 2016.  He remains on Plavix but is no longer on aspirin.  I suspect he was taken off of aspirin because of his anemia which may be due to some GI bleeding.  I do not think this has been definitively diagnosed, according to his report.      He has shortness of breath, probably due to obesity and deconditioning more than anything else.  His left ventricular function has been normal in the past, although has not been checked recently.  I will go ahead and repeat an echocardiogram for further evaluation.      I think his most significant issue with nonhealing ulcers of his distal left lower extremity, possibly due to peripheral arterial disease.  I have suggested that he have a CT angiogram of the lower extremity vessels and then follow up  with our Vascular Clinic to help understand this issue somewhat better.  If he does not have significant arterial disease, then his nonhealing ulcers are probably due to his lymphedema problems which he is actively treating currently.  Ultrasound of his lower extremity venous system did not show any incompetency of his lower extremity venous system.      I will have him follow up with us again here in Cardiology Clinic again in a month for review his echocardiogram and discussion of his symptoms.  If he does go on to Vascular Surgery or knee surgery, I would recommend that he have a repeat Lexiscan nuclear perfusion imaging study prior to that surgery to make sure that there are no significant areas of ischemia as a preoperative evaluation.      cc:     Julio Cesar Sahu PA-C    [facility]   [address] [place] [address]         DIONE BEYER MD, [facility]             D: 2018   T: 2018   MT: JUAN      Name:     MALCOLM SANCHES   MRN:      [id number]        Account:      [id number]   :      1950           Service Date: 2018      Document: [id number]         Outpatient Encounter Prescriptions as of 2018   Medication Sig Dispense Refill     acetaminophen (TYLENOL) 500 MG tablet Take 1-2 tablets (500-1,000 mg) by mouth every 6 hours as needed for mild pain 120 tablet 4     clopidogrel (PLAVIX) 75 MG tablet Take 1 tablet (75 mg) by mouth daily 90 tablet 1     cyclobenzaprine (FLEXERIL) 10 MG tablet TAKE 1 TAB BY MOUTH TWICE DAILY AS NEEDED FOR ROTATOR CUFF TEAR  0     ferrous sulfate (IRON) 325 (65 Fe) MG tablet Take 1 tablet (325 mg) by mouth daily (with breakfast) 90 tablet 1     furosemide (LASIX) 40 MG tablet Take 1 tablet (40 mg) by mouth daily 90 tablet 1     lidocaine (XYLOCAINE) 5 % ointment Apply topically daily 50 g 3     losartan (COZAAR) 25 MG tablet Take 1 tablet (25 mg) by mouth daily 90 tablet 1     methocarbamol (ROBAXIN) 500 MG tablet Take 1 tablet (500 mg) by  mouth 4 times daily as needed for muscle spasms 70 tablet 1     metoprolol tartrate (LOPRESSOR) 25 MG tablet Take 1 tablet (25 mg) by mouth 2 times daily 60 tablet 4     pantoprazole (PROTONIX) 40 MG EC tablet Take 1 tablet (40 mg) by mouth daily Take 30-60 minutes before a meal. 30 tablet 0     rosuvastatin (CRESTOR) 10 MG tablet Take 1 tablet (10 mg) by mouth daily 90 tablet 1     senna-docusate (SENOKOT-S;PERICOLACE) 8.6-50 MG per tablet Take 1 tablet by mouth 2 times daily as needed for constipation 100 tablet      Wound Dressings (TRIAD HYDROPHILIC WOUND DRESSI) PSTE Externally apply 1 g topically daily 1 Tube 3     [DISCONTINUED] SitaGLIPtin-MetFORMIN HCl  MG TB24 Take 1 tablet by mouth daily (with breakfast) 90 tablet 1     blood glucose (NO BRAND SPECIFIED) lancing device Use to test blood sugars 2 times daily or as directed. 1 each 0     blood glucose calibration (NO BRAND SPECIFIED) solution To accompany: Blood Glucose Monitor Brands: per insurance. 1 Bottle 3     blood glucose monitoring (NO BRAND SPECIFIED) meter device kit test blood sugar 2 xdaily or as directed.  Blood Glucose Monitor Brands: per insurance. 1 kit 0     blood glucose monitoring (NO BRAND SPECIFIED) test strip Use to test blood sugar 2 times daily Blood Glucose Monitor Brands: Glucocard Expression 100 strip 6     blood glucose monitoring (NO BRAND SPECIFIED) test strip Use to test blood sugar 2x  daily or as directed. To accompany: Blood Glucose Monitor Brands: per insurance. 100 strip 1     Blood Glucose Monitoring Suppl (GLUCOCARD EXPRESSION MONITOR) W/DEVICE KIT USE TWICE DAILY TO TEST BLOOD GLUCOSE  0     order for DME Equipment being ordered: electric chair for sleeping and adjustment to allow for elevation of legs above the heart.  Zero gravity type heavy duty sleep chair advised. 1 Units 0     order for DME Compression garments toe to knee.  Specific brand is Compraflex Light Compression garment.   Diagnosis codes for  venous stasis ulcer I83.029 and for  Lymphoedema is I89.0   Goal is for compression of about 30 to 40 mm of mercury   measurements are right ankle 28.5 cm and right calf is 45.5 cm.  Left ankle is 27.5 cm and the calf is 45 cm   Tall for length. 2 Device 1     order for DME Equipment being ordered: Needs to have a sleep chair for home use. 1 Device 0     order for DME Equipment being ordered: compression stockings below knee and above knee if available.  Medium compression. 1 Units 1     order for DME One touch glucose monitoring strip with Glucometer and lancets. 1 Box 1     thin (NO BRAND SPECIFIED) lancets Test 2 x daily or as directed.  Brands: per insurance. 100 each 1     No facility-administered encounter medications on file as of 8/28/2018.        Again, thank you for allowing me to participate in the care of your patient.      Sincerely,    DIONE BEYER MD     [facility]

## 2018-08-28 NOTE — PROGRESS NOTES
HPI and Plan:   See dictation    Orders Placed This Encounter   Procedures     CT Angiogram abdominal aorta and bilateral iliofemoral runoff w & w/o contrast     Follow-Up with Vascular Provider     Follow-Up with Cardiac Advanced Practice Provider     EKG 12-LEAD CLINIC READ     Echocardiogram       No orders of the defined types were placed in this encounter.      There are no discontinued medications.      Encounter Diagnoses   Name Primary?     Shortness of breath Yes     Hypertension, goal below 140/90      Hyperlipidemia LDL goal <100      History of coronary artery stent placement      Coronary artery disease involving native coronary artery of native heart without angina pectoris      Type 2 diabetes mellitus with hyperosmolarity without coma, without long-term current use of insulin (H)      PAD (peripheral artery disease) (H)      Open wound of left lower extremity, subsequent encounter        CURRENT MEDICATIONS:  Current Outpatient Prescriptions   Medication Sig Dispense Refill     acetaminophen (TYLENOL) 500 MG tablet Take 1-2 tablets (500-1,000 mg) by mouth every 6 hours as needed for mild pain 120 tablet 4     clopidogrel (PLAVIX) 75 MG tablet Take 1 tablet (75 mg) by mouth daily 90 tablet 1     cyclobenzaprine (FLEXERIL) 10 MG tablet TAKE 1 TAB BY MOUTH TWICE DAILY AS NEEDED FOR ROTATOR CUFF TEAR  0     ferrous sulfate (IRON) 325 (65 Fe) MG tablet Take 1 tablet (325 mg) by mouth daily (with breakfast) 90 tablet 1     furosemide (LASIX) 40 MG tablet Take 1 tablet (40 mg) by mouth daily 90 tablet 1     lidocaine (XYLOCAINE) 5 % ointment Apply topically daily 50 g 3     losartan (COZAAR) 25 MG tablet Take 1 tablet (25 mg) by mouth daily 90 tablet 1     methocarbamol (ROBAXIN) 500 MG tablet Take 1 tablet (500 mg) by mouth 4 times daily as needed for muscle spasms 70 tablet 1     metoprolol tartrate (LOPRESSOR) 25 MG tablet Take 1 tablet (25 mg) by mouth 2 times daily 60 tablet 4     pantoprazole  (PROTONIX) 40 MG EC tablet Take 1 tablet (40 mg) by mouth daily Take 30-60 minutes before a meal. 30 tablet 0     rosuvastatin (CRESTOR) 10 MG tablet Take 1 tablet (10 mg) by mouth daily 90 tablet 1     senna-docusate (SENOKOT-S;PERICOLACE) 8.6-50 MG per tablet Take 1 tablet by mouth 2 times daily as needed for constipation 100 tablet      SitaGLIPtin-MetFORMIN HCl  MG TB24 Take 1 tablet by mouth daily (with breakfast) 90 tablet 1     Wound Dressings (TRIAD HYDROPHILIC WOUND DRESSI) PSTE Externally apply 1 g topically daily 1 Tube 3     blood glucose (NO BRAND SPECIFIED) lancing device Use to test blood sugars 2 times daily or as directed. 1 each 0     blood glucose calibration (NO BRAND SPECIFIED) solution To accompany: Blood Glucose Monitor Brands: per insurance. 1 Bottle 3     blood glucose monitoring (NO BRAND SPECIFIED) meter device kit test blood sugar 2 xdaily or as directed.  Blood Glucose Monitor Brands: per insurance. 1 kit 0     blood glucose monitoring (NO BRAND SPECIFIED) test strip Use to test blood sugar 2 times daily Blood Glucose Monitor Brands: Glucocard Expression 100 strip 6     blood glucose monitoring (NO BRAND SPECIFIED) test strip Use to test blood sugar 2x  daily or as directed. To accompany: Blood Glucose Monitor Brands: per insurance. 100 strip 1     Blood Glucose Monitoring Suppl (GLUCOCARD EXPRESSION MONITOR) W/DEVICE KIT USE TWICE DAILY TO TEST BLOOD GLUCOSE  0     order for DME Equipment being ordered: electric chair for sleeping and adjustment to allow for elevation of legs above the heart.  Zero gravity type heavy duty sleep chair advised. 1 Units 0     order for DME Compression garments toe to knee.  Specific brand is Compraflex Light Compression garment.   Diagnosis codes for venous stasis ulcer I83.029 and for  Lymphoedema is I89.0   Goal is for compression of about 30 to 40 mm of mercury   measurements are right ankle 28.5 cm and right calf is 45.5 cm.  Left ankle is 27.5  cm and the calf is 45 cm   Tall for length. 2 Device 1     order for DME Equipment being ordered: Needs to have a sleep chair for home use. 1 Device 0     order for DME Equipment being ordered: compression stockings below knee and above knee if available.  Medium compression. 1 Units 1     order for DME One touch glucose monitoring strip with Glucometer and lancets. 1 Box 1     thin (NO BRAND SPECIFIED) lancets Test 2 x daily or as directed.  Brands: per insurance. 100 each 1       ALLERGIES     Allergies   Allergen Reactions     No Known Allergies        PAST MEDICAL HISTORY:  Past Medical History:   Diagnosis Date     Acute pain of left knee 5/22/2017     Chronic pain of right knee 5/22/2017     Hx of coronary artery disease 5/22/2017     Hx of heart artery stent 5/22/2017     Mixed incontinence 8/13/2018     Obesity, morbid, BMI 40.0-49.9 (H) 11/20/2017     Open wound of left lower extremity without complication, initial encounter 5/22/2017     [place] (obstructive sleep apnea) 5/22/2017     Type 2 diabetes mellitus with other circulatory complication, without long-term current use of insulin (H) 5/22/2017     Venous stasis ulcer of left lower extremity (H) 5/22/2017     Venous stasis ulcer of left lower extremity (H) 5/22/2017       PAST SURGICAL HISTORY:  Past Surgical History:   Procedure Laterality Date     CARDIAC SURGERY      stent placement     CHOLECYSTECTOMY       LAMINECTOMY LUMBAR THREE+ LEVELS N/A 3/13/2018    Procedure: LAMINECTOMY LUMBAR THREE+ LEVELS;  T5-9 LAMINECTOMIES, RESECTION OF EPIDURAL LIPOMATOSIS;  Surgeon: Hugh Mendieta MD;  Location:  OR       FAMILY HISTORY:  No family history on file.    SOCIAL HISTORY:  Social History     Social History     Marital status: Single     Spouse name: N/A     Number of children: N/A     Years of education: N/A     Social History Main Topics     Smoking status: Former Smoker     Types: Cigars     Smokeless tobacco: Never Used      Comment: exposure to  "second hand smoke     Alcohol use No     Drug use: No     Sexual activity: No     Other Topics Concern     Not on file     Social History Narrative       Review of Systems:  Skin:  Negative       Eyes:  Negative      ENT:  Negative      Respiratory:  Positive for dyspnea on exertion;shortness of breath     Cardiovascular:  Negative for;palpitations;chest pain Positive for;edema wraps on legs  Gastroenterology: Negative      Genitourinary:  Negative      Musculoskeletal:  Positive for arthritis;back pain;joint pain    Neurologic:  Negative      Psychiatric:  Positive for sleep disturbances can not sleep in a bed   Heme/Lymph/Imm:  Negative      Endocrine:  Positive for diabetes      Physical Exam:  Vitals: /58 (BP Location: Right arm, Patient Position: Fowlers, Cuff Size: Adult Large)  Pulse 78  Ht 1.778 m (5' 10\")  Wt 142.3 kg (313 lb 12.8 oz)  SpO2 96%  BMI 45.03 kg/m2    Constitutional:  cooperative;well developed;in no acute distress morbidly obese      Skin:  warm and dry to the touch          Head:  normocephalic, no masses or lesions        Eyes:  pupils equal and round, conjunctivae and lids unremarkable, sclera white, no xanthalasma, EOMS intact, no nystagmus        Lymph:No Cervical lymphadenopathy present     ENT:  no pallor or cyanosis, dentition good        Neck:  carotid pulses are full and equal bilaterally, JVP normal, no carotid bruit        Respiratory:  normal breath sounds, clear to auscultation, normal A-P diameter, normal symmetry, normal respiratory excursion, no use of accessory muscles         Cardiac: regular rhythm, normal S1/S2, no S3 or S4, apical impulse not displaced, no murmurs, gallops or rubs                                                         GI:  abdomen soft;BS normoactive        Extremities and Muscular Skeletal:  no deformities, clubbing, cyanosis, erythema observed   bilateral LE edema;3+          Neurological:  affect appropriate   in motorized " wheelchair    Psych:  Alert and Oriented x 3        CC  Julio Cesar Esteban PA-C  [address] [place] [address]

## 2018-08-28 NOTE — MR AVS SNAPSHOT
After Visit Summary   8/28/2018    Glenroy Padilla    MRN: [id number]           Patient Information     Date Of Birth          1950        Visit Information        Provider Department      8/28/2018 3:00 PM Arnav Kohli MD [facility]        Today's Diagnoses     Shortness of breath    -  1    Hypertension, goal below 140/90        Hyperlipidemia LDL goal <100        History of coronary artery stent placement        Coronary artery disease involving native coronary artery of native heart without angina pectoris        Type 2 diabetes mellitus with hyperosmolarity without coma, without long-term current use of insulin (H)        PAD (peripheral artery disease) (H)        Open wound of left lower extremity, subsequent encounter           Follow-ups after your visit        Additional Services     Follow-Up with Vascular Provider           Follow-Up with Cardiac Advanced Practice Provider                 Your next 10 appointments already scheduled     Sep 10, 2018  9:00 AM CDT   Return Visit with PH WOUND EXAM ROOM   [facility]    [address] [address]   [phone]            Sep 20, 2018 10:10 AM CDT   [facility] with Martha Reddy PA-C   [facility]    [address], [address]  [place] [id number] [phone]            Sep 27, 2018  9:00 AM CDT   Pre-Op physical with Julio Cesar Esteban PA-C   [facility]    [address] [id number] [phone]              Future tests that were ordered for you today     Open Future Orders        Priority Expected Expires Ordered    Follow-Up with Cardiac Advanced Practice Provider Routine 9/27/2018 8/28/2019 8/28/2018    Echocardiogram Routine 9/4/2018 8/28/2019 8/28/2018    Follow-Up with Vascular Provider Routine 9/4/2018  "8/28/2019 8/28/2018    CT Angiogram abdominal aorta and bilateral iliofemoral runoff w & w/o contrast Routine 8/29/2018 8/28/2019 8/28/2018            Who to contact     If you have questions or need follow up information about today's clinic visit or your schedule please contact [facility] directly at [phone].  Normal or non-critical lab and imaging results will be communicated to you by MyChart, letter or phone within 4 business days after the clinic has received the results. If you do not hear from us within 7 days, please contact the clinic through MyChart or phone. If you have a critical or abnormal lab result, we will notify you by phone as soon as possible.  Submit refill requests through [employer] or call your pharmacy and they will forward the refill request to us. Please allow 3 business days for your refill to be completed.          Additional Information About Your Visit        Care EveryWhere ID     This is your Care EveryWhere ID. This could be used by other organizations to access your [facility] medical records  [id number]        Your Vitals Were     Pulse Height Pulse Oximetry BMI (Body Mass Index)          78 1.778 m (5' 10\") 96% 45.03 kg/m2         Blood Pressure from Last 3 Encounters:   08/28/18 102/58   08/23/18 124/70   06/29/18 122/64    Weight from Last 3 Encounters:   08/28/18 142.3 kg (313 lb 12.8 oz)   08/23/18 142 kg (313 lb)   06/29/18 132 kg (290 lb 14.4 oz)              We Performed the Following     EKG 12-LEAD CLINIC READ        Primary Care Provider Office Phone # Fax #    Julio Cesar Esteban PA-C [phone] [address] [address] [address]        Equal Access to Services     JAZ TOBAR AH: Hadshea Omalley, wathierno cody, qashantita kaalmada duke, adams horton. So [facility] [place] [phone].    ATENCIÓN: Si habla español, tiene a wilkinson disposición servicios gratuitos de asistencia " lingüística. Frances al [phone].    We comply with applicable federal civil rights laws and [place] laws. We do not discriminate on the basis of race, color, national origin, age, disability, sex, sexual orientation, or gender identity.            Thank you!     Thank you for choosing [facility]  for your care. Our goal is always to provide you with excellent care. Hearing back from our patients is one way we can continue to improve our services. Please take a few minutes to complete the written survey that you may receive in the mail after your visit with us. Thank you!             Your Updated Medication List - Protect others around you: Learn how to safely use, store and throw away your medicines at www.disposemymeds.org.          This list is accurate as of 8/28/18  4:21 PM.  Always use your most recent med list.                   Brand Name Dispense Instructions for use Diagnosis    acetaminophen 500 MG tablet    TYLENOL    120 tablet    Take 1-2 tablets (500-1,000 mg) by mouth every 6 hours as needed for mild pain    Chronic pain of left knee, Complete tear of left rotator cuff       blood glucose calibration solution    NO BRAND SPECIFIED    1 Bottle    To accompany: Blood Glucose Monitor Brands: per insurance.    Type 2 diabetes mellitus with other circulatory complication, without long-term current use of insulin (H)       blood glucose lancing device    no brand specified    1 each    Use to test blood sugars 2 times daily or as directed.    Type 2 diabetes mellitus with other circulatory complication, without long-term current use of insulin (H)       * blood glucose monitoring test strip    no brand specified    100 strip    Use to test blood sugar 2x  daily or as directed. To accompany: Blood Glucose Monitor Brands: per insurance.    Type 2 diabetes mellitus with other circulatory complication, without long-term current use of insulin (H)       * blood  glucose monitoring test strip    no brand specified    100 strip    Use to test blood sugar 2 times daily Blood Glucose Monitor Brands: Glucocard Expression    Type 2 diabetes mellitus with other circulatory complication, without long-term current use of insulin (H)       clopidogrel 75 MG tablet    PLAVIX    90 tablet    Take 1 tablet (75 mg) by mouth daily    Type 2 diabetes mellitus with other circulatory complication, without long-term current use of insulin (H), S/P spinal surgery, Hypertension, goal below 140/90, Hyperlipidemia LDL goal <100       cyclobenzaprine 10 MG tablet    FLEXERIL     TAKE 1 TAB BY MOUTH TWICE DAILY AS NEEDED FOR ROTATOR CUFF TEAR        ferrous sulfate 325 (65 Fe) MG tablet    IRON    90 tablet    Take 1 tablet (325 mg) by mouth daily (with breakfast)    History of anemia       furosemide 40 MG tablet    LASIX    90 tablet    Take 1 tablet (40 mg) by mouth daily    Hypertension, goal below 140/90       * GLUCOCARD EXPRESSION MONITOR w/Device Kit      USE TWICE DAILY TO TEST BLOOD GLUCOSE        * blood glucose monitoring meter device kit    no brand specified    1 kit    test blood sugar 2 xdaily or as directed.  Blood Glucose Monitor Brands: per insurance.    Type 2 diabetes mellitus with other circulatory complication, without long-term current use of insulin (H)       lidocaine 5 % ointment    XYLOCAINE    50 g    Apply topically daily    Chronic pain of right knee       losartan 25 MG tablet    COZAAR    90 tablet    Take 1 tablet (25 mg) by mouth daily    Hypertension, goal below 140/90, Type 2 diabetes mellitus with other circulatory complication, without long-term current use of insulin (H)       methocarbamol 500 MG tablet    ROBAXIN    70 tablet    Take 1 tablet (500 mg) by mouth 4 times daily as needed for muscle spasms    S/P spinal surgery       metoprolol tartrate 25 MG tablet    LOPRESSOR    60 tablet    Take 1 tablet (25 mg) by mouth 2 times daily    Type 2 diabetes  mellitus with other circulatory complication, without long-term current use of insulin (H), History of coronary artery stent placement, Hypertension, goal below 140/90       order for DME     1 Box    One touch glucose monitoring strip with Glucometer and lancets.    Type 2 diabetes mellitus with other circulatory complication, without long-term current use of insulin (H)       order for DME     1 Units    Equipment being ordered: compression stockings below knee and above knee if available.  Medium compression.    Primary osteoarthritis of right knee, Lymphedema of right lower extremity       order for DME     1 Device    Equipment being ordered: Needs to have a sleep chair for home use.    Type 2 diabetes mellitus with other circulatory complication, without long-term current use of insulin (H), Morbid obesity due to excess calories (H), History of coronary artery stent placement, Gastroesophageal reflux disease without esophagitis, Chronic pain of left knee, DAYTON (obstructive sleep apnea)       order for DME     2 Device    Compression garments toe to knee. ?Specific brand is Compraflex Light Compression garment.  Diagnosis codes for venous stasis ulcer I83.029 and for ?Lymphoedema is I89.0  Goal is for compression of about 30 to 40 mm of mercury  measurements are right ankle 28.5 cm and right calf is 45.5 cm. ?Left ankle is 27.5 cm and the calf is 45 cm  Tall for length.    Neuropathy, Lymphedema of right lower extremity, Lymphedema       order for DME     1 Units    Equipment being ordered: electric chair for sleeping and adjustment to allow for elevation of legs above the heart.  Zero gravity type heavy duty sleep chair advised.    Screening for diabetic peripheral neuropathy, History of coronary artery stent placement, Hypertension, goal below 140/90, Open wound of left lower extremity without complication, initial encounter, Type 2 diabetes mellitus with other circulatory complication, without long-term  current use of insulin (H), Weakness of both legs, Iron deficiency anemia secondary to inadequate dietary iron intake, DAYTON (obstructive sleep apnea), Central spinal stenosis, Foraminal stenosis of lumbar region       pantoprazole 40 MG EC tablet    PROTONIX    30 tablet    Take 1 tablet (40 mg) by mouth daily Take 30-60 minutes before a meal.    Gastroesophageal reflux disease without esophagitis       rosuvastatin 10 MG tablet    CRESTOR    90 tablet    Take 1 tablet (10 mg) by mouth daily    Hyperlipidemia LDL goal <100       senna-docusate 8.6-50 MG per tablet    SENOKOT-S;PERICOLACE    100 tablet    Take 1 tablet by mouth 2 times daily as needed for constipation    Spinal stenosis, lumbar region, without neurogenic claudication       SitaGLIPtin-MetFORMIN HCl  MG Tb24     90 tablet    Take 1 tablet by mouth daily (with breakfast)    Type 2 diabetes mellitus with other circulatory complication, without long-term current use of insulin (H)       thin lancets    NO BRAND SPECIFIED    100 each    Test 2 x daily or as directed.  Brands: per insurance.    Type 2 diabetes mellitus with other circulatory complication, without long-term current use of insulin (H)       TRIAD HYDROPHILIC WOUND DRESSI Pste     1 Tube    Externally apply 1 g topically daily    Open wound of right lower leg, initial encounter       * Notice:  This list has 4 medication(s) that are the same as other medications prescribed for you. Read the directions carefully, and ask your doctor or other care provider to review them with you.

## 2018-08-28 NOTE — PROGRESS NOTES
Service Date: 08/28/2018      HISTORY OF PRESENT ILLNESS:  I had the opportunity to see Mr. Glenroy Padilla in Cardiology Clinic today at the [facility] in [place] for consultation regarding a history of coronary artery disease and stenting of the mid LAD in [place] in 03/2016.  He has cardiac risk factors including hypertension, dyslipidemia and type 2 diabetes.  Those risk factors appear to be well controlled.  He tells me that he did not really have any symptoms of heart disease when he went into the hospital and was found to have coronary artery disease.  He had multiple falling events.  As part of his evaluation, he had a stress test with nuclear imaging which suggested anterior ischemia.  He went on to have an angiogram which revealed an 80%-90% mid LAD stenosis which was treated with a Promus drug-eluting stent.  He had a 80%-90% stenosis within a small diagonal branch.  That could not be treated with stenting as well as a terminal portion of the posterior descending coronary artery which could not be stented either.  Those issues have been managed medically and he has not had any anginal symptoms.        His symptoms now include shortness of breath, but this sounds somewhat chronic.  He is overweight with severe osteoarthritis of the knees and can walk only very short distances due to knee pain primarily.  He does get short of breath doing this.  He does not get chest pain.      He is also dealing with chronic bilateral lower extremity lymphedema problems and nonhealing distal left lower extremity skin ulcers.  He had some skin ulcers on the right which healed, but on the left they have not and he is concerned about his circulation.      I reviewed his coronary angiogram report and the last stress test that was done while he was in [place] using Persantine and nuclear imaging which reportedly was normal, although I would not put much stock in that one.  I am sure the test was  challenging.  His left ventricular function has been normal.  His last echo was in 2016.  He has not had prior infarction.        He has chronic anemia with a hemoglobin as low as 7.6  earlier this year and now up to 10.5.  His renal function is normal.  His LDL is 57, total cholesterol 128, HDL 43.      PHYSICAL EXAMINATION:  His weight is 313 pounds, blood pressure 102/58, heart rate 78.  His lungs are clear.  His heart rhythm is regular.  I do not hear any cardiac murmurs.  He has bilateral lower extremity lymphedema, but I did not unwrap his legs to look at his skin wounds today.      His electrocardiogram was repeated today and I reviewed that personally.  It demonstrates sinus rhythm with first-degree AV block and isolated Q-wave in lead V3, but is otherwise normal.      IMPRESSIONS:  Mr. Glenroy Padilla is a 68-year-old gentleman with hypertension, dyslipidemia and type 2 diabetes who has coronary artery disease and has undergone stenting of the LAD in [place] in 2016.  He remains on Plavix but is no longer on aspirin.  I suspect he was taken off of aspirin because of his anemia which may be due to some GI bleeding.  I do not think this has been definitively diagnosed, according to his report.      He has shortness of breath, probably due to obesity and deconditioning more than anything else.  His left ventricular function has been normal in the past, although has not been checked recently.  I will go ahead and repeat an echocardiogram for further evaluation.      I think his most significant issue with nonhealing ulcers of his distal left lower extremity, possibly due to peripheral arterial disease.  I have suggested that he have a CT angiogram of the lower extremity vessels and then follow up with our Vascular Clinic to help understand this issue somewhat better.  If he does not have significant arterial disease, then his nonhealing ulcers are probably due to his lymphedema problems which he is actively  treating currently.  Ultrasound of his lower extremity venous system did not show any incompetency of his lower extremity venous system.      I will have him follow up with us again here in Cardiology Clinic again in a month for review his echocardiogram and discussion of his symptoms.  If he does go on to Vascular Surgery or knee surgery, I would recommend that he have a repeat Lexiscan nuclear perfusion imaging study prior to that surgery to make sure that there are no significant areas of ischemia as a preoperative evaluation.      cc:     Julio Cesar Sahu PA-C    [facility]   [address] [place] [address]         DIONE BEYER MD, FACC             D: 2018   T: 2018   MT: JUAN      Name:     MALCOLM SANCHES   MRN:      [id number]        Account:      [id number]   :      1950           Service Date: 2018      Document: [id number]

## 2018-08-28 NOTE — LETTER
8/28/2018    Julio Cesar Sahu PA-C  [address] [facility] [address]    RE: Glenroy Padilla       Dear Colleague,    I had the pleasure of seeing Glenroy Padilla in the [facility].    HPI and Plan:   See dictation    Orders Placed This Encounter   Procedures     CT Angiogram abdominal aorta and bilateral iliofemoral runoff w & w/o contrast     Follow-Up with Vascular Provider     Follow-Up with Cardiac Advanced Practice Provider     EKG 12-LEAD CLINIC READ     Echocardiogram       No orders of the defined types were placed in this encounter.      There are no discontinued medications.      Encounter Diagnoses   Name Primary?     Shortness of breath Yes     Hypertension, goal below 140/90      Hyperlipidemia LDL goal <100      History of coronary artery stent placement      Coronary artery disease involving native coronary artery of native heart without angina pectoris      Type 2 diabetes mellitus with hyperosmolarity without coma, without long-term current use of insulin (H)      PAD (peripheral artery disease) (H)      Open wound of left lower extremity, subsequent encounter        CURRENT MEDICATIONS:  Current Outpatient Prescriptions   Medication Sig Dispense Refill     acetaminophen (TYLENOL) 500 MG tablet Take 1-2 tablets (500-1,000 mg) by mouth every 6 hours as needed for mild pain 120 tablet 4     clopidogrel (PLAVIX) 75 MG tablet Take 1 tablet (75 mg) by mouth daily 90 tablet 1     cyclobenzaprine (FLEXERIL) 10 MG tablet TAKE 1 TAB BY MOUTH TWICE DAILY AS NEEDED FOR ROTATOR CUFF TEAR  0     ferrous sulfate (IRON) 325 (65 Fe) MG tablet Take 1 tablet (325 mg) by mouth daily (with breakfast) 90 tablet 1     furosemide (LASIX) 40 MG tablet Take 1 tablet (40 mg) by mouth daily 90 tablet 1     lidocaine (XYLOCAINE) 5 % ointment Apply topically daily 50 g 3     losartan (COZAAR) 25 MG tablet Take 1 tablet (25 mg) by mouth daily 90 tablet 1     methocarbamol (ROBAXIN) 500 MG  tablet Take 1 tablet (500 mg) by mouth 4 times daily as needed for muscle spasms 70 tablet 1     metoprolol tartrate (LOPRESSOR) 25 MG tablet Take 1 tablet (25 mg) by mouth 2 times daily 60 tablet 4     pantoprazole (PROTONIX) 40 MG EC tablet Take 1 tablet (40 mg) by mouth daily Take 30-60 minutes before a meal. 30 tablet 0     rosuvastatin (CRESTOR) 10 MG tablet Take 1 tablet (10 mg) by mouth daily 90 tablet 1     senna-docusate (SENOKOT-S;PERICOLACE) 8.6-50 MG per tablet Take 1 tablet by mouth 2 times daily as needed for constipation 100 tablet      SitaGLIPtin-MetFORMIN HCl  MG TB24 Take 1 tablet by mouth daily (with breakfast) 90 tablet 1     Wound Dressings (TRIAD HYDROPHILIC WOUND DRESSI) PSTE Externally apply 1 g topically daily 1 Tube 3     blood glucose (NO BRAND SPECIFIED) lancing device Use to test blood sugars 2 times daily or as directed. 1 each 0     blood glucose calibration (NO BRAND SPECIFIED) solution To accompany: Blood Glucose Monitor Brands: per insurance. 1 Bottle 3     blood glucose monitoring (NO BRAND SPECIFIED) meter device kit test blood sugar 2 xdaily or as directed.  Blood Glucose Monitor Brands: per insurance. 1 kit 0     blood glucose monitoring (NO BRAND SPECIFIED) test strip Use to test blood sugar 2 times daily Blood Glucose Monitor Brands: Glucocard Expression 100 strip 6     blood glucose monitoring (NO BRAND SPECIFIED) test strip Use to test blood sugar 2x  daily or as directed. To accompany: Blood Glucose Monitor Brands: per insurance. 100 strip 1     Blood Glucose Monitoring Suppl (GLUCOCARD EXPRESSION MONITOR) W/DEVICE KIT USE TWICE DAILY TO TEST BLOOD GLUCOSE  0     order for DME Equipment being ordered: electric chair for sleeping and adjustment to allow for elevation of legs above the heart.  Zero gravity type heavy duty sleep chair advised. 1 Units 0     order for DME Compression garments toe to knee.  Specific brand is Compraflex Light Compression garment.    Diagnosis codes for venous stasis ulcer I83.029 and for  Lymphoedema is I89.0   Goal is for compression of about 30 to 40 mm of mercury   measurements are right ankle 28.5 cm and right calf is 45.5 cm.  Left ankle is 27.5 cm and the calf is 45 cm   Tall for length. 2 Device 1     order for DME Equipment being ordered: Needs to have a sleep chair for home use. 1 Device 0     order for DME Equipment being ordered: compression stockings below knee and above knee if available.  Medium compression. 1 Units 1     order for DME One touch glucose monitoring strip with Glucometer and lancets. 1 Box 1     thin (NO BRAND SPECIFIED) lancets Test 2 x daily or as directed.  Brands: per insurance. 100 each 1       ALLERGIES     Allergies   Allergen Reactions     No Known Allergies        PAST MEDICAL HISTORY:  Past Medical History:   Diagnosis Date     Acute pain of left knee 5/22/2017     Chronic pain of right knee 5/22/2017     Hx of coronary artery disease 5/22/2017     Hx of heart artery stent 5/22/2017     Mixed incontinence 8/13/2018     Obesity, morbid, BMI 40.0-49.9 (H) 11/20/2017     Open wound of left lower extremity without complication, initial encounter 5/22/2017     [place] (obstructive sleep apnea) 5/22/2017     Type 2 diabetes mellitus with other circulatory complication, without long-term current use of insulin (H) 5/22/2017     Venous stasis ulcer of left lower extremity (H) 5/22/2017     Venous stasis ulcer of left lower extremity (H) 5/22/2017       PAST SURGICAL HISTORY:  Past Surgical History:   Procedure Laterality Date     CARDIAC SURGERY      stent placement     CHOLECYSTECTOMY       LAMINECTOMY LUMBAR THREE+ LEVELS N/A 3/13/2018    Procedure: LAMINECTOMY LUMBAR THREE+ LEVELS;  T5-9 LAMINECTOMIES, RESECTION OF EPIDURAL LIPOMATOSIS;  Surgeon: Hugh Mendieta MD;  Location:  OR       FAMILY HISTORY:  No family history on file.    SOCIAL HISTORY:  Social History     Social History     Marital status:  "Single     Spouse name: N/A     Number of children: N/A     Years of education: N/A     Social History Main Topics     Smoking status: Former Smoker     Types: Cigars     Smokeless tobacco: Never Used      Comment: exposure to second hand smoke     Alcohol use No     Drug use: No     Sexual activity: No     Other Topics Concern     Not on file     Social History Narrative       Review of Systems:  Skin:  Negative       Eyes:  Negative      ENT:  Negative      Respiratory:  Positive for dyspnea on exertion;shortness of breath     Cardiovascular:  Negative for;palpitations;chest pain Positive for;edema wraps on legs  Gastroenterology: Negative      Genitourinary:  Negative      Musculoskeletal:  Positive for arthritis;back pain;joint pain    Neurologic:  Negative      Psychiatric:  Positive for sleep disturbances can not sleep in a bed   Heme/Lymph/Imm:  Negative      Endocrine:  Positive for diabetes      Physical Exam:  Vitals: /58 (BP Location: Right arm, Patient Position: Fowlers, Cuff Size: Adult Large)  Pulse 78  Ht 1.778 m (5' 10\")  Wt 142.3 kg (313 lb 12.8 oz)  SpO2 96%  BMI 45.03 kg/m2    Constitutional:  cooperative;well developed;in no acute distress morbidly obese      Skin:  warm and dry to the touch          Head:  normocephalic, no masses or lesions        Eyes:  pupils equal and round, conjunctivae and lids unremarkable, sclera white, no xanthalasma, EOMS intact, no nystagmus        Lymph:No Cervical lymphadenopathy present     ENT:  no pallor or cyanosis, dentition good        Neck:  carotid pulses are full and equal bilaterally, JVP normal, no carotid bruit        Respiratory:  normal breath sounds, clear to auscultation, normal A-P diameter, normal symmetry, normal respiratory excursion, no use of accessory muscles         Cardiac: regular rhythm, normal S1/S2, no S3 or S4, apical impulse not displaced, no murmurs, gallops or rubs                                                     "     GI:  abdomen soft;BS normoactive        Extremities and Muscular Skeletal:  no deformities, clubbing, cyanosis, erythema observed   bilateral LE edema;3+          Neurological:  affect appropriate   in motorized wheelchair    Psych:  Alert and Oriented x 3        CC  Julio Cesar Esteban PA-C  [address] [place] [address]                Thank you for allowing me to participate in the care of your patient.      Sincerely,     DIONE BEYER MD     [facility] [place]    cc:   Julio Cesar Esteban PA-C  [address] [place] [address]

## 2018-08-29 ENCOUNTER — TELEPHONE (OUTPATIENT)
Dept: FAMILY MEDICINE | Facility: OTHER | Age: 68
End: 2018-08-29

## 2018-08-29 NOTE — TELEPHONE ENCOUNTER
Form has been faxed, sent to scanning and copy placed in Dr Monzon folder  Closing encounter  Josselin Montes RT (R)

## 2018-08-29 NOTE — TELEPHONE ENCOUNTER
Reason for Call:  Form, our goal is to have forms completed with 72 hours, however, some forms may require a visit or additional information.    Type of letter, form or note:  medical    Who is the form from?: [employer] (if other please explain)    Where did the form come from: form was faxed in    What clinic location was the form placed at?: [facility] - [phone]    Where the form was placed: 's Box    What number is listed as a contact on the form?: [phone]       Additional comments: n/a    Call taken on 8/29/2018 at 7:51 AM by Diamond Juarez

## 2018-08-30 ENCOUNTER — TELEPHONE (OUTPATIENT)
Dept: ORTHOPEDICS | Facility: CLINIC | Age: 68
End: 2018-08-30

## 2018-08-30 NOTE — TELEPHONE ENCOUNTER
Switched to Jentadueto XR.  May need dose adjustment in the future.  Electronically signed:    Julio Cesar Sahu PA-C

## 2018-08-30 NOTE — TELEPHONE ENCOUNTER
Reason for Call:  Other appointment    Detailed comments:  Jc, wound care nurse, is calling to let us know that patient has been admitted to [facility] care to help address wound- She also wants to verify what kind of wound it is. Home care nurse Jc [phone]      Best Time: any    Can we leave a detailed message on this number? YES    Call taken on 8/30/2018 at 1:29 PM by Meme Isaacs

## 2018-08-31 NOTE — TELEPHONE ENCOUNTER
Tried x 2, busy.     If Jc calls back, tell him/her that Glenroy has zenobia stasis ulcers.    Meme Beth RN on 8/31/2018 at 8:40 AM

## 2018-09-06 ENCOUNTER — MEDICAL CORRESPONDENCE (OUTPATIENT)
Dept: HEALTH INFORMATION MANAGEMENT | Facility: CLINIC | Age: 68
End: 2018-09-06

## 2018-09-06 ENCOUNTER — TELEPHONE (OUTPATIENT)
Dept: FAMILY MEDICINE | Facility: OTHER | Age: 68
End: 2018-09-06

## 2018-09-06 NOTE — TELEPHONE ENCOUNTER
Reason for Call:  Form, our goal is to have forms completed with 72 hours, however, some forms may require a visit or additional information.    Type of letter, form or note:  medical    Who is the form from?: [employer] (if other please explain)    Where did the form come from: form was faxed in    What clinic location was the form placed at?: [facility] - [phone]    Where the form was placed: 's Box    What number is listed as a contact on the form?: [phone]       Additional comments: n/a    Call taken on 9/6/2018 at 2:05 PM by Diamond Juarez

## 2018-09-10 ENCOUNTER — CARE COORDINATION (OUTPATIENT)
Dept: CARDIOLOGY | Facility: CLINIC | Age: 68
End: 2018-09-10

## 2018-09-10 ENCOUNTER — MEDICAL CORRESPONDENCE (OUTPATIENT)
Dept: HEALTH INFORMATION MANAGEMENT | Facility: CLINIC | Age: 68
End: 2018-09-10

## 2018-09-10 ENCOUNTER — HOSPITAL ENCOUNTER (OUTPATIENT)
Dept: WOUND CARE | Facility: CLINIC | Age: 68
Discharge: HOME | End: 2018-09-10
Attending: PHYSICIAN ASSISTANT | Admitting: PHYSICIAN ASSISTANT
Payer: MEDICARE

## 2018-09-10 ENCOUNTER — HOSPITAL ENCOUNTER (OUTPATIENT)
Dept: CARDIOLOGY | Facility: CLINIC | Age: 68
End: 2018-09-10
Attending: INTERNAL MEDICINE
Payer: MEDICARE

## 2018-09-10 DIAGNOSIS — R06.02 SHORTNESS OF BREATH: ICD-10-CM

## 2018-09-10 PROCEDURE — 40000264 ECHO COMPLETE WITH OPTISON

## 2018-09-10 PROCEDURE — 93306 TTE W/DOPPLER COMPLETE: CPT | Mod: 26 | Performed by: INTERNAL MEDICINE

## 2018-09-10 PROCEDURE — G0463 HOSPITAL OUTPT CLINIC VISIT: HCPCS | Mod: 25

## 2018-09-10 PROCEDURE — 25500064 ZZH RX 255 OP 636: Performed by: INTERNAL MEDICINE

## 2018-09-10 RX ADMIN — HUMAN ALBUMIN MICROSPHERES AND PERFLUTREN 3 ML: 10; .22 INJECTION, SOLUTION INTRAVENOUS at 10:58

## 2018-09-10 NOTE — PROGRESS NOTES
Reason For Visit: Glenroy Padilla, 67 year old male, seen as outpatient to re evaluate and treat left lower leg ulcers. Referred by Dr Shine. Patient presents by himself today.        History: Pt who was referred to me initially back in June of 2017 by Dr Shine for ongoing wound cares.  He returned to his home in [place] soon after and returned to [place] later in 2017.  Pt has had open ulcer of the left lower leg, original injury was related to trauma.  Course of healing was slow due to venous insuffiencey.  When wound was nearly closed new calcium deposits were found and Dr Shine removed these.  He was referred back to me for on going wound care, initial visit this time being 6/21/18.       Personal/social history: Pt is currently living in an AL in [place] with home care from a non  provider.        Objective:   Physical appearance: alert and oriented times three  Ambulation: limited, using electric scooter, able to take few steps to transfer.    Current treatment plan: Iodosorb as primary dressing.  Last changed: yesterday      Wound #1 Left lower leg.  Venous stasis ulcer.  Stage/tissue depth: full thickness  7.2 cm L x 2.8 cm W x 0.3 cm D  Tunneling: no  Undermining: no  Wound bed type/amount: approximately 80 % granular tissue and 20 % new scar and epithelial tissue; NA fluctuant  Wound Edges: open  Periwound: hemosiderin staining, nonpitting edema and scar tissue.  Drainage: Moderate amount  Odor: no  Pain: yes with direct wound cares      Dorsalis Pedal Pulse: weak but palpable: NA doppler: NA phasic  Hair growth: none noted below the knees bilaterally  Capillary Refill: less than 3 seconds  Feet/toes color: pink with some hemosiderin staining  Nails: thick but wnl  R Leg: Edema nonpitting. Ankle circumference NA cm. Calf circumference NA cm.  L Leg: Edema nonpitting. Ankle circumference Na cm. Calf circumference NA cm.      Mobility: limited   Current offloading/footwear: well fitting  sneakers  Sensation: peripheral neuropathy in the legs and feet has resolved since his spinal surgery.    HgbA1C: 6.0 Date: 4/20/18  Checks Blood Glucose?:  Once daily, Average Readings: Not assessed this visit  Other callousing/areas of concern: none noted today      Diet: regular with awareness of effects on diabetes control.  Smoking: former cigar smoker      Discussed: etiology of wound (venous stasis ulcers), pathophysiology and patient specific goals for wound healing.   Education: Wound status, role of compression in wound healing and wound prevention, use of Ready Wrap compression garments daily on in the am and remove at HS. New wound care primary dressing changed over to Medihoney to promote a more consistently moist but not wet wound bed.       Assessment:  Overall the wound appears to have made good progress since my last assessment.  There are three open areas within the total initial wound site and these are all clean and granular tissue with little to no depth.  As the new scar tissue has progressed from the size it has made the open areas more scattered within the total area measured.  Though the total size has not decreased the amount of open tissue present has decreased as new scar tissue has increased.  This wound initially was two wounds that merged into one larger wound.  The proximal of the two initially was deeper than the distal and this is still evident in the wound today where all the depth is located in the proxima 1/4 of the wound bed.  No signs of infection noted.  Appears new Ready Wraps are working well and pt states it has improved the consistency of his edema control.       Barriers to wound healing:   Poor nutrition: inadequate supply of protein, carbohydrates, fatty acids, and trace elements essential for all phases of wound healing.  Pt aware of the need for increased protein in diet for healing.   Reduced Blood Supply: inadequate perfusion to heal wound, NA  Medication:  NA  Chemotherapy: suppresses the immune system and inflammatory response, NA  Radiotherapy: increases production of free radical which damage cells, NA  Psychological stress: Not assessed this visit  Obesity: decreases tissue perfusion  Infection: prolongs inflammatory phase, uses vital nutrients, impairs epithelialization and releases toxins.  None noted at this time, we did start a different type of antimicrobial dressing today.     Underlying Disease: diabetes mellitis and autoimmune disorders.    Maceration: reduces wound tensile strength and inhibits epithelial migration.  None noted at today.  Will monitor at each visit.  Started the use of a more absorbent primary dressing today.    Patient compliance, NA  Unrelieved pressure, NA  Immobility, NA  Substance abuse: NA      Plan:   Change to the use of Medihoney with Alginate, with goal to provide a moist wound bed, more balanced absorbency and a continued antimicrobial element.  The Medihoney alginate will also allow the pt's nurses to cut pieces of the material so it is applied only over the open wounds with limited contact with the surrounding skin.  New wound care instructions are as follows, to be done Monday Wednesday and Friday.  1 Remove old dressing and cleanse wounds with soap and water, normal saline or a no rinse dermal cleanser and dry well.  2 Apply cut to fit pieces of the Medihoney with alginate over the open wounds.  3 Cover with gauze and secure with roll gauze.    4 Apply the ready wraps as you have been doing, changing every other day.     Topical care: Medihoney with Alginate  Offloading: NA  Additional recommendations: none at this time.      Wound Care: cleansed with Microklenz and gauze, patted dry. Periwound protected with NA. Wound base filled with Medihoney with Alginate pieces to the open areas only. Covered with gauze pads, followed by NA. Secured with roll gauze and tape. To be changed MWF.        Discussed plan of care with  patient. Teaching done with patient for dressing changes; pt is unable, home care is able and willing to perform.      The following discharge instructions were reviewed with and sent home with the patient: See discharge instructions.      The following supplies were sent home with the patient:  Medihoney with Alginate      Will reassess care plan in 3 weeks and order supplies as needed.       Return visit: Oct 1st Monday      Verbal, written, & demonstrative education provided.  Face to face time (excluding procedure): approximately 45 minutes.  Procedure: NA  Care plan was changed.      [phone]

## 2018-09-10 NOTE — PROGRESS NOTES
Pt had Echo today September 10, 2018, results below. Pt has f/u scheduled with [facility] on 10/10/18 to review echo results. CTA abd/pelvis w/runoff scheduled for 10/11 and then consult with Vascular Cardiologist later that afternoon. Will send results to Dr. Beyer for review.    Recent Results (from the past 8760 hour(s))   ECHO COMPLETE WITH OPTISON    Narrative    [id number]  [id number]  [id number]  [id number]^KAYLEEN^DIONE^RENUKA           [facility]  Echocardiography Laboratory  [address] [place] [address]        Name: MALCOLM SANCHES  MRN: [id number]  : 1950  Study Date: 09/10/2018 10:23 AM  Age: 68 yrs  Gender: Male  Patient Location: [facility]  Reason For Study: , Shortness of breath  History: HTN,CAD,Diabetes  Ordering Physician: DIONE BEYER  Referring Physician: Julio Cesar Sahu MD  Performed By: Sydnie De Leon     BSA: 2.5 m2  Height: 70 in  Weight: 313 lb  HR: 75  BP: 130/68 mmHg  _____________________________________________________________________________  __        Procedure  Complete Echo Adult. Contrast Optison.  _____________________________________________________________________________  __        Interpretation Summary     The visual ejection fraction is estimated at 60-65%.  There is mild concentric left ventricular hypertrophy.  The left ventricle is normal in structure, function and size.  The right ventricle is mildly dilated.  Mildly decreased right ventricular systolic function  The left atrium is mildly dilated.  Trivial pericardial effusion  _____________________________________________________________________________  __        Left Ventricle  The left ventricle is normal in structure, function and size. There is mild  concentric left ventricular hypertrophy. The visual ejection fraction is  estimated at 60-65%. Left ventricular diastolic function is indeterminate. No  regional wall motion abnormalities noted.     Right Ventricle  The right ventricle is mildly  dilated. Mildly decreased right ventricular  systolic function.     Atria  The left atrium is mildly dilated. Right atrial size is normal.     Mitral Valve  There is no mitral regurgitation noted.        Tricuspid Valve  There is trace to mild tricuspid regurgitation.     Aortic Valve  The aortic valve is trileaflet with aortic valve sclerosis.     Pulmonic Valve  The pulmonic valve is not well visualized.     Vessels  Borderline aortic root dilatation. The ascending aorta is Mildly dilated.  Dilated inferior vena cava.     Pericardium  Trivial pericardial effusion.        Rhythm  Sinus rhythm was noted.  _____________________________________________________________________________  __  MMode/2D Measurements & Calculations  IVSd: 1.2 cm     LVIDd: 5.3 cm  LVIDs: 3.6 cm  LVPWd: 1.1 cm  FS: 32.2 %  LV mass(C)d: 246.5 grams  LV mass(C)dI: 97.5 grams/m2  Ao root diam: 3.7 cm  LA dimension: 4.1 cm  asc Aorta Diam: 4.0 cm  LA/Ao: 1.1  RWT: 0.43        Doppler Measurements & Calculations  MV E max facundo: 87.0 cm/sec  MV A max facundo: 82.8 cm/sec  MV E/A: 1.1  MV dec time: 0.25 sec  E/E' av.9  Lateral E/e': 12.8  Medial E/e': 11.1              _____________________________________________________________________________  __        Report approved by: ARELY Freitas 09/10/2018 11:53 AM

## 2018-09-10 NOTE — DISCHARGE INSTRUCTIONS
Today we will change the plan of care for you left lower leg ulcers.  There are three open areas within the total initial wound site.  We will change to the use of Medihoney with Alginate.  This will provide a moist wound bed, absorbency and an antimicrobial element.  The Medihoney alginate will also allow your nurse to cut pieces of the material so it is applied only over the open wounds with limited contact with the surrounding skin.    New wound care instructions are as follows, to be done Monday Wednesday and Friday.  1 Remove old dressing and cleanse wounds with soap and water, normal saline or a no rinse dermal cleanser and dry well.  2 Apply cut to fit pieces of the Medihoney with alginate over the open wounds.  3 Cover with gauze and secure with roll gauze.    4 Apply the ready wraps as you have been doing, changing every other day.    I will see you in clinic again in three weeks on Oct 1st Monday at 10:00 am.    Jett Garza RN [id number]

## 2018-09-10 NOTE — IP AVS SNAPSHOT
MRN:[id number]                      After Visit Summary   9/10/2018    Glenroy Padilla    MRN: [id number]           Visit Information        Provider Department      9/10/2018  9:00 AM PH WOUND EXAM ROOM [facility]           Review of your medicines      UNREVIEWED medicines. Ask your doctor about these medicines        Dose / Directions    acetaminophen 500 MG tablet   Commonly known as:  TYLENOL   Used for:  Chronic pain of left knee, Complete tear of left rotator cuff        Dose:  500-1000 mg   Take 1-2 tablets (500-1,000 mg) by mouth every 6 hours as needed for mild pain   Quantity:  120 tablet   Refills:  4       clopidogrel 75 MG tablet   Commonly known as:  PLAVIX   Used for:  Type 2 diabetes mellitus with other circulatory complication, without long-term current use of insulin (H), S/P spinal surgery, Hypertension, goal below 140/90, Hyperlipidemia LDL goal <100        Dose:  75 mg   Take 1 tablet (75 mg) by mouth daily   Quantity:  90 tablet   Refills:  1       cyclobenzaprine 10 MG tablet   Commonly known as:  FLEXERIL        TAKE 1 TAB BY MOUTH TWICE DAILY AS NEEDED FOR ROTATOR CUFF TEAR   Refills:  0       ferrous sulfate 325 (65 Fe) MG tablet   Commonly known as:  IRON   Used for:  History of anemia        Dose:  325 mg   Take 1 tablet (325 mg) by mouth daily (with breakfast)   Quantity:  90 tablet   Refills:  1       furosemide 40 MG tablet   Commonly known as:  LASIX   Used for:  Hypertension, goal below 140/90        Dose:  40 mg   Take 1 tablet (40 mg) by mouth daily   Quantity:  90 tablet   Refills:  1       lidocaine 5 % ointment   Commonly known as:  XYLOCAINE   Used for:  Chronic pain of right knee        Apply topically daily   Quantity:  50 g   Refills:  3       linagliptin-metFORMIN ER 2.5-1000 MG per table   Commonly known as:  JENTADUETO XR   Used for:  Type 2 diabetes mellitus with other circulatory complication, without long-term current use of insulin  (H)        Dose:  1 tablet   Take 1 tablet by mouth daily with food   Quantity:  90 tablet   Refills:  1       losartan 25 MG tablet   Commonly known as:  COZAAR   Used for:  Hypertension, goal below 140/90, Type 2 diabetes mellitus with other circulatory complication, without long-term current use of insulin (H)        Dose:  25 mg   Take 1 tablet (25 mg) by mouth daily   Quantity:  90 tablet   Refills:  1       methocarbamol 500 MG tablet   Commonly known as:  ROBAXIN   Used for:  S/P spinal surgery        Dose:  500 mg   Take 1 tablet (500 mg) by mouth 4 times daily as needed for muscle spasms   Quantity:  70 tablet   Refills:  1       metoprolol tartrate 25 MG tablet   Commonly known as:  LOPRESSOR   Used for:  Type 2 diabetes mellitus with other circulatory complication, without long-term current use of insulin (H), History of coronary artery stent placement, Hypertension, goal below 140/90        Dose:  25 mg   Take 1 tablet (25 mg) by mouth 2 times daily   Quantity:  60 tablet   Refills:  4       pantoprazole 40 MG EC tablet   Commonly known as:  PROTONIX   Used for:  Gastroesophageal reflux disease without esophagitis        Dose:  40 mg   Take 1 tablet (40 mg) by mouth daily Take 30-60 minutes before a meal.   Quantity:  30 tablet   Refills:  0       rosuvastatin 10 MG tablet   Commonly known as:  CRESTOR   Used for:  Hyperlipidemia LDL goal <100        Dose:  10 mg   Take 1 tablet (10 mg) by mouth daily   Quantity:  90 tablet   Refills:  1       senna-docusate 8.6-50 MG per tablet   Commonly known as:  SENOKOT-S;PERICOLACE   Used for:  Spinal stenosis, lumbar region, without neurogenic claudication        Dose:  1 tablet   Take 1 tablet by mouth 2 times daily as needed for constipation   Quantity:  100 tablet   Refills:  0       TRIAD HYDROPHILIC WOUND DRESSI Pste   Used for:  Open wound of right lower leg, initial encounter        Dose:  1 applicator   Externally apply 1 g topically daily   Quantity:  1  Tube   Refills:  3         CONTINUE these medicines which have NOT CHANGED        Dose / Directions    blood glucose calibration solution   Commonly known as:  NO BRAND SPECIFIED   Used for:  Type 2 diabetes mellitus with other circulatory complication, without long-term current use of insulin (H)        To accompany: Blood Glucose Monitor Brands: per insurance.   Quantity:  1 Bottle   Refills:  3       blood glucose lancing device   Commonly known as:  no brand specified   Used for:  Type 2 diabetes mellitus with other circulatory complication, without long-term current use of insulin (H)        Use to test blood sugars 2 times daily or as directed.   Quantity:  1 each   Refills:  0       * blood glucose monitoring test strip   Commonly known as:  no brand specified   Used for:  Type 2 diabetes mellitus with other circulatory complication, without long-term current use of insulin (H)        Use to test blood sugar 2x  daily or as directed. To accompany: Blood Glucose Monitor Brands: per insurance.   Quantity:  100 strip   Refills:  1       * blood glucose monitoring test strip   Commonly known as:  no brand specified   Used for:  Type 2 diabetes mellitus with other circulatory complication, without long-term current use of insulin (H)        Use to test blood sugar 2 times daily Blood Glucose Monitor Brands: Glucocard Expression   Quantity:  100 strip   Refills:  6       * GLUCOCARD EXPRESSION MONITOR w/Device Kit        USE TWICE DAILY TO TEST BLOOD GLUCOSE   Refills:  0       * blood glucose monitoring meter device kit   Commonly known as:  no brand specified   Used for:  Type 2 diabetes mellitus with other circulatory complication, without long-term current use of insulin (H)        test blood sugar 2 xdaily or as directed.  Blood Glucose Monitor Brands: per insurance.   Quantity:  1 kit   Refills:  0       order for DME   Used for:  Type 2 diabetes mellitus with other circulatory complication, without long-term  current use of insulin (H)        One touch glucose monitoring strip with Glucometer and lancets.   Quantity:  1 Box   Refills:  1       order for DME   Used for:  Primary osteoarthritis of right knee, Lymphedema of right lower extremity        Equipment being ordered: compression stockings below knee and above knee if available.  Medium compression.   Quantity:  1 Units   Refills:  1       order for DME   Used for:  Type 2 diabetes mellitus with other circulatory complication, without long-term current use of insulin (H), Morbid obesity due to excess calories (H), History of coronary artery stent placement, Gastroesophageal reflux disease without esophagitis, Chronic pain of left knee, DAYTON (obstructive sleep apnea)        Equipment being ordered: Needs to have a sleep chair for home use.   Quantity:  1 Device   Refills:  0       order for DME   Used for:  Neuropathy, Lymphedema of right lower extremity, Lymphedema        Compression garments toe to knee. ?Specific brand is Compraflex Light Compression garment.  Diagnosis codes for venous stasis ulcer I83.029 and for ?Lymphoedema is I89.0  Goal is for compression of about 30 to 40 mm of mercury  measurements are right ankle 28.5 cm and right calf is 45.5 cm. ?Left ankle is 27.5 cm and the calf is 45 cm  Tall for length.   Quantity:  2 Device   Refills:  1       order for DME   Used for:  Screening for diabetic peripheral neuropathy, History of coronary artery stent placement, Hypertension, goal below 140/90, Open wound of left lower extremity without complication, initial encounter, Type 2 diabetes mellitus with other circulatory complication, without long-term current use of insulin (H), Weakness of both legs, Iron deficiency anemia secondary to inadequate dietary iron intake, DAYTON (obstructive sleep apnea), Central spinal stenosis, Foraminal stenosis of lumbar region        Equipment being ordered: electric chair for sleeping and adjustment to allow for elevation  of legs above the heart.  Zero gravity type heavy duty sleep chair advised.   Quantity:  1 Units   Refills:  0       thin lancets   Commonly known as:  NO BRAND SPECIFIED   Used for:  Type 2 diabetes mellitus with other circulatory complication, without long-term current use of insulin (H)        Test 2 x daily or as directed.  Brands: per insurance.   Quantity:  100 each   Refills:  1       * Notice:  This list has 4 medication(s) that are the same as other medications prescribed for you. Read the directions carefully, and ask your doctor or other care provider to review them with you.             Protect others around you: Learn how to safely use, store and throw away your medicines at www.disposemymeds.org.         Follow-ups after your visit        Your next 10 appointments already scheduled     Sep 10, 2018 11:00 AM CDT   Ech Complete with BLAS   [facility] ([facility])    [address] [place] [address]  [place] [id number] [phone]           1.  Please bring or wear a comfortable two-piece outfit. 2.  You may eat, drink and take your normal medicines. 3.  For any questions that cannot be answered, please contact the ordering physician 4.  Please do not wear perfumes or scented lotions on the day of your exam.            Sep 20, 2018 10:10 AM CDT   Return Visit with Martha Reddy PA-C   [facility] [place] [facility] ([facility])    [address] [place] [address], [address]  [facility] [address] [phone]            Sep 27, 2018  9:00 AM CDT   Pre-Op physical with Julio Cesar Esteban PA-C   [facility]    [id number] [address] [place] [address] [phone]            Oct 01, 2018 10:00 AM CDT   Return Visit with PH WOUND EXAM ROOM   [facility] ([facility])    [address] [place] [address] [place] [id number] [phone]            Oct 09, 2018 10:45 AM CDT   Return Visit with  Tyrone Estrada PA-C   [facility] ([facility])    [address] [facility] [address] [phone]            Oct 11, 2018 11:00 AM CDT   CTA ABDOMEN PELVIS BILAT LEG RUNOFF W CONTR with SCICT1   [facility] ([facility])    [address] [place] [address]  [address]  [place] [address] [phone]           How do I prepare for my exam? (Food and drink instructions) **You will have contrast for this exam.** Do not eat or drink for 2 hours before your exam. If you need to take medicine, you may take it with small sips of water. (We may ask you to take liquid medicine as well.)  The day before your exam, drink extra fluids at least six 8-ounce glasses (unless your doctor tells you to restrict your fluids).  How do I prepare for my exam? (Other instructions) Patients over 70 or patients with diabetes or kidney problems: If you haven t had a blood test (creatinine test) within the last 30 days, the Cardiologist/Radiologist may require you to get this test prior to your exam.  What should I wear: Please wear loose clothing, such as a sweat suit or jogging clothes.  Avoid snaps, zippers and other metal. We may ask you to undress and put on a hospital gown.  How long does the exam take: Most scans take less than 20 minutes.  What should I bring: Please bring any scans or X-rays taken at other hospitals, if similar tests were done. Also bring a list of your medicines, including vitamins, minerals and over-the-counter drugs. It is safest to leave personal items at home.  Do I need a :  No  is needed.  What do I need to tell my doctor? Be sure to tell your doctor: * If you have any allergies. * If there s any chance you are pregnant. * If you are breastfeeding. * If you have diabetes as your medication may need to be adjusted for this exam.  What should I do after the exam: No  restrictions, You may resume normal activities.  What is this test: A CT (computed tomography) scan is a series of pictures that allows us to look inside your body. The scanner creates images of the body in cross sections, much like slices of bread. This helps us see any problems more clearly. You may receive contrast (X-ray dye) before or during your scan. Contrast is given through an IV (small needle in your arm).  Who should I call with questions: If you have any questions, please call the Imaging Department where you will have your exam. Directions, parking instructions, and other information is available on our website, [email or web address].            Oct 11, 2018  1:15 PM CDT   New Visit with Elmer Mcfadden MD   [facility] ([facility])    [address] [place] [address] [phone] OPT 2               Care Instructions        Further instructions from your care team       Today we will change the plan of care for you left lower leg ulcers.  There are three open areas within the total initial wound site.  We will change to the use of Medihoney with Alginate.  This will provide a moist wound bed, absorbency and an antimicrobial element.  The Medihoney alginate will also allow your nurse to cut pieces of the material so it is applied only over the open wounds with limited contact with the surrounding skin.    New wound care instructions are as follows, to be done Monday Wednesday and Friday.  1 Remove old dressing and cleanse wounds with soap and water, normal saline or a no rinse dermal cleanser and dry well.  2 Apply cut to fit pieces of the Medihoney with alginate over the open wounds.  3 Cover with gauze and secure with roll gauze.    4 Apply the ready wraps as you have been doing, changing every other day.    I will see you in clinic again in three weeks on Oct 1st Monday at 10:00 am.    Jett Garza RN [email or web address]     Additional  Information About Your Visit        Care EveryWhere ID     This is your Care EveryWhere ID. This could be used by other organizations to access your [facility] medical records  [id number]         Primary Care Provider Office Phone # Fax #    Julio Cesar Esteban PA-C [phone]      Equal Access to Services     KATDEBBI EKATERINA : Hadii aad ku hadasho Soomaali, waaxda luqadaha, qaybta kaalmada adeegyada, adams pinedain hayaan adebrady gonsales lanehalelzbieta . So [facility] [phone].    ATENCIÓN: Si habla español, tiene a wilkinson disposición servicios gratuitos de asistencia lingüística. Frances al [phone].    We comply with applicable federal civil rights laws and [place] laws. We do not discriminate on the basis of race, color, national origin, age, disability, sex, sexual orientation, or gender identity.            Thank you!     Thank you for choosing [facility] for your care. Our goal is always to provide you with excellent care. Hearing back from our patients is one way we can continue to improve our services. Please take a few minutes to complete the written survey that you may receive in the mail after you visit with us. Thank you!             Medication List: This is a list of all your medications and when to take them. Check marks below indicate your daily home schedule. Keep this list as a reference.      Medications           Morning Afternoon Evening Bedtime As Needed    acetaminophen 500 MG tablet   Commonly known as:  TYLENOL   Take 1-2 tablets (500-1,000 mg) by mouth every 6 hours as needed for mild pain                                blood glucose calibration solution   Commonly known as:  NO BRAND SPECIFIED   To accompany: Blood Glucose Monitor Brands: per insurance.                                blood glucose lancing device   Commonly known as:  no brand specified   Use to test blood sugars 2 times daily or as directed.                                * blood glucose monitoring test strip   Commonly known as:   no brand specified   Use to test blood sugar 2x  daily or as directed. To accompany: Blood Glucose Monitor Brands: per insurance.                                * blood glucose monitoring test strip   Commonly known as:  no brand specified   Use to test blood sugar 2 times daily Blood Glucose Monitor Brands: Glucocard Expression                                clopidogrel 75 MG tablet   Commonly known as:  PLAVIX   Take 1 tablet (75 mg) by mouth daily                                cyclobenzaprine 10 MG tablet   Commonly known as:  FLEXERIL   TAKE 1 TAB BY MOUTH TWICE DAILY AS NEEDED FOR ROTATOR CUFF TEAR                                ferrous sulfate 325 (65 Fe) MG tablet   Commonly known as:  IRON   Take 1 tablet (325 mg) by mouth daily (with breakfast)                                furosemide 40 MG tablet   Commonly known as:  LASIX   Take 1 tablet (40 mg) by mouth daily                                * GLUCOCARD EXPRESSION MONITOR w/Device Kit   USE TWICE DAILY TO TEST BLOOD GLUCOSE                                * blood glucose monitoring meter device kit   Commonly known as:  no brand specified   test blood sugar 2 xdaily or as directed.  Blood Glucose Monitor Brands: per insurance.                                lidocaine 5 % ointment   Commonly known as:  XYLOCAINE   Apply topically daily                                linagliptin-metFORMIN ER 2.5-1000 MG per table   Commonly known as:  JENTADUETO XR   Take 1 tablet by mouth daily with food                                losartan 25 MG tablet   Commonly known as:  COZAAR   Take 1 tablet (25 mg) by mouth daily                                methocarbamol 500 MG tablet   Commonly known as:  ROBAXIN   Take 1 tablet (500 mg) by mouth 4 times daily as needed for muscle spasms                                metoprolol tartrate 25 MG tablet   Commonly known as:  LOPRESSOR   Take 1 tablet (25 mg) by mouth 2 times daily                                order for DME    One touch glucose monitoring strip with Glucometer and lancets.                                order for DME   Equipment being ordered: compression stockings below knee and above knee if available.  Medium compression.                                order for DME   Equipment being ordered: Needs to have a sleep chair for home use.                                order for DME   Compression garments toe to knee. ?Specific brand is Compraflex Light Compression garment.  Diagnosis codes for venous stasis ulcer I83.029 and for ?Lymphoedema is I89.0  Goal is for compression of about 30 to 40 mm of mercury  measurements are right ankle 28.5 cm and right calf is 45.5 cm. ?Left ankle is 27.5 cm and the calf is 45 cm  Tall for length.                                order for DME   Equipment being ordered: electric chair for sleeping and adjustment to allow for elevation of legs above the heart.  Zero gravity type heavy duty sleep chair advised.                                pantoprazole 40 MG EC tablet   Commonly known as:  PROTONIX   Take 1 tablet (40 mg) by mouth daily Take 30-60 minutes before a meal.                                rosuvastatin 10 MG tablet   Commonly known as:  CRESTOR   Take 1 tablet (10 mg) by mouth daily                                senna-docusate 8.6-50 MG per tablet   Commonly known as:  SENOKOT-S;PERICOLACE   Take 1 tablet by mouth 2 times daily as needed for constipation                                thin lancets   Commonly known as:  NO BRAND SPECIFIED   Test 2 x daily or as directed.  Brands: per insurance.                                TRIAD HYDROPHILIC WOUND DRESSI Pste   Externally apply 1 g topically daily                                * Notice:  This list has 4 medication(s) that are the same as other medications prescribed for you. Read the directions carefully, and ask your doctor or other care provider to review them with you.

## 2018-09-10 NOTE — IP AVS SNAPSHOT
[address] [address]    Phone:  [phone]    Fax:  [phone]                                       After Visit Summary   9/10/2018    Glenroy Padilla    MRN: [id number]           After Visit Summary Signature Page     I have received my discharge instructions, and my questions have been answered. I have discussed any challenges I see with this plan with the nurse or doctor.    ..........................................................................................................................................  Patient/Patient Representative Signature      ..........................................................................................................................................  Patient Representative Print Name and Relationship to Patient    ..................................................               ................................................  Date                                            Time    ..........................................................................................................................................  Reviewed by Signature/Title    ...................................................              ..............................................  Date                                                            Time          22EPIC Rev 08/18

## 2018-09-11 NOTE — TELEPHONE ENCOUNTER
Echo looks ok. I don't think he needs the appt with VADIM on 10/10/18 unless he wants to keep that. EE

## 2018-09-13 ENCOUNTER — TELEPHONE (OUTPATIENT)
Dept: SURGERY | Facility: CLINIC | Age: 68
End: 2018-09-13

## 2018-09-14 ENCOUNTER — TELEPHONE (OUTPATIENT)
Dept: FAMILY MEDICINE | Facility: OTHER | Age: 68
End: 2018-09-14

## 2018-09-14 ENCOUNTER — MEDICAL CORRESPONDENCE (OUTPATIENT)
Dept: HEALTH INFORMATION MANAGEMENT | Facility: CLINIC | Age: 68
End: 2018-09-14

## 2018-09-14 NOTE — TELEPHONE ENCOUNTER
Form faxed to [facility] for [facility] review/signature. Please have MD rico if available. AE not in clinic.  Anne Broussard, CMA

## 2018-09-14 NOTE — TELEPHONE ENCOUNTER
Reason for Call:  Form, our goal is to have forms completed with 72 hours, however, some forms may require a visit or additional information.    Type of letter, form or note:  medical    Who is the form from?: Home care    Where did the form come from: form was faxed in    What clinic location was the form placed at?: [facility] - [phone]    Where the form was placed: 's Box    What number is listed as a contact on the form?: [phone]       Additional comments: sign fax back    Call taken on 9/14/2018 at 2:04 PM by Neda Cortez

## 2018-09-14 NOTE — PROGRESS NOTES
Called pt on mobile phone, wife answered, stated she was in [place] and to call pt at home phone. Called pt at home, reviewed Echo looks OK and per Dr. Kohli he does not need the OV on 10/9. Reminded pt of CT scan & OV on 10/11/18. Pt had no questions or concerns.    You  Glenroy Padilla 2 days ago                     Arnav Kohli MD   [facility] Heart Core Nurse 3 days ago                 Echo looks ok. I don't think he needs the appt with VADIM on 10/10/18 unless he wants to keep that. EE                  Documentation

## 2018-09-17 NOTE — TELEPHONE ENCOUNTER
Forms have been completed, signed, faxed/mailed, and sent to scanning.  Lisa Zuluaga CMA ([facility])

## 2018-09-19 ENCOUNTER — TELEPHONE (OUTPATIENT)
Dept: FAMILY MEDICINE | Facility: OTHER | Age: 68
End: 2018-09-19

## 2018-09-19 DIAGNOSIS — Z53.9 DIAGNOSIS NOT YET DEFINED: Primary | ICD-10-CM

## 2018-09-19 NOTE — TELEPHONE ENCOUNTER
Reason for Call:  Form, our goal is to have forms completed with 72 hours, however, some forms may require a visit or additional information.    Type of letter, form or note:  medical    Who is the form from?: Home care    Where did the form come from: form was faxed in    What clinic location was the form placed at?: [facility] - [phone]    Where the form was placed: 's Box and faxed over to [facility]    What number is listed as a contact on the form?: [phone]       Additional comments: please complete form,sign,date and return to fax [phone]    Call taken on 9/19/2018 at 12:39 PM by Riana Price

## 2018-09-20 ENCOUNTER — OFFICE VISIT (OUTPATIENT)
Dept: NEUROSURGERY | Facility: OTHER | Age: 68
End: 2018-09-20
Payer: MEDICARE

## 2018-09-20 VITALS — HEIGHT: 70 IN | BODY MASS INDEX: 44.81 KG/M2 | WEIGHT: 313 LBS | TEMPERATURE: 97.5 F

## 2018-09-20 DIAGNOSIS — Z98.890 S/P LAMINECTOMY: Primary | ICD-10-CM

## 2018-09-20 DIAGNOSIS — M54.16 LUMBAR RADICULOPATHY: ICD-10-CM

## 2018-09-20 PROCEDURE — 99213 OFFICE O/P EST LOW 20 MIN: CPT | Performed by: PHYSICIAN ASSISTANT

## 2018-09-20 NOTE — PATIENT INSTRUCTIONS
Lumbar corset back brace ordered - they will contact you to schedule    Lumbar MRI ordered - I will contact you with results    Follow up in 6 months for routine post op

## 2018-09-20 NOTE — PROGRESS NOTES
[facility]  Neurosurgery followup:    HPI: 6 months s/p T5-9 laminectomies and resection of epidural lipomatosis. Has been doing well post op and states he is very happy how well he did from the surgery. States his nerve pain is also improving significantly and he can now feel the bottoms of his feet. He is still awaiting to undergo TKAs. He has been up ambulating and feels as though this is getting better but his knees do limit him. He does have low back pain radiating to left lateral thigh and has been having issues standing up straight. Denies weakness.  Exam:  Constitutional:  Alert, well nourished, NAD.  HEENT: Normocephalic, atraumatic.   Pulm:  Without shortness of breath   CV:  No pitting edema of BLE.      Neurological:  Awake  Alert  Oriented x 3  Motor exam:        IP Q DF PF EHL  R   5  5   5   5    5  L   5  5   5   5    5     Reflexes are 2+ in the patellar and Achilles. There is no clonus. Downgoing Babinski.    Able to spontaneously move L/E bilaterally  Sensation intact throughout all L/E dermatomes     Incision: Nicely helaed  A/P: 6 months s/p T5-9 laminectomies and resection of epidural lipomatosis. Continues to recover well. Now regaining sensation in BLE. Still awaiting to undergo TKA when cleared by cardiology and other services. Will obtain updated lumbar MRI for low back pain and radiculopathy. I have also ordered him a lumbar corset. I will call him with MRI results. Follow up in 6 months for routine post op. Patient voiced understanding and agreement.  - followup in 6 months for routine post op  - Call the clinic at [phone] for increased pain or any other questions and concerns.      Martha Reddy PA-C  [facility]  [address]  [address] [place] [address]    Tel [phone]  Pager [phone]

## 2018-09-20 NOTE — NURSING NOTE
"Glenroy Padilla is a 68 year old male who presents for:  Chief Complaint   Patient presents with     Neurologic Problem     laminectomy 3/13/18         Initial Vitals:  Temp 97.5  F (36.4  C) (Temporal)  Ht 5' 10\" (1.778 m)  Wt 313 lb (142 kg)  BMI 44.91 kg/m2 Estimated body mass index is 44.91 kg/(m^2) as calculated from the following:    Height as of this encounter: 5' 10\" (1.778 m).    Weight as of this encounter: 313 lb (142 kg).. Body surface area is 2.65 meters squared. BP completed using cuff size: regular  Data Unavailable    Do you feel safe in your environment?  Yes  Do you need any refills today? No    Nursing Comments:         Micah Jackson    "

## 2018-09-20 NOTE — MR AVS SNAPSHOT
After Visit Summary   9/20/2018    Glenroy Padilla    MRN: [id number]           Patient Information     Date Of Birth          1950        Visit Information        Provider Department      9/20/2018 10:10 AM Martha Reddy PA-C [facility]        Today's Diagnoses     S/P laminectomy    -  1    Lumbar radiculopathy          Care Instructions    Lumbar corset back brace ordered - they will contact you to schedule    Lumbar MRI ordered - I will contact you with results    Follow up in 6 months for routine post op          Follow-ups after your visit        Additional Services     ORTHOTICS REFERRAL       **This referral order prints off in the [facility]  (Orthotics & Prosthetics) Central Scheduling Office**    The [facility] Central Scheduling Staff will contact the patient to schedule appointments.     Central Scheduling Contact Information: [phone] ([place])    Orthotics: Lumbar corset    Please be aware that coverage of these services is subject to the terms and limitations of your health insurance plan.  Call member services at your health plan with any benefit or coverage questions.      Please bring the following to your appointment:    >>   Any x-rays, CTs or MRIs which have been performed.  Contact the facility where they were done to arrange for  prior to your scheduled appointment.    >>   List of current medications   >>   This referral request   >>   Any documents/labs given to you for this referral                  Your next 10 appointments already scheduled     Sep 27, 2018  9:00 AM CDT   Pre-Op physical with [facility]io Cesar Esteban [facility]    [address] [facility] [address] [phone]            Oct 01, 2018 10:00 AM CDT   Return Visit with  WOUND EXAM ROOM   [facility]    [address] [address]    [phone]            Oct 11, 2018 11:00 AM CDT   CTA ABDOMEN PELVIS BILAT LEG RUNOFF W CONTR with SCICT1   [facility] ([facility]    [address] [place] [address]  [address]  [place] [id number] [phone]           How do I prepare for my exam? (Food and drink instructions) **You will have contrast for this exam.** Do not eat or drink for 2 hours before your exam. If you need to take medicine, you may take it with small sips of water. (We may ask you to take liquid medicine as well.)  The day before your exam, drink extra fluids at least six 8-ounce glasses (unless your doctor tells you to restrict your fluids).  How do I prepare for my exam? (Other instructions) Patients over 70 or patients with diabetes or kidney problems: If you haven t had a blood test (creatinine test) within the last 30 days, the Cardiologist/Radiologist may require you to get this test prior to your exam.  What should I wear: Please wear loose clothing, such as a sweat suit or jogging clothes.  Avoid snaps, zippers and other metal. We may ask you to undress and put on a hospital gown.  How long does the exam take: Most scans take less than 20 minutes.  What should I bring: Please bring any scans or X-rays taken at other hospitals, if similar tests were done. Also bring a list of your medicines, including vitamins, minerals and over-the-counter drugs. It is safest to leave personal items at home.  Do I need a :  No  is needed.  What do I need to tell my doctor? Be sure to tell your doctor: * If you have any allergies. * If there s any chance you are pregnant. * If you are breastfeeding. * If you have diabetes as your medication may need to be adjusted for this exam.  What should I do after the exam: No restrictions, You may resume normal activities.  What is this test: A CT (computed tomography) scan is a series of pictures that allows us to look inside your body.  The scanner creates images of the body in cross sections, much like slices of bread. This helps us see any problems more clearly. You may receive contrast (X-ray dye) before or during your scan. Contrast is given through an IV (small needle in your arm).  Who should I call with questions: If you have any questions, please call the Imaging Department where you will have your exam. Directions, parking instructions, and other information is available on our website, [email or web address].            Oct 11, 2018  1:15 PM CDT   New Visit with Elmer Mcfadden MD   [facility] ([facility])    [address]  [facility] [id number] [phone] OPT 2              Future tests that were ordered for you today     Open Future Orders        Priority Expected Expires Ordered    MR Lumbar Spine w/o Contrast Routine  9/20/2019 9/20/2018            Who to contact     If you have questions or need follow up information about today's clinic visit or your schedule please contact [facility] TATIANAFELICIA CHRISTEL directly at [phone].  Normal or non-critical lab and imaging results will be communicated to you by MyChart, letter or phone within 4 business days after the clinic has received the results. If you do not hear from us within 7 days, please contact the clinic through MyChart or phone. If you have a critical or abnormal lab result, we will notify you by phone as soon as possible.  Submit refill requests through [employer] or call your pharmacy and they will forward the refill request to us. Please allow 3 business days for your refill to be completed.          Additional Information About Your Visit        Care EveryWhere ID     This is your Care EveryWhere ID. This could be used by other organizations to access your [facility] medical records  [id number]        Your Vitals Were     Temperature Height BMI (Body Mass Index)             97.5  F (36.4  C) (Temporal)  "5' 10\" (1.778 m) 44.91 kg/m2          Blood Pressure from Last 3 Encounters:   08/28/18 102/58   08/23/18 124/70   06/29/18 122/64    Weight from Last 3 Encounters:   09/20/18 313 lb (142 kg)   08/28/18 313 lb 12.8 oz (142.3 kg)   08/23/18 313 lb (142 kg)              We Performed the Following     ORTHOTICS REFERRAL        Primary Care Provider Office Phone # Fax #    Julio Cesar Esteban PA-C [phone] [address] [facility] [address]        Equal Access to Services     [facility] : Hadii katia benedict hadaltagraciao Sogloria, waaxda lusteveadaha, qaybta kaalmada adebradyyada, adams batres . So [facility] [phone].    ATENCIÓN: Si habla español, tiene a wilkinson disposición servicios gratuitos de asistencia lingüística. [facility] [phone].    We comply with applicable federal civil rights laws and [place] laws. We do not discriminate on the basis of race, color, national origin, age, disability, sex, sexual orientation, or gender identity.            Thank you!     Thank you for choosing [facility]  for your care. Our goal is always to provide you with excellent care. Hearing back from our patients is one way we can continue to improve our services. Please take a few minutes to complete the written survey that you may receive in the mail after your visit with us. Thank you!             Your Updated Medication List - Protect others around you: Learn how to safely use, store and throw away your medicines at www.disposemymeds.org.          This list is accurate as of 9/20/18 10:14 AM.  Always use your most recent med list.                   Brand Name Dispense Instructions for use Diagnosis    acetaminophen 500 MG tablet    TYLENOL    120 tablet    Take 1-2 tablets (500-1,000 mg) by mouth every 6 hours as needed for mild pain    Chronic pain of left knee, Complete tear of left rotator cuff       blood glucose calibration solution    NO BRAND SPECIFIED    1 Bottle    To " accompany: Blood Glucose Monitor Brands: per insurance.    Type 2 diabetes mellitus with other circulatory complication, without long-term current use of insulin (H)       blood glucose lancing device    no brand specified    1 each    Use to test blood sugars 2 times daily or as directed.    Type 2 diabetes mellitus with other circulatory complication, without long-term current use of insulin (H)       * blood glucose monitoring test strip    no brand specified    100 strip    Use to test blood sugar 2x  daily or as directed. To accompany: Blood Glucose Monitor Brands: per insurance.    Type 2 diabetes mellitus with other circulatory complication, without long-term current use of insulin (H)       * blood glucose monitoring test strip    no brand specified    100 strip    Use to test blood sugar 2 times daily Blood Glucose Monitor Brands: Glucocard Expression    Type 2 diabetes mellitus with other circulatory complication, without long-term current use of insulin (H)       clopidogrel 75 MG tablet    PLAVIX    90 tablet    Take 1 tablet (75 mg) by mouth daily    Type 2 diabetes mellitus with other circulatory complication, without long-term current use of insulin (H), S/P spinal surgery, Hypertension, goal below 140/90, Hyperlipidemia LDL goal <100       cyclobenzaprine 10 MG tablet    FLEXERIL     TAKE 1 TAB BY MOUTH TWICE DAILY AS NEEDED FOR ROTATOR CUFF TEAR        ferrous sulfate 325 (65 Fe) MG tablet    IRON    90 tablet    Take 1 tablet (325 mg) by mouth daily (with breakfast)    History of anemia       furosemide 40 MG tablet    LASIX    90 tablet    Take 1 tablet (40 mg) by mouth daily    Hypertension, goal below 140/90       * GLUCOCARD EXPRESSION MONITOR w/Device Kit      USE TWICE DAILY TO TEST BLOOD GLUCOSE        * blood glucose monitoring meter device kit    no brand specified    1 kit    test blood sugar 2 xdaily or as directed.  Blood Glucose Monitor Brands: per insurance.    Type 2 diabetes  mellitus with other circulatory complication, without long-term current use of insulin (H)       lidocaine 5 % ointment    XYLOCAINE    50 g    Apply topically daily    Chronic pain of right knee       linagliptin-metFORMIN ER 2.5-1000 MG per table    JENTADUETO XR    90 tablet    Take 1 tablet by mouth daily with food    Type 2 diabetes mellitus with other circulatory complication, without long-term current use of insulin (H)       losartan 25 MG tablet    COZAAR    90 tablet    Take 1 tablet (25 mg) by mouth daily    Hypertension, goal below 140/90, Type 2 diabetes mellitus with other circulatory complication, without long-term current use of insulin (H)       methocarbamol 500 MG tablet    ROBAXIN    70 tablet    Take 1 tablet (500 mg) by mouth 4 times daily as needed for muscle spasms    S/P spinal surgery       metoprolol tartrate 25 MG tablet    LOPRESSOR    60 tablet    Take 1 tablet (25 mg) by mouth 2 times daily    Type 2 diabetes mellitus with other circulatory complication, without long-term current use of insulin (H), History of coronary artery stent placement, Hypertension, goal below 140/90       order for DME     1 Box    One touch glucose monitoring strip with Glucometer and lancets.    Type 2 diabetes mellitus with other circulatory complication, without long-term current use of insulin (H)       order for DME     1 Units    Equipment being ordered: compression stockings below knee and above knee if available.  Medium compression.    Primary osteoarthritis of right knee, Lymphedema of right lower extremity       order for DME     1 Device    Equipment being ordered: Needs to have a sleep chair for home use.    Type 2 diabetes mellitus with other circulatory complication, without long-term current use of insulin (H), Morbid obesity due to excess calories (H), History of coronary artery stent placement, Gastroesophageal reflux disease without esophagitis, Chronic pain of left knee, DAYTON (obstructive  sleep apnea)       order for DME     2 Device    Compression garments toe to knee. ?Specific brand is Compraflex Light Compression garment.  Diagnosis codes for venous stasis ulcer I83.029 and for ?Lymphoedema is I89.0  Goal is for compression of about 30 to 40 mm of mercury  measurements are right ankle 28.5 cm and right calf is 45.5 cm. ?Left ankle is 27.5 cm and the calf is 45 cm  Tall for length.    Neuropathy, Lymphedema of right lower extremity, Lymphedema       order for DME     1 Units    Equipment being ordered: electric chair for sleeping and adjustment to allow for elevation of legs above the heart.  Zero gravity type heavy duty sleep chair advised.    Screening for diabetic peripheral neuropathy, History of coronary artery stent placement, Hypertension, goal below 140/90, Open wound of left lower extremity without complication, initial encounter, Type 2 diabetes mellitus with other circulatory complication, without long-term current use of insulin (H), Weakness of both legs, Iron deficiency anemia secondary to inadequate dietary iron intake, DAYTON (obstructive sleep apnea), Central spinal stenosis, Foraminal stenosis of lumbar region       pantoprazole 40 MG EC tablet    PROTONIX    30 tablet    Take 1 tablet (40 mg) by mouth daily Take 30-60 minutes before a meal.    Gastroesophageal reflux disease without esophagitis       rosuvastatin 10 MG tablet    CRESTOR    90 tablet    Take 1 tablet (10 mg) by mouth daily    Hyperlipidemia LDL goal <100       senna-docusate 8.6-50 MG per tablet    SENOKOT-S;PERICOLACE    100 tablet    Take 1 tablet by mouth 2 times daily as needed for constipation    Spinal stenosis, lumbar region, without neurogenic claudication       thin lancets    NO BRAND SPECIFIED    100 each    Test 2 x daily or as directed.  Brands: per insurance.    Type 2 diabetes mellitus with other circulatory complication, without long-term current use of insulin (H)       TRIAD HYDROPHILIC WOUND  DRESSI Pste     1 Tube    Externally apply 1 g topically daily    Open wound of right lower leg, initial encounter       * Notice:  This list has 4 medication(s) that are the same as other medications prescribed for you. Read the directions carefully, and ask your doctor or other care provider to review them with you.

## 2018-09-20 NOTE — LETTER
9/20/2018         RE: Glenroy Padilla  [address] [address]        Dear Colleague,    Thank you for referring your patient, Glenroy Padilla, to the [facility]. Please see a copy of my visit note below.    [facility]  Neurosurgery followup:    HPI: 6 months s/p T5-9 laminectomies and resection of epidural lipomatosis. Has been doing well post op and states he is very happy how well he did from the surgery. States his nerve pain is also improving significantly and he can now feel the bottoms of his feet. He is still awaiting to undergo TKAs. He has been up ambulating and feels as though this is getting better but his knees do limit him. He does have low back pain radiating to left lateral thigh and has been having issues standing up straight. Denies weakness.  Exam:  Constitutional:  Alert, well nourished, NAD.  HEENT: Normocephalic, atraumatic.   Pulm:  Without shortness of breath   CV:  No pitting edema of BLE.      Neurological:  Awake  Alert  Oriented x 3  Motor exam:        IP Q DF PF EHL  R   5  5   5   5    5  L   5  5   5   5    5     Reflexes are 2+ in the patellar and Achilles. There is no clonus. Downgoing Babinski.    Able to spontaneously move L/E bilaterally  Sensation intact throughout all L/E dermatomes     Incision: Nicely helaed  A/P: 6 months s/p T5-9 laminectomies and resection of epidural lipomatosis. Continues to recover well. Now regaining sensation in BLE. Still awaiting to undergo TKA when cleared by cardiology and other services. Will obtain updated lumbar MRI for low back pain and radiculopathy. I have also ordered him a lumbar corset. I will call him with MRI results. Follow up in 6 months for routine post op. Patient voiced understanding and agreement.  - followup in 6 months for routine post op  - Call the clinic at [phone] for increased pain or any other questions and concerns.      Martha Reddy PA-C  [facility]  [address]  [address]  [place] [address]    Tel [phone]  Pager [phone]      Again, thank you for allowing me to participate in the care of your patient.        Sincerely,        Martha Reddy PA-C

## 2018-09-21 NOTE — PROGRESS NOTES
[facility]  [address] [facility] [id number] [phone]  Dept: [phone]    PRE-OP EVALUATION:  Today's date: 2018    Glenroy Padilla (: 1950) presents for pre-operative evaluation assessment as requested by Dr. Field.  He requires evaluation and anesthesia risk assessment prior to undergoing surgery/procedure for treatment of Oral Surgery dental extraction.    Fax number for surgical facility: __________________  Primary Physician: Julio Cesar Esteban  Type of Anesthesia Anticipated: Choice    Patient has a Health Care Directive or Living Will:      Preop Questions 2018   What are you having done? teeth removed   Date of Surgery/Procedure: 03 10 2018   Facility or Hospital where procedure/surgery will be performed: [facility]   1.  Do you have a history of Heart attack, stroke, stent, coronary bypass surgery, or other heart surgery? No   2.  Do you ever have any pain or discomfort in your chest? No   3.  Do you have a history of  Heart Failure? No   4.   Are you troubled by shortness of breath when:  walking on a level surface, or up a slight hill, or at night? YES -    5.  Do you currently have a cold, bronchitis or other respiratory infection? No   6.  Do you have a cough, shortness of breath, or wheezing? No   7.  Do you sometimes get pains in the calves of your legs when you walk? No   8. Do you or anyone in your family have previous history of blood clots? No   9.  Do you or does anyone in your family have a serious bleeding problem such as prolonged bleeding following surgeries or cuts? No   10. Have you ever had problems with anemia or been told to take iron pills? YES -    11. Have you had any abnormal blood loss such as black, tarry or bloody stools? No   12. Have you ever had a blood transfusion? No   13. Have you or any of your relatives ever had problems with anesthesia? No   14. Do you have sleep apnea, excessive snoring or daytime drowsiness? YES -    15.  Do you have any prosthetic heart valves? No   16. Do you have prosthetic joints? No         HPI:     HPI related to upcoming procedure: dental problem and tooth fracture noted in need of intervention.      See problem list for active medical problems.  Problems all longstanding and stable, except as noted/documented.  See ROS for pertinent symptoms related to these conditions.                                                                                                                                                          .    MEDICAL HISTORY:     Patient Active Problem List    Diagnosis Date Noted     Iron deficiency anemia secondary to inadequate dietary iron intake 08/23/2018     Priority: Medium     Mixed incontinence 08/13/2018     Priority: Medium     Lymphedema of right lower extremity 05/14/2018     Priority: Medium     Primary osteoarthritis of right knee 05/14/2018     Priority: Medium     Complete tear of left rotator cuff 04/04/2018     Priority: Medium     S/P spinal surgery 03/13/2018     Priority: Medium     Lymphedema 01/29/2018     Priority: Medium     Falls frequently 01/27/2018     Priority: Medium     Weakness of both legs 01/27/2018     Priority: Medium     Central spinal stenosis L3-4 and L4-5 01/27/2018     Priority: Medium     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 01/27/2018     Priority: Medium     Neuropathy 01/25/2018     Priority: Medium     Foot drop, right 01/25/2018     Priority: Medium     Cardiomegaly 01/25/2018     Priority: Medium     Gastroesophageal reflux disease without esophagitis 01/25/2018     Priority: Medium     Obesity, morbid, BMI 40.0-49.9 (H) 11/20/2017     Priority: Medium     Advanced directives, counseling/discussion 10/02/2017     Priority: Medium     Will bring in a copy       Hypertension, goal below 140/90 09/25/2017     Priority: Medium     Hyperlipidemia LDL goal <100 09/25/2017     Priority: Medium     History of coronary artery stent placement  09/25/2017     Priority: Medium     Type 2 diabetes mellitus with other circulatory complication, without long-term current use of insulin (H) 05/22/2017     Priority: Medium     Venous stasis ulcer of left lower extremity (H) 05/22/2017     Priority: Medium     Acute pain of left knee 05/22/2017     Priority: Medium     Chronic pain of right knee 05/22/2017     Priority: Medium     Open wound of left lower extremity without complication, initial encounter 05/22/2017     Priority: Medium     Hx of heart artery stent 05/22/2017     Priority: Medium     [place] (obstructive sleep apnea) 05/22/2017     Priority: Medium     Hx of coronary artery disease 03/12/2016     Priority: Medium      Past Medical History:   Diagnosis Date     Acute pain of left knee 5/22/2017     Chronic pain of right knee 5/22/2017     Hx of coronary artery disease 5/22/2017     Hx of heart artery stent 5/22/2017     Mixed incontinence 8/13/2018     Obesity, morbid, BMI 40.0-49.9 (H) 11/20/2017     Open wound of left lower extremity without complication, initial encounter 5/22/2017     [place] (obstructive sleep apnea) 5/22/2017     Type 2 diabetes mellitus with other circulatory complication, without long-term current use of insulin (H) 5/22/2017     Venous stasis ulcer of left lower extremity (H) 5/22/2017     Venous stasis ulcer of left lower extremity (H) 5/22/2017     Past Surgical History:   Procedure Laterality Date     CARDIAC SURGERY      stent placement     CHOLECYSTECTOMY       LAMINECTOMY LUMBAR THREE+ LEVELS N/A 3/13/2018    Procedure: LAMINECTOMY LUMBAR THREE+ LEVELS;  T5-9 LAMINECTOMIES, RESECTION OF EPIDURAL LIPOMATOSIS;  Surgeon: Hugh Mendieta MD;  Location:  OR     Current Outpatient Prescriptions   Medication Sig Dispense Refill     acetaminophen (TYLENOL) 500 MG tablet Take 1-2 tablets (500-1,000 mg) by mouth every 6 hours as needed for mild pain 120 tablet 4     blood glucose (NO BRAND SPECIFIED) lancing device Use to test  blood sugars 2 times daily or as directed. 1 each 0     blood glucose calibration (NO BRAND SPECIFIED) solution To accompany: Blood Glucose Monitor Brands: per insurance. 1 Bottle 3     blood glucose monitoring (NO BRAND SPECIFIED) meter device kit test blood sugar 2 xdaily or as directed.  Blood Glucose Monitor Brands: per insurance. 1 kit 0     blood glucose monitoring (NO BRAND SPECIFIED) test strip Use to test blood sugar 2 times daily Blood Glucose Monitor Brands: Glucocard Expression 100 strip 6     blood glucose monitoring (NO BRAND SPECIFIED) test strip Use to test blood sugar 2x  daily or as directed. To accompany: Blood Glucose Monitor Brands: per insurance. 100 strip 1     Blood Glucose Monitoring Suppl (GLUCOCARD EXPRESSION MONITOR) W/DEVICE KIT USE TWICE DAILY TO TEST BLOOD GLUCOSE  0     clopidogrel (PLAVIX) 75 MG tablet Take 1 tablet (75 mg) by mouth daily 90 tablet 1     cyclobenzaprine (FLEXERIL) 10 MG tablet TAKE 1 TAB BY MOUTH TWICE DAILY AS NEEDED FOR ROTATOR CUFF TEAR  0     ferrous sulfate (IRON) 325 (65 Fe) MG tablet Take 1 tablet (325 mg) by mouth daily (with breakfast) 90 tablet 1     furosemide (LASIX) 40 MG tablet Take 1 tablet (40 mg) by mouth daily 90 tablet 1     lidocaine (XYLOCAINE) 5 % ointment Apply topically daily 50 g 3     linagliptin-metFORMIN ER (JENTADUETO XR) 2.5-1000 MG per table Take 1 tablet by mouth daily with food 90 tablet 1     losartan (COZAAR) 25 MG tablet Take 1 tablet (25 mg) by mouth daily 90 tablet 1     methocarbamol (ROBAXIN) 500 MG tablet Take 1 tablet (500 mg) by mouth 4 times daily as needed for muscle spasms 70 tablet 1     metoprolol tartrate (LOPRESSOR) 25 MG tablet Take 1 tablet (25 mg) by mouth 2 times daily 60 tablet 4     order for DME Equipment being ordered: electric chair for sleeping and adjustment to allow for elevation of legs above the heart.  Zero gravity type heavy duty sleep chair advised. 1 Units 0     order for DME Compression garments toe  to knee.  Specific brand is Compraflex Light Compression garment.   Diagnosis codes for venous stasis ulcer I83.029 and for  Lymphoedema is I89.0   Goal is for compression of about 30 to 40 mm of mercury   measurements are right ankle 28.5 cm and right calf is 45.5 cm.  Left ankle is 27.5 cm and the calf is 45 cm   Tall for length. 2 Device 1     order for DME Equipment being ordered: Needs to have a sleep chair for home use. 1 Device 0     order for DME Equipment being ordered: compression stockings below knee and above knee if available.  Medium compression. 1 Units 1     order for DME One touch glucose monitoring strip with Glucometer and lancets. 1 Box 1     pantoprazole (PROTONIX) 40 MG EC tablet Take 1 tablet (40 mg) by mouth daily Take 30-60 minutes before a meal. 30 tablet 0     rosuvastatin (CRESTOR) 10 MG tablet Take 1 tablet (10 mg) by mouth daily 90 tablet 1     senna-docusate (SENOKOT-S;PERICOLACE) 8.6-50 MG per tablet Take 1 tablet by mouth 2 times daily as needed for constipation 100 tablet      thin (NO BRAND SPECIFIED) lancets Test 2 x daily or as directed.  Brands: per insurance. 100 each 1     Wound Dressings (TRIAD HYDROPHILIC WOUND DRESSI) PSTE Externally apply 1 g topically daily 1 Tube 3     OTC products: None, except as noted above    Allergies   Allergen Reactions     No Known Allergies       Latex Allergy: NO    Social History   Substance Use Topics     Smoking status: Former Smoker     Types: Cigars     Smokeless tobacco: Never Used      Comment: exposure to second hand smoke     Alcohol use No     History   Drug Use No       REVIEW OF SYSTEMS:   CONSTITUTIONAL: NEGATIVE for fever, chills, change in weight  INTEGUMENTARY/SKIN: NEGATIVE for worrisome rashes, moles or lesions  EYES: NEGATIVE for vision changes or irritation  ENT/MOUTH: NEGATIVE for ear, mouth and throat problems  RESP: NEGATIVE for significant cough or SOB  BREAST: NEGATIVE for masses, tenderness or discharge  CV: NEGATIVE  for chest pain, palpitations or peripheral edema  GI: NEGATIVE for nausea, abdominal pain, heartburn, or change in bowel habits  : NEGATIVE for frequency, dysuria, or hematuria  MUSCULOSKELETAL: NEGATIVE for significant arthralgias or myalgia  NEURO: NEGATIVE for weakness, dizziness or paresthesias  ENDOCRINE: NEGATIVE for temperature intolerance, skin/hair changes  HEME: NEGATIVE for bleeding problems  PSYCHIATRIC: NEGATIVE for changes in mood or affect    EXAM:   There were no vitals taken for this visit.    GENERAL APPEARANCE: healthy, alert and no distress     EYES: EOMI,  PERRL     HENT: ear canals and TM's normal and nose and mouth without ulcers or lesions     NECK: no adenopathy, no asymmetry, masses, or scars and thyroid normal to palpation     RESP: lungs clear to auscultation - no rales, rhonchi or wheezes     CV: regular rates and rhythm, normal S1 S2, no S3 or S4 and no murmur, click or rub     ABDOMEN:  soft, nontender, no HSM or masses and bowel sounds normal     MS: Wheelchair-bound male within normal limits as we have come to know him.     SKIN: no suspicious lesions or rashes     NEURO: Essentially wheelchair bound male in no apparent distress today.     PSYCH: mentation appears normal. and affect normal/bright    DIAGNOSTICS:   Recent echo will be attached that was within normal limits considering age.  Recent labs have been relatively stable.  We will send copy to the surgeon of record.    Recent Labs   Lab Test  08/23/18   1049 08/01/18 04/20/18   1010   03/15/18   0853  03/14/18   0810   HGB  10.5*   --    --    --   8.2*  7.8*   PLT  314   --    --    --    --   222   NA  139   --    --    --    --   139   POTASSIUM  4.0  4.6   --    --    --   4.0   CR  1.13  1.10   --    --    --   0.90   A1C  5.7*   --   6.0   < >   --    --     < > = values in this interval not displayed.        IMPRESSION:   Reason for surgery/procedure: Dental extraction due to tooth fracture.  Diagnosis/reason for  consult: Anesthesia/surgical clearance.    The proposed surgical procedure is considered INTERMEDIATE risk.    REVISED CARDIAC RISK INDEX  The patient has the following serious cardiovascular risks for perioperative complications such as (MI, PE, VFib and 3  AV Block):  No serious cardiac risks  INTERPRETATION: 2 risks: Class III (moderate risk - 6.6% complication rate)    The patient has the following additional risks for perioperative complications:  No identified additional risks  The ASCVD Risk score (Radhapaul WYNNE Jr, et al., 2013) failed to calculate for the following reasons:    The valid total cholesterol range is 130 to 320 mg/dL      ICD-10-CM    1. Screen for colon cancer Z12.11    2. Need for prophylactic vaccination and inoculation against influenza Z23    3. Preop general physical exam Z01.818        RECOMMENDATIONS:     --Consult hospital rounder / IM to assist post-op medical management should inpatient stay be needed.    --Patient is to take all scheduled medications on the day of surgery EXCEPT for modifications listed below.   He is to hold Plavix for the next 10 days.  He is to hold Cozaar the day of the procedure.    APPROVAL GIVEN to proceed with proposed procedure, without further diagnostic evaluation       Signed Electronically by: DAKOTA Barker PA-C    Copy of this evaluation report is provided to requesting physician.    [facility] Preop Guidelines    Revised Cardiac Risk Index

## 2018-09-26 ENCOUNTER — TELEPHONE (OUTPATIENT)
Dept: FAMILY MEDICINE | Facility: OTHER | Age: 68
End: 2018-09-26

## 2018-09-26 NOTE — TELEPHONE ENCOUNTER
Dr Field [phone] would like you to give him a call to discuss this patient he is coming in at 9 am on 9/27/2018 for pre-op.  He will be available to discuss at 8am   Thanks  Josselin Montes RT (R)

## 2018-09-27 ENCOUNTER — TELEPHONE (OUTPATIENT)
Dept: SURGERY | Facility: OTHER | Age: 68
End: 2018-09-27

## 2018-09-27 ENCOUNTER — TELEPHONE (OUTPATIENT)
Dept: FAMILY MEDICINE | Facility: OTHER | Age: 68
End: 2018-09-27

## 2018-09-27 ENCOUNTER — OFFICE VISIT (OUTPATIENT)
Dept: FAMILY MEDICINE | Facility: OTHER | Age: 68
End: 2018-09-27
Payer: MEDICARE

## 2018-09-27 VITALS
TEMPERATURE: 97.6 F | BODY MASS INDEX: 42.27 KG/M2 | DIASTOLIC BLOOD PRESSURE: 76 MMHG | OXYGEN SATURATION: 93 % | WEIGHT: 294.6 LBS | SYSTOLIC BLOOD PRESSURE: 120 MMHG | HEART RATE: 80 BPM | RESPIRATION RATE: 18 BRPM

## 2018-09-27 DIAGNOSIS — Z23 NEED FOR PROPHYLACTIC VACCINATION AND INOCULATION AGAINST INFLUENZA: ICD-10-CM

## 2018-09-27 DIAGNOSIS — I10 HYPERTENSION, GOAL BELOW 140/90: ICD-10-CM

## 2018-09-27 DIAGNOSIS — E11.59 TYPE 2 DIABETES MELLITUS WITH OTHER CIRCULATORY COMPLICATION, WITHOUT LONG-TERM CURRENT USE OF INSULIN (H): ICD-10-CM

## 2018-09-27 DIAGNOSIS — K03.81 BROKEN OR CRACKED TOOTH, NONTRAUMATIC: ICD-10-CM

## 2018-09-27 DIAGNOSIS — Z12.11 SCREEN FOR COLON CANCER: ICD-10-CM

## 2018-09-27 DIAGNOSIS — Z01.818 PREOP GENERAL PHYSICAL EXAM: Primary | ICD-10-CM

## 2018-09-27 DIAGNOSIS — Z98.890 S/P SPINAL SURGERY: ICD-10-CM

## 2018-09-27 DIAGNOSIS — E78.5 HYPERLIPIDEMIA LDL GOAL <100: ICD-10-CM

## 2018-09-27 DIAGNOSIS — K08.9 POOR DENTITION: ICD-10-CM

## 2018-09-27 PROCEDURE — 99214 OFFICE O/P EST MOD 30 MIN: CPT | Performed by: PHYSICIAN ASSISTANT

## 2018-09-27 RX ORDER — CLOPIDOGREL BISULFATE 75 MG/1
75 TABLET ORAL DAILY
Qty: 90 TABLET | Refills: 1 | Status: SHIPPED | OUTPATIENT
Start: 2018-09-27 | End: 2019-07-11

## 2018-09-27 ASSESSMENT — PAIN SCALES - GENERAL: PAINLEVEL: NO PAIN (0)

## 2018-09-27 NOTE — MR AVS SNAPSHOT
After Visit Summary   9/27/2018    Glenroy Padilla    MRN: [id number]           Patient Information     Date Of Birth          1950        Visit Information        Provider Department      9/27/2018 9:00 AM Julio Cesar Esteban PA-C [facility]        Today's Diagnoses     Preop general physical exam    -  1    Poor dentition        Broken or cracked tooth, nontraumatic        Type 2 diabetes mellitus with other circulatory complication, without long-term current use of insulin (H)        S/P spinal surgery        Hypertension, goal below 140/90        Hyperlipidemia LDL goal <100        Screen for colon cancer        Need for prophylactic vaccination and inoculation against influenza          Care Instructions      Before Your Surgery      Call your surgeon if there is any change in your health. This includes signs of a cold or flu (such as a sore throat, runny nose, cough, rash or fever).    Do not smoke, drink alcohol or take over the counter medicine (unless your surgeon or primary care doctor tells you to) for the 24 hours before and after surgery.    If you take prescribed drugs: Follow your doctor s orders about which medicines to take and which to stop until after surgery.    Eating and drinking prior to surgery: follow the instructions from your surgeon    Take a shower or bath the night before surgery. Use the soap your surgeon gave you to gently clean your skin. If you do not have soap from your surgeon, use your regular soap. Do not shave or scrub the surgery site.  Wear clean pajamas and have clean sheets on your bed.           Follow-ups after your visit        Follow-up notes from your care team     Return in about 3 months (around 12/27/2018) for recheck of current condition.      Your next 10 appointments already scheduled     Oct 01, 2018 10:00 AM CDT   [facility] with [facility]AM ROOM   [facility]    [address] [place] [address]  Drive  [place] [address]   [phone]            Oct 11, 2018 10:00 AM CDT   MR LUMBAR SPINE W/O CONTRAST with SHMRP2   [facility] ([facility])    [facility] [facility] [address]   [phone]           How do I prepare for my exam? (Food and drink instructions) **If you will be receiving sedation or general anesthesia, please see special notes below.**  How do I prepare for my exam? (Other instructions) Take your medicines as usual, unless your doctor tells you not to. Please remove any body piercings and hair extensions before you arrive. Follow your doctor s orders. If you do not, we may have to postpone your exam. You may or may not receive IV contrast for this exam pending the discretion of the Radiologist.  You do not need to do anything special to prepare. **If you will be receiving sedation or general anesthesia, please see special notes below.**  What should I wear:  The MRI machine uses a strong magnet. Please wear clothes without metal (snaps, zippers). A sweatsuit works well, or we may give you a hospital gown.  How long does the exam take: Most tests take 30 to 60 minutes.  HOWEVER, IF YOUR DOCTOR PRESCRIBES ANESTHESIA please plan on spending four to five hours in the recovery room.  What should I bring: Bring a list of your current medicines to your exam (including vitamins, minerals and over-the-counter drugs). Also bring the results of similar scans you may have had.  Do I need a : **If you will be receiving sedation or general anesthesia, please see special notes below.**  What should I do after the exam? No Restrictions, You may resume normal activities.  What is this test: MRI (magnetic resonance imaging) uses a strong magnet and radio waves to look inside the body. An MRA (magnetic resonance angiogram) does the same thing, but it lets us look at your blood vessels. A computer turns the radio waves into pictures showing cross sections of  the body, much like slices of bread. This helps us see any problems more clearly.  Who should I call with questions: Please call the Imaging Department at your exam site with any questions. Directions, parking instructions, and other information is available on our website, [email or web address].  How do I prepare if I m having sedation or anesthesia? **IMPORTANT** THE INSTRUCTIONS BELOW ARE ONLY FOR THOSE PATIENTS WHO HAVE BEEN TOLD THEY WILL RECEIVE SEDATION OR GENERAL ANESTHESIA DURING THEIR MRI PROCEDURE:  IF YOU WILL RECEIVE SEDATION (take medicine to help you relax during your exam): You must get the medicine from your doctor before you arrive. Bring the medicine to the exam. Do not take it at home. Arrive one hour early. Bring someone who can take you home after the test. Your medicine will make you sleepy. After the exam, you may not drive, take a bus or take a taxi by yourself. No eating 8 hours before your exam. You may have clear liquids up until 4 hours before your exam. (Clear liquids include water, clear tea, black coffee and fruit juice without pulp.)  IF YOU WILL RECEIVE ANESTHESIA (be asleep for your exam): Arrive 1 1/2 hours early. Bring someone who can take you home after the test. You may not drive, take a bus or take a taxi by yourself. No eating 8 hours before your exam. You may have clear liquids up until 4 hours before your exam. (Clear liquids include water, clear tea, black coffee and fruit juice without pulp.) You will spend four to five hours in the recovery room.            Oct 11, 2018 11:00 AM CDT   CTA ABDOMEN PELVIS BILAT LEG RUNOFF W CONTR with SCICT1   [facility] ([facility])    [address] [place] [address]  [address]  [facility] [id number] [phone]           How do I prepare for my exam? (Food and drink instructions) **You will have contrast for this exam.** Do not eat or drink for 2 hours before your exam. If you need to  take medicine, you may take it with small sips of water. (We may ask you to take liquid medicine as well.)  The day before your exam, drink extra fluids at least six 8-ounce glasses (unless your doctor tells you to restrict your fluids).  How do I prepare for my exam? (Other instructions) Patients over 70 or patients with diabetes or kidney problems: If you haven t had a blood test (creatinine test) within the last 30 days, the Cardiologist/Radiologist may require you to get this test prior to your exam.  What should I wear: Please wear loose clothing, such as a sweat suit or jogging clothes.  Avoid snaps, zippers and other metal. We may ask you to undress and put on a hospital gown.  How long does the exam take: Most scans take less than 20 minutes.  What should I bring: Please bring any scans or X-rays taken at other hospitals, if similar tests were done. Also bring a list of your medicines, including vitamins, minerals and over-the-counter drugs. It is safest to leave personal items at home.  Do I need a :  No  is needed.  What do I need to tell my doctor? Be sure to tell your doctor: * If you have any allergies. * If there s any chance you are pregnant. * If you are breastfeeding. * If you have diabetes as your medication may need to be adjusted for this exam.  What should I do after the exam: No restrictions, You may resume normal activities.  What is this test: A CT (computed tomography) scan is a series of pictures that allows us to look inside your body. The scanner creates images of the body in cross sections, much like slices of bread. This helps us see any problems more clearly. You may receive contrast (X-ray dye) before or during your scan. Contrast is given through an IV (small needle in your arm).  Who should I call with questions: If you have any questions, please call the Imaging Department where you will have your exam. Directions, parking instructions, and other information is  available on our website, [email or web address].            Oct 11, 2018  1:15 PM CDT   New Visit with Elmer Mcfadden MD   [facility] ([facility])    [id number]  [facility] [address] [phone] OPT 2              Who to contact     If you have questions or need follow up information about today's clinic visit or your schedule please contact [facility] directly at [phone].  Normal or non-critical lab and imaging results will be communicated to you by MyChart, letter or phone within 4 business days after the clinic has received the results. If you do not hear from us within 7 days, please contact the clinic through MyChart or phone. If you have a critical or abnormal lab result, we will notify you by phone as soon as possible.  Submit refill requests through [employer] or call your pharmacy and they will forward the refill request to us. Please allow 3 business days for your refill to be completed.          Additional Information About Your Visit        Care EveryWhere ID     This is your Care EveryWhere ID. This could be used by other organizations to access your [facility] medical records  [id number]        Your Vitals Were     Pulse Temperature Respirations Pulse Oximetry BMI (Body Mass Index)       80 97.6  F (36.4  C) (Temporal) 18 93% 42.27 kg/m2        Blood Pressure from Last 3 Encounters:   09/27/18 120/76   08/28/18 102/58   08/23/18 124/70    Weight from Last 3 Encounters:   09/27/18 294 lb 9.6 oz (133.6 kg)   09/20/18 313 lb (142 kg)   08/28/18 313 lb 12.8 oz (142.3 kg)              We Performed the Following     Albumin Random Urine Quantitative with Creat Ratio          Where to get your medicines      These medications [facility] sent to [id number] - [place], [place] - [address] [facility]  [address] [facility] [address]     Phone:  [phone]     clopidogrel 75 MG tablet          Primary  Care Provider Office Phone # Fax #    Julio Cesar Esteban PA-C [phone] [address] [facility] [address]        Equal Access to Services     JAZ TOBAR : Hadshea aad ku hadflorentino Sogloria, waaxda luqadaha, qaybta kaalmada adegerardo, adams gonsales laGaspergabriella horton. So [facility] [phone].    ATENCIÓN: Si habla español, tiene a wilkinson disposición servicios gratuitos de asistencia lingüística. Llame al [phone].    We comply with applicable federal civil rights laws and [place] laws. We do not discriminate on the basis of race, color, national origin, age, disability, sex, sexual orientation, or gender identity.            Thank you!     Thank you for choosing [facility]  for your care. Our goal is always to provide you with excellent care. Hearing back from our patients is one way we can continue to improve our services. Please take a few minutes to complete the written survey that you may receive in the mail after your visit with us. Thank you!             Your Updated Medication List - Protect others around you: Learn how to safely use, store and throw away your medicines at www.disposemymeds.org.          This list is accurate as of 9/27/18  9:22 AM.  Always use your most recent med list.                   Brand Name Dispense Instructions for use Diagnosis    acetaminophen 500 MG tablet    TYLENOL    120 tablet    Take 1-2 tablets (500-1,000 mg) by mouth every 6 hours as needed for mild pain    Chronic pain of left knee, Complete tear of left rotator cuff       blood glucose calibration solution    NO BRAND SPECIFIED    1 Bottle    To accompany: Blood Glucose Monitor Brands: per insurance.    Type 2 diabetes mellitus with other circulatory complication, without long-term current use of insulin (H)       blood glucose lancing device    no brand specified    1 each    Use to test blood sugars 2 times daily or as directed.    Type 2 diabetes mellitus with other  circulatory complication, without long-term current use of insulin (H)       * blood glucose monitoring test strip    no brand specified    100 strip    Use to test blood sugar 2x  daily or as directed. To accompany: Blood Glucose Monitor Brands: per insurance.    Type 2 diabetes mellitus with other circulatory complication, without long-term current use of insulin (H)       * blood glucose monitoring test strip    no brand specified    100 strip    Use to test blood sugar 2 times daily Blood Glucose Monitor Brands: Glucocard Expression    Type 2 diabetes mellitus with other circulatory complication, without long-term current use of insulin (H)       clopidogrel 75 MG tablet    PLAVIX    90 tablet    Take 1 tablet (75 mg) by mouth daily    Type 2 diabetes mellitus with other circulatory complication, without long-term current use of insulin (H), S/P spinal surgery, Hypertension, goal below 140/90, Hyperlipidemia LDL goal <100       cyclobenzaprine 10 MG tablet    FLEXERIL     TAKE 1 TAB BY MOUTH TWICE DAILY AS NEEDED FOR ROTATOR CUFF TEAR        ferrous sulfate 325 (65 Fe) MG tablet    IRON    90 tablet    Take 1 tablet (325 mg) by mouth daily (with breakfast)    History of anemia       furosemide 40 MG tablet    LASIX    90 tablet    Take 1 tablet (40 mg) by mouth daily    Hypertension, goal below 140/90       * GLUCOCARD EXPRESSION MONITOR w/Device Kit      USE TWICE DAILY TO TEST BLOOD GLUCOSE        * blood glucose monitoring meter device kit    no brand specified    1 kit    test blood sugar 2 xdaily or as directed.  Blood Glucose Monitor Brands: per insurance.    Type 2 diabetes mellitus with other circulatory complication, without long-term current use of insulin (H)       lidocaine 5 % ointment    XYLOCAINE    50 g    Apply topically daily    Chronic pain of right knee       linagliptin-metFORMIN ER 2.5-1000 MG per table    JENTADUETO XR    180 tablet    Take 2 tablets by mouth daily with food    Type 2  diabetes mellitus with other circulatory complication, without long-term current use of insulin (H)       losartan 25 MG tablet    COZAAR    90 tablet    Take 1 tablet (25 mg) by mouth daily    Hypertension, goal below 140/90, Type 2 diabetes mellitus with other circulatory complication, without long-term current use of insulin (H)       methocarbamol 500 MG tablet    ROBAXIN    70 tablet    Take 1 tablet (500 mg) by mouth 4 times daily as needed for muscle spasms    S/P spinal surgery       metoprolol tartrate 25 MG tablet    LOPRESSOR    60 tablet    Take 1 tablet (25 mg) by mouth 2 times daily    Type 2 diabetes mellitus with other circulatory complication, without long-term current use of insulin (H), History of coronary artery stent placement, Hypertension, goal below 140/90       order for DME     1 Box    One touch glucose monitoring strip with Glucometer and lancets.    Type 2 diabetes mellitus with other circulatory complication, without long-term current use of insulin (H)       order for DME     1 Units    Equipment being ordered: compression stockings below knee and above knee if available.  Medium compression.    Primary osteoarthritis of right knee, Lymphedema of right lower extremity       order for DME     1 Device    Equipment being ordered: Needs to have a sleep chair for home use.    Type 2 diabetes mellitus with other circulatory complication, without long-term current use of insulin (H), Morbid obesity due to excess calories (H), History of coronary artery stent placement, Gastroesophageal reflux disease without esophagitis, Chronic pain of left knee, DAYTON (obstructive sleep apnea)       order for DME     2 Device    Compression garments toe to knee. ?Specific brand is Compraflex Light Compression garment.  Diagnosis codes for venous stasis ulcer I83.029 and for ?Lymphoedema is I89.0  Goal is for compression of about 30 to 40 mm of mercury  measurements are right ankle 28.5 cm and right calf is  45.5 cm. ?Left ankle is 27.5 cm and the calf is 45 cm  Tall for length.    Neuropathy, Lymphedema of right lower extremity, Lymphedema       order for DME     1 Units    Equipment being ordered: electric chair for sleeping and adjustment to allow for elevation of legs above the heart.  Zero gravity type heavy duty sleep chair advised.    Screening for diabetic peripheral neuropathy, History of coronary artery stent placement, Hypertension, goal below 140/90, Open wound of left lower extremity without complication, initial encounter, Type 2 diabetes mellitus with other circulatory complication, without long-term current use of insulin (H), Weakness of both legs, Iron deficiency anemia secondary to inadequate dietary iron intake, DAYTON (obstructive sleep apnea), Central spinal stenosis, Foraminal stenosis of lumbar region       pantoprazole 40 MG EC tablet    PROTONIX    30 tablet    Take 1 tablet (40 mg) by mouth daily Take 30-60 minutes before a meal.    Gastroesophageal reflux disease without esophagitis       rosuvastatin 10 MG tablet    CRESTOR    90 tablet    Take 1 tablet (10 mg) by mouth daily    Hyperlipidemia LDL goal <100       senna-docusate 8.6-50 MG per tablet    SENOKOT-S;PERICOLACE    100 tablet    Take 1 tablet by mouth 2 times daily as needed for constipation    Spinal stenosis, lumbar region, without neurogenic claudication       thin lancets    NO BRAND SPECIFIED    100 each    Test 2 x daily or as directed.  Brands: per insurance.    Type 2 diabetes mellitus with other circulatory complication, without long-term current use of insulin (H)       TRIAD HYDROPHILIC WOUND DRESSI Pste     1 Tube    Externally apply 1 g topically daily    Open wound of right lower leg, initial encounter       * Notice:  This list has 4 medication(s) that are the same as other medications prescribed for you. Read the directions carefully, and ask your doctor or other care provider to review them with you.

## 2018-09-27 NOTE — TELEPHONE ENCOUNTER
Tried to call on the off chance to be able to get a hold of the physician but got the answering service.  I will try to get a hold of him at my convenience after 8 AM this morning.  Electronically signed:    Julio Cesar Sahu PA-C

## 2018-09-27 NOTE — TELEPHONE ENCOUNTER
Reason for Call:  Other Please Fax    Detailed comments: Tanya from [facility] called clinic asking for preop notes from today. Please fax to: [phone]    Phone Number: [phone]    Best Time: any    Can we leave a detailed message on this number? Not Applicable    Call taken on 9/27/2018 at 11:14 AM by Anna Baez

## 2018-09-27 NOTE — TELEPHONE ENCOUNTER
Reason for Call:  Medication or medication refill:    Do you use a [facility]?  Name of the pharmacy and phone number for the current request:  [facility]    Name of the medication requested: janunet xr  mg    Other request:  Janunet xr  mg says take one. Patient wants to put two in his med box, [facility] needing ok. Lasix 40 mg, on one of his bottles it says 2, needs to clarify this also    Can we leave a detailed message on this number? YES    Phone number patient can be reached at: Other phone number:  [facility] [phone]    Best Time: any    Call taken on 9/27/2018 at 2:27 PM by Neda Cortez

## 2018-09-28 NOTE — TELEPHONE ENCOUNTER
Needs to take one Jentadueto daily.  He may take Lasix twice a day by recommend breakfast and lunch.  Electronically signed:    Julio Cesar Sahu PA-C

## 2018-10-01 ENCOUNTER — TRANSFERRED RECORDS (OUTPATIENT)
Dept: HEALTH INFORMATION MANAGEMENT | Facility: CLINIC | Age: 68
End: 2018-10-01

## 2018-10-08 ENCOUNTER — TELEPHONE (OUTPATIENT)
Dept: FAMILY MEDICINE | Facility: OTHER | Age: 68
End: 2018-10-08

## 2018-10-08 NOTE — TELEPHONE ENCOUNTER
Reason for Call:  Other     Detailed comments: Miriam calling from [facility] needing verbal order from blossom zaragoza to administer flu vaccination. Please call Miriam at [phone]    Phone Number Patient can be reached at:[phone]    Best Time: ANY    Can we leave a detailed message on this number? YES    Call taken on 10/8/2018 at 2:41 PM by Riana Price

## 2018-10-08 NOTE — TELEPHONE ENCOUNTER
Per Dr. Shine-this should go to patient's PCP-message routed to Julio Cesar Wharton..........................................................Hui Ashraf CMA  ([facility])

## 2018-10-08 NOTE — TELEPHONE ENCOUNTER
Per Dr Copeland, Please give him his influenza vaccine.  Electronically signed:    Julio Cesar Sahu PA-C

## 2018-10-09 ENCOUNTER — MEDICAL CORRESPONDENCE (OUTPATIENT)
Dept: HEALTH INFORMATION MANAGEMENT | Facility: CLINIC | Age: 68
End: 2018-10-09

## 2018-10-11 ENCOUNTER — TELEPHONE (OUTPATIENT)
Dept: NEUROSURGERY | Facility: CLINIC | Age: 68
End: 2018-10-11

## 2018-10-11 ENCOUNTER — HOSPITAL ENCOUNTER (OUTPATIENT)
Dept: CARDIOLOGY | Facility: CLINIC | Age: 68
Discharge: HOME | End: 2018-10-11
Attending: INTERNAL MEDICINE | Admitting: INTERNAL MEDICINE
Payer: MEDICARE

## 2018-10-11 ENCOUNTER — TELEPHONE (OUTPATIENT)
Dept: FAMILY MEDICINE | Facility: OTHER | Age: 68
End: 2018-10-11

## 2018-10-11 ENCOUNTER — HOSPITAL ENCOUNTER (OUTPATIENT)
Dept: MRI IMAGING | Facility: CLINIC | Age: 68
End: 2018-10-11
Attending: PHYSICIAN ASSISTANT
Payer: MEDICARE

## 2018-10-11 ENCOUNTER — OFFICE VISIT (OUTPATIENT)
Dept: CARDIOLOGY | Facility: CLINIC | Age: 68
End: 2018-10-11
Attending: INTERNAL MEDICINE
Payer: MEDICARE

## 2018-10-11 VITALS
HEIGHT: 70 IN | DIASTOLIC BLOOD PRESSURE: 76 MMHG | HEART RATE: 76 BPM | SYSTOLIC BLOOD PRESSURE: 107 MMHG | WEIGHT: 300.2 LBS | BODY MASS INDEX: 42.98 KG/M2

## 2018-10-11 DIAGNOSIS — I73.9 PAD (PERIPHERAL ARTERY DISEASE) (H): ICD-10-CM

## 2018-10-11 DIAGNOSIS — M54.16 LUMBAR RADICULOPATHY: ICD-10-CM

## 2018-10-11 DIAGNOSIS — S81.802D OPEN WOUND OF LEFT LOWER EXTREMITY, SUBSEQUENT ENCOUNTER: ICD-10-CM

## 2018-10-11 LAB
CREAT BLD-MCNC: 1.2 MG/DL (ref 0.66–1.25)
GFR SERPL CREATININE-BSD FRML MDRD: 60 ML/MIN/1.7M2

## 2018-10-11 PROCEDURE — 25000128 H RX IP 250 OP 636: Performed by: INTERNAL MEDICINE

## 2018-10-11 PROCEDURE — 72148 MRI LUMBAR SPINE W/O DYE: CPT

## 2018-10-11 PROCEDURE — 82565 ASSAY OF CREATININE: CPT

## 2018-10-11 PROCEDURE — 99204 OFFICE O/P NEW MOD 45 MIN: CPT | Performed by: INTERNAL MEDICINE

## 2018-10-11 PROCEDURE — 75635 CT ANGIO ABDOMINAL ARTERIES: CPT

## 2018-10-11 RX ORDER — HYDROCODONE BITARTRATE AND ACETAMINOPHEN 5; 325 MG/1; MG/1
1 TABLET ORAL EVERY 6 HOURS PRN
COMMUNITY
End: 2019-04-18

## 2018-10-11 RX ORDER — IOPAMIDOL 755 MG/ML
500 INJECTION, SOLUTION INTRAVASCULAR ONCE
Status: COMPLETED | OUTPATIENT
Start: 2018-10-11 | End: 2018-10-11

## 2018-10-11 RX ADMIN — SODIUM CHLORIDE 100 ML: 9 INJECTION, SOLUTION INTRAVENOUS at 11:34

## 2018-10-11 RX ADMIN — IOPAMIDOL 110 ML: 755 INJECTION, SOLUTION INTRAVENOUS at 11:34

## 2018-10-11 NOTE — TELEPHONE ENCOUNTER
----- Message from Martha Reddy PA-C sent at 10/11/2018  1:34 PM CDT -----  Hey - do you guys mind calling and getting patient set up to see Dr. Mendieta in clinic to review recent MRI and discuss plan? Thanks!

## 2018-10-11 NOTE — TELEPHONE ENCOUNTER
Our goal is to have forms completed with 72 hours, however some forms may require a visit or additional information.    Who is the form from?: Home care  Where the form came from: form was faxed in  What clinic location was the form placed at?: [facility]  Where the form was placed: 'dipika Silva  What number is listed as a contact on the form?: [phone]    Phone call message- patient request for a letter, form or note:    Date needed: as soon as possible  Please fax to [phone]  Has the patient signed a consent form for release of information? NO    Additional comments:     Call taken on 10/11/2018 at 3:59 PM by Leigh Tidwell    Type of letter, form or note: medical

## 2018-10-11 NOTE — TELEPHONE ENCOUNTER
Our goal is to have forms completed with 72 hours, however some forms may require a visit or additional information.    Who is the form from?: Home care  Where the form came from: form was faxed in  What clinic location was the form placed at?: [facility]  Where the form was placed: 'dipika [facility]  What number is listed as a contact on the form?: [phone]    Phone call message- patient request for a letter, form or note:    Date needed: as soon as possible  Please fax to [phone]  Has the patient signed a consent form for release of information? NO    Additional comments:     Call taken on 10/11/2018 at 3:59 PM by Leigh Tidwell    Type of letter, form or note: medical

## 2018-10-11 NOTE — LETTER
[facility]   [facility]  [address] [place] [address]  [id number]      11/9/18    To Whom It May Concern,          Glenroy BENDER Amaurisilvino was seen in clinic for evaluation of low back and leg pain. A zero-gravity chair would be a beneficial option to help manage the pain. Please call our clinic with any further questions: [phone].        Sincerely,          Martha Reddy PA-C  [facility]  [address] [address]

## 2018-10-11 NOTE — TELEPHONE ENCOUNTER
Informed patient of below message and offered to schedule an appointment. Patient states he will have his nurse call to coordinate an appointment with Dr. Mendieta.

## 2018-10-11 NOTE — PROGRESS NOTES
Cardiology Vascular Consultation     Assessment & Plan       Reviewed patient's clinical status and exam today.  We are pleased that his wound is healing compared to what was described previously.  He is also receiving good care regarding his edema with the lymphedema and wound clinic.  He does have based on the ABIs appropriate lower extremity circulation to allow this.  The CTA is still pending at the time of dictation I will be on the look out for this.  For the time being let us continue with conservative management and wound healing.  I will defer the remainder of his care to my cardiology colleagues.  We will be available if he needs anything in the future.  Thank you kindly for allowing us to participate in his care.      Elmer Mcfadden MD      Patient is a 68-year-old male who is seen by my partner Dr. Kohli at [place].  He is referred to the [facility] clinic.  Cardiac risk factors include hypertension, hyperlipidemia, and diabetes mellitus.  He apparently had history of PCI performed in [place] in March 2016.  This consisted of the mid LAD in her V and upon.  He also has small vessel disease and branch vessels.  Patient unfortunately has been dealing with chronic bilateral lower extremity edema with a nonhealing distal left lower extremity skin ulcer.  Also has some ulcers noted on the right which are healed.  He is also overweight.  He underwent studies in the past consisting of venous competency as well as ABIs.  Today he underwent a CTA of the lower extremity however the results are not pending.      MINESH 2017:  HISTORY: Localized edema. Chronic venous hypertension (idiopathic)  without complications of bilateral lower extremity.     COMPARISON: None.     FINDINGS: Ankle-brachial index is 0.96 on the right and 0.91 on the  left. Waveforms appear biphasic/monophasic bilaterally.         IMPRESSION: Borderline bilateral arterial insufficiency.      CTA: Pending    Venous competency study:  2017    ULTRASOUND VENOUS COMPETENCY BILATERAL   6/1/2017 3:29 PM      HISTORY: Localized edema. Chronic venous hypertension (idiopathic)  without complications of bilateral lower extremity. Unspecified open  wound, left lower leg, initial encounter.     COMPARISON: None.     TECHNIQUE: Color Doppler and spectral waveform analysis performed.     FINDINGS: Bilateral common femoral veins, femoral veins, and popliteal  veins are patent and demonstrate no evidence of reflux.     Other than questionable 1 second reflux at the right saphenofemoral  junction, the right great saphenous vein is competent throughout its  length. Diameter of the right great saphenous vein is 3-7 mm.     The left great saphenous vein is competent throughout its length.  Diameter of the left great saphenous vein is 2-7 mm.     Visualized portions of bilateral small saphenous veins appear to be  competent, without evidence of reflux.     No prominent perforators were demonstrated. Bilateral Giacomini veins  were competent.         IMPRESSION: The deep and superficial venous systems of both legs are  patent and competent.          Primary Care Physician   Julio Cesar Sahu      Patient Active Problem List   Diagnosis     Type 2 diabetes mellitus with other circulatory complication, without long-term current use of insulin (H)     Venous stasis ulcer of left lower extremity (H)     Acute pain of left knee     Chronic pain of right knee     Open wound of left lower extremity without complication, initial encounter     Hx of coronary artery disease     Hx of heart artery stent     DAYTON (obstructive sleep apnea)     Hypertension, goal below 140/90     Hyperlipidemia LDL goal <100     History of coronary artery stent placement     Advanced directives, counseling/discussion     Obesity, morbid, BMI 40.0-49.9 (H)     Neuropathy     Foot drop, right     Cardiomegaly     Gastroesophageal reflux disease without esophagitis     Falls frequently     Weakness of  both legs     Central spinal stenosis L3-4 and L4-5     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     Lymphedema     S/P spinal surgery     Complete tear of left rotator cuff     Lymphedema of right lower extremity     Primary osteoarthritis of right knee     Mixed incontinence     Iron deficiency anemia secondary to inadequate dietary iron intake       Past Medical History   I have reviewed this patient's medical history and updated it with pertinent information if needed.   Past Medical History:   Diagnosis Date     Acute pain of left knee 5/22/2017     Chronic pain of right knee 5/22/2017     Hx of coronary artery disease 5/22/2017     Hx of heart artery stent 5/22/2017     Mixed incontinence 8/13/2018     Obesity, morbid, BMI 40.0-49.9 (H) 11/20/2017     Open wound of left lower extremity without complication, initial encounter 5/22/2017     DAY[place] (obstructive sleep apnea) 5/22/2017     Type 2 diabetes mellitus with other circulatory complication, without long-term current use of insulin (H) 5/22/2017     Venous stasis ulcer of left lower extremity (H) 5/22/2017     Venous stasis ulcer of left lower extremity (H) 5/22/2017       Past Surgical History   I have reviewed this patient's surgical history and updated it with pertinent information if needed.  Past Surgical History:   Procedure Laterality Date     CARDIAC SURGERY      stent placement     CHOLECYSTECTOMY       LAMINECTOMY LUMBAR THREE+ LEVELS N/A 3/13/2018    Procedure: LAMINECTOMY LUMBAR THREE+ LEVELS;  T5-9 LAMINECTOMIES, RESECTION OF EPIDURAL LIPOMATOSIS;  Surgeon: Hugh Mendieta MD;  Location:  OR       Prior to Admission Medications   Cannot display prior to admission medications because the patient has not been admitted in this contact.     [unfilled]  [unfilled]  Allergies   Allergies   Allergen Reactions     No Known Allergies        Social History    reports that he has quit smoking. His smoking use included Cigars. He has never used  smokeless tobacco. He reports that he does not drink alcohol or use illicit drugs.    Family History   No family history on file.    Review of Systems   The comprehensive 10 point Review of Systems is negative other than noted in the HPI or here.     Physical Exam   Vital Signs with Ranges     Wt Readings from Last 4 Encounters:   09/27/18 133.6 kg (294 lb 9.6 oz)   09/20/18 142 kg (313 lb)   08/28/18 142.3 kg (313 lb 12.8 oz)   08/23/18 142 kg (313 lb)       Vitals:     Constitutional:  cooperative;well developed;in no acute distress morbidly obese       Skin:  warm and dry to the touch           Head:  normocephalic, no masses or lesions         Eyes:  pupils equal and round, conjunctivae and lids unremarkable, sclera white, no xanthalasma, EOMS intact, no nystagmus         Lymph:No Cervical lymphadenopathy present      ENT:  no pallor or cyanosis, dentition good         Neck:  carotid pulses are full and equal bilaterally, JVP normal, no carotid bruit         Respiratory:  normal breath sounds, clear to auscultation, normal A-P diameter, normal symmetry, normal respiratory excursion, no use of accessory muscles          Cardiac: regular rhythm, normal S1/S2, no S3 or S4, apical impulse not displaced, no murmurs, gallops or rubs                                                          GI:  abdomen soft;BS normoactive         Extremities and Muscular Skeletal:  no deformities, clubbing, cyanosis, erythema observed   bilateral LE edema;3+           Neurological:  affect appropriate   in motorized wheelchair     Psych:  Alert and Oriented x 3        @LABRCNTIPR(tropi:5,troponinies:5)@    @LABRCNTIPR(wbc:3,hgb:3,mcv:3,plt:3,inr:3,na:3,potassium:3,chloride:3,co2:3,bun:3,cr:3,gfrestimated:3,gfrestblack:3,aniongap:3,santiago:3,glc:3,albumin:2,prottotal:2,bilitotal:2,alkphos:2,alt:2,ast:2,lipase:2,tropi:3)@  Recent Labs   Lab Test  03/09/18   1020  09/25/17   1216  05/22/17   1029   CHOL   --   128  130   HDL   --   43  42    LDL  76  57  64   TRIG   --   140  118     @LABRCNTIP(wbc:3,hgb:3,hct:3,mcv:3,plt:3,iron:3,ironsat:3,reticabsct:3,retp:3,feb:3,tangela:3,b12:3,folic:3,epoe:3,morph:3)@  @LABRCNTIP(PH:3,PHV:3,PO2:3,PO2V:3,sat:3,PCO2:3,PCO2V:3,HCO3:3,HCO3V:3)@  @LABRCNTIP(NTBNPI:3,NTBNP:3)@  @LABRCNTIP(DD:1)@  @LABRCNTIP(sed:3,crp:3)@  @LABRCNTIP(PLT:3)@  @LABRCNTIP(TSH:3)@  @LABRCNTIP(color:1,appearance:1,urineg,urinebili:1,urineketone:1,s,ubld:1,urineph:1,protein:1,urobilinogen:1,nitrite:1,leukest:1,rbcu:1,wbcu:1)@    Imaging:  Recent Results (from the past 48 hour(s))   MR Lumbar Spine w/o Contrast    Narrative    MR LUMBAR SPINE WITHOUT CONTRAST 10/11/2018 10:32 AM     HISTORY: Lumbar radiculopathy.    TECHNIQUE: Multiplanar, multisequence images were obtained through the  lumbar spine without contrast.    COMPARISON: 2018.    FINDINGS: Five lumbar type vertebral bodies are presumed. Posterior  alignment is normal. There is mild disc space narrowing at multiple  levels. The conus medullaris is normal. The cauda equina and nerve  roots are markedly bunched at the L3-4 level due to central canal  stenosis and at the L4-5 level due to central canal stenosis. Bone  marrow signal intensity is within normal limits.    L1-2: Minimal facet hypertrophy and minimal disc bulge. No stenosis.    L2-3: There is a left posterolateral disc protrusion and some mild  facet hypertrophy. The disc protrusion is causing some mild to  moderate left-sided foraminal stenosis. There is no central canal  stenosis.    L3-4: There is broad-based disc protrusion and moderate to severe  facet and ligamentum flavum hypertrophy causing severe central canal  stenosis and moderate bilateral neural foraminal stenosis.    L4-5: Broad-based disc protrusion and moderate to severe facet and  ligamentum flavum hypertrophy is present causing moderate to severe  central canal stenosis and moderate bilateral neural foraminal  stenosis.    L5-SI: Mild to moderate  facet hypertrophy is present along with  minimal disc bulging. There is no stenosis.    Paraspinal soft tissues: Unremarkable as visualized.      Impression    IMPRESSION:  1. Severe degenerative central canal stenosis at L3-4.  2. Moderate to severe degenerative central canal stenosis at L4-5.  3. Left posterolateral L2-3 disc protrusion with secondary mild to  moderate left-sided foraminal stenosis.    TASHA GAYTAN MD       Echo:  Recent Results (from the past 4320 hour(s))   ECHO COMPLETE WITH OPTISON    Narrative    [id number]  [id number]  [id number]^KAYLEEN^DIONE^RENUKA           [facility]  Echocardiography Laboratory  [address] [place] [address]        Name: MALCOLM SANCHES  MRN: [id number]  : 1950  Study Date: 09/10/2018 10:23 AM  Age: 68 yrs  Gender: Male  Patient Location: [facility]  Reason For Study: , Shortness of breath  History: HTN,CAD,Diabetes  Ordering Physician: DIONE BEYER  Referring Physician: Julio Cesar Sahu MD  Performed By: Sydnie De Leon     BSA: 2.5 m2  Height: 70 in  Weight: 313 lb  HR: 75  BP: 130/68 mmHg  _____________________________________________________________________________  __        Procedure  Complete Echo Adult. Contrast Optison.  _____________________________________________________________________________  __        Interpretation Summary     The visual ejection fraction is estimated at 60-65%.  There is mild concentric left ventricular hypertrophy.  The left ventricle is normal in structure, function and size.  The right ventricle is mildly dilated.  Mildly decreased right ventricular systolic function  The left atrium is mildly dilated.  Trivial pericardial effusion  _____________________________________________________________________________  __        Left Ventricle  The left ventricle is normal in structure, function and size. There is mild  concentric left ventricular hypertrophy. The visual ejection fraction is  estimated at  60-65%. Left ventricular diastolic function is indeterminate. No  regional wall motion abnormalities noted.     Right Ventricle  The right ventricle is mildly dilated. Mildly decreased right ventricular  systolic function.     Atria  The left atrium is mildly dilated. Right atrial size is normal.     Mitral Valve  There is no mitral regurgitation noted.        Tricuspid Valve  There is trace to mild tricuspid regurgitation.     Aortic Valve  The aortic valve is trileaflet with aortic valve sclerosis.     Pulmonic Valve  The pulmonic valve is not well visualized.     Vessels  Borderline aortic root dilatation. The ascending aorta is Mildly dilated.  Dilated inferior vena cava.     Pericardium  Trivial pericardial effusion.        Rhythm  Sinus rhythm was noted.  _____________________________________________________________________________  __  MMode/2D Measurements & Calculations  IVSd: 1.2 cm     LVIDd: 5.3 cm  LVIDs: 3.6 cm  LVPWd: 1.1 cm  FS: 32.2 %  LV mass(C)d: 246.5 grams  LV mass(C)dI: 97.5 grams/m2  Ao root diam: 3.7 cm  LA dimension: 4.1 cm  asc Aorta Diam: 4.0 cm  LA/Ao: 1.1  RWT: 0.43        Doppler Measurements & Calculations  MV E max facundo: 87.0 cm/sec  MV A max facundo: 82.8 cm/sec  MV E/A: 1.1  MV dec time: 0.25 sec  E/E' av.9  Lateral E/e': 12.8  Medial E/e': 11.1              _____________________________________________________________________________  __        Report approved by: ARELY Freitas 09/10/2018 11:53 AM

## 2018-10-11 NOTE — MR AVS SNAPSHOT
"              After Visit Summary   10/11/2018    Glenroy Padilla    MRN: [id number]           Patient Information     Date Of Birth          1950        Visit Information        Provider Department      10/11/2018 1:15 PM Elmer Mcfadden MD [facility]        Today's Diagnoses     PAD (peripheral artery disease) (H)        Open wound of left lower extremity, subsequent encounter           Follow-ups after your visit        Your next 10 appointments already scheduled     Oct 15, 2018  9:00 AM CDT   Return Visit with PH WOUND EXAM ROOM   [facility] ([facility]    [address] [place] [id number] [phone]              Who to contact     If you have questions or need follow up information about today's clinic visit or your schedule please contact [facility]   [place] directly at [phone].  Normal or non-critical lab and imaging results will be communicated to you by MyChart, letter or phone within 4 business days after the clinic has received the results. If you do not hear from us within 7 days, please contact the clinic through MyChart or phone. If you have a critical or abnormal lab result, we will notify you by phone as soon as possible.  Submit refill requests through [employer] or call your pharmacy and they will forward the refill request to us. Please allow 3 business days for your refill to be completed.          Additional Information About Your Visit        Care EveryWhere ID     This is your Care EveryWhere ID. This could be used by other organizations to access your [facility] medical records  [id number]        Your Vitals Were     Pulse Height BMI (Body Mass Index)             76 1.778 m (5' 10\") 43.07 kg/m2          Blood Pressure from Last 3 Encounters:   10/11/18 107/76   09/27/18 120/76   08/28/18 102/58    Weight from Last 3 Encounters:   10/11/18 136.2 kg (300 lb 3.2 " oz)   09/27/18 133.6 kg (294 lb 9.6 oz)   09/20/18 142 kg (313 lb)              We Performed the Following     Follow-Up with Vascular Provider          Today's Medication Changes          These changes are accurate as of 10/11/18  1:52 PM.  If you have any questions, ask your nurse or doctor.               Stop taking these medicines if you haven't already. Please contact your care team if you have questions.     linagliptin-metFORMIN ER 2.5-1000 MG per table   Commonly known as:  JENTADUETO XR   Stopped by:  Elmer Mcfadden MD                   Information about OPIOIDS     PRESCRIPTION OPIOIDS: WHAT YOU NEED TO KNOW   We gave you an opioid (narcotic) pain medicine. It is important to manage your pain, but opioids are not always the best choice. You should first try all the other options your care team gave you. Take this medicine for as short a time (and as few doses) as possible.    Some activities can increase your pain, such as bandage changes or therapy sessions. It may help to take your pain medicine 30 to 60 minutes before these activities. Reduce your stress by getting enough sleep, working on hobbies you enjoy and practicing relaxation or meditation. Talk to your care team about ways to manage your pain beyond prescription opioids.    These medicines have risks:    DO NOT drive when on new or higher doses of pain medicine. These medicines can affect your alertness and reaction times, and you could be arrested for driving under the influence (DUI). If you need to use opioids long-term, talk to your care team about driving.    DO NOT operate heavy machinery    DO NOT do any other dangerous activities while taking these medicines.    DO NOT drink any alcohol while taking these medicines.     If the opioid prescribed includes acetaminophen, DO NOT take with any other medicines that contain acetaminophen. Read all labels carefully. Look for the word  acetaminophen  or  Tylenol.  Ask your pharmacist if  you have questions or are unsure.    You can get addicted to pain medicines, especially if you have a history of addiction (chemical, alcohol or substance dependence). Talk to your care team about ways to reduce this risk.    All opioids tend to cause constipation. Drink plenty of water and eat foods that have a lot of fiber, such as fruits, vegetables, prune juice, apple juice and high-fiber cereal. Take a laxative (Miralax, milk of magnesia, Colace, Senna) if you don t move your bowels at least every other day. Other side effects include upset stomach, sleepiness, dizziness, throwing up, tolerance (needing more of the medicine to have the same effect), physical dependence and slowed breathing.    Store your pills in a secure place, locked if possible. We will not replace any lost or stolen medicine. If you don t finish your medicine, please throw away (dispose) as directed by your pharmacist. The [place] [employer] has more information about safe disposal: https://www.pca.[facility].[email or web address]/living-green/managing-unwanted-medications         Primary Care Provider Office Phone # Fax #    Julio Cesar Esteban PA-C [phone] [id number] [facility] [id number]        Equal Access to Services     JAZ TOBAR : Pietro bernalo Sogloria, waaxda lusteveadaha, qaybta kaalmada adebradyyada, adams horton. So [facility] [phone].    ATENCIÓN: Si habla español, tiene a wilkinson disposición servicios gratuitos de asistencia lingüística. Llame al [phone].    We comply with applicable federal civil rights laws and [place] laws. We do not discriminate on the basis of race, color, national origin, age, disability, sex, sexual orientation, or gender identity.            Thank you!     Thank you for choosing [facility]  for your care. Our goal is always to provide you with excellent care. Hearing back from our patients is one way we can  continue to improve our services. Please take a few minutes to complete the written survey that you may receive in the mail after your visit with us. Thank you!             Your Updated Medication List - Protect others around you: Learn how to safely use, store and throw away your medicines at www.disposemymeds.org.          This list is accurate as of 10/11/18  1:52 PM.  Always use your most recent med list.                   Brand Name Dispense Instructions for use Diagnosis    acetaminophen 500 MG tablet    TYLENOL    120 tablet    Take 1-2 tablets (500-1,000 mg) by mouth every 6 hours as needed for mild pain    Chronic pain of left knee, Complete tear of left rotator cuff       blood glucose calibration solution    NO BRAND SPECIFIED    1 Bottle    To accompany: Blood Glucose Monitor Brands: per insurance.    Type 2 diabetes mellitus with other circulatory complication, without long-term current use of insulin (H)       blood glucose lancing device    no brand specified    1 each    Use to test blood sugars 2 times daily or as directed.    Type 2 diabetes mellitus with other circulatory complication, without long-term current use of insulin (H)       * blood glucose monitoring test strip    no brand specified    100 strip    Use to test blood sugar 2x  daily or as directed. To accompany: Blood Glucose Monitor Brands: per insurance.    Type 2 diabetes mellitus with other circulatory complication, without long-term current use of insulin (H)       * blood glucose monitoring test strip    no brand specified    100 strip    Use to test blood sugar 2 times daily Blood Glucose Monitor Brands: Glucocard Expression    Type 2 diabetes mellitus with other circulatory complication, without long-term current use of insulin (H)       clopidogrel 75 MG tablet    PLAVIX    90 tablet    Take 1 tablet (75 mg) by mouth daily    Type 2 diabetes mellitus with other circulatory complication, without long-term current use of insulin  (H), S/P spinal surgery, Hypertension, goal below 140/90, Hyperlipidemia LDL goal <100       cyclobenzaprine 10 MG tablet    FLEXERIL     TAKE 1 TAB BY MOUTH TWICE DAILY AS NEEDED FOR ROTATOR CUFF TEAR        ferrous sulfate 325 (65 Fe) MG tablet    IRON    90 tablet    Take 1 tablet (325 mg) by mouth daily (with breakfast)    History of anemia       furosemide 40 MG tablet    LASIX    90 tablet    Take 1 tablet (40 mg) by mouth daily    Hypertension, goal below 140/90       * GLUCOCARD EXPRESSION MONITOR w/Device Kit      USE TWICE DAILY TO TEST BLOOD GLUCOSE        * blood glucose monitoring meter device kit    no brand specified    1 kit    test blood sugar 2 xdaily or as directed.  Blood Glucose Monitor Brands: per insurance.    Type 2 diabetes mellitus with other circulatory complication, without long-term current use of insulin (H)       HYDROcodone-acetaminophen 5-325 MG per tablet    NORCO     Take 1 tablet by mouth every 6 hours as needed for severe pain        JANUMET  MG per tablet   Generic drug:  sitagliptin-metFORMIN      Take 1 tablet by mouth daily        lidocaine 5 % ointment    XYLOCAINE    50 g    Apply topically daily    Chronic pain of right knee       losartan 25 MG tablet    COZAAR    90 tablet    Take 1 tablet (25 mg) by mouth daily    Hypertension, goal below 140/90, Type 2 diabetes mellitus with other circulatory complication, without long-term current use of insulin (H)       methocarbamol 500 MG tablet    ROBAXIN    70 tablet    Take 1 tablet (500 mg) by mouth 4 times daily as needed for muscle spasms    S/P spinal surgery       metoprolol tartrate 25 MG tablet    LOPRESSOR    60 tablet    Take 1 tablet (25 mg) by mouth 2 times daily    Type 2 diabetes mellitus with other circulatory complication, without long-term current use of insulin (H), History of coronary artery stent placement, Hypertension, goal below 140/90       order for DME     1 Box    One touch glucose monitoring  strip with Glucometer and lancets.    Type 2 diabetes mellitus with other circulatory complication, without long-term current use of insulin (H)       order for DME     1 Units    Equipment being ordered: compression stockings below knee and above knee if available.  Medium compression.    Primary osteoarthritis of right knee, Lymphedema of right lower extremity       order for DME     1 Device    Equipment being ordered: Needs to have a sleep chair for home use.    Type 2 diabetes mellitus with other circulatory complication, without long-term current use of insulin (H), Morbid obesity due to excess calories (H), History of coronary artery stent placement, Gastroesophageal reflux disease without esophagitis, Chronic pain of left knee, DAYTON (obstructive sleep apnea)       order for DME     2 Device    Compression garments toe to knee. ?Specific brand is Compraflex Light Compression garment.  Diagnosis codes for venous stasis ulcer I83.029 and for ?Lymphoedema is I89.0  Goal is for compression of about 30 to 40 mm of mercury  measurements are right ankle 28.5 cm and right calf is 45.5 cm. ?Left ankle is 27.5 cm and the calf is 45 cm  Tall for length.    Neuropathy, Lymphedema of right lower extremity, Lymphedema       order for DME     1 Units    Equipment being ordered: electric chair for sleeping and adjustment to allow for elevation of legs above the heart.  Zero gravity type heavy duty sleep chair advised.    Screening for diabetic peripheral neuropathy, History of coronary artery stent placement, Hypertension, goal below 140/90, Open wound of left lower extremity without complication, initial encounter, Type 2 diabetes mellitus with other circulatory complication, without long-term current use of insulin (H), Weakness of both legs, Iron deficiency anemia secondary to inadequate dietary iron intake, DAYTON (obstructive sleep apnea), Central spinal stenosis, Foraminal stenosis of lumbar region       pantoprazole 40 MG  EC tablet    PROTONIX    30 tablet    Take 1 tablet (40 mg) by mouth daily Take 30-60 minutes before a meal.    Gastroesophageal reflux disease without esophagitis       rosuvastatin 10 MG tablet    CRESTOR    90 tablet    Take 1 tablet (10 mg) by mouth daily    Hyperlipidemia LDL goal <100       senna-docusate 8.6-50 MG per tablet    SENOKOT-S;PERICOLACE    100 tablet    Take 1 tablet by mouth 2 times daily as needed for constipation    Spinal stenosis, lumbar region, without neurogenic claudication       thin lancets    NO BRAND SPECIFIED    100 each    Test 2 x daily or as directed.  Brands: per insurance.    Type 2 diabetes mellitus with other circulatory complication, without long-term current use of insulin (H)       TRIAD HYDROPHILIC WOUND DRESSI Pste     1 Tube    Externally apply 1 g topically daily    Open wound of right lower leg, initial encounter       * Notice:  This list has 4 medication(s) that are the same as other medications prescribed for you. Read the directions carefully, and ask your doctor or other care provider to review them with you.

## 2018-10-11 NOTE — LETTER
10/11/2018    Julio Cesar Sahu PA-C  [address] [facility] [address]    RE: Glenroy BENDER Amaurisilvino       Dear Colleague,    I had the pleasure of seeing Glenroy Padilla in the [facility].      Cardiology Vascular Consultation     Assessment & Plan       Reviewed patient's clinical status and exam today.  We are pleased that his wound is healing compared to what was described previously.  He is also receiving good care regarding his edema with the lymphedema and wound clinic.  He does have based on the ABIs appropriate lower extremity circulation to allow this.  The CTA is still pending at the time of dictation I will be on the look out for this.  For the time being let us continue with conservative management and wound healing.  I will defer the remainder of his care to my cardiology colleagues.  We will be available if he needs anything in the future.  Thank you kindly for allowing us to participate in his care.      Elmer Mcfadden MD      Patient is a 68-year-old male who is seen by my partner Dr. Kohli at [place].  He is referred to the [facility] clinic.  Cardiac risk factors include hypertension, hyperlipidemia, and diabetes mellitus.  He apparently had history of PCI performed in [place] in March 2016.  This consisted of the mid LAD in her V and upon.  He also has small vessel disease and branch vessels.  Patient unfortunately has been dealing with chronic bilateral lower extremity edema with a nonhealing distal left lower extremity skin ulcer.  Also has some ulcers noted on the right which are healed.  He is also overweight.  He underwent studies in the past consisting of venous competency as well as ABIs.  Today he underwent a CTA of the lower extremity however the results are not pending.      MINESH 2017:  HISTORY: Localized edema. Chronic venous hypertension (idiopathic)  without complications of bilateral lower extremity.     COMPARISON: None.     FINDINGS: Ankle-brachial  index is 0.96 on the right and 0.91 on the  left. Waveforms appear biphasic/monophasic bilaterally.         IMPRESSION: Borderline bilateral arterial insufficiency.      CTA: Pending    Venous competency study: 2017    ULTRASOUND VENOUS COMPETENCY BILATERAL   6/1/2017 3:29 PM      HISTORY: Localized edema. Chronic venous hypertension (idiopathic)  without complications of bilateral lower extremity. Unspecified open  wound, left lower leg, initial encounter.     COMPARISON: None.     TECHNIQUE: Color Doppler and spectral waveform analysis performed.     FINDINGS: Bilateral common femoral veins, femoral veins, and popliteal  veins are patent and demonstrate no evidence of reflux.     Other than questionable 1 second reflux at the right saphenofemoral  junction, the right great saphenous vein is competent throughout its  length. Diameter of the right great saphenous vein is 3-7 mm.     The left great saphenous vein is competent throughout its length.  Diameter of the left great saphenous vein is 2-7 mm.     Visualized portions of bilateral small saphenous veins appear to be  competent, without evidence of reflux.     No prominent perforators were demonstrated. Bilateral Giacomini veins  were competent.         IMPRESSION: The deep and superficial venous systems of both legs are  patent and competent.          Primary Care Physician   Julio Cesar Sahu      Patient Active Problem List   Diagnosis     Type 2 diabetes mellitus with other circulatory complication, without long-term current use of insulin (H)     Venous stasis ulcer of left lower extremity (H)     Acute pain of left knee     Chronic pain of right knee     Open wound of left lower extremity without complication, initial encounter     Hx of coronary artery disease     Hx of heart artery stent     DAYTON (obstructive sleep apnea)     Hypertension, goal below 140/90     Hyperlipidemia LDL goal <100     History of coronary artery stent placement     Advanced directives,  counseling/discussion     Obesity, morbid, BMI 40.0-49.9 (H)     Neuropathy     Foot drop, right     Cardiomegaly     Gastroesophageal reflux disease without esophagitis     Falls frequently     Weakness of both legs     Central spinal stenosis L3-4 and L4-5     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     Lymphedema     S/P spinal surgery     Complete tear of left rotator cuff     Lymphedema of right lower extremity     Primary osteoarthritis of right knee     Mixed incontinence     Iron deficiency anemia secondary to inadequate dietary iron intake       Past Medical History   I have reviewed this patient's medical history and updated it with pertinent information if needed.   Past Medical History:   Diagnosis Date     Acute pain of left knee 5/22/2017     Chronic pain of right knee 5/22/2017     Hx of coronary artery disease 5/22/2017     Hx of heart artery stent 5/22/2017     Mixed incontinence 8/13/2018     Obesity, morbid, BMI 40.0-49.9 (H) 11/20/2017     Open wound of left lower extremity without complication, initial encounter 5/22/2017     DAYTON (obstructive sleep apnea) 5/22/2017     Type 2 diabetes mellitus with other circulatory complication, without long-term current use of insulin (H) 5/22/2017     Venous stasis ulcer of left lower extremity (H) 5/22/2017     Venous stasis ulcer of left lower extremity (H) 5/22/2017       Past Surgical History   I have reviewed this patient's surgical history and updated it with pertinent information if needed.  Past Surgical History:   Procedure Laterality Date     CARDIAC SURGERY      stent placement     CHOLECYSTECTOMY       LAMINECTOMY LUMBAR THREE+ LEVELS N/A 3/13/2018    Procedure: LAMINECTOMY LUMBAR THREE+ LEVELS;  T5-9 LAMINECTOMIES, RESECTION OF EPIDURAL LIPOMATOSIS;  Surgeon: Hugh Mendieta MD;  Location:  OR       Prior to Admission Medications   Cannot display prior to admission medications because the patient has not been admitted in this contact.      [email or web address]@  [unfilled]  Allergies   Allergies   Allergen Reactions     No Known Allergies        Social History    reports that he has quit smoking. His smoking use included Cigars. He has never used smokeless tobacco. He reports that he does not drink alcohol or use illicit drugs.    Family History   No family history on file.    Review of Systems   The comprehensive 10 point Review of Systems is negative other than noted in the HPI or here.     Physical Exam   Vital Signs with Ranges     Wt Readings from Last 4 Encounters:   09/27/18 133.6 kg (294 lb 9.6 oz)   09/20/18 142 kg (313 lb)   08/28/18 142.3 kg (313 lb 12.8 oz)   08/23/18 142 kg (313 lb)       Vitals:     Constitutional:  cooperative;well developed;in no acute distress morbidly obese       Skin:  warm and dry to the touch           Head:  normocephalic, no masses or lesions         Eyes:  pupils equal and round, conjunctivae and lids unremarkable, sclera white, no xanthalasma, EOMS intact, no nystagmus         Lymph:No Cervical lymphadenopathy present      ENT:  no pallor or cyanosis, dentition good         Neck:  carotid pulses are full and equal bilaterally, JVP normal, no carotid bruit         Respiratory:  normal breath sounds, clear to auscultation, normal A-P diameter, normal symmetry, normal respiratory excursion, no use of accessory muscles          Cardiac: regular rhythm, normal S1/S2, no S3 or S4, apical impulse not displaced, no murmurs, gallops or rubs                                                          GI:  abdomen soft;BS normoactive         Extremities and Muscular Skeletal:  no deformities, clubbing, cyanosis, erythema observed   bilateral LE edema;3+           Neurological:  affect appropriate   in motorized wheelchair     Psych:  Alert and Oriented x 3         @LABRCNTIPR(tropi:5,troponinies:5)@    @LABRCNTIPR(wbc:3,hgb:3,mcv:3,plt:3,inr:3,na:3,potassium:3,chloride:3,co2:3,bun:3,cr:3,gfrestimated:3,gfrestblack:3,aniongap:3,santiago:3,glc:3,albumin:2,prottotal:2,bilitotal:2,alkphos:2,alt:2,ast:2,lipase:2,tropi:3)@  Recent Labs   Lab Test  18   1020  17   1216  17   1029   CHOL   --   128  130   HDL   --   43  42   LDL  76  57  64   TRIG   --   140  118     @LABRCNTIP(wbc:3,hgb:3,hct:3,mcv:3,plt:3,iron:3,ironsat:3,reticabsct:3,retp:3,feb:3,tangela:3,b12:3,folic:3,epoe:3,morph:3)@  @LABRCNTIP(PH:3,PHV:3,PO2:3,PO2V:3,sat:3,PCO2:3,PCO2V:3,HCO3:3,HCO3V:3)@  @LABRCNTIP(NTBNPI:3,NTBNP:3)@  @LABRCNTIP(DD:1)@  @LABRCNTIP(sed:3,crp:3)@  @LABRCNTIP(PLT:3)@  @LABRCNTIP(TSH:3)@  @LABRCNTIP(color:1,appearance:1,urineg,urinebili:1,urineketone:1,s,ubld:1,urineph:1,protein:1,urobilinogen:1,nitrite:1,leukest:1,rbcu:1,wbcu:1)@    Imaging:  Recent Results (from the past 48 hour(s))   MR Lumbar Spine w/o Contrast    Narrative    MR LUMBAR SPINE WITHOUT CONTRAST 10/11/2018 10:32 AM     HISTORY: Lumbar radiculopathy.    TECHNIQUE: Multiplanar, multisequence images were obtained through the  lumbar spine without contrast.    COMPARISON: 2018.    FINDINGS: Five lumbar type vertebral bodies are presumed. Posterior  alignment is normal. There is mild disc space narrowing at multiple  levels. The conus medullaris is normal. The cauda equina and nerve  roots are markedly bunched at the L3-4 level due to central canal  stenosis and at the L4-5 level due to central canal stenosis. Bone  marrow signal intensity is within normal limits.    L1-2: Minimal facet hypertrophy and minimal disc bulge. No stenosis.    L2-3: There is a left posterolateral disc protrusion and some mild  facet hypertrophy. The disc protrusion is causing some mild to  moderate left-sided foraminal stenosis. There is no central canal  stenosis.    L3-4: There is broad-based disc protrusion and moderate to  severe  facet and ligamentum flavum hypertrophy causing severe central canal  stenosis and moderate bilateral neural foraminal stenosis.    L4-5: Broad-based disc protrusion and moderate to severe facet and  ligamentum flavum hypertrophy is present causing moderate to severe  central canal stenosis and moderate bilateral neural foraminal  stenosis.    L5-SI: Mild to moderate facet hypertrophy is present along with  minimal disc bulging. There is no stenosis.    Paraspinal soft tissues: Unremarkable as visualized.      Impression    IMPRESSION:  1. Severe degenerative central canal stenosis at L3-4.  2. Moderate to severe degenerative central canal stenosis at L4-5.  3. Left posterolateral L2-3 disc protrusion with secondary mild to  moderate left-sided foraminal stenosis.    TASHA GAYTAN MD       Echo:  Recent Results (from the past 4320 hour(s))   ECHO COMPLETE WITH OPTISON    Narrative    [id number]  [id number]  [id number]  [id number]^KAYLEEN^DIONE^RENUKA           [facility]  Echocardiography Laboratory  [address] [place] [address]        Name: MALCOLM SANCHES  MRN: [id number]  : 1950  Study Date: 09/10/2018 10:23 AM  Age: 68 yrs  Gender: Male  Patient Location: Merged with [facility]  Reason For Study: , Shortness of breath  History: HTN,CAD,Diabetes  Ordering Physician: DIONE BEYER  Referring Physician: Julio Cesar Sahu MD  Performed By: Sydnie De Leon     BSA: 2.5 m2  Height: 70 in  Weight: 313 lb  HR: 75  BP: 130/68 mmHg  _____________________________________________________________________________  __        Procedure  Complete Echo Adult. Contrast Optison.  _____________________________________________________________________________  __        Interpretation Summary     The visual ejection fraction is estimated at 60-65%.  There is mild concentric left ventricular hypertrophy.  The left ventricle is normal in structure, function and size.  The right ventricle is mildly dilated.  Mildly  decreased right ventricular systolic function  The left atrium is mildly dilated.  Trivial pericardial effusion  _____________________________________________________________________________  __        Left Ventricle  The left ventricle is normal in structure, function and size. There is mild  concentric left ventricular hypertrophy. The visual ejection fraction is  estimated at 60-65%. Left ventricular diastolic function is indeterminate. No  regional wall motion abnormalities noted.     Right Ventricle  The right ventricle is mildly dilated. Mildly decreased right ventricular  systolic function.     Atria  The left atrium is mildly dilated. Right atrial size is normal.     Mitral Valve  There is no mitral regurgitation noted.        Tricuspid Valve  There is trace to mild tricuspid regurgitation.     Aortic Valve  The aortic valve is trileaflet with aortic valve sclerosis.     Pulmonic Valve  The pulmonic valve is not well visualized.     Vessels  Borderline aortic root dilatation. The ascending aorta is Mildly dilated.  Dilated inferior vena cava.     Pericardium  Trivial pericardial effusion.        Rhythm  Sinus rhythm was noted.  _____________________________________________________________________________  __  MMode/2D Measurements & Calculations  IVSd: 1.2 cm     LVIDd: 5.3 cm  LVIDs: 3.6 cm  LVPWd: 1.1 cm  FS: 32.2 %  LV mass(C)d: 246.5 grams  LV mass(C)dI: 97.5 grams/m2  Ao root diam: 3.7 cm  LA dimension: 4.1 cm  asc Aorta Diam: 4.0 cm  LA/Ao: 1.1  RWT: 0.43        Doppler Measurements & Calculations  MV E max facundo: 87.0 cm/sec  MV A max facundo: 82.8 cm/sec  MV E/A: 1.1  MV dec time: 0.25 sec  E/E' av.9  Lateral E/e': 12.8  Medial E/e': 11.1              _____________________________________________________________________________  __        Report approved by: ARELY Freitas 09/10/2018 11:53 AM          Thank you for allowing me to participate in the care of your patient.    Sincerely,      Elmer Mcfadden MD     [facility]

## 2018-10-12 ENCOUNTER — CARE COORDINATION (OUTPATIENT)
Dept: CARDIOLOGY | Facility: CLINIC | Age: 68
End: 2018-10-12

## 2018-10-12 ENCOUNTER — MEDICAL CORRESPONDENCE (OUTPATIENT)
Dept: HEALTH INFORMATION MANAGEMENT | Facility: CLINIC | Age: 68
End: 2018-10-12

## 2018-10-12 DIAGNOSIS — E11.59 TYPE 2 DIABETES MELLITUS WITH OTHER CIRCULATORY COMPLICATION, WITHOUT LONG-TERM CURRENT USE OF INSULIN (H): ICD-10-CM

## 2018-10-12 DIAGNOSIS — I10 HYPERTENSION, GOAL BELOW 140/90: ICD-10-CM

## 2018-10-12 DIAGNOSIS — Z95.5 HISTORY OF CORONARY ARTERY STENT PLACEMENT: ICD-10-CM

## 2018-10-12 DIAGNOSIS — E78.5 HYPERLIPIDEMIA LDL GOAL <100: ICD-10-CM

## 2018-10-12 NOTE — TELEPHONE ENCOUNTER
Forms have been completed, signed, faxed/mailed, and sent to scanning.  Lisa Zuluaga CMA ([facility])

## 2018-10-12 NOTE — PROGRESS NOTES
Results of CTA ABD Aorta with iliofemoral runoff 10/11/18 came in after pt's OV with Vascular Cardiologist yesterday. Will send to Dr. Mcfadden to review and see if any changes or continue with conservative management and lymphedema/wound clinics.

## 2018-10-15 ENCOUNTER — HOSPITAL ENCOUNTER (OUTPATIENT)
Dept: WOUND CARE | Facility: CLINIC | Age: 68
Discharge: HOME | End: 2018-10-15
Attending: SPECIALIST | Admitting: SPECIALIST
Payer: MEDICARE

## 2018-10-15 PROCEDURE — G0463 HOSPITAL OUTPT CLINIC VISIT: HCPCS

## 2018-10-15 RX ORDER — FUROSEMIDE 40 MG
40 TABLET ORAL DAILY
Qty: 90 TABLET | Refills: 2 | Status: ON HOLD | OUTPATIENT
Start: 2018-10-15 | End: 2019-07-31

## 2018-10-15 RX ORDER — METOPROLOL TARTRATE 25 MG/1
25 TABLET, FILM COATED ORAL 2 TIMES DAILY
Qty: 180 TABLET | Refills: 3 | Status: SHIPPED | OUTPATIENT
Start: 2018-10-15 | End: 2019-10-22

## 2018-10-15 RX ORDER — ROSUVASTATIN CALCIUM 10 MG/1
10 TABLET, COATED ORAL DAILY
Qty: 90 TABLET | Refills: 1 | Status: SHIPPED | OUTPATIENT
Start: 2018-10-15 | End: 2019-05-10

## 2018-10-15 NOTE — DISCHARGE INSTRUCTIONS
Today the wound on your left lower leg is much improved.  There remains a small open area of 0.3 cm X 0.5 cm x 0 cm and is all granular tissue and clean.    We will change the plan of care for your wound care to just covering the wound with dry gauze (not Telfa), secured with roll gauze and tape.  Continue with the every other day dressing changes.  Ok to cleanse in the shower with no bandaging on.  Wash with soap, saline or a no rinse dermal cleanser and dry well before bandaging.  Continue to apply the ready wrap compression garments.      Once the open wound is scabbed or scarred over ok to no longer cover with a bandage.    I would avoid applying moisturizer to the area for a total of 6 weeks after the wound is closed. After 6 weeks start to apply moisture to the site daily or every other day after bathing.    I will see you again in 4 weeks on Monday 11/12/18.  If the wound is fully closed and your nurses have no concerns you can cancel this visit.    Good Mcn[place], and thanks for coming in today.    Jett Garza RN [email or web address]

## 2018-10-15 NOTE — TELEPHONE ENCOUNTER
Lasix, Metoprolol, & Crestor:  Prescription approved per [facility] Refill Protocol.    Rossi Pérez, RN, BSN

## 2018-10-15 NOTE — IP AVS SNAPSHOT
MRN:[id number]                      After Visit Summary   10/15/2018    Glenroy Padilla    MRN: [id number]           Visit Information        Provider Department      10/15/2018  9:00 AM PH WOUND EXAM ROOM [facility]           Review of your medicines      UNREVIEWED medicines. Ask your doctor about these medicines        Dose / Directions    acetaminophen 500 MG tablet   Commonly known as:  TYLENOL   Used for:  Chronic pain of left knee, Complete tear of left rotator cuff        Dose:  500-1000 mg   Take 1-2 tablets (500-1,000 mg) by mouth every 6 hours as needed for mild pain   Quantity:  120 tablet   Refills:  4       clopidogrel 75 MG tablet   Commonly known as:  PLAVIX   Used for:  Type 2 diabetes mellitus with other circulatory complication, without long-term current use of insulin (H), S/P spinal surgery, Hypertension, goal below 140/90, Hyperlipidemia LDL goal <100        Dose:  75 mg   Take 1 tablet (75 mg) by mouth daily   Quantity:  90 tablet   Refills:  1       cyclobenzaprine 10 MG tablet   Commonly known as:  FLEXERIL        TAKE 1 TAB BY MOUTH TWICE DAILY AS NEEDED FOR ROTATOR CUFF TEAR   Refills:  0       ferrous sulfate 325 (65 Fe) MG tablet   Commonly known as:  IRON   Used for:  History of anemia        Dose:  325 mg   Take 1 tablet (325 mg) by mouth daily (with breakfast)   Quantity:  90 tablet   Refills:  1       furosemide 40 MG tablet   Commonly known as:  LASIX   Used for:  Hypertension, goal below 140/90        Dose:  40 mg   Take 1 tablet (40 mg) by mouth daily   Quantity:  90 tablet   Refills:  1       HYDROcodone-acetaminophen 5-325 MG per tablet   Commonly known as:  NORCO        Dose:  1 tablet   Take 1 tablet by mouth every 6 hours as needed for severe pain   Refills:  0       JANUMET  MG per tablet   Generic drug:  sitagliptin-metFORMIN        Dose:  1 tablet   Take 1 tablet by mouth daily   Refills:  0       lidocaine 5 % ointment   Commonly  known as:  XYLOCAINE   Used for:  Chronic pain of right knee        Apply topically daily   Quantity:  50 g   Refills:  3       losartan 25 MG tablet   Commonly known as:  COZAAR   Used for:  Hypertension, goal below 140/90, Type 2 diabetes mellitus with other circulatory complication, without long-term current use of insulin (H)        Dose:  25 mg   Take 1 tablet (25 mg) by mouth daily   Quantity:  90 tablet   Refills:  1       methocarbamol 500 MG tablet   Commonly known as:  ROBAXIN   Used for:  S/P spinal surgery        Dose:  500 mg   Take 1 tablet (500 mg) by mouth 4 times daily as needed for muscle spasms   Quantity:  70 tablet   Refills:  1       metoprolol tartrate 25 MG tablet   Commonly known as:  LOPRESSOR   Used for:  Type 2 diabetes mellitus with other circulatory complication, without long-term current use of insulin (H), History of coronary artery stent placement, Hypertension, goal below 140/90        Dose:  25 mg   Take 1 tablet (25 mg) by mouth 2 times daily   Quantity:  60 tablet   Refills:  4       pantoprazole 40 MG EC tablet   Commonly known as:  PROTONIX   Used for:  Gastroesophageal reflux disease without esophagitis        Dose:  40 mg   Take 1 tablet (40 mg) by mouth daily Take 30-60 minutes before a meal.   Quantity:  30 tablet   Refills:  0       rosuvastatin 10 MG tablet   Commonly known as:  CRESTOR   Used for:  Hyperlipidemia LDL goal <100        Dose:  10 mg   Take 1 tablet (10 mg) by mouth daily   Quantity:  90 tablet   Refills:  1       senna-docusate 8.6-50 MG per tablet   Commonly known as:  SENOKOT-S;PERICOLACE   Used for:  Spinal stenosis, lumbar region, without neurogenic claudication        Dose:  1 tablet   Take 1 tablet by mouth 2 times daily as needed for constipation   Quantity:  100 tablet   Refills:  0       TRIAD HYDROPHILIC WOUND DRESSI Pste   Used for:  Open wound of right lower leg, initial encounter        Dose:  1 applicator   Externally apply 1 g topically daily    Quantity:  1 Tube   Refills:  3         CONTINUE these medicines which have NOT CHANGED        Dose / Directions    blood glucose calibration solution   Commonly known as:  NO BRAND SPECIFIED   Used for:  Type 2 diabetes mellitus with other circulatory complication, without long-term current use of insulin (H)        To accompany: Blood Glucose Monitor Brands: per insurance.   Quantity:  1 Bottle   Refills:  3       blood glucose lancing device   Commonly known as:  no brand specified   Used for:  Type 2 diabetes mellitus with other circulatory complication, without long-term current use of insulin (H)        Use to test blood sugars 2 times daily or as directed.   Quantity:  1 each   Refills:  0       * blood glucose monitoring test strip   Commonly known as:  no brand specified   Used for:  Type 2 diabetes mellitus with other circulatory complication, without long-term current use of insulin (H)        Use to test blood sugar 2x  daily or as directed. To accompany: Blood Glucose Monitor Brands: per insurance.   Quantity:  100 strip   Refills:  1       * blood glucose monitoring test strip   Commonly known as:  no brand specified   Used for:  Type 2 diabetes mellitus with other circulatory complication, without long-term current use of insulin (H)        Use to test blood sugar 2 times daily Blood Glucose Monitor Brands: Glucocard Expression   Quantity:  100 strip   Refills:  6       * GLUCOCARD EXPRESSION MONITOR w/Device Kit        USE TWICE DAILY TO TEST BLOOD GLUCOSE   Refills:  0       * blood glucose monitoring meter device kit   Commonly known as:  no brand specified   Used for:  Type 2 diabetes mellitus with other circulatory complication, without long-term current use of insulin (H)        test blood sugar 2 xdaily or as directed.  Blood Glucose Monitor Brands: per insurance.   Quantity:  1 kit   Refills:  0       order for DME   Used for:  Type 2 diabetes mellitus with other circulatory complication,  without long-term current use of insulin (H)        One touch glucose monitoring strip with Glucometer and lancets.   Quantity:  1 Box   Refills:  1       order for DME   Used for:  Primary osteoarthritis of right knee, Lymphedema of right lower extremity        Equipment being ordered: compression stockings below knee and above knee if available.  Medium compression.   Quantity:  1 Units   Refills:  1       order for DME   Used for:  Type 2 diabetes mellitus with other circulatory complication, without long-term current use of insulin (H), Morbid obesity due to excess calories (H), History of coronary artery stent placement, Gastroesophageal reflux disease without esophagitis, Chronic pain of left knee, DAYTON (obstructive sleep apnea)        Equipment being ordered: Needs to have a sleep chair for home use.   Quantity:  1 Device   Refills:  0       order for DME   Used for:  Neuropathy, Lymphedema of right lower extremity, Lymphedema        Compression garments toe to knee. ?Specific brand is Compraflex Light Compression garment.  Diagnosis codes for venous stasis ulcer I83.029 and for ?Lymphoedema is I89.0  Goal is for compression of about 30 to 40 mm of mercury  measurements are right ankle 28.5 cm and right calf is 45.5 cm. ?Left ankle is 27.5 cm and the calf is 45 cm  Tall for length.   Quantity:  2 Device   Refills:  1       order for DME   Used for:  Screening for diabetic peripheral neuropathy, History of coronary artery stent placement, Hypertension, goal below 140/90, Open wound of left lower extremity without complication, initial encounter, Type 2 diabetes mellitus with other circulatory complication, without long-term current use of insulin (H), Weakness of both legs, Iron deficiency anemia secondary to inadequate dietary iron intake, DAYTON (obstructive sleep apnea), Central spinal stenosis, Foraminal stenosis of lumbar region        Equipment being ordered: electric chair for sleeping and adjustment to  allow for elevation of legs above the heart.  Zero gravity type heavy duty sleep chair advised.   Quantity:  1 Units   Refills:  0       thin lancets   Commonly known as:  NO BRAND SPECIFIED   Used for:  Type 2 diabetes mellitus with other circulatory complication, without long-term current use of insulin (H)        Test 2 x daily or as directed.  Brands: per insurance.   Quantity:  100 each   Refills:  1       * Notice:  This list has 4 medication(s) that are the same as other medications prescribed for you. Read the directions carefully, and ask your doctor or other care provider to review them with you.             Protect others around you: Learn how to safely use, store and throw away your medicines at www.disposemymeds.org.         Follow-ups after your visit        Your next 10 appointments already scheduled     Nov 12, 2018  9:00 AM CST   Return Visit with  WOUND EXAM ROOM   [facility]    [address] [place] [id number] [phone]               Care Instructions        Further instructions from your care team       Today the wound on your left lower leg is much improved.  There remains a small open area of 0.3 cm X 0.5 cm x 0 cm and is all granular tissue and clean.    We will change the plan of care for your wound care to just covering the wound with dry gauze (not Telfa), secured with roll gauze and tape.  Continue with the every other day dressing changes.  Ok to cleanse in the shower with no bandaging on.  Wash with soap, saline or a no rinse dermal cleanser and dry well before bandaging.  Continue to apply the ready wrap compression garments.      Once the open wound is scabbed or scarred over ok to no longer cover with a bandage.    I would avoid applying moisturizer to the area for a total of 6 weeks after the wound is closed. After 6 weeks start to apply moisture to the site daily or every other day after bathing.    I will see you  again in 4 weeks on Monday 11/12/18.  If the wound is fully closed and your nurses have no concerns you can cancel this visit.    Good [place], and thanks for coming in today.    Jett Garza RN [email or web address]     Additional Information About Your Visit        Care EveryWhere ID     This is your Care EveryWhere ID. This could be used by other organizations to access your [facility] medical records  [id number]         Primary Care Provider Office Phone # Fax #    Julio Cesar Esteban PA-C [phone]      Equal Access to Services     [facility]: Hadii aad ku hadasho Soomaali, waaxda luqadaha, qaybta kaalmada adeegyada, waxmisael batres . [facility] [phone].    ATENCIÓN: Si habla español, tiene a wilkinson disposición servicios gratuitos de asistencia lingüística. [facility] [phone].    We comply with applicable federal civil rights laws and [place] laws. We do not discriminate on the basis of race, color, national origin, age, disability, sex, sexual orientation, or gender identity.            Thank you!     Thank you for choosing [facility] for your care. Our goal is always to provide you with excellent care. Hearing back from our patients is one way we can continue to improve our services. Please take a few minutes to complete the written survey that you may receive in the mail after you visit with us. Thank you!             Medication List: This is a list of all your medications and when to take them. Check marks below indicate your daily home schedule. Keep this list as a reference.      Medications           Morning Afternoon Evening Bedtime As Needed    acetaminophen 500 MG tablet   Commonly known as:  TYLENOL   Take 1-2 tablets (500-1,000 mg) by mouth every 6 hours as needed for mild pain                                blood glucose calibration solution   Commonly known as:  NO BRAND SPECIFIED   To accompany: Blood Glucose Monitor Brands: per insurance.                                 blood glucose lancing device   Commonly known as:  no brand specified   Use to test blood sugars 2 times daily or as directed.                                * blood glucose monitoring test strip   Commonly known as:  no brand specified   Use to test blood sugar 2x  daily or as directed. To accompany: Blood Glucose Monitor Brands: per insurance.                                * blood glucose monitoring test strip   Commonly known as:  no brand specified   Use to test blood sugar 2 times daily Blood Glucose Monitor Brands: Glucocard Expression                                clopidogrel 75 MG tablet   Commonly known as:  PLAVIX   Take 1 tablet (75 mg) by mouth daily                                cyclobenzaprine 10 MG tablet   Commonly known as:  FLEXERIL   TAKE 1 TAB BY MOUTH TWICE DAILY AS NEEDED FOR ROTATOR CUFF TEAR                                ferrous sulfate 325 (65 Fe) MG tablet   Commonly known as:  IRON   Take 1 tablet (325 mg) by mouth daily (with breakfast)                                furosemide 40 MG tablet   Commonly known as:  LASIX   Take 1 tablet (40 mg) by mouth daily                                * GLUCOCARD EXPRESSION MONITOR w/Device Kit   USE TWICE DAILY TO TEST BLOOD GLUCOSE                                * blood glucose monitoring meter device kit   Commonly known as:  no brand specified   test blood sugar 2 xdaily or as directed.  Blood Glucose Monitor Brands: per insurance.                                HYDROcodone-acetaminophen 5-325 MG per tablet   Commonly known as:  NORCO   Take 1 tablet by mouth every 6 hours as needed for severe pain                                JANUMET  MG per tablet   Take 1 tablet by mouth daily   Generic drug:  sitagliptin-metFORMIN                                lidocaine 5 % ointment   Commonly known as:  XYLOCAINE   Apply topically daily                                losartan 25 MG tablet   Commonly known as:  COZAAR   Take 1 tablet (25 mg) by  mouth daily                                methocarbamol 500 MG tablet   Commonly known as:  ROBAXIN   Take 1 tablet (500 mg) by mouth 4 times daily as needed for muscle spasms                                metoprolol tartrate 25 MG tablet   Commonly known as:  LOPRESSOR   Take 1 tablet (25 mg) by mouth 2 times daily                                order for DME   One touch glucose monitoring strip with Glucometer and lancets.                                order for DME   Equipment being ordered: compression stockings below knee and above knee if available.  Medium compression.                                order for DME   Equipment being ordered: Needs to have a sleep chair for home use.                                order for DME   Compression garments toe to knee. ?Specific brand is Compraflex Light Compression garment.  Diagnosis codes for venous stasis ulcer I83.029 and for ?Lymphoedema is I89.0  Goal is for compression of about 30 to 40 mm of mercury  measurements are right ankle 28.5 cm and right calf is 45.5 cm. ?Left ankle is 27.5 cm and the calf is 45 cm  Tall for length.                                order for DME   Equipment being ordered: electric chair for sleeping and adjustment to allow for elevation of legs above the heart.  Zero gravity type heavy duty sleep chair advised.                                pantoprazole 40 MG EC tablet   Commonly known as:  PROTONIX   Take 1 tablet (40 mg) by mouth daily Take 30-60 minutes before a meal.                                rosuvastatin 10 MG tablet   Commonly known as:  CRESTOR   Take 1 tablet (10 mg) by mouth daily                                senna-docusate 8.6-50 MG per tablet   Commonly known as:  SENOKOT-S;PERICOLACE   Take 1 tablet by mouth 2 times daily as needed for constipation                                thin lancets   Commonly known as:  NO BRAND SPECIFIED   Test 2 x daily or as directed.  Brands: per insurance.                                 TRIAD HYDROPHILIC WOUND DRESSI Pste   Externally apply 1 g topically daily                                * Notice:  This list has 4 medication(s) that are the same as other medications prescribed for you. Read the directions carefully, and ask your doctor or other care provider to review them with you.

## 2018-10-15 NOTE — IP AVS SNAPSHOT
[address] [address]    Phone:  [phone]    Fax:  [phone]                                       After Visit Summary   10/15/2018    Glenroy Padilla    MRN: [id number]           After Visit Summary Signature Page     I have received my discharge instructions, and my questions have been answered. I have discussed any challenges I see with this plan with the nurse or doctor.    ..........................................................................................................................................  Patient/Patient Representative Signature      ..........................................................................................................................................  Patient Representative Print Name and Relationship to Patient    ..................................................               ................................................  Date                                   Time    ..........................................................................................................................................  Reviewed by Signature/Title    ...................................................              ..............................................  Date                                               Time          22EPIC Rev 08/18

## 2018-10-15 NOTE — PROGRESS NOTES
Reason For Visit: Glenroy Padilla, 67 year old male, seen as outpatient to re evaluate and treat left lower leg ulcers. Referred by Dr Shine. Patient presents by himself today.        History: Pt who was referred to me initially back in June of 2017 by Dr Shine for ongoing wound cares.  He returned to his home in [place] soon after and returned to [place] later in 2017.  Pt has had open ulcer of the left lower leg, original injury was related to trauma.  Course of healing was slow due to venous insuffiencey.  When wound was nearly closed new calcium deposits were found and Dr Shine removed these.  He was referred back to me for on going wound care, initial visit this time being 6/21/18.       Personal/social history: Pt is currently living in an AL in [place] with home care from a non  provider.        Objective:   Physical appearance: alert and oriented times three  Ambulation: limited, using electric scooter, able to take few steps to transfer.    Current treatment plan: Medihoney with alginate.   Last changed: yesterday      Wound #1 Left lower leg.  Venous stasis ulcer.  Stage/tissue depth: full thickness  0.4 cm L x 0.5 cm W x 0 cm D  Tunneling: no  Undermining: no  Wound bed type/amount: 100 % granular tissue; NA fluctuant  Wound Edges: flush with surrounding skin  Periwound: hemosiderin staining, nonpitting edema and scar tissue.  Drainage: none to small  Odor: no  Pain: with direct cares small amount of pain still noted      Dorsalis Pedal Pulse: weak but palpable: NA doppler: NA phasic  Hair growth: none noted below the knees bilaterally  Capillary Refill: less than 3 seconds  Feet/toes color: pink with some hemosiderin staining  Nails: thick but wnl  R Leg: Edema nonpitting. Ankle circumference NA cm. Calf circumference NA cm.  L Leg: Edema nonpitting. Ankle circumference Na cm. Calf circumference NA cm.      Mobility: limited   Current offloading/footwear: well fitting sneakers  Sensation: peripheral  neuropathy in the legs and feet has resolved since his spinal surgery.    HgbA1C: 6.0 Date: 4/20/18  Checks Blood Glucose?:  Once daily, Average Readings: 150s per pt  Other callousing/areas of concern: none noted today      Diet: regular with awareness of effects on diabetes control.  Smoking: former cigar smoker      Discussed: etiology of wound (venous stasis ulcers), pathophysiology and patient specific goals for wound healing.   Education: Wound status, role of compression in wound healing and wound prevention, use of Ready Wrap compression garments. New wound care primary dressing changed over to just dry gauze promote closure.       Assessment:  Only remaining open aspect is located on the most proximal aspect of the original wound, was covered on arrival with a thick moist black scab.  With cleansing and friction the scab was removed and exposed clean granular tissue.  No signs of infection.  Rest of the old wound is fully scar or epithelialized depending on depth in each location.  The inferior most aspect of the w      Barriers to wound healing:   Poor nutrition: inadequate supply of protein, carbohydrates, fatty acids, and trace elements essential for all phases of wound healing.  Pt aware of the need for increased protein in diet for healing.   Reduced Blood Supply: inadequate perfusion to heal wound, NA  Medication: NA  Chemotherapy: suppresses the immune system and inflammatory response, NA  Radiotherapy: increases production of free radical which damage cells, NA  Psychological stress: Not assessed this visit  Obesity: decreases tissue perfusion  Infection: prolongs inflammatory phase, uses vital nutrients, impairs epithelialization and releases toxins.  None noted at this time, we did start a different type of antimicrobial dressing today.     Underlying Disease: diabetes mellitis and autoimmune disorders.    Maceration: reduces wound tensile strength and inhibits epithelial migration.  None noted at  today.  Will monitor at each visit.  Started the use of a more absorbent primary dressing today.    Patient compliance, NA  Unrelieved pressure, NA  Immobility, NA  Substance abuse: NA      Plan:   Change in plan of care to just cover the wound with dry gauze and change every other day..Do no use telfa as will not absorb as needed.  Goal is to allow for new thinner scab or for full scar covering from the edges with the use of pain regular absorbent gauze.       Topical care: dry gauze  Offloading: NA  Additional recommendations: none at this time.      Wound Care: cleansed with Microklenz and gauze, patted dry. Periwound protected with NA. Wound base filled with NA. Covered with gauze pads, followed by NA. Secured with roll gauze and tape. To be changed every other day.        Discussed plan of care with patient. Teaching done with patient for dressing changes; pt is unable, home care is able and willing to perform.      The following discharge instructions were reviewed with and sent home with the patient: See discharge instructions.      The following supplies were sent home with the patient: None this visit      Will reassess care plan in 4 weeks and order supplies as needed.        Return visit: Nov the 12 th Monday      Verbal, written, & demonstrative education provided.  Face to face time (excluding procedure): approximately 35 minutes.  Procedure: NA  Care plan was changed.      [phone]   Family/Transporter

## 2018-10-16 ENCOUNTER — TELEPHONE (OUTPATIENT)
Dept: FAMILY MEDICINE | Facility: OTHER | Age: 68
End: 2018-10-16

## 2018-10-17 ENCOUNTER — TELEPHONE (OUTPATIENT)
Dept: FAMILY MEDICINE | Facility: OTHER | Age: 68
End: 2018-10-17

## 2018-10-17 NOTE — TELEPHONE ENCOUNTER
Reason for Call:  Form, our goal is to have forms completed with 72 hours, however, some forms may require a visit or additional information.    Type of letter, form or note:  medical    Who is the form from?: [employer] (if other please explain)    Where did the form come from: form was faxed in    What clinic location was the form placed at?: [facility] - [phone]    Where the form was placed: 's Box    What number is listed as a contact on the form?: fax [phone]       Additional comments: please complete and fax    Call taken on 10/17/2018 at 1:18 PM by Olivia Mayfield

## 2018-10-18 ENCOUNTER — MEDICAL CORRESPONDENCE (OUTPATIENT)
Dept: HEALTH INFORMATION MANAGEMENT | Facility: CLINIC | Age: 68
End: 2018-10-18

## 2018-10-18 NOTE — TELEPHONE ENCOUNTER
Documentation is on Dr. Copeland's desk for cosignature.  Electronically signed:    Julio Cesar Sahu PA-C

## 2018-10-19 ENCOUNTER — TELEPHONE (OUTPATIENT)
Dept: SURGERY | Facility: CLINIC | Age: 68
End: 2018-10-19

## 2018-10-19 NOTE — TELEPHONE ENCOUNTER
[facility] home care forms signed and faxed back, copy sent to scanning.............................Hui Ashraf CMA  ([facility])

## 2018-10-23 ENCOUNTER — TELEPHONE (OUTPATIENT)
Dept: FAMILY MEDICINE | Facility: OTHER | Age: 68
End: 2018-10-23

## 2018-10-23 NOTE — TELEPHONE ENCOUNTER
Reason for Call:  Form, our goal is to have forms completed with 72 hours, however, some forms may require a visit or additional information.    Type of letter, form or note:  medical    Who is the form from?: Home care    Where did the form come from: form was faxed in    What clinic location was the form placed at?: [facility] - [phone]    Where the form was placed: [facility]    What number is listed as a contact on the form?: fax [phone]       Additional comments: please complete and fax     Call taken on 10/23/2018 at 1:49 PM by Olivia Mayfield

## 2018-10-24 ENCOUNTER — MEDICAL CORRESPONDENCE (OUTPATIENT)
Dept: HEALTH INFORMATION MANAGEMENT | Facility: CLINIC | Age: 68
End: 2018-10-24

## 2018-10-25 ENCOUNTER — TELEPHONE (OUTPATIENT)
Dept: FAMILY MEDICINE | Facility: OTHER | Age: 68
End: 2018-10-25

## 2018-10-25 NOTE — TELEPHONE ENCOUNTER
Reason for Call:  Form, our goal is to have forms completed with 72 hours, however, some forms may require a visit or additional information.    Type of letter, form or note:  medical    Who is the form from?: [facility] (if other please explain)    Where did the form come from: form was faxed in    What clinic location was the form placed at?: [facility] - [phone]    Where the form was placed: [facility]'s [facility]    What number is listed as a contact on the form?: [phone]       Additional comments: n/a    Call taken on 10/25/2018 at 10:19 AM by Diamond Juarez

## 2018-11-05 ENCOUNTER — TELEPHONE (OUTPATIENT)
Dept: FAMILY MEDICINE | Facility: OTHER | Age: 68
End: 2018-11-05

## 2018-11-05 NOTE — TELEPHONE ENCOUNTER
Called the designated caregiver back and advised that I approve of the visit.  Electronically signed:    Julio Cesar Sahu PA-C

## 2018-11-05 NOTE — TELEPHONE ENCOUNTER
Reason for Call:  Other call back    Detailed comments: Stephanie from [facility] calling, needing a verbal order for Glenroy to be seen by her tomorrow. There were orders placed, but they have . Please advise.     Phone Number Patient can be reached at: Other phone number:  [phone]*    Best Time: any     Can we leave a detailed message on this number? YES    Call taken on 2018 at 2:48 PM by Maggie Morgan

## 2018-11-08 ENCOUNTER — TELEPHONE (OUTPATIENT)
Dept: FAMILY MEDICINE | Facility: OTHER | Age: 68
End: 2018-11-08

## 2018-11-08 ENCOUNTER — MEDICAL CORRESPONDENCE (OUTPATIENT)
Dept: HEALTH INFORMATION MANAGEMENT | Facility: CLINIC | Age: 68
End: 2018-11-08

## 2018-11-08 NOTE — TELEPHONE ENCOUNTER
Reason for Call:  Form, our goal is to have forms completed with 72 hours, however, some forms may require a visit or additional information.    Type of letter, form or note:  medical    Who is the form from?: [employer] [employer] (if other please explain)    Where did the form come from: form was faxed in    What clinic location was the form placed at?: [facility] - [phone]    Where the form was placed: 's Box    What number is listed as a contact on the form?: fax [phone]       Additional comments: please complete and fax    Call taken on 11/8/2018 at 9:03 AM by Olivia Mayfield

## 2018-11-08 NOTE — TELEPHONE ENCOUNTER
Reason for Call:  Form, our goal is to have forms completed with 72 hours, however, some forms may require a visit or additional information.    Type of letter, form or note:  medical    Who is the form from?: [facility] (if other please explain)    Where did the form come from: form was faxed in    What clinic location was the form placed at?: [facility] - [phone]    Where the form was placed: Dr's Box    What number is listed as a contact on the form?: [phone]       Additional comments: n/a    Call taken on 11/8/2018 at 2:43 PM by Diamond Juarez

## 2018-11-09 ENCOUNTER — MEDICAL CORRESPONDENCE (OUTPATIENT)
Dept: HEALTH INFORMATION MANAGEMENT | Facility: CLINIC | Age: 68
End: 2018-11-09

## 2018-11-09 NOTE — TELEPHONE ENCOUNTER
Pt calling and has questions in regards to his MRI and a follow-up appt, please call pt at#[phone]. Thank You Beverly

## 2018-11-09 NOTE — TELEPHONE ENCOUNTER
Returned call and spoke to patient. Per notes, Martha MOTTA recommends patient f/u with Dr. Mendieta to review recent MRI and discuss plan. Patient agrees. Appt scheduled.     Patient also requesting a letter from Martha in support of a zero-gravity chair to help manage back and leg pain. Discussed with Martha and she approves. Will fax letter to patient's care coordinator, Tanja #[phone].

## 2018-11-12 ENCOUNTER — TELEPHONE (OUTPATIENT)
Dept: FAMILY MEDICINE | Facility: OTHER | Age: 68
End: 2018-11-12

## 2018-11-12 NOTE — TELEPHONE ENCOUNTER
Received faxed from [employer] Care Coordinator at [facility].     Patient is requesting a 0 gravity chair. They are asking if you can call or write a letter detailing the medical necessity of this item for patient. Asking to include if it mus be a 0 gravity chair or if another item would also meet his needs. If letter written they are request it be emailed to [email or web address]. Fax placed at [employer]'s desk for review.     Ravi Owen,

## 2018-11-12 NOTE — TELEPHONE ENCOUNTER
"Patient needs to have an assessment with physical therapy before I can write any type of letter related to his \"need for a 0 gravity chair\".  I cannot make a judgment based on his current level of evidence for a 0 gravity chair.  Electronically signed:    Julio Cesar Sahu PA-C   "

## 2018-11-13 ENCOUNTER — TELEPHONE (OUTPATIENT)
Dept: FAMILY MEDICINE | Facility: OTHER | Age: 68
End: 2018-11-13

## 2018-11-13 NOTE — TELEPHONE ENCOUNTER
Reason for Call:  Form, our goal is to have forms completed with 72 hours, however, some forms may require a visit or additional information.    Type of letter, form or note:  medical    Who is the form from?: [facility] (if other please explain)    Where did the form come from: form was faxed in    What clinic location was the form placed at?: [facility] - [phone]    Where the form was placed: Dr's Box    What number is listed as a contact on the form?: [phone]       Additional comments: n/a    Call taken on 11/13/2018 at 2:15 PM by Diamond Juarez

## 2018-11-14 ENCOUNTER — TELEPHONE (OUTPATIENT)
Dept: FAMILY MEDICINE | Facility: OTHER | Age: 68
End: 2018-11-14

## 2018-11-14 DIAGNOSIS — K21.9 GASTROESOPHAGEAL REFLUX DISEASE WITHOUT ESOPHAGITIS: ICD-10-CM

## 2018-11-14 DIAGNOSIS — G89.29 CHRONIC PAIN OF LEFT KNEE: ICD-10-CM

## 2018-11-14 DIAGNOSIS — Z95.5 HISTORY OF CORONARY ARTERY STENT PLACEMENT: ICD-10-CM

## 2018-11-14 DIAGNOSIS — M25.562 CHRONIC PAIN OF LEFT KNEE: ICD-10-CM

## 2018-11-14 DIAGNOSIS — G47.33 OSA (OBSTRUCTIVE SLEEP APNEA): ICD-10-CM

## 2018-11-14 DIAGNOSIS — E11.59 TYPE 2 DIABETES MELLITUS WITH OTHER CIRCULATORY COMPLICATION, WITHOUT LONG-TERM CURRENT USE OF INSULIN (H): ICD-10-CM

## 2018-11-14 DIAGNOSIS — E66.01 MORBID OBESITY DUE TO EXCESS CALORIES (H): ICD-10-CM

## 2018-11-14 NOTE — TELEPHONE ENCOUNTER
Reason for Call:  Other prescription    Detailed comments: pt states ahmet delaney wrote a prescription order for sleep chair but his coordinator was unable to get it done and now its . Pt needing new order for this sleep chair as his coordinator can now take care of it. Pt states has letter of recommendations supporting prescription. Please advise     Pt needs it sent to the [facility] nurse teresa       Phone Number Patient can be reached at: Home number on file [phone] (home)    Best Time: ANY    Can we leave a detailed message on this number? YES    Call taken on 2018 at 11:12 AM by Riana Price

## 2018-11-15 ENCOUNTER — OFFICE VISIT (OUTPATIENT)
Dept: NEUROSURGERY | Facility: OTHER | Age: 68
End: 2018-11-15
Payer: COMMERCIAL

## 2018-11-15 VITALS
BODY MASS INDEX: 43.07 KG/M2 | SYSTOLIC BLOOD PRESSURE: 110 MMHG | HEIGHT: 70 IN | DIASTOLIC BLOOD PRESSURE: 58 MMHG | TEMPERATURE: 98.1 F

## 2018-11-15 DIAGNOSIS — M48.00 CENTRAL SPINAL STENOSIS: Primary | ICD-10-CM

## 2018-11-15 PROCEDURE — 99213 OFFICE O/P EST LOW 20 MIN: CPT | Performed by: PHYSICIAN ASSISTANT

## 2018-11-15 ASSESSMENT — PAIN SCALES - GENERAL: PAINLEVEL: MILD PAIN (2)

## 2018-11-15 NOTE — MR AVS SNAPSHOT
"              After Visit Summary   11/15/2018    Glenroy Padilla    MRN: [id number]           Patient Information     Date Of Birth          1950        Visit Information        Provider Department      11/15/2018 9:00 AM Martha Reddy PA-C [facility]        Today's Diagnoses     Central spinal stenosis L3-4 and L4-5    -  1       Follow-ups after your visit        Your next 10 appointments already scheduled     Nov 19, 2018  1:30 PM CST   [facility]    [address] [place] [id number] [phone]              Who to contact     If you have questions or need follow up information about today's clinic visit or your schedule please contact [facility] directly at [phone].  Normal or non-critical lab and imaging results will be communicated to you by MyChart, letter or phone within 4 business days after the clinic has received the results. If you do not hear from us within 7 days, please contact the clinic through MyChart or phone. If you have a critical or abnormal lab result, we will notify you by phone as soon as possible.  Submit refill requests through [employer] or call your pharmacy and they will forward the refill request to us. Please allow 3 business days for your refill to be completed.          Additional Information About Your Visit        Care EveryWhere ID     This is your Care EveryWhere ID. This could be used by other organizations to access your [facility] medical records  [id number]        Your Vitals Were     Temperature Height BMI (Body Mass Index)             98.1  F (36.7  C) (Oral) 5' 10\" (1.778 m) 43.07 kg/m2          Blood Pressure from Last 3 Encounters:   11/15/18 110/58   10/11/18 107/76   09/27/18 120/76    Weight from Last 3 Encounters:   10/11/18 300 lb 3.2 oz (136.2 kg)   09/27/18 294 lb 9.6 oz (133.6 kg)   09/20/18 313 lb (142 kg)            "   Today, you had the following     No orders found for display         Today's Medication Changes          These changes are accurate as of 11/15/18  3:36 PM.  If you have any questions, ask your nurse or doctor.               These medicines have changed or have updated prescriptions.        Dose/Directions    pantoprazole 40 MG EC tablet   Commonly known as:  PROTONIX   This may have changed:    - when to take this  - reasons to take this  - additional instructions   Used for:  Gastroesophageal reflux disease without esophagitis        Dose:  40 mg   Take 1 tablet (40 mg) by mouth daily Take 30-60 minutes before a meal.   Quantity:  30 tablet   Refills:  0                Primary Care Provider Office Phone # Fax #    Julio Cesar Esteban PA-C [phone] [address] [facility] [address]        Equal Access to Services     JAZ TOABR : Pietro Omalley, waaxda luqadaha, qaybta kaalmada duke, adams horton. So [facility] [phone].    ATENCIÓN: Si habla español, tiene a wilkinson disposición servicios gratuitos de asistencia lingüística. [facility] [phone].    We comply with applicable federal civil rights laws and [place] laws. We do not discriminate on the basis of race, color, national origin, age, disability, sex, sexual orientation, or gender identity.            Thank you!     Thank you for choosing [facility] [place]  for your care. Our goal is always to provide you with excellent care. Hearing back from our patients is one way we can continue to improve our services. Please take a few minutes to complete the written survey that you may receive in the mail after your visit with us. Thank you!             Your Updated Medication List - Protect others around you: Learn how to safely use, store and throw away your medicines at www.disposemymeds.org.          This list is accurate as of 11/15/18  3:36 PM.  Always use your most recent med list.                    Brand Name Dispense Instructions for use Diagnosis    acetaminophen 500 MG tablet    TYLENOL    120 tablet    Take 1-2 tablets (500-1,000 mg) by mouth every 6 hours as needed for mild pain    Chronic pain of left knee, Complete tear of left rotator cuff       blood glucose calibration solution    NO BRAND SPECIFIED    1 Bottle    To accompany: Blood Glucose Monitor Brands: per insurance.    Type 2 diabetes mellitus with other circulatory complication, without long-term current use of insulin (H)       blood glucose lancing device    no brand specified    1 each    Use to test blood sugars 2 times daily or as directed.    Type 2 diabetes mellitus with other circulatory complication, without long-term current use of insulin (H)       * blood glucose monitoring test strip    no brand specified    100 strip    Use to test blood sugar 2x  daily or as directed. To accompany: Blood Glucose Monitor Brands: per insurance.    Type 2 diabetes mellitus with other circulatory complication, without long-term current use of insulin (H)       * blood glucose monitoring test strip    no brand specified    100 strip    Use to test blood sugar 2 times daily Blood Glucose Monitor Brands: Glucocard Expression    Type 2 diabetes mellitus with other circulatory complication, without long-term current use of insulin (H)       clopidogrel 75 MG tablet    PLAVIX    90 tablet    Take 1 tablet (75 mg) by mouth daily    Type 2 diabetes mellitus with other circulatory complication, without long-term current use of insulin (H), S/P spinal surgery, Hypertension, goal below 140/90, Hyperlipidemia LDL goal <100       cyclobenzaprine 10 MG tablet    FLEXERIL     TAKE 1 TAB BY MOUTH TWICE DAILY AS NEEDED FOR ROTATOR CUFF TEAR        ferrous sulfate 325 (65 Fe) MG tablet    IRON    90 tablet    Take 1 tablet (325 mg) by mouth daily (with breakfast)    History of anemia       furosemide 40 MG tablet    LASIX    90 tablet    Take 1  tablet (40 mg) by mouth daily    Hypertension, goal below 140/90       * GLUCOCARD EXPRESSION MONITOR w/Device Kit      USE TWICE DAILY TO TEST BLOOD GLUCOSE        * blood glucose monitoring meter device kit    no brand specified    1 kit    test blood sugar 2 xdaily or as directed.  Blood Glucose Monitor Brands: per insurance.    Type 2 diabetes mellitus with other circulatory complication, without long-term current use of insulin (H)       HYDROcodone-acetaminophen 5-325 MG per tablet    NORCO     Take 1 tablet by mouth every 6 hours as needed for severe pain        JANUMET  MG per tablet   Generic drug:  sitagliptin-metFORMIN      Take 1 tablet by mouth daily        lidocaine 5 % ointment    XYLOCAINE    50 g    Apply topically daily    Chronic pain of right knee       losartan 25 MG tablet    COZAAR    90 tablet    Take 1 tablet (25 mg) by mouth daily    Hypertension, goal below 140/90, Type 2 diabetes mellitus with other circulatory complication, without long-term current use of insulin (H)       methocarbamol 500 MG tablet    ROBAXIN    70 tablet    Take 1 tablet (500 mg) by mouth 4 times daily as needed for muscle spasms    S/P spinal surgery       metoprolol tartrate 25 MG tablet    LOPRESSOR    180 tablet    Take 1 tablet (25 mg) by mouth 2 times daily    Type 2 diabetes mellitus with other circulatory complication, without long-term current use of insulin (H), History of coronary artery stent placement, Hypertension, goal below 140/90       order for DME     1 Box    One touch glucose monitoring strip with Glucometer and lancets.    Type 2 diabetes mellitus with other circulatory complication, without long-term current use of insulin (H)       order for DME     1 Units    Equipment being ordered: compression stockings below knee and above knee if available.  Medium compression.    Primary osteoarthritis of right knee, Lymphedema of right lower extremity       order for DME     2 Device     Compression garments toe to knee. ?Specific brand is Compraflex Light Compression garment.  Diagnosis codes for venous stasis ulcer I83.029 and for ?Lymphoedema is I89.0  Goal is for compression of about 30 to 40 mm of mercury  measurements are right ankle 28.5 cm and right calf is 45.5 cm. ?Left ankle is 27.5 cm and the calf is 45 cm  Tall for length.    Neuropathy, Lymphedema of right lower extremity, Lymphedema       order for DME     1 Units    Equipment being ordered: electric chair for sleeping and adjustment to allow for elevation of legs above the heart.  Zero gravity type heavy duty sleep chair advised.    Screening for diabetic peripheral neuropathy, History of coronary artery stent placement, Hypertension, goal below 140/90, Open wound of left lower extremity without complication, initial encounter, Type 2 diabetes mellitus with other circulatory complication, without long-term current use of insulin (H), Weakness of both legs, Iron deficiency anemia secondary to inadequate dietary iron intake, DAYTON (obstructive sleep apnea), Central spinal stenosis, Foraminal stenosis of lumbar region       order for DME     1 Device    Equipment being ordered: Needs to have a sleep chair for home use.    Type 2 diabetes mellitus with other circulatory complication, without long-term current use of insulin (H), Morbid obesity due to excess calories (H), History of coronary artery stent placement, Gastroesophageal reflux disease without esophagitis, Chronic pain of left knee, DAYTON (obstructive sleep apnea)       pantoprazole 40 MG EC tablet    PROTONIX    30 tablet    Take 1 tablet (40 mg) by mouth daily Take 30-60 minutes before a meal.    Gastroesophageal reflux disease without esophagitis       rosuvastatin 10 MG tablet    CRESTOR    90 tablet    Take 1 tablet (10 mg) by mouth daily    Hyperlipidemia LDL goal <100       senna-docusate 8.6-50 MG per tablet    SENOKOT-S;PERICOLACE    100 tablet    Take 1 tablet by mouth 2  times daily as needed for constipation    Spinal stenosis, lumbar region, without neurogenic claudication       thin lancets    NO BRAND SPECIFIED    100 each    Test 2 x daily or as directed.  Brands: per insurance.    Type 2 diabetes mellitus with other circulatory complication, without long-term current use of insulin (H)       TRIAD HYDROPHILIC WOUND DRESSI Pste     1 Tube    Externally apply 1 g topically daily    Open wound of right lower leg, initial encounter       * Notice:  This list has 4 medication(s) that are the same as other medications prescribed for you. Read the directions carefully, and ask your doctor or other care provider to review them with you.

## 2018-11-15 NOTE — TELEPHONE ENCOUNTER
Called and informed patient this has been completed. Patient requested I fax this to his assisted living center.   Called [phone] and they gave me the fax number [phone].  Patient stated to put it attention to nurse moore.     Ravi Owen,

## 2018-11-15 NOTE — PROGRESS NOTES
"[facility]  Neurosurgery followup:    HPI: 6 months s/p T5-9 laminectomies and resection of epidural lipomatosis. Has been doing well post op and states he is very happy how well he did from the surgery. States his nerve pain is also improving significantly and he can now feel the bottoms of his feet. He is still awaiting to undergo TKAs. He has been up ambulating and feels as though this is getting better but his knees do limit him. He does have low back pain radiating to left lateral thigh and has been having issues standing up straight. Denies weakness.    Returns today for follow up and to review MRI results. He states he has been feeling well and has not had any recent back or leg pain, besides his baseline right knee pain. He has been getting injections for his knees in order to control the pain prior to awaiting TKAs. He states his ambulation has been \"as good as its going to get until he gets his knee surgery\". No weakness.   Exam:  Constitutional:  Alert, well nourished, NAD.  HEENT: Normocephalic, atraumatic.   Pulm:  Without shortness of breath   CV:  No pitting edema of BLE.      Neurological:  Awake  Alert  Oriented x 3  Motor exam:        IP Q DF PF EHL  R   5  5   5   5    5  L   5  5   5   5    5     Reflexes are 2+ in the patellar and Achilles. There is no clonus. Downgoing Babinski.    Able to spontaneously move L/E bilaterally  Sensation intact throughout all L/E dermatomes     Imaging: Lumbar MRI from 10/11/2018 reviewed in office today.  A/P: Returns today for follow up and to review MRI results. He states he has been feeling well and has not had any recent back or leg pain, besides his baseline right knee pain. MRI reviewed in office today with moderate-severe central stenosis at L4-5. No weakness on exam. We did discuss beginning dedicated lumbar PT, but he wishes to hold off at this time as his back has not been bothersome and he wants to focus on his knees right now. He would " like to start PT after he undergoes knee surgery. We did also discuss possibility of caudal TANO if he develops radicular symptoms. Advised to contact our office if he wishes for either of these orders to be placed or he develops worsening pain. Patient voiced understanding and agreement.      Martha Reddy PA-C  [facility]  [address] [address]    Tel [phone]  Pager [phone]

## 2018-11-15 NOTE — NURSING NOTE
"Glenroy Padilla is a 68 year old male who presents for:  Chief Complaint   Patient presents with     Neurologic Problem     per Martha- follow up with Dr. Mendieta to review MRI and plan.MRI -10/11        Initial Vitals:  /58  Temp 98.1  F (36.7  C) (Oral)  Ht 5' 10\" (1.778 m)  BMI 43.07 kg/m2 Estimated body mass index is 43.07 kg/(m^2) as calculated from the following:    Height as of this encounter: 5' 10\" (1.778 m).    Weight as of 10/11/18: 300 lb 3.2 oz (136.2 kg).. Body surface area is 2.59 meters squared. BP completed using cuff size: large  Mild Pain (2)    Do you feel safe in your environment?  Yes  Do you need any refills today? No    Nursing Comments:         Meme Beth    "

## 2018-11-15 NOTE — LETTER
"    11/15/2018         RE: Glenroy Padilla  [address] [facility] [address]        Dear Colleague,    Thank you for referring your patient, Glenroy Padilla, to the [facility]. Please see a copy of my visit note below.    [facility]  Neurosurgery followup:    HPI: 6 months s/p T5-9 laminectomies and resection of epidural lipomatosis. Has been doing well post op and states he is very happy how well he did from the surgery. States his nerve pain is also improving significantly and he can now feel the bottoms of his feet. He is still awaiting to undergo TKAs. He has been up ambulating and feels as though this is getting better but his knees do limit him. He does have low back pain radiating to left lateral thigh and has been having issues standing up straight. Denies weakness.    Returns today for follow up and to review MRI results. He states he has been feeling well and has not had any recent back or leg pain, besides his baseline right knee pain. He has been getting injections for his knees in order to control the pain prior to awaiting TKAs. He states his ambulation has been \"as good as its going to get until he gets his knee surgery\". No weakness.   Exam:  Constitutional:  Alert, well nourished, NAD.  HEENT: Normocephalic, atraumatic.   Pulm:  Without shortness of breath   CV:  No pitting edema of BLE.      Neurological:  Awake  Alert  Oriented x 3  Motor exam:        IP Q DF PF EHL  R   5  5   5   5    5  L   5  5   5   5    5     Reflexes are 2+ in the patellar and Achilles. There is no clonus. Downgoing Babinski.    Able to spontaneously move L/E bilaterally  Sensation intact throughout all L/E dermatomes     Imaging: Lumbar MRI from 10/11/2018 reviewed in office today.  A/P: Returns today for follow up and to review MRI results. He states he has been feeling well and has not had any recent back or leg pain, besides his baseline right knee pain. MRI reviewed in office " today with moderate-severe central stenosis at L4-5. No weakness on exam. We did discuss beginning dedicated lumbar PT, but he wishes to hold off at this time as his back has not been bothersome and he wants to focus on his knees right now. He would like to start PT after he undergoes knee surgery. We did also discuss possibility of caudal TANO if he develops radicular symptoms. Advised to contact our office if he wishes for either of these orders to be placed or he develops worsening pain. Patient voiced understanding and agreement.      Martha Reddy PA-C  [facility]  [address] [address]    Tel [phone]  Pager [phone]      Again, thank you for allowing me to participate in the care of your patient.        Sincerely,        Martha Reddy PA-C

## 2018-11-16 ENCOUNTER — TELEPHONE (OUTPATIENT)
Dept: NEUROSURGERY | Facility: OTHER | Age: 68
End: 2018-11-16

## 2018-11-16 ENCOUNTER — TELEPHONE (OUTPATIENT)
Dept: FAMILY MEDICINE | Facility: OTHER | Age: 68
End: 2018-11-16

## 2018-11-16 ENCOUNTER — MEDICAL CORRESPONDENCE (OUTPATIENT)
Dept: HEALTH INFORMATION MANAGEMENT | Facility: CLINIC | Age: 68
End: 2018-11-16

## 2018-11-16 NOTE — TELEPHONE ENCOUNTER
Returned patient's call. Spoke to patient. Patient is requesting a zero gravity sleep chair (XL Durant chair) and forgot to mention this yesterday. Patient stated that his PCP recommended Rx be written by NSG team for chair.     Letter supporting the device and DME order has already been done. Called and spoke to Leigh SINCLAIR at [facility] ([phone]) to verify she received everything. She said yes she got everything she needed. She will call if needing anything else.

## 2018-11-16 NOTE — TELEPHONE ENCOUNTER
Reason for Call:  Other     Detailed comments: pt states returning call from Martha Reddy team. I wasn't able to find an encounter as to what it was regarding. Please advise and call pt back     Phone Number Patient can be reached at: Home number on file [phone] (home)    Best Time: ANY    Can we leave a detailed message on this number? YES    Call taken on 11/16/2018 at 10:08 AM by Riana Price

## 2018-11-16 NOTE — TELEPHONE ENCOUNTER
Reason for Call:  Form, our goal is to have forms completed with 72 hours, however, some forms may require a visit or additional information.    Type of letter, form or note:  medical    Who is the form from?: [facility] (if other please explain)    Where did the form come from: form was faxed in    What clinic location was the form placed at?: [facility] - [phone]    Where the form was placed: Dr's Box    What number is listed as a contact on the form?: [phone]       Additional comments: n/a    Call taken on 11/16/2018 at 1:43 PM by Diamond Juarez

## 2018-11-19 ENCOUNTER — OFFICE VISIT (OUTPATIENT)
Dept: SURGERY | Facility: CLINIC | Age: 68
End: 2018-11-19
Payer: COMMERCIAL

## 2018-11-19 ENCOUNTER — TELEPHONE (OUTPATIENT)
Dept: FAMILY MEDICINE | Facility: OTHER | Age: 68
End: 2018-11-19

## 2018-11-19 VITALS
WEIGHT: 306.7 LBS | BODY MASS INDEX: 44.01 KG/M2 | SYSTOLIC BLOOD PRESSURE: 122 MMHG | HEART RATE: 90 BPM | DIASTOLIC BLOOD PRESSURE: 70 MMHG

## 2018-11-19 DIAGNOSIS — S81.802D OPEN WOUND OF LEFT LOWER EXTREMITY WITHOUT COMPLICATION, SUBSEQUENT ENCOUNTER: Primary | ICD-10-CM

## 2018-11-19 DIAGNOSIS — Z53.9 DIAGNOSIS NOT YET DEFINED: Primary | ICD-10-CM

## 2018-11-19 DIAGNOSIS — R60.0 EDEMA OF BOTH LEGS: ICD-10-CM

## 2018-11-19 PROCEDURE — G0179 MD RECERTIFICATION HHA PT: HCPCS | Performed by: FAMILY MEDICINE

## 2018-11-19 PROCEDURE — 99213 OFFICE O/P EST LOW 20 MIN: CPT | Performed by: SPECIALIST

## 2018-11-19 NOTE — TELEPHONE ENCOUNTER
Forms are placed in Dr. Copeland in basket for cosignature.  Electronically signed:    Julio Cesar Sahu PA-C

## 2018-11-19 NOTE — LETTER
11/19/2018         RE: Glenroy Padilla  [address] [id number]        Dear Colleague,    Thank you for referring your patient, Glenroy Padilla, to the [facility]. Please see a copy of my visit note below.    F/U for leg wound    Subjective:  Patient is a 67-year-old white male presenting history of a left leg wound. He hit a coffee table  the wound is slow to heal. He was initially treated in [place] and has a 15 year history of bilateral lower extremity edema. He has never been treated with any form of compression therapy and was told not to based on an ultrasound results in [place]. Those results are not available to me at this time. He denies any prior DVT, nonhealing wounds, leg trauma, claudication or rest pain. He was told in [place] to treat the leg with Betadine. [place] the [place] second opinion.      Last seen by me in June and was tx with UNNA boot and silvercel.  Returned to [place] and was treated with hydroferra blue.       Had UNNA boot on when was last seen.  Has been in Assisted living since last seen by me in January for leg weakness.  TX with TRIAD, Foam and UNNA.   Laceration on right healed.    Reports wound doing well.  Has been using redi-wrap only with gauze      Objective:  B/P: 122/70, T: Data Unavailable, P: 90, R: Data Unavailable  Ext; Warm,  No edema. Bilateral venous stasis changes with thickened skin.   (+)FEM pulses.  No distal pulses.  LLE - all wounds closed      U/S -   ULTRASOUND VENOUS COMPETENCY BILATERAL   6/1/2017 3:29 PM      HISTORY: Localized edema. Chronic venous hypertension (idiopathic)  without complications of bilateral lower extremity. Unspecified open  wound, left lower leg, initial encounter.     COMPARISON: None.     TECHNIQUE: Color Doppler and spectral waveform analysis performed.     FINDINGS: Bilateral common femoral veins, femoral veins, and popliteal  veins are patent and demonstrate no evidence of  reflux.     Other than questionable 1 second reflux at the right saphenofemoral  junction, the right great saphenous vein is competent throughout its  length. Diameter of the right great saphenous vein is 3-7 mm.     The left great saphenous vein is competent throughout its length.  Diameter of the left great saphenous vein is 2-7 mm.     Visualized portions of bilateral small saphenous veins appear to be  competent, without evidence of reflux.     No prominent perforators were demonstrated. Bilateral Giacomini veins  were competent.         IMPRESSION: The deep and superficial venous systems of both legs are  patent and competent.     PATRICK SCHERER MD    [facility]ESH -   ULTRASOUND ANKLE-BRACHIAL INDEX DOPPLER NO EXERCISE   6/1/2017 3:16 PM        HISTORY: Localized edema. Chronic venous hypertension (idiopathic)  without complications of bilateral lower extremity.     COMPARISON: None.     FINDINGS: Ankle-brachial index is 0.96 on the right and 0.91 on the  left. Waveforms appear biphasic/monophasic bilaterally.         IMPRESSION: Borderline bilateral arterial insufficiency.     PATRICK SCHERER MD      Assessment/plan:  This is a 67 year old gentleman with bilateral chronic stasis changes and lymphedema. He has a new right leg wound as well as a result of a fall - closed.     All wounds have healed.  Cont redi-wrap.  F/U with me PRN.      Casey Shine MD, FACS.      Again, thank you for allowing me to participate in the care of your patient.        Sincerely,        Casey Shine MD

## 2018-11-19 NOTE — MR AVS SNAPSHOT
After Visit Summary   11/19/2018    Glenroy Padilla    MRN: [id number]           Patient Information     Date Of Birth          1950        Visit Information        Provider Department      11/19/2018 1:30 PM Casey Shine MD [facility]        Today's Diagnoses     Open wound of left lower extremity without complication, subsequent encounter    -  1    Edema of both legs           Follow-ups after your visit        Who to contact     If you have questions or need follow up information about today's clinic visit or your schedule please contact [facility] directly at [phone].  Normal or non-critical lab and imaging results will be communicated to you by MyChart, letter or phone within 4 business days after the clinic has received the results. If you do not hear from us within 7 days, please contact the clinic through MyChart or phone. If you have a critical or abnormal lab result, we will notify you by phone as soon as possible.  Submit refill requests through mySkin or call your pharmacy and they will forward the refill request to us. Please allow 3 business days for your refill to be completed.          Additional Information About Your Visit        Care EveryWhere ID     This is your Care EveryWhere ID. This could be used by other organizations to access your [facility] medical records  [id number]        Your Vitals Were     Pulse BMI (Body Mass Index)                90 44.01 kg/m2           Blood Pressure from Last 3 Encounters:   11/19/18 122/70   11/15/18 110/58   10/11/18 107/76    Weight from Last 3 Encounters:   11/19/18 139.1 kg (306 lb 11.2 oz)   10/11/18 136.2 kg (300 lb 3.2 oz)   09/27/18 133.6 kg (294 lb 9.6 oz)              Today, you had the following     No orders found for display         Today's Medication Changes          These changes are accurate as of 11/19/18  1:40 PM.  If you have any questions, ask your nurse or doctor.                These medicines have changed or have updated prescriptions.        Dose/Directions    pantoprazole 40 MG EC tablet   Commonly known as:  PROTONIX   This may have changed:    - when to take this  - reasons to take this  - additional instructions   Used for:  Gastroesophageal reflux disease without esophagitis        Dose:  40 mg   Take 1 tablet (40 mg) by mouth daily Take 30-60 minutes before a meal.   Quantity:  30 tablet   Refills:  0                Primary Care Provider Office Phone # Fax #    Julio Cesar Esteban PA-C [phone] [address], [place] [address]        Equal Access to Services     [facility]: Hadii katia benedict hadasho Soomaali, waaxda luqadaha, qaybta kaalmada duke, adams batres . So [facility] [phone].    ATENCIÓN: Si habla español, tiene a wilkinson disposición servicios gratuitos de asistencia lingüística. [facility] [phone].    We comply with applicable federal civil rights laws and [place] laws. We do not discriminate on the basis of race, color, national origin, age, disability, sex, sexual orientation, or gender identity.            Thank you!     Thank you for choosing [facility]  for your care. Our goal is always to provide you with excellent care. Hearing back from our patients is one way we can continue to improve our services. Please take a few minutes to complete the written survey that you may receive in the mail after your visit with us. Thank you!             Your Updated Medication List - Protect others around you: Learn how to safely use, store and throw away your medicines at www.disposemymeds.org.          This list is accurate as of 11/19/18  1:40 PM.  Always use your most recent med list.                   Brand Name Dispense Instructions for use Diagnosis    acetaminophen 500 MG tablet    TYLENOL    120 tablet    Take 1-2 tablets (500-1,000 mg) by mouth every 6 hours as needed for mild pain    Chronic pain of  left knee, Complete tear of left rotator cuff       blood glucose calibration solution    NO BRAND SPECIFIED    1 Bottle    To accompany: Blood Glucose Monitor Brands: per insurance.    Type 2 diabetes mellitus with other circulatory complication, without long-term current use of insulin (H)       blood glucose lancing device    no brand specified    1 each    Use to test blood sugars 2 times daily or as directed.    Type 2 diabetes mellitus with other circulatory complication, without long-term current use of insulin (H)       * blood glucose monitoring test strip    no brand specified    100 strip    Use to test blood sugar 2x  daily or as directed. To accompany: Blood Glucose Monitor Brands: per insurance.    Type 2 diabetes mellitus with other circulatory complication, without long-term current use of insulin (H)       * blood glucose monitoring test strip    no brand specified    100 strip    Use to test blood sugar 2 times daily Blood Glucose Monitor Brands: Glucocard Expression    Type 2 diabetes mellitus with other circulatory complication, without long-term current use of insulin (H)       clopidogrel 75 MG tablet    PLAVIX    90 tablet    Take 1 tablet (75 mg) by mouth daily    Type 2 diabetes mellitus with other circulatory complication, without long-term current use of insulin (H), S/P spinal surgery, Hypertension, goal below 140/90, Hyperlipidemia LDL goal <100       cyclobenzaprine 10 MG tablet    FLEXERIL     TAKE 1 TAB BY MOUTH TWICE DAILY AS NEEDED FOR ROTATOR CUFF TEAR        ferrous sulfate 325 (65 Fe) MG tablet    IRON    90 tablet    Take 1 tablet (325 mg) by mouth daily (with breakfast)    History of anemia       furosemide 40 MG tablet    LASIX    90 tablet    Take 1 tablet (40 mg) by mouth daily    Hypertension, goal below 140/90       * GLUCOCARD EXPRESSION MONITOR w/Device Kit      USE TWICE DAILY TO TEST BLOOD GLUCOSE        * blood glucose monitoring meter device kit    no brand specified     1 kit    test blood sugar 2 xdaily or as directed.  Blood Glucose Monitor Brands: per insurance.    Type 2 diabetes mellitus with other circulatory complication, without long-term current use of insulin (H)       HYDROcodone-acetaminophen 5-325 MG per tablet    NORCO     Take 1 tablet by mouth every 6 hours as needed for severe pain        JANUMET  MG per tablet   Generic drug:  sitagliptin-metFORMIN      Take 1 tablet by mouth daily        lidocaine 5 % ointment    XYLOCAINE    50 g    Apply topically daily    Chronic pain of right knee       losartan 25 MG tablet    COZAAR    90 tablet    Take 1 tablet (25 mg) by mouth daily    Hypertension, goal below 140/90, Type 2 diabetes mellitus with other circulatory complication, without long-term current use of insulin (H)       methocarbamol 500 MG tablet    ROBAXIN    70 tablet    Take 1 tablet (500 mg) by mouth 4 times daily as needed for muscle spasms    S/P spinal surgery       metoprolol tartrate 25 MG tablet    LOPRESSOR    180 tablet    Take 1 tablet (25 mg) by mouth 2 times daily    Type 2 diabetes mellitus with other circulatory complication, without long-term current use of insulin (H), History of coronary artery stent placement, Hypertension, goal below 140/90       order for DME     1 Box    One touch glucose monitoring strip with Glucometer and lancets.    Type 2 diabetes mellitus with other circulatory complication, without long-term current use of insulin (H)       order for DME     1 Units    Equipment being ordered: compression stockings below knee and above knee if available.  Medium compression.    Primary osteoarthritis of right knee, Lymphedema of right lower extremity       order for DME     2 Device    Compression garments toe to knee. ?Specific brand is Compraflex Light Compression garment.  Diagnosis codes for venous stasis ulcer I83.029 and for ?Lymphoedema is I89.0  Goal is for compression of about 30 to 40 mm of mercury  measurements  are right ankle 28.5 cm and right calf is 45.5 cm. ?Left ankle is 27.5 cm and the calf is 45 cm  Tall for length.    Neuropathy, Lymphedema of right lower extremity, Lymphedema       order for DME     1 Units    Equipment being ordered: electric chair for sleeping and adjustment to allow for elevation of legs above the heart.  Zero gravity type heavy duty sleep chair advised.    Screening for diabetic peripheral neuropathy, History of coronary artery stent placement, Hypertension, goal below 140/90, Open wound of left lower extremity without complication, initial encounter, Type 2 diabetes mellitus with other circulatory complication, without long-term current use of insulin (H), Weakness of both legs, Iron deficiency anemia secondary to inadequate dietary iron intake, [place] (obstructive sleep apnea), Central spinal stenosis, Foraminal stenosis of lumbar region       order for DME     1 Device    Equipment being ordered: Needs to have a sleep chair for home use.    Type 2 diabetes mellitus with other circulatory complication, without long-term current use of insulin (H), Morbid obesity due to excess calories (H), History of coronary artery stent placement, Gastroesophageal reflux disease without esophagitis, Chronic pain of left knee, [place] (obstructive sleep apnea)       pantoprazole 40 MG EC tablet    PROTONIX    30 tablet    Take 1 tablet (40 mg) by mouth daily Take 30-60 minutes before a meal.    Gastroesophageal reflux disease without esophagitis       rosuvastatin 10 MG tablet    CRESTOR    90 tablet    Take 1 tablet (10 mg) by mouth daily    Hyperlipidemia LDL goal <100       senna-docusate 8.6-50 MG per tablet    SENOKOT-S;PERICOLACE    100 tablet    Take 1 tablet by mouth 2 times daily as needed for constipation    Spinal stenosis, lumbar region, without neurogenic claudication       thin lancets    NO BRAND SPECIFIED    100 each    Test 2 x daily or as directed.  Brands: per insurance.    Type 2 diabetes  mellitus with other circulatory complication, without long-term current use of insulin (H)       TRIAD HYDROPHILIC WOUND DRESSI Pste     1 Tube    Externally apply 1 g topically daily    Open wound of right lower leg, initial encounter       * Notice:  This list has 4 medication(s) that are the same as other medications prescribed for you. Read the directions carefully, and ask your doctor or other care provider to review them with you.

## 2018-11-19 NOTE — NURSING NOTE
"Glenroy Padilla is a 68 year old male who presents for:  Chief Complaint   Patient presents with     WOUND CARE     lower leg wound     RECHECK        Initial Vitals:  /70 (BP Location: Right arm, Patient Position: Sitting, Cuff Size: Adult Large)  Pulse 90  Wt 139.1 kg (306 lb 11.2 oz)  BMI 44.01 kg/m2 Estimated body mass index is 44.01 kg/(m^2) as calculated from the following:    Height as of 11/15/18: 1.778 m (5' 10\").    Weight as of this encounter: 139.1 kg (306 lb 11.2 oz).. Body surface area is 2.62 meters squared. BP completed using cuff size: large  Data Unavailable    Do you feel safe in your environment?  Yes  Do you need any refills today? No    Nursing Comments:         Hui Ashraf  "

## 2018-11-19 NOTE — TELEPHONE ENCOUNTER
Reason for Call:  Form, our goal is to have forms completed with 72 hours, however, some forms may require a visit or additional information.    Type of letter, form or note:  medical    Who is the form from?: [facility]    Where did the form come from: form was faxed in    What clinic location was the form placed at?: [facility] - [phone] Nigel Sahu     Where the form was placed: 's Box    What number is listed as a contact on the form?: [phone]       Additional comments: Please review, sign, date and fax back to [phone]    Call taken on 11/19/2018 at 3:24 PM by Italia Luis

## 2018-11-19 NOTE — PROGRESS NOTES
F/U for leg wound    Subjective:  Patient is a 67-year-old white male presenting history of a left leg wound. He hit a coffee table  the wound is slow to heal. He was initially treated in [place] and has a 15 year history of bilateral lower extremity edema. He has never been treated with any form of compression therapy and was told not to based on an ultrasound results in [place]. Those results are not available to me at this time. He denies any prior DVT, nonhealing wounds, leg trauma, claudication or rest pain. He was told in [place] to treat the leg with Betadine. [place].      Last seen by me in June and was tx with UNNA boot and silvercel.  Returned to [place] and was treated with hydroferra blue.       Had UNNA boot on when was last seen.  Has been in Assisted living since last seen by me in January for leg weakness.  TX with TRIAD, Foam and UNNA.   Laceration on right healed.    Reports wound doing well.  Has been using redi-wrap only with gauze      Objective:  B/P: 122/70, T: Data Unavailable, P: 90, R: Data Unavailable  Ext; Warm,  No edema. Bilateral venous stasis changes with thickened skin.   (+)FEM pulses.  No distal pulses.  LLE - all wounds closed      U/S -   ULTRASOUND VENOUS COMPETENCY BILATERAL   6/1/2017 3:29 PM      HISTORY: Localized edema. Chronic venous hypertension (idiopathic)  without complications of bilateral lower extremity. Unspecified open  wound, left lower leg, initial encounter.     COMPARISON: None.     TECHNIQUE: Color Doppler and spectral waveform analysis performed.     FINDINGS: Bilateral common femoral veins, femoral veins, and popliteal  veins are patent and demonstrate no evidence of reflux.     Other than questionable 1 second reflux at the right saphenofemoral  junction, the right great saphenous vein is competent throughout its  length. Diameter of the right great saphenous vein is 3-7 mm.     The left great saphenous vein is  competent throughout its length.  Diameter of the left great saphenous vein is 2-7 mm.     Visualized portions of bilateral small saphenous veins appear to be  competent, without evidence of reflux.     No prominent perforators were demonstrated. Bilateral Giacomini veins  were competent.         IMPRESSION: The deep and superficial venous systems of both legs are  patent and competent.     PATRICK SCHERER MD    [facility] -   ULTRASOUND ANKLE-BRACHIAL INDEX DOPPLER NO EXERCISE   6/1/2017 3:16 PM        HISTORY: Localized edema. Chronic venous hypertension (idiopathic)  without complications of bilateral lower extremity.     COMPARISON: None.     FINDINGS: Ankle-brachial index is 0.96 on the right and 0.91 on the  left. Waveforms appear biphasic/monophasic bilaterally.         IMPRESSION: Borderline bilateral arterial insufficiency.     PATRICK SCHERER MD      Assessment/plan:  This is a 67 year old gentleman with bilateral chronic stasis changes and lymphedema. He has a new right leg wound as well as a result of a fall - closed.     All wounds have healed.  Cont redi-wrap.  F/U with me PRN.      Casey Shine MD, FACS.

## 2018-11-20 ENCOUNTER — MEDICAL CORRESPONDENCE (OUTPATIENT)
Dept: HEALTH INFORMATION MANAGEMENT | Facility: CLINIC | Age: 68
End: 2018-11-20

## 2018-11-26 ENCOUNTER — TELEPHONE (OUTPATIENT)
Dept: FAMILY MEDICINE | Facility: OTHER | Age: 68
End: 2018-11-26

## 2018-11-26 NOTE — TELEPHONE ENCOUNTER
Reason for Call:  Form, our goal is to have forms completed with 72 hours, however, some forms may require a visit or additional information.    Type of letter, form or note:  medical    Who is the form from?: Home care    Where did the form come from: form was faxed in    What clinic location was the form placed at?: [facility] - [phone]    Where the form was placed: Dr's Box    What number is listed as a contact on the form?: fax [phone]       Additional comments: please sign and fax back    Call taken on 11/26/2018 at 10:01 AM by Olivia Mayfield

## 2018-11-27 NOTE — TELEPHONE ENCOUNTER
Forms have been completed, signed, faxed/mailed, and sent to scanning.  Lisa Zuluaga CMA ([facility])

## 2018-11-29 ENCOUNTER — TELEPHONE (OUTPATIENT)
Dept: FAMILY MEDICINE | Facility: OTHER | Age: 68
End: 2018-11-29

## 2018-11-29 NOTE — TELEPHONE ENCOUNTER
Reason for Call:  Form, our goal is to have forms completed with 72 hours, however, some forms may require a visit or additional information.    Type of letter, form or note:  medical    Who is the form from?: Home care    Where did the form come from: form was faxed in    What clinic location was the form placed at?: [facility] - [phone]    Where the form was placed: Dr's Box    What number is listed as a contact on the form?: fax [phone]       Additional comments: please complete and fax    Call taken on 11/29/2018 at 2:33 PM by Olivia Mayfield

## 2018-11-30 ENCOUNTER — TELEPHONE (OUTPATIENT)
Dept: FAMILY MEDICINE | Facility: OTHER | Age: 68
End: 2018-11-30

## 2018-11-30 NOTE — TELEPHONE ENCOUNTER
Sydnie from [facility] called in to let you know that patient was discharged on 11/20/2018, they received fax for face to face and skilled nursing and  they will be faxing over a request for physical therapy referral  Thanks  Josselin Montes RT (R)

## 2018-11-30 NOTE — TELEPHONE ENCOUNTER
Forms have been completed, signed, faxed/mailed, and sent to scanning.  Lisa Zuluaga CMA ([facility])

## 2018-12-03 ENCOUNTER — DOCUMENTATION ONLY (OUTPATIENT)
Dept: NEUROSURGERY | Facility: CLINIC | Age: 68
End: 2018-12-03

## 2018-12-03 NOTE — PROGRESS NOTES
Patient's [facility] Care Coordinator (Carolin) contacted clinic regarding approval for a massage chair. Patient is 6 months s/p T5-9 laminectomies and resection of epidural lipomatosis. Earlier in Nov, patient requested a zero gravity chair to help with back pain. We wrote a letter in support of this. Now the patient is requesting a massage chair as well. Carolin is wondering if this is medically necessary.    Discussed with Charles MOTTA. Massage chair is not medically necessary. LVM with Carolin to inform her as well. Advised to call back with further questions/concerns.

## 2018-12-05 ENCOUNTER — TELEPHONE (OUTPATIENT)
Dept: FAMILY MEDICINE | Facility: OTHER | Age: 68
End: 2018-12-05

## 2018-12-05 NOTE — TELEPHONE ENCOUNTER
Reason for Call:  Other Physical Therapy    Detailed comments: please call Sumi from [facility]. Patient refused PT and start of care. Patient aid it was not needed.     Phone Number Patient can be reached at: [phone]    Best Time:     Can we leave a detailed message on this number? NO  Call taken on 12/5/2018 at 10:08 AM by Leigh Tidwell

## 2018-12-06 NOTE — TELEPHONE ENCOUNTER
I suspect there is nothing I can do about the patient's refusal without a face-to-face consultation with the patient.  Please return the call and advise the caller of this.  Electronically signed:    Julio Cesar Sahu PA-C

## 2018-12-11 ENCOUNTER — TELEPHONE (OUTPATIENT)
Dept: FAMILY MEDICINE | Facility: OTHER | Age: 68
End: 2018-12-11

## 2018-12-11 NOTE — TELEPHONE ENCOUNTER
Summary:    Patient is due/failing the following:   FIT    Action needed:   complete a FIT test     Type of outreach:    reminder letter sent    Questions for provider review:    None                                                                                                                                    Carmelina Bellamy       Chart routed to Care Team .          Panel Management Review      Patient has the following on his problem list:     Diabetes    ASA:     Last A1C  Lab Results   Component Value Date    A1C 5.7 08/23/2018    A1C 6.0 04/20/2018    A1C 5.3 03/26/2018    A1C 5.6 01/27/2018    A1C 6.2 09/25/2017     A1C tested: Passed    Last LDL:    Lab Results   Component Value Date    CHOL 128 09/25/2017     Lab Results   Component Value Date    HDL 43 09/25/2017     Lab Results   Component Value Date    LDL 76 03/09/2018    LDL 57 09/25/2017     Lab Results   Component Value Date    TRIG 140 09/25/2017     No results found for: CHOLHDLRATIO  Lab Results   Component Value Date    NHDL 85 09/25/2017       Is the patient on a Statin? YES             Is the patient on Aspirin? NO    Medications     HMG CoA Reductase Inhibitors    rosuvastatin (CRESTOR) 10 MG tablet          Last three blood pressure readings:  BP Readings from Last 3 Encounters:   11/19/18 122/70   11/15/18 110/58   10/11/18 107/76            Tobacco History:     History   Smoking Status     Former Smoker     Types: Cigars   Smokeless Tobacco     Never Used     Comment: exposure to second hand smoke         Hypertension   Last three blood pressure readings:  BP Readings from Last 3 Encounters:   11/19/18 122/70   11/15/18 110/58   10/11/18 107/76     Blood pressure: Passed    HTN Guidelines:  Age 18-59 BP range:  Less than 140/90  Age 60-85 with Diabetes:  Less than 140/90  Age 60-85 without Diabetes:  less than 150/90      Composite cancer screening  Chart review shows that this patient is due/due soon for the following Fecal  Colorectal (FIT)

## 2018-12-11 NOTE — LETTER
[facility]  [id number] [place] [address]  Phone: [phone]  December 11, 2018      Glenroy Padilla  [address] [address]      Dear Glenroy,    We care about your health and have reviewed your health plan including your medical conditions, medications, and lab results.  Based on this review, it is recommended that you follow up regarding the following health topic(s):  -Colon Cancer Screening    We recommend you take the following action(s):  -schedule a COLONOSCOPY to look for colon cancer (due every 10 years or 5 years in higher risk situations.)  Colonoscopies can prevent 90-95% of colon cancer deaths.  Problem lesions can be removed before they ever become cancer.  If you do not wish to do a colonoscopy or cannot afford to do one at this time, there is another option called a Fecal Immunochemical Occult Blood Test (FIT) a take home stool sample kit.  It does not replace the colonoscopy for colorectal cancer screening, but it can detect hidden bleeding in the lower colon.  It does need to be repeated every year and if a positive result is obtained, you would be referred for a colonoscopy.  If you have completed either one of these tests at another facility, please have the records sent to our clinic for our records.     Please call us at the [facility] - [phone] (or use [employer]) to address the above recommendations.     Thank you for trusting [facility] and we appreciate the opportunity to serve you.  We look forward to supporting your healthcare needs in the future.    Healthy Regards,    Your Health Care Team  [facility]

## 2019-01-07 ENCOUNTER — TELEPHONE (OUTPATIENT)
Dept: FAMILY MEDICINE | Facility: OTHER | Age: 69
End: 2019-01-07

## 2019-01-07 DIAGNOSIS — D50.8 IRON DEFICIENCY ANEMIA SECONDARY TO INADEQUATE DIETARY IRON INTAKE: ICD-10-CM

## 2019-01-07 DIAGNOSIS — R29.898 WEAKNESS OF BOTH LEGS: ICD-10-CM

## 2019-01-07 DIAGNOSIS — I10 HYPERTENSION, GOAL BELOW 140/90: ICD-10-CM

## 2019-01-07 DIAGNOSIS — E11.59 TYPE 2 DIABETES MELLITUS WITH OTHER CIRCULATORY COMPLICATION, WITHOUT LONG-TERM CURRENT USE OF INSULIN (H): ICD-10-CM

## 2019-01-07 DIAGNOSIS — Z13.89 SCREENING FOR DIABETIC PERIPHERAL NEUROPATHY: ICD-10-CM

## 2019-01-07 DIAGNOSIS — M48.00 CENTRAL SPINAL STENOSIS: ICD-10-CM

## 2019-01-07 DIAGNOSIS — Z95.5 HISTORY OF CORONARY ARTERY STENT PLACEMENT: ICD-10-CM

## 2019-01-07 DIAGNOSIS — M48.061 FORAMINAL STENOSIS OF LUMBAR REGION: ICD-10-CM

## 2019-01-07 DIAGNOSIS — S81.802A OPEN WOUND OF LEFT LOWER EXTREMITY WITHOUT COMPLICATION, INITIAL ENCOUNTER: ICD-10-CM

## 2019-01-07 DIAGNOSIS — G47.33 OSA (OBSTRUCTIVE SLEEP APNEA): ICD-10-CM

## 2019-01-07 NOTE — TELEPHONE ENCOUNTER
Reason for Call:  Form, our goal is to have forms completed with 72 hours, however, some forms may require a visit or additional information.    Type of letter, form or note:  medical    Who is the form from?: Insurance comp    Where did the form come from: form was faxed in    What clinic location was the form placed at?: [facility] - [phone]    Where the form was placed: 's Box    What number is listed as a contact on the form?:  [phone] fax- [phone]       Additional comments: from [employer].     Form has been completed, faxed and sent to scanning.    Call taken on 1/7/2019 at 8:28 AM by Cathi Owen

## 2019-01-15 ENCOUNTER — TELEPHONE (OUTPATIENT)
Dept: SURGERY | Facility: CLINIC | Age: 69
End: 2019-01-15

## 2019-01-15 DIAGNOSIS — M17.0 BILATERAL PRIMARY OSTEOARTHRITIS OF KNEE: Primary | ICD-10-CM

## 2019-01-15 NOTE — TELEPHONE ENCOUNTER
Reason for Call: Request for an order or referral:    Order or referral being requested: referral for a ortho surgeon to get bilateral knee replacements. Patient stated that Dr. Thakur said he wouldn't do it because he is a diabetic. Patient is requesting to either get a referral to the [facility] or if he has a recommendation for a surgeon. Please call     Date needed: at your convenience    Has the patient been seen by the PCP for this problem? YES    Additional comments:     Phone number Patient can be reached at:  Home number on file [phone] (home)    Best Time:  any    Can we leave a detailed message on this number?  YES    Call taken on 1/15/2019 at 9:05 AM by Ignacia Zepeda

## 2019-01-15 NOTE — TELEPHONE ENCOUNTER
Call to patient-patient requesting a referral to another orthopedic provider for another opinion. Offered patient appointment with Dr. Ibrahim or Dr. Meza, patient declined, he would like referral to a different orthopedic group.  Informed patient he may check with his insurance for coverage at other othropedic providers, but will route message to Dr. Thakur's team for review and possible referral.    Please contact patient with referral information...............Hui Ashraf CMA  (AAMA)

## 2019-01-16 NOTE — TELEPHONE ENCOUNTER
Phone call to patient. He states he already has been contacted regarding referral to other orthopedic surgeon and has an appointment on 2/5/19.    Shama Broussard RN

## 2019-01-30 ENCOUNTER — TELEPHONE (OUTPATIENT)
Dept: FAMILY MEDICINE | Facility: OTHER | Age: 69
End: 2019-01-30

## 2019-01-30 NOTE — TELEPHONE ENCOUNTER
Reason for Call:  Form, our goal is to have forms completed with 72 hours, however, some forms may require a visit or additional information.    Type of letter, form or note:  medical    Who is the form from?: [facility] (if other please explain)    Where did the form come from: form was faxed in    What clinic location was the form placed at?: [facility] - [phone]    Where the form was placed: 's Box    What number is listed as a contact on the form?: fax  [phone]       Additional comments: please complete and fax    Call taken on 1/30/2019 at 11:49 AM by Olivia Mayfield

## 2019-02-02 ENCOUNTER — MEDICAL CORRESPONDENCE (OUTPATIENT)
Dept: HEALTH INFORMATION MANAGEMENT | Facility: CLINIC | Age: 69
End: 2019-02-02

## 2019-02-04 ENCOUNTER — PRE VISIT (OUTPATIENT)
Dept: ORTHOPEDICS | Facility: CLINIC | Age: 69
End: 2019-02-04

## 2019-02-04 NOTE — TELEPHONE ENCOUNTER
Discussed with patient reason for visit Bilateral knee pain  Patient prepared for appointment through the followin. Have you had any recent xrays in the last 6 months? Yes - Xray are in EPIC. 18. Had images at [facility] in 2018, will call to get images sent over.    2. Have you had an MRI? No.     3. Have you had any surgery in past related to complaint?  No.  If yes, Patient advised that implant stickers are needed for any previous total joint surgery as well for appointment.     4. Are you being referred by another provider? Yes: Dr. Thakur  If yes-Records in Epic.    5. Have you received the intake form in mail?.Yes.    6. Is this work comp or MVA related? No.     Jayshree Gutierrez RN

## 2019-02-04 NOTE — TELEPHONE ENCOUNTER
Forms have been completed, signed, faxed/mailed, and sent to scanning.  Lisa Zuluaga CMA ([facility])

## 2019-02-05 ENCOUNTER — OFFICE VISIT (OUTPATIENT)
Dept: ORTHOPEDICS | Facility: CLINIC | Age: 69
End: 2019-02-05
Payer: COMMERCIAL

## 2019-02-05 VITALS — SYSTOLIC BLOOD PRESSURE: 128 MMHG | OXYGEN SATURATION: 96 % | HEART RATE: 84 BPM | DIASTOLIC BLOOD PRESSURE: 68 MMHG

## 2019-02-05 DIAGNOSIS — I89.0 LYMPHEDEMA OF BOTH LOWER EXTREMITIES: Primary | ICD-10-CM

## 2019-02-05 PROCEDURE — 99214 OFFICE O/P EST MOD 30 MIN: CPT | Performed by: ORTHOPAEDIC SURGERY

## 2019-02-05 RX ORDER — METOPROLOL TARTRATE 25 MG/1
50 TABLET, FILM COATED ORAL
COMMUNITY
Start: 2018-08-10 | End: 2019-02-21

## 2019-02-05 RX ORDER — ACETAMINOPHEN 500 MG
500-1000 TABLET ORAL
Status: ON HOLD | COMMUNITY
Start: 2018-04-20 | End: 2019-07-31

## 2019-02-05 RX ORDER — ROSUVASTATIN CALCIUM 10 MG/1
10 TABLET, COATED ORAL
COMMUNITY
Start: 2018-07-23 | End: 2019-02-21

## 2019-02-05 RX ORDER — LINAGLIPTIN AND METFORMIN HYDROCHLORIDE 2.5; 1 MG/1; MG/1
TABLET, FILM COATED, EXTENDED RELEASE ORAL
Refills: 1 | Status: ON HOLD | COMMUNITY
Start: 2019-01-14 | End: 2019-07-31

## 2019-02-05 RX ORDER — PANTOPRAZOLE SODIUM 40 MG/1
40 TABLET, DELAYED RELEASE ORAL
COMMUNITY
Start: 2018-04-01 | End: 2019-02-21

## 2019-02-05 RX ORDER — LOSARTAN POTASSIUM 25 MG/1
25 TABLET ORAL
COMMUNITY
Start: 2018-03-14 | End: 2019-02-21

## 2019-02-05 RX ORDER — CLOPIDOGREL BISULFATE 75 MG/1
75 TABLET ORAL
COMMUNITY
Start: 2018-04-05 | End: 2019-02-21

## 2019-02-05 RX ORDER — SITAGLIPTIN AND METFORMIN HYDROCHLORIDE 1000; 50 MG/1; MG/1
TABLET, FILM COATED, EXTENDED RELEASE ORAL
Refills: 1 | COMMUNITY
Start: 2019-01-14 | End: 2019-05-31

## 2019-02-05 RX ORDER — METHOCARBAMOL 500 MG/1
500 TABLET, FILM COATED ORAL
COMMUNITY
Start: 2018-03-14 | End: 2019-03-25

## 2019-02-05 RX ORDER — FERROUS SULFATE 325(65) MG
TABLET ORAL
Refills: 1 | COMMUNITY
Start: 2019-01-14 | End: 2019-02-21

## 2019-02-05 RX ORDER — AMOXICILLIN 250 MG
1 CAPSULE ORAL
COMMUNITY
Start: 2018-01-29 | End: 2019-08-07

## 2019-02-05 RX ORDER — FUROSEMIDE 40 MG
40 TABLET ORAL
COMMUNITY
Start: 2018-03-14 | End: 2019-02-21

## 2019-02-05 ASSESSMENT — PAIN SCALES - GENERAL: PAINLEVEL: MODERATE PAIN (5)

## 2019-02-05 NOTE — NURSING NOTE
Glenroy Padilla's chief complaint for this visit includes:  Chief Complaint   Patient presents with     Consult     bilateral knee osteoarthritis     PCP: Julio Cesar Esteban    Referring Provider:  No referring provider defined for this encounter.    /68 (BP Location: Left arm, Patient Position: Sitting, Cuff Size: Adult Large)   Pulse 84   SpO2 96%   Moderate Pain (5)     Do you need any medication refills at today's visit? NO

## 2019-02-05 NOTE — LETTER
2/5/2019         RE: Glenroy Padilla  [address] [facility] [address]        Dear Colleague,    Thank you for referring your patient, Glenroy Padilla, to the [facility]. Please see a copy of my visit note below.    Chief Complaint: Consult (bilateral knee osteoarthritis)    Physician:  No ref. provider found    HPI: Glenroy Padilla is a 68 year old male who presents today for evaluation of his right knee    Symptom Profile  Location of symptoms:  knee  Onset: insidious  Duration of symptoms: multiple years   Quality of symptoms: aching and sharp  Severity: severe  Alleviate:activity modification   Exacerbating: activities   Previous Treatments: Previous treatments include activity modification, oral pain medication, physical therapy, intra-articular injections.    Current Status:  Results of the patient s Hip Disability and Osteoarthritis Outcome Score (HOOS)  are as follows (0-100 scales with 100 being the theoretical best):  Pain: 67.5   Symptoms: 60   ADLs: 50   Sports/Recreation:0  Quality of Life:0  ([email or web address].[email or web address]/)  [facility] Activity Score: 2    MEDICAL HISTORY:   Past Medical History:   Diagnosis Date     Acute pain of left knee 5/22/2017     Chronic pain of right knee 5/22/2017     Hx of coronary artery disease 5/22/2017     Hx of heart artery stent 5/22/2017     Mixed incontinence 8/13/2018     Obesity, morbid, BMI 40.0-49.9 (H) 11/20/2017     Open wound of left lower extremity without complication, initial encounter 5/22/2017     [place] (obstructive sleep apnea) 5/22/2017     Type 2 diabetes mellitus with other circulatory complication, without long-term current use of insulin (H) 5/22/2017     Venous stasis ulcer of left lower extremity (H) 5/22/2017     Venous stasis ulcer of left lower extremity (H) 5/22/2017       Medications:     Current Outpatient Medications:      acetaminophen (TYLENOL) 500 MG tablet, Take 500-1,000 mg by mouth, Disp: , Rfl:      blood glucose (NO BRAND  SPECIFIED) lancing device, Use to test blood sugars 2 times daily or as directed., Disp: 1 each, Rfl: 0     blood glucose calibration (NO BRAND SPECIFIED) solution, To accompany: Blood Glucose Monitor Brands: per insurance., Disp: 1 Bottle, Rfl: 3     blood glucose monitoring (NO BRAND SPECIFIED) meter device kit, test blood sugar 2 xdaily or as directed.  Blood Glucose Monitor Brands: per insurance., Disp: 1 kit, Rfl: 0     blood glucose monitoring (NO BRAND SPECIFIED) test strip, Use to test blood sugar 2 times daily Blood Glucose Monitor Brands: Glucocard Expression, Disp: 100 strip, Rfl: 6     blood glucose monitoring (NO BRAND SPECIFIED) test strip, Use to test blood sugar 2x  daily or as directed. To accompany: Blood Glucose Monitor Brands: per insurance., Disp: 100 strip, Rfl: 1     Blood Glucose Monitoring Suppl (GLUCOCARD EXPRESSION MONITOR) W/DEVICE KIT, USE TWICE DAILY TO TEST BLOOD GLUCOSE, Disp: , Rfl: 0     clopidogrel (PLAVIX) 75 MG tablet, Take 75 mg by mouth, Disp: , Rfl:      clopidogrel (PLAVIX) 75 MG tablet, Take 1 tablet (75 mg) by mouth daily, Disp: 90 tablet, Rfl: 1     cyclobenzaprine (FLEXERIL) 10 MG tablet, TAKE 1 TAB BY MOUTH TWICE DAILY AS NEEDED FOR ROTATOR CUFF TEAR, Disp: , Rfl: 0     ferrous sulfate (IRON) 325 (65 Fe) MG tablet, Take 1 tablet (325 mg) by mouth daily (with breakfast), Disp: 90 tablet, Rfl: 1     furosemide (LASIX) 40 MG tablet, Take 40 mg by mouth, Disp: , Rfl:      furosemide (LASIX) 40 MG tablet, Take 1 tablet (40 mg) by mouth daily, Disp: 90 tablet, Rfl: 2     GNP IRON 200 (65 Fe) MG TABS, , Disp: , Rfl: 1     HYDROcodone-acetaminophen (NORCO) 5-325 MG per tablet, Take 1 tablet by mouth every 6 hours as needed for severe pain, Disp: , Rfl:      JANUMET XR  MG TB24, , Disp: , Rfl: 1     JENTADUETO XR 2.5-1000 MG per table, , Disp: , Rfl: 1     lidocaine (XYLOCAINE) 5 % ointment, Apply topically daily, Disp: 50 g, Rfl: 3     losartan (COZAAR) 25 MG tablet, Take  25 mg by mouth, Disp: , Rfl:      losartan (COZAAR) 25 MG tablet, Take 1 tablet (25 mg) by mouth daily, Disp: 90 tablet, Rfl: 1     methocarbamol (ROBAXIN) 500 MG tablet, Take 500 mg by mouth, Disp: , Rfl:      methocarbamol (ROBAXIN) 500 MG tablet, Take 1 tablet (500 mg) by mouth 4 times daily as needed for muscle spasms, Disp: 70 tablet, Rfl: 1     metoprolol tartrate (LOPRESSOR) 25 MG tablet, Take 50 mg by mouth, Disp: , Rfl:      metoprolol tartrate (LOPRESSOR) 25 MG tablet, Take 1 tablet (25 mg) by mouth 2 times daily, Disp: 180 tablet, Rfl: 3     order for DME, Equipment being ordered: electric chair for sleeping and adjustment to allow for elevation of legs above the heart.  Zero gravity type heavy duty sleep chair advised. MaxiComforter ([id number]), Disp: 1 Units, Rfl: 0     order for DME, Equipment being ordered: Needs to have a sleep chair for home use., Disp: 1 Device, Rfl: 0     order for DME, Compression garments toe to knee.  Specific brand is Compraflex Light Compression garment.  Diagnosis codes for venous stasis ulcer I83.029 and for  Lymphoedema is I89.0  Goal is for compression of about 30 to 40 mm of mercury  measurements are right ankle 28.5 cm and right calf is 45.5 cm.  Left ankle is 27.5 cm and the calf is 45 cm  Tall for length., Disp: 2 Device, Rfl: 1     order for DME, Equipment being ordered: compression stockings below knee and above knee if available.  Medium compression., Disp: 1 Units, Rfl: 1     order for DME, One touch glucose monitoring strip with Glucometer and lancets., Disp: 1 Box, Rfl: 1     pantoprazole (PROTONIX) 40 MG EC tablet, Take 40 mg by mouth, Disp: , Rfl:      pantoprazole (PROTONIX) 40 MG EC tablet, Take 1 tablet (40 mg) by mouth daily Take 30-60 minutes before a meal. (Patient taking differently: Take 40 mg by mouth daily as needed Take 30-60 minutes before a meal.), Disp: 30 tablet, Rfl: 0     rosuvastatin (CRESTOR) 10 MG tablet, Take 10 mg by mouth, Disp: , Rfl:       rosuvastatin (CRESTOR) 10 MG tablet, Take 1 tablet (10 mg) by mouth daily, Disp: 90 tablet, Rfl: 1     senna-docusate (SENOKOT-S/PERICOLACE) 8.6-50 MG tablet, Take 1 tablet by mouth, Disp: , Rfl:      senna-docusate (SENOKOT-S;PERICOLACE) 8.6-50 MG per tablet, Take 1 tablet by mouth 2 times daily as needed for constipation, Disp: 100 tablet, Rfl:      sitagliptin-metFORMIN (JANUMET)  MG per tablet, Take 1 tablet by mouth daily, Disp: , Rfl:      thin (NO BRAND SPECIFIED) lancets, Test 2 x daily or as directed.  Brands: per insurance., Disp: 100 each, Rfl: 1     Wound Dressings (TRIAD HYDROPHILIC WOUND DRESSI) PSTE, Externally apply 1 g topically daily, Disp: 1 Tube, Rfl: 3    Allergies: No known allergies    SURGICAL HISTORY:   Past Surgical History:   Procedure Laterality Date     CARDIAC SURGERY      stent placement     CHOLECYSTECTOMY       LAMINECTOMY LUMBAR THREE+ LEVELS N/A 3/13/2018    Procedure: LAMINECTOMY LUMBAR THREE+ LEVELS;  T5-9 LAMINECTOMIES, RESECTION OF EPIDURAL LIPOMATOSIS;  Surgeon: Hugh Mendieta MD;  Location:  OR       FAMILY HISTORY: No family history on file.    SOCIAL HISTORY:   Social History     Tobacco Use     Smoking status: Former Smoker     Types: Cigars     Smokeless tobacco: Never Used     Tobacco comment: exposure to second hand smoke   Substance Use Topics     Alcohol use: No       REVIEW OF SYSTEMS:  The comprehensive review of systems from the intake form was reviewed with the patient.  No fever, weight change or fatigue. No dry eyes. No oral ulcers, sore throat or voice change. No palpitations, syncope, angina or edema.  No chest pain, excessive sleepiness, shortness of breath or hemoptysis.   No abdominal pain, nausea, vomiting, diarrhea or heartburn.  No skin rash. No focal weakness or numbness. No bleeding or lymphadenopathy. No rhinitis or hives.     Exam:  On physical examination the patient appears the stated age, is in no acute distress, affectThe is  appropriate, and breathing is non-labored.  Vitals are documented in the EMR and have been reviewed:    /68 (BP Location: Left arm, Patient Position: Sitting, Cuff Size: Adult Large)   Pulse 84   SpO2 96%   Data Unavailable  There is no height or weight on file to calculate BMI.  5'10, 290#  BMI 41.6    Rises from chair: with effort   Gait: antalgic on the right  Gains the exam table: with effort   ROM is irritated with 25-85 of motion and symptoms reproduced with palpation at the medial and lateral joint lines. No effusion. Ligament examination is grossly normal.   Distally, the circulatory, motor, and sensation exam is intact with 5/5 EHL, gastroc-soleus, and tibialis anterior.  Sensation to light touch is decreased in the feet.  Dorsalis pedis is not palpable and posterior tibialis pulses is barely so.  There are no sores on the feet, there is skin discoloration consistent with severe lymphedema, and there are healing ulcers both shins.     X-rays:   Moderate to advanced osteoarthritis right knee, more involved on the medial joint space.     Assessment and Plan: This is a 68 year old with severe pain associated with knee osteoarthritis. The barriers here to knee osteoarthritis revolve around his history of obesity which though significantly decreased (he is down about 100 lbs) continues with residual issues in the form of lymphedema and slowly healing skin sores. We discussed that one of the issues that I have concerns with is his clinically poor vascular examination and whether or not if this contributes to his slowly healing skin ulcers and if it might affect his ability to heal an incision. We discussed the significantly higher rate of complications and infections in the patient population with lymphedema. We discussed the options available to investigate this and we have settled on getting ABIs. He is going to [facility] to review and discuss treatment.      Again, thank you for allowing me to participate in  the care of your patient.        Sincerely,        Ruiz Mcmahon MD

## 2019-02-05 NOTE — PATIENT INSTRUCTIONS
Thanks for coming today.  [facility]  [address] [place] [address]    To schedule future appointments in Ortho Clinic, you may call [phone].    To schedule ordered imaging by your provider:   Call Central Imaging [phone]    To schedule an injection ordered by your provider:  Call Central Imaging Injection scheduling line: [phone]  [employer] available online at:  [email or web address]    Please call if any further questions or concerns ([phone]).  Clinic hours 8 am to 5 pm.    Return to clinic (call) if symptoms worsen or fail to improve.

## 2019-02-05 NOTE — PROGRESS NOTES
Chief Complaint: Consult (bilateral knee osteoarthritis)    Physician:  No ref. provider found    HPI: Glenroy Padilla is a 68 year old male who presents today for evaluation of his right knee    Symptom Profile  Location of symptoms:  knee  Onset: insidious  Duration of symptoms: multiple years   Quality of symptoms: aching and sharp  Severity: severe  Alleviate:activity modification   Exacerbating: activities   Previous Treatments: Previous treatments include activity modification, oral pain medication, physical therapy, intra-articular injections.    Current Status:  Results of the patient s Hip Disability and Osteoarthritis Outcome Score (HOOS)  are as follows (0-100 scales with 100 being the theoretical best):  Pain: 67.5   Symptoms: 60   ADLs: 50   Sports/Recreation:0  Quality of Life:0  ([email or web address].[email or web address]/)  [facility] Activity Score: 2    MEDICAL HISTORY:   Past Medical History:   Diagnosis Date     Acute pain of left knee 5/22/2017     Chronic pain of right knee 5/22/2017     Hx of coronary artery disease 5/22/2017     Hx of heart artery stent 5/22/2017     Mixed incontinence 8/13/2018     Obesity, morbid, BMI 40.0-49.9 (H) 11/20/2017     Open wound of left lower extremity without complication, initial encounter 5/22/2017     [place] (obstructive sleep apnea) 5/22/2017     Type 2 diabetes mellitus with other circulatory complication, without long-term current use of insulin (H) 5/22/2017     Venous stasis ulcer of left lower extremity (H) 5/22/2017     Venous stasis ulcer of left lower extremity (H) 5/22/2017       Medications:     Current Outpatient Medications:      acetaminophen (TYLENOL) 500 MG tablet, Take 500-1,000 mg by mouth, Disp: , Rfl:      blood glucose (NO BRAND SPECIFIED) lancing device, Use to test blood sugars 2 times daily or as directed., Disp: 1 each, Rfl: 0     blood glucose calibration (NO BRAND SPECIFIED) solution, To accompany: Blood Glucose Monitor Brands: per insurance., Disp: 1 Bottle, Rfl:  3     blood glucose monitoring (NO BRAND SPECIFIED) meter device kit, test blood sugar 2 xdaily or as directed.  Blood Glucose Monitor Brands: per insurance., Disp: 1 kit, Rfl: 0     blood glucose monitoring (NO BRAND SPECIFIED) test strip, Use to test blood sugar 2 times daily Blood Glucose Monitor Brands: Glucocard Expression, Disp: 100 strip, Rfl: 6     blood glucose monitoring (NO BRAND SPECIFIED) test strip, Use to test blood sugar 2x  daily or as directed. To accompany: Blood Glucose Monitor Brands: per insurance., Disp: 100 strip, Rfl: 1     Blood Glucose Monitoring Suppl (GLUCOCARD EXPRESSION MONITOR) W/DEVICE KIT, USE TWICE DAILY TO TEST BLOOD GLUCOSE, Disp: , Rfl: 0     clopidogrel (PLAVIX) 75 MG tablet, Take 75 mg by mouth, Disp: , Rfl:      clopidogrel (PLAVIX) 75 MG tablet, Take 1 tablet (75 mg) by mouth daily, Disp: 90 tablet, Rfl: 1     cyclobenzaprine (FLEXERIL) 10 MG tablet, TAKE 1 TAB BY MOUTH TWICE DAILY AS NEEDED FOR ROTATOR CUFF TEAR, Disp: , Rfl: 0     ferrous sulfate (IRON) 325 (65 Fe) MG tablet, Take 1 tablet (325 mg) by mouth daily (with breakfast), Disp: 90 tablet, Rfl: 1     furosemide (LASIX) 40 MG tablet, Take 40 mg by mouth, Disp: , Rfl:      furosemide (LASIX) 40 MG tablet, Take 1 tablet (40 mg) by mouth daily, Disp: 90 tablet, Rfl: 2     GNP IRON 200 (65 Fe) MG TABS, , Disp: , Rfl: 1     HYDROcodone-acetaminophen (NORCO) 5-325 MG per tablet, Take 1 tablet by mouth every 6 hours as needed for severe pain, Disp: , Rfl:      JANUMET XR  MG TB24, , Disp: , Rfl: 1     JENTADUETO XR 2.5-1000 MG per table, , Disp: , Rfl: 1     lidocaine (XYLOCAINE) 5 % ointment, Apply topically daily, Disp: 50 g, Rfl: 3     losartan (COZAAR) 25 MG tablet, Take 25 mg by mouth, Disp: , Rfl:      losartan (COZAAR) 25 MG tablet, Take 1 tablet (25 mg) by mouth daily, Disp: 90 tablet, Rfl: 1     methocarbamol (ROBAXIN) 500 MG tablet, Take 500 mg by mouth, Disp: , Rfl:      methocarbamol (ROBAXIN) 500 MG  tablet, Take 1 tablet (500 mg) by mouth 4 times daily as needed for muscle spasms, Disp: 70 tablet, Rfl: 1     metoprolol tartrate (LOPRESSOR) 25 MG tablet, Take 50 mg by mouth, Disp: , Rfl:      metoprolol tartrate (LOPRESSOR) 25 MG tablet, Take 1 tablet (25 mg) by mouth 2 times daily, Disp: 180 tablet, Rfl: 3     order for DME, Equipment being ordered: electric chair for sleeping and adjustment to allow for elevation of legs above the heart.  Zero gravity type heavy duty sleep chair advised. MaxiComforter ([id number]), Disp: 1 Units, Rfl: 0     order for DME, Equipment being ordered: Needs to have a sleep chair for home use., Disp: 1 Device, Rfl: 0     order for DME, Compression garments toe to knee.  Specific brand is Compraflex Light Compression garment.  Diagnosis codes for venous stasis ulcer I83.029 and for  Lymphoedema is I89.0  Goal is for compression of about 30 to 40 mm of mercury  measurements are right ankle 28.5 cm and right calf is 45.5 cm.  Left ankle is 27.5 cm and the calf is 45 cm  Tall for length., Disp: 2 Device, Rfl: 1     order for DME, Equipment being ordered: compression stockings below knee and above knee if available.  Medium compression., Disp: 1 Units, Rfl: 1     order for DME, One touch glucose monitoring strip with Glucometer and lancets., Disp: 1 Box, Rfl: 1     pantoprazole (PROTONIX) 40 MG EC tablet, Take 40 mg by mouth, Disp: , Rfl:      pantoprazole (PROTONIX) 40 MG EC tablet, Take 1 tablet (40 mg) by mouth daily Take 30-60 minutes before a meal. (Patient taking differently: Take 40 mg by mouth daily as needed Take 30-60 minutes before a meal.), Disp: 30 tablet, Rfl: 0     rosuvastatin (CRESTOR) 10 MG tablet, Take 10 mg by mouth, Disp: , Rfl:      rosuvastatin (CRESTOR) 10 MG tablet, Take 1 tablet (10 mg) by mouth daily, Disp: 90 tablet, Rfl: 1     senna-docusate (SENOKOT-S/PERICOLACE) 8.6-50 MG tablet, Take 1 tablet by mouth, Disp: , Rfl:      senna-docusate  (SENOKOT-S;PERICOLACE) 8.6-50 MG per tablet, Take 1 tablet by mouth 2 times daily as needed for constipation, Disp: 100 tablet, Rfl:      sitagliptin-metFORMIN (JANUMET)  MG per tablet, Take 1 tablet by mouth daily, Disp: , Rfl:      thin (NO BRAND SPECIFIED) lancets, Test 2 x daily or as directed.  Brands: per insurance., Disp: 100 each, Rfl: 1     Wound Dressings (TRIAD HYDROPHILIC WOUND DRESSI) PSTE, Externally apply 1 g topically daily, Disp: 1 Tube, Rfl: 3    Allergies: No known allergies    SURGICAL HISTORY:   Past Surgical History:   Procedure Laterality Date     CARDIAC SURGERY      stent placement     CHOLECYSTECTOMY       LAMINECTOMY LUMBAR THREE+ LEVELS N/A 3/13/2018    Procedure: LAMINECTOMY LUMBAR THREE+ LEVELS;  T5-9 LAMINECTOMIES, RESECTION OF EPIDURAL LIPOMATOSIS;  Surgeon: Hugh Mendieta MD;  Location:  OR       FAMILY HISTORY: No family history on file.    SOCIAL HISTORY:   Social History     Tobacco Use     Smoking status: Former Smoker     Types: Cigars     Smokeless tobacco: Never Used     Tobacco comment: exposure to second hand smoke   Substance Use Topics     Alcohol use: No       REVIEW OF SYSTEMS:  The comprehensive review of systems from the intake form was reviewed with the patient.  No fever, weight change or fatigue. No dry eyes. No oral ulcers, sore throat or voice change. No palpitations, syncope, angina or edema.  No chest pain, excessive sleepiness, shortness of breath or hemoptysis.   No abdominal pain, nausea, vomiting, diarrhea or heartburn.  No skin rash. No focal weakness or numbness. No bleeding or lymphadenopathy. No rhinitis or hives.     Exam:  On physical examination the patient appears the stated age, is in no acute distress, affectThe is appropriate, and breathing is non-labored.  Vitals are documented in the EMR and have been reviewed:    /68 (BP Location: Left arm, Patient Position: Sitting, Cuff Size: Adult Large)   Pulse 84   SpO2 96%   Data  Unavailable  There is no height or weight on file to calculate BMI.  5'10, 290#  BMI 41.6    Rises from chair: with effort   Gait: antalgic on the right  Gains the exam table: with effort   ROM is irritated with 25-85 of motion and symptoms reproduced with palpation at the medial and lateral joint lines. No effusion. Ligament examination is grossly normal.   Distally, the circulatory, motor, and sensation exam is intact with 5/5 EHL, gastroc-soleus, and tibialis anterior.  Sensation to light touch is decreased in the feet.  Dorsalis pedis is not palpable and posterior tibialis pulses is barely so.  There are no sores on the feet, there is skin discoloration consistent with severe lymphedema, and there are healing ulcers both shins.     X-rays:   Moderate to advanced osteoarthritis right knee, more involved on the medial joint space.     Assessment and Plan: This is a 68 year old with severe pain associated with knee osteoarthritis. The barriers here to knee osteoarthritis revolve around his history of obesity which though significantly decreased (he is down about 100 lbs) continues with residual issues in the form of lymphedema and slowly healing skin sores. We discussed that one of the issues that I have concerns with is his clinically poor vascular examination and whether or not if this contributes to his slowly healing skin ulcers and if it might affect his ability to heal an incision. We discussed the significantly higher rate of complications and infections in the patient population with lymphedema. We discussed the options available to investigate this and we have settled on getting ABIs. He is going to RTC to review and discuss treatment.

## 2019-02-06 ENCOUNTER — ANCILLARY PROCEDURE (OUTPATIENT)
Dept: ULTRASOUND IMAGING | Facility: CLINIC | Age: 69
End: 2019-02-06
Attending: ORTHOPAEDIC SURGERY
Payer: COMMERCIAL

## 2019-02-06 DIAGNOSIS — I89.0 LYMPHEDEMA OF BOTH LOWER EXTREMITIES: ICD-10-CM

## 2019-02-06 PROCEDURE — 93922 UPR/L XTREMITY ART 2 LEVELS: CPT | Performed by: INTERNAL MEDICINE

## 2019-02-12 RX ORDER — LINAGLIPTIN AND METFORMIN HYDROCHLORIDE 2.5; 1 MG/1; MG/1
TABLET, FILM COATED, EXTENDED RELEASE ORAL
Refills: 1 | Status: CANCELLED | OUTPATIENT
Start: 2019-02-12

## 2019-02-12 NOTE — TELEPHONE ENCOUNTER
I believe that this was a switch away from Janumet at some point in time.  I do not know what his dosing is for his frequency of use is.  Please contact the patient and get some more information and encourage a recheck office visit since it has been over 3 months since we saw him last.  I think it would be wise to begin the prior authorization but I do not know what his current regimen is with respect to this medication.  Electronically signed:    Julio Cesar Sahu PA-C

## 2019-02-12 NOTE — TELEPHONE ENCOUNTER
Spoke with patient patient does not want to change medication he like Jeromyumet he does not want to switch medications, he will call back to schedule appointment with Julio Cesar Esteban   Closing encounter  Josselin Montes RT (R)

## 2019-02-12 NOTE — TELEPHONE ENCOUNTER
Per fax from pharmacy a PRIOR AUTHORIZATION is needed for JENTADUETO XR 2.5mg, please advise if you would like a PA started   Thanks  Josselin NATION (R)       #[phone]  ID#[id number]  Group:[id number]  [facility] [id number]  N SBPARTD

## 2019-02-13 ENCOUNTER — TELEPHONE (OUTPATIENT)
Dept: FAMILY MEDICINE | Facility: OTHER | Age: 69
End: 2019-02-13

## 2019-02-13 ENCOUNTER — TELEPHONE (OUTPATIENT)
Dept: ORTHOPEDICS | Facility: CLINIC | Age: 69
End: 2019-02-13

## 2019-02-13 NOTE — TELEPHONE ENCOUNTER
Per Dr. Mcmahon's last office visit note, Pt needs to come back in to discuss results and options in clinic with Dr. Mcmahon. Will ask procedure  to assist in scheduling f/u visit.     Levon Gandhi RN

## 2019-02-13 NOTE — TELEPHONE ENCOUNTER
2/13 called and spoke to patient. He is currently schedule to come see Dr. Mcmahon on 2/19/19.     [facility] \[facility]\""   Procedure    Ortho/Sports Med/Ent/Eye   [facility]   [phone]

## 2019-02-13 NOTE — TELEPHONE ENCOUNTER
Reason for Call:  Form, our goal is to have forms completed with 72 hours, however, some forms may require a visit or additional information.    Type of letter, form or note:  medical    Who is the form from?: Home care    Where did the form come from: form was faxed in    What clinic location was the form placed at?: [facility] - [phone]    Where the form was placed: 's Box    What number is listed as a contact on the form?: fax [phone]       Additional comments: please complete and fax    Call taken on 2/13/2019 at 4:18 PM by Olivia Mayfield

## 2019-02-13 NOTE — TELEPHONE ENCOUNTER
[facility] Call Center    Phone Message    May a detailed message be left on voicemail: yes    Reason for Call: Requesting Results   Name/type of test: Ultrasound  Date of test: 02/06/19        Action Taken: Message routed to:  Adult Clinics: Sports Medicine p [phone]

## 2019-02-14 ENCOUNTER — MEDICAL CORRESPONDENCE (OUTPATIENT)
Dept: HEALTH INFORMATION MANAGEMENT | Facility: CLINIC | Age: 69
End: 2019-02-14

## 2019-02-15 ENCOUNTER — TELEPHONE (OUTPATIENT)
Dept: FAMILY MEDICINE | Facility: OTHER | Age: 69
End: 2019-02-15

## 2019-02-15 NOTE — TELEPHONE ENCOUNTER
Panel Management Review      Patient has the following on his problem list:     Diabetes    ASA: Passed    Last A1C  Lab Results   Component Value Date    A1C 5.7 08/23/2018    A1C 6.0 04/20/2018    A1C 5.3 03/26/2018    A1C 5.6 01/27/2018    A1C 6.2 09/25/2017     A1C tested: Passed    Last LDL:    Lab Results   Component Value Date    CHOL 128 09/25/2017     Lab Results   Component Value Date    HDL 43 09/25/2017     Lab Results   Component Value Date    LDL 76 03/09/2018    LDL 57 09/25/2017     Lab Results   Component Value Date    TRIG 140 09/25/2017     No results found for: CHOLHDLRATIO  Lab Results   Component Value Date    NHDL 85 09/25/2017       Is the patient on a Statin? YES             Is the patient on Aspirin? YES    Medications     HMG CoA Reductase Inhibitors    rosuvastatin (CRESTOR) 10 MG tablet    rosuvastatin (CRESTOR) 10 MG tablet          Last three blood pressure readings:  BP Readings from Last 3 Encounters:   02/05/19 128/68   11/19/18 122/70   11/15/18 110/58       Date of last diabetes office visit: 09/27/18     Tobacco History:     History   Smoking Status     Former Smoker     Types: Cigars   Smokeless Tobacco     Never Used     Comment: exposure to second hand smoke         Hypertension   Last three blood pressure readings:  BP Readings from Last 3 Encounters:   02/05/19 128/68   11/19/18 122/70   11/15/18 110/58     Blood pressure: Passed    HTN Guidelines:  Age 18-59 BP range:  Less than 140/90  Age 60-85 with Diabetes:  Less than 140/90  Age 60-85 without Diabetes:  less than 150/90      Composite cancer screening  Chart review shows that this patient is due/due soon for the following Fecal Colorectal (FIT)  Summary:    Patient is due/failing the following:   A1C, FOLLOW UP, FIT and LDL    Action needed:   Patient needs office visit for Diabetic follow up..    Type of outreach:    Phone, spoke to patient.  Patient scheduled.    Questions for provider review:    None                                                                                                                                     Lisa Zuluaga CMA (LAURA)       Chart routed to Care Team .

## 2019-02-19 ENCOUNTER — DOCUMENTATION ONLY (OUTPATIENT)
Dept: OTHER | Facility: CLINIC | Age: 69
End: 2019-02-19

## 2019-02-19 ENCOUNTER — OFFICE VISIT (OUTPATIENT)
Dept: ORTHOPEDICS | Facility: CLINIC | Age: 69
End: 2019-02-19
Payer: COMMERCIAL

## 2019-02-19 DIAGNOSIS — I73.9 PERIPHERAL ARTERY DISEASE (H): Primary | ICD-10-CM

## 2019-02-19 PROCEDURE — 99213 OFFICE O/P EST LOW 20 MIN: CPT | Performed by: ORTHOPAEDIC SURGERY

## 2019-02-19 NOTE — NURSING NOTE
Glenroy Padilla's chief complaint for this visit includes:  Chief Complaint   Patient presents with     RECHECK     bilateral leg ultrasound results review     PCP: Julio Cesar Esteban    Referring Provider:  No referring provider defined for this encounter.    There were no vitals taken for this visit.  Data Unavailable     Do you need any medication refills at today's visit? no

## 2019-02-19 NOTE — PATIENT INSTRUCTIONS
Thanks for coming today.  Ortho/Sports Medicine Clinic  [address] [place] [address]    To schedule future appointments in Ortho Clinic, you may call [phone].    To schedule ordered imaging by your provider:   Call Central Imaging [phone]    To schedule an injection ordered by your provider:  Call Central Imaging Injection scheduling line: [phone]  GRAM Acquisitionhart available online at:  [email or web address]    Please call if any further questions or concerns ([phone]).  Clinic hours 8 am to 5 pm.    Return to clinic (call) if symptoms worsen or fail to improve.

## 2019-02-19 NOTE — TELEPHONE ENCOUNTER
FUTURE VISIT INFORMATION      FUTURE VISIT INFORMATION:    Date: 2/20    Time: 220    Location: Vascular  REFERRAL INFORMATION:    Referring provider:       Referring providers clinic:  Ortho    Reason for visit/diagnosis  PAD    RECORDS REQUESTED FROM:       Clinic name Comments Records Status Imaging Status   [facility]  2/6/19 CT  Internal

## 2019-02-19 NOTE — PROGRESS NOTES
"Received referral via \"in-basket\", per  guidelines referral forwarded to [facility].    Kateryna Fall, MERYLN, RN    "

## 2019-02-19 NOTE — PROGRESS NOTES
"Reviewed results with patient. Discussed abnormalities on the exam including monophasic wave form. Patient reports that he has known \"blockage\" on the left. Considering the symmetry of the exam have suggested that next step is to see vascular and get their opinion on wound healing for elective surgery. Referral made, Vascular at  in March.     Twenty minutes were spent with the patient with greater than 50% on counseling and coordination of care.       Bilateral lower extremity segmental pressures without exercise dated  2/6/2019 11:59 AM     Comparison study: 6/1/17     Clinical history: Lymphedema Sores     Ordering clinician: ZINA SAENZ     Findings:                  Right Leg:      Segmental pressures:   Brachial artery: 156 mmHg   PT ankle: 128 mmHg     DP foot: 122 mmHg     MINESH: 0.82 mmHg     Doppler wave form:  PTA: Monophasic  DPA: Monophasic     Left Leg:     Segmental pressures:   Brachial artery: 150 mmHg   PT ankle: 130 mmHg     DP foot: 110 mmHg     MINESH: 0.83 mmHg     Doppler wave form:  PTA: Monophasic  DPA: Monophasic                                                                         Impression:  1. Right Leg: MINESH 0.82, consistent with mild-moderate peripheral  artery disease. Duplex waveforms demonstrate monophasic flow distally  suggestive of tibial or small vessel disease.  2. Left Leg: MINESH 0.83, consistent with mild-moderate peripheral artery  disease. Duplex waveforms demonstrate monophasic flow distally  suggestive of tibial or small vessel disease.     Compared to prior study, there is a slight progression of disease in  bilateral lower extremity arterial disease.      MINESH Diagnostic Criteria:   > 1.4: Non compressible.  1.00 - 1.40: Normal.  0.91 - 0.99: Borderline.  At or below 0.90: Abnormal.     Guideline criteria published in Circulation 2011; 124: 9265-3569.     JULIAN LOUIS MD  "

## 2019-02-19 NOTE — LETTER
"    2/19/2019         RE: Glenroy Padilla  [address] [address]        Dear Colleague,    Thank you for referring your patient, Glenroy Padilla, to the [facility]. Please see a copy of my visit note below.    Reviewed results with patient. Discussed abnormalities on the exam including monophasic wave form. Patient reports that he has known \"blockage\" on the left. Considering the symmetry of the exam have suggested that next step is to see vascular and get their opinion on wound healing for elective surgery. Referral made, Vascular at  in March.     Twenty minutes were spent with the patient with greater than 50% on counseling and coordination of care.       Bilateral lower extremity segmental pressures without exercise dated  2/6/2019 11:59 AM     Comparison study: 6/1/17     Clinical history: Lymphedema Sores     Ordering clinician: ZINA SAENZ     Findings:                  Right Leg:      Segmental pressures:   Brachial artery: 156 mmHg   PT ankle: 128 mmHg     DP foot: 122 mmHg     MINESH: 0.82 mmHg     Doppler wave form:  PTA: Monophasic  DPA: Monophasic     Left Leg:     Segmental pressures:   Brachial artery: 150 mmHg   PT ankle: 130 mmHg     DP foot: 110 mmHg     MINESH: 0.83 mmHg     Doppler wave form:  PTA: Monophasic  DPA: Monophasic                                                                         Impression:  1. Right Leg: MINESH 0.82, consistent with mild-moderate peripheral  artery disease. Duplex waveforms demonstrate monophasic flow distally  suggestive of tibial or small vessel disease.  2. Left Leg: MINESH 0.83, consistent with mild-moderate peripheral artery  disease. Duplex waveforms demonstrate monophasic flow distally  suggestive of tibial or small vessel disease.     Compared to prior study, there is a slight progression of disease in  bilateral lower extremity arterial disease.      MINESH Diagnostic Criteria:   > 1.4: Non compressible.  1.00 - 1.40: " Normal.  0.91 - 0.99: Borderline.  At or below 0.90: Abnormal.     Guideline criteria published in Circulation 2011; 124: 0216-0821.     JULIAN LOUIS MD    Again, thank you for allowing me to participate in the care of your patient.        Sincerely,        Ruiz Mcmahon MD

## 2019-02-20 ENCOUNTER — PRE VISIT (OUTPATIENT)
Dept: VASCULAR SURGERY | Facility: CLINIC | Age: 69
End: 2019-02-20

## 2019-02-21 ENCOUNTER — MEDICAL CORRESPONDENCE (OUTPATIENT)
Dept: HEALTH INFORMATION MANAGEMENT | Facility: CLINIC | Age: 69
End: 2019-02-21

## 2019-02-21 ENCOUNTER — OFFICE VISIT (OUTPATIENT)
Dept: FAMILY MEDICINE | Facility: OTHER | Age: 69
End: 2019-02-21
Payer: COMMERCIAL

## 2019-02-21 VITALS
HEIGHT: 70 IN | OXYGEN SATURATION: 94 % | WEIGHT: 292.7 LBS | HEART RATE: 80 BPM | SYSTOLIC BLOOD PRESSURE: 130 MMHG | RESPIRATION RATE: 20 BRPM | DIASTOLIC BLOOD PRESSURE: 70 MMHG | TEMPERATURE: 97.7 F | BODY MASS INDEX: 41.9 KG/M2

## 2019-02-21 DIAGNOSIS — K21.9 GASTROESOPHAGEAL REFLUX DISEASE WITHOUT ESOPHAGITIS: ICD-10-CM

## 2019-02-21 DIAGNOSIS — E66.01 MORBID OBESITY DUE TO EXCESS CALORIES (H): ICD-10-CM

## 2019-02-21 DIAGNOSIS — M48.00 CENTRAL SPINAL STENOSIS: ICD-10-CM

## 2019-02-21 DIAGNOSIS — S81.802A OPEN WOUND OF LEFT LOWER EXTREMITY WITHOUT COMPLICATION, INITIAL ENCOUNTER: ICD-10-CM

## 2019-02-21 DIAGNOSIS — I99.8 ISCHEMIC VASCULAR DISEASE: ICD-10-CM

## 2019-02-21 DIAGNOSIS — G89.29 CHRONIC PAIN OF RIGHT KNEE: Primary | ICD-10-CM

## 2019-02-21 DIAGNOSIS — I83.029 VENOUS STASIS ULCER OF LEFT LOWER EXTREMITY (H): ICD-10-CM

## 2019-02-21 DIAGNOSIS — G62.9 NEUROPATHY: ICD-10-CM

## 2019-02-21 DIAGNOSIS — L97.929 VENOUS STASIS ULCER OF LEFT LOWER EXTREMITY (H): ICD-10-CM

## 2019-02-21 DIAGNOSIS — E11.59 TYPE 2 DIABETES MELLITUS WITH OTHER CIRCULATORY COMPLICATION, WITHOUT LONG-TERM CURRENT USE OF INSULIN (H): ICD-10-CM

## 2019-02-21 DIAGNOSIS — M25.561 CHRONIC PAIN OF RIGHT KNEE: Primary | ICD-10-CM

## 2019-02-21 DIAGNOSIS — M48.061 FORAMINAL STENOSIS OF LUMBAR REGION: ICD-10-CM

## 2019-02-21 LAB
ALBUMIN SERPL-MCNC: 3.5 G/DL (ref 3.4–5)
ALP SERPL-CCNC: 72 U/L (ref 40–150)
ALT SERPL W P-5'-P-CCNC: 18 U/L (ref 0–70)
ANION GAP SERPL CALCULATED.3IONS-SCNC: 7 MMOL/L (ref 3–14)
AST SERPL W P-5'-P-CCNC: 17 U/L (ref 0–45)
BILIRUB SERPL-MCNC: 0.7 MG/DL (ref 0.2–1.3)
BUN SERPL-MCNC: 26 MG/DL (ref 7–30)
CALCIUM SERPL-MCNC: 9.3 MG/DL (ref 8.5–10.1)
CHLORIDE SERPL-SCNC: 100 MMOL/L (ref 94–109)
CHOLEST SERPL-MCNC: 134 MG/DL
CO2 SERPL-SCNC: 31 MMOL/L (ref 20–32)
CREAT SERPL-MCNC: 1.23 MG/DL (ref 0.66–1.25)
CREAT UR-MCNC: 81 MG/DL
GFR SERPL CREATININE-BSD FRML MDRD: 59 ML/MIN/{1.73_M2}
GLUCOSE SERPL-MCNC: 155 MG/DL (ref 70–99)
HBA1C MFR BLD: 6.1 % (ref 0–5.6)
HDLC SERPL-MCNC: 40 MG/DL
LDLC SERPL CALC-MCNC: 61 MG/DL
MICROALBUMIN UR-MCNC: 107 MG/L
MICROALBUMIN/CREAT UR: 131.77 MG/G CR (ref 0–17)
NONHDLC SERPL-MCNC: 94 MG/DL
POTASSIUM SERPL-SCNC: 4.2 MMOL/L (ref 3.4–5.3)
PROT SERPL-MCNC: 8.6 G/DL (ref 6.8–8.8)
SODIUM SERPL-SCNC: 138 MMOL/L (ref 133–144)
TRIGL SERPL-MCNC: 166 MG/DL

## 2019-02-21 PROCEDURE — 99000 SPECIMEN HANDLING OFFICE-LAB: CPT | Performed by: PHYSICIAN ASSISTANT

## 2019-02-21 PROCEDURE — 83036 HEMOGLOBIN GLYCOSYLATED A1C: CPT | Performed by: PHYSICIAN ASSISTANT

## 2019-02-21 PROCEDURE — 80061 LIPID PANEL: CPT | Performed by: PHYSICIAN ASSISTANT

## 2019-02-21 PROCEDURE — 36415 COLL VENOUS BLD VENIPUNCTURE: CPT | Performed by: PHYSICIAN ASSISTANT

## 2019-02-21 PROCEDURE — 80307 DRUG TEST PRSMV CHEM ANLYZR: CPT | Mod: 90 | Performed by: PHYSICIAN ASSISTANT

## 2019-02-21 PROCEDURE — 82043 UR ALBUMIN QUANTITATIVE: CPT | Performed by: PHYSICIAN ASSISTANT

## 2019-02-21 PROCEDURE — 80053 COMPREHEN METABOLIC PANEL: CPT | Performed by: PHYSICIAN ASSISTANT

## 2019-02-21 PROCEDURE — 99214 OFFICE O/P EST MOD 30 MIN: CPT | Performed by: PHYSICIAN ASSISTANT

## 2019-02-21 ASSESSMENT — PAIN SCALES - GENERAL: PAINLEVEL: NO PAIN (0)

## 2019-02-21 ASSESSMENT — ANXIETY QUESTIONNAIRES
3. WORRYING TOO MUCH ABOUT DIFFERENT THINGS: SEVERAL DAYS
1. FEELING NERVOUS, ANXIOUS, OR ON EDGE: SEVERAL DAYS
GAD7 TOTAL SCORE: 3
2. NOT BEING ABLE TO STOP OR CONTROL WORRYING: NOT AT ALL
5. BEING SO RESTLESS THAT IT IS HARD TO SIT STILL: NOT AT ALL
7. FEELING AFRAID AS IF SOMETHING AWFUL MIGHT HAPPEN: NOT AT ALL
6. BECOMING EASILY ANNOYED OR IRRITABLE: NOT AT ALL
GAD7 TOTAL SCORE: 3
7. FEELING AFRAID AS IF SOMETHING AWFUL MIGHT HAPPEN: NOT AT ALL
4. TROUBLE RELAXING: SEVERAL DAYS
GAD7 TOTAL SCORE: 3

## 2019-02-21 ASSESSMENT — MIFFLIN-ST. JEOR: SCORE: 2093.93

## 2019-02-21 ASSESSMENT — PATIENT HEALTH QUESTIONNAIRE - PHQ9
10. IF YOU CHECKED OFF ANY PROBLEMS, HOW DIFFICULT HAVE THESE PROBLEMS MADE IT FOR YOU TO DO YOUR WORK, TAKE CARE OF THINGS AT HOME, OR GET ALONG WITH OTHER PEOPLE: NOT DIFFICULT AT ALL
SUM OF ALL RESPONSES TO PHQ QUESTIONS 1-9: 5
SUM OF ALL RESPONSES TO PHQ QUESTIONS 1-9: 5

## 2019-02-21 NOTE — PROGRESS NOTES
"    SUBJECTIVE:                                                    Glenroy Padilla is a 68 year old male who presents to clinic today for the following health issues:  {Provider please address medication reconciliation discrepancies--rooming staff please delete if no med/rec issues}    {Superlists:949277}    {additional problems for provider to add:300702}    Problem list and histories reviewed & adjusted, as indicated.  Additional history: {NONE - AS DOCUMENTED:385616::\"as documented\"}    {HIST REVIEW/ LINKS 2:217361}    {PROVIDER CHARTING PREFERENCE:656662}      "

## 2019-02-21 NOTE — PROGRESS NOTES
SUBJECTIVE:   Glenroy Padilla is a 68 year old male who presents to clinic today for the following health issues:      History of Present Illness     Diabetes:     Frequency of checking blood sugars::  Weekly    Diabetic concerns::  None (Sore on left foot. )    Hypoglycemia symptoms::  None    Paraesthesia present::  No    Eye Exam in the last year::  Yes    Diabetes Management Resources  Frequency of exercise:  4-5 days/week  Duration of exercise:  15-30 minutes  Taking medications regularly:  Yes  Medication side effects:  None  Additional concerns today:  No    Patient presents today with concern of a blister that he has to the medial aspect of his left lower foot.  He is unaware of anything that he may have come into contact with this region that has caused the blister.  He has significant vascular disease to the left lower extremity with poor blood flow and circulation noted.  He continues to struggle with neuropathy related to diabetes but today his overall exam appears to be fairly stable.  We discussed the fact that he has been applying for a 0 gravity chair that will allow for his distal extremities to be raised above the level of his heart since the current recliner that he is using does not allow for this we discussed the fact that it would be wise to have a chair that does.  He assures me that the 0 gravity chair that he is applying for would do just that.  In my opinion this would be a wise measure for him to utilize to decrease his swelling and hopefully increase the healing that is allowed with this type of device.    Problem list and histories reviewed & adjusted, as indicated.  Additional history: as documented      Patient Active Problem List   Diagnosis     Type 2 diabetes mellitus with other circulatory complication, without long-term current use of insulin (H)     Venous stasis ulcer of left lower extremity (H)     Acute pain of left knee     Chronic pain of right knee     Open wound of left  lower extremity without complication, initial encounter     Hx of coronary artery disease     Hx of heart artery stent     [place] (obstructive sleep apnea)     Hypertension, goal below 140/90     Hyperlipidemia LDL goal <100     History of coronary artery stent placement     Advanced directives, counseling/discussion     Morbid obesity due to excess calories (H)     Neuropathy     Foot drop, right     Cardiomegaly     Gastroesophageal reflux disease without esophagitis     Falls frequently     Weakness of both legs     Central spinal stenosis L3-4 and L4-5     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     Lymphedema     S/P spinal surgery     Complete tear of left rotator cuff     Lymphedema of right lower extremity     Primary osteoarthritis of right knee     Mixed incontinence     Iron deficiency anemia secondary to inadequate dietary iron intake     Past Surgical History:   Procedure Laterality Date     CARDIAC SURGERY      stent placement     CHOLECYSTECTOMY       LAMINECTOMY LUMBAR THREE+ LEVELS N/A 3/13/2018    Procedure: LAMINECTOMY LUMBAR THREE+ LEVELS;  T5-9 LAMINECTOMIES, RESECTION OF EPIDURAL LIPOMATOSIS;  Surgeon: Hugh Mendieta MD;  Location:  OR       Social History     Tobacco Use     Smoking status: Former Smoker     Types: Cigars     Smokeless tobacco: Never Used     Tobacco comment: exposure to second hand smoke   Substance Use Topics     Alcohol use: No     History reviewed. No pertinent family history.      Current Outpatient Medications   Medication Sig Dispense Refill     acetaminophen (TYLENOL) 500 MG tablet Take 500-1,000 mg by mouth       blood glucose (NO BRAND SPECIFIED) lancing device Use to test blood sugars 2 times daily or as directed. 1 each 0     blood glucose calibration (NO BRAND SPECIFIED) solution To accompany: Blood Glucose Monitor Brands: per insurance. 1 Bottle 3     blood glucose monitoring (NO BRAND SPECIFIED) meter device kit test blood sugar 2 xdaily or as directed.   Blood Glucose Monitor Brands: per insurance. 1 kit 0     blood glucose monitoring (NO BRAND SPECIFIED) test strip Use to test blood sugar 2 times daily Blood Glucose Monitor Brands: Glucocard Expression 100 strip 6     blood glucose monitoring (NO BRAND SPECIFIED) test strip Use to test blood sugar 2x  daily or as directed. To accompany: Blood Glucose Monitor Brands: per insurance. 100 strip 1     Blood Glucose Monitoring Suppl (GLUCOCARD EXPRESSION MONITOR) W/DEVICE KIT USE TWICE DAILY TO TEST BLOOD GLUCOSE  0     clopidogrel (PLAVIX) 75 MG tablet Take 1 tablet (75 mg) by mouth daily 90 tablet 1     ferrous sulfate (IRON) 325 (65 Fe) MG tablet Take 1 tablet (325 mg) by mouth daily (with breakfast) 90 tablet 1     furosemide (LASIX) 40 MG tablet Take 1 tablet (40 mg) by mouth daily 90 tablet 2     losartan (COZAAR) 25 MG tablet Take 1 tablet (25 mg) by mouth daily 90 tablet 1     methocarbamol (ROBAXIN) 500 MG tablet Take 1 tablet (500 mg) by mouth 4 times daily as needed for muscle spasms 70 tablet 1     metoprolol tartrate (LOPRESSOR) 25 MG tablet Take 1 tablet (25 mg) by mouth 2 times daily 180 tablet 3     order for DME Equipment being ordered: electric chair for sleeping and adjustment to allow for elevation of legs above the heart.  Zero gravity type heavy duty sleep chair advised. MaxConchitamadaner ([id number]) 1 Units 0     order for DME Equipment being ordered: Needs to have a sleep chair for home use. 1 Device 0     order for DME Compression garments toe to knee.  Specific brand is Compraflex Light Compression garment.   Diagnosis codes for venous stasis ulcer I83.029 and for  Lymphoedema is I89.0   Goal is for compression of about 30 to 40 mm of mercury   measurements are right ankle 28.5 cm and right calf is 45.5 cm.  Left ankle is 27.5 cm and the calf is 45 cm   Tall for length. 2 Device 1     order for DME Equipment being ordered: compression stockings below knee and above knee if available.  Medium  compression. 1 Units 1     rosuvastatin (CRESTOR) 10 MG tablet Take 1 tablet (10 mg) by mouth daily 90 tablet 1     senna-docusate (SENOKOT-S/PERICOLACE) 8.6-50 MG tablet Take 1 tablet by mouth       senna-docusate (SENOKOT-S;PERICOLACE) 8.6-50 MG per tablet Take 1 tablet by mouth 2 times daily as needed for constipation 100 tablet      sitagliptin-metFORMIN (JANUMET)  MG per tablet Take 1 tablet by mouth daily       thin (NO BRAND SPECIFIED) lancets Test 2 x daily or as directed.  Brands: per insurance. 100 each 1     Wound Dressings (TRIAD HYDROPHILIC WOUND DRESSI) PSTE Externally apply 1 g topically daily 1 Tube 3     cyclobenzaprine (FLEXERIL) 10 MG tablet TAKE 1 TAB BY MOUTH TWICE DAILY AS NEEDED FOR ROTATOR CUFF TEAR  0     HYDROcodone-acetaminophen (NORCO) 5-325 MG per tablet Take 1 tablet by mouth every 6 hours as needed for severe pain       JANUMET XR  MG TB24   1     JENTADUETO XR 2.5-1000 MG per table   1     methocarbamol (ROBAXIN) 500 MG tablet Take 500 mg by mouth       Allergies   Allergen Reactions     No Known Allergies      Recent Labs   Lab Test 10/11/18  1106 08/23/18  1049 08/01/18 04/20/18  1010 03/26/18 03/09/18  1020  09/25/17  1216 05/22/17  1029   A1C  --  5.7*  --  6.0 5.3  --   --    < > 6.2* 6.4*   LDL  --   --   --   --   --   --  76  --  57 64   HDL  --   --   --   --   --   --   --   --  43 42   TRIG  --   --   --   --   --   --   --   --  140 118   ALT  --  13 17  --   --   --  19  --  19 20   CR  --  1.13 1.10  --   --    < > 0.91   < > 0.92 0.91   GFRESTIMATED 60* 64 >60  --   --    < > 82   < > 81 82   GFRESTBLACK 72 78 >60  --   --    < > >90   < > >90 >90  African American GFR Calc     POTASSIUM  --  4.0 4.6  --   --    < > 4.6   < > 4.1 4.0   TSH  --   --   --   --   --   --   --   --  2.07  --     < > = values in this interval not displayed.      BP Readings from Last 3 Encounters:   02/21/19 130/70   02/05/19 128/68   11/19/18 122/70    Wt Readings from Last 3  "Encounters:   02/21/19 132.8 kg (292 lb 11.2 oz)   11/19/18 139.1 kg (306 lb 11.2 oz)   10/11/18 136.2 kg (300 lb 3.2 oz)                  Labs reviewed in EPIC    ROS:  In general he denies any excessive malaise or fatigue he denies any unintentional weight loss or gain.  He denies any problems with eyes ears nose or throat though does admit to some mild rhinorrhea when he changes from inside to outside which is consistent with vasomotor rhinitis.  He denies any chest pain pressure palpitations related to cardiovascular concerns.  Denies any shortness of breath dyspnea on exertion but does admit to not using his CPAP as often as he should for his obstructive sleep apnea.  He denies any gastroenteritis heartburn or other related symptoms.  He denies any urination issues.  Patient he denies any further musculoskeletal problems other than those listed below in the assessment and plan.  Denies any psychiatric problems at this point in time.     OBJECTIVE:     /70 (Cuff Size: Adult Large)   Pulse 80   Temp 97.7  F (36.5  C) (Temporal)   Resp 20   Ht 1.778 m (5' 10\")   Wt 132.8 kg (292 lb 11.2 oz)   SpO2 94%   BMI 42.00 kg/m    Body mass index is 42 kg/m .  GENERAL: healthy, alert and no distress  NECK: no adenopathy, no asymmetry, masses, or scars and thyroid normal to palpation  RESP: lungs clear to auscultation - no rales, rhonchi or wheezes  CV: regular rate and rhythm, normal S1 S2, no S3 or S4, no murmur, click or rub, no peripheral edema and peripheral pulses strong  ABDOMEN: soft, nontender, no hepatosplenomegaly, no masses and bowel sounds normal  MS: no gross musculoskeletal defects noted, no edema  SKIN: The skin to the calf is beefy and uneven surface.  Evidence of venous ulceration of the left lower extremity is noted again today however this seems to be clearing up relatively well though slowly.  He does have the blister to the medial aspect of the left foot at the ball.  NEURO: Normal strength " "and tone, sensory exam grossly normal and mentation intact  PSYCH: mentation appears normal, affect normal/bright  Diabetic foot exam: Overall today am surprised to see that he is sensation at the toes is relatively maintained bilaterally.  I am especially surprised that this is present on the left foot today.  Dry skin is noted to the foot.  Blister to the medial aspect of the left fOot is noted.    Diagnostic Test Results:  No results found for this or any previous visit (from the past 24 hour(s)).    ASSESSMENT/PLAN:     BP Screening:   Last 3 BP Readings:    BP Readings from Last 3 Encounters:   02/21/19 130/70   02/05/19 128/68   11/19/18 122/70       The following was recommended to the patient:  Re-screen BP within a year and recommended lifestyle modifications  BMI:   Estimated body mass index is 42 kg/m  as calculated from the following:    Height as of this encounter: 1.778 m (5' 10\").    Weight as of this encounter: 132.8 kg (292 lb 11.2 oz).   Weight management plan: Discussed healthy diet and exercise guidelines      1. Venous stasis ulcer of left lower extremity (H)  2. Morbid obesity due to excess calories (H)  3. Type 2 diabetes mellitus with other circulatory complication, without long-term current use of insulin (H)  Monitoring labs done today we will just treatment based on results  - Lipid panel reflex to direct LDL Fasting  - Hemoglobin A1c  - Comprehensive metabolic panel  - Albumin Random Urine Quantitative with Creat Ratio    4. Gastroesophageal reflux disease without esophagitis  No new concerns with respect to this today.    5. Chronic pain of right knee  6. Central spinal stenosis L3-4 and L4-5  7. Neuropathy  8. 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 Open wound of left lower extremity without complication, initial encounter  Stable at this point in time.  No new concerns.  - [id number] - Pain Drug Screen, Urine, with reported meds (Med[facility]ox)    Follow-up in 6 months is advised.    Julio Cesar" Lamonte Valerio PA-C  [facility]  Answers for HPI/ROS submitted by the patient on 2/21/2019   Chronic problems general questions HPI Form  If you checked off any problems, how difficult have these problems made it for you to do your work, take care of things at home, or get along with other people?: Not difficult at all  PHQ9 TOTAL SCORE: 5  ARNALDO 7 TOTAL SCORE: 3

## 2019-02-22 ASSESSMENT — PATIENT HEALTH QUESTIONNAIRE - PHQ9: SUM OF ALL RESPONSES TO PHQ QUESTIONS 1-9: 5

## 2019-02-22 ASSESSMENT — ANXIETY QUESTIONNAIRES: GAD7 TOTAL SCORE: 3

## 2019-02-26 LAB — PAIN DRUG SCR UR W RPTD MEDS: NORMAL

## 2019-02-27 ENCOUNTER — OFFICE VISIT (OUTPATIENT)
Dept: VASCULAR SURGERY | Facility: CLINIC | Age: 69
End: 2019-02-27
Payer: COMMERCIAL

## 2019-02-27 VITALS — DIASTOLIC BLOOD PRESSURE: 50 MMHG | OXYGEN SATURATION: 97 % | SYSTOLIC BLOOD PRESSURE: 101 MMHG | HEART RATE: 82 BPM

## 2019-02-27 DIAGNOSIS — M79.3 LIPODERMATOSCLEROSIS: ICD-10-CM

## 2019-02-27 DIAGNOSIS — I87.8 VENOUS STASIS: Primary | ICD-10-CM

## 2019-02-27 ASSESSMENT — PAIN SCALES - GENERAL: PAINLEVEL: MILD PAIN (2)

## 2019-02-27 NOTE — NURSING NOTE
Vascular Rooming Note     Glenroy Padilla's goals for this visit include:   Chief Complaint   Patient presents with     Consult     Glenroy is being seen today for a consult (3rd opinion) for PAD,      Sydnie Shelby LPN

## 2019-02-27 NOTE — PROGRESS NOTES
Addendum 3/18/19:    A review of his venous incompetency ultrasound performed 3/1/2019 does reveal mild degree of superficial venous incompetency, particularly of the right GSV and segmentally of the left GSV.     In the overall clinical picture of the degree/severity of his lower extremity swelling/edema and chronicity, I suspect this is a small factor overall, and mostly related to severe lymphedema, obesity, and inactivity/calf pump failure.     Currently, I think he is high risk for venous treatment due to poor ambulation and DVT risk. Furthermore, I don't think any treatment of his superficial venous incompetency would reduce his swelling significantly. Additionally, he currently does not have any active ulcerations to warrant the risk of treatment.     As for his wound healing capability from a  Knee arthroplasty, his ankle pressures suggest adequate inflow to heal, no clinical signs of significant arterial ischemia, although he does have mild PAD due to mildly diminished bilateral MINESH.       \Mr. Padilla presents to care for consultation regarding lymphedema, lower extremity swelling, chronic in the setting of recurrent venous stasis/lymphedema ulcers. He is in a motorized vehicle/wheelchair.     He is referred for consideration of vascular optimization prior to consideration of knee arthroplasties. He has a complex history of present illness but it appears he has suffered from lymphedema for many years and dealt with many different providers for care including lymphedema clinic, ultimately leading to various degrees of wraps and compression.     He most recently has been in stocking compression with additional what appears to be a brace type compression which has reduced the size of his legs considerably. He can ambulate but for only a short distance, he walks with a cane/assistance but is mostly off his feet. He has had multiple wounds in the past due to minor trauma but nothing active apart from a blister  along his medial left forefoot.     He denies any rest pain, he has severe neuropathy with diminished sensation in his feet.    He is obese but does report weight loss with diet over the past couple of years.    O:  Gen: in motorized vehicle, nad  Legs: Left leg unwrapped, severe lipodermatosclerosis with old healed wounds and scarring anterior shin/pretibial region. Tense subcutaneous tissues. Normal capillary refill. Coloration is preserved with leg elevation. Sensation diminished. Large unruptured blister medial forefoot.     Imaging: Old venous incompetency from 6/1/2017 at [facility] [place] without significant venous incompetency.    MINESH from 2/6/2019 which shows left MINESH of 0.83 and right MINESH 0.82. Monophasic waveforms at ankle.   CTA from 10/10/2018 which shows patent vasculature to knee without significant arterial stenosis.     A/P:  CEAP 4/5 bilaterally. Physical exam and history most consistent with lymphedema and severe venous stasis changes with lipodermatosclerosis and history of multiple healed venous stasis ulcers. No current ulceration. He does have mild arterial insufficiency with likely disease of his tibial vessels in his calves, vessels leading into the level of the knee are widely patent.     The venous competency ultrasound from 6/2017 is older than 1 year and unclear if he was standing during the test. Although I anticipate he likely won't be able to stand for a large portion of the study, I would like to repeat his venous competency ultrasound. If there is superficial venous incompetency that we can treat to help improve his venous stasis in addition to the aggressive compressive wraps he is currently receiving, it would be indicated. From an arterial standpoint he should be able to heal his prosthesis.     Total of approximately 30 minutes spent with the patient of which >50% spent on counseling.

## 2019-02-27 NOTE — LETTER
2/27/2019       RE: Glenroy Padilla  [address] [address]     Dear Colleague,    Thank you for referring your patient, Glenroy Padilla, to the [facility] at [facility]. Please see a copy of my visit note below.    Mr. Padilla presents to care for consultation regarding lymphedema, lower extremity swelling, chronic in the setting of recurrent venous stasis/lymphedema ulcers. He is in a motorized vehicle/wheelchair.     He is referred for consideration of vascular optimization prior to consideration of knee arthroplasties. He has a complex history of present illness but it appears he has suffered from lymphedema for many years and dealt with many different providers for care including lymphedema clinic, ultimately leading to various degrees of wraps and compression.     He most recently has been in stocking compression with additional what appears to be a brace type compression which has reduced the size of his legs considerably. He can ambulate but for only a short distance, he walks with a cane/assistance but is mostly off his feet. He has had multiple wounds in the past due to minor trauma but nothing active apart from a blister along his medial left forefoot.     He denies any rest pain, he has severe neuropathy with diminished sensation in his feet.    He is obese but does report weight loss with diet over the past couple of years.    O:  Gen: in motorized vehicle, nad  Legs: Left leg unwrapped, severe lipodermatosclerosis with old healed wounds and scarring anterior shin/pretibial region. Tense subcutaneous tissues. Normal capillary refill. Coloration is preserved with leg elevation. Sensation diminished. Large unruptured blister medial forefoot.     Imaging: Old venous incompetency from 6/1/2017 at [facility] without significant venous incompetency.    MINESH from 2/6/2019 which shows left MINESH of 0.83 and right MINESH 0.82. Monophasic  waveforms at ankle.   CTA from 10/10/2018 which shows patent vasculature to knee without significant arterial stenosis.     A/P:  CEAP 4/5 bilaterally. Physical exam and history most consistent with lymphedema and severe venous stasis changes with lipodermatosclerosis and history of multiple healed venous stasis ulcers. No current ulceration. He does have mild arterial insufficiency with likely disease of his tibial vessels in his calves, vessels leading into the level of the knee are widely patent.     The venous competency ultrasound from 6/2017 is older than 1 year and unclear if he was standing during the test. Although I anticipate he likely won't be able to stand for a large portion of the study, I would like to repeat his venous competency ultrasound. If there is superficial venous incompetency that we can treat to help improve his venous stasis in addition to the aggressive compressive wraps he is currently receiving, it would be indicated. From an arterial standpoint he should be able to heal his prosthesis.     Total of approximately 30 minutes spent with the patient of which >50% spent on counseling.     Again, thank you for allowing me to participate in the care of your patient.      Sincerely,    Hugo Heller MD

## 2019-03-01 ENCOUNTER — ANCILLARY PROCEDURE (OUTPATIENT)
Dept: ULTRASOUND IMAGING | Facility: CLINIC | Age: 69
End: 2019-03-01
Attending: RADIOLOGY
Payer: COMMERCIAL

## 2019-03-01 DIAGNOSIS — I87.8 VENOUS STASIS: ICD-10-CM

## 2019-03-04 ENCOUNTER — TELEPHONE (OUTPATIENT)
Dept: ORTHOPEDICS | Facility: CLINIC | Age: 69
End: 2019-03-04

## 2019-03-04 NOTE — TELEPHONE ENCOUNTER
[facility] Call Center    Phone Message    May a detailed message be left on voicemail: yes    Reason for Call: Requesting Results   Name/type of test: Ultrasound     Date of test: 3/1/19         Action Taken: Message routed to:  Adult Clinics: Orthopedics p [phone]

## 2019-03-06 ENCOUNTER — TELEPHONE (OUTPATIENT)
Dept: VASCULAR SURGERY | Facility: CLINIC | Age: 69
End: 2019-03-06

## 2019-03-08 ENCOUNTER — OFFICE VISIT (OUTPATIENT)
Dept: SURGERY | Facility: CLINIC | Age: 69
End: 2019-03-08
Payer: COMMERCIAL

## 2019-03-08 VITALS
WEIGHT: 283.5 LBS | BODY MASS INDEX: 40.68 KG/M2 | SYSTOLIC BLOOD PRESSURE: 172 MMHG | HEART RATE: 85 BPM | DIASTOLIC BLOOD PRESSURE: 60 MMHG | OXYGEN SATURATION: 96 %

## 2019-03-08 DIAGNOSIS — T25.222A LEFT FOOT BURN, SECOND DEGREE, INITIAL ENCOUNTER: Primary | ICD-10-CM

## 2019-03-08 PROCEDURE — 99213 OFFICE O/P EST LOW 20 MIN: CPT | Performed by: SPECIALIST

## 2019-03-08 RX ORDER — HYDROPHILIC CREAM
1 PASTE (GRAM) TOPICAL DAILY
Qty: 1 TUBE | Refills: 3 | Status: SHIPPED | OUTPATIENT
Start: 2019-03-08 | End: 2019-07-23

## 2019-03-08 NOTE — NURSING NOTE
Per VO Dr. Shine-Carlitos applied to left great toe wound and sterile dressing applied.....................Hui Ashraf CMA  ([facility])

## 2019-03-08 NOTE — LETTER
3/8/2019         RE: Glenroy Padilla  [address] [address]        Dear Colleague,    Thank you for referring your patient, Glenroy Padilla, to the [facility]. Please see a copy of my visit note below.    F/U for left foot wound    Subjective:   Patient is well-known to me for leg wounds have healed.  He is still in the nursing home and had his foot near a heater and dense sustained subsequent blistering of his left medial foot about a week and a half ago for which he now follows up.  That area is insensate from his diabetes.  The blisters opened up several days ago.  He now presents for evaluation of his left foot wound.    Objective:  B/P: 172/60, T: Data Unavailable, P: 85, R: Data Unavailable  LLE: Warm, no edema, (+)CVSD changes.  Medial foot - 4x2 cm wound with evidence of old blister.  90% pink tissue.    Assessment/plan:  Is a 68-year-old gentleman with known diabetes PVD who sustained a second degree burn to his left foot.  The base is clean with a good pink tissue.  The plan at this time is to start triad to the area and with adequate nutrition this area hopefully epithelialized.  He will follow-up with me in 2 weeks for wound check if it remains open.    Glenroy to follow up with Primary Care provider regarding elevated blood pressure.    Casey Shine MD, FACS      Again, thank you for allowing me to participate in the care of your patient.        Sincerely,        Casey Shine MD

## 2019-03-08 NOTE — NURSING NOTE
"Glenroy Padilla is a 68 year old male who presents for:  Chief Complaint   Patient presents with     WOUND CARE     left great toe wound        Initial Vitals:  /60   Pulse 85   Wt 128.6 kg (283 lb 8 oz)   SpO2 96%   BMI 40.68 kg/m   Estimated body mass index is 40.68 kg/m  as calculated from the following:    Height as of 2/21/19: 1.778 m (5' 10\").    Weight as of this encounter: 128.6 kg (283 lb 8 oz).. Body surface area is 2.52 meters squared. BP completed using cuff size: large  Data Unavailable        Nursing Comments:         Hui Ashraf, IDA  "

## 2019-03-11 ENCOUNTER — TELEPHONE (OUTPATIENT)
Dept: FAMILY MEDICINE | Facility: OTHER | Age: 69
End: 2019-03-11

## 2019-03-11 NOTE — TELEPHONE ENCOUNTER
Reason for Call:  Form, our goal is to have forms completed with 72 hours, however, some forms may require a visit or additional information.    Type of letter, form or note:  medical    Who is the form from?: [employer] (if other please explain)    Where did the form come from: form was faxed in    What clinic location was the form placed at?: [facility] - [phone]    Where the form was placed: 's Box    What number is listed as a contact on the form?: [phone]       Additional comments: Please sign and fax to [phone]    Call taken on 3/11/2019 at 5:00 PM by Tanya Rao      3

## 2019-03-12 NOTE — TELEPHONE ENCOUNTER
Forms have been completed, signed, faxed/mailed, and sent to scanning.  Lisa Zuluaga CMA ([facility])

## 2019-03-13 ENCOUNTER — TELEPHONE (OUTPATIENT)
Dept: FAMILY MEDICINE | Facility: OTHER | Age: 69
End: 2019-03-13

## 2019-03-13 NOTE — TELEPHONE ENCOUNTER
Summary:    Patient is due/failing the following:   FIT    Action needed:   Complete a FIT     Type of outreach:    Phone, spoke to patient.  patient will discuss at his Pre-op    Questions for provider review:    None                                                                                                                                    Carmelina Bellamy       Chart routed to Care Team .          Panel Management Review      Patient has the following on his problem list:     Diabetes    ASA: Passed    Last A1C  Lab Results   Component Value Date    A1C 6.1 02/21/2019    A1C 5.7 08/23/2018    A1C 6.0 04/20/2018    A1C 5.3 03/26/2018    A1C 5.6 01/27/2018     A1C tested: Passed    Last LDL:    Lab Results   Component Value Date    CHOL 134 02/21/2019     Lab Results   Component Value Date    HDL 40 02/21/2019     Lab Results   Component Value Date    LDL 61 02/21/2019     Lab Results   Component Value Date    TRIG 166 02/21/2019     No results found for: CHOLHDLRATIO  Lab Results   Component Value Date    NHDL 94 02/21/2019       Is the patient on a Statin? YES            Is the patient on Aspirin? NO    Medications     HMG CoA Reductase Inhibitors     rosuvastatin (CRESTOR) 10 MG tablet             Last three blood pressure readings:  BP Readings from Last 3 Encounters:   03/08/19 172/60   02/27/19 101/50   02/21/19 130/70            Tobacco History:     History   Smoking Status     Former Smoker     Types: Cigars   Smokeless Tobacco     Never Used     Comment: exposure to second hand smoke         Hypertension   Last three blood pressure readings:  BP Readings from Last 3 Encounters:   03/08/19 172/60   02/27/19 101/50   02/21/19 130/70     Blood pressure:    HTN Guidelines:  Age 18-59 BP range:  Less than 140/90  Age 60-85 with Diabetes:  Less than 140/90  Age 60-85 without Diabetes:  less than 150/90      Composite cancer screening  Chart review shows that this patient is due/due soon for the following  Fecal Colorectal (FIT)

## 2019-03-19 ENCOUNTER — MEDICAL CORRESPONDENCE (OUTPATIENT)
Dept: HEALTH INFORMATION MANAGEMENT | Facility: CLINIC | Age: 69
End: 2019-03-19

## 2019-03-19 ENCOUNTER — TELEPHONE (OUTPATIENT)
Dept: FAMILY MEDICINE | Facility: OTHER | Age: 69
End: 2019-03-19

## 2019-03-19 NOTE — TELEPHONE ENCOUNTER
Reason for Call:  Form, our goal is to have forms completed with 72 hours, however, some forms may require a visit or additional information.    Type of letter, form or note:  medical    Who is the form from?: [facility] [employer] [facility] (if other please explain)    Where did the form come from: form was faxed in    What clinic location was the form placed at?: [facility] - [phone]    Where the form was placed: 's Box    What number is listed as a contact on the form?: [phone]       Additional comments: n/a    Call taken on 3/19/2019 at 9:23 AM by Diamond Juarez

## 2019-03-20 ENCOUNTER — TELEPHONE (OUTPATIENT)
Dept: ORTHOPEDICS | Facility: CLINIC | Age: 69
End: 2019-03-20

## 2019-03-20 NOTE — TELEPHONE ENCOUNTER
Called to patient to advise that he could be scheduled 4/2/19 in [place] with Dr Mcmahon. Patient states this is his second opinion and he wants to get in as soon as possible at the [place] location. Message sent to scheduling to have them schedule patient. Sumi Tran RN

## 2019-03-20 NOTE — TELEPHONE ENCOUNTER
3/20 called and spoke to patient. patient is scheduled for next thr 3/28 with Dr. Mcmahon.    Yoli Chaney   Procedure    Ortho/Sports Med/Ent/Eye   [facility]   [phone]

## 2019-03-20 NOTE — TELEPHONE ENCOUNTER
I called and spoke with Rinku.  I updated him that Dr Heller's note is complete from his consultation on his legs.  I have messaged Dr. Ruiz Mcmahon to let him know the recommendations are finished (referring provider).  All Del questions were answered.  SAVANNAH Goldstein RN, BSN  Interventional Radiology Nurse Coordinator   Phone:  [phone]

## 2019-03-20 NOTE — TELEPHONE ENCOUNTER
Pt calling to clarify his options for his right knee. Per review of LOV notes, Dr. Mcmahon needs to see Pt in clinic to discuss complete risks and benefits of surgery and review the vascular surgery eval.    Levon Gandhi RN

## 2019-03-21 ENCOUNTER — OFFICE VISIT (OUTPATIENT)
Dept: SURGERY | Facility: CLINIC | Age: 69
End: 2019-03-21
Payer: COMMERCIAL

## 2019-03-21 ENCOUNTER — TELEPHONE (OUTPATIENT)
Dept: SURGERY | Facility: CLINIC | Age: 69
End: 2019-03-21

## 2019-03-21 ENCOUNTER — TELEPHONE (OUTPATIENT)
Dept: FAMILY MEDICINE | Facility: OTHER | Age: 69
End: 2019-03-21

## 2019-03-21 VITALS
SYSTOLIC BLOOD PRESSURE: 130 MMHG | DIASTOLIC BLOOD PRESSURE: 60 MMHG | WEIGHT: 285.6 LBS | HEART RATE: 83 BPM | BODY MASS INDEX: 40.98 KG/M2 | OXYGEN SATURATION: 96 %

## 2019-03-21 DIAGNOSIS — T25.222D: Primary | ICD-10-CM

## 2019-03-21 PROCEDURE — 99213 OFFICE O/P EST LOW 20 MIN: CPT | Performed by: SPECIALIST

## 2019-03-21 RX ORDER — HYDROPHILIC CREAM
1 PASTE (GRAM) TOPICAL DAILY
Qty: 1 TUBE | Refills: 3 | Status: SHIPPED | OUTPATIENT
Start: 2019-03-21 | End: 2019-07-23

## 2019-03-21 NOTE — TELEPHONE ENCOUNTER
Form is on Dr. Copeland is to ask for cosignature then we will fax it to the party in question.  Electronically signed:    Julio Cesar Sahu PA-C

## 2019-03-21 NOTE — NURSING NOTE
Per VO Dr. Shine-Carlitos applied to toe wound, covered with sterile dressing and a secured with tape.................Hui Ashraf CMA  ([facility])
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
## 2019-03-21 NOTE — TELEPHONE ENCOUNTER
Reason for Call:  Form, our goal is to have forms completed with 72 hours, however, some forms may require a visit or additional information.    Type of letter, form or note:  medical    Who is the form from?: [facility] (if other please explain)    Where did the form come from: form was faxed in    What clinic location was the form placed at?: [facility] - [phone]    Where the form was placed: 's Box    What number is listed as a contact on the form?: fax [phone]       Additional comments: please complete and fax back    Call taken on 3/21/2019 at 1:16 PM by Olivia Mayfield

## 2019-03-22 NOTE — PROGRESS NOTES
SUBJECTIVE:   Glenroy Padilla is a 68 year old male who presents to clinic today for the following health issues:    History of Present Illness     Diabetes:     Frequency of checking blood sugars::  Other    Diabetic concerns::  None    Hypoglycemia symptoms::  None    Paraesthesia present::  YES    Eye Exam in the last year::  NO    Diabetes Management Resources    Diet:  Regular (no restrictions)  Frequency of exercise:  6-7 days/week  Duration of exercise:  15-30 minutes  Taking medications regularly:  Yes  Medication side effects:  None  Additional concerns today:  YES      Problem list and histories reviewed & adjusted, as indicated.  Additional history: The patient is in as good a condition and health as I have known him in the last 18 months.  He actually was able to walk to the backside of the clinic today.  This is the first time that I have seen him ambulatory.  His most recent hemoglobin A1c was well within acceptable limits.  I refer the reader back to recent cardiovascular testing.  We note that some of this is a bit more remote and certainly could be redone if surgical intervention is deemed possible.  At this point in time I would call him at moderate risk for surgical intervention due to his history but he has made good strides in the last 18 months to come back to significant health in my opinion should be considered for intervention related to his knees though I must admit his risk factors are more than your typical population.      Patient Active Problem List   Diagnosis     Type 2 diabetes mellitus with other circulatory complication, without long-term current use of insulin (H)     Venous stasis ulcer of left lower extremity (H)     Acute pain of left knee     Chronic pain of right knee     Open wound of left lower extremity without complication, initial encounter     Hx of coronary artery disease     Hx of heart artery stent     [place] (obstructive sleep apnea)     Hypertension, goal below  140/90     Hyperlipidemia LDL goal <100     History of coronary artery stent placement     Advanced directives, counseling/discussion     Morbid obesity due to excess calories (H)     Neuropathy     Foot drop, right     Cardiomegaly     Gastroesophageal reflux disease without esophagitis     Falls frequently     Weakness of both legs     Central spinal stenosis L3-4 and L4-5     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     Lymphedema     S/P spinal surgery     Complete tear of left rotator cuff     Lymphedema of right lower extremity     Primary osteoarthritis of right knee     Mixed incontinence     Iron deficiency anemia secondary to inadequate dietary iron intake     Ischemic vascular disease     Past Surgical History:   Procedure Laterality Date     CARDIAC SURGERY      stent placement     CHOLECYSTECTOMY       LAMINECTOMY LUMBAR THREE+ LEVELS N/A 3/13/2018    Procedure: LAMINECTOMY LUMBAR THREE+ LEVELS;  T5-9 LAMINECTOMIES, RESECTION OF EPIDURAL LIPOMATOSIS;  Surgeon: Hugh Mendieta MD;  Location:  OR       Social History     Tobacco Use     Smoking status: Former Smoker     Types: Cigars     Smokeless tobacco: Never Used     Tobacco comment: exposure to second hand smoke   Substance Use Topics     Alcohol use: No     History reviewed. No pertinent family history.      Current Outpatient Medications   Medication Sig Dispense Refill     acetaminophen (TYLENOL) 500 MG tablet Take 500-1,000 mg by mouth       blood glucose (NO BRAND SPECIFIED) lancing device Use to test blood sugars 2 times daily or as directed. 1 each 0     blood glucose calibration (NO BRAND SPECIFIED) solution To accompany: Blood Glucose Monitor Brands: per insurance. 1 Bottle 3     blood glucose monitoring (NO BRAND SPECIFIED) meter device kit test blood sugar 2 xdaily or as directed.  Blood Glucose Monitor Brands: per insurance. 1 kit 0     blood glucose monitoring (NO BRAND SPECIFIED) test strip Use to test blood sugar 2 times daily  Blood Glucose Monitor Brands: Glucocard Expression 100 strip 6     blood glucose monitoring (NO BRAND SPECIFIED) test strip Use to test blood sugar 2x  daily or as directed. To accompany: Blood Glucose Monitor Brands: per insurance. 100 strip 1     Blood Glucose Monitoring Suppl (GLUCOCARD EXPRESSION MONITOR) W/DEVICE KIT USE TWICE DAILY TO TEST BLOOD GLUCOSE  0     clopidogrel (PLAVIX) 75 MG tablet Take 1 tablet (75 mg) by mouth daily 90 tablet 1     ferrous sulfate (IRON) 325 (65 Fe) MG tablet Take 1 tablet (325 mg) by mouth daily (with breakfast) 90 tablet 1     furosemide (LASIX) 40 MG tablet Take 1 tablet (40 mg) by mouth daily 90 tablet 2     JANUMET XR  MG TB24   1     losartan (COZAAR) 25 MG tablet Take 1 tablet (25 mg) by mouth daily 90 tablet 1     metoprolol tartrate (LOPRESSOR) 25 MG tablet Take 1 tablet (25 mg) by mouth 2 times daily 180 tablet 3     order for DME Equipment being ordered: electric chair for sleeping and adjustment to allow for elevation of legs above the heart.  Zero gravity type heavy duty sleep chair advised. MaxiComforter ([id number]) 1 Units 0     order for DME Equipment being ordered: Needs to have a sleep chair for home use. 1 Device 0     order for DME Compression garments toe to knee.  Specific brand is Compraflex Light Compression garment.   Diagnosis codes for venous stasis ulcer I83.029 and for  Lymphoedema is I89.0   Goal is for compression of about 30 to 40 mm of mercury   measurements are right ankle 28.5 cm and right calf is 45.5 cm.  Left ankle is 27.5 cm and the calf is 45 cm   Tall for length. 2 Device 1     order for DME Equipment being ordered: compression stockings below knee and above knee if available.  Medium compression. 1 Units 1     rosuvastatin (CRESTOR) 10 MG tablet Take 1 tablet (10 mg) by mouth daily 90 tablet 1     senna-docusate (SENOKOT-S/PERICOLACE) 8.6-50 MG tablet Take 1 tablet by mouth       sitagliptin-metFORMIN (JANUMET)  MG per  tablet Take 1 tablet by mouth daily       thin (NO BRAND SPECIFIED) lancets Test 2 x daily or as directed.  Brands: per insurance. 100 each 1     Wound Dressings (TRIAD HYDROPHILIC WOUND DRESSI) PSTE Externally apply 1 g topically daily 1 Tube 3     Wound Dressings (TRIAD HYDROPHILIC WOUND DRESSI) PSTE Externally apply 1 g topically daily 1 Tube 3     cyclobenzaprine (FLEXERIL) 10 MG tablet TAKE 1 TAB BY MOUTH TWICE DAILY AS NEEDED FOR ROTATOR CUFF TEAR  0     HYDROcodone-acetaminophen (NORCO) 5-325 MG per tablet Take 1 tablet by mouth every 6 hours as needed for severe pain       JENTADUETO XR 2.5-1000 MG per table   1     Allergies   Allergen Reactions     No Known Allergies      Recent Labs   Lab Test 02/21/19  0929 10/11/18  1106 08/23/18  1049 08/01/18 04/20/18  1010  03/09/18  1020  09/25/17  1216 05/22/17  1029   A1C 6.1*  --  5.7*  --  6.0   < >  --    < > 6.2* 6.4*   LDL 61  --   --   --   --   --  76  --  57 64   HDL 40  --   --   --   --   --   --   --  43 42   TRIG 166*  --   --   --   --   --   --   --  140 118   ALT 18  --  13 17  --   --  19  --  19 20   CR 1.23  --  1.13 1.10  --    < > 0.91   < > 0.92 0.91   GFRESTIMATED 59* 60* 64 >60  --    < > 82   < > 81 82   GFRESTBLACK 68 72 78 >60  --    < > >90   < > >90 >90  African American GFR Calc     POTASSIUM 4.2  --  4.0 4.6  --    < > 4.6   < > 4.1 4.0   TSH  --   --   --   --   --   --   --   --  2.07  --     < > = values in this interval not displayed.      BP Readings from Last 3 Encounters:   03/25/19 136/80   03/21/19 130/60   03/08/19 172/60    Wt Readings from Last 3 Encounters:   03/25/19 131.4 kg (289 lb 9.6 oz)   03/21/19 129.5 kg (285 lb 9.6 oz)   03/08/19 128.6 kg (283 lb 8 oz)                  Labs reviewed in EPIC    ROS:  CONSTITUTIONAL: NEGATIVE for fever, chills, change in weight  INTEGUMENTARY/SKIN: NEGATIVE for worrisome rashes, moles or lesions  ENT/MOUTH: NEGATIVE for ear, mouth and throat problems  RESP: NEGATIVE for significant  cough or SOB  CV: NEGATIVE for chest pain, palpitations or peripheral edema  GI: NEGATIVE for nausea, abdominal pain, heartburn, or change in bowel habits  MUSCULOSKELETAL: POSITIVE  for arthralgias globally but most significant to the bilateral knees.  He says that he is stable at this point in time and would like to proceed with surgery so that he could be more mobile.  NEURO: NEGATIVE for weakness, dizziness or paresthesias  PSYCHIATRIC: NEGATIVE for changes in mood or affect    OBJECTIVE:     /80 (Cuff Size: Adult Large)   Pulse 88   Temp 98.4  F (36.9  C) (Temporal)   Resp 20   Wt 131.4 kg (289 lb 9.6 oz)   SpO2 97%   BMI 41.55 kg/m     Body mass index is 41.55 kg/m .  GENERAL: healthy, alert and no distress  NECK: no adenopathy, no asymmetry, masses, or scars and thyroid normal to palpation  RESP: lungs clear to auscultation - no rales, rhonchi or wheezes  CV: regular rate and rhythm, normal S1 S2, no S3 or S4, no murmur, click or rub, no peripheral edema and peripheral pulses strong  MS: no gross musculoskeletal defects noted, no edema  SKIN: no suspicious lesions or rashes to visible skin today.  NEURO: Normal strength and tone, mentation intact and speech normal  PSYCH: mentation appears normal, affect normal/bright    Diagnostic Test Results:  No results found for this or any previous visit (from the past 24 hour(s)).    ASSESSMENT/PLAN:     1. Chronic pain of right knee  2. Weakness of both legs  3. Acute pain of left knee  4. Morbid obesity due to excess calories (H)  5. Type 2 diabetes mellitus with other circulatory complication, without long-term current use of insulin (H)  6. Hypertension, goal below 140/90  All things considered patient is in the best physical that health that I have seen him since he has been coming to see me in the last 18 months.  If further evaluation is necessary I would have him see cardiology for additional clearance related to surgical intervention.  This is not  to be considered a preop physical today.    7. Need for prophylactic vaccination and inoculation against influenza  8. Screen for colon cancer  Declines health maintenance interventions at this point in time.    Level of service follow-up with orthopedic specialist for a second opinion is an option at this point in time.    Julio Cesar Sahu PA-C  [facility]
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
## 2019-03-25 ENCOUNTER — TELEPHONE (OUTPATIENT)
Dept: FAMILY MEDICINE | Facility: OTHER | Age: 69
End: 2019-03-25

## 2019-03-25 ENCOUNTER — OFFICE VISIT (OUTPATIENT)
Dept: FAMILY MEDICINE | Facility: OTHER | Age: 69
End: 2019-03-25
Payer: COMMERCIAL

## 2019-03-25 VITALS
HEART RATE: 88 BPM | BODY MASS INDEX: 41.55 KG/M2 | DIASTOLIC BLOOD PRESSURE: 80 MMHG | SYSTOLIC BLOOD PRESSURE: 136 MMHG | TEMPERATURE: 98.4 F | WEIGHT: 289.6 LBS | RESPIRATION RATE: 20 BRPM | OXYGEN SATURATION: 97 %

## 2019-03-25 DIAGNOSIS — Z12.11 SCREEN FOR COLON CANCER: ICD-10-CM

## 2019-03-25 DIAGNOSIS — M25.562 ACUTE PAIN OF LEFT KNEE: ICD-10-CM

## 2019-03-25 DIAGNOSIS — E66.01 MORBID OBESITY DUE TO EXCESS CALORIES (H): ICD-10-CM

## 2019-03-25 DIAGNOSIS — I10 HYPERTENSION, GOAL BELOW 140/90: ICD-10-CM

## 2019-03-25 DIAGNOSIS — E11.59 TYPE 2 DIABETES MELLITUS WITH OTHER CIRCULATORY COMPLICATION, WITHOUT LONG-TERM CURRENT USE OF INSULIN (H): ICD-10-CM

## 2019-03-25 DIAGNOSIS — M25.561 CHRONIC PAIN OF RIGHT KNEE: Primary | ICD-10-CM

## 2019-03-25 DIAGNOSIS — Z23 NEED FOR PROPHYLACTIC VACCINATION AND INOCULATION AGAINST INFLUENZA: ICD-10-CM

## 2019-03-25 DIAGNOSIS — R29.898 WEAKNESS OF BOTH LEGS: ICD-10-CM

## 2019-03-25 DIAGNOSIS — G89.29 CHRONIC PAIN OF RIGHT KNEE: Primary | ICD-10-CM

## 2019-03-25 PROCEDURE — 99214 OFFICE O/P EST MOD 30 MIN: CPT | Performed by: PHYSICIAN ASSISTANT

## 2019-03-25 RX ORDER — LOSARTAN POTASSIUM 25 MG/1
25 TABLET ORAL DAILY
Qty: 90 TABLET | Refills: 3 | Status: ON HOLD | OUTPATIENT
Start: 2019-03-25 | End: 2019-07-31

## 2019-03-25 ASSESSMENT — PAIN SCALES - GENERAL: PAINLEVEL: MILD PAIN (2)

## 2019-03-25 NOTE — TELEPHONE ENCOUNTER
Per fax from [facility]acy a PRIOR AUTHORIZATION is needed for CLOPIDOGREL BISULFATE 75 mg tablets please initiate PA.      COVER MY MEDS  Key:[id number]  [id number]    Ravi Owen,

## 2019-03-25 NOTE — TELEPHONE ENCOUNTER
Per fax from [facility] a PRIOR AUTHORIZATION is needed for CLOPIDOGREL BISULFATE 75 mg tablets, please advise If you would like a PA started or change the medication.     COVER MY MEDS  Key:[id number]  [id number]    Thanks  Josselin Montes RT (R)

## 2019-03-26 NOTE — TELEPHONE ENCOUNTER
"Requested Prescriptions   Pending Prescriptions Disp Refills     losartan (COZAAR) 25 MG tablet [Pharmacy Med Name: LOSARTAN POTASSIUM 25 MG TA 25 TAB] 90 tablet 11     Sig: TAKE 1 TABLET (25 MG) BY MOUTH DAILY    Angiotensin-II Receptors Passed - 3/25/2019 10:38 AM       Passed - Blood pressure under 140/90 in past 12 months    BP Readings from Last 3 Encounters:   03/25/19 136/80   03/21/19 130/60   03/08/19 172/60          Passed - Recent (12 mo) or future (30 days) visit within the authorizing provider's specialty    Patient had office visit in the last 12 months or has a visit in the next 30 days with authorizing provider or within the authorizing provider's specialty.  See \"Patient Info\" tab in inbasket, or \"Choose Columns\" in Meds & Orders section of the refill encounter.           Passed - Medication is active on med list       Passed - Patient is age 18 or older       Passed - Normal serum creatinine on file in past 12 months    Recent Labs   Lab Test 02/21/19  0929 10/11/18  1106   CR 1.23  --    CREAT  --  1.2          Passed - Normal serum potassium on file in past 12 months    Recent Labs   Lab Test 02/21/19  0929   POTASSIUM 4.2           Last OV TODAY  Last filled 08/23/2018    Prescription approved per [facility] Refill Protocol.  Yonny Meyers, RN, BSN          "

## 2019-03-28 NOTE — TELEPHONE ENCOUNTER
Prior Authorization Not Needed per Insurance    Medication:   Insurance Company: [employer] [place] - Phone [phone] Fax [phone]  Expected CoPay:      Pharmacy Filling the Rx: [id number] - [place], [place] - [address] [place] [address]  Pharmacy Notified: Yes  Patient Notified: Yes      Pharmacy has a paid claim for medication dated 03/25/2019

## 2019-03-28 NOTE — TELEPHONE ENCOUNTER
Central Prior Authorization Team   Phone: [phone]    PA Initiation    Medication:   Insurance Company: [facility] - Phone [phone] Fax [phone]  Pharmacy Filling the Rx: [id number] - [place], [place] - [address] [place] [address]  Filling Pharmacy Phone: [phone]  Filling Pharmacy Fax:    Start Date: 3/28/2019    Key: [id number] - Rx #: [id number]

## 2019-04-02 ENCOUNTER — TELEPHONE (OUTPATIENT)
Dept: FAMILY MEDICINE | Facility: OTHER | Age: 69
End: 2019-04-02

## 2019-04-02 DIAGNOSIS — Z53.9 DIAGNOSIS NOT YET DEFINED: Primary | ICD-10-CM

## 2019-04-02 PROCEDURE — G0180 MD CERTIFICATION HHA PATIENT: HCPCS | Performed by: FAMILY MEDICINE

## 2019-04-02 NOTE — TELEPHONE ENCOUNTER
Reason for Call:  Form, our goal is to have forms completed with 72 hours, however, some forms may require a visit or additional information.    Type of letter, form or note:  medical    Who is the form from?: [facility] (if other please explain)    Where did the form come from: form was faxed in    What clinic location was the form placed at?: [facility] - [phone]    Where the form was placed: 's Box    What number is listed as a contact on the form?: [phone]       Additional comments: n/a    Call taken on 4/2/2019 at 10:14 AM by Diamond Juarez

## 2019-04-04 ENCOUNTER — TELEPHONE (OUTPATIENT)
Dept: SURGERY | Facility: CLINIC | Age: 69
End: 2019-04-04

## 2019-04-04 NOTE — TELEPHONE ENCOUNTER
Order froms from [facility]. Placed in [employer]'s mailbox for signature on Monday.    Meme Beth RN on 4/4/2019 at 1:43 PM

## 2019-04-08 ENCOUNTER — MEDICAL CORRESPONDENCE (OUTPATIENT)
Dept: HEALTH INFORMATION MANAGEMENT | Facility: CLINIC | Age: 69
End: 2019-04-08

## 2019-04-09 ENCOUNTER — TELEPHONE (OUTPATIENT)
Dept: SURGERY | Facility: OTHER | Age: 69
End: 2019-04-09

## 2019-04-09 NOTE — TELEPHONE ENCOUNTER
Reason for Call:  Other appointment    Detailed comments: [facility] calling to see if it would be ok to change the wound care changes to Mon, Wed and Friday instead of daily. Please call Stephanie at [phone]    Phone Number Patient can be reached at: Home number on file      Best Time: any    Can we leave a detailed message on this number? YES    Call taken on 4/9/2019 at 11:13 AM by Meme Isaacs

## 2019-04-11 NOTE — TELEPHONE ENCOUNTER
Left a message for Stephanie informing her that Dr. Zaragoza is out of office until Monday. There is a fax/order for this in Dr. zaragoza's basket.     Albina STEINER RN. . .  4/11/2019, 4:18 PM

## 2019-04-15 NOTE — TELEPHONE ENCOUNTER
Faxed order signed and faxed back to Jennifer-Attn: Stephanie- [phone]...............Hui Ashraf CMA  ([facility])

## 2019-04-16 ENCOUNTER — TELEPHONE (OUTPATIENT)
Dept: FAMILY MEDICINE | Facility: OTHER | Age: 69
End: 2019-04-16

## 2019-04-16 NOTE — TELEPHONE ENCOUNTER
Reason for Call:  Form, our goal is to have forms completed with 72 hours, however, some forms may require a visit or additional information.    Type of letter, form or note:  medical    Who is the form from?: [facility] (if other please explain)    Where did the form come from: form was faxed in    What clinic location was the form placed at?: [facility] - [phone]    Where the form was placed: 's Box    What number is listed as a contact on the form?: [phone]       Additional comments: n/a    Call taken on 4/16/2019 at 8:59 AM by Diamond Juarez

## 2019-04-18 ENCOUNTER — ANCILLARY PROCEDURE (OUTPATIENT)
Dept: GENERAL RADIOLOGY | Facility: CLINIC | Age: 69
End: 2019-04-18
Attending: ORTHOPAEDIC SURGERY
Payer: COMMERCIAL

## 2019-04-18 ENCOUNTER — OFFICE VISIT (OUTPATIENT)
Dept: ORTHOPEDICS | Facility: CLINIC | Age: 69
End: 2019-04-18
Payer: COMMERCIAL

## 2019-04-18 VITALS
WEIGHT: 282 LBS | HEIGHT: 70 IN | BODY MASS INDEX: 40.37 KG/M2 | DIASTOLIC BLOOD PRESSURE: 77 MMHG | SYSTOLIC BLOOD PRESSURE: 138 MMHG

## 2019-04-18 DIAGNOSIS — M25.561 RIGHT KNEE PAIN: ICD-10-CM

## 2019-04-18 DIAGNOSIS — M17.11 PRIMARY OSTEOARTHRITIS OF RIGHT KNEE: Primary | ICD-10-CM

## 2019-04-18 PROCEDURE — 99214 OFFICE O/P EST MOD 30 MIN: CPT | Performed by: ORTHOPAEDIC SURGERY

## 2019-04-18 PROCEDURE — 73564 X-RAY EXAM KNEE 4 OR MORE: CPT | Mod: TC

## 2019-04-18 ASSESSMENT — PAIN SCALES - GENERAL: PAINLEVEL: MILD PAIN (2)

## 2019-04-18 ASSESSMENT — MIFFLIN-ST. JEOR: SCORE: 2045.39

## 2019-04-18 NOTE — PROGRESS NOTES
"ORTHOPEDIC CONSULT      Chief Complaint: Glenroy Padilla is a 68 year old male who is being seen for Chief Complaint   Patient presents with     Knee Pain     right knee pain       Patient previously saw Dr. Thakur for his knees.  History of Present Illness:   Mechanism of Injury: No trauma or inciting event.  Location: bilateral knee anterior  Duration of Pain:  \"many years\"  Rating of Pain:  moderate.    Pain Quality: dull, aching and sharp  Pain is better with: Rest  Pain is worse with:  weightbearing  Treatment so far consists of:  Steroid injections (no lasting relief), hyalluronic injections, physical therapy.   Associated Features: None  Prior history of related problems:   The pain is getting worse. He is on plavix for stents.  He has been in a wheelchair for the past year due to the knee pains.  He does also walk with a cane.  He does have peripheral vascular disease; denies any current ulcers on legs.  He has a history of lumbar spine surgery and reports some mild chronic weakness right foot      Patient's past medical, surgical, social and family histories reviewed.     Past Medical History:   Diagnosis Date     Acute pain of left knee 5/22/2017     Chronic pain of right knee 5/22/2017     Hx of coronary artery disease 5/22/2017     Hx of heart artery stent 5/22/2017     Mixed incontinence 8/13/2018     Obesity, morbid, BMI 40.0-49.9 (H) 11/20/2017     Open wound of left lower extremity without complication, initial encounter 5/22/2017     DAYTON (obstructive sleep apnea) 5/22/2017     Type 2 diabetes mellitus with other circulatory complication, without long-term current use of insulin (H) 5/22/2017     Venous stasis ulcer of left lower extremity (H) 5/22/2017     Venous stasis ulcer of left lower extremity (H) 5/22/2017       Past Surgical History:   Procedure Laterality Date     CARDIAC SURGERY      stent placement     CHOLECYSTECTOMY       LAMINECTOMY LUMBAR THREE+ LEVELS N/A 3/13/2018    Procedure: " LAMINECTOMY LUMBAR THREE+ LEVELS;  T5-9 LAMINECTOMIES, RESECTION OF EPIDURAL LIPOMATOSIS;  Surgeon: Hugh Mendieta MD;  Location:  OR       Medications:    Current Outpatient Medications on File Prior to Visit:  acetaminophen (TYLENOL) 500 MG tablet Take 500-1,000 mg by mouth   blood glucose (NO BRAND SPECIFIED) lancing device Use to test blood sugars 2 times daily or as directed.   blood glucose calibration (NO BRAND SPECIFIED) solution To accompany: Blood Glucose Monitor Brands: per insurance.   blood glucose monitoring (NO BRAND SPECIFIED) meter device kit test blood sugar 2 xdaily or as directed.  Blood Glucose Monitor Brands: per insurance.   blood glucose monitoring (NO BRAND SPECIFIED) test strip Use to test blood sugar 2 times daily Blood Glucose Monitor Brands: Glucocard Expression   blood glucose monitoring (NO BRAND SPECIFIED) test strip Use to test blood sugar 2x  daily or as directed. To accompany: Blood Glucose Monitor Brands: per insurance.   Blood Glucose Monitoring Suppl (GLUCOCARD EXPRESSION MONITOR) W/DEVICE KIT USE TWICE DAILY TO TEST BLOOD GLUCOSE   clopidogrel (PLAVIX) 75 MG tablet Take 1 tablet (75 mg) by mouth daily   ferrous sulfate (IRON) 325 (65 Fe) MG tablet Take 1 tablet (325 mg) by mouth daily (with breakfast)   furosemide (LASIX) 40 MG tablet Take 1 tablet (40 mg) by mouth daily   JANUMET XR  MG TB24    JENTADUETO XR 2.5-1000 MG per table    losartan (COZAAR) 25 MG tablet TAKE 1 TABLET (25 MG) BY MOUTH DAILY   metoprolol tartrate (LOPRESSOR) 25 MG tablet Take 1 tablet (25 mg) by mouth 2 times daily   order for DME Equipment being ordered: electric chair for sleeping and adjustment to allow for elevation of legs above the heart.  Zero gravity type heavy duty sleep chair advised. Cassieer ([id number])   order for DME Equipment being ordered: Needs to have a sleep chair for home use.   order for DME Compression garments toe to knee.  Specific brand is Compraflex Light  "Compression garment. Diagnosis codes for venous stasis ulcer I83.029 and for  Lymphoedema is I89.0 Goal is for compression of about 30 to 40 mm of mercury measurements are right ankle 28.5 cm and right calf is 45.5 cm.  Left ankle is 27.5 cm and the calf is 45 cm Tall for length.   order for DME Equipment being ordered: compression stockings below knee and above knee if available.  Medium compression.   rosuvastatin (CRESTOR) 10 MG tablet Take 1 tablet (10 mg) by mouth daily   senna-docusate (SENOKOT-S/PERICOLACE) 8.6-50 MG tablet Take 1 tablet by mouth   sitagliptin-metFORMIN (JANUMET)  MG per tablet Take 1 tablet by mouth daily   thin (NO BRAND SPECIFIED) lancets Test 2 x daily or as directed.  Brands: per insurance.   Wound Dressings (TRIAD HYDROPHILIC WOUND DRESSI) PSTE Externally apply 1 g topically daily   Wound Dressings (TRIAD HYDROPHILIC WOUND DRESSI) PSTE Externally apply 1 g topically daily     No current facility-administered medications on file prior to visit.     Allergies   Allergen Reactions     No Known Allergies        Social History     Occupational History     Not on file   Tobacco Use     Smoking status: Former Smoker     Types: Cigars     Smokeless tobacco: Never Used     Tobacco comment: exposure to second hand smoke   Substance and Sexual Activity     Alcohol use: No     Drug use: No     Sexual activity: Never     Partners: Female       No family history on file.    REVIEW OF SYSTEMS  10 point review systems performed otherwise negative as noted as per history of present illness.    Physical Exam:  Vitals: /77   Ht 1.778 m (5' 10\")   Wt 127.9 kg (282 lb)   BMI 40.46 kg/m    BMI= Body mass index is 40.46 kg/m .  Constitutional: healthy, alert and no acute distress   Psychiatric: mentation appears normal and affect normal/bright  NEURO: no focal deficits  RESP: Normal with easy respirations and no use of accessory muscles to breathe, no audible wheezing or retractions  CV: " "Significant bilateral lower extremity edema         Regular rate and rhythm by palpation  SKIN: Wound over left great toe without any signs of infection.  No other areas of skin ulceration on lower extremities visualized.  Chronic venous stasis changes bilateral lower extremities.  JOINT/EXTREMITIES:bilateral Knee Exam: Inspection: Varus alignment.    Tender: medial joint line  Non-tender: lateral patellar facet, medial patellar facet, lateral joint line  Active Range of Motion: 5-100 degrees  Strength: Mild (4/5) weakness right ankle dorsiflexion, left normal  Special tests: normal Valgus stress test, normal Varus, negative drawer testing.  GAIT: In wheelchair    Diagnostic Modalities:  bilateral knee X-ray: Bilateral tricompartmental knee osteoarthritis with osteophyte formation and joint space narrowing, most severe medially.  Independent visualization of the images was performed.      Impression: bilateral knee primary osteoarthritis in a 68-year-old male with PVD, left great toe healing burn, obesity, and DM.    Plan:  All of the above pertinent physical exam and imaging modalities findings was reviewed with Glenroy.    Patient has had ongoing chronic bilateral knee pain refractory to conservative treatments including steroid and synvisc injections.  Pain has becoming very limiting for him.  He cannot walk more than a block anymore due to the severe knee pain.  He does have a history of PVD and we discussed risks for wound healing with a total knee arthroplasty.  He currently has a burn that is in the process of healing.  Discussed he must have this healed prior to proceeding with a total knee arthroplasty.  He is in agreement with this.      Vascular note in epic dated February 27, 2019 by Dr. Heller \"As for his wound healing capability from a  Knee arthroplasty, his ankle pressures suggest adequate inflow to heal, no clinical signs of significant arterial ischemia, although he does have mild PAD due to " "mildly diminished bilateral MINESH. \"    Return to clinic PRN, or sooner as needed for changes.  Re-x-ray on return: No    Scribed by:  Milana Westbrook PA-C  4/18/2019     I attest I have seen and evaluated the patient.  I agree with above impression and plan.  Federico Ibrahim D.O.  "

## 2019-04-18 NOTE — LETTER
"    4/18/2019         RE: Glenroy Padilla  [address] [facility] [address]        Dear Colleague,    Thank you for referring your patient, Glenroy Padilla, to the [facility]. Please see a copy of my visit note below.    ORTHOPEDIC CONSULT      Chief Complaint: Glenroy Padilla is a 68 year old male who is being seen for Chief Complaint   Patient presents with     Knee Pain     right knee pain       Patient previously saw Dr. Thakur for his knees.  History of Present Illness:   Mechanism of Injury: No trauma or inciting event.  Location: bilateral knee anterior  Duration of Pain:  \"many years\"  Rating of Pain:  moderate.    Pain Quality: dull, aching and sharp  Pain is better with: Rest  Pain is worse with:  weightbearing  Treatment so far consists of:  Steroid injections (no lasting relief), hyalluronic injections, physical therapy.   Associated Features: None  Prior history of related problems:   The pain is getting worse. He is on plavix for stents.  He has been in a wheelchair for the past year due to the knee pains.  He does also walk with a cane.  He does have peripheral vascular disease; denies any current ulcers on legs.  He has a history of lumbar spine surgery and reports some mild chronic weakness right foot      Patient's past medical, surgical, social and family histories reviewed.     Past Medical History:   Diagnosis Date     Acute pain of left knee 5/22/2017     Chronic pain of right knee 5/22/2017     Hx of coronary artery disease 5/22/2017     Hx of heart artery stent 5/22/2017     Mixed incontinence 8/13/2018     Obesity, morbid, BMI 40.0-49.9 (H) 11/20/2017     Open wound of left lower extremity without complication, initial encounter 5/22/2017     DAYTON (obstructive sleep apnea) 5/22/2017     Type 2 diabetes mellitus with other circulatory complication, without long-term current use of insulin (H) 5/22/2017     Venous stasis ulcer of left lower extremity (H) 5/22/2017     " Venous stasis ulcer of left lower extremity (H) 5/22/2017       Past Surgical History:   Procedure Laterality Date     CARDIAC SURGERY      stent placement     CHOLECYSTECTOMY       LAMINECTOMY LUMBAR THREE+ LEVELS N/A 3/13/2018    Procedure: LAMINECTOMY LUMBAR THREE+ LEVELS;  T5-9 LAMINECTOMIES, RESECTION OF EPIDURAL LIPOMATOSIS;  Surgeon: Hugh Mendieta MD;  Location:  OR       Medications:    Current Outpatient Medications on File Prior to Visit:  acetaminophen (TYLENOL) 500 MG tablet Take 500-1,000 mg by mouth   blood glucose (NO BRAND SPECIFIED) lancing device Use to test blood sugars 2 times daily or as directed.   blood glucose calibration (NO BRAND SPECIFIED) solution To accompany: Blood Glucose Monitor Brands: per insurance.   blood glucose monitoring (NO BRAND SPECIFIED) meter device kit test blood sugar 2 xdaily or as directed.  Blood Glucose Monitor Brands: per insurance.   blood glucose monitoring (NO BRAND SPECIFIED) test strip Use to test blood sugar 2 times daily Blood Glucose Monitor Brands: Glucocard Expression   blood glucose monitoring (NO BRAND SPECIFIED) test strip Use to test blood sugar 2x  daily or as directed. To accompany: Blood Glucose Monitor Brands: per insurance.   Blood Glucose Monitoring Suppl (GLUCOCARD EXPRESSION MONITOR) W/DEVICE KIT USE TWICE DAILY TO TEST BLOOD GLUCOSE   clopidogrel (PLAVIX) 75 MG tablet Take 1 tablet (75 mg) by mouth daily   ferrous sulfate (IRON) 325 (65 Fe) MG tablet Take 1 tablet (325 mg) by mouth daily (with breakfast)   furosemide (LASIX) 40 MG tablet Take 1 tablet (40 mg) by mouth daily   JANUMET XR  MG TB24    JENTADUETO XR 2.5-1000 MG per table    losartan (COZAAR) 25 MG tablet TAKE 1 TABLET (25 MG) BY MOUTH DAILY   metoprolol tartrate (LOPRESSOR) 25 MG tablet Take 1 tablet (25 mg) by mouth 2 times daily   order for DME Equipment being ordered: electric chair for sleeping and adjustment to allow for elevation of legs above the heart.  Zero  "gravity type heavy duty sleep chair advised. Jamal ([id number])   order for DME Equipment being ordered: Needs to have a sleep chair for home use.   order for DME Compression garments toe to knee.  Specific brand is Compraflex Light Compression garment. Diagnosis codes for venous stasis ulcer I83.029 and for  Lymphoedema is I89.0 Goal is for compression of about 30 to 40 mm of mercury measurements are right ankle 28.5 cm and right calf is 45.5 cm.  Left ankle is 27.5 cm and the calf is 45 cm Tall for length.   order for DME Equipment being ordered: compression stockings below knee and above knee if available.  Medium compression.   rosuvastatin (CRESTOR) 10 MG tablet Take 1 tablet (10 mg) by mouth daily   senna-docusate (SENOKOT-S/PERICOLACE) 8.6-50 MG tablet Take 1 tablet by mouth   sitagliptin-metFORMIN (JANUMET)  MG per tablet Take 1 tablet by mouth daily   thin (NO BRAND SPECIFIED) lancets Test 2 x daily or as directed.  Brands: per insurance.   Wound Dressings (TRIAD HYDROPHILIC WOUND DRESSI) PSTE Externally apply 1 g topically daily   Wound Dressings (TRIAD HYDROPHILIC WOUND DRESSI) PSTE Externally apply 1 g topically daily     No current facility-administered medications on file prior to visit.     Allergies   Allergen Reactions     No Known Allergies        Social History     Occupational History     Not on file   Tobacco Use     Smoking status: Former Smoker     Types: Cigars     Smokeless tobacco: Never Used     Tobacco comment: exposure to second hand smoke   Substance and Sexual Activity     Alcohol use: No     Drug use: No     Sexual activity: Never     Partners: Female       No family history on file.    REVIEW OF SYSTEMS  10 point review systems performed otherwise negative as noted as per history of present illness.    Physical Exam:  Vitals: /77   Ht 1.778 m (5' 10\")   Wt 127.9 kg (282 lb)   BMI 40.46 kg/m     BMI= Body mass index is 40.46 kg/m .  Constitutional: healthy, " alert and no acute distress   Psychiatric: mentation appears normal and affect normal/bright  NEURO: no focal deficits  RESP: Normal with easy respirations and no use of accessory muscles to breathe, no audible wheezing or retractions  CV: Significant bilateral lower extremity edema         Regular rate and rhythm by palpation  SKIN: Wound over left great toe without any signs of infection.  No other areas of skin ulceration on lower extremities visualized.  Chronic venous stasis changes bilateral lower extremities.  JOINT/EXTREMITIES:bilateral Knee Exam: Inspection: Varus alignment.    Tender: medial joint line  Non-tender: lateral patellar facet, medial patellar facet, lateral joint line  Active Range of Motion: 5-100 degrees  Strength: Mild (4/5) weakness right ankle dorsiflexion, left normal  Special tests: normal Valgus stress test, normal Varus, negative drawer testing.  GAIT: In wheelchair    Diagnostic Modalities:  bilateral knee X-ray: Bilateral tricompartmental knee osteoarthritis with osteophyte formation and joint space narrowing, most severe medially.  Independent visualization of the images was performed.      Impression: bilateral knee primary osteoarthritis in a 68-year-old male with PVD, left great toe healing burn, obesity, and DM.    Plan:  All of the above pertinent physical exam and imaging modalities findings was reviewed with Glenroy.    Patient has had ongoing chronic bilateral knee pain refractory to conservative treatments including steroid and synvisc injections.  Pain has becoming very limiting for him.  He cannot walk more than a block anymore due to the severe knee pain.  He does have a history of PVD and we discussed risks for wound healing with a total knee arthroplasty.  He currently has a burn that is in the process of healing.  Discussed he must have this healed prior to proceeding with a total knee arthroplasty.  He is in agreement with this.      Vascular note in epic dated  "February 27, 2019 by Dr. Heller \"As for his wound healing capability from a  Knee arthroplasty, his ankle pressures suggest adequate inflow to heal, no clinical signs of significant arterial ischemia, although he does have mild PAD due to mildly diminished bilateral MINESH. \"    Return to clinic PRN, or sooner as needed for changes.  Re-x-ray on return: No    Scribed by:  Milana Westbrook PA-C  4/18/2019     I attest I have seen and evaluated the patient.  I agree with above impression and plan.  Federico Ibrahim D.O.    Again, thank you for allowing me to participate in the care of your patient.        Sincerely,        Marc Ibrahim, DO    "

## 2019-04-22 ENCOUNTER — TELEPHONE (OUTPATIENT)
Dept: FAMILY MEDICINE | Facility: OTHER | Age: 69
End: 2019-04-22

## 2019-04-22 NOTE — TELEPHONE ENCOUNTER
Reason for Call:  Form, our goal is to have forms completed with 72 hours, however, some forms may require a visit or additional information.    Type of letter, form or note:  medical    Who is the form from?: Home care    Where did the form come from: form was faxed in    What clinic location was the form placed at?: [facility] - [phone]    Where the form was placed: [facility]    What number is listed as a contact on the form?: - [phone]  - [phone]       Additional comments: please sign and fax back.     Call taken on 4/22/2019 at 4:29 PM by Cathi Owen

## 2019-04-23 ENCOUNTER — MEDICAL CORRESPONDENCE (OUTPATIENT)
Dept: HEALTH INFORMATION MANAGEMENT | Facility: CLINIC | Age: 69
End: 2019-04-23

## 2019-04-24 DIAGNOSIS — Z86.2 HISTORY OF ANEMIA: ICD-10-CM

## 2019-04-25 ENCOUNTER — TELEPHONE (OUTPATIENT)
Dept: FAMILY MEDICINE | Facility: OTHER | Age: 69
End: 2019-04-25

## 2019-04-25 RX ORDER — FERROUS SULFATE 325(65) MG
TABLET ORAL
Qty: 90 TABLET | Refills: 1 | Status: SHIPPED | OUTPATIENT
Start: 2019-04-25 | End: 2019-10-22

## 2019-04-25 NOTE — TELEPHONE ENCOUNTER
Iron    Routing refill request to provider for review/approval because:  Drug not on the [facility] refill protocol     Chantel Mederos RN, BSN

## 2019-04-25 NOTE — TELEPHONE ENCOUNTER
Reason for Call:  Form, our goal is to have forms completed with 72 hours, however, some forms may require a visit or additional information.    Type of letter, form or note:  medical    Who is the form from?: [facility] (if other please explain)    Where did the form come from: form was faxed in    What clinic location was the form placed at?: [facility] - [phone]    Where the form was placed: box Box/Folder    What number is listed as a contact on the form?: [phone]       Additional comments: n/a    Call taken on 4/25/2019 at 11:28 AM by Diamond Juarez

## 2019-05-02 NOTE — TELEPHONE ENCOUNTER
Attempted to call x2. Fast busy both times. YAHIR Wilkins      Sun protection.  Massage scar PRN    Call for problems or worsening symptoms

## 2019-05-03 ENCOUNTER — TELEPHONE (OUTPATIENT)
Dept: FAMILY MEDICINE | Facility: OTHER | Age: 69
End: 2019-05-03

## 2019-05-03 ENCOUNTER — MEDICAL CORRESPONDENCE (OUTPATIENT)
Dept: HEALTH INFORMATION MANAGEMENT | Facility: CLINIC | Age: 69
End: 2019-05-03

## 2019-05-03 NOTE — TELEPHONE ENCOUNTER
Reason for Call:  Form, our goal is to have forms completed with 72 hours, however, some forms may require a visit or additional information.    Type of letter, form or note:  medical    Who is the form from?: [facility] (if other please explain)    Where did the form come from: form was faxed in    What clinic location was the form placed at?: [facility] - [phone]    Where the form was placed: [facility] Box[facility]Folder    What number is listed as a contact on the form?: [phone]       Additional comments: Please sign and fax to [phone]    Call taken on 5/3/2019 at 5:30 PM by Tanya Rao

## 2019-05-08 NOTE — TELEPHONE ENCOUNTER
Patient calling back. He got a call from the [facility] that said that he couldn't be seen until this form is filled out. They said [facility] will be out. He is wondering if anyone else can sign this form. He has an appointment scheduled for Friday and needs this form by then.

## 2019-05-09 ENCOUNTER — TELEPHONE (OUTPATIENT)
Dept: SURGERY | Facility: OTHER | Age: 69
End: 2019-05-09

## 2019-05-09 NOTE — TELEPHONE ENCOUNTER
Reason for Call:  Other call back    Detailed comments: Miriam, patient's nurse called clinic. She stated left medial wound has stalled with use of Medihoney; patient would like to use Aquacel AG in wound bed. Please give Miriam a call back and give verbal okay for this.     Phone Number: [phone]    Best Time: any    Can we leave a detailed message on this number? YES    Call taken on 5/9/2019 at 10:13 AM by Anna Baez

## 2019-05-10 DIAGNOSIS — E78.5 HYPERLIPIDEMIA LDL GOAL <100: ICD-10-CM

## 2019-05-10 RX ORDER — ROSUVASTATIN CALCIUM 10 MG/1
10 TABLET, COATED ORAL DAILY
Qty: 90 TABLET | Refills: 1 | Status: SHIPPED | OUTPATIENT
Start: 2019-05-10 | End: 2019-09-18

## 2019-05-10 NOTE — TELEPHONE ENCOUNTER
Prescription approved per [facility].  Yonny Meyers, RN, BSN

## 2019-05-13 ENCOUNTER — TELEPHONE (OUTPATIENT)
Dept: FAMILY MEDICINE | Facility: OTHER | Age: 69
End: 2019-05-13

## 2019-05-13 ENCOUNTER — MEDICAL CORRESPONDENCE (OUTPATIENT)
Dept: HEALTH INFORMATION MANAGEMENT | Facility: CLINIC | Age: 69
End: 2019-05-13

## 2019-05-13 DIAGNOSIS — Z53.9 DIAGNOSIS NOT YET DEFINED: Primary | ICD-10-CM

## 2019-05-13 PROCEDURE — G0180 MD CERTIFICATION HHA PATIENT: HCPCS | Performed by: FAMILY MEDICINE

## 2019-05-13 NOTE — TELEPHONE ENCOUNTER
Reason for Call:  Form, our goal is to have forms completed with 72 hours, however, some forms may require a visit or additional information.    Type of letter, form or note:  medical    Who is the form from?: [facility] (if other please explain)    Where did the form come from: form was faxed in    What clinic location was the form placed at?: [facility] - [phone]    Where the form was placed: box Box/Folder    What number is listed as a contact on the form?: [phone]       Additional comments: n/a    Call taken on 5/13/2019 at 5:06 PM by Diamond Juarez

## 2019-05-13 NOTE — TELEPHONE ENCOUNTER
Call and spoke with Stephanie at [facility], relayed message from Dr. Shine. She will check and see who prescibed Medihoney for patient.................Hui Ashraf CMA  ([facility])

## 2019-05-13 NOTE — TELEPHONE ENCOUNTER
I did NOT presribe the medihoney - my script as for TRIAD.   I have not seen him in 2 months.  I would contact the provider who started the medihoney

## 2019-05-13 NOTE — TELEPHONE ENCOUNTER
Forms have been completed, signed, faxed/mailed, and sent to scanning.  Lisa Zuluaga CMA ([facility])

## 2019-05-13 NOTE — TELEPHONE ENCOUNTER
homecare calling to ck the status of the order change request from last week.  Please call to advise.  thanks

## 2019-05-15 ENCOUNTER — TELEPHONE (OUTPATIENT)
Dept: FAMILY MEDICINE | Facility: OTHER | Age: 69
End: 2019-05-15

## 2019-05-15 NOTE — TELEPHONE ENCOUNTER
BETTE left informing Stephanie at [facility]-Dr. Shine would like to see patient back in clinic for wound check, please have patient schedule appointment. Call back number [phone].

## 2019-05-15 NOTE — TELEPHONE ENCOUNTER
Reason for Call: Request for Orders:  Wound orders        Stephanie would like to know exactly what medication he would like to help with his wounds.    Date needed: as soon as possible    Has the patient been seen by the PCP for this problem? YES    Additional comments: none    Phone number Patient can be reached at:  Other phone number:  [phone]    Best Time:  anytime    Can we leave a detailed message on this number?  YES    Call taken on 5/15/2019 at 11:40 AM by Jeromy Abarca

## 2019-05-20 ENCOUNTER — OFFICE VISIT (OUTPATIENT)
Dept: SURGERY | Facility: CLINIC | Age: 69
End: 2019-05-20
Payer: COMMERCIAL

## 2019-05-20 ENCOUNTER — DOCUMENTATION ONLY (OUTPATIENT)
Dept: OTHER | Facility: CLINIC | Age: 69
End: 2019-05-20

## 2019-05-20 DIAGNOSIS — T25.222D: Primary | ICD-10-CM

## 2019-05-20 PROBLEM — Z71.89 ADVANCED DIRECTIVES, COUNSELING/DISCUSSION: Status: RESOLVED | Noted: 2017-10-02 | Resolved: 2019-05-20

## 2019-05-20 PROCEDURE — 99213 OFFICE O/P EST LOW 20 MIN: CPT | Performed by: SPECIALIST

## 2019-05-20 NOTE — PROGRESS NOTES
F/U for left foot wound    Subjective:   Patient is well-known to me for leg wounds have healed.  He is still in the nursing home and had his foot near a heater and dense sustained subsequent blistering of his left medial foot about a week and a half ago for which he now follows up.  That area is insensate from his diabetes.  The blisters opened up     Has been using TRIAD and occasionally medihoney.  Now presents for followup.    Objective:  B/P: 130/60, T: Data Unavailable, P: 83, R: Data Unavailable  LLE: Warm, no edema, (+)CVSD changes.  Medial foot - 0.5x0.3x0.3cm 100% granulation tissue      Assessment/plan:  This is a 68-year-old gentleman with known diabetes PVD who sustained a second degree burn to his left foot.  The base is clean with a good pink tissue.  Much smaller this week  The plan at this time is to continue triad to the area and with adequate nutrition this area will hopefully epithelialize.  He will follow-up with me in 2-3 weeks for wound check if it remains open.      Casey Shine MD, FACS

## 2019-05-20 NOTE — LETTER
5/20/2019         RE: Glenroy Padilla  [address] [id number]        Dear Colleague,    Thank you for referring your patient, Glenroy Padilla, to the [facility]. Please see a copy of my visit note below.    F/U for left foot wound    Subjective:   Patient is well-known to me for leg wounds have healed.  He is still in the nursing home and had his foot near a heater and dense sustained subsequent blistering of his left medial foot about a week and a half ago for which he now follows up.  That area is insensate from his diabetes.  The blisters opened up     Has been using TRIAD and occasionally medihoney.  Now presents for followup.    Objective:  B/P: 130/60, T: Data Unavailable, P: 83, R: Data Unavailable  LLE: Warm, no edema, (+)CVSD changes.  Medial foot - 0.5x0.3x0.3cm 100% granulation tissue      Assessment/plan:  This is a 68-year-old gentleman with known diabetes PVD who sustained a second degree burn to his left foot.  The base is clean with a good pink tissue.  Much smaller this week  The plan at this time is to continue triad to the area and with adequate nutrition this area will hopefully epithelialize.  He will follow-up with me in 2-3 weeks for wound check if it remains open.      Casey Shine MD, FACS      Again, thank you for allowing me to participate in the care of your patient.        Sincerely,        Casey Shine MD

## 2019-05-20 NOTE — NURSING NOTE
Per VO Dr. Shine-Carlitos applied to wound,sterile dressing applied...................Hui Ashraf CMA  ([facility])

## 2019-05-22 ENCOUNTER — TELEPHONE (OUTPATIENT)
Dept: SURGERY | Facility: OTHER | Age: 69
End: 2019-05-22

## 2019-05-22 NOTE — TELEPHONE ENCOUNTER
RK not in clinic, will review on Thursday 5- when Dr. Shine in Clinic..................Hui Ashraf CMA  ([facility])

## 2019-05-22 NOTE — TELEPHONE ENCOUNTER
Reason for Call:  Other appointment    Detailed comments: Carolin is calling from [facility] wondering if Dr. Shine ordered some ready wrap socks for patient? Please call her at [phone]     Phone Number Patient can be reached at: Home number on file [phone] (home)    Best Time: any    Can we leave a detailed message on this number? YES    Call taken on 5/22/2019 at 10:44 AM by Meme Isaacs

## 2019-05-23 NOTE — TELEPHONE ENCOUNTER
VM left for Carolin to call back. When she calls back please inform:    Patient was referred to [facility] 09-, they requested redi-wraps as part of therapy per RK............Hui Ashraf CMA  ([facility])

## 2019-05-23 NOTE — TELEPHONE ENCOUNTER
Call back from Carolin -she is stating patient is having problems with redi-wrap order and was under the impression we were having issues.  Informed Carolin that patient was referred to [facility] in 2017, she may want to reach out to them as I no of no issues with the Redi wraps................Hui Ashraf CMA  ([facility])

## 2019-05-28 ENCOUNTER — TELEPHONE (OUTPATIENT)
Dept: FAMILY MEDICINE | Facility: OTHER | Age: 69
End: 2019-05-28

## 2019-05-28 DIAGNOSIS — M25.562 ACUTE PAIN OF LEFT KNEE: Primary | ICD-10-CM

## 2019-05-28 DIAGNOSIS — M21.371 FOOT DROP, RIGHT: ICD-10-CM

## 2019-05-28 DIAGNOSIS — G62.9 NEUROPATHY: ICD-10-CM

## 2019-05-28 DIAGNOSIS — Z98.890 S/P SPINAL SURGERY: ICD-10-CM

## 2019-05-28 DIAGNOSIS — M48.00 CENTRAL SPINAL STENOSIS: ICD-10-CM

## 2019-05-28 DIAGNOSIS — R29.898 WEAKNESS OF BOTH LEGS: ICD-10-CM

## 2019-05-28 DIAGNOSIS — G89.29 CHRONIC PAIN OF RIGHT KNEE: ICD-10-CM

## 2019-05-28 DIAGNOSIS — M25.561 CHRONIC PAIN OF RIGHT KNEE: ICD-10-CM

## 2019-05-28 NOTE — TELEPHONE ENCOUNTER
"I am fairly certain that the prescription will need to be more highly detailed than the information that is given in this call.  Please call the patient back and find out as much details we can so that we can order the right thing and not have to do this 3 or 4 times.  He may need to get someone to pull the cover off his scooter and get the specifications off of the battery so that we can place that with in the DME order for him.  I will not write a blank DME order form for prescription.  Better yet he should have the supplier of the scooter contact us for a \"Refill\"  Electronically signed:    Julio Cesar Sahu PA-C   "

## 2019-05-28 NOTE — TELEPHONE ENCOUNTER
Reason for Call: Request for an order or referral:    Order or referral being requested: rx for battery for scooter    Date needed: as soon as possible    Has the patient been seen by the PCP for this problem? YES    Additional comments: please fax to [phone]    Phone number Patient can be reached at:  Other phone number:  [phone]    Best Time:  any    Can we leave a detailed message on this number?  YES    Call taken on 5/28/2019 at 12:41 PM by Leigh Gamboa

## 2019-05-28 NOTE — TELEPHONE ENCOUNTER
Carolin returned call, message relayed. Gave her fax# for [facility], she will fax what she has, please follow up with her if anything else is needed.

## 2019-05-28 NOTE — TELEPHONE ENCOUNTER
Note from the patient reads as such Glenroy is requesting a battery for his wheelchair utilizing his elderly waiver funds.  The wheelchair is in [employer] [address].  This year was given a route by family member it was not provided to him through [place].  [facility] stated they will fill the order for a battery with a prescription.  If you support this could you please fax prescription to me at [phone] thank you Carolin SMITH     I will place a DME order but I suspect that this is not the last that we will see from this transaction.

## 2019-05-28 NOTE — TELEPHONE ENCOUNTER
Left message for Carolin Payan [phone] to return our call, please inform them to send over refill request for [employer] scooter battery, so we can get the proper information we need to order a new battery.   We need detailed information on battery replacement  Thanks  Josselin Montes RT (R)

## 2019-05-28 NOTE — TELEPHONE ENCOUNTER
Forms have been completed, signed, faxed/mailed, and sent to scanning.  Lisa Zuluaga CMA ([facility])    
In my [facility] box (overhead file over my desk) for completion and scan of the document into the medical record.  Electronically signed:    Julio Cesar Sahu PA-C    
Reason for Call:  Form, our goal is to have forms completed with 72 hours, however, some forms may require a visit or additional information.    Type of letter, form or note:  medical    Who is the form from?: Home care    Where did the form come from: form was faxed in    What clinic location was the form placed at?: [facility] - [phone]    Where the form was placed: box Box/Folder    What number is listed as a contact on the form?: fax [phone]       Additional comments: please complete and fax    Call taken on 5/28/2019 at 4:08 PM by Olivia Mayfield        
Patient

## 2019-05-30 ENCOUNTER — MEDICAL CORRESPONDENCE (OUTPATIENT)
Dept: HEALTH INFORMATION MANAGEMENT | Facility: CLINIC | Age: 69
End: 2019-05-30

## 2019-05-31 ENCOUNTER — OFFICE VISIT (OUTPATIENT)
Dept: FAMILY MEDICINE | Facility: OTHER | Age: 69
End: 2019-05-31
Payer: COMMERCIAL

## 2019-05-31 VITALS
SYSTOLIC BLOOD PRESSURE: 116 MMHG | RESPIRATION RATE: 16 BRPM | WEIGHT: 290 LBS | OXYGEN SATURATION: 97 % | DIASTOLIC BLOOD PRESSURE: 60 MMHG | HEIGHT: 70 IN | BODY MASS INDEX: 41.52 KG/M2 | TEMPERATURE: 97.7 F | HEART RATE: 70 BPM

## 2019-05-31 DIAGNOSIS — H61.21 IMPACTED CERUMEN OF RIGHT EAR: Primary | ICD-10-CM

## 2019-05-31 PROCEDURE — 99212 OFFICE O/P EST SF 10 MIN: CPT | Mod: 25 | Performed by: PHYSICIAN ASSISTANT

## 2019-05-31 PROCEDURE — 69209 REMOVE IMPACTED EAR WAX UNI: CPT | Mod: RT | Performed by: PHYSICIAN ASSISTANT

## 2019-05-31 ASSESSMENT — PAIN SCALES - GENERAL: PAINLEVEL: NO PAIN (0)

## 2019-05-31 ASSESSMENT — MIFFLIN-ST. JEOR: SCORE: 2081.68

## 2019-06-17 ENCOUNTER — MEDICAL CORRESPONDENCE (OUTPATIENT)
Dept: HEALTH INFORMATION MANAGEMENT | Facility: CLINIC | Age: 69
End: 2019-06-17

## 2019-06-26 ENCOUNTER — TELEPHONE (OUTPATIENT)
Dept: ORTHOPEDICS | Facility: OTHER | Age: 69
End: 2019-06-26

## 2019-06-26 ENCOUNTER — TELEPHONE (OUTPATIENT)
Dept: FAMILY MEDICINE | Facility: OTHER | Age: 69
End: 2019-06-26

## 2019-06-26 ENCOUNTER — OFFICE VISIT (OUTPATIENT)
Dept: ORTHOPEDICS | Facility: CLINIC | Age: 69
End: 2019-06-26
Payer: COMMERCIAL

## 2019-06-26 VITALS
SYSTOLIC BLOOD PRESSURE: 110 MMHG | DIASTOLIC BLOOD PRESSURE: 70 MMHG | WEIGHT: 253 LBS | HEIGHT: 70 IN | BODY MASS INDEX: 36.22 KG/M2

## 2019-06-26 DIAGNOSIS — M17.11 PRIMARY OSTEOARTHRITIS OF RIGHT KNEE: Primary | ICD-10-CM

## 2019-06-26 DIAGNOSIS — Z01.818 PREOPERATIVE EXAMINATION: Primary | ICD-10-CM

## 2019-06-26 PROCEDURE — 99213 OFFICE O/P EST LOW 20 MIN: CPT | Performed by: ORTHOPAEDIC SURGERY

## 2019-06-26 ASSESSMENT — MIFFLIN-ST. JEOR: SCORE: 1913.85

## 2019-06-26 ASSESSMENT — PAIN SCALES - GENERAL: PAINLEVEL: NO PAIN (0)

## 2019-06-26 NOTE — TELEPHONE ENCOUNTER
Type of surgery: Right total knee replacement   Location of surgery: [facility]   Date of surgery: 7/30/19  Surgeon: Dr. Ibrahim  Pre-Op Appt Date: 7/15/19  Post-Op Appt Date:    Packet sent out: Surgery packet given in clinic  Pre-cert/Authorization completed: NA  Date: 6/26/2019    **labs ordered  **soap given    Huyen Escalona  Surgery Scheduler

## 2019-06-26 NOTE — PROGRESS NOTES
"Office Visit-Follow up    Chief Complaint: Glenroy Padilla is a 68 year old male who is being seen for   Chief Complaint   Patient presents with     RECHECK     bilateral knee primary osteoarthritis        History of Present Illness:  Today's visit  Returns to clinic. States the left toe wound has healed nicely. No concerns of infection. Has also lost about 30 pounds over the last 2 months. He states by diet modification only.  Continues to have progressive knee pain and issues.  Can walk about 1 block but then unable to do further due to pain and instability.  Uses a cane, otherwise using a wheelchair more and more.  Would like to proceed with surgery.  Treatments tried: Steroid injections (no lasting relief), hyalluronic injections, physical therapy, activity modification, rest, time, weight loss. using a cane and now wheelchair.     Takes plavix for previous stents 3-4 years ago.  Has been off of it for back surgery without issue.  Diabetic. Last A1c: 6.1 2/2019.  4/18/19 visit   Patient previously saw Dr. Thakur for his knees.  History of Present Illness:   Mechanism of Injury: No trauma or inciting event.  Location: bilateral knee anterior  Duration of Pain:  \"many years\"  Rating of Pain:  moderate.    Pain Quality: dull, aching and sharp  Pain is better with: Rest  Pain is worse with:  weightbearing  Treatment so far consists of:  Steroid injections (no lasting relief), hyalluronic injections, physical therapy.   Associated Features: None  Prior history of related problems:   The pain is getting worse. He is on plavix for stents.  He has been in a wheelchair for the past year due to the knee pains.  He does also walk with a cane.  He does have peripheral vascular disease; denies any current ulcers on legs.  He has a history of lumbar spine surgery and reports some mild chronic weakness right foot      REVIEW OF SYSTEMS  General: negative for, night sweats, dizziness, fatigue  Resp: No shortness of breath and no " "cough  CV: negative for chest pain, syncope or near-syncope  GI: negative for nausea, vomiting and diarrhea  : negative for dysuria and hematuria  Musculoskeletal: as above  Neurologic: negative for syncope   Hematologic: negative for bleeding disorder    Physical Exam:  Vitals: /70   Ht 1.778 m (5' 10\")   Wt 114.8 kg (253 lb)   BMI 36.30 kg/m    BMI= Body mass index is 36.3 kg/m .  Constitutional: healthy, alert and no acute distress   Psychiatric: mentation appears normal and affect normal/bright  NEURO: no focal deficits  RESP: Normal with easy respirations and no use of accessory muscles to breathe, no audible wheezing or retractions  CV: Significant bilateral lower extremity edema, mid calf to ankle.         Regular rate and rhythm by palpation  SKIN: Wound over left great toe without any signs of infection.  No other areas of skin ulceration on lower extremities visualized.  Chronic venous stasis changes bilateral lower extremities.  JOINT/EXTREMITIES: right Knee. Varus alignment. Mild effusion. Tender along medial joint line. 7-95 AROM.  Mild (4/5) weakness right ankle dorsiflexion, left normal  Special tests: normal Valgus stress test, normal Varus, negative drawer testing.  GAIT: In wheelchair    Diagnostic Modalities:  None today.  Previous imaging reviewed.  Independent visualization of the images was performed.      Impression: bilateral primary osteoarthritis R > L.  PVD.    Plan:  All of the above pertinent physical exam and imaging modalities findings was reviewed with Glenroy.  He would like to proceed with surgery. He has exhausted conservative therapy, lost weight and skin has healed. He can ambulate about 1 block but unable to do more. Right is more symptomatic than left. Would like to start with right knee.     The patient has attempted conservative treatments that include  Steroid injections (no lasting relief), hyalluronic injections, physical therapy, activity modification, rest, " time, weight loss. using a cane and now wheelchair.  yet still continues to have significant issues. These issues include pain at rest, increased pain with activity, gait disturbances, instability, fear of falls, decreasing activity, needing to use wheelchair more. Secondary to these reasons I have offered surgery in the form of RIGHT total knee replacement. .    Risks, benefits, complications, alternatives, limitations, and post operative course were discussed. Risks including infection (requiring long-term antibiotics and repeat surgeries), implant loosening (requiring revision surgery), heart attacks, strokes, bleeding (possibly requiring blood transfusion), scars, instability, numbness around the knee, stiffness (requiring manipulations or repeat surgeries), instability and dislocations, fractures, blood clots the legs and lungs, nerve injury (possibly leading to foot drop), and or death.  Postoperative course was discussed as far as limitations and expected recovery times. It was also discussed that after surgery there is a need for blood thinners to prevent blood clots, but even when being treated it is possible to develop a blood clot. Anticipate being hospitalized 1-3 days and subsequently either discharged home or to a rehabilitation facility. Determinations of this will be based on progress with physical therapy while in the hospital. The importance of post-operative physical therapy was discussed. If discharge home from the hospital expect [place] home physical therapy for about 2 weeks and then transition to going to a physical therapy location for more aggressive therapy. Without physical therapy, outcomes may not be optimal. All questions were answered. The patient agrees to proceed with surgery.  Past medical and surgical history was reviewed. It is positive for DM, PVD. Mr. Padilla will need a pre-operative medical evaluation and clearance by a primary care provider. We will obtain a CBC, UA, nasal  swab for MRSA as well as the appropriate radiographs prior to surgery. I will leave it to the discretion of the primary care provider for additional pre-operative testing.     He resides at an assistive living in [place] and feels would be able to get 24/7 support as well has rehab right there. This would be his first choice after surgery. Would be ok if TCU if cannot accommodate at his assistive living. Will plan for DVT prevention with xeralto.       Return to clinic 1 week prior to surgery without xrays, 14 days post-op with xrays, or sooner as needed for changes.  Re-x-ray on return: as above    Scribed by:  ELFEGO Long, CNP  8:46 AM  6/26/2019  I attest I have seen and evaluated the patient.  I agree with above impression and plan.    Federico Ibrahim D.O.

## 2019-06-26 NOTE — LETTER
"    6/26/2019         RE: Glenroy Padilla  [address] [address]        Dear Colleague,    Thank you for referring your patient, Glenroy Padilla, to the [facility]. Please see a copy of my visit note below.    Office Visit-Follow up    Chief Complaint: Glenroy Padilla is a 68 year old male who is being seen for   Chief Complaint   Patient presents with     RECHECK     bilateral knee primary osteoarthritis        History of Present Illness:  Today's visit  Returns to clinic. States the left toe wound has healed nicely. No concerns of infection. Has also lost about 30 pounds over the last 2 months. He states by diet modification only.  Continues to have progressive knee pain and issues.  Can walk about 1 block but then unable to do further due to pain and instability.  Uses a cane, otherwise using a wheelchair more and more.  Would like to proceed with surgery.  Treatments tried: Steroid injections (no lasting relief), hyalluronic injections, physical therapy, activity modification, rest, time, weight loss. using a cane and now wheelchair.     Takes plavix for previous stents 3-4 years ago.  Has been off of it for back surgery without issue.  Diabetic. Last A1c: 6.1 2/2019.  4/18/19 visit   Patient previously saw Dr. Thakur for his knees.  History of Present Illness:   Mechanism of Injury: No trauma or inciting event.  Location: bilateral knee anterior  Duration of Pain:  \"many years\"  Rating of Pain:  moderate.    Pain Quality: dull, aching and sharp  Pain is better with: Rest  Pain is worse with:  weightbearing  Treatment so far consists of:  Steroid injections (no lasting relief), hyalluronic injections, physical therapy.   Associated Features: None  Prior history of related problems:   The pain is getting worse. He is on plavix for stents.  He has been in a wheelchair for the past year due to the knee pains.  He does also walk with a cane.  He does have peripheral vascular " "disease; denies any current ulcers on legs.  He has a history of lumbar spine surgery and reports some mild chronic weakness right foot      REVIEW OF SYSTEMS  General: negative for, night sweats, dizziness, fatigue  Resp: No shortness of breath and no cough  CV: negative for chest pain, syncope or near-syncope  GI: negative for nausea, vomiting and diarrhea  : negative for dysuria and hematuria  Musculoskeletal: as above  Neurologic: negative for syncope   Hematologic: negative for bleeding disorder    Physical Exam:  Vitals: /70   Ht 1.778 m (5' 10\")   Wt 114.8 kg (253 lb)   BMI 36.30 kg/m     BMI= Body mass index is 36.3 kg/m .  Constitutional: healthy, alert and no acute distress   Psychiatric: mentation appears normal and affect normal/bright  NEURO: no focal deficits  RESP: Normal with easy respirations and no use of accessory muscles to breathe, no audible wheezing or retractions  CV: Significant bilateral lower extremity edema, mid calf to ankle.         Regular rate and rhythm by palpation  SKIN: Wound over left great toe without any signs of infection.  No other areas of skin ulceration on lower extremities visualized.  Chronic venous stasis changes bilateral lower extremities.  JOINT/EXTREMITIES: right Knee . Varus alignment. Mild effusion. Tender along medial joint line. 7-95 AROM.  Mild (4/5) weakness right ankle dorsiflexion, left normal  Special tests: normal Valgus stress test, normal Varus, negative drawer testing.  GAIT: In wheelchair    Diagnostic Modalities:  None today.  Previous imaging reviewed.  Independent visualization of the images was performed.      Impression: bilateral primary osteoarthritis R > L.  PVD.    Plan:  All of the above pertinent physical exam and imaging modalities findings was reviewed with Glenroy.  He would like to proceed with surgery. He has exhausted conservative therapy, lost weight and skin has healed. He can ambulate about 1 block but unable to do more. " Right is more symptomatic than left. Would like to start with right knee.     The patient has attempted conservative treatments that include  Steroid injections (no lasting relief), hyalluronic injections, physical therapy, activity modification, rest, time, weight loss. using a cane and now wheelchair.  yet still continues to have significant issues. These issues include pain at rest, increased pain with activity, gait disturbances, instability, fear of falls, decreasing activity, needing to use wheelchair more. Secondary to these reasons I have offered surgery in the form of RIGHT total knee replacement. .    Risks, benefits, complications, alternatives, limitations, and post operative course were discussed. Risks including infection (requiring long-term antibiotics and repeat surgeries), implant loosening (requiring revision surgery), heart attacks, strokes, bleeding (possibly requiring blood transfusion), scars, instability, numbness around the knee, stiffness (requiring manipulations or repeat surgeries), instability and dislocations, fractures, blood clots the legs and lungs, nerve injury (possibly leading to foot drop), and or death.  Postoperative course was discussed as far as limitations and expected recovery times. It was also discussed that after surgery there is a need for blood thinners to prevent blood clots, but even when being treated it is possible to develop a blood clot. Anticipate being hospitalized 1-3 days and subsequently either discharged home or to a rehabilitation facility. Determinations of this will be based on progress with physical therapy while in the hospital. The importance of post-operative physical therapy was discussed. If discharge home from the hospital expect [place] home physical therapy for about 2 weeks and then transition to going to a physical therapy location for more aggressive therapy. Without physical therapy, outcomes may not be optimal. All questions were answered.  The patient agrees to proceed with surgery.  Past medical and surgical history was reviewed. It is positive for DM, PVD. Mr. Padilla will need a pre-operative medical evaluation and clearance by a primary care provider. We will obtain a CBC, UA, nasal swab for MRSA as well as the appropriate radiographs prior to surgery. I will leave it to the discretion of the primary care provider for additional pre-operative testing.     He resides at an assistive living in [place] and feels would be able to get 24/7 support as well has rehab right there. This would be his first choice after surgery. Would be ok if TCU if cannot accommodate at his assistive living. Will plan for DVT prevention with xeralto.       Return to clinic 1 week prior to surgery without xrays, 14 days post-op with xrays, or sooner as needed for changes.  Re-x-ray on return: as above    Scribed by:  ELFEGO Long, CNP  8:46 AM  6/26/2019  I attest I have seen and evaluated the patient.  I agree with above impression and plan.    Federico Ibrahim D.O.          Again, thank you for allowing me to participate in the care of your patient.        Sincerely,        Marc Ibrahim, DO

## 2019-06-26 NOTE — TELEPHONE ENCOUNTER
Reason for Call:  Form, our goal is to have forms completed with 72 hours, however, some forms may require a visit or additional information.    Type of letter, form or note:  medical    Who is the form from?: [facility] (if other please explain)    Where did the form come from: form was faxed in    What clinic location was the form placed at?: [facility] - [phone]    Where the form was placed: box Box/Folder    What number is listed as a contact on the form?: [phone]       Additional comments: n/a    Call taken on 6/26/2019 at 8:00 AM by Diamond Juarez

## 2019-07-09 NOTE — PROGRESS NOTES
[facility]  [address] [address] [id number] [phone]  Dept: [phone]    PRE-OP EVALUATION:  Today's date: 7/15/2019    Glenroy Padilla (: 1950) presents for pre-operative evaluation assessment as requested by Dr. Ibrahim.  He requires evaluation and anesthesia risk assessment prior to undergoing surgery/procedure for treatment of right knee pain .    Fax number for surgical facility:   Primary Physician: Julio Cesar Esteban  Type of Anesthesia Anticipated: Spinal    Patient has a Health Care Directive or Living Will:  YES Prior    Preop Questions 7/15/2019   Who is doing your surgery? dr perdomo   What are you having done? [facility]   Date of Surgery/Procedure: 2019   Facility or Hospital where procedure/surgery will be performed: [facility]   1.  Do you have a history of Heart attack, stroke, stent, coronary bypass surgery, or other heart surgery? No, UNKNOWN -    2.  Do you ever have any pain or discomfort in your chest? UNKNOWN -    3.  Do you have a history of  Heart Failure? No   4.   Are you troubled by shortness of breath when:  walking on a level surface, or up a slight hill, or at night? No   5.  Do you currently have a cold, bronchitis or other respiratory infection? No   6.  Do you have a cough, shortness of breath, or wheezing? No   7.  Do you sometimes get pains in the calves of your legs when you walk? No   8. Do you or anyone in your family have previous history of blood clots? No   9.  Do you or does anyone in your family have a serious bleeding problem such as prolonged bleeding following surgeries or cuts? No   10. Have you ever had problems with anemia or been told to take iron pills? YES -    11. Have you had any abnormal blood loss such as black, tarry or bloody stools? No   12. Have you ever had a blood transfusion? No   13. Have you or any of your relatives ever had problems with anesthesia? No   14. Do you have sleep apnea, excessive snoring  or daytime drowsiness? UNKNOWN -    15. Do you have any prosthetic heart valves? No   16. Do you have prosthetic joints? No         HPI:     HPI related to upcoming procedure: Long-term knee problems with the osteoarthritis dictating need for knee replacement.  Surgery pending.      See problem list for active medical problems.  Problems all longstanding and stable, except as noted/documented.  See ROS for pertinent symptoms related to these conditions.      MEDICAL HISTORY:     Patient Active Problem List    Diagnosis Date Noted     Ischemic vascular disease 02/21/2019     Priority: Medium     Iron deficiency anemia secondary to inadequate dietary iron intake 08/23/2018     Priority: Medium     Mixed incontinence 08/13/2018     Priority: Medium     Lymphedema of right lower extremity 05/14/2018     Priority: Medium     Primary osteoarthritis of right knee 05/14/2018     Priority: Medium     Complete tear of left rotator cuff 04/04/2018     Priority: Medium     S/P spinal surgery 03/13/2018     Priority: Medium     Lymphedema 01/29/2018     Priority: Medium     Falls frequently 01/27/2018     Priority: Medium     Weakness of both legs 01/27/2018     Priority: Medium     Central spinal stenosis L3-4 and L4-5 01/27/2018     Priority: Medium     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 01/27/2018     Priority: Medium     Neuropathy 01/25/2018     Priority: Medium     Foot drop, right 01/25/2018     Priority: Medium     Cardiomegaly 01/25/2018     Priority: Medium     Gastroesophageal reflux disease without esophagitis 01/25/2018     Priority: Medium     Morbid obesity due to excess calories (H) 11/20/2017     Priority: Medium     Hypertension, goal below 140/90 09/25/2017     Priority: Medium     Hyperlipidemia LDL goal <100 09/25/2017     Priority: Medium     History of coronary artery stent placement 09/25/2017     Priority: Medium     Type 2 diabetes mellitus with other circulatory complication, without long-term  current use of insulin (H) 05/22/2017     Priority: Medium     Venous stasis ulcer of left lower extremity (H) 05/22/2017     Priority: Medium     Acute pain of left knee 05/22/2017     Priority: Medium     Chronic pain of right knee 05/22/2017     Priority: Medium     Open wound of left lower extremity without complication, initial encounter 05/22/2017     Priority: Medium     Hx of heart artery stent 05/22/2017     Priority: Medium     DAYTON (obstructive sleep apnea) 05/22/2017     Priority: Medium     Hx of coronary artery disease 03/12/2016     Priority: Medium      Past Medical History:   Diagnosis Date     Acute pain of left knee 5/22/2017     Chronic pain of right knee 5/22/2017     Hx of coronary artery disease 5/22/2017     Hx of heart artery stent 5/22/2017     Mixed incontinence 8/13/2018     Obesity, morbid, BMI 40.0-49.9 (H) 11/20/2017     Open wound of left lower extremity without complication, initial encounter 5/22/2017     DAYTON (obstructive sleep apnea) 5/22/2017     Type 2 diabetes mellitus with other circulatory complication, without long-term current use of insulin (H) 5/22/2017     Venous stasis ulcer of left lower extremity (H) 5/22/2017     Venous stasis ulcer of left lower extremity (H) 5/22/2017     Past Surgical History:   Procedure Laterality Date     CARDIAC SURGERY      stent placement     CHOLECYSTECTOMY       LAMINECTOMY LUMBAR THREE+ LEVELS N/A 3/13/2018    Procedure: LAMINECTOMY LUMBAR THREE+ LEVELS;  T5-9 LAMINECTOMIES, RESECTION OF EPIDURAL LIPOMATOSIS;  Surgeon: Hugh Mendieta MD;  Location:  OR     Current Outpatient Medications   Medication Sig Dispense Refill     acetaminophen (TYLENOL) 500 MG tablet Take 500-1,000 mg by mouth       blood glucose (NO BRAND SPECIFIED) lancing device Use to test blood sugars 2 times daily or as directed. 1 each 0     blood glucose calibration (NO BRAND SPECIFIED) solution To accompany: Blood Glucose Monitor Brands: per insurance. 1 Bottle 3      blood glucose monitoring (NO BRAND SPECIFIED) meter device kit test blood sugar 2 xdaily or as directed.  Blood Glucose Monitor Brands: per insurance. 1 kit 0     blood glucose monitoring (NO BRAND SPECIFIED) test strip Use to test blood sugar 2 times daily Blood Glucose Monitor Brands: Glucocard Expression 100 strip 6     blood glucose monitoring (NO BRAND SPECIFIED) test strip Use to test blood sugar 2x  daily or as directed. To accompany: Blood Glucose Monitor Brands: per insurance. 100 strip 1     Blood Glucose Monitoring Suppl (GLUCOCARD EXPRESSION MONITOR) W/DEVICE KIT USE TWICE DAILY TO TEST BLOOD GLUCOSE  0     clopidogrel (PLAVIX) 75 MG tablet Take 1 tablet (75 mg) by mouth daily 90 tablet 1     furosemide (LASIX) 40 MG tablet Take 1 tablet (40 mg) by mouth daily 90 tablet 2     GNP IRON 200 (65 Fe) MG TABS TAKE 1 TABLET (325 MG) BY MOUTH DAILY (WITH BREAKFAST) 90 tablet 1     JENTADUETO XR 2.5-1000 MG per table   1     losartan (COZAAR) 25 MG tablet TAKE 1 TABLET (25 MG) BY MOUTH DAILY 90 tablet 3     metoprolol tartrate (LOPRESSOR) 25 MG tablet Take 1 tablet (25 mg) by mouth 2 times daily 180 tablet 3     order for DME Equipment being ordered: Replacement battery or batteries for an [employer] Pronto M 51. 1 Device 0     order for DME Equipment being ordered: electric chair for sleeping and adjustment to allow for elevation of legs above the heart.  Zero gravity type heavy duty sleep chair advised. MaxiComforter ([id number]) 1 Units 0     order for DME Equipment being ordered: Needs to have a sleep chair for home use. 1 Device 0     order for DME Compression garments toe to knee.  Specific brand is Compraflex Light Compression garment.   Diagnosis codes for venous stasis ulcer I83.029 and for  Lymphoedema is I89.0   Goal is for compression of about 30 to 40 mm of mercury   measurements are right ankle 28.5 cm and right calf is 45.5 cm.  Left ankle is 27.5 cm and the calf is 45 cm   Tall for length. 2  Device 1     order for DME Equipment being ordered: compression stockings below knee and above knee if available.  Medium compression. 1 Units 1     rosuvastatin (CRESTOR) 10 MG tablet TAKE 1 TABLET (10 MG) BY MOUTH DAILY 90 tablet 1     senna-docusate (SENOKOT-S/PERICOLACE) 8.6-50 MG tablet Take 1 tablet by mouth       sitagliptin-metFORMIN (JANUMET)  MG per tablet Take 1 tablet by mouth daily       thin (NO BRAND SPECIFIED) lancets Test 2 x daily or as directed.  Brands: per insurance. 100 each 1     Wound Dressings (TRIAD HYDROPHILIC WOUND DRESSI) PSTE Externally apply 1 g topically daily 1 Tube 3     Wound Dressings (TRIAD HYDROPHILIC WOUND DRESSI) PSTE Externally apply 1 g topically daily 1 Tube 3     OTC products: None, except as noted above    Allergies   Allergen Reactions     No Known Allergies       Latex Allergy: NO    Social History     Tobacco Use     Smoking status: Former Smoker     Types: Cigars     Smokeless tobacco: Never Used     Tobacco comment: exposure to second hand smoke   Substance Use Topics     Alcohol use: No     History   Drug Use No       REVIEW OF SYSTEMS:   CONSTITUTIONAL: NEGATIVE for fever, chills, change in weight  INTEGUMENTARY/SKIN: NEGATIVE for worrisome rashes, moles or lesions  EYES: NEGATIVE for vision changes or irritation  ENT/MOUTH: NEGATIVE for ear, mouth and throat problems  RESP: NEGATIVE for significant cough or SOB  BREAST: NEGATIVE for masses, tenderness or discharge  CV: NEGATIVE for chest pain, palpitations or peripheral edema  GI: NEGATIVE for nausea, abdominal pain, heartburn, or change in bowel habits  : NEGATIVE for frequency, dysuria, or hematuria  MUSCULOSKELETAL: NEGATIVE for significant arthralgias or myalgia  NEURO: NEGATIVE for weakness, dizziness or paresthesias  ENDOCRINE: NEGATIVE for temperature intolerance, skin/hair changes  HEME: NEGATIVE for bleeding problems  PSYCHIATRIC: NEGATIVE for changes in mood or affect    EXAM:   /54 (Cuff  Size: Adult Large)   Pulse 72   Temp 97.6  F (36.4  C) (Temporal)   Resp 18   Wt 125.9 kg (277 lb 9.6 oz)   SpO2 97%   BMI 39.83 kg/m      GENERAL APPEARANCE: healthy, alert and no distress     EYES: EOMI,  PERRL     HENT: ear canals and TM's normal and nose and mouth without ulcers or lesions     NECK: no adenopathy, no asymmetry, masses, or scars and thyroid normal to palpation     RESP: lungs clear to auscultation - no rales, rhonchi or wheezes     CV: regular rates and rhythm, normal S1 S2, no S3 or S4 and no murmur, click or rub     ABDOMEN:  soft, nontender, no HSM or masses and bowel sounds normal     MS: extremities normal- no gross deformities noted, no evidence of inflammation in joints, FROM in all extremities.     SKIN: no suspicious lesions or rashes     NEURO: Normal strength and tone, sensory exam grossly normal, mentation intact and speech normal     PSYCH: mentation appears normal. and affect normal/bright     LYMPHATICS: No cervical adenopathy    DIAGNOSTICS:     EKG: Similar to previous tracings.  Question of first-degree AV block, normal axis, normal intervals, no acute ST/T changes c/w ischemia, no LVH by voltage criteria,   Labs Resulted Today:   Results for orders placed or performed in visit on 04/18/19   XR Knee Right G/E 4 Views    Narrative    RIGHT KNEE FOUR OR MORE VIEWS   4/18/2019 8:14 AM     HISTORY: Right knee pain.    COMPARISON: Left knee was provided for comparison on the AP, notch and  sunrise views. Right knee x-rays dated 1/27/2018.    FINDINGS: There is mild to moderate medial compartment joint space  loss which is similar to the prior study from 2018. There are moderate  size medial and patellofemoral and small lateral compartment  osteophytes in the right knee. Small to moderate tricompartmental  osteophytes are also incidentally noted in the left knee which was  provided for comparison. No acute fractures identified. No effusion.  There is mild enthesopathic change of  the quadriceps tendon insertion  into the patella and of the patellar tendon origin at the patella.      Impression    IMPRESSION:    1. Moderate to severe medial and mild to moderate lateral and  patellofemoral compartment degenerative joint disease of the right  knee.  2. Mild to moderate tricompartmental degenerative changes of the left  knee are incidentally noted.  3. Enthesopathic changes of the right patella.  4. No fracture.    LISA BASSETT MD     Labs Drawn and in Process:   Unresulted Labs Ordered in the Past 30 Days of this Admission     No orders found from 5/16/2019 to 7/16/2019.          Recent Labs   Lab Test 02/21/19  0929 08/23/18  1049  03/15/18  0853 03/14/18  0810   HGB  --  10.5*  --  8.2* 7.8*   PLT  --  314  --   --  222    139  --   --  139   POTASSIUM 4.2 4.0   < >  --  4.0   CR 1.23 1.13   < >  --  0.90   A1C 6.1* 5.7*   < >  --   --     < > = values in this interval not displayed.        IMPRESSION:   Reason for surgery/procedure: Right knee intervention  Diagnosis/reason for consult: Anesthesia/surgical clearance    The proposed surgical procedure is considered INTERMEDIATE risk.    REVISED CARDIAC RISK INDEX  The patient has the following serious cardiovascular risks for perioperative complications such as (MI, PE, VFib and 3  AV Block):  High risk surgery (>5% cardiac complication risk)  INTERPRETATION: 2 risks: Class III (moderate risk - 6.6% complication rate)    The patient has the following additional risks for perioperative complications:  No identified additional risks  The 10-year ASCVD risk score (Radha DC Jr., et al., 2013) is: 22.9%    Values used to calculate the score:      Age: 68 years      Sex: Male      Is Non- : No      Diabetic: Yes      Tobacco smoker: No      Systolic Blood Pressure: 100 mmHg      Is BP treated: Yes      HDL Cholesterol: 40 mg/dL      Total Cholesterol: 134 mg/dL      ICD-10-CM    1. Preop general physical exam Z01.818  Hemoglobin A1c     Comprehensive metabolic panel     CBC with platelets     LDL cholesterol direct     EKG 12-lead complete w/read - Clinics   2. Preoperative examination Z01.818 MRSA MSSA Presurgical Screen     *UA reflex to Microscopic and Culture ([place]; [facility]; [facility]; [facility]; [facility]; [facility]; [place]; [place])     Hemoglobin A1c     Comprehensive metabolic panel     CBC with platelets     LDL cholesterol direct     EKG 12-lead complete w/read - Clinics   3. Disease of spinal cord (H) G95.9 EKG 12-lead complete w/read - Clinics   4. DAY[place] (obstructive sleep apnea) G47.33    5. Gastroesophageal reflux disease without esophagitis K21.9    6. Morbid obesity due to excess calories (H) E66.01    7. Chronic pain of right knee M25.561     G89.29    8. Type 2 diabetes mellitus with other circulatory complication, without long-term current use of insulin (H) E11.59    9. Hypertension, goal below 140/90 I10    10. Ischemic vascular disease I99.8    11. 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 M99.83    12. Falls frequently R29.6    13. History of coronary artery stent placement Z95.5    14. Hx of coronary artery disease Z86.79        RECOMMENDATIONS:     --Consult hospital rounder / IM to assist post-op medical management should inpatient treatment be necessary.    --Patient is to take all scheduled medications on the day of surgery EXCEPT for modifications listed below.    Anticoagulant or Antiplatelet Medication Use  ASPIRIN: Discontinue ASA 7-10 days prior to procedure to reduce bleeding risk.  It should be resumed post-operatively.  PLAVIX: Marginal contraindication to stopping plavix.  Stop 7-10 days prior to surgery        ACE Inhibitor or Angiotensin Receptor Blocker (ARB) Use  Ace inhibitor or Angiotensin Receptor Blocker (ARB) and should HOLD this medication for the 24 hours prior to surgery.      APPROVAL GIVEN to proceed with proposed procedure, without further diagnostic  evaluation       Signed Electronically by: DAKOTA Barker PA-C    Copy of this evaluation report is provided to requesting physician.    [place] Preop Guidelines    Revised Cardiac Risk Index

## 2019-07-09 NOTE — H&P (VIEW-ONLY)
[facility]  [address] [address] [id number] [phone]  Dept: [phone]    PRE-OP EVALUATION:  Today's date: 7/15/2019    Glenroy Padilla (: 1950) presents for pre-operative evaluation assessment as requested by Dr. Ibrahim.  He requires evaluation and anesthesia risk assessment prior to undergoing surgery/procedure for treatment of right knee pain .    Fax number for surgical facility:   Primary Physician: Julio Cesar Esteban  Type of Anesthesia Anticipated: Spinal    Patient has a Health Care Directive or Living Will:  YES Prior    Preop Questions 7/15/2019   Who is doing your surgery? dr perdomo   What are you having done? [facility]   Date of Surgery/Procedure: 2019   Facility or Hospital where procedure/surgery will be performed: [facility]   1.  Do you have a history of Heart attack, stroke, stent, coronary bypass surgery, or other heart surgery? No, UNKNOWN -    2.  Do you ever have any pain or discomfort in your chest? UNKNOWN -    3.  Do you have a history of  Heart Failure? No   4.   Are you troubled by shortness of breath when:  walking on a level surface, or up a slight hill, or at night? No   5.  Do you currently have a cold, bronchitis or other respiratory infection? No   6.  Do you have a cough, shortness of breath, or wheezing? No   7.  Do you sometimes get pains in the calves of your legs when you walk? No   8. Do you or anyone in your family have previous history of blood clots? No   9.  Do you or does anyone in your family have a serious bleeding problem such as prolonged bleeding following surgeries or cuts? No   10. Have you ever had problems with anemia or been told to take iron pills? YES -    11. Have you had any abnormal blood loss such as black, tarry or bloody stools? No   12. Have you ever had a blood transfusion? No   13. Have you or any of your relatives ever had problems with anesthesia? No   14. Do you have sleep apnea, excessive snoring  or daytime drowsiness? UNKNOWN -    15. Do you have any prosthetic heart valves? No   16. Do you have prosthetic joints? No         HPI:     HPI related to upcoming procedure: Long-term knee problems with the osteoarthritis dictating need for knee replacement.  Surgery pending.      See problem list for active medical problems.  Problems all longstanding and stable, except as noted/documented.  See ROS for pertinent symptoms related to these conditions.      MEDICAL HISTORY:     Patient Active Problem List    Diagnosis Date Noted     Ischemic vascular disease 02/21/2019     Priority: Medium     Iron deficiency anemia secondary to inadequate dietary iron intake 08/23/2018     Priority: Medium     Mixed incontinence 08/13/2018     Priority: Medium     Lymphedema of right lower extremity 05/14/2018     Priority: Medium     Primary osteoarthritis of right knee 05/14/2018     Priority: Medium     Complete tear of left rotator cuff 04/04/2018     Priority: Medium     S/P spinal surgery 03/13/2018     Priority: Medium     Lymphedema 01/29/2018     Priority: Medium     Falls frequently 01/27/2018     Priority: Medium     Weakness of both legs 01/27/2018     Priority: Medium     Central spinal stenosis L3-4 and L4-5 01/27/2018     Priority: Medium     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 01/27/2018     Priority: Medium     Neuropathy 01/25/2018     Priority: Medium     Foot drop, right 01/25/2018     Priority: Medium     Cardiomegaly 01/25/2018     Priority: Medium     Gastroesophageal reflux disease without esophagitis 01/25/2018     Priority: Medium     Morbid obesity due to excess calories (H) 11/20/2017     Priority: Medium     Hypertension, goal below 140/90 09/25/2017     Priority: Medium     Hyperlipidemia LDL goal <100 09/25/2017     Priority: Medium     History of coronary artery stent placement 09/25/2017     Priority: Medium     Type 2 diabetes mellitus with other circulatory complication, without long-term  current use of insulin (H) 05/22/2017     Priority: Medium     Venous stasis ulcer of left lower extremity (H) 05/22/2017     Priority: Medium     Acute pain of left knee 05/22/2017     Priority: Medium     Chronic pain of right knee 05/22/2017     Priority: Medium     Open wound of left lower extremity without complication, initial encounter 05/22/2017     Priority: Medium     Hx of heart artery stent 05/22/2017     Priority: Medium     DAYTON (obstructive sleep apnea) 05/22/2017     Priority: Medium     Hx of coronary artery disease 03/12/2016     Priority: Medium      Past Medical History:   Diagnosis Date     Acute pain of left knee 5/22/2017     Chronic pain of right knee 5/22/2017     Hx of coronary artery disease 5/22/2017     Hx of heart artery stent 5/22/2017     Mixed incontinence 8/13/2018     Obesity, morbid, BMI 40.0-49.9 (H) 11/20/2017     Open wound of left lower extremity without complication, initial encounter 5/22/2017     DAYTON (obstructive sleep apnea) 5/22/2017     Type 2 diabetes mellitus with other circulatory complication, without long-term current use of insulin (H) 5/22/2017     Venous stasis ulcer of left lower extremity (H) 5/22/2017     Venous stasis ulcer of left lower extremity (H) 5/22/2017     Past Surgical History:   Procedure Laterality Date     CARDIAC SURGERY      stent placement     CHOLECYSTECTOMY       LAMINECTOMY LUMBAR THREE+ LEVELS N/A 3/13/2018    Procedure: LAMINECTOMY LUMBAR THREE+ LEVELS;  T5-9 LAMINECTOMIES, RESECTION OF EPIDURAL LIPOMATOSIS;  Surgeon: Hugh Mendieta MD;  Location:  OR     Current Outpatient Medications   Medication Sig Dispense Refill     acetaminophen (TYLENOL) 500 MG tablet Take 500-1,000 mg by mouth       blood glucose (NO BRAND SPECIFIED) lancing device Use to test blood sugars 2 times daily or as directed. 1 each 0     blood glucose calibration (NO BRAND SPECIFIED) solution To accompany: Blood Glucose Monitor Brands: per insurance. 1 Bottle 3      blood glucose monitoring (NO BRAND SPECIFIED) meter device kit test blood sugar 2 xdaily or as directed.  Blood Glucose Monitor Brands: per insurance. 1 kit 0     blood glucose monitoring (NO BRAND SPECIFIED) test strip Use to test blood sugar 2 times daily Blood Glucose Monitor Brands: Glucocard Expression 100 strip 6     blood glucose monitoring (NO BRAND SPECIFIED) test strip Use to test blood sugar 2x  daily or as directed. To accompany: Blood Glucose Monitor Brands: per insurance. 100 strip 1     Blood Glucose Monitoring Suppl (GLUCOCARD EXPRESSION MONITOR) W/DEVICE KIT USE TWICE DAILY TO TEST BLOOD GLUCOSE  0     clopidogrel (PLAVIX) 75 MG tablet Take 1 tablet (75 mg) by mouth daily 90 tablet 1     furosemide (LASIX) 40 MG tablet Take 1 tablet (40 mg) by mouth daily 90 tablet 2     GNP IRON 200 (65 Fe) MG TABS TAKE 1 TABLET (325 MG) BY MOUTH DAILY (WITH BREAKFAST) 90 tablet 1     JENTADUETO XR 2.5-1000 MG per table   1     losartan (COZAAR) 25 MG tablet TAKE 1 TABLET (25 MG) BY MOUTH DAILY 90 tablet 3     metoprolol tartrate (LOPRESSOR) 25 MG tablet Take 1 tablet (25 mg) by mouth 2 times daily 180 tablet 3     order for DME Equipment being ordered: Replacement battery or batteries for an [employer] Pronto M 51. 1 Device 0     order for DME Equipment being ordered: electric chair for sleeping and adjustment to allow for elevation of legs above the heart.  Zero gravity type heavy duty sleep chair advised. MaxiComforter ([id number]) 1 Units 0     order for DME Equipment being ordered: Needs to have a sleep chair for home use. 1 Device 0     order for DME Compression garments toe to knee.  Specific brand is Compraflex Light Compression garment.   Diagnosis codes for venous stasis ulcer I83.029 and for  Lymphoedema is I89.0   Goal is for compression of about 30 to 40 mm of mercury   measurements are right ankle 28.5 cm and right calf is 45.5 cm.  Left ankle is 27.5 cm and the calf is 45 cm   Tall for length. 2  Device 1     order for DME Equipment being ordered: compression stockings below knee and above knee if available.  Medium compression. 1 Units 1     rosuvastatin (CRESTOR) 10 MG tablet TAKE 1 TABLET (10 MG) BY MOUTH DAILY 90 tablet 1     senna-docusate (SENOKOT-S/PERICOLACE) 8.6-50 MG tablet Take 1 tablet by mouth       sitagliptin-metFORMIN (JANUMET)  MG per tablet Take 1 tablet by mouth daily       thin (NO BRAND SPECIFIED) lancets Test 2 x daily or as directed.  Brands: per insurance. 100 each 1     Wound Dressings (TRIAD HYDROPHILIC WOUND DRESSI) PSTE Externally apply 1 g topically daily 1 Tube 3     Wound Dressings (TRIAD HYDROPHILIC WOUND DRESSI) PSTE Externally apply 1 g topically daily 1 Tube 3     OTC products: None, except as noted above    Allergies   Allergen Reactions     No Known Allergies       Latex Allergy: NO    Social History     Tobacco Use     Smoking status: Former Smoker     Types: Cigars     Smokeless tobacco: Never Used     Tobacco comment: exposure to second hand smoke   Substance Use Topics     Alcohol use: No     History   Drug Use No       REVIEW OF SYSTEMS:   CONSTITUTIONAL: NEGATIVE for fever, chills, change in weight  INTEGUMENTARY/SKIN: NEGATIVE for worrisome rashes, moles or lesions  EYES: NEGATIVE for vision changes or irritation  ENT/MOUTH: NEGATIVE for ear, mouth and throat problems  RESP: NEGATIVE for significant cough or SOB  BREAST: NEGATIVE for masses, tenderness or discharge  CV: NEGATIVE for chest pain, palpitations or peripheral edema  GI: NEGATIVE for nausea, abdominal pain, heartburn, or change in bowel habits  : NEGATIVE for frequency, dysuria, or hematuria  MUSCULOSKELETAL: NEGATIVE for significant arthralgias or myalgia  NEURO: NEGATIVE for weakness, dizziness or paresthesias  ENDOCRINE: NEGATIVE for temperature intolerance, skin/hair changes  HEME: NEGATIVE for bleeding problems  PSYCHIATRIC: NEGATIVE for changes in mood or affect    EXAM:   /54 (Cuff  Size: Adult Large)   Pulse 72   Temp 97.6  F (36.4  C) (Temporal)   Resp 18   Wt 125.9 kg (277 lb 9.6 oz)   SpO2 97%   BMI 39.83 kg/m      GENERAL APPEARANCE: healthy, alert and no distress     EYES: EOMI,  PERRL     HENT: ear canals and TM's normal and nose and mouth without ulcers or lesions     NECK: no adenopathy, no asymmetry, masses, or scars and thyroid normal to palpation     RESP: lungs clear to auscultation - no rales, rhonchi or wheezes     CV: regular rates and rhythm, normal S1 S2, no S3 or S4 and no murmur, click or rub     ABDOMEN:  soft, nontender, no HSM or masses and bowel sounds normal     MS: extremities normal- no gross deformities noted, no evidence of inflammation in joints, FROM in all extremities.     SKIN: no suspicious lesions or rashes     NEURO: Normal strength and tone, sensory exam grossly normal, mentation intact and speech normal     PSYCH: mentation appears normal. and affect normal/bright     LYMPHATICS: No cervical adenopathy    DIAGNOSTICS:     EKG: Similar to previous tracings.  Question of first-degree AV block, normal axis, normal intervals, no acute ST/T changes c/w ischemia, no LVH by voltage criteria,   Labs Resulted Today:   Results for orders placed or performed in visit on 04/18/19   XR Knee Right G/E 4 Views    Narrative    RIGHT KNEE FOUR OR MORE VIEWS   4/18/2019 8:14 AM     HISTORY: Right knee pain.    COMPARISON: Left knee was provided for comparison on the AP, notch and  sunrise views. Right knee x-rays dated 1/27/2018.    FINDINGS: There is mild to moderate medial compartment joint space  loss which is similar to the prior study from 2018. There are moderate  size medial and patellofemoral and small lateral compartment  osteophytes in the right knee. Small to moderate tricompartmental  osteophytes are also incidentally noted in the left knee which was  provided for comparison. No acute fractures identified. No effusion.  There is mild enthesopathic change of  the quadriceps tendon insertion  into the patella and of the patellar tendon origin at the patella.      Impression    IMPRESSION:    1. Moderate to severe medial and mild to moderate lateral and  patellofemoral compartment degenerative joint disease of the right  knee.  2. Mild to moderate tricompartmental degenerative changes of the left  knee are incidentally noted.  3. Enthesopathic changes of the right patella.  4. No fracture.    LISA BASSETT MD     Labs Drawn and in Process:   Unresulted Labs Ordered in the Past 30 Days of this Admission     No orders found from 5/16/2019 to 7/16/2019.          Recent Labs   Lab Test 02/21/19  0929 08/23/18  1049  03/15/18  0853 03/14/18  0810   HGB  --  10.5*  --  8.2* 7.8*   PLT  --  314  --   --  222    139  --   --  139   POTASSIUM 4.2 4.0   < >  --  4.0   CR 1.23 1.13   < >  --  0.90   A1C 6.1* 5.7*   < >  --   --     < > = values in this interval not displayed.        IMPRESSION:   Reason for surgery/procedure: Right knee intervention  Diagnosis/reason for consult: Anesthesia/surgical clearance    The proposed surgical procedure is considered INTERMEDIATE risk.    REVISED CARDIAC RISK INDEX  The patient has the following serious cardiovascular risks for perioperative complications such as (MI, PE, VFib and 3  AV Block):  High risk surgery (>5% cardiac complication risk)  INTERPRETATION: 2 risks: Class III (moderate risk - 6.6% complication rate)    The patient has the following additional risks for perioperative complications:  No identified additional risks  The 10-year ASCVD risk score (Radha DC Jr., et al., 2013) is: 22.9%    Values used to calculate the score:      Age: 68 years      Sex: Male      Is Non- : No      Diabetic: Yes      Tobacco smoker: No      Systolic Blood Pressure: 100 mmHg      Is BP treated: Yes      HDL Cholesterol: 40 mg/dL      Total Cholesterol: 134 mg/dL      ICD-10-CM    1. Preop general physical exam Z01.818  Hemoglobin A1c     Comprehensive metabolic panel     CBC with platelets     LDL cholesterol direct     EKG 12-lead complete w/read - Clinics   2. Preoperative examination Z01.818 MRSA MSSA Presurgical Screen     *UA reflex to Microscopic and Culture ([place]; [facility]; [facility]; [facility]; [facility]; [facility]; [place]; [place])     Hemoglobin A1c     Comprehensive metabolic panel     CBC with platelets     LDL cholesterol direct     EKG 12-lead complete w/read - Clinics   3. Disease of spinal cord (H) G95.9 EKG 12-lead complete w/read - Clinics   4. DAYTON (obstructive sleep apnea) G47.33    5. Gastroesophageal reflux disease without esophagitis K21.9    6. Morbid obesity due to excess calories (H) E66.01    7. Chronic pain of right knee M25.561     G89.29    8. Type 2 diabetes mellitus with other circulatory complication, without long-term current use of insulin (H) E11.59    9. Hypertension, goal below 140/90 I10    10. Ischemic vascular disease I99.8    11. 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 M99.83    12. Falls frequently R29.6    13. History of coronary artery stent placement Z95.5    14. Hx of coronary artery disease Z86.79        RECOMMENDATIONS:     --Consult hospital rounder / IM to assist post-op medical management should inpatient treatment be necessary.    --Patient is to take all scheduled medications on the day of surgery EXCEPT for modifications listed below.    Anticoagulant or Antiplatelet Medication Use  ASPIRIN: Discontinue ASA 7-10 days prior to procedure to reduce bleeding risk.  It should be resumed post-operatively.  PLAVIX: Marginal contraindication to stopping plavix.  Stop 7-10 days prior to surgery        ACE Inhibitor or Angiotensin Receptor Blocker (ARB) Use  Ace inhibitor or Angiotensin Receptor Blocker (ARB) and should HOLD this medication for the 24 hours prior to surgery.      APPROVAL GIVEN to proceed with proposed procedure, without further diagnostic  evaluation       Signed Electronically by: DAKOTA Barker PA-C    Copy of this evaluation report is provided to requesting physician.    [place] Preop Guidelines    Revised Cardiac Risk Index

## 2019-07-10 DIAGNOSIS — E78.5 HYPERLIPIDEMIA LDL GOAL <100: ICD-10-CM

## 2019-07-10 DIAGNOSIS — I10 HYPERTENSION, GOAL BELOW 140/90: ICD-10-CM

## 2019-07-10 DIAGNOSIS — E11.59 TYPE 2 DIABETES MELLITUS WITH OTHER CIRCULATORY COMPLICATION, WITHOUT LONG-TERM CURRENT USE OF INSULIN (H): ICD-10-CM

## 2019-07-10 DIAGNOSIS — Z98.890 S/P SPINAL SURGERY: ICD-10-CM

## 2019-07-10 RX ORDER — ROSUVASTATIN CALCIUM 10 MG/1
10 TABLET, COATED ORAL DAILY
Qty: 90 TABLET | Refills: 1
Start: 2019-07-10

## 2019-07-10 NOTE — TELEPHONE ENCOUNTER
Pending Prescriptions:                       Disp   Refills    rosuvastatin (CRESTOR) 10 MG tablet       90 tab*1            Sig: Take 1 tablet (10 mg) by mouth daily    Sent 5/10/19 with 90#, 1 refill supply. Refill not appropriate at this time.     Nneka Keith, RN, BSN

## 2019-07-10 NOTE — TELEPHONE ENCOUNTER
Pending Prescriptions:                       Disp   Refills    clopidogrel (PLAVIX) 75 MG tablet         90 tab*1            Sig: Take 1 tablet (75 mg) by mouth daily    Last Written Prescription Date:  9/27/18  Last Fill Quantity: 90,  # refills: 1   Last office visit: 5/31/2019 with prescribing provider:  5/31/19   Future Office Visit:   Next 5 appointments (look out 90 days)    Jul 15, 2019  8:00 AM CDT  Pre-Op physical with Julio Cesar Esteban PA-C  [facility] [address] [address] [address] [phone]         Routing refill request to provider for review/approval because:  Labs out of range:  8/26/18 hemoglobin 10.5    Nneka Keith, RN, BSN

## 2019-07-11 RX ORDER — CLOPIDOGREL BISULFATE 75 MG/1
75 TABLET ORAL DAILY
Qty: 90 TABLET | Refills: 1 | Status: ON HOLD | OUTPATIENT
Start: 2019-07-11 | End: 2019-07-31

## 2019-07-15 ENCOUNTER — OFFICE VISIT (OUTPATIENT)
Dept: FAMILY MEDICINE | Facility: OTHER | Age: 69
End: 2019-07-15
Payer: COMMERCIAL

## 2019-07-15 VITALS
SYSTOLIC BLOOD PRESSURE: 100 MMHG | TEMPERATURE: 97.6 F | RESPIRATION RATE: 18 BRPM | DIASTOLIC BLOOD PRESSURE: 54 MMHG | HEART RATE: 72 BPM | WEIGHT: 277.6 LBS | BODY MASS INDEX: 39.83 KG/M2 | OXYGEN SATURATION: 97 %

## 2019-07-15 DIAGNOSIS — E11.59 TYPE 2 DIABETES MELLITUS WITH OTHER CIRCULATORY COMPLICATION, WITHOUT LONG-TERM CURRENT USE OF INSULIN (H): ICD-10-CM

## 2019-07-15 DIAGNOSIS — Z86.79 HX OF CORONARY ARTERY DISEASE: ICD-10-CM

## 2019-07-15 DIAGNOSIS — Z01.818 PREOPERATIVE EXAMINATION: ICD-10-CM

## 2019-07-15 DIAGNOSIS — K21.9 GASTROESOPHAGEAL REFLUX DISEASE WITHOUT ESOPHAGITIS: ICD-10-CM

## 2019-07-15 DIAGNOSIS — E66.01 MORBID OBESITY DUE TO EXCESS CALORIES (H): ICD-10-CM

## 2019-07-15 DIAGNOSIS — M48.061 FORAMINAL STENOSIS OF LUMBAR REGION: ICD-10-CM

## 2019-07-15 DIAGNOSIS — R29.6 FALLS FREQUENTLY: ICD-10-CM

## 2019-07-15 DIAGNOSIS — G47.33 OSA (OBSTRUCTIVE SLEEP APNEA): ICD-10-CM

## 2019-07-15 DIAGNOSIS — G95.9 DISEASE OF SPINAL CORD (H): ICD-10-CM

## 2019-07-15 DIAGNOSIS — M25.561 CHRONIC PAIN OF RIGHT KNEE: ICD-10-CM

## 2019-07-15 DIAGNOSIS — Z95.5 HISTORY OF CORONARY ARTERY STENT PLACEMENT: ICD-10-CM

## 2019-07-15 DIAGNOSIS — Z01.818 PREOP GENERAL PHYSICAL EXAM: Primary | ICD-10-CM

## 2019-07-15 DIAGNOSIS — I10 HYPERTENSION, GOAL BELOW 140/90: ICD-10-CM

## 2019-07-15 DIAGNOSIS — I99.8 ISCHEMIC VASCULAR DISEASE: ICD-10-CM

## 2019-07-15 DIAGNOSIS — G89.29 CHRONIC PAIN OF RIGHT KNEE: ICD-10-CM

## 2019-07-15 LAB
ALBUMIN SERPL-MCNC: 3.5 G/DL (ref 3.4–5)
ALBUMIN UR-MCNC: 30 MG/DL
ALP SERPL-CCNC: 61 U/L (ref 40–150)
ALT SERPL W P-5'-P-CCNC: 15 U/L (ref 0–70)
ANION GAP SERPL CALCULATED.3IONS-SCNC: 9 MMOL/L (ref 3–14)
APPEARANCE UR: CLEAR
AST SERPL W P-5'-P-CCNC: 15 U/L (ref 0–45)
BACTERIA #/AREA URNS HPF: ABNORMAL /HPF
BILIRUB SERPL-MCNC: 0.5 MG/DL (ref 0.2–1.3)
BILIRUB UR QL STRIP: NEGATIVE
BUN SERPL-MCNC: 27 MG/DL (ref 7–30)
CALCIUM SERPL-MCNC: 9.5 MG/DL (ref 8.5–10.1)
CHLORIDE SERPL-SCNC: 99 MMOL/L (ref 94–109)
CO2 SERPL-SCNC: 33 MMOL/L (ref 20–32)
COLOR UR AUTO: YELLOW
CREAT SERPL-MCNC: 1.23 MG/DL (ref 0.66–1.25)
ERYTHROCYTE [DISTWIDTH] IN BLOOD BY AUTOMATED COUNT: 16.5 % (ref 10–15)
GFR SERPL CREATININE-BSD FRML MDRD: 59 ML/MIN/{1.73_M2}
GLUCOSE SERPL-MCNC: 167 MG/DL (ref 70–99)
GLUCOSE UR STRIP-MCNC: NEGATIVE MG/DL
HBA1C MFR BLD: 5.6 % (ref 0–5.6)
HCT VFR BLD AUTO: 35.5 % (ref 40–53)
HGB BLD-MCNC: 10.9 G/DL (ref 13.3–17.7)
HGB UR QL STRIP: ABNORMAL
KETONES UR STRIP-MCNC: NEGATIVE MG/DL
LDLC SERPL DIRECT ASSAY-MCNC: 73 MG/DL
LEUKOCYTE ESTERASE UR QL STRIP: ABNORMAL
MCH RBC QN AUTO: 27.6 PG (ref 26.5–33)
MCHC RBC AUTO-ENTMCNC: 30.7 G/DL (ref 31.5–36.5)
MCV RBC AUTO: 90 FL (ref 78–100)
NITRATE UR QL: NEGATIVE
NON-SQ EPI CELLS #/AREA URNS LPF: ABNORMAL /LPF
PH UR STRIP: 5.5 PH (ref 5–7)
PLATELET # BLD AUTO: 295 10E9/L (ref 150–450)
POTASSIUM SERPL-SCNC: 4.1 MMOL/L (ref 3.4–5.3)
PROT SERPL-MCNC: 8.2 G/DL (ref 6.8–8.8)
RBC # BLD AUTO: 3.95 10E12/L (ref 4.4–5.9)
RBC #/AREA URNS AUTO: ABNORMAL /HPF
SODIUM SERPL-SCNC: 141 MMOL/L (ref 133–144)
SOURCE: ABNORMAL
SP GR UR STRIP: 1.01 (ref 1–1.03)
UROBILINOGEN UR STRIP-ACNC: 0.2 EU/DL (ref 0.2–1)
WBC # BLD AUTO: 7.8 10E9/L (ref 4–11)
WBC #/AREA URNS AUTO: ABNORMAL /HPF

## 2019-07-15 PROCEDURE — 85027 COMPLETE CBC AUTOMATED: CPT | Performed by: PHYSICIAN ASSISTANT

## 2019-07-15 PROCEDURE — 40000868 ZZHCL STATISTIC MRSA/MSSA PRESURGICAL SCREEN ID: Performed by: ORTHOPAEDIC SURGERY

## 2019-07-15 PROCEDURE — 81001 URINALYSIS AUTO W/SCOPE: CPT | Performed by: PHYSICIAN ASSISTANT

## 2019-07-15 PROCEDURE — 99215 OFFICE O/P EST HI 40 MIN: CPT | Performed by: PHYSICIAN ASSISTANT

## 2019-07-15 PROCEDURE — 40000869 ZZHCL STATISTIC MRSA/MSSA PRESURGICAL SCREEN CULTURE: Performed by: ORTHOPAEDIC SURGERY

## 2019-07-15 PROCEDURE — 93000 ELECTROCARDIOGRAM COMPLETE: CPT | Performed by: PHYSICIAN ASSISTANT

## 2019-07-15 PROCEDURE — 83721 ASSAY OF BLOOD LIPOPROTEIN: CPT | Performed by: PHYSICIAN ASSISTANT

## 2019-07-15 PROCEDURE — 80053 COMPREHEN METABOLIC PANEL: CPT | Performed by: PHYSICIAN ASSISTANT

## 2019-07-15 PROCEDURE — 36415 COLL VENOUS BLD VENIPUNCTURE: CPT | Performed by: PHYSICIAN ASSISTANT

## 2019-07-15 PROCEDURE — 83036 HEMOGLOBIN GLYCOSYLATED A1C: CPT | Performed by: PHYSICIAN ASSISTANT

## 2019-07-15 ASSESSMENT — PAIN SCALES - GENERAL: PAINLEVEL: NO PAIN (0)

## 2019-07-16 LAB
BACTERIA SPEC CULT: ABNORMAL
SPECIMEN SOURCE: ABNORMAL

## 2019-07-18 ENCOUNTER — TELEPHONE (OUTPATIENT)
Dept: ORTHOPEDICS | Facility: CLINIC | Age: 69
End: 2019-07-18

## 2019-07-18 DIAGNOSIS — Z22.321 MSSA (METHICILLIN-SUSCEPTIBLE STAPHYLOCOCCUS AUREUS) COLONIZATION: Primary | ICD-10-CM

## 2019-07-18 NOTE — TELEPHONE ENCOUNTER
MSSA nasal swab positive  Will send Rx.    bactroban 2% 0.5gram BID for 5 days  Called and explained results and treatment  Sent to [facility] per patient preference      ELFEGO Perdue, CNP  Orthopedic Surgery

## 2019-07-24 ENCOUNTER — OFFICE VISIT (OUTPATIENT)
Dept: ORTHOPEDICS | Facility: CLINIC | Age: 69
End: 2019-07-24
Payer: COMMERCIAL

## 2019-07-24 VITALS — WEIGHT: 293 LBS | HEIGHT: 70 IN | BODY MASS INDEX: 41.95 KG/M2

## 2019-07-24 DIAGNOSIS — M17.11 PRIMARY OSTEOARTHRITIS OF RIGHT KNEE: Primary | ICD-10-CM

## 2019-07-24 PROCEDURE — 99207 ZZC PREOP VISIT IN GLOBAL PKG: CPT | Performed by: NURSE PRACTITIONER

## 2019-07-24 ASSESSMENT — MIFFLIN-ST. JEOR: SCORE: 2090.29

## 2019-07-24 NOTE — PROGRESS NOTES
1 week prior to surgery.   Reviewed labs, +MSSA, taking bactroban, last dose Friday.  Unremarkable otherwise  Reviewed h&P recommend hospitalist due to hx of HTN, DAYTON and DM.   Xeralto for DVT prevention.   Lives assistive living, lives alone thinks he will be able to get services there following surgery. TCU would be backup. Will have  help with this.      His weight today was higher than last recorded weight. Denies any increasing in swelling to the legs, denies any shortness of breath, states feeling good.  Says his legs are at baseline.  No new wounds, no open sores. Leg has baseline lower extremity edema midcalf to ankle and chronic venous stasis. Unchanged from last visit.,    Discussed with Dr. Ibrahim. Will proceed with surgery.  All questions answered.    ELFEGO Perdue, CNP  Orthopedic Surgery

## 2019-07-24 NOTE — LETTER
7/24/2019         RE: Glenroy Padilla  [address] [address]        Dear Colleague,    Thank you for referring your patient, Glenroy Padilla, to the [facility]. Please see a copy of my visit note below.    1 week prior to surgery.   Reviewed labs, +MSSA, taking bactroban, last dose Friday.  Unremarkable otherwise  Reviewed h&P recommend hospitalist due to hx of HTN, DAYTON and DM.   Xeralto for DVT prevention.   Lives assistive living, lives alone thinks he will be able to get services there following surgery. TCU would be backup. Will have  help with this.      His weight today was higher than last recorded weight. Denies any increasing in swelling to the legs, denies any shortness of breath, states feeling good.  Says his legs are at baseline.  No new wounds, no open sores. Leg has baseline lower extremity edema midcalf to ankle and chronic venous stasis. Unchanged from last visit.,    Discussed with Dr. Ibrahim. Will proceed with surgery.  All questions answered.    ELFEGO Perdue, CNP  Orthopedic Surgery        Again, thank you for allowing me to participate in the care of your patient.        Sincerely,        Marc Ibrahim, DO

## 2019-07-30 ENCOUNTER — APPOINTMENT (OUTPATIENT)
Dept: PHYSICAL THERAPY | Facility: CLINIC | Age: 69
End: 2019-07-30
Attending: ORTHOPAEDIC SURGERY
Payer: COMMERCIAL

## 2019-07-30 ENCOUNTER — ANESTHESIA EVENT (OUTPATIENT)
Dept: SURGERY | Facility: CLINIC | Age: 69
End: 2019-07-30
Payer: COMMERCIAL

## 2019-07-30 ENCOUNTER — HOSPITAL ENCOUNTER (OUTPATIENT)
Facility: CLINIC | Age: 69
LOS: 1 days | Discharge: NURSING, REHAB OR LONG-TERM CARE | End: 2019-07-31
Attending: ORTHOPAEDIC SURGERY | Admitting: ORTHOPAEDIC SURGERY
Payer: COMMERCIAL

## 2019-07-30 ENCOUNTER — APPOINTMENT (OUTPATIENT)
Dept: GENERAL RADIOLOGY | Facility: CLINIC | Age: 69
End: 2019-07-30
Attending: ORTHOPAEDIC SURGERY
Payer: COMMERCIAL

## 2019-07-30 ENCOUNTER — ANESTHESIA (OUTPATIENT)
Dept: SURGERY | Facility: CLINIC | Age: 69
End: 2019-07-30
Payer: COMMERCIAL

## 2019-07-30 DIAGNOSIS — I10 HYPERTENSION, GOAL BELOW 140/90: ICD-10-CM

## 2019-07-30 DIAGNOSIS — E78.5 HYPERLIPIDEMIA LDL GOAL <100: ICD-10-CM

## 2019-07-30 DIAGNOSIS — Z96.651 S/P TOTAL KNEE REPLACEMENT USING CEMENT, RIGHT: Primary | ICD-10-CM

## 2019-07-30 DIAGNOSIS — E11.59 TYPE 2 DIABETES MELLITUS WITH OTHER CIRCULATORY COMPLICATION, WITHOUT LONG-TERM CURRENT USE OF INSULIN (H): ICD-10-CM

## 2019-07-30 DIAGNOSIS — Z98.890 S/P SPINAL SURGERY: ICD-10-CM

## 2019-07-30 LAB
GLUCOSE BLDC GLUCOMTR-MCNC: 128 MG/DL (ref 70–99)
GLUCOSE BLDC GLUCOMTR-MCNC: 132 MG/DL (ref 70–99)

## 2019-07-30 PROCEDURE — 99207 ZZC CDG-CODE CATEGORY CHANGED: CPT | Performed by: FAMILY MEDICINE

## 2019-07-30 PROCEDURE — 40000985 XR KNEE PORT RT 1/2 VW: Mod: RT

## 2019-07-30 PROCEDURE — 71000014 ZZH RECOVERY PHASE 1 LEVEL 2 FIRST HR: Performed by: ORTHOPAEDIC SURGERY

## 2019-07-30 PROCEDURE — 99225 ZZC SUBSEQUENT OBSERVATION CARE,LEVEL II: CPT | Performed by: FAMILY MEDICINE

## 2019-07-30 PROCEDURE — 97162 PT EVAL MOD COMPLEX 30 MIN: CPT | Mod: GP

## 2019-07-30 PROCEDURE — 25000128 H RX IP 250 OP 636: Performed by: NURSE ANESTHETIST, CERTIFIED REGISTERED

## 2019-07-30 PROCEDURE — 27447 TOTAL KNEE ARTHROPLASTY: CPT | Mod: AS | Performed by: PHYSICIAN ASSISTANT

## 2019-07-30 PROCEDURE — C1776 JOINT DEVICE (IMPLANTABLE): HCPCS | Performed by: ORTHOPAEDIC SURGERY

## 2019-07-30 PROCEDURE — 25800030 ZZH RX IP 258 OP 636: Performed by: NURSE ANESTHETIST, CERTIFIED REGISTERED

## 2019-07-30 PROCEDURE — 25000125 ZZHC RX 250: Performed by: NURSE ANESTHETIST, CERTIFIED REGISTERED

## 2019-07-30 PROCEDURE — 36000093 ZZH SURGERY LEVEL 4 1ST 30 MIN: Performed by: ORTHOPAEDIC SURGERY

## 2019-07-30 PROCEDURE — 27447 TOTAL KNEE ARTHROPLASTY: CPT | Mod: RT | Performed by: ORTHOPAEDIC SURGERY

## 2019-07-30 PROCEDURE — 27210794 ZZH OR GENERAL SUPPLY STERILE: Performed by: ORTHOPAEDIC SURGERY

## 2019-07-30 PROCEDURE — 97530 THERAPEUTIC ACTIVITIES: CPT | Mod: GP

## 2019-07-30 PROCEDURE — 27810169 ZZH OR IMPLANT GENERAL: Performed by: ORTHOPAEDIC SURGERY

## 2019-07-30 PROCEDURE — 40000306 ZZH STATISTIC PRE PROC ASSESS II: Performed by: ORTHOPAEDIC SURGERY

## 2019-07-30 PROCEDURE — 25000128 H RX IP 250 OP 636: Performed by: ORTHOPAEDIC SURGERY

## 2019-07-30 PROCEDURE — 25800030 ZZH RX IP 258 OP 636: Performed by: ORTHOPAEDIC SURGERY

## 2019-07-30 PROCEDURE — 25000132 ZZH RX MED GY IP 250 OP 250 PS 637: Performed by: ORTHOPAEDIC SURGERY

## 2019-07-30 PROCEDURE — 25000125 ZZHC RX 250: Performed by: ORTHOPAEDIC SURGERY

## 2019-07-30 PROCEDURE — 82962 GLUCOSE BLOOD TEST: CPT | Mod: 91

## 2019-07-30 PROCEDURE — 25800030 ZZH RX IP 258 OP 636: Performed by: NURSE PRACTITIONER

## 2019-07-30 PROCEDURE — 71000015 ZZH RECOVERY PHASE 1 LEVEL 2 EA ADDTL HR: Performed by: ORTHOPAEDIC SURGERY

## 2019-07-30 PROCEDURE — 25000128 H RX IP 250 OP 636: Performed by: NURSE PRACTITIONER

## 2019-07-30 PROCEDURE — 36000063 ZZH SURGERY LEVEL 4 EA 15 ADDTL MIN: Performed by: ORTHOPAEDIC SURGERY

## 2019-07-30 PROCEDURE — 37000008 ZZH ANESTHESIA TECHNICAL FEE, 1ST 30 MIN: Performed by: ORTHOPAEDIC SURGERY

## 2019-07-30 PROCEDURE — 37000009 ZZH ANESTHESIA TECHNICAL FEE, EACH ADDTL 15 MIN: Performed by: ORTHOPAEDIC SURGERY

## 2019-07-30 DEVICE — BONE CEMENT GENTAMYCIN SMART SET GHV 5450-35-500: Type: IMPLANTABLE DEVICE | Site: KNEE | Status: FUNCTIONAL

## 2019-07-30 DEVICE — IMPLANTABLE DEVICE: Type: IMPLANTABLE DEVICE | Site: KNEE | Status: FUNCTIONAL

## 2019-07-30 RX ORDER — AMOXICILLIN 250 MG
1 CAPSULE ORAL AT BEDTIME
Status: DISCONTINUED | OUTPATIENT
Start: 2019-07-30 | End: 2019-07-31 | Stop reason: HOSPADM

## 2019-07-30 RX ORDER — FENTANYL CITRATE 50 UG/ML
INJECTION, SOLUTION INTRAMUSCULAR; INTRAVENOUS PRN
Status: DISCONTINUED | OUTPATIENT
Start: 2019-07-30 | End: 2019-07-30

## 2019-07-30 RX ORDER — CEFAZOLIN SODIUM IN 0.9 % NACL 3 G/100 ML
3 INTRAVENOUS SOLUTION, PIGGYBACK (ML) INTRAVENOUS
Status: COMPLETED | OUTPATIENT
Start: 2019-07-30 | End: 2019-07-30

## 2019-07-30 RX ORDER — CEFAZOLIN SODIUM 2 G/100ML
2 INJECTION, SOLUTION INTRAVENOUS EVERY 8 HOURS
Status: COMPLETED | OUTPATIENT
Start: 2019-07-30 | End: 2019-07-31

## 2019-07-30 RX ORDER — CALCIUM CARBONATE 500 MG/1
1000 TABLET, CHEWABLE ORAL 4 TIMES DAILY PRN
Status: DISCONTINUED | OUTPATIENT
Start: 2019-07-30 | End: 2019-07-31 | Stop reason: HOSPADM

## 2019-07-30 RX ORDER — LIDOCAINE 40 MG/G
CREAM TOPICAL
Status: DISCONTINUED | OUTPATIENT
Start: 2019-07-30 | End: 2019-07-31 | Stop reason: HOSPADM

## 2019-07-30 RX ORDER — ONDANSETRON 2 MG/ML
4 INJECTION INTRAMUSCULAR; INTRAVENOUS EVERY 30 MIN PRN
Status: DISCONTINUED | OUTPATIENT
Start: 2019-07-30 | End: 2019-07-30 | Stop reason: HOSPADM

## 2019-07-30 RX ORDER — ONDANSETRON 2 MG/ML
4 INJECTION INTRAMUSCULAR; INTRAVENOUS EVERY 6 HOURS PRN
Status: DISCONTINUED | OUTPATIENT
Start: 2019-07-30 | End: 2019-07-31 | Stop reason: HOSPADM

## 2019-07-30 RX ORDER — PROCHLORPERAZINE MALEATE 5 MG
5 TABLET ORAL EVERY 6 HOURS PRN
Status: DISCONTINUED | OUTPATIENT
Start: 2019-07-30 | End: 2019-07-31 | Stop reason: HOSPADM

## 2019-07-30 RX ORDER — ONDANSETRON 4 MG/1
4 TABLET, ORALLY DISINTEGRATING ORAL EVERY 30 MIN PRN
Status: DISCONTINUED | OUTPATIENT
Start: 2019-07-30 | End: 2019-07-30 | Stop reason: HOSPADM

## 2019-07-30 RX ORDER — SODIUM CHLORIDE, SODIUM LACTATE, POTASSIUM CHLORIDE, CALCIUM CHLORIDE 600; 310; 30; 20 MG/100ML; MG/100ML; MG/100ML; MG/100ML
INJECTION, SOLUTION INTRAVENOUS CONTINUOUS
Status: DISCONTINUED | OUTPATIENT
Start: 2019-07-30 | End: 2019-07-30 | Stop reason: HOSPADM

## 2019-07-30 RX ORDER — ONDANSETRON 4 MG/1
4 TABLET, ORALLY DISINTEGRATING ORAL EVERY 6 HOURS PRN
Status: DISCONTINUED | OUTPATIENT
Start: 2019-07-30 | End: 2019-07-31 | Stop reason: HOSPADM

## 2019-07-30 RX ORDER — LIDOCAINE HYDROCHLORIDE AND EPINEPHRINE 5; 5 MG/ML; UG/ML
INJECTION, SOLUTION INFILTRATION; PERINEURAL PRN
Status: DISCONTINUED | OUTPATIENT
Start: 2019-07-30 | End: 2019-07-30

## 2019-07-30 RX ORDER — FENTANYL CITRATE 50 UG/ML
25-50 INJECTION, SOLUTION INTRAMUSCULAR; INTRAVENOUS
Status: DISCONTINUED | OUTPATIENT
Start: 2019-07-30 | End: 2019-07-30 | Stop reason: HOSPADM

## 2019-07-30 RX ORDER — PHENYLEPHRINE HYDROCHLORIDE 10 MG/ML
INJECTION INTRAVENOUS PRN
Status: DISCONTINUED | OUTPATIENT
Start: 2019-07-30 | End: 2019-07-30

## 2019-07-30 RX ORDER — HYDROXYZINE HYDROCHLORIDE 10 MG/1
10 TABLET, FILM COATED ORAL EVERY 6 HOURS PRN
Status: DISCONTINUED | OUTPATIENT
Start: 2019-07-30 | End: 2019-07-31 | Stop reason: HOSPADM

## 2019-07-30 RX ORDER — SODIUM CHLORIDE, SODIUM LACTATE, POTASSIUM CHLORIDE, CALCIUM CHLORIDE 600; 310; 30; 20 MG/100ML; MG/100ML; MG/100ML; MG/100ML
INJECTION, SOLUTION INTRAVENOUS CONTINUOUS
Status: DISCONTINUED | OUTPATIENT
Start: 2019-07-30 | End: 2019-07-31 | Stop reason: HOSPADM

## 2019-07-30 RX ORDER — ACETAMINOPHEN 325 MG/1
975 TABLET ORAL EVERY 8 HOURS
Status: DISCONTINUED | OUTPATIENT
Start: 2019-07-30 | End: 2019-07-31 | Stop reason: HOSPADM

## 2019-07-30 RX ORDER — NALOXONE HYDROCHLORIDE 0.4 MG/ML
.1-.4 INJECTION, SOLUTION INTRAMUSCULAR; INTRAVENOUS; SUBCUTANEOUS
Status: DISCONTINUED | OUTPATIENT
Start: 2019-07-30 | End: 2019-07-30

## 2019-07-30 RX ORDER — CEFAZOLIN SODIUM 1 G/3ML
1 INJECTION, POWDER, FOR SOLUTION INTRAMUSCULAR; INTRAVENOUS SEE ADMIN INSTRUCTIONS
Status: DISCONTINUED | OUTPATIENT
Start: 2019-07-30 | End: 2019-07-30 | Stop reason: HOSPADM

## 2019-07-30 RX ORDER — LIDOCAINE 40 MG/G
CREAM TOPICAL
Status: DISCONTINUED | OUTPATIENT
Start: 2019-07-30 | End: 2019-07-30 | Stop reason: HOSPADM

## 2019-07-30 RX ORDER — BUPIVACAINE HYDROCHLORIDE 7.5 MG/ML
INJECTION, SOLUTION INTRASPINAL PRN
Status: DISCONTINUED | OUTPATIENT
Start: 2019-07-30 | End: 2019-07-30

## 2019-07-30 RX ORDER — HYDROMORPHONE HYDROCHLORIDE 1 MG/ML
0.2 INJECTION, SOLUTION INTRAMUSCULAR; INTRAVENOUS; SUBCUTANEOUS
Status: DISCONTINUED | OUTPATIENT
Start: 2019-07-30 | End: 2019-07-31 | Stop reason: HOSPADM

## 2019-07-30 RX ORDER — ROSUVASTATIN CALCIUM 5 MG/1
10 TABLET, COATED ORAL DAILY
Status: DISCONTINUED | OUTPATIENT
Start: 2019-07-30 | End: 2019-07-31 | Stop reason: HOSPADM

## 2019-07-30 RX ORDER — METOPROLOL TARTRATE 25 MG/1
25 TABLET, FILM COATED ORAL 2 TIMES DAILY
Status: DISCONTINUED | OUTPATIENT
Start: 2019-07-30 | End: 2019-07-31 | Stop reason: HOSPADM

## 2019-07-30 RX ORDER — FERROUS SULFATE 325(65) MG
325 TABLET ORAL DAILY
Status: DISCONTINUED | OUTPATIENT
Start: 2019-07-30 | End: 2019-07-31 | Stop reason: HOSPADM

## 2019-07-30 RX ORDER — ONDANSETRON 2 MG/ML
INJECTION INTRAMUSCULAR; INTRAVENOUS PRN
Status: DISCONTINUED | OUTPATIENT
Start: 2019-07-30 | End: 2019-07-30

## 2019-07-30 RX ORDER — EPHEDRINE SULFATE 50 MG/ML
INJECTION, SOLUTION INTRAVENOUS PRN
Status: DISCONTINUED | OUTPATIENT
Start: 2019-07-30 | End: 2019-07-30

## 2019-07-30 RX ORDER — OXYCODONE HYDROCHLORIDE 5 MG/1
5-10 TABLET ORAL
Status: DISCONTINUED | OUTPATIENT
Start: 2019-07-30 | End: 2019-07-31 | Stop reason: HOSPADM

## 2019-07-30 RX ORDER — CEFAZOLIN SODIUM 2 G/100ML
2 INJECTION, SOLUTION INTRAVENOUS
Status: DISCONTINUED | OUTPATIENT
Start: 2019-07-30 | End: 2019-07-30 | Stop reason: DRUGHIGH

## 2019-07-30 RX ORDER — NALOXONE HYDROCHLORIDE 0.4 MG/ML
.1-.4 INJECTION, SOLUTION INTRAMUSCULAR; INTRAVENOUS; SUBCUTANEOUS
Status: DISCONTINUED | OUTPATIENT
Start: 2019-07-30 | End: 2019-07-31 | Stop reason: HOSPADM

## 2019-07-30 RX ORDER — PROPOFOL 10 MG/ML
INJECTION, EMULSION INTRAVENOUS CONTINUOUS PRN
Status: DISCONTINUED | OUTPATIENT
Start: 2019-07-30 | End: 2019-07-30

## 2019-07-30 RX ADMIN — SODIUM CHLORIDE, POTASSIUM CHLORIDE, SODIUM LACTATE AND CALCIUM CHLORIDE: 600; 310; 30; 20 INJECTION, SOLUTION INTRAVENOUS at 15:30

## 2019-07-30 RX ADMIN — FENTANYL CITRATE 50 MCG: 50 INJECTION, SOLUTION INTRAMUSCULAR; INTRAVENOUS at 11:01

## 2019-07-30 RX ADMIN — PHENYLEPHRINE HYDROCHLORIDE 100 MCG: 10 INJECTION INTRAVENOUS at 12:10

## 2019-07-30 RX ADMIN — LIDOCAINE HYDROCHLORIDE AND EPINEPHRINE 10 ML: 5; 5 INJECTION, SOLUTION INFILTRATION; PERINEURAL at 14:23

## 2019-07-30 RX ADMIN — ONDANSETRON 4 MG: 2 INJECTION INTRAMUSCULAR; INTRAVENOUS at 12:30

## 2019-07-30 RX ADMIN — CEFAZOLIN SODIUM 2 G: 2 INJECTION, SOLUTION INTRAVENOUS at 18:15

## 2019-07-30 RX ADMIN — SENNOSIDES AND DOCUSATE SODIUM 1 TABLET: 8.6; 5 TABLET ORAL at 20:52

## 2019-07-30 RX ADMIN — PHENYLEPHRINE HYDROCHLORIDE 100 MCG: 10 INJECTION INTRAVENOUS at 11:56

## 2019-07-30 RX ADMIN — PHENYLEPHRINE HYDROCHLORIDE 100 MCG: 10 INJECTION INTRAVENOUS at 12:02

## 2019-07-30 RX ADMIN — PHENYLEPHRINE HYDROCHLORIDE 100 MCG: 10 INJECTION INTRAVENOUS at 11:51

## 2019-07-30 RX ADMIN — ACETAMINOPHEN 975 MG: 325 TABLET ORAL at 16:48

## 2019-07-30 RX ADMIN — LIDOCAINE HYDROCHLORIDE 1 ML: 10 INJECTION, SOLUTION EPIDURAL; INFILTRATION; INTRACAUDAL; PERINEURAL at 09:39

## 2019-07-30 RX ADMIN — PHENYLEPHRINE HYDROCHLORIDE 100 MCG: 10 INJECTION INTRAVENOUS at 12:05

## 2019-07-30 RX ADMIN — PHENYLEPHRINE HYDROCHLORIDE 100 MCG: 10 INJECTION INTRAVENOUS at 11:39

## 2019-07-30 RX ADMIN — SODIUM CHLORIDE, POTASSIUM CHLORIDE, SODIUM LACTATE AND CALCIUM CHLORIDE: 600; 310; 30; 20 INJECTION, SOLUTION INTRAVENOUS at 09:45

## 2019-07-30 RX ADMIN — EPHEDRINE SULFATE 5 MG: 50 INJECTION, SOLUTION INTRAVENOUS at 12:15

## 2019-07-30 RX ADMIN — CEFAZOLIN 3 G: 1 INJECTION, POWDER, FOR SOLUTION INTRAMUSCULAR; INTRAVENOUS at 11:16

## 2019-07-30 RX ADMIN — PHENYLEPHRINE HYDROCHLORIDE 100 MCG: 10 INJECTION INTRAVENOUS at 11:59

## 2019-07-30 RX ADMIN — ACETAMINOPHEN 975 MG: 325 TABLET ORAL at 23:10

## 2019-07-30 RX ADMIN — PHENYLEPHRINE HYDROCHLORIDE 100 MCG: 10 INJECTION INTRAVENOUS at 12:12

## 2019-07-30 RX ADMIN — PHENYLEPHRINE HYDROCHLORIDE 100 MCG: 10 INJECTION INTRAVENOUS at 11:48

## 2019-07-30 RX ADMIN — SODIUM CHLORIDE, POTASSIUM CHLORIDE, SODIUM LACTATE AND CALCIUM CHLORIDE: 600; 310; 30; 20 INJECTION, SOLUTION INTRAVENOUS at 12:32

## 2019-07-30 RX ADMIN — PHENYLEPHRINE HYDROCHLORIDE 100 MCG: 10 INJECTION INTRAVENOUS at 11:53

## 2019-07-30 RX ADMIN — EPHEDRINE SULFATE 5 MG: 50 INJECTION, SOLUTION INTRAVENOUS at 12:30

## 2019-07-30 RX ADMIN — LIDOCAINE HYDROCHLORIDE AND EPINEPHRINE 10 ML: 5; 5 INJECTION, SOLUTION INFILTRATION; PERINEURAL at 14:24

## 2019-07-30 RX ADMIN — BUPIVACAINE HYDROCHLORIDE IN DEXTROSE 1.8 ML: 7.5 INJECTION, SOLUTION SUBARACHNOID at 11:26

## 2019-07-30 RX ADMIN — ROSUVASTATIN CALCIUM 10 MG: 5 TABLET, FILM COATED ORAL at 16:48

## 2019-07-30 RX ADMIN — SITAGLIPTIN 50 MG: 50 TABLET, FILM COATED ORAL at 16:48

## 2019-07-30 RX ADMIN — FERROUS SULFATE TAB 325 MG (65 MG ELEMENTAL FE) 325 MG: 325 (65 FE) TAB at 16:48

## 2019-07-30 RX ADMIN — METOPROLOL TARTRATE 25 MG: 25 TABLET ORAL at 20:51

## 2019-07-30 RX ADMIN — PROPOFOL 100 MCG/KG/MIN: 10 INJECTION, EMULSION INTRAVENOUS at 11:27

## 2019-07-30 ASSESSMENT — MIFFLIN-ST. JEOR: SCORE: 2040.39

## 2019-07-30 NOTE — CONSULTS
[facility]  Consult Note - Hospitalist Service     Date of Admission:  7/30/2019  Consult Requested by: Dr. Ibrahim  Reason for Consult: Management of chronic medical problems post right total knee arthroplasty    Assessment & Plan   Glenroy Padilla is a 69 year old male admitted on 7/30/2019. He is admitted after an elective right total knee arthroplasty performed today by Dr. Ibrahim.  History significant for type 2 diabetes controlled taking sitagliptin/metformin, hypertension taking Lasix, losartan and Lopressor, history of CAD with stent placement on Plavix and chronic lymphedema involving the right lower extremity with history of central spinal stenosis status post surgery.  Surgery went well with patient having no discomfort at this time.  His home meds have been restarted with the exception of the losartan and Lasix which is very reasonable until his blood pressure stabilized.  Home medications have been restarted for control of his diabetes.  Orthopedic surgery is recommending use of Xarelto for DVT prophylaxis.  At this time would anticipate routine postoperative cares.  Will monitor for blood pressure control.  He is expecting to discharge to assisted living or TCU in the next 1 to 2 days.    Principal Problem:    S/P total knee replacement using cement, right  Active Problems:    Type 2 diabetes mellitus with other circulatory complication, without long-term current use of insulin (H)    Hypertension, goal below 140/90    Lymphedema of right lower extremity    Hx of coronary artery disease    Hyperlipidemia LDL goal <100    Morbid obesity due to excess calories (H)        The patient's care was discussed with the Patient.    Cristobal Osullivan MD  [facility]    ______________________________________________________________________    Chief Complaint   Admitted with ongoing right knee pain for elective right total knee arthroplasty    History  is obtained from the patient and electronic health record    History of Present Illness   Glenroy Padilla is a 69 year old male who presents with chronic knee pain with elective right total knee arthroplasty performed today.  Prior to this he has had chronic issues involving both knees.  History significant for lymphedema involving the right lower extremity, history of CAD status post stent placement taking Plavix, type 2 diabetes controlled with last hemoglobin A1c of 6.1 taking daily sitagliptin and metformin.  He has history of hypertension taking daily Lasix, losartan and Lopressor prior to surgery was otherwise feeling well with no shortness of breath, chest pain, abdominal pain.  Since surgery is feeling quite well with minimal discomfort at this time.  He has eaten dinner with no nausea or vomiting.    Review of Systems   The 10 point Review of Systems is negative other than noted in the HPI or here.     Past Medical History    I have reviewed this patient's medical history and updated it with pertinent information if needed.   Past Medical History:   Diagnosis Date     Acute pain of left knee 5/22/2017     Chronic pain of right knee 5/22/2017     Hx of coronary artery disease 5/22/2017     Hx of heart artery stent 5/22/2017     Mixed incontinence 8/13/2018     Obesity, morbid, BMI 40.0-49.9 (H) 11/20/2017     Open wound of left lower extremity without complication, initial encounter 5/22/2017     DAYTON (obstructive sleep apnea) 5/22/2017     Type 2 diabetes mellitus with other circulatory complication, without long-term current use of insulin (H) 5/22/2017     Venous stasis ulcer of left lower extremity (H) 5/22/2017     Venous stasis ulcer of left lower extremity (H) 5/22/2017       Past Surgical History   I have reviewed this patient's surgical history and updated it with pertinent information if needed.  Past Surgical History:   Procedure Laterality Date     CARDIAC SURGERY      stent placement      CHOLECYSTECTOMY       LAMINECTOMY LUMBAR THREE+ LEVELS N/A 3/13/2018    Procedure: LAMINECTOMY LUMBAR THREE+ LEVELS;  T5-9 LAMINECTOMIES, RESECTION OF EPIDURAL LIPOMATOSIS;  Surgeon: Hugh Mendieta MD;  Location:  OR       Social History   I have reviewed this patient's social history and updated it with pertinent information if needed.  Social History     Tobacco Use     Smoking status: Former Smoker     Types: Cigars     Smokeless tobacco: Never Used     Tobacco comment: exposure to second hand smoke   Substance Use Topics     Alcohol use: No     Drug use: No       Family History   I have reviewed this patient's family history and updated it with pertinent information if needed.   History reviewed. No pertinent family history.    Medications   Medications Prior to Admission   Medication Sig Dispense Refill Last Dose     blood glucose (NO BRAND SPECIFIED) lancing device Use to test blood sugars 2 times daily or as directed. 1 each 0 Taking     blood glucose calibration (NO BRAND SPECIFIED) solution To accompany: Blood Glucose Monitor Brands: per insurance. 1 Bottle 3 Taking     blood glucose monitoring (NO BRAND SPECIFIED) meter device kit test blood sugar 2 xdaily or as directed.  Blood Glucose Monitor Brands: per insurance. 1 kit 0 Taking     blood glucose monitoring (NO BRAND SPECIFIED) test strip Use to test blood sugar 2 times daily Blood Glucose Monitor Brands: Glucocard Expression 100 strip 6 Taking     blood glucose monitoring (NO BRAND SPECIFIED) test strip Use to test blood sugar 2x  daily or as directed. To accompany: Blood Glucose Monitor Brands: per insurance. 100 strip 1 Taking     Blood Glucose Monitoring Suppl (GLUCOCARD EXPRESSION MONITOR) W/DEVICE KIT USE TWICE DAILY TO TEST BLOOD GLUCOSE  0 Taking     clopidogrel (PLAVIX) 75 MG tablet Take 1 tablet (75 mg) by mouth daily 90 tablet 1 Past Month at Unknown time     furosemide (LASIX) 40 MG tablet Take 1 tablet (40 mg) by mouth daily 90 tablet  2 2019 at 1900     GNP IRON 200 (65 Fe) MG TABS TAKE 1 TABLET (325 MG) BY MOUTH DAILY (WITH BREAKFAST) 90 tablet 1 2019 at Unknown time     JENTADUETO XR 2.5-1000 MG per table   1 Taking     losartan (COZAAR) 25 MG tablet TAKE 1 TABLET (25 MG) BY MOUTH DAILY 90 tablet 3 2019 at Unknown time     metoprolol tartrate (LOPRESSOR) 25 MG tablet Take 1 tablet (25 mg) by mouth 2 times daily 180 tablet 3 2019 at Unknown time     [] mupirocin (BACTROBAN NASAL) 2 % nasal ointment Spray 0.5 g into both nostrils 2 times daily for 5 days 5 g 0      order for DME Equipment being ordered: Replacement battery or batteries for an iDoneThisre Affinity Systemsto M 51. 1 Device 0 Taking     order for DME Equipment being ordered: electric chair for sleeping and adjustment to allow for elevation of legs above the heart.  Zero gravity type heavy duty sleep chair advised. MaxiComforter ([id number]) 1 Units 0 Taking     order for DME Equipment being ordered: Needs to have a sleep chair for home use. 1 Device 0 Taking     order for DME Compression garments toe to knee.  Specific brand is Compraflex Light Compression garment.   Diagnosis codes for venous stasis ulcer I83.029 and for  Lymphoedema is I89.0   Goal is for compression of about 30 to 40 mm of mercury   measurements are right ankle 28.5 cm and right calf is 45.5 cm.  Left ankle is 27.5 cm and the calf is 45 cm   Tall for length. 2 Device 1 Taking     order for DME Equipment being ordered: compression stockings below knee and above knee if available.  Medium compression. 1 Units 1 Taking     rosuvastatin (CRESTOR) 10 MG tablet TAKE 1 TABLET (10 MG) BY MOUTH DAILY 90 tablet 1 2019 at Unknown time     sitagliptin-metFORMIN (JANUMET)  MG per tablet Take 1 tablet by mouth daily   2019 at Unknown time     thin (NO BRAND SPECIFIED) lancets Test 2 x daily or as directed.  Brands: per insurance. 100 each 1 Taking     acetaminophen (TYLENOL) 500 MG tablet Take  500-1,000 mg by mouth   More than a month at Unknown time     senna-docusate (SENOKOT-S/PERICOLACE) 8.6-50 MG tablet Take 1 tablet by mouth   Taking       Allergies   Allergies   Allergen Reactions     No Known Allergies        Physical Exam   Vital Signs: Temp: 96.7  F (35.9  C) Temp src: Oral BP: 119/52 Pulse: 81 Heart Rate: 81 Resp: 16 SpO2: 94 % O2 Device: Nasal cannula Oxygen Delivery: 1 LPM  Weight: 282 lbs 0 oz    Constitutional: awake, alert, cooperative, no apparent distress, and appears stated age  Eyes: Lids and lashes normal, pupils equal, round and reactive to light, extra ocular muscles intact, sclera clear, conjunctiva normal  ENT: Normocephalic, without obvious abnormality, atraumatic, sinuses nontender on palpation, external ears without lesions, oral pharynx with moist mucous membranes, tonsils without erythema or exudates, gums normal and good dentition.  Hematologic / Lymphatic: no cervical lymphadenopathy and no supraclavicular lymphadenopathy  Respiratory: No increased work of breathing, good air exchange, clear to auscultation bilaterally, no crackles or wheezing  Cardiovascular: regular rate and rhythm  GI: normal bowel sounds, soft, non-distended and non-tender  Skin: no bruising or bleeding, normal skin color, texture, turgor and no redness, warmth, or swelling  Musculoskeletal: Right knee dressed with a knee immobilizer in place.  CMS in toes is good    Data   Results for orders placed or performed during the hospital encounter of 07/30/19 (from the past 24 hour(s))   Glucose by meter   Result Value Ref Range    Glucose 132 (H) 70 - 99 mg/dL   XR Knee Port Right 1/2 Views    Narrative    KNEE PORTABLE RIGHT ONE TO TWO VIEWS July 30, 2019 1:34 PM     HISTORY: Postoperative total knee.    FINDINGS: Single AP view demonstrates placement of a total knee  arthroplasty.    TAB MARTINEZ MD   Glucose by meter   Result Value Ref Range    Glucose 128 (H) 70 - 99 mg/dL

## 2019-07-30 NOTE — OP NOTE
Procedure Date: 07/30/2019      PREOPERATIVE DIAGNOSIS:  Right knee primary osteoarthritis.      POSTOPERATIVE DIAGNOSIS:  Right knee primary osteoarthritis.      PROCEDURE:  Right total knee arthroplasty.      SURGEON:  Marc Ibrahim DO      ASSISTANT:  Aaron Horner PA-C (who was utilized throughout the procedure, assisting with soft tissue retraction, assisting with trial and final implant placement, and he provided skin closure).      ANESTHESIA:  Spinal with IV sedation.      COMPLICATIONS:  None.      ESTIMATED BLOOD LOSS:  10 mL.      FLUIDS:  1100 mL crystalloids.      URINE OUTPUT:  150 mL.      TOURNIQUET TIME AND PRESSURE:  83 minutes at 338 mmHg.      COUNTS:  Correct.      DISPOSITION:  To PACU in stable condition.      IMPLANTS:  Includes a DePuy Attune size 7 posterior stabilized femoral component with a size 8 rotating platform tibial baseplate, a 5 mm polyethylene insert and a 38 mm patellar button.      GROSS FINDINGS:  Motion was approximately 5-115 degrees.  There was an incompletely correctable varus contracture passively.  There was bone-on-bone medial compartment arthritis.  There was also full thickness cartilage loss in the lateral compartment along the weightbearing condyle.      INDICATIONS FOR PROCEDURE:  This is a 69-year-old male with longstanding knee pain.  The pain has been present for many years.  It has been refractory to steroid injections, hyaluronic injections, physical therapy, activity modification, rest time and weight loss.  He has required the use of a cane, progressing to a wheelchair because of this pain.  History of some peripheral vascular disease, as well as diabetes.  Initially in the office when I evaluated him, he had some open sores that subsequently healed.  He has also been able to lose weight.  So given his continued pain refractory to conservative care with x-rays that are clinically correlated, we discussed proceeding with total knee arthroplasty.  The risks,  benefits, and complications were reviewed at length.  The postoperative timeframe for recovery was discussed.  Once reviewed, he opted to proceed.      DETAILS OF PROCEDURE:  He was met preoperatively, again informed consent was verified and the appropriate site was marked.  He was subsequently wheeled to operative suite #1.  Transferred to the OR table without any issues.  When deemed appropriate by Anesthesia, the right lower extremity was sterilely prepped and draped in a normal manner.  Prior to incision, a timeout was performed.  Again, the appropriate patient, surgery and extremity were verified and that antibiotics had been administered.  A midline skin incision was followed with a medial parapatellar arthrotomy.  The anterior fat pad was excised.  The medial tibia was skeletonized with care to protect the collateral ligament.  I did perform a small release at this point to help balance.  With this complete, the retractors were placed and maintained through the key components of the procedure.  The knee was gently flexed up.  Osteophytes were removed.  A drill was utilized to enter the intramedullary canal of the distal femur with no issues.  Set in 5 degrees in valgus, taking 11 mm off distally, a cut was performed in the distal medial and lateral aspect of the femur with no issues.  The jig was removed.  Attention was turned to the tibia.  With the extramedullary tibia device set to take 4 mm off the medial side with care to recreate posterior slope and varus valgus alignment, the jig was pinned into position.  With the posterior neurovascular structures and the collateral ligaments protected, the cut on the medial and lateral aspect of the proximal tibia was performed.  The knee was brought out to full extension.  A spacer block was placed.  It showed recreation of normal alignment with an acceptable resection.  It was balanced in full extension.  The block was removed.  The knee was gently flexed back up.   Retractors were placed.  Rotation was based on Grand line and epicondylar axis.  With this pinned, it measured a size 7.  A size 7, 4-in-1 block was placed.  At 90 degrees of flexion, the space was equal to the extension space.  The 4-in-1 cuts were performed, followed with a box cut.  Excess bone and meniscus remnants were removed.  Trial components were placed.  He had 0-125 degrees of motion.  Patellar tracked with a no-thumbs technique through the arc of motion.  The knee was balanced through the arc of motion.  At this point, the patella was resurfaced back down to anatomic dimensions using a freehand technique.  Again, it was taken through range of motion, tracking nicely.  The tibia and femur were prepared.  The final components as listed above were cemented in position and held in extension until the cement cured.  During this process, a dilute Betadine lavage was performed for 3 minutes followed with pulse lavage.  Once cured, the joint was inspected and excess cement fragments were removed.  The arthrotomy was closed with 0 Vicryl, taken through full range of motion showing a tight closure followed with 2-0 Vicryl subcutaneous, followed with running Monocryl suture with some skin glue.  A sterile bandage was applied and he was subsequently transferred to the PACU in stable condition.      He will be admitted to the hospital for orthopedic care, DVT prophylaxis, and pain management.         VANCE GALLAGHER DO             D: 2019   T: 2019   MT: JENNIFER      Name:     MALCOLM SANCHES   MRN:      [id number]        Account:        [id number]   :      1950           Procedure Date: 2019      Document: [id number]

## 2019-07-30 NOTE — BRIEF OP NOTE
[facility] Brief Operative Note    Pre-operative diagnosis: Primary osteoarthritis of right knee   Post-operative diagnosis: Same   Procedure: Procedure(s):  Right total knee replacement   Surgeon:  Anesthesia: Marc Ibrahim DO  Spinal   Assistant(s): MARLO Rajput (A advanced practice provider was necessary for his expertise, exposure and surgical assistance throughout the case.)   Estimated blood loss:  Fluids:  UO:  TT/Pressure: Less than 10 ml  1100 ml Crystalloids  150 ml  83 minutes at 338 mmHg   Specimens: None   Findings: See dictated operative report for full details [id number]     Federico Ibrahim D.O.      Implants:    Implant Name Type Inv. Item Serial No.  Lot No. LRB No. Used   BONE CEMENT GENTAMYCIN SMART SET GHV 5450- Cement, Bone BONE CEMENT GENTAMYCIN SMART SET GHV 5450-  [employer]C [id number] Right 1   BONE CEMENT GENTAMYCIN SMART SET GHV 5450- Cement, Bone BONE CEMENT GENTAMYCIN SMART SET GHV 5450-  [employer] [id number] Right 1   IMP COMP FEM JJ ATTUNE CR RT RONNY SZ7 [id number] Total Joint Component/Insert IMP COMP FEM JJ ATTUNE CR RT RONNY SZ9 [id number]  [employer]-   [id number] Right 1   Tibial Base Rotating PLatform Attune Knee Size 8 Cemented    Depuy [id number] Right 1   IMP PATELLA JJ ATTUNE DOME 38MM [id number] Total Joint Component/Insert IMP PATELLA JJ ATTUNE DOME 38MM [id number]  [employer] [id number] Right 1   IMP INSERT JJ ATTUNE PS RP SZ7 5MM [id number] Total Joint Component/Insert IMP INSERT JJ ATTUNE PS RP SZ7 5MM [id number]  [employer]-   [id number] Right 1

## 2019-07-30 NOTE — PROGRESS NOTES
"[facility]    Post Op Check Note    69 year old male POD#0 s/p right total knee arthroplasty   S: Patient seen at bedside in no apparent distress, alert and pleasant.  I discussed surgery with the patient.  He denies numbness, tingling or major pain issues.  He is joking and doing well and telling stories.  Discussed discharge to Assisted living tomorrow or Thursday.    O: CMS intact right LE, DF/PF intact       Ace Dressing on, clean, dry and intact       Knee Immobilizer on and intact       Able to slightly fire quad but no straight leg-raise yet.       right knee with minimal swelling and no erythema or ecchymosis at the foot (the rest of the leg is covered in the ace wrap)       Bilateral calves soft and non tender    Vital signs:  Temp: 96.2  F (35.7  C) Temp src: Oral BP: 118/45 Pulse: 65 Heart Rate: 67 Resp: 17 SpO2: 100 % O2 Device: Nasal cannula Oxygen Delivery: 2 LPM Height: 177.8 cm (5' 10\") Weight: 127.9 kg (282 lb)  Estimated body mass index is 40.46 kg/m  as calculated from the following:    Height as of this encounter: 1.778 m (5' 10\").    Weight as of this encounter: 127.9 kg (282 lb).      Hemoglobin   Date Value Ref Range Status   07/15/2019 10.9 (L) 13.3 - 17.7 g/dL Final     No results found for: INR      A/P:  1. Pain-controlled   2. DVT Prophylaxis-Xeralto 10mg x 14 days.   3. Weight Bearing Status-WBAT right LE  4. Plan-Continue to follow. Discharge to Assisted living tomorrow or Thursday pending therapy progress.  Back up plan would be [facility]UDIO.     Grant Horner PA-C  7/30/2019         "

## 2019-07-30 NOTE — PROGRESS NOTES
S: Patient arrives to room 271 via cart from PACU    B: S/p right total knee arthroplasty    A: Patient goals: VSS patient stood at edge of bed. Tolerating regular diet. No void yet. Denies pain. Lungs clear. CMS intact. Dressing clean, dry and intact.     R: Orders reviewed with patient. Will monitor patient per MD orders.     Health care directives status obtained and documented: Yes  SCD's Documented: Yes  Skin issues/needs documented:Yes  Isolation needs addressed, if appropriate: NA  Fall Prevention: Care plan updated, Education given and documented Yes  Care Plan initiated: Yes  Education Assessment documented:Yes  Admission Medication Reconciliation completed: Yes  New medication patient education completed and documented (Possible Side Effects of Common Medications handout): Yes  Home medications if not able to send immediately home with family stored here: NA  Reminder note placed in discharge instructions: NA  Discharge planning review completed (admission navigator) Yes

## 2019-07-30 NOTE — OR NURSING
Transfer from  PACU to Room 273  Transferred to bed via glyder mat (Glyder Mat,Transfer Boar,Slider Sheet)    S: 68 y/o male  S/P right total knee arthroplasty       Anesthesia Type:  spinal       Surgeon:  Dr. Ibrahim       Allergies:  See Medication Reconciliation Record       DNR:   (Yes,No)    B:  Pertinent Medical History:   Past Medical History:   Diagnosis Date     Acute pain of left knee 5/22/2017     Chronic pain of right knee 5/22/2017     Hx of coronary artery disease 5/22/2017     Hx of heart artery stent 5/22/2017     Mixed incontinence 8/13/2018     Obesity, morbid, BMI 40.0-49.9 (H) 11/20/2017     Open wound of left lower extremity without complication, initial encounter 5/22/2017     DAYTON (obstructive sleep apnea) 5/22/2017     Type 2 diabetes mellitus with other circulatory complication, without long-term current use of insulin (H) 5/22/2017     Venous stasis ulcer of left lower extremity (H) 5/22/2017     Venous stasis ulcer of left lower extremity (H) 5/22/2017        (CHF; Heart Disease; Lung Disease; Chronic Pain; Diabetes; Other (Comment)          Surgical History:    Past Surgical History:   Procedure Laterality Date     CARDIAC SURGERY      stent placement     CHOLECYSTECTOMY       LAMINECTOMY LUMBAR THREE+ LEVELS N/A 3/13/2018    Procedure: LAMINECTOMY LUMBAR THREE+ LEVELS;  T5-9 LAMINECTOMIES, RESECTION OF EPIDURAL LIPOMATOSIS;  Surgeon: Hugh Mendieta MD;  Location:  OR   '      A:  EBL: 10        IVF:  1600        UOP:  150 in OR, mujica removed at end of case        NPO:  ___Yes __x_No tolerating sips of ice water        Vomiting:  ___Yes _x_No         Drainage: none        Skin Integrity: new surgical incision to right knee (Normal; Pressure Ulcer (Location)        RFO: ___Yes_x__No (identify item if present)        SSI Patient?  __x_Yes___No (if yes, see checklist for actions)        Brace/sling/equipment:  _x_Yes___No (identify item if present) Knee immobilizer.  Pt denied pain in  PACU.  Anesthesia completed the adductor canal block at 1430.    Telephone report called to Jc KHOURY RN         See PACU record for ongoing assessment, vital signs and pain assessment.    R: Post-Op vitals and assessments as ordered/indicated per patient's condition.       Follow Post-Op orders and notify Physician prn.       Continue to involve patient/family in plan of care and discharge planning.       Reinforce Pre-Operative education.       Implement skin safety interventions as appropriate.    Name: Shakila Liu RN

## 2019-07-30 NOTE — PLAN OF CARE
Discharge Planner PT   Patient plan for discharge: return to [facility]  Current status: Patient s/p day 0 of R TKA. Reports no pain upon PT arrival. Patient demonstrates ankle pumps and quad sets prior to transferring to EOB, poor quad activation noted. Patient requires min A at R LE to perform supine<>sit transfer. Patient sits EOB x 5 min with legs dangling, maintaining safe static sitting. Patient reports he would like to try standing, but does not feel confident with ambulating at this time. Patient requires min A to perform sit to stand with 4WW, performs static standing x 2 min with B UE support at 4WW before returning to sitting. No increase in pain throughout evaluation today. Did not attempt ambulation.  Barriers to return to prior living situation: None  Recommendations for discharge:  PT at [facility]  Rationale for recommendations: Patient with decreased R LE strength and decreased functional strength & mobility. Patient would benefit from continued skilled therapeutic intervention in order to progress towards her desired level of function.       Entered by: Ignacia Cole 07/30/2019 6:25 PM

## 2019-07-30 NOTE — ANESTHESIA PREPROCEDURE EVALUATION
Anesthesia Pre-Procedure Evaluation    Patient: Glenroy Padilla   MRN: [id number] : 1950          Preoperative Diagnosis: Primary osteoarthritis of right knee    Procedure(s):  Right total knee replacement    Past Medical History:   Diagnosis Date     Acute pain of left knee 2017     Chronic pain of right knee 2017     Hx of coronary artery disease 2017     Hx of heart artery stent 2017     Mixed incontinence 2018     Obesity, morbid, BMI 40.0-49.9 (H) 2017     Open wound of left lower extremity without complication, initial encounter 2017     [place] (obstructive sleep apnea) 2017     Type 2 diabetes mellitus with other circulatory complication, without long-term current use of insulin (H) 2017     Venous stasis ulcer of left lower extremity (H) 2017     Venous stasis ulcer of left lower extremity (H) 2017     Past Surgical History:   Procedure Laterality Date     CARDIAC SURGERY      stent placement     CHOLECYSTECTOMY       LAMINECTOMY LUMBAR THREE+ LEVELS N/A 3/13/2018    Procedure: LAMINECTOMY LUMBAR THREE+ LEVELS;  T5-9 LAMINECTOMIES, RESECTION OF EPIDURAL LIPOMATOSIS;  Surgeon: Hugh Mendieta MD;  Location: [facility]       Anesthesia Evaluation     .             ROS/MED HX    ENT/Pulmonary:     (+)sleep apnea, uses CPAP , . .    Neurologic:     (+)neuropathy other neuro spinal surgery with neuropathy    Cardiovascular: Comment: 1 stent mid LAD on 17    (+) hypertension--CAD, --stent,2017  1 Drug Eluting Stent .. : . . . :. . Previous cardiac testing Echodate:2018results:Mild left ventricular concentric hypertrophy, no wall motion abnormalities, EF 60-65%date: results: date: results: date: results:          METS/Exercise Tolerance:  >4 METS   Hematologic:     (+) Other Hematologic Disorder-lymphadema       Musculoskeletal:   (+) arthritis,  -       GI/Hepatic:  - neg GI/hepatic ROS       Renal/Genitourinary:         Endo:     (+) type II DM  "Last HgA1c: 5.6 date: 7/15/19 Not using insulin - not using insulin pump not previously admitted for DM/DKA Obesity, .      Psychiatric:  - neg psychiatric ROS       Infectious Disease:  - neg infectious disease ROS       Malignancy:      - no malignancy   Other:    - neg other ROS                      Physical Exam  Normal systems: cardiovascular and pulmonary    Airway   Mallampati: II  TM distance: >3 FB  Neck ROM: full    Dental   (+) upper dentures and lower dentures    Cardiovascular   Rhythm and rate: regular and normal      Pulmonary    breath sounds clear to auscultation            Lab Results   Component Value Date    WBC 7.8 07/15/2019    HGB 10.9 (L) 07/15/2019    HCT 35.5 (L) 07/15/2019     07/15/2019    CRP 21.5 (H) 05/22/2017    SED 49 (H) 05/22/2017     07/15/2019    POTASSIUM 4.1 07/15/2019    CHLORIDE 99 07/15/2019    CO2 33 (H) 07/15/2019    BUN 27 07/15/2019    CR 1.23 07/15/2019     (H) 07/15/2019    PHAN 9.5 07/15/2019    ALBUMIN 3.5 07/15/2019    PROTTOTAL 8.2 07/15/2019    ALT 15 07/15/2019    AST 15 07/15/2019    ALKPHOS 61 07/15/2019    BILITOTAL 0.5 07/15/2019    TSH 2.07 09/25/2017       Preop Vitals  BP Readings from Last 3 Encounters:   07/15/19 100/54   06/26/19 110/70   05/31/19 116/60    Pulse Readings from Last 3 Encounters:   07/15/19 72   05/31/19 70   03/25/19 88      Resp Readings from Last 3 Encounters:   07/15/19 18   05/31/19 16   03/25/19 20    SpO2 Readings from Last 3 Encounters:   07/15/19 97%   05/31/19 97%   03/25/19 97%      Temp Readings from Last 1 Encounters:   07/15/19 97.6  F (36.4  C) (Temporal)    Ht Readings from Last 1 Encounters:   07/24/19 1.778 m (5' 10\")      Wt Readings from Last 1 Encounters:   07/24/19 132.9 kg (293 lb)    Estimated body mass index is 42.04 kg/m  as calculated from the following:    Height as of 7/24/19: 1.778 m (5' 10\").    Weight as of 7/24/19: 132.9 kg (293 lb).       Anesthesia Plan      History & Physical " Review  History and physical reviewed and following examination; no interval change.    ASA Status:  3 .    NPO Status:  > 6 hours    Plan for Spinal and Periph. Nerve Block for postop pain with Propofol induction. Maintenance will be TIVA.    PONV prophylaxis:  Ondansetron (or other 5HT-3) and Dexamethasone or Solumedrol       Postoperative Care  Postoperative pain management:  Peripheral nerve block (Single Shot), IV analgesics and Oral pain medications.      Consents  Anesthetic plan, risks, benefits and alternatives discussed with:  Patient.  Use of blood products discussed: No .   .                 ELFEGO Hinojosa CRNA

## 2019-07-30 NOTE — INTERVAL H&P NOTE
This H&P has been reviewed and there are no clinically significant changes in the patient s condition.  The patient was evaluated by myself as well as Julio Cesar Sahu  prior to surgery. The Patient is approved for the surgery and the stated surgical procedure is still clinically indicated.

## 2019-07-30 NOTE — ANESTHESIA PROCEDURE NOTES
Peripheral nerve/Neuraxial procedure note : Adductor canal  Pre-Procedure  Performed by  Marisabel Baez APRN CRNA   Location: post-op      Pre-Anesthestic Checklist: patient identified, IV checked, site marked, risks and benefits discussed, informed consent, monitors and equipment checked, pre-op evaluation, at physician/surgeon's request and post-op pain management    Timeout  Correct Patient: Yes   Correct Procedure: Yes   Correct Site: Yes   Correct Laterality: Yes   Correct Position: Yes   Site Marked: Yes   .   Procedure Documentation    .    Procedure:  right  Adductor canal.  Local skin infiltrated with 1 mL of 1% lidocaine.     Ultrasound used to identify targeted nerve, plexus, or vascular marker and placed a needle adjacent to it., Ultrasound was used to visualize the spread of the anesthetic in close proximity to the above stated nerve.   Patient Prep;mask, sterile gloves, chlorhexidine gluconate and isopropyl alcohol.  Nerve Stim: Initial Level 0 mA. Lowest motor response mA..  Needle: insulated Needle Gauge: 22.  Needle Length (millimeters) 100  Insertion Method: Single Shot.       Assessment/Narrative  Paresthesias: No.  Injection made incrementally with aspirations every 5 mL..  The placement was negative for: blood aspirated, painful injection and site bleeding.  Bolus given via needle..   Secured via.   Complications: none. Comments:  Procedure done in PACU patient's spinal anesthetic was still working T-10.  Patient tolerated the procedure well.  Will follow up with patient as necessary. No anesthesia concerns.

## 2019-07-30 NOTE — ANESTHESIA CARE TRANSFER NOTE
Patient: Glenroy Padilla    Procedure(s):  Right total knee replacement    Diagnosis: Primary osteoarthritis of right knee  Diagnosis Additional Information: No value filed.    Anesthesia Type:   Spinal, Periph. Nerve Block for postop pain     Note:  Airway :Room Air  Patient transferred to:PACU        Vitals: (Last set prior to Anesthesia Care Transfer)    CRNA VITALS  7/30/2019 1245 - 7/30/2019 1324      7/30/2019             Pulse:  81    SpO2:  94 %                Electronically Signed By: ELFEGO Hinojosa CRNA  July 30, 2019  1:24 PM

## 2019-07-31 ENCOUNTER — APPOINTMENT (OUTPATIENT)
Dept: PHYSICAL THERAPY | Facility: CLINIC | Age: 69
End: 2019-07-31
Attending: ORTHOPAEDIC SURGERY
Payer: COMMERCIAL

## 2019-07-31 VITALS
HEIGHT: 70 IN | DIASTOLIC BLOOD PRESSURE: 51 MMHG | RESPIRATION RATE: 16 BRPM | HEART RATE: 63 BPM | TEMPERATURE: 97.6 F | OXYGEN SATURATION: 96 % | SYSTOLIC BLOOD PRESSURE: 122 MMHG | BODY MASS INDEX: 40.37 KG/M2 | WEIGHT: 282 LBS

## 2019-07-31 LAB
GLUCOSE SERPL-MCNC: 166 MG/DL (ref 70–99)
HGB BLD-MCNC: 9.4 G/DL (ref 13.3–17.7)

## 2019-07-31 PROCEDURE — 99225 ZZC SUBSEQUENT OBSERVATION CARE,LEVEL II: CPT | Performed by: NURSE PRACTITIONER

## 2019-07-31 PROCEDURE — 25000132 ZZH RX MED GY IP 250 OP 250 PS 637: Performed by: ORTHOPAEDIC SURGERY

## 2019-07-31 PROCEDURE — 36415 COLL VENOUS BLD VENIPUNCTURE: CPT | Performed by: ORTHOPAEDIC SURGERY

## 2019-07-31 PROCEDURE — 82947 ASSAY GLUCOSE BLOOD QUANT: CPT | Performed by: ORTHOPAEDIC SURGERY

## 2019-07-31 PROCEDURE — 25000128 H RX IP 250 OP 636: Performed by: ORTHOPAEDIC SURGERY

## 2019-07-31 PROCEDURE — 85018 HEMOGLOBIN: CPT | Performed by: ORTHOPAEDIC SURGERY

## 2019-07-31 PROCEDURE — 99207 ZZC CDG-CODE CATEGORY CHANGED: CPT | Performed by: NURSE PRACTITIONER

## 2019-07-31 PROCEDURE — 97116 GAIT TRAINING THERAPY: CPT | Mod: GP

## 2019-07-31 PROCEDURE — 97110 THERAPEUTIC EXERCISES: CPT | Mod: GP

## 2019-07-31 RX ORDER — AMOXICILLIN 250 MG
1 CAPSULE ORAL 2 TIMES DAILY PRN
Status: DISCONTINUED | OUTPATIENT
Start: 2019-07-31 | End: 2019-07-31 | Stop reason: HOSPADM

## 2019-07-31 RX ORDER — ACETAMINOPHEN 325 MG/1
975 TABLET ORAL EVERY 8 HOURS
Qty: 100 TABLET | Refills: 0 | Status: ON HOLD | OUTPATIENT
Start: 2019-07-31 | End: 2019-12-19

## 2019-07-31 RX ORDER — OXYCODONE HYDROCHLORIDE 5 MG/1
5-10 TABLET ORAL EVERY 4 HOURS PRN
Qty: 50 TABLET | Refills: 0 | Status: SHIPPED | OUTPATIENT
Start: 2019-07-31 | End: 2019-08-29

## 2019-07-31 RX ORDER — LOSARTAN POTASSIUM 25 MG/1
25 TABLET ORAL DAILY
Qty: 90 TABLET | Refills: 3 | Status: SHIPPED | OUTPATIENT
Start: 2019-07-31 | End: 2019-08-07

## 2019-07-31 RX ORDER — CLOPIDOGREL BISULFATE 75 MG/1
75 TABLET ORAL DAILY
Qty: 90 TABLET | Refills: 1 | Status: ON HOLD | OUTPATIENT
Start: 2019-08-14 | End: 2019-12-19

## 2019-07-31 RX ORDER — FUROSEMIDE 40 MG
40 TABLET ORAL DAILY
Qty: 90 TABLET | Refills: 2 | Status: SHIPPED | OUTPATIENT
Start: 2019-07-31 | End: 2019-10-22

## 2019-07-31 RX ORDER — AMOXICILLIN 250 MG
1 CAPSULE ORAL 2 TIMES DAILY PRN
Qty: 50 TABLET | Refills: 1 | Status: SHIPPED | OUTPATIENT
Start: 2019-07-31 | End: 2019-08-29

## 2019-07-31 RX ORDER — ONDANSETRON 4 MG/1
4 TABLET, ORALLY DISINTEGRATING ORAL EVERY 6 HOURS PRN
Qty: 20 TABLET | Refills: 0 | Status: SHIPPED | OUTPATIENT
Start: 2019-07-31 | End: 2019-08-29

## 2019-07-31 RX ADMIN — RIVAROXABAN 10 MG: 10 TABLET, FILM COATED ORAL at 09:39

## 2019-07-31 RX ADMIN — ROSUVASTATIN CALCIUM 10 MG: 5 TABLET, FILM COATED ORAL at 09:40

## 2019-07-31 RX ADMIN — CEFAZOLIN SODIUM 2 G: 2 INJECTION, SOLUTION INTRAVENOUS at 02:37

## 2019-07-31 RX ADMIN — FERROUS SULFATE TAB 325 MG (65 MG ELEMENTAL FE) 325 MG: 325 (65 FE) TAB at 09:39

## 2019-07-31 RX ADMIN — ACETAMINOPHEN 975 MG: 325 TABLET ORAL at 06:46

## 2019-07-31 RX ADMIN — METOPROLOL TARTRATE 25 MG: 25 TABLET ORAL at 09:40

## 2019-07-31 NOTE — CONSULTS
CARE TRANSITION SOCIAL WORK INITIAL ASSESSMENT:      Met with: Patient.    DATA  Principal Problem:    S/P total knee replacement using cement, right  Active Problems:    Type 2 diabetes mellitus with other circulatory complication, without long-term current use of insulin (H)    Hx of coronary artery disease    Hypertension, goal below 140/90    Hyperlipidemia LDL goal <100    Morbid obesity due to excess calories (H)    Lymphedema of right lower extremity        ASSESSMENT  Cognitive Status: awake, alert and oriented.       Resources List: Home Care      Insurance Concerns: No Insurance issues identified  Living Arrangements: assisted living    This writer met with pt introduced self and role. Discussed discharge planning and medicare guidelines in regards to home care and SNF benefits.  Discussed discharge plans with patient.  Patient states that after his last surgery he has [facility] services because [facility] does not serve his area.  Patient states he would prefer to use [facility] at discharge if they serve his area.  Called and talked with aKtie, at [facility] Intake.  Katie confirmed that [facility] does not serve his area.  Patient is agreement to use [facility] for RN and P/T at discharge.  Charge nurse notified.         PLAN    [facility] [phone] Phone [phone] Fax      SARAH Darden  [facility] [phone]/ [place] [phone]

## 2019-07-31 NOTE — PROGRESS NOTES
07/30/19 1700   Quick Adds   Type of Visit Initial PT Evaluation       Present no   Living Environment   Lives With alone   Living Arrangements assisted living   Home Accessibility no concerns   Transportation Anticipated agency   Self-Care   Usual Activity Tolerance good   Current Activity Tolerance good   Regular Exercise No   Equipment Currently Used at Home walker, rolling  (4WW/rollator)   Functional Level Prior   Ambulation 1-->assistive equipment   Transferring 0-->independent   Toileting 0-->independent   Bathing 0-->independent   Communication 0-->understands/communicates without difficulty   Swallowing 0-->swallows foods/liquids without difficulty   Cognition 0 - no cognition issues reported   Fall history within last six months no   Number of times patient has fallen within last six months 0   Which of the above functional risks had a recent onset or change? ambulation;transferring   Prior Functional Level Comment patient uses 4WW for ambulation, reports he has been using power WC recently d/t increase in knee pain.    General Information   Onset of Illness/Injury or Date of Surgery - Date 07/30/19   Referring Physician Dr. Ibrahim   Patient/Family Goals Statement return to group home   Pertinent History of Current Problem (include personal factors and/or comorbidities that impact the POC) Patient s/p day 0 of R TKA. Complex PMH: obesity, T2DM, chronic bilateral knee pain, CAD with stent 2017, T5-9 & lumbar laminectomy 2017, neuropathy, see epic for more.    Precautions/Limitations fall precautions   Weight-Bearing Status - LUE full weight-bearing   Weight-Bearing Status - RUE full weight-bearing   Weight-Bearing Status - LLE full weight-bearing   Weight-Bearing Status - RLE weight-bearing as tolerated   General Info Comments order comments: Post-op Ambulation/Exercise Instructions   Cognitive Status Examination   Orientation orientation to person, place and time   Level of Consciousness  alert   Follows Commands and Answers Questions 100% of the time   Personal Safety and Judgment intact   Memory intact   Pain Assessment   Patient Currently in Pain No   Integumentary/Edema   Integumentary/Edema no deficits were identifed   Integumentary/Edema Comments dressing in place over incision   Posture    Posture Forward head position;Protracted shoulders   Range of Motion (ROM)   ROM Comment decreased B knee ROM, R deficits > L   Strength   Strength Comments decreased B LE strength, decreased functional strength   Bed Mobility   Bed Mobility Comments min A at R LE   Transfer Skills   Transfer Comments min A sit<>stand with 4WW   Gait   Gait Comments did not attempt gait today   Balance   Balance Comments decreased static and dynamic balance   Sensory Examination   Sensory Perception Comments decreased distal sensation in R LE   Coordination   Coordination no deficits were identified   Muscle Tone   Muscle Tone no deficits were identified   Modality Interventions   Planned Modality Interventions Cryotherapy   Planned Modality Interventions Comments modalities as indicated   General Therapy Interventions   Planned Therapy Interventions bed mobility training;balance training;gait training;neuromuscular re-education;strengthening;stretching;transfer training;risk factor education;home program guidelines;progressive activity/exercise   Intervention Comments other interventions as indicated   Clinical Impression   Criteria for Skilled Therapeutic Intervention yes, treatment indicated   PT Diagnosis decreased R knee ROM & strength; decreased functional strength, mobility, and balance   Influenced by the following impairments strength, ROM, balance, mobility, edema   Functional limitations due to impairments transfers, bed mobility, ambulation, steps, ADLs   Clinical Presentation Evolving/Changing   Clinical Presentation Rationale Patient is a 69 y.o. male s/p day 0 R TKA. Patient with significant cardiac and  "musculoskeletal past medical history. Patient exhibits decreased bilateral knee ROM & strength, R deficits greater than L. Patient with decreased functional strength, mobility, and balance. patient would benefit from skilled physiacl therapy intervention.     Clinical Decision Making (Complexity) High complexity   Therapy Frequency 2x/day   Predicted Duration of Therapy Intervention (days/wks) 1-2 days   Anticipated Equipment Needs at Discharge front wheeled walker   Anticipated Discharge Disposition Home with Home Therapy   Risk & Benefits of therapy have been explained Yes   Patient, Family & other staff in agreement with plan of care Yes   [facility] TM \"6 Clicks\"   2016, Trustees of [facility], under license to [employer].  All rights reserved.   6 Clicks Short Forms Basic Mobility Inpatient Short Form   [facility]  \"6 Clicks\" V.2 Basic Mobility Inpatient Short Form   1. Turning from your back to your side while in a flat bed without using bedrails? 3 - A Little   2. Moving from lying on your back to sitting on the side of a flat bed without using bedrails? 3 - A Little   3. Moving to and from a bed to a chair (including a wheelchair)? 3 - A Little   4. Standing up from a chair using your arms (e.g., wheelchair, or bedside chair)? 3 - A Little   5. To walk in hospital room? 2 - A Lot   6. Climbing 3-5 steps with a railing? 2 - A Lot   Basic Mobility Raw Score (Score out of 24.Lower scores equate to lower levels of function) 16   Total Evaluation Time   Total Evaluation Time (Minutes) 15       Thank you for your referral.     Ignacia Cole PT, FERNANDO    [facility]    O: [phone]  E: [email or web address]    "

## 2019-07-31 NOTE — PLAN OF CARE
S-(situation): 4 hour update    B-(background): Right TKA    A-(assessment): No complaints of pain. Still some numbness in right leg. Dressing on right leg clean, dry and intact.     R-(recommendations): Will continue to monitor.

## 2019-07-31 NOTE — ANESTHESIA POSTPROCEDURE EVALUATION
Patient: Glenroy Padilla    Procedure(s):  Right total knee replacement    Diagnosis:Primary osteoarthritis of right knee  Diagnosis Additional Information: No value filed.    Anesthesia Type:  Spinal, Periph. Nerve Block for postop pain    Note:  Anesthesia Post Evaluation    Patient location during evaluation: Floor  Patient participation: Able to fully participate in evaluation  Level of consciousness: awake and alert  Pain management: adequate  Airway patency: patent  Cardiovascular status: blood pressure returned to baseline  Respiratory status: spontaneous ventilation and room air  Hydration status: acceptable  PONV: none     Anesthetic complications: None    Comments: Patient was very pleased with his anesthetic.  He has had 0/10 pain, his adductor canal block is still working very well.  He has been up with PT without incident and plans on being discharged to home later today. No anesthesia concerns.         Last vitals:  Vitals:    07/30/19 2313 07/31/19 0300 07/31/19 0630   BP: 120/52 109/41 122/51   Pulse: 70 63 63   Resp: 17 16 16   Temp: 97  F (36.1  C) 96.3  F (35.7  C) 97.6  F (36.4  C)   SpO2: 96% 93% 96%         Electronically Signed By: ELFEGO Dixon CRNA  July 31, 2019  2:50 PM

## 2019-07-31 NOTE — DISCHARGE INSTRUCTIONS
Total Knee Replacement Discharge Instructions                                     [phone]  Bone and Joint Service Line for issues or concerns      General Care:  After surgery you may feel tired/sleepy. This is normal. If you have any question along the way please contact the office. If you feel it is an issue cannot wait for normal office h    Diet:  Start with non-alcoholic liquids at first, particularly water or sports drinks after surgery. Progress to bland foods such as crackers and bread and finally to your normal diet if you have no problems. Avoid alcohol when taking narcotic pain medications.      Pain control:  Take your pain medications as prescribed. These medications may make you sleepy. Do not drive, operate equipment, or drink alcohol when taking these.  You may take Tylenol (Generic name is acetaminophen) as directed on the bottle for additional relief or in place of the prescribed pain medications as your pain gets better. Do not take any other NSAIDs (Motrin, Ibuprofen, Aleve, Naproxen) while taking the blood thinner. If the medications cause a reaction such as nausea or skin rash, stop taking them and contact your doctor. Please plan accordingly, pain medications will not be re-filled on the weekends or at night. Call the office during the day if you need more medications.    Blood thinner:  It is very important to take it as prescribed. It is a medication to help prevent blood clots in your legs or lungs. No medication is perfect, so if you notice a sudden onset of pain/swelling in your calf area call your doctor. If you notice a sudden onset of troubles breathing and/or chest pain call 911.     Icing:  It is common for some swelling, aching and stiffness to occur for up to 6-9 months after a knee replacement. If you knee swells, get off your feet, elevate your leg and apply some ice packs. Apply for 20 minutes at a time. For the first 1-2 weeks apply ice 2-3 x day or more after  therapy.    Walker/crutches:  Use a walker/crutches when you go home. You will transition to the use of a cane and finally to no additional support.     Braces:  You will go home with a knee immobilizer. You can take it off when you are lying or sitting down. Wear it when you are walking. This immobilizer can come off after you are doing a straight leg raise. Your physician and or physical therapist will help to determine when to stop wearing it.     Physical Therapy:  The success of your knee replacement is based on doing physical therapy. You will have some pain and discomfort along the way. If you feel your pain is limiting your progress make sure to take some pain medication prior to your therapy session. If you pain medications are not working talk to your surgeon.   For the first 2 weeks after going home you will have in-home physical therapy. The goal is to work on range of motion. While it is important to working on bending your knee, it is equally important to make sure you knee comes out all the way straight. To assist in this do not place any bumps, pillows and or blankets under your knee when you are lying down. You should have an outpatient physical therapy appointment scheduled for about 2 weeks after surgery.     Activity:  Unless otherwise instructed, you can weight bear as tolerated. While laying or sitting down you should straighten your knee all the way out and then gently work on bending the knee back. Do not worry at first if your knee feels stiff and will not bend like normal, this will get better. Never put a pillow or bump under you knee, instead put a pillow under your ankle so your knee will straighten out all the way.     Normal findings after surgery:  Numbness and tenderness around the incisions and to the outside of the incision is normal.  You may have bruising around the incisions and down the lower leg.   Your knee will be swollen for months after surgery. It will feel  tight  to  move.   Low grade fevers less than 100.5 degrees Fahrenheit are normal.   You may have some minor swelling in the leg/calf area.  You will have some increased pain after your therapy sessions.     When to call the Office:  Temperature greater than 101.5 degrees Fahreheit.  Fever, chills, and increasing pain in the knee.  Excessive drainage from the incisions that include bright red blood.  Drainage from the incisions sites that appear yellow, pus-like, or foul smelling.  Increasing pain the knee not relieved by the prescribed pain medications or ice.  Persistent nausea or vomiting not helped by the Zofran.  Increased pain or swelling in your calf area (in back above your ankle) that wasn t there when in the hospital.  Any other effects you feel are significant.  Call 911 if you experience any chest pain and/or shortness or breath.

## 2019-07-31 NOTE — PROGRESS NOTES
"S-(situation): Patient discharged to AL in [place] via w/c with Brittney Rushing @ 1700.    B-(background): Observation goals met     A-(assessment): /51 (BP Location: Right arm)   Pulse 63   Temp 97.6  F (36.4  C) (Oral)   Resp 16   Ht 1.778 m (5' 10\")   Wt 127.9 kg (282 lb)   SpO2 96%   BMI 40.46 kg/m   aquacelle dressing applied to right knee, Teds and immobilizer on. Pt up with SBA and walker. Pain managed with scheduled Tylenol.     R-(recommendations): Discharge instructions reviewed with pt. Listed belongings gathered and returned to patient.  Patient Education resolved: Yes  New medications-Pt. Has been educated about reason of use and side effects Yes  Home and hospital acquired medications returned to patient Yes  Medication Bin checked and emptied on discharge Yes      "

## 2019-07-31 NOTE — PLAN OF CARE
"VSS. Lung sounds clear and equal bilaterally. O2 saturation 93-96% on RA. Denied any pain this shift. CMS intact. Mild edema in bilateral legs. Knee immobilizer on right leg. Dressing clean, dry and intact. Pt ambulated to bathroom with 1 assist. Voided 400 mL overnight. Pt slept in recliner.     /41 (BP Location: Right arm)   Pulse 63   Temp 96.3  F (35.7  C) (Oral)   Resp 16   Ht 1.778 m (5' 10\")   Wt 127.9 kg (282 lb)   SpO2 93%   BMI 40.46 kg/m      "

## 2019-07-31 NOTE — DISCHARGE SUMMARY
[facility] Discharge Summary    Glenroy Padilla MRN# [id number]   Age: 69 year old YOB: 1950     Date of Admission:  7/30/2019  Date of Discharge::  7/31/2019  Admitting Physician:  Marc Ibrahim DO  Discharge Physician:  Grant Horner PA-C     Home clinic: [facility]          Admission Diagnoses:   Primary osteoarthritis of right knee  S/P total knee replacement using cement, right          Discharge Diagnosis:   Primary osteoarthritis of right knee  S/P total knee replacement using cement, right          Procedures:   Procedure(s): Procedure(s):  Right total knee replacement       No other procedures performed during this admission           Medications Prior to Admission:     Medications Prior to Admission   Medication Sig Dispense Refill Last Dose     blood glucose (NO BRAND SPECIFIED) lancing device Use to test blood sugars 2 times daily or as directed. 1 each 0 Taking     blood glucose calibration (NO BRAND SPECIFIED) solution To accompany: Blood Glucose Monitor Brands: per insurance. 1 Bottle 3 Taking     blood glucose monitoring (NO BRAND SPECIFIED) meter device kit test blood sugar 2 xdaily or as directed.  Blood Glucose Monitor Brands: per insurance. 1 kit 0 Taking     blood glucose monitoring (NO BRAND SPECIFIED) test strip Use to test blood sugar 2 times daily Blood Glucose Monitor Brands: Glucocard Expression 100 strip 6 Taking     blood glucose monitoring (NO BRAND SPECIFIED) test strip Use to test blood sugar 2x  daily or as directed. To accompany: Blood Glucose Monitor Brands: per insurance. 100 strip 1 Taking     Blood Glucose Monitoring Suppl (GLUCOCARD EXPRESSION MONITOR) W/DEVICE KIT USE TWICE DAILY TO TEST BLOOD GLUCOSE  0 Taking     GNP IRON 200 (65 Fe) MG TABS TAKE 1 TABLET (325 MG) BY MOUTH DAILY (WITH BREAKFAST) 90 tablet 1 7/29/2019 at Unknown time     JENTADUETO XR 2.5-1000 MG per table   1 Taking     metoprolol tartrate  (LOPRESSOR) 25 MG tablet Take 1 tablet (25 mg) by mouth 2 times daily 180 tablet 3 7/29/2019 at Unknown time     order for DME Equipment being ordered: Replacement battery or batteries for an [employer] M 51. 1 Device 0 Taking     order for DME Equipment being ordered: electric chair for sleeping and adjustment to allow for elevation of legs above the heart.  Zero gravity type heavy duty sleep chair advised. MaxiComforter ([id number]) 1 Units 0 Taking     order for DME Equipment being ordered: Needs to have a sleep chair for home use. 1 Device 0 Taking     order for DME Compression garments toe to knee.  Specific brand is Compraflex Light Compression garment.   Diagnosis codes for venous stasis ulcer I83.029 and for  Lymphoedema is I89.0   Goal is for compression of about 30 to 40 mm of mercury   measurements are right ankle 28.5 cm and right calf is 45.5 cm.  Left ankle is 27.5 cm and the calf is 45 cm   Tall for length. 2 Device 1 Taking     order for DME Equipment being ordered: compression stockings below knee and above knee if available.  Medium compression. 1 Units 1 Taking     rosuvastatin (CRESTOR) 10 MG tablet TAKE 1 TABLET (10 MG) BY MOUTH DAILY 90 tablet 1 7/29/2019 at Unknown time     sitagliptin-metFORMIN (JANUMET)  MG per tablet Take 1 tablet by mouth daily   7/29/2019 at Unknown time     thin (NO BRAND SPECIFIED) lancets Test 2 x daily or as directed.  Brands: per insurance. 100 each 1 Taking     senna-docusate (SENOKOT-S/PERICOLACE) 8.6-50 MG tablet Take 1 tablet by mouth   Taking     [DISCONTINUED] acetaminophen (TYLENOL) 500 MG tablet Take 500-1,000 mg by mouth   More than a month at Unknown time     [DISCONTINUED] mupirocin (BACTROBAN NASAL) 2 % nasal ointment Spray 0.5 g into both nostrils 2 times daily for 5 days 5 g 0              Discharge Medications:     Current Discharge Medication List      START taking these medications    Details   ondansetron (ZOFRAN-ODT) 4 MG ODT tab Take 1  tablet (4 mg) by mouth every 6 hours as needed for nausea or vomiting  Qty: 20 tablet, Refills: 0    Associated Diagnoses: S/P total knee replacement using cement, right      oxyCODONE (ROXICODONE) 5 MG tablet Take 1-2 tablets (5-10 mg) by mouth every 4 hours as needed for pain  Qty: 50 tablet, Refills: 0    Associated Diagnoses: S/P total knee replacement using cement, right      rivaroxaban ANTICOAGULANT (XARELTO) 10 MG TABS tablet Take 1 tablet (10 mg) by mouth daily for 12 days  Qty: 12 tablet, Refills: 0    Comments: Indication: DVT Prophylaxis  Associated Diagnoses: S/P total knee replacement using cement, right      !! senna-docusate (SENOKOT-S/PERICOLACE) 8.6-50 MG tablet Take 1 tablet by mouth 2 times daily as needed for constipation  Qty: 50 tablet, Refills: 1    Associated Diagnoses: S/P total knee replacement using cement, right       !! - Potential duplicate medications found. Please discuss with provider.      CONTINUE these medications which have CHANGED    Details   acetaminophen (TYLENOL) 325 MG tablet Take 3 tablets (975 mg) by mouth every 8 hours  Qty: 100 tablet, Refills: 0    Associated Diagnoses: S/P total knee replacement using cement, right      clopidogrel (PLAVIX) 75 MG tablet Take 1 tablet (75 mg) by mouth daily  Qty: 90 tablet, Refills: 1    Comments: Restart on 8/14/19 after Xarelto is discontinued.  Associated Diagnoses: Type 2 diabetes mellitus with other circulatory complication, without long-term current use of insulin (H); S/P spinal surgery; Hypertension, goal below 140/90; Hyperlipidemia LDL goal <100      furosemide (LASIX) 40 MG tablet Take 1 tablet (40 mg) by mouth daily Do not restart until seen by primary care doctor and given the okay.  Qty: 90 tablet, Refills: 2    Associated Diagnoses: Hypertension, goal below 140/90      losartan (COZAAR) 25 MG tablet Take 1 tablet (25 mg) by mouth daily Do not restart until seen by primary care doctor and given the okay.  Qty: 90 tablet,  Refills: 3    Associated Diagnoses: Hypertension, goal below 140/90; Type 2 diabetes mellitus with other circulatory complication, without long-term current use of insulin (H)         CONTINUE these medications which have NOT CHANGED    Details   blood glucose (NO BRAND SPECIFIED) lancing device Use to test blood sugars 2 times daily or as directed.  Qty: 1 each, Refills: 0    Comments: Gluco Card Expressions  Associated Diagnoses: Type 2 diabetes mellitus with other circulatory complication, without long-term current use of insulin (H)      blood glucose calibration (NO BRAND SPECIFIED) solution To accompany: Blood Glucose Monitor Brands: per insurance.  Qty: 1 Bottle, Refills: 3    Associated Diagnoses: Type 2 diabetes mellitus with other circulatory complication, without long-term current use of insulin (H)      blood glucose monitoring (NO BRAND SPECIFIED) meter device kit test blood sugar 2 xdaily or as directed.  Blood Glucose Monitor Brands: per insurance.  Qty: 1 kit, Refills: 0    Associated Diagnoses: Type 2 diabetes mellitus with other circulatory complication, without long-term current use of insulin (H)      !! blood glucose monitoring (NO BRAND SPECIFIED) test strip Use to test blood sugar 2 times daily Blood Glucose Monitor Brands: Glucocard Expression  Qty: 100 strip, Refills: 6    Comments: Dx: E11.59  Associated Diagnoses: Type 2 diabetes mellitus with other circulatory complication, without long-term current use of insulin (H)      !! blood glucose monitoring (NO BRAND SPECIFIED) test strip Use to test blood sugar 2x  daily or as directed. To accompany: Blood Glucose Monitor Brands: per insurance.  Qty: 100 strip, Refills: 1    Associated Diagnoses: Type 2 diabetes mellitus with other circulatory complication, without long-term current use of insulin (H)      Blood Glucose Monitoring Suppl (GLUCOCARD EXPRESSION MONITOR) W/DEVICE KIT USE TWICE DAILY TO TEST BLOOD GLUCOSE  Refills: 0      GNP IRON  200 (65 Fe) MG TABS TAKE 1 TABLET (325 MG) BY MOUTH DAILY (WITH BREAKFAST)  Qty: 90 tablet, Refills: 1    Associated Diagnoses: History of anemia      JENTADUETO XR 2.5-1000 MG per table Refills: 1      metoprolol tartrate (LOPRESSOR) 25 MG tablet Take 1 tablet (25 mg) by mouth 2 times daily  Qty: 180 tablet, Refills: 3    Associated Diagnoses: Type 2 diabetes mellitus with other circulatory complication, without long-term current use of insulin (H); History of coronary artery stent placement; Hypertension, goal below 140/90      !! order for DME Equipment being ordered: Replacement battery or batteries for an [employer] [place] M 51.  Qty: 1 Device, Refills: 0    Associated Diagnoses: Acute pain of left knee; Chronic pain of right knee; Neuropathy; Weakness of both legs; Foot drop, right; S/P spinal surgery; Central spinal stenosis      !! order for DME Equipment being ordered: electric chair for sleeping and adjustment to allow for elevation of legs above the heart.  Zero gravity type heavy duty sleep chair advised. Jamal ([id number])  Qty: 1 Units, Refills: 0    Associated Diagnoses: Screening for diabetic peripheral neuropathy; History of coronary artery stent placement; Hypertension, goal below 140/90; Open wound of left lower extremity without complication, initial encounter; Type 2 diabetes mellitus with other circulatory complication, without long-term current use of insulin (H); Weakness of both legs; Iron deficiency anemia secondary to inadequate dietary iron intake; [place] (obstructive sleep apnea); Central spinal stenosis; Foraminal stenosis of lumbar region      !! order for DME Equipment being ordered: Needs to have a sleep chair for home use.  Qty: 1 Device, Refills: 0    Associated Diagnoses: Type 2 diabetes mellitus with other circulatory complication, without long-term current use of insulin (H); Morbid obesity due to excess calories (H); History of coronary artery stent placement;  Gastroesophageal reflux disease without esophagitis; Chronic pain of left knee; [place] (obstructive sleep apnea)      !! order for DME Compression garments toe to knee.  Specific brand is Compraflex Light Compression garment.   Diagnosis codes for venous stasis ulcer I83.029 and for  Lymphoedema is I89.0   Goal is for compression of about 30 to 40 mm of mercury   measurements are right ankle 28.5 cm and right calf is 45.5 cm.  Left ankle is 27.5 cm and the calf is 45 cm   Tall for length.  Qty: 2 Device, Refills: 1    Associated Diagnoses: Neuropathy; Lymphedema of right lower extremity; Lymphedema      !! order for DME Equipment being ordered: compression stockings below knee and above knee if available.  Medium compression.  Qty: 1 Units, Refills: 1    Associated Diagnoses: Primary osteoarthritis of right knee; Lymphedema of right lower extremity      rosuvastatin (CRESTOR) 10 MG tablet TAKE 1 TABLET (10 MG) BY MOUTH DAILY  Qty: 90 tablet, Refills: 1    Associated Diagnoses: Hyperlipidemia LDL goal <100      sitagliptin-metFORMIN (JANUMET)  MG per tablet Take 1 tablet by mouth daily      thin (NO BRAND SPECIFIED) lancets Test 2 x daily or as directed.  Brands: per insurance.  Qty: 100 each, Refills: 1    Associated Diagnoses: Type 2 diabetes mellitus with other circulatory complication, without long-term current use of insulin (H)      !! senna-docusate (SENOKOT-S/PERICOLACE) 8.6-50 MG tablet Take 1 tablet by mouth       !! - Potential duplicate medications found. Please discuss with provider.      STOP taking these medications       mupirocin (BACTROBAN NASAL) 2 % nasal ointment Comments:   Reason for Stopping:                     Consultations:     Hospitalist/Physical Therapy/Occupational Therapy/Case Management          Brief History of Illness:   Reason for admission requiring a surgical or invasive procedure:   Primary osteoarthritis of right knee  S/P total knee replacement using cement, right   The  patient underwent the following procedure(s):   Procedure(s):  Right total knee replacement   There were no immediate complications during this procedure.    Please refer to the full operative summary for details.           Hospital Course:   This patient was admitted for the above diagnosis to under go the above procedure.  Postoperatively he did very well with no apparent complications, and he had an uneventful hospital stay. Antibiotics were given for 24 hours after surgery. He was given Xarelto, leg pumps as well as Teds for DVT prophylaxis and his pain was well controlled with IV pain meds, then transitioned to oral pain medications during his hospital stay. The patient was followed by the Orthopedic team and on postoperative day 1 CMS/dressing were intact.  He progressed well with physical therapy and discharge to his assisted living home was recommended. Occupational therapy also saw the patient and worked on activities of daily living. His chronic medical problems were followed by the medicine team during his hospital stay and there were no significant changes to conditions or treatment plans other than holding his losartan and Lasix until he sees his primary care provider.  No new medical problems presented during his hospital stay.       Discharge Instructions and Follow-Up:   Discharge diet: Pre-procedure diet or regular   Discharge activity: Activity as tolerated  Driving restricitIons: no driving while taking narcotic analgesics  Weight bearing status: Weight bearing as tolerated   Discharge follow-up: Follow up with Dr. Ibrahim in 10 to 14 days   Wound care: Keep the Aquacel (surgical) dressing on for 7 days after surgery, on the 7th day you can remove the dressing and apply gauze and tape.  Change that dressing once per day until your clinic follow up.   Anticoagulation:            Xeralto 10mg x 14 days.  Then he can restart his Plavix again.    Discharge Pain Plan:   - During patient's  hospitalization, the patient experienced expected pain due to the surgical procedure. The pain plan for discharge was discussed with the patient. The plan was created in a collaborative fashion.   1. Opioids prescribed on discharge: Oxycodone  2. Duration of opioids after discharge: patient was prescribed with a >3 day supply of opioid narcotics. This is a reasonable course as patient is expected to experience pain at levels that would necessitate opioid narcotics following the above surgical procedure. Prescribing a 3 day supply would impose undue hardship as patient has limited ability to leave the house to  a refill as patient has a gait abnormality and requires supervisor/assistance for ambulation due to the above surgical procedure.   3. Bowel regimen: Senna, fluids, activity  4. Pharmacologic adjuvants: Acetaminophen  5. Non-pharmacologic adjuvants: Ice, elevation           Discharge Disposition:   Discharged to his assisted living home       Attestation:  I have reviewed today's vital signs, notes, medications, labs and imaging.  Total time: 45 minutes    Grant Horner PA-C

## 2019-07-31 NOTE — PROGRESS NOTES
"[facility]    Progress note    69 year old male POD#1 s/p right total knee arthroplasty  S: Patient seen  reclining in his chair this morning in no apparent distress, alert and pleasant.  I discussed surgery and rehabilitation with the patient.  He denies numbness, tingling or major pain issues.  He actually denies having much pain at all.  He has only taken Tylenol so far.  Patient has not ambulated with physical therapy but he has walked to the bathroom and felt steady when he did.  He wants to go back to his assisted living but would rather do it tomorrow.  He wants to get his life stronger, however if he meets criteria he will go today.    Pain Assessment:  Current Pain Score 7/31/2019   Patient currently in pain? denies   Pain score (0-10) -   Pain location -   Pain descriptors -   - Glenroy is experiencing pain due to surgery. Pain management was discussed and the plan was created in a collaborative fashion.  Glenroy's response to the current recommendations: engaged  compliant  - Opioid regimen: Oxycodone, however he has not taken any yet.  - Response to opioid medications: Reduction of symptoms with acetaminophen so far.  - Bowel regimen: senna,fluids, activity  - Pharmacologic adjuvants: Acetaminophen  - Non-pharmacologic adjuvants: Ice and elevation  - Previous pain medications/therapies tried: IV dilaudid after surgery    O: CMS intact right LE, DF/PF intact       Dressing on, clean and dry       Knee immobilizer on and intact            Right knee with minimal swelling.  no erythema or ecchymosis noted at the foot.  The knee and leg is covered with an Ace wrap and knee immobilizer at this time.       Bilateral calves soft and non tender    Vital signs:  Temp: 97.6  F (36.4  C) Temp src: Oral BP: 122/51 Pulse: 63 Heart Rate: 63 Resp: 16 SpO2: 96 % O2 Device: None (Room air) Oxygen Delivery: 1 LPM Height: 177.8 cm (5' 10\") Weight: 127.9 kg (282 lb)  Estimated body mass index is " "40.46 kg/m  as calculated from the following:    Height as of this encounter: 1.778 m (5' 10\").    Weight as of this encounter: 127.9 kg (282 lb).      Hemoglobin   Date Value Ref Range Status   07/31/2019 9.4 (L) 13.3 - 17.7 g/dL Final     No results found for: INR      A/P:  1. Pain-controlled   2. DVT Prophylaxis-Xeralto 10mg x 14 days.   3. Weight Bearing Status-WBAT right LE  4. Physical Therapy-planning on discharge with home health physical therapy at the assisted living  5. Occupational Therapy-to see the patient today for ADLs  6. Case Management-to see the patient today.  Patient planning on going home to assisted living with home therapy.  His back-up plan was [facility] but I do not think he will need that.  7. Hospitalist-Dr. Osullivan following.  Thank you  8. Incision-no signs or symptoms of infection  9. Plan-discharge to home/assisted living later this evening or tomorrow as long as Hosp/PT/OT agree.  If he meets criteria he will go today.  All discharge orders in and narcotic prescription signed.  Discharge summary pending/shared.       Grant Horner PA-C  7/31/2019         "

## 2019-07-31 NOTE — PLAN OF CARE
"Discharge Planner PT   Patient plan for discharge: return to [facility]  Current status: Patient s/p day 1 of R TKA. Patient performs post-op knee exercises prior to transfer. Patient transfers sit<>stand CGA with 4WW, proceeded to ambulate in hallway x 100 ft total with 4WW & CGA. Patient completes 2 steps ascending and descending with B UE support at railings & CGA for safety, fair control of L LE (\"good leg\") while descending, no LOB throughout. Pain remained 0/10 with ambulation this morning.   Barriers to return to prior living situation: None  Recommendations for discharge:  PT at [facility]  Rationale for recommendations: Patient with decreased R LE strength and decreased functional strength & mobility. Patient completed all mobility safely per home requirements. Patient would benefit from continued skilled therapeutic intervention in order to progress towards her desired level of function.       Entered by: Ignacia Cole 07/31/2019 9:54 AM         Physical Therapy Discharge Summary    Reason for therapy discharge:    Discharged to home with home therapy.    Progress towards therapy goal(s). See goals on Care Plan in [facility] electronic health record for goal details.  Goals partially met.  Barriers to achieving goals:   discharge from facility.    Therapy recommendation(s):    see above      "

## 2019-07-31 NOTE — PROGRESS NOTES
[facility]      OUTPATIENT PHYSICAL THERAPY EVALUATION  PLAN OF TREATMENT FOR OUTPATIENT REHABILITATION  (COMPLETE FOR INITIAL CLAIMS ONLY)  Patient's Last Name, First Name, M.I.  YOB: 1950  Glenroy Padilla                        Provider's Name  [facility] Medical Record No.  [id number]                               Onset Date:  07/30/19   Start of Care Date:    7/30/2019     Type:     _X_PT   ___OT   ___SLP Medical Diagnosis: Right  TKA                        PT Diagnosis:  decreased R knee ROM & strength; decreased functional strength, mobility, and balance   Visits from SOC:  1   _________________________________________________________________________________  Plan of Treatment/Functional Goals    Planned Interventions: Cryotherapy, modalities as indicated  bed mobility training, balance training, gait training, neuromuscular re-education, strengthening, stretching, transfer training, risk factor education, home program guidelines, progressive activity/exercise, other interventions as indicated     Goals: See Physical Therapy Goals on Care Plan in [employer] electronic health record.    Therapy Frequency: 2x/day  Predicted Duration of Therapy Intervention: 1-2 days    7/30/2019 - 7/31/2019    Ignacia Cole PT, DPT  _________________________________________________________________________________    I CERTIFY THE NEED FOR THESE SERVICES FURNISHED UNDER        THIS PLAN OF TREATMENT AND WHILE UNDER MY CARE     (Physician co-signature of this document indicates review and certification of the therapy plan).                 ,      Referring Physician: Dr. Ibrahim            Initial Assessment        See Physical Therapy evaluation dated   in Epic electronic health record.

## 2019-07-31 NOTE — PROGRESS NOTES
[facility]    Medicine Progress Note - Hospitalist Service       Date of Admission:  7/30/2019  Assessment & Plan       S/P total knee replacement using cement, right  Glenroy Padilla is a 69 year old male admitted on 7/30/2019. He is admitted after an elective right total knee arthroplasty performed today by Dr. Ibrahim.  History significant for type 2 diabetes controlled taking sitagliptin/metformin, hypertension taking Lasix, losartan and Lopressor, history of CAD with stent placement on Plavix and chronic lymphedema involving the right lower extremity with history of central spinal stenosis status post surgery.  Surgery went well with patient having no discomfort at this time. POD 1 today. Patient is up and walking with therapy and is discharging home. Patient is eating and drinking well. No nausea. Voiding well. Orthopedic surgery is recommending use of Xarelto for DVT prophylaxis.  At this time would anticipate routine postoperative cares.       Hypertension, goal below 140/90  His home meds have been restarted with the exception of the losartan and Lasix which is very reasonable until his blood pressure stabilized. Patient blood pressure remains low/normal. Discussed plan for home with HOLDING his losartan and Lasix until he is seen by his primary care.       Type 2 diabetes mellitus with other circulatory complication, without long-term current use of insulin (H)  Home medications have been restarted for control of his diabetes.      Medication review was completed with patient.       Active Problems:    Lymphedema of right lower extremity    Hx of coronary artery disease    Hyperlipidemia LDL goal <100    Morbid obesity due to excess calories (H)    Diet: Advance Diet as Tolerated: Regular Diet Adult  Room Service  Diet    DVT Prophylaxis: Xarelto   Fountain Catheter: not present  Code Status: Full Code      Disposition Plan   Expected discharge: Today, recommended to prior living  arrangement once adequate pain management/ tolerating PO medications, safe disposition plan/ TCU bed available and medically ready for discharge. .  Entered: Khushbu Broussard CNP 07/31/2019, 12:55 PM       The patient's care was discussed with the Attending Physician, Dr. Mancera, Bedside Nurse, Care Coordinator/ and Patient.    Khushbu Broussard CNP  Hospitalist Service  [facility]    ______________________________________________________________________    Interval History   PATIENT seen while sitting up in his chair. Patient is alert and oriented, talkative. Pain is well managed. Patient vital signs are stable, low/normal blood pressure without home medications. Patient has been afebrile, hemodynamically stable. Eating and drinking well. No nausea.     Data reviewed today: I reviewed all medications, new labs and imaging results over the last 24 hours.     Physical Exam   Vital Signs: Temp: 97.6  F (36.4  C) Temp src: Oral BP: 122/51 Pulse: 63 Heart Rate: 63 Resp: 16 SpO2: 96 % O2 Device: None (Room air) Oxygen Delivery: 1 LPM  Weight: 282 lbs 0 oz    Exam:  Constitutional: healthy, alert and no distress  Head: Normocephalic. No masses, lesions, tenderness or abnormalities  Cardiovascular: PMI normal. No lifts, heaves, or thrills. RRR.  Respiratory: Good diaphragmatic excursion. Lungs clear  Gastrointestinal: Abdomen soft, non-tender. BS normal.   Musculoskeletal: extremities normal- no gross deformities noted, gait normal and normal muscle tone  Skin: no suspicious lesions or rashes and incision covered  Neurologic:  Sensation grossly WNL.  Psychiatric: mentation appears normal and affect normal/bright      Data   Recent Labs   Lab 07/31/19  0545   HGB 9.4*   *

## 2019-07-31 NOTE — PLAN OF CARE
S-(situation): 4 hour update    B-(background): Right TKA    A-(assessment): Pt sleeping up in recliner, still no report of pain. Right knee in immobilizer, dressing clean, dry and intact.     R-(recommendations): Will continue to monitor.

## 2019-08-01 ENCOUNTER — TELEPHONE (OUTPATIENT)
Dept: ORTHOPEDICS | Facility: OTHER | Age: 69
End: 2019-08-01

## 2019-08-01 NOTE — TELEPHONE ENCOUNTER
"Reason for Call:  Other appointment    Detailed comments: Patient calling because he had surgery on Tuesday 07/30 and was discharged yesterday. It was discovered that the brace was put on \"crooked\". The assisted living nurses noticed this before he took it off to sleep last night. It's on right today, but yesterday he was in quite a bit of pain and is wondering if this pain could be due to the brace was put on crooked or if this pain is normal? He is still having pain today and would like to talk to a nurse about this. Please call him.     Phone Number Patient can be reached at: Home number on file [phone] (home)    Best Time: any    Can we leave a detailed message on this number? YES    Call taken on 8/1/2019 at 1:30 PM by Meme Isaacs    "

## 2019-08-01 NOTE — TELEPHONE ENCOUNTER
Nursing Note    D:  Immobilizer put immobilizer on crooked at 4:30pm yesterday. Patient walked to wheelchair and at apartment with immobilizer on crooked... Got to assisted living at 6:00pm when it was noticed. Immobilizer posterior knee gatch slipped down to calf.    Pain increased in right knee last yadira/today.    Patient thinks he was sent home too early. Patient upset that he was given BP medication after someone told patient  He shouldn't have this medication that day. Then BP was low.    A:  I provided reassurance that this pain is related to the immobilizer being in the wrong place and walking on it before his body was physically strong enough to do so. I instructed patient to ice 15 minutes 6 times per day and continue immobilizer usage until further notice.    R:  Patient verbalizes understanding and will call back if needed.    Meme Beth RN  [facility]  [phone]  8/1/2019 1:58 PM

## 2019-08-02 ENCOUNTER — TELEPHONE (OUTPATIENT)
Dept: FAMILY MEDICINE | Facility: OTHER | Age: 69
End: 2019-08-02

## 2019-08-02 NOTE — TELEPHONE ENCOUNTER
Reason for Call:  Other call back    Detailed comments: Jc from [facility] called clinic. She is asking if Jessika or Min can sign off on verbal orders for services follow patient's right knee replacement. Needs orders for: skilled nursing 2x a week for 2 weeks and 1x per week for 2 weeks, PT to eval and treat. Please call with orders.     Phone Number: [phone]    Best Time: any    Can we leave a detailed message on this number? Not Applicable    Call taken on 8/2/2019 at 2:27 PM by Anna Baez

## 2019-08-05 ENCOUNTER — TELEPHONE (OUTPATIENT)
Dept: ORTHOPEDICS | Facility: OTHER | Age: 69
End: 2019-08-05

## 2019-08-05 NOTE — TELEPHONE ENCOUNTER
Reason for Call:  Other call back    Detailed comments: Patient has questions about post surgery care.  In pain and no nurse care.  Please call to discuss    Phone Number Patient can be reached at: Home number on file [phone] (home)    Best Time: any    Can we leave a detailed message on this number? YES    Call taken on 8/5/2019 at 9:11 AM by Stephanie Leone

## 2019-08-06 ENCOUNTER — TELEPHONE (OUTPATIENT)
Dept: FAMILY MEDICINE | Facility: OTHER | Age: 69
End: 2019-08-06

## 2019-08-06 ENCOUNTER — MEDICAL CORRESPONDENCE (OUTPATIENT)
Dept: HEALTH INFORMATION MANAGEMENT | Facility: CLINIC | Age: 69
End: 2019-08-06

## 2019-08-06 ENCOUNTER — PATIENT OUTREACH (OUTPATIENT)
Dept: CARE COORDINATION | Facility: CLINIC | Age: 69
End: 2019-08-06

## 2019-08-06 ASSESSMENT — ACTIVITIES OF DAILY LIVING (ADL): DEPENDENT_IADLS:: MEDICATION MANAGEMENT;TRANSPORTATION

## 2019-08-06 NOTE — PROGRESS NOTES
Clinic Care Coordination Contact    This writer is covering for the clinic SW who is out of the office this week.      Situation: Patient chart reviewed by care coordinator.    Background: Pt was admitted & discharged from [facility] on 7-31-19 post S/P total knee replacement using cement, right.    Pt was discharged back to his [facility], [facility], with [facility] services for RN & PT.  However, pt called clinic today and message was routed to this writer.  Pt requesting placement in TCU for post surgical cares.    SW placed call to [facility], spoke with Lisa [phone], who informed this writer that in RN notes it states that pt is a max assist for all care needs and should be in a TCU.    FRANK placed call to pt's [facility] care coordinator via [facility], Carolin Payan [phone].  Left message, awaiting return call.    FRANK placed call to Jazmin, [facility], TCU Admissions: [phone]/Fax: [phone]), who informed this writer that she has spoken at length with the pt & Carolin today, and informed the pt that since he is home from the hospital, [facility] considers him to be an admit from the community, thus requiring the following documentation in order to accept him for cares:  1.  Updated H&P.  Pt must be seen in the past 5 days & information must be current.  2.  Updated medication list where each medication has a corresponding diagnosis associated to the medication.  3.  Hard script for any pain medications, as pt cannot bring home supply.    Each of these 3 documents must be signed & dated by a MD in order to be valid for the TCU and must be FAXED to Jazmin at [facility].    FRANK then placed a call to the pt.  [facility] introduced self, title, and role and explained that this writer is covering for office SW but that I am available to assist him as needed.  Pt agreed to speak with this writer and explained that he should have gone to a TCU post surgery and  now is struggling at home with his I/ADLS, despite having home care come in.    SW explained that the TCU will need documents from a medical appointment & asked if we could get an appointment made.  Pt agreed & we did a conference call to the [facility].  Pt has an appointment to see Asad Wheatley on 8-7-19 at 8 AM.  Pt is aware this appointment is for possible TCU admission.    SW will be available via [facility], phone or Epic if there are questions about what documents are needed for the pt to be considered for TCU cares.  SW has requested admission order set from Jazmin at [facility], but have not received them yet.      Claire Daugherty [facility]  Primary Care Clinic- Social Work Care Coordinator  [facility]  [phone]

## 2019-08-06 NOTE — TELEPHONE ENCOUNTER
Reason for call:  [facility] representative, Carolin Payan [phone]    Patient had knee replacement in July and d/c'd August 1st.     Patient would like to go to [facility] for Therapy. They need a medication list with diagnosis listed for the meds, and recent notes or physical    Fax number is: [phone]  Attn: Jazmin    [facility] number [phone]  Jazmin in admissions if any questions.

## 2019-08-06 NOTE — TELEPHONE ENCOUNTER
"Called and spoke with patient. He was upset that he did not receive a call from the RN yesterday.  Spoke with patient for approximately 15 minutes regarding his concerns about how his stay in the hospital was \"too fast\" and that he should have been discharged to a TCU versus back to the nursing home.  He is currently trying to get in to a TCU in [place].  He has a post op appt with Dr. Ibrahim on 8/8/19.  He had questions about how often to wear his braces, physical therapy etc. Questions were answered and he will make a  List of any additional questions to bring with to his appt on Thursday.   /    Albina STEINER RN. . .  8/6/2019, 9:30 AM    "

## 2019-08-06 NOTE — TELEPHONE ENCOUNTER
Patient called appearing to be concerned that this was not completed two days ago. Patient was informed that we received this request one hour ago and it was being worked on.   Patient stated someone needed to get this done as he needs to get into rehab. Patient feels this will not be completed as his PCP is out of office.   Patient was informed that it was being worked on.  Lisa Zuluaga CMA ([facility])

## 2019-08-07 ENCOUNTER — OFFICE VISIT (OUTPATIENT)
Dept: FAMILY MEDICINE | Facility: OTHER | Age: 69
End: 2019-08-07
Payer: COMMERCIAL

## 2019-08-07 ENCOUNTER — PATIENT OUTREACH (OUTPATIENT)
Dept: CARE COORDINATION | Facility: CLINIC | Age: 69
End: 2019-08-07

## 2019-08-07 VITALS
WEIGHT: 282 LBS | SYSTOLIC BLOOD PRESSURE: 120 MMHG | HEIGHT: 70 IN | DIASTOLIC BLOOD PRESSURE: 72 MMHG | BODY MASS INDEX: 40.37 KG/M2 | HEART RATE: 73 BPM | TEMPERATURE: 97.8 F | OXYGEN SATURATION: 92 % | RESPIRATION RATE: 18 BRPM

## 2019-08-07 DIAGNOSIS — E78.5 HYPERLIPIDEMIA LDL GOAL <100: ICD-10-CM

## 2019-08-07 DIAGNOSIS — K21.9 GASTROESOPHAGEAL REFLUX DISEASE WITHOUT ESOPHAGITIS: ICD-10-CM

## 2019-08-07 DIAGNOSIS — E11.59 TYPE 2 DIABETES MELLITUS WITH OTHER CIRCULATORY COMPLICATION, WITHOUT LONG-TERM CURRENT USE OF INSULIN (H): ICD-10-CM

## 2019-08-07 DIAGNOSIS — Z96.651 S/P TOTAL KNEE REPLACEMENT USING CEMENT, RIGHT: Primary | ICD-10-CM

## 2019-08-07 DIAGNOSIS — I99.8 ISCHEMIC VASCULAR DISEASE: ICD-10-CM

## 2019-08-07 DIAGNOSIS — G47.33 OSA (OBSTRUCTIVE SLEEP APNEA): ICD-10-CM

## 2019-08-07 DIAGNOSIS — I89.0 LYMPHEDEMA: ICD-10-CM

## 2019-08-07 DIAGNOSIS — D50.8 IRON DEFICIENCY ANEMIA SECONDARY TO INADEQUATE DIETARY IRON INTAKE: ICD-10-CM

## 2019-08-07 DIAGNOSIS — I10 HYPERTENSION, GOAL BELOW 140/90: ICD-10-CM

## 2019-08-07 LAB
ANION GAP SERPL CALCULATED.3IONS-SCNC: 6 MMOL/L (ref 3–14)
BUN SERPL-MCNC: 22 MG/DL (ref 7–30)
CALCIUM SERPL-MCNC: 8.8 MG/DL (ref 8.5–10.1)
CHLORIDE SERPL-SCNC: 106 MMOL/L (ref 94–109)
CO2 SERPL-SCNC: 27 MMOL/L (ref 20–32)
CREAT SERPL-MCNC: 0.99 MG/DL (ref 0.66–1.25)
GFR SERPL CREATININE-BSD FRML MDRD: 76 ML/MIN/{1.73_M2}
GLUCOSE SERPL-MCNC: 141 MG/DL (ref 70–99)
POTASSIUM SERPL-SCNC: 4.1 MMOL/L (ref 3.4–5.3)
SODIUM SERPL-SCNC: 139 MMOL/L (ref 133–144)

## 2019-08-07 PROCEDURE — 36415 COLL VENOUS BLD VENIPUNCTURE: CPT | Performed by: PHYSICIAN ASSISTANT

## 2019-08-07 PROCEDURE — 80048 BASIC METABOLIC PNL TOTAL CA: CPT | Performed by: PHYSICIAN ASSISTANT

## 2019-08-07 PROCEDURE — 99214 OFFICE O/P EST MOD 30 MIN: CPT | Performed by: PHYSICIAN ASSISTANT

## 2019-08-07 ASSESSMENT — ACTIVITIES OF DAILY LIVING (ADL)
DEPENDENT_IADLS:: MEDICATION MANAGEMENT;TRANSPORTATION
DEPENDENT_IADLS:: MEDICATION MANAGEMENT;TRANSPORTATION

## 2019-08-07 ASSESSMENT — MIFFLIN-ST. JEOR: SCORE: 2040.39

## 2019-08-07 NOTE — PATIENT INSTRUCTIONS
You are cleared for admission to the transitional care facility in [place].    You can restart the Lasix.    Continue with Tylenol every 8 hours as needed for pain.    If you need a refill of your pain medications, Dr. Ibrahim can fill this for you.

## 2019-08-07 NOTE — PROGRESS NOTES
"Subjective     Glenroy Padilla is a 69 year old male who presents to clinic today for the following health issues:    HPI     Patient recently had total right knee replacement on 07/30 with Dr. Ibrahim. He states, \"I only stayed in the hospital for 1.5 days and they discharged me too early.\" He stays at [facility] in [place] and \"they said that they couldn't help me and that I need to go to rehab because I am a fall risk.\" They do not have the capacity to care for his needs there so he is planning on being admitted to a U care facility in [place] but since he was not directly sent there from the hospital, it is a community admission so he needs and history and physical along with paper work completed before admission. He states his pain is fairly well controlled with Tylenol as he does not like to take narcotics unless absolutely needed. He is having a lot of difficulty with ambulation since surgery along with ADL's. He is having normal bowel movements, taking Senna as needed and denies any shortness of breath. His Losartan and Lasix have been held since hospital discharge.        Reviewed and updated as needed this visit by Provider  Tobacco  Allergies  Meds  Problems  Med Hx  Surg Hx  Fam Hx      Review of Systems   GENERAL: Denies fever, fatigue, weakness, weight gain, or weight loss.  CARDIOVASCULAR: Denies chest pain, shortness of breath, irregular heartbeats, palpitations, or edema.  RESPIRATORY: Denies cough, hemoptysis, and shortness of breath.  GASTROINTESTINAL: Denies nausea, vomiting, change in appetite, abdominal pain, diarrhea, or constipation.  GENITOURINARY: Denies increased frequency, urgency, dysuria, hematuria, or incontinence.   MUSCULOSKELETAL: +Right knee pain.  NEUROLOGIC: Denies headache, fainting, dizziness, memory loss, numbness, tingling, or seizures.  PSYCHIATRIC: Denies depression, anxiety, mood swings, and thoughts of suicide.      Objective    /72   Pulse 73   Temp 97.8 " " F (36.6  C) (Temporal)   Resp 18   Ht 1.778 m (5' 10\")   Wt 127.9 kg (282 lb)   SpO2 92%   BMI 40.46 kg/m    Body mass index is 40.46 kg/m .  Physical Exam   GENERAL: healthy, alert and no distress  RESP: lungs clear to auscultation - no rales, rhonchi or wheezes  CV: regular rate and rhythm, normal S1 S2, no S3 or S4, no murmur, click or rub  ABDOMEN: soft, nontender, no hepatosplenomegaly, no masses and bowel sounds normal  MS: Leg wraps in place. Right knee with mild tenderness and limited range of motion.   NEURO: Normal strength and tone with the exception of right knee extension which is limited due to recent surgery.  Mentation intact and speech normal. Gait not tested.   PSYCH: mentation appears normal, affect normal/bright        Assessment & Plan       ICD-10-CM    1. S/P total knee replacement using cement, right Z96.651 CARE COORDINATION REFERRAL   2. [place] (obstructive sleep apnea) G47.33 CARE COORDINATION REFERRAL   3. Ischemic vascular disease I99.8 CARE COORDINATION REFERRAL   4. Iron deficiency anemia secondary to inadequate dietary iron intake D50.8 CARE COORDINATION REFERRAL   5. Lymphedema I89.0 CARE COORDINATION REFERRAL   6. Gastroesophageal reflux disease without esophagitis K21.9 CARE COORDINATION REFERRAL   7. Hypertension, goal below 140/90 I10 Basic metabolic panel  (Ca, Cl, CO2, Creat, Gluc, K, Na, BUN)     CARE COORDINATION REFERRAL   8. Hyperlipidemia LDL goal <100 E78.5 CARE COORDINATION REFERRAL   9. Type 2 diabetes mellitus with other circulatory complication, without long-term current use of insulin (H) E11.59 CARE COORDINATION REFERRAL        Will order updated BMP today to make sure kidney function and electrolytes are stable.  Blood pressure is normal. Okay to restart Lasix 40 mg daily but will hold off on restarting the Losartan for now to avoid hypotension. This can be re-evaluated at his next visit with his PCP.  I agree that he would benefit from admission to a TCU " facility for close monitoring and frequent PT/OT to help him recover from surgery.     Continue with current medications. He is on Xarelto for DVT prophylaxis and will remain on this until 8/14 at which times he can restart Plavix.     Diabetes under very good control.      Jn Wheatley PA-C  [facility]

## 2019-08-07 NOTE — PROGRESS NOTES
Clinic Care Coordination Contact    Clinic Care Coordination Contact  OUTREACH    Referral Information:  Referral Source: Self-patient/Caregiver; needs TCU admission.  Primary Diagnosis: Orthopedic    Chief Complaint   Patient presents with     Clinic Care Coordination - Follow-up     social work      Universal Utilization: Clinic Utilization  Difficulty keeping appointments:: No  Compliance Concerns: No  No-Show Concerns: No  No PCP office visit in Past Year: No  Utilization    Last refreshed: 8/7/2019 11:28 AM:  Hospital Admissions 0           Last refreshed: 8/7/2019 11:28 AM:  ED Visits 0           Last refreshed: 8/7/2019 11:28 AM:  No Show Count (past year) 1              Current as of: 8/7/2019 11:28 AM            Clinical Concerns:  Current Medical Concerns:  Post RKA & needs TCU cares    Current Behavioral Concerns: None  Education Provided to patient: See above     Pain  Pain (GOAL):: Yes  Type: Other(post surgical)  Location of chronic pain:: knee, back  Radiating: Yes  Progression: Constant  Chronic pain severity:: 5  Limitation of routine activities due to chronic pain:: Yes  Description: Unable to perform most daily activities (chores, hobbies, social activities, driving)  Alleviating Factors: Rest, Ice, Pain Medication  Aggravating Factors: Activity, Positioning    Health Maintenance Reviewed: Due/Overdue:  Health Maintenance Due   Topic Date Due     EYE EXAM  1950     ZOSTER IMMUNIZATION (1 of 2) 07/16/1998     MEDICARE ANNUAL WELLNESS VISIT  07/16/2013     FIT  09/30/2018     LOW DOSE ASA FOR PRIMARY PREVENTION  04/20/2019     Clinical Pathway: None    Medication Management:  Pt states he takes as prescribes & has no issues with obtaining.     Functional Status:  Dependent ADLs:: Ambulation-walker, Bathing, Dressing, Positioning, Transfers  Dependent IADLs:: Medication Management, Transportation  Bed or wheelchair confined:: No  Mobility Status: Independent w/Device    Living  Situation:  Current living arrangement:: I live in assisted living, I live alone  Type of residence:: Apartment - handicap accessible    Diet/Exercise/Sleep:  Diet:: Diabetic diet  Inadequate nutrition (GOAL):: No  Food Insecurity: No  Tube Feeding: No  Exercise:: Currently not exercising  Inadequate activity/exercise (GOAL):: No  Significant changes in sleep pattern (GOAL): No    Transportation:  Transportation concerns (GOAL):: No  Transportation means:: Family, Friend, Regular car, Medical transport     Psychosocial:  Baptist or spiritual beliefs that impact treatment:: No  Mental health DX:: No  Mental health management concern (GOAL):: No  Informal Support system:: Friends, Neighbors     Financial/Insurance: Social Security/ BLUE PLUS ADVANTAGE DUAL [employer]  Financial/Insurance concerns (GOAL):: No     Resources and Interventions:  Current Resources:   List of home care services:: Skilled Nursing, Physicial Therapy;   Community Resources: [employer] Programs, County Worker  Supplies used at home:: Diabetic Supplies, Compression Stockings  Equipment Currently Used at Home: walker, rolling, wheelchair, manual    Advance Care Plan/Directive  Advanced Care Plans/Directives on file:: No  Advanced Care Plan/Directive Status: Considering Options    Referrals Placed: TCU     Goals:   Goal Statement: I need to get into a TCU for PT/OT cares post knee surgery.  Measure of Success: Pt will have appropriate level of PT/OT services.  Supportive Steps to Achieve: SW to assist pt & medical team with TCU admission documents.  Barriers: None  Strengths: Pt is motivate to heal  Date to Achieve By: 8-9-19  Patient expressed understanding of goal: Yes    Patient/Caregiver understanding: Pt is aware that the medical team has completed all documents needed for his admission to [facility] TCU and the [facility]     Future Appointments              Tomorrow [id number] [place]  Clinics [facility]    Tomorrow Marc Ibrahim,  [facility]        Plan: SW to follow until pt is discharged from TCU cares.    Claire Granado  Primary Care Clinic- Social Work Care Coordinator  [facility]  [phone]

## 2019-08-07 NOTE — TELEPHONE ENCOUNTER
Patient saw JENNIFER today - forms filled out and faxed to [facility] [phone]

## 2019-08-08 ENCOUNTER — OFFICE VISIT (OUTPATIENT)
Dept: ORTHOPEDICS | Facility: CLINIC | Age: 69
End: 2019-08-08
Payer: COMMERCIAL

## 2019-08-08 ENCOUNTER — MEDICAL CORRESPONDENCE (OUTPATIENT)
Dept: HEALTH INFORMATION MANAGEMENT | Facility: CLINIC | Age: 69
End: 2019-08-08

## 2019-08-08 ENCOUNTER — ANCILLARY PROCEDURE (OUTPATIENT)
Dept: GENERAL RADIOLOGY | Facility: CLINIC | Age: 69
End: 2019-08-08
Attending: ORTHOPAEDIC SURGERY
Payer: COMMERCIAL

## 2019-08-08 VITALS — DIASTOLIC BLOOD PRESSURE: 70 MMHG | SYSTOLIC BLOOD PRESSURE: 110 MMHG | WEIGHT: 287 LBS | BODY MASS INDEX: 41.18 KG/M2

## 2019-08-08 DIAGNOSIS — Z96.651 S/P TOTAL KNEE REPLACEMENT USING CEMENT, RIGHT: Primary | ICD-10-CM

## 2019-08-08 DIAGNOSIS — Z96.651 S/P TOTAL KNEE REPLACEMENT USING CEMENT, RIGHT: ICD-10-CM

## 2019-08-08 PROCEDURE — 99024 POSTOP FOLLOW-UP VISIT: CPT | Performed by: ORTHOPAEDIC SURGERY

## 2019-08-08 PROCEDURE — 73562 X-RAY EXAM OF KNEE 3: CPT | Mod: TC

## 2019-08-08 ASSESSMENT — PAIN SCALES - GENERAL: PAINLEVEL: MODERATE PAIN (4)

## 2019-08-08 NOTE — PROGRESS NOTES
Clinic Care Coordination Contact    PAS-RR    Per DHS regulation, CTS team completed and submitted PAS-RR to [place] Board on Aging Direct Connect via the Senior [employer] Line. CTS team advised SNF and they are aware a PAS-RR has been submitted.     CTS team reviewed with pt or health care agent that they may be contacted for a follow up appointment within 10 days of hospital discharge if SNF stay is <30 days. Contact information for Senior [employer] Line was also provided.     Pt or health care agent verbalized understanding.     PAS-RR # [id number]      Claire Granado  Primary Care Clinic- Social Work Care Coordinator  [facility]  [phone]

## 2019-08-08 NOTE — LETTER
8/8/2019         RE: Glenroy Padilla  [address] [facility] [id number]        Dear Colleague,    Thank you for referring your patient, Glenroy Padilla, to the [facility]. Please see a copy of my visit note below.    Orthopedic Clinic Post-Operative Note    CHIEF COMPLAINT:   Chief Complaint   Patient presents with     Surgical Followup     dos: 7/30/2019~Right total knee arthroplasty~9 days postop       HISTORY OF PRESENT ILLNESS  Pain is controlled with Tylenol.  Did not like the way oxycodone made him feel.  He is completing his Xarelto.  Unfortunately some issues as far as rehab postoperatively.  He went back to his assisted living.  He did not have the assistance that he was expecting or needing.  Since then he is made arrangements to go to a rehab facility.  No other issues as far as the incision.    Patient's past medical, surgical, social and family histories reviewed.     Past Medical History:   Diagnosis Date     Acute pain of left knee 5/22/2017     Chronic pain of right knee 5/22/2017     Hx of coronary artery disease 5/22/2017     Hx of heart artery stent 5/22/2017     Mixed incontinence 8/13/2018     Obesity, morbid, BMI 40.0-49.9 (H) 11/20/2017     Open wound of left lower extremity without complication, initial encounter 5/22/2017     DAYTON (obstructive sleep apnea) 5/22/2017     Type 2 diabetes mellitus with other circulatory complication, without long-term current use of insulin (H) 5/22/2017     Venous stasis ulcer of left lower extremity (H) 5/22/2017     Venous stasis ulcer of left lower extremity (H) 5/22/2017       Past Surgical History:   Procedure Laterality Date     ARTHROPLASTY KNEE Right 7/30/2019    Procedure: Right total knee replacement;  Surgeon: Marc Ibrahim DO;  Location: [facility] OR     CARDIAC SURGERY      stent placement     CHOLECYSTECTOMY       LAMINECTOMY LUMBAR THREE+ LEVELS N/A 3/13/2018    Procedure: LAMINECTOMY LUMBAR THREE+ LEVELS;  T5-9  LAMINECTOMIES, RESECTION OF EPIDURAL LIPOMATOSIS;  Surgeon: Hugh Mendieta MD;  Location:  OR       Medications:    Current Outpatient Medications on File Prior to Visit:  acetaminophen (TYLENOL) 325 MG tablet Take 3 tablets (975 mg) by mouth every 8 hours   blood glucose (NO BRAND SPECIFIED) lancing device Use to test blood sugars 2 times daily or as directed.   blood glucose calibration (NO BRAND SPECIFIED) solution To accompany: Blood Glucose Monitor Brands: per insurance.   blood glucose monitoring (NO BRAND SPECIFIED) meter device kit test blood sugar 2 xdaily or as directed.  Blood Glucose Monitor Brands: per insurance.   blood glucose monitoring (NO BRAND SPECIFIED) test strip Use to test blood sugar 2 times daily Blood Glucose Monitor Brands: Glucocard Expression   blood glucose monitoring (NO BRAND SPECIFIED) test strip Use to test blood sugar 2x  daily or as directed. To accompany: Blood Glucose Monitor Brands: per insurance.   Blood Glucose Monitoring Suppl (GLUCOCARD EXPRESSION MONITOR) W/DEVICE KIT USE TWICE DAILY TO TEST BLOOD GLUCOSE   GNP IRON 200 (65 Fe) MG TABS TAKE 1 TABLET (325 MG) BY MOUTH DAILY (WITH BREAKFAST)   metoprolol tartrate (LOPRESSOR) 25 MG tablet Take 1 tablet (25 mg) by mouth 2 times daily   order for DME Equipment being ordered: Replacement battery or batteries for an [employer] M [employer].   order for DME Equipment being ordered: electric chair for sleeping and adjustment to allow for elevation of legs above the heart.  Zero gravity type heavy duty sleep chair advised. MaxiComforter ([id number])   order for DME Equipment being ordered: Needs to have a sleep chair for home use.   order for DME Compression garments toe to knee.  Specific brand is Compraflex Light Compression garment. Diagnosis codes for venous stasis ulcer I83.029 and for  Lymphoedema is I89.0 Goal is for compression of about 30 to 40 mm of mercury measurements are right ankle 28.5 cm and right calf is 45.5 cm.   Left ankle is 27.5 cm and the calf is 45 cm Tall for length.   order for DME Equipment being ordered: compression stockings below knee and above knee if available.  Medium compression.   rivaroxaban ANTICOAGULANT (XARELTO) 10 MG TABS tablet Take 1 tablet (10 mg) by mouth daily for 12 days   rosuvastatin (CRESTOR) 10 MG tablet TAKE 1 TABLET (10 MG) BY MOUTH DAILY   senna-docusate (SENOKOT-S/PERICOLACE) 8.6-50 MG tablet Take 1 tablet by mouth 2 times daily as needed for constipation   sitagliptin-metFORMIN (JANUMET)  MG tablet Take 1 tablet by mouth 2 times daily (with meals) This is actually Janumet XR   thin (NO BRAND SPECIFIED) lancets Test 2 x daily or as directed.  Brands: per insurance.   [START ON 8/14/2019] clopidogrel (PLAVIX) 75 MG tablet Take 1 tablet (75 mg) by mouth daily (Patient not taking: Reported on 8/8/2019)   furosemide (LASIX) 40 MG tablet Take 1 tablet (40 mg) by mouth daily Do not restart until seen by primary care doctor and given the okay. (Patient not taking: Reported on 8/8/2019)   ondansetron (ZOFRAN-ODT) 4 MG ODT tab Take 1 tablet (4 mg) by mouth every 6 hours as needed for nausea or vomiting (Patient not taking: Reported on 8/8/2019)   oxyCODONE (ROXICODONE) 5 MG tablet Take 1-2 tablets (5-10 mg) by mouth every 4 hours as needed for pain (Patient not taking: Reported on 8/8/2019)     No current facility-administered medications on file prior to visit.     Allergies   Allergen Reactions     No Known Allergies        Social History     Occupational History     Not on file   Tobacco Use     Smoking status: Former Smoker     Types: Cigars     Smokeless tobacco: Never Used     Tobacco comment: exposure to second hand smoke   Substance and Sexual Activity     Alcohol use: No     Drug use: No     Sexual activity: Never     Partners: Female       No family history on file.    REVIEW OF SYSTEMS  General: negative for, night sweats, dizziness, fatigue  Resp: No shortness of breath and no  cough  CV: negative for chest pain, syncope or near-syncope  GI: negative for nausea, vomiting and diarrhea  : negative for dysuria and hematuria  Musculoskeletal: as above  Neurologic: negative for syncope   Hematologic: negative for bleeding disorder    Physical Exam:  Vitals: /70   Wt 130.2 kg (287 lb)   BMI 41.18 kg/m     BMI= Body mass index is 41.18 kg/m .  Constitutional: healthy, alert and no acute distress   Psychiatric: mentation appears normal and affect normal/bright  NEURO: no focal deficits  SKIN: .healing well, well approximated skin edges, without signs of infection including no erythema, incision breakdown or purlent drainage  JOINT/EXTREMITIES: Active motion 15-70.  Passively 5-75 degrees.  No instability.  There is some swelling consistent bilateral with chronic venous stasis changes unchanged.  Distal neurovascular intact  GAIT: not tested     Diagnostic Modalities:  right knee X-ray: The prosthesis has acceptable alignment. No fractures or dislocations. Prosthesis is well seated with no evidence of loosening  Independent visualization of the images was performed.      Impression:   Chief Complaint   Patient presents with     Surgical Followup     dos: 7/30/2019~Right total knee arthroplasty~9 days postop   Overall making progress.  Unfortunately some issues postoperatively as far as receiving the care he needs.  He be heading to rehab.    Plan:   Activity: Take frequent breaks and elevate the extremity, Keep incision clean and dry  Staples/Sutures out: Not applicable.  Pain controlled: Yes. Continue to use: Tylenol  Immobilzation: No  Physical Therapy: decrease swelling and inflammation, passive range of motion, active range of motion  Rest, Ice, Elevation, Compression  Return to clinic 4, week(s), or sooner as needed for changes.  Issues putting on the compression hose-is using neoprene wraps.  Okay to continue using.  Re-x-ray on return: No    Federico Ibrahim D.O.    Again, thank you for  allowing me to participate in the care of your patient.        Sincerely,        Marc Ibrahim, DO

## 2019-08-08 NOTE — PROGRESS NOTES
Orthopedic Clinic Post-Operative Note    CHIEF COMPLAINT:   Chief Complaint   Patient presents with     Surgical Followup     dos: 7/30/2019~Right total knee arthroplasty~9 days postop       HISTORY OF PRESENT ILLNESS  Pain is controlled with Tylenol.  Did not like the way oxycodone made him feel.  He is completing his Xarelto.  Unfortunately some issues as far as rehab postoperatively.  He went back to his assisted living.  He did not have the assistance that he was expecting or needing.  Since then he is made arrangements to go to a rehab facility.  No other issues as far as the incision.    Patient's past medical, surgical, social and family histories reviewed.     Past Medical History:   Diagnosis Date     Acute pain of left knee 5/22/2017     Chronic pain of right knee 5/22/2017     Hx of coronary artery disease 5/22/2017     Hx of heart artery stent 5/22/2017     Mixed incontinence 8/13/2018     Obesity, morbid, BMI 40.0-49.9 (H) 11/20/2017     Open wound of left lower extremity without complication, initial encounter 5/22/2017     [place] (obstructive sleep apnea) 5/22/2017     Type 2 diabetes mellitus with other circulatory complication, without long-term current use of insulin (H) 5/22/2017     Venous stasis ulcer of left lower extremity (H) 5/22/2017     Venous stasis ulcer of left lower extremity (H) 5/22/2017       Past Surgical History:   Procedure Laterality Date     ARTHROPLASTY KNEE Right 7/30/2019    Procedure: Right total knee replacement;  Surgeon: Marc Ibrahim DO;  Location:  OR     CARDIAC SURGERY      stent placement     CHOLECYSTECTOMY       LAMINECTOMY LUMBAR THREE+ LEVELS N/A 3/13/2018    Procedure: LAMINECTOMY LUMBAR THREE+ LEVELS;  T5-9 LAMINECTOMIES, RESECTION OF EPIDURAL LIPOMATOSIS;  Surgeon: Hugh Mendieta MD;  Location:  OR       Medications:    Current Outpatient Medications on File Prior to Visit:  acetaminophen (TYLENOL) 325 MG tablet Take 3 tablets (975 mg) by  mouth every 8 hours   blood glucose (NO BRAND SPECIFIED) lancing device Use to test blood sugars 2 times daily or as directed.   blood glucose calibration (NO BRAND SPECIFIED) solution To accompany: Blood Glucose Monitor Brands: per insurance.   blood glucose monitoring (NO BRAND SPECIFIED) meter device kit test blood sugar 2 xdaily or as directed.  Blood Glucose Monitor Brands: per insurance.   blood glucose monitoring (NO BRAND SPECIFIED) test strip Use to test blood sugar 2 times daily Blood Glucose Monitor Brands: Glucocard Expression   blood glucose monitoring (NO BRAND SPECIFIED) test strip Use to test blood sugar 2x  daily or as directed. To accompany: Blood Glucose Monitor Brands: per insurance.   Blood Glucose Monitoring Suppl (GLUCOCARD EXPRESSION MONITOR) W/DEVICE KIT USE TWICE DAILY TO TEST BLOOD GLUCOSE   GNP IRON 200 (65 Fe) MG TABS TAKE 1 TABLET (325 MG) BY MOUTH DAILY (WITH BREAKFAST)   metoprolol tartrate (LOPRESSOR) 25 MG tablet Take 1 tablet (25 mg) by mouth 2 times daily   order for DME Equipment being ordered: Replacement battery or batteries for an [employer] M 51.   order for DME Equipment being ordered: electric chair for sleeping and adjustment to allow for elevation of legs above the heart.  Zero gravity type heavy duty sleep chair advised. MaxMetabolier ([id number])   order for DME Equipment being ordered: Needs to have a sleep chair for home use.   order for DME Compression garments toe to knee.  Specific brand is Compraflex Light Compression garment. Diagnosis codes for venous stasis ulcer I83.029 and for  Lymphoedema is I89.0 Goal is for compression of about 30 to 40 mm of mercury measurements are right ankle 28.5 cm and right calf is 45.5 cm.  Left ankle is 27.5 cm and the calf is 45 cm Tall for length.   order for DME Equipment being ordered: compression stockings below knee and above knee if available.  Medium compression.   rivaroxaban ANTICOAGULANT (XARELTO) 10 MG TABS tablet  Take 1 tablet (10 mg) by mouth daily for 12 days   rosuvastatin (CRESTOR) 10 MG tablet TAKE 1 TABLET (10 MG) BY MOUTH DAILY   senna-docusate (SENOKOT-S/PERICOLACE) 8.6-50 MG tablet Take 1 tablet by mouth 2 times daily as needed for constipation   sitagliptin-metFORMIN (JANUMET)  MG tablet Take 1 tablet by mouth 2 times daily (with meals) This is actually Janumet XR   thin (NO BRAND SPECIFIED) lancets Test 2 x daily or as directed.  Brands: per insurance.   [START ON 8/14/2019] clopidogrel (PLAVIX) 75 MG tablet Take 1 tablet (75 mg) by mouth daily (Patient not taking: Reported on 8/8/2019)   furosemide (LASIX) 40 MG tablet Take 1 tablet (40 mg) by mouth daily Do not restart until seen by primary care doctor and given the okay. (Patient not taking: Reported on 8/8/2019)   ondansetron (ZOFRAN-ODT) 4 MG ODT tab Take 1 tablet (4 mg) by mouth every 6 hours as needed for nausea or vomiting (Patient not taking: Reported on 8/8/2019)   oxyCODONE (ROXICODONE) 5 MG tablet Take 1-2 tablets (5-10 mg) by mouth every 4 hours as needed for pain (Patient not taking: Reported on 8/8/2019)     No current facility-administered medications on file prior to visit.     Allergies   Allergen Reactions     No Known Allergies        Social History     Occupational History     Not on file   Tobacco Use     Smoking status: Former Smoker     Types: Cigars     Smokeless tobacco: Never Used     Tobacco comment: exposure to second hand smoke   Substance and Sexual Activity     Alcohol use: No     Drug use: No     Sexual activity: Never     Partners: Female       No family history on file.    REVIEW OF SYSTEMS  General: negative for, night sweats, dizziness, fatigue  Resp: No shortness of breath and no cough  CV: negative for chest pain, syncope or near-syncope  GI: negative for nausea, vomiting and diarrhea  : negative for dysuria and hematuria  Musculoskeletal: as above  Neurologic: negative for syncope   Hematologic: negative for  bleeding disorder    Physical Exam:  Vitals: /70   Wt 130.2 kg (287 lb)   BMI 41.18 kg/m    BMI= Body mass index is 41.18 kg/m .  Constitutional: healthy, alert and no acute distress   Psychiatric: mentation appears normal and affect normal/bright  NEURO: no focal deficits  SKIN: .healing well, well approximated skin edges, without signs of infection including no erythema, incision breakdown or purlent drainage  JOINT/EXTREMITIES: Active motion 15-70.  Passively 5-75 degrees.  No instability.  There is some swelling consistent bilateral with chronic venous stasis changes unchanged.  Distal neurovascular intact  GAIT: not tested     Diagnostic Modalities:  right knee X-ray: The prosthesis has acceptable alignment. No fractures or dislocations. Prosthesis is well seated with no evidence of loosening  Independent visualization of the images was performed.      Impression:   Chief Complaint   Patient presents with     Surgical Followup     dos: 7/30/2019~Right total knee arthroplasty~9 days postop   Overall making progress.  Unfortunately some issues postoperatively as far as receiving the care he needs.  He be heading to rehab.    Plan:   Activity: Take frequent breaks and elevate the extremity, Keep incision clean and dry  Staples/Sutures out: Not applicable.  Pain controlled: Yes. Continue to use: Tylenol  Immobilzation: No  Physical Therapy: decrease swelling and inflammation, passive range of motion, active range of motion  Rest, Ice, Elevation, Compression  Return to clinic 4, week(s), or sooner as needed for changes.  Issues putting on the compression hose-is using neoprene wraps.  Okay to continue using.  Re-x-ray on return: No    Federico Ibrahim D.O.

## 2019-08-15 NOTE — PROGRESS NOTES
Subjective     Glenroy Padilla is a 69 year old male who presents to clinic today for the following health issues:    HPI   Concern - follow up after rehab knee replacement 7/30/19  Onset: 7/30/19    Description:   Follow up from rehab    Intensity:     Progression of Symptoms:  improving    Accompanying Signs & Symptoms:  none    Previous history of similar problem:   none    Precipitating factors:   Worsened by: none    Alleviating factors:  Improved by: none    Therapies Tried and outcome: physical therapy    Patient Active Problem List   Diagnosis     Type 2 diabetes mellitus with other circulatory complication, without long-term current use of insulin (H)     Venous stasis ulcer of left lower extremity (H)     Acute pain of left knee     Chronic pain of right knee     Open wound of left lower extremity without complication, initial encounter     Hx of coronary artery disease     Hx of heart artery stent     DAYTON (obstructive sleep apnea)     Hypertension, goal below 140/90     Hyperlipidemia LDL goal <100     History of coronary artery stent placement     Morbid obesity due to excess calories (H)     Neuropathy     Foot drop, right     Cardiomegaly     Gastroesophageal reflux disease without esophagitis     Falls frequently     Weakness of both legs     Central spinal stenosis L3-4 and L4-5     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     Lymphedema     S/P spinal surgery     Complete tear of left rotator cuff     Lymphedema of right lower extremity     Primary osteoarthritis of right knee     Mixed incontinence     Iron deficiency anemia secondary to inadequate dietary iron intake     Ischemic vascular disease     Disease of spinal cord (H)     MSSA (methicillin-susceptible Staphylococcus aureus) colonization     S/P total knee replacement using cement, right     Past Surgical History:   Procedure Laterality Date     ARTHROPLASTY KNEE Right 7/30/2019    Procedure: Right total knee replacement;  Surgeon: Patrice  Marc Delgado, ;  Location:  OR     CARDIAC SURGERY      stent placement     CHOLECYSTECTOMY       LAMINECTOMY LUMBAR THREE+ LEVELS N/A 3/13/2018    Procedure: LAMINECTOMY LUMBAR THREE+ LEVELS;  T5-9 LAMINECTOMIES, RESECTION OF EPIDURAL LIPOMATOSIS;  Surgeon: Hugh Mendieta MD;  Location:  OR       Social History     Tobacco Use     Smoking status: Former Smoker     Types: Cigars     Smokeless tobacco: Never Used     Tobacco comment: exposure to second hand smoke   Substance Use Topics     Alcohol use: No     No family history on file.      Current Outpatient Medications   Medication Sig Dispense Refill     acetaminophen (TYLENOL) 325 MG tablet Take 3 tablets (975 mg) by mouth every 8 hours 100 tablet 0     blood glucose (NO BRAND SPECIFIED) lancing device Use to test blood sugars 2 times daily or as directed. 1 each 0     blood glucose calibration (NO BRAND SPECIFIED) solution To accompany: Blood Glucose Monitor Brands: per insurance. 1 Bottle 3     blood glucose monitoring (NO BRAND SPECIFIED) meter device kit test blood sugar 2 xdaily or as directed.  Blood Glucose Monitor Brands: per insurance. 1 kit 0     blood glucose monitoring (NO BRAND SPECIFIED) test strip Use to test blood sugar 2 times daily Blood Glucose Monitor Brands: Glucocard Expression 100 strip 6     blood glucose monitoring (NO BRAND SPECIFIED) test strip Use to test blood sugar 2x  daily or as directed. To accompany: Blood Glucose Monitor Brands: per insurance. 100 strip 1     Blood Glucose Monitoring Suppl (GLUCOCARD EXPRESSION MONITOR) W/DEVICE KIT USE TWICE DAILY TO TEST BLOOD GLUCOSE  0     clopidogrel (PLAVIX) 75 MG tablet Take 1 tablet (75 mg) by mouth daily 90 tablet 1     furosemide (LASIX) 40 MG tablet Take 1 tablet (40 mg) by mouth daily Do not restart until seen by primary care doctor and given the okay. 90 tablet 2     GNP IRON 200 (65 Fe) MG TABS TAKE 1 TABLET (325 MG) BY MOUTH DAILY (WITH BREAKFAST) 90 tablet 1      metoprolol tartrate (LOPRESSOR) 25 MG tablet Take 1 tablet (25 mg) by mouth 2 times daily 180 tablet 3     Nutritional Supplements (ENSURE ACTIVE HIGH PROTEIN) LIQD Take 1 Can by mouth daily 30 each 1     order for DME Equipment being ordered: Replacement battery or batteries for an [employer] Pronto M 51. 1 Device 0     order for DME Equipment being ordered: electric chair for sleeping and adjustment to allow for elevation of legs above the heart.  Zero gravity type heavy duty sleep chair advised. MaxiComforter ([id number]) 1 Units 0     order for DME Equipment being ordered: Needs to have a sleep chair for home use. 1 Device 0     order for DME Compression garments toe to knee.  Specific brand is Compraflex Light Compression garment.   Diagnosis codes for venous stasis ulcer I83.029 and for  Lymphoedema is I89.0   Goal is for compression of about 30 to 40 mm of mercury   measurements are right ankle 28.5 cm and right calf is 45.5 cm.  Left ankle is 27.5 cm and the calf is 45 cm   Tall for length. 2 Device 1     order for DME Equipment being ordered: compression stockings below knee and above knee if available.  Medium compression. 1 Units 1     rosuvastatin (CRESTOR) 10 MG tablet TAKE 1 TABLET (10 MG) BY MOUTH DAILY 90 tablet 1     sitagliptin-metFORMIN (JANUMET)  MG tablet Take 1 tablet by mouth 2 times daily (with meals) This is actually Janumet XR       thin (NO BRAND SPECIFIED) lancets Test 2 x daily or as directed.  Brands: per insurance. 100 each 1     Allergies   Allergen Reactions     No Known Allergies      Recent Labs   Lab Test 08/07/19  0826 07/15/19  0852 02/21/19  0929  08/23/18  1049  03/09/18  1020  09/25/17  1216 05/22/17  1029   A1C  --  5.6 6.1*  --  5.7*   < >  --    < > 6.2* 6.4*   LDL  --  73 61  --   --   --  76  --  57 64   HDL  --   --  40  --   --   --   --   --  43 42   TRIG  --   --  166*  --   --   --   --   --  140 118   ALT  --  15 18  --  13   < > 19  --  19 20   CR 0.99 1.23  "1.23  --  1.13   < > 0.91   < > 0.92 0.91   GFRESTIMATED 76 59* 59*   < > 64   < > 82   < > 81 82   GFRESTBLACK 88 68 68   < > 78   < > >90   < > >90 >90  African American GFR Calc     POTASSIUM 4.1 4.1 4.2  --  4.0   < > 4.6   < > 4.1 4.0   TSH  --   --   --   --   --   --   --   --  2.07  --     < > = values in this interval not displayed.      BP Readings from Last 3 Encounters:   08/29/19 110/58   08/08/19 110/70   08/07/19 120/72    Wt Readings from Last 3 Encounters:   08/29/19 127.5 kg (281 lb)   08/08/19 130.2 kg (287 lb)   08/07/19 127.9 kg (282 lb)                    Reviewed and updated as needed this visit by Provider         Review of Systems   ROS COMP: Constitutional, HEENT, cardiovascular, pulmonary, GI, , musculoskeletal, neuro, skin, endocrine and psych systems are negative, except as otherwise noted.      Objective    /58   Pulse 72   Temp 96.7  F (35.9  C) (Temporal)   Resp 16   Ht 1.778 m (5' 10\")   Wt 127.5 kg (281 lb)   SpO2 98%   BMI 40.32 kg/m    Body mass index is 40.32 kg/m .  Physical Exam   GENERAL: healthy, alert and no distress  NECK: no adenopathy, no asymmetry, masses, or scars and trachea midline and normal to palpation  RESP: lungs clear to auscultation - no rales, rhonchi or wheezes  CV: regular rate and rhythm, normal S1 S2, no S3 or S4, no murmur, click or rub, no peripheral edema and peripheral pulses strong  ABDOMEN: soft, nontender, no hepatosplenomegaly, no masses and bowel sounds normal  MS: no gross musculoskeletal defects noted, no edema  SKIN: no suspicious lesions or rashes to visible skin.  The surgical scar over the right knee is healing fantastically well.  He still has trace edema to this region we talked about him continuing his Lasix for now.  NEURO: Normal strength and tone, mentation intact and speech normal  PSYCH: mentation appears normal, affect normal/bright    Diagnostic Test Results:  Labs reviewed in Epic  No results found for this or any " "previous visit (from the past 24 hour(s)).        Assessment & Plan     1. Hyperlipidemia LDL goal <100  - CBC with platelets  - Comprehensive metabolic panel    2. Type 2 diabetes mellitus with other circulatory complication, without long-term current use of insulin (H)  Rechecking related labs and set something up for the future for him as well.  - CBC with platelets  - Comprehensive metabolic panel  - **A1C FUTURE 3mo; Future    3. Hypertension, goal below 140/90  Rechecking related labs.  - CBC with platelets  - Comprehensive metabolic panel    4. Primary osteoarthritis of right knee  5. S/P total knee replacement using cement, right  6. Lymphedema of right lower extremity  He wishes to see surgery again for lymphedema  Advised that that would be appropriate.  He does have the surgeons number and does not need a new referral by report.  Follow-up with me as needed after that.       BMI:   Estimated body mass index is 40.32 kg/m  as calculated from the following:    Height as of this encounter: 1.778 m (5' 10\").    Weight as of this encounter: 127.5 kg (281 lb).     Return in about 7 weeks (around 10/17/2019) for recheck of current condition, Medication Recheck.    Julio Cesar Sahu PA-C  [facility]    "

## 2019-08-20 ENCOUNTER — TELEPHONE (OUTPATIENT)
Dept: FAMILY MEDICINE | Facility: OTHER | Age: 69
End: 2019-08-20

## 2019-08-20 DIAGNOSIS — I99.8 ISCHEMIC VASCULAR DISEASE: ICD-10-CM

## 2019-08-20 DIAGNOSIS — K21.9 GASTROESOPHAGEAL REFLUX DISEASE WITHOUT ESOPHAGITIS: Primary | ICD-10-CM

## 2019-08-20 DIAGNOSIS — E66.01 MORBID OBESITY DUE TO EXCESS CALORIES (H): ICD-10-CM

## 2019-08-20 DIAGNOSIS — E11.59 TYPE 2 DIABETES MELLITUS WITH OTHER CIRCULATORY COMPLICATION, WITHOUT LONG-TERM CURRENT USE OF INSULIN (H): ICD-10-CM

## 2019-08-20 NOTE — TELEPHONE ENCOUNTER
Reason for Call:  Form, our goal is to have forms completed with 72 hours, however, some forms may require a visit or additional information.    Type of letter, form or note:  medical    Who is the form from?: Home care    Where did the form come from: form was faxed in    What clinic location was the form placed at?: [facility] - [phone]    Where the form was placed: box Box/Folder    What number is listed as a contact on the form?: fax [phone]       Additional comments: Please complete and fax    Call taken on 8/20/2019 at 1:26 PM by Olivia Mayfield

## 2019-08-20 NOTE — TELEPHONE ENCOUNTER
Reason for Call:  Medication or medication refill:    Do you use a [facility]?  Name of the pharmacy and phone number for the current request:  [facility] Fax: [phone]    Name of the medication requested: High Protein Ensure     Other request: Carolin cochran  from [facility].  phone number:[phone]      Phone number patient can be reached at: Home number on file [phone] (home)    Best Time: any      Call taken on 8/20/2019 at 1:17 PM by Cathi Owen

## 2019-08-20 NOTE — TELEPHONE ENCOUNTER
Please figure out what pharmacy this truly needs to go to...  Electronically signed:    Julio Cesar Sahu PA-C

## 2019-08-21 ENCOUNTER — TELEPHONE (OUTPATIENT)
Dept: ORTHOPEDICS | Facility: OTHER | Age: 69
End: 2019-08-21

## 2019-08-21 ENCOUNTER — PATIENT OUTREACH (OUTPATIENT)
Dept: CARE COORDINATION | Facility: CLINIC | Age: 69
End: 2019-08-21

## 2019-08-21 NOTE — PROGRESS NOTES
Clinic Care Coordination Contact  [facility]/Voicemail    Contacting [facility] to get update on if pt remains in TCU yet.   Clinical Data: Care Coordinator Outreach  Outreach attempted x 1.  Left message on voicemail with call back information and requested return call.  Plan: Care Coordinator will wait for return call.      SARAH Glover   Primary Care Clinic- Social Work Care Coordinator  [facility]  8/21/2019 9:38 AM  [phone]

## 2019-08-21 NOTE — TELEPHONE ENCOUNTER
Left message for patient to return call to clinic. When call is returned please please verify where he would like the high protein ensure sent to?    Ravi Owen,

## 2019-08-21 NOTE — TELEPHONE ENCOUNTER
Patient called back. He would like it delivered to his assisted living.   Address:  [address] [place] [id number]

## 2019-08-21 NOTE — PROGRESS NOTES
Clinic Care Coordination Contact  Care Team Conversations    FRANK CC received return call from Jazmin at [facility]. She left voicemail that pt completed his rehab stay and did very well. He discharged back to his assisted living facility ([facility] yesterday 8/20/19 with home care services.    Plan: FRANK YOGI will outreach to pt tomorrow, appears is working with [facility] care again and they're requesting orders.     SARAH Glover   Primary Care Clinic- Social Work Care Coordinator  [facility]  8/21/2019 1:13 PM  [phone]

## 2019-08-21 NOTE — TELEPHONE ENCOUNTER
Bita SINCLAIR needs verbal for home care. Please call Bita at#[phone]  1 time a week for 3 wks, starting today. PT eval and treat, starting today . Thank You Beverly

## 2019-08-22 ENCOUNTER — PATIENT OUTREACH (OUTPATIENT)
Dept: CARE COORDINATION | Facility: CLINIC | Age: 69
End: 2019-08-22

## 2019-08-22 DIAGNOSIS — Z53.9 DIAGNOSIS NOT YET DEFINED: Primary | ICD-10-CM

## 2019-08-22 PROCEDURE — G0180 MD CERTIFICATION HHA PATIENT: HCPCS | Performed by: FAMILY MEDICINE

## 2019-08-22 RX ORDER — LACTOSE-REDUCED FOOD
1 LIQUID (ML) ORAL DAILY
Qty: 30 EACH | Refills: 1 | Status: SHIPPED | OUTPATIENT
Start: 2019-08-22

## 2019-08-22 ASSESSMENT — ACTIVITIES OF DAILY LIVING (ADL): DEPENDENT_IADLS:: MEDICATION MANAGEMENT;TRANSPORTATION

## 2019-08-22 NOTE — TELEPHONE ENCOUNTER
Called pt LM to call clinic back need to verify if High Protein Ensure should be faxed to [facility] medical supply Fax: [phone]. If not what pharmacy should it be sent to.     Micah Simms MA on 8/22/2019 at 8:58 AM

## 2019-08-22 NOTE — TELEPHONE ENCOUNTER
Per Dr. Ibrahim  This is fine.  Left message for Bita to call back if questions.      Roldan/CARLOS A

## 2019-08-22 NOTE — PROGRESS NOTES
Clinic Care Coordination Contact    Follow Up Progress Note     Assessment: Pt was admitted & discharged from [facility] on 7-31-19 post S/P total knee replacement using cement, right. Pt was discharged back to his [facility], [facility], with [facility] services for RN & PT.  However home care was concerned and recommended pt go to TCU. FRANK CC covering for this writer assisted in getting pt placed into TCU at [facility]. Pt discharged from TCU on 8/20/19 [facility]. Spoke with pt today. States he is doing well. He has home care services with [facility] care. Declines any needs at this time.     Goals addressed this encounter:   Goals Addressed                 This Visit's Progress       Patient Stated      Functional (pt-stated)   On track     Goal Statement: I need to get into a TCU for PT/OT cares post knee surgery.  Measure of Success: Pt will have appropriate level of PT/OT services.  Supportive Steps to Achieve: SW to assist pt & medical team with TCU admission documents.  Barriers: None  Strengths: Pt is motivate to heal  Date to Achieve By: 8-9-19  Patient expressed understanding of goal: Yes    Met goal 100%             Intervention/Education provided during outreach: Encouraged pt to call with any questions or CC needs.      Plan:   Will change to Maintenance status as goal of doing rehab in TCU has been met.   Care Coordinator will follow up in 2 months.       SARAH Glover   Primary Care Clinic- Social Work Care Coordinator  [facility]  8/22/2019 9:47 AM  [phone]

## 2019-08-22 NOTE — TELEPHONE ENCOUNTER
Spoke to patient, this is the correct place to send the prescription.   Lisa Zuluaga CMA ([facility])

## 2019-08-26 ENCOUNTER — TELEPHONE (OUTPATIENT)
Dept: FAMILY MEDICINE | Facility: OTHER | Age: 69
End: 2019-08-26

## 2019-08-26 NOTE — TELEPHONE ENCOUNTER
Reason for Call:  Form, our goal is to have forms completed with 72 hours, however, some forms may require a visit or additional information.    Type of letter, form or note:  medical    Who is the form from?: Evelia (if other please explain)    Where did the form come from: form was faxed in    What clinic location was the form placed at?: [facility] - [phone]    Where the form was placed: box Box/Folder    What number is listed as a contact on the form?: [phone]       Additional comments: n/a    Call taken on 8/26/2019 at 8:02 AM by Diamond Juarez

## 2019-08-28 ENCOUNTER — MEDICAL CORRESPONDENCE (OUTPATIENT)
Dept: HEALTH INFORMATION MANAGEMENT | Facility: CLINIC | Age: 69
End: 2019-08-28

## 2019-08-29 ENCOUNTER — OFFICE VISIT (OUTPATIENT)
Dept: FAMILY MEDICINE | Facility: OTHER | Age: 69
End: 2019-08-29
Payer: COMMERCIAL

## 2019-08-29 VITALS
BODY MASS INDEX: 40.23 KG/M2 | SYSTOLIC BLOOD PRESSURE: 110 MMHG | TEMPERATURE: 96.7 F | HEIGHT: 70 IN | WEIGHT: 281 LBS | HEART RATE: 72 BPM | DIASTOLIC BLOOD PRESSURE: 58 MMHG | OXYGEN SATURATION: 98 % | RESPIRATION RATE: 16 BRPM

## 2019-08-29 DIAGNOSIS — I89.0 LYMPHEDEMA OF RIGHT LOWER EXTREMITY: ICD-10-CM

## 2019-08-29 DIAGNOSIS — M17.11 PRIMARY OSTEOARTHRITIS OF RIGHT KNEE: ICD-10-CM

## 2019-08-29 DIAGNOSIS — E78.5 HYPERLIPIDEMIA LDL GOAL <100: Primary | ICD-10-CM

## 2019-08-29 DIAGNOSIS — I10 HYPERTENSION, GOAL BELOW 140/90: ICD-10-CM

## 2019-08-29 DIAGNOSIS — Z96.651 S/P TOTAL KNEE REPLACEMENT USING CEMENT, RIGHT: ICD-10-CM

## 2019-08-29 DIAGNOSIS — E11.59 TYPE 2 DIABETES MELLITUS WITH OTHER CIRCULATORY COMPLICATION, WITHOUT LONG-TERM CURRENT USE OF INSULIN (H): ICD-10-CM

## 2019-08-29 LAB
ALBUMIN SERPL-MCNC: 3.3 G/DL (ref 3.4–5)
ALP SERPL-CCNC: 81 U/L (ref 40–150)
ALT SERPL W P-5'-P-CCNC: 16 U/L (ref 0–70)
ANION GAP SERPL CALCULATED.3IONS-SCNC: 6 MMOL/L (ref 3–14)
AST SERPL W P-5'-P-CCNC: 14 U/L (ref 0–45)
BILIRUB SERPL-MCNC: 0.6 MG/DL (ref 0.2–1.3)
BUN SERPL-MCNC: 22 MG/DL (ref 7–30)
CALCIUM SERPL-MCNC: 9.2 MG/DL (ref 8.5–10.1)
CHLORIDE SERPL-SCNC: 103 MMOL/L (ref 94–109)
CO2 SERPL-SCNC: 29 MMOL/L (ref 20–32)
CREAT SERPL-MCNC: 1.13 MG/DL (ref 0.66–1.25)
ERYTHROCYTE [DISTWIDTH] IN BLOOD BY AUTOMATED COUNT: 16.2 % (ref 10–15)
GFR SERPL CREATININE-BSD FRML MDRD: 65 ML/MIN/{1.73_M2}
GLUCOSE SERPL-MCNC: 140 MG/DL (ref 70–99)
HCT VFR BLD AUTO: 30.6 % (ref 40–53)
HGB BLD-MCNC: 9.2 G/DL (ref 13.3–17.7)
MCH RBC QN AUTO: 26.7 PG (ref 26.5–33)
MCHC RBC AUTO-ENTMCNC: 30.1 G/DL (ref 31.5–36.5)
MCV RBC AUTO: 89 FL (ref 78–100)
PLATELET # BLD AUTO: 319 10E9/L (ref 150–450)
POTASSIUM SERPL-SCNC: 4 MMOL/L (ref 3.4–5.3)
PROT SERPL-MCNC: 8.4 G/DL (ref 6.8–8.8)
RBC # BLD AUTO: 3.45 10E12/L (ref 4.4–5.9)
SODIUM SERPL-SCNC: 138 MMOL/L (ref 133–144)
WBC # BLD AUTO: 6.7 10E9/L (ref 4–11)

## 2019-08-29 PROCEDURE — 99214 OFFICE O/P EST MOD 30 MIN: CPT | Performed by: PHYSICIAN ASSISTANT

## 2019-08-29 PROCEDURE — 85027 COMPLETE CBC AUTOMATED: CPT | Performed by: PHYSICIAN ASSISTANT

## 2019-08-29 PROCEDURE — 36415 COLL VENOUS BLD VENIPUNCTURE: CPT | Performed by: PHYSICIAN ASSISTANT

## 2019-08-29 PROCEDURE — 80053 COMPREHEN METABOLIC PANEL: CPT | Performed by: PHYSICIAN ASSISTANT

## 2019-08-29 ASSESSMENT — MIFFLIN-ST. JEOR: SCORE: 2035.86

## 2019-09-04 ENCOUNTER — TELEPHONE (OUTPATIENT)
Dept: ORTHOPEDICS | Facility: CLINIC | Age: 69
End: 2019-09-04

## 2019-09-04 DIAGNOSIS — Z53.9 DIAGNOSIS NOT YET DEFINED: Primary | ICD-10-CM

## 2019-09-04 PROCEDURE — G0180 MD CERTIFICATION HHA PATIENT: HCPCS | Performed by: ORTHOPAEDIC SURGERY

## 2019-09-04 NOTE — TELEPHONE ENCOUNTER
Received form from [facility].  Faxed back to [phone]  Roldan/CARLOS A

## 2019-09-05 ENCOUNTER — OFFICE VISIT (OUTPATIENT)
Dept: SURGERY | Facility: CLINIC | Age: 69
End: 2019-09-05
Payer: COMMERCIAL

## 2019-09-05 ENCOUNTER — OFFICE VISIT (OUTPATIENT)
Dept: ORTHOPEDICS | Facility: CLINIC | Age: 69
End: 2019-09-05
Payer: COMMERCIAL

## 2019-09-05 VITALS
BODY MASS INDEX: 40.76 KG/M2 | SYSTOLIC BLOOD PRESSURE: 130 MMHG | WEIGHT: 284.1 LBS | HEART RATE: 86 BPM | OXYGEN SATURATION: 99 % | DIASTOLIC BLOOD PRESSURE: 70 MMHG

## 2019-09-05 VITALS
DIASTOLIC BLOOD PRESSURE: 52 MMHG | TEMPERATURE: 97 F | BODY MASS INDEX: 40.23 KG/M2 | SYSTOLIC BLOOD PRESSURE: 112 MMHG | WEIGHT: 281 LBS | HEIGHT: 70 IN

## 2019-09-05 DIAGNOSIS — I87.2 VENOUS INSUFFICIENCY OF BOTH LOWER EXTREMITIES: ICD-10-CM

## 2019-09-05 DIAGNOSIS — Z96.651 S/P TOTAL KNEE REPLACEMENT USING CEMENT, RIGHT: Primary | ICD-10-CM

## 2019-09-05 DIAGNOSIS — R60.0 EDEMA OF BOTH LEGS: Primary | ICD-10-CM

## 2019-09-05 PROCEDURE — 99024 POSTOP FOLLOW-UP VISIT: CPT | Performed by: ORTHOPAEDIC SURGERY

## 2019-09-05 PROCEDURE — 99212 OFFICE O/P EST SF 10 MIN: CPT | Performed by: SPECIALIST

## 2019-09-05 ASSESSMENT — MIFFLIN-ST. JEOR: SCORE: 2035.86

## 2019-09-05 ASSESSMENT — PAIN SCALES - GENERAL: PAINLEVEL: NO PAIN (0)

## 2019-09-05 NOTE — LETTER
9/5/2019         RE: Glenroy Padilla  [address] [address]        Dear Colleague,    Thank you for referring your patient, Glenroy Padilla, to the [facility]. Please see a copy of my visit note below.    Orthopedic Clinic Post-Operative Note    CHIEF COMPLAINT:   Chief Complaint   Patient presents with     RECHECK     right knee follow up     Surgical Followup     dos: 7/30/2019~Right total knee arthroplasty~6 weeks postop       HISTORY OF PRESENT ILLNESS  Very happy.  Much better than presurgery.  He would like to proceed with his contralateral side in the future.  Patient's past medical, surgical, social and family histories reviewed.     Past Medical History:   Diagnosis Date     Acute pain of left knee 5/22/2017     Chronic pain of right knee 5/22/2017     Hx of coronary artery disease 5/22/2017     Hx of heart artery stent 5/22/2017     Mixed incontinence 8/13/2018     Obesity, morbid, BMI 40.0-49.9 (H) 11/20/2017     Open wound of left lower extremity without complication, initial encounter 5/22/2017     DAYTON (obstructive sleep apnea) 5/22/2017     Type 2 diabetes mellitus with other circulatory complication, without long-term current use of insulin (H) 5/22/2017     Venous stasis ulcer of left lower extremity (H) 5/22/2017     Venous stasis ulcer of left lower extremity (H) 5/22/2017       Past Surgical History:   Procedure Laterality Date     ARTHROPLASTY KNEE Right 7/30/2019    Procedure: Right total knee replacement;  Surgeon: Marc Ibrahim DO;  Location:  OR     CARDIAC SURGERY      stent placement     CHOLECYSTECTOMY       LAMINECTOMY LUMBAR THREE+ LEVELS N/A 3/13/2018    Procedure: LAMINECTOMY LUMBAR THREE+ LEVELS;  T5-9 LAMINECTOMIES, RESECTION OF EPIDURAL LIPOMATOSIS;  Surgeon: Hugh Mendieta MD;  Location:  OR       Medications:    Current Outpatient Medications on File Prior to Visit:  acetaminophen (TYLENOL) 325 MG tablet Take 3 tablets  (975 mg) by mouth every 8 hours   blood glucose (NO BRAND SPECIFIED) lancing device Use to test blood sugars 2 times daily or as directed.   blood glucose calibration (NO BRAND SPECIFIED) solution To accompany: Blood Glucose Monitor Brands: per insurance.   blood glucose monitoring (NO BRAND SPECIFIED) meter device kit test blood sugar 2 xdaily or as directed.  Blood Glucose Monitor Brands: per insurance.   blood glucose monitoring (NO BRAND SPECIFIED) test strip Use to test blood sugar 2 times daily Blood Glucose Monitor Brands: Glucocard Expression   blood glucose monitoring (NO BRAND SPECIFIED) test strip Use to test blood sugar 2x  daily or as directed. To accompany: Blood Glucose Monitor Brands: per insurance.   Blood Glucose Monitoring Suppl (GLUCOCARD EXPRESSION MONITOR) W/DEVICE KIT USE TWICE DAILY TO TEST BLOOD GLUCOSE   clopidogrel (PLAVIX) 75 MG tablet Take 1 tablet (75 mg) by mouth daily   furosemide (LASIX) 40 MG tablet Take 1 tablet (40 mg) by mouth daily Do not restart until seen by primary care doctor and given the okay.   GNP IRON 200 (65 Fe) MG TABS TAKE 1 TABLET (325 MG) BY MOUTH DAILY (WITH BREAKFAST)   metoprolol tartrate (LOPRESSOR) 25 MG tablet Take 1 tablet (25 mg) by mouth 2 times daily   Nutritional Supplements (ENSURE ACTIVE HIGH PROTEIN) LIQD Take 1 Can by mouth daily   order for DME Equipment being ordered: Replacement battery or batteries for an [employer] Pronto M 51.   order for DME Equipment being ordered: electric chair for sleeping and adjustment to allow for elevation of legs above the heart.  Zero gravity type heavy duty sleep chair advised. [facility] ([id number])   order for DME Equipment being ordered: Needs to have a sleep chair for home use.   order for DME Compression garments toe to knee.  Specific brand is Compraflex Light Compression garment. Diagnosis codes for venous stasis ulcer I83.029 and for  Lymphoedema is I89.0 Goal is for compression of about 30 to 40 mm of  "mercury measurements are right ankle 28.5 cm and right calf is 45.5 cm.  Left ankle is 27.5 cm and the calf is 45 cm Tall for length.   order for DME Equipment being ordered: compression stockings below knee and above knee if available.  Medium compression.   rosuvastatin (CRESTOR) 10 MG tablet TAKE 1 TABLET (10 MG) BY MOUTH DAILY   sitagliptin-metFORMIN (JANUMET)  MG tablet Take 1 tablet by mouth 2 times daily (with meals) This is actually Janumet XR   thin (NO BRAND SPECIFIED) lancets Test 2 x daily or as directed.  Brands: per insurance.     No current facility-administered medications on file prior to visit.     Allergies   Allergen Reactions     No Known Allergies        Social History     Occupational History     Not on file   Tobacco Use     Smoking status: Former Smoker     Types: Cigars     Smokeless tobacco: Never Used     Tobacco comment: exposure to second hand smoke   Substance and Sexual Activity     Alcohol use: No     Drug use: No     Sexual activity: Never     Partners: Female       No family history on file.    REVIEW OF SYSTEMS  General: negative for, night sweats, dizziness, fatigue  Resp: No shortness of breath and no cough  CV: negative for chest pain, syncope or near-syncope  GI: negative for nausea, vomiting and diarrhea  : negative for dysuria and hematuria  Musculoskeletal: as above  Neurologic: negative for syncope   Hematologic: negative for bleeding disorder    Physical Exam:  Vitals: /52   Temp 97  F (36.1  C) (Temporal)   Ht 1.778 m (5' 10\")   Wt 127.5 kg (281 lb)   BMI 40.32 kg/m     BMI= Body mass index is 40.32 kg/m .  Constitutional: healthy, alert and no acute distress   Psychiatric: mentation appears normal and affect normal/bright  NEURO: no focal deficits  SKIN: .well healed, no erythema, no incision breakdown and no drainage.  JOINT/EXTREMITIES: Active motion 5-105 degrees.  No instability through the arc of motion.  Passively able to get i him to full " extension.  No areas of tenderness.  GAIT: not tested     Diagnostic Modalities:  Previous imaging reviewed.  Independent visualization of the images was performed.      Impression:   Chief Complaint   Patient presents with     RECHECK     right knee follow up     Surgical Followup     dos: 7/30/2019~Right total knee arthroplasty~6 weeks postop   Doing well.  We discussed ways to work on terminal extension.    Plan:   Continue physical therapy.  Continue working on range of motion and strengthening.  He would like to proceed with a left knee replacement future.  Recommend he return back in 4 weeks and we will discuss.  I am hoping he has adequately recovered by that point.  Return to clinic 4, week(s), or sooner as needed for changes.    Re-x-ray on return: Yes.  Bilateral knees 3 views    Federico Ibrahim D.O.    Again, thank you for allowing me to participate in the care of your patient.        Sincerely,        Marc Ibrahim, DO

## 2019-09-05 NOTE — LETTER
9/5/2019         RE: Glenroy Padilla  [address] [address]        Dear Colleague,    Thank you for referring your patient, Glenroy Padilla, to the [facility]. Please see a copy of my visit note below.    F/U for leg edema      Subjective:  Patient is well-known to me for bilateral lower extremity edema and wounds.  There were insurance issues with his chair and he cannot elevate his legs above his heart at night.  He does wear ready wraps during the day.  He now presents for prescription for a vasopressin DVT prophylactic system.  He would like a prescription for it.  He reports no wounds at this time.    Objective:  B/P: 130/70, T: Data Unavailable, P: 86, R: Data Unavailable  LE - rediwraps in place.  No edema    Assessment/plan:  This is a 69-year-old gentleman well-known to me for lower extremely wounds that are all healed.  There were insurance issues with a special chair to elevate his legs above his heart and he would like to try the vasopressor DVT prophylaxis system at night.  The patient was given a prescription for that.  He will follow-up with me as necessary.    Casey Shine MD, FACS            Again, thank you for allowing me to participate in the care of your patient.        Sincerely,        Casey Shine MD

## 2019-09-05 NOTE — PROGRESS NOTES
F/U for leg edema      Subjective:  Patient is well-known to me for bilateral lower extremity edema and wounds.  There were insurance issues with his chair and he cannot elevate his legs above his heart at night.  He does wear ready wraps during the day.  He now presents for prescription for a vasopressin DVT prophylactic system.  He would like a prescription for it.  He reports no wounds at this time.    Objective:  B/P: 130/70, T: Data Unavailable, P: 86, R: Data Unavailable  LE - rediwraps in place.  No edema    Assessment/plan:  This is a 69-year-old gentleman well-known to me for lower extremely wounds that are all healed.  There were insurance issues with a special chair to elevate his legs above his heart and he would like to try the vasopressor DVT prophylaxis system at night.  The patient was given a prescription for that.  He will follow-up with me as necessary.    Casey Shine MD, FACS

## 2019-09-05 NOTE — PROGRESS NOTES
Orthopedic Clinic Post-Operative Note    CHIEF COMPLAINT:   Chief Complaint   Patient presents with     RECHECK     right knee follow up     Surgical Followup     dos: 7/30/2019~Right total knee arthroplasty~6 weeks postop       HISTORY OF PRESENT ILLNESS  Very happy.  Much better than presurgery.  He would like to proceed with his contralateral side in the future.  Patient's past medical, surgical, social and family histories reviewed.     Past Medical History:   Diagnosis Date     Acute pain of left knee 5/22/2017     Chronic pain of right knee 5/22/2017     Hx of coronary artery disease 5/22/2017     Hx of heart artery stent 5/22/2017     Mixed incontinence 8/13/2018     Obesity, morbid, BMI 40.0-49.9 (H) 11/20/2017     Open wound of left lower extremity without complication, initial encounter 5/22/2017     DAYTON (obstructive sleep apnea) 5/22/2017     Type 2 diabetes mellitus with other circulatory complication, without long-term current use of insulin (H) 5/22/2017     Venous stasis ulcer of left lower extremity (H) 5/22/2017     Venous stasis ulcer of left lower extremity (H) 5/22/2017       Past Surgical History:   Procedure Laterality Date     ARTHROPLASTY KNEE Right 7/30/2019    Procedure: Right total knee replacement;  Surgeon: Marc Ibrahim DO;  Location:  OR     CARDIAC SURGERY      stent placement     CHOLECYSTECTOMY       LAMINECTOMY LUMBAR THREE+ LEVELS N/A 3/13/2018    Procedure: LAMINECTOMY LUMBAR THREE+ LEVELS;  T5-9 LAMINECTOMIES, RESECTION OF EPIDURAL LIPOMATOSIS;  Surgeon: Hugh Mendieta MD;  Location:  OR       Medications:    Current Outpatient Medications on File Prior to Visit:  acetaminophen (TYLENOL) 325 MG tablet Take 3 tablets (975 mg) by mouth every 8 hours   blood glucose (NO BRAND SPECIFIED) lancing device Use to test blood sugars 2 times daily or as directed.   blood glucose calibration (NO BRAND SPECIFIED) solution To accompany: Blood Glucose Monitor Brands: per  insurance.   blood glucose monitoring (NO BRAND SPECIFIED) meter device kit test blood sugar 2 xdaily or as directed.  Blood Glucose Monitor Brands: per insurance.   blood glucose monitoring (NO BRAND SPECIFIED) test strip Use to test blood sugar 2 times daily Blood Glucose Monitor Brands: Glucocard Expression   blood glucose monitoring (NO BRAND SPECIFIED) test strip Use to test blood sugar 2x  daily or as directed. To accompany: Blood Glucose Monitor Brands: per insurance.   Blood Glucose Monitoring Suppl (GLUCOCARD EXPRESSION MONITOR) W/DEVICE KIT USE TWICE DAILY TO TEST BLOOD GLUCOSE   clopidogrel (PLAVIX) 75 MG tablet Take 1 tablet (75 mg) by mouth daily   furosemide (LASIX) 40 MG tablet Take 1 tablet (40 mg) by mouth daily Do not restart until seen by primary care doctor and given the okay.   GNP IRON 200 (65 Fe) MG TABS TAKE 1 TABLET (325 MG) BY MOUTH DAILY (WITH BREAKFAST)   metoprolol tartrate (LOPRESSOR) 25 MG tablet Take 1 tablet (25 mg) by mouth 2 times daily   Nutritional Supplements (ENSURE ACTIVE HIGH PROTEIN) LIQD Take 1 Can by mouth daily   order for DME Equipment being ordered: Replacement battery or batteries for an [employer] M 51.   order for DME Equipment being ordered: electric chair for sleeping and adjustment to allow for elevation of legs above the heart.  Zero gravity type heavy duty sleep chair advised. [facility] ([id number])   order for DME Equipment being ordered: Needs to have a sleep chair for home use.   order for DME Compression garments toe to knee.  Specific brand is Compraflex Light Compression garment. Diagnosis codes for venous stasis ulcer I83.029 and for  Lymphoedema is I89.0 Goal is for compression of about 30 to 40 mm of mercury measurements are right ankle 28.5 cm and right calf is 45.5 cm.  Left ankle is 27.5 cm and the calf is 45 cm Tall for length.   order for DME Equipment being ordered: compression stockings below knee and above knee if available.  Medium  "compression.   rosuvastatin (CRESTOR) 10 MG tablet TAKE 1 TABLET (10 MG) BY MOUTH DAILY   sitagliptin-metFORMIN (JANUMET)  MG tablet Take 1 tablet by mouth 2 times daily (with meals) This is actually Janumet XR   thin (NO BRAND SPECIFIED) lancets Test 2 x daily or as directed.  Brands: per insurance.     No current facility-administered medications on file prior to visit.     Allergies   Allergen Reactions     No Known Allergies        Social History     Occupational History     Not on file   Tobacco Use     Smoking status: Former Smoker     Types: Cigars     Smokeless tobacco: Never Used     Tobacco comment: exposure to second hand smoke   Substance and Sexual Activity     Alcohol use: No     Drug use: No     Sexual activity: Never     Partners: Female       No family history on file.    REVIEW OF SYSTEMS  General: negative for, night sweats, dizziness, fatigue  Resp: No shortness of breath and no cough  CV: negative for chest pain, syncope or near-syncope  GI: negative for nausea, vomiting and diarrhea  : negative for dysuria and hematuria  Musculoskeletal: as above  Neurologic: negative for syncope   Hematologic: negative for bleeding disorder    Physical Exam:  Vitals: /52   Temp 97  F (36.1  C) (Temporal)   Ht 1.778 m (5' 10\")   Wt 127.5 kg (281 lb)   BMI 40.32 kg/m    BMI= Body mass index is 40.32 kg/m .  Constitutional: healthy, alert and no acute distress   Psychiatric: mentation appears normal and affect normal/bright  NEURO: no focal deficits  SKIN: .well healed, no erythema, no incision breakdown and no drainage.  JOINT/EXTREMITIES: Active motion 5-105 degrees.  No instability through the arc of motion.  Passively able to get i him to full extension.  No areas of tenderness.  GAIT: not tested     Diagnostic Modalities:  Previous imaging reviewed.  Independent visualization of the images was performed.      Impression:   Chief Complaint   Patient presents with     RECHECK     right knee " follow up     Surgical Followup     dos: 7/30/2019~Right total knee arthroplasty~6 weeks postop   Doing well.  We discussed ways to work on terminal extension.    Plan:   Continue physical therapy.  Continue working on range of motion and strengthening.  He would like to proceed with a left knee replacement future.  Recommend he return back in 4 weeks and we will discuss.  I am hoping he has adequately recovered by that point.  Return to clinic 4, week(s), or sooner as needed for changes.    Re-x-ray on return: Yes.  Bilateral knees 3 views    Federico Ibrahim D.O.

## 2019-09-10 ENCOUNTER — TELEPHONE (OUTPATIENT)
Dept: FAMILY MEDICINE | Facility: OTHER | Age: 69
End: 2019-09-10

## 2019-09-10 NOTE — TELEPHONE ENCOUNTER
Reason for Call:  Form, our goal is to have forms completed with 72 hours, however, some forms may require a visit or additional information.    Type of letter, form or note:  medical    Who is the form from?: [facility] (if other please explain)    Where did the form come from: form was faxed in    What clinic location was the form placed at?: [facility] - [phone]    Where the form was placed: [facility]Folder    What number is listed as a contact on the form?: ph: [phone] fx:[phone]       Additional comments: please complete and fax back.     Call taken on 9/10/2019 at 10:24 AM by Cathi Owen

## 2019-09-11 ENCOUNTER — TELEPHONE (OUTPATIENT)
Dept: ORTHOPEDICS | Facility: CLINIC | Age: 69
End: 2019-09-11

## 2019-09-18 DIAGNOSIS — E78.5 HYPERLIPIDEMIA LDL GOAL <100: ICD-10-CM

## 2019-09-18 DIAGNOSIS — E11.59 TYPE 2 DIABETES MELLITUS WITH OTHER CIRCULATORY COMPLICATION, WITHOUT LONG-TERM CURRENT USE OF INSULIN (H): ICD-10-CM

## 2019-09-19 ENCOUNTER — MEDICAL CORRESPONDENCE (OUTPATIENT)
Dept: HEALTH INFORMATION MANAGEMENT | Facility: CLINIC | Age: 69
End: 2019-09-19

## 2019-09-19 RX ORDER — SITAGLIPTIN AND METFORMIN HYDROCHLORIDE 1000; 50 MG/1; MG/1
TABLET, FILM COATED, EXTENDED RELEASE ORAL
Qty: 180 TABLET | Refills: 0 | Status: ON HOLD | OUTPATIENT
Start: 2019-09-19 | End: 2019-12-19

## 2019-09-19 RX ORDER — ROSUVASTATIN CALCIUM 10 MG/1
10 TABLET, COATED ORAL DAILY
Qty: 90 TABLET | Refills: 0 | Status: SHIPPED | OUTPATIENT
Start: 2019-09-19 | End: 2020-01-23

## 2019-09-19 NOTE — TELEPHONE ENCOUNTER
Medication is being filled for 1 time refill only due to:  Patient needs to be seen because needs follow up..   Next 5 appointments (look out 90 days)    Sep 26, 2019  1:50 PM CDT  Return Visit with Marc Ibrahim DO  [facility] [address] [address]  [phone]   Oct 17, 2019  7:20 AM CDT  Office [facility] with Julio Cesar Esteban [facility] [address] [place] [id number] [phone]        Yonny Meyers, RN, BSN

## 2019-09-23 ENCOUNTER — TELEPHONE (OUTPATIENT)
Dept: FAMILY MEDICINE | Facility: OTHER | Age: 69
End: 2019-09-23

## 2019-09-23 NOTE — TELEPHONE ENCOUNTER
Reason for Call:  Form, our goal is to have forms completed with 72 hours, however, some forms may require a visit or additional information.    Type of letter, form or note:  medical    Who is the form from?: [facility] (if other please explain)    Where did the form come from: form was faxed in    What clinic location was the form placed at?: [facility] - [phone]    Where the form was placed: Dr. Copeland Box/Folder    What number is listed as a contact on the form?: [phone]       Additional comments: FAX Back with SIGNED OV notes.     Call taken on 9/23/2019 at 1:25 PM by Cathi Owen

## 2019-09-23 NOTE — TELEPHONE ENCOUNTER
Called patient. He was wondering if there is anyway this could be done on 09/26 when is heading this way into town.   I was not sure if you wanted 15 or 30 mins Dr. Copeland. Please let me know, you do have some 15 spots.     Ravi Owen,

## 2019-09-23 NOTE — TELEPHONE ENCOUNTER
Left message for patient to return call to clinic. When call is returned please inform patient Dr. Copeland is unable to see him on 09/26. Please assist in scheduling him for another day.   I did look into why he needed to see Dr. Copeland for this form and it looks like [facility] is requiring signed office visit notes from a MD.     Ravi Owen,

## 2019-09-24 ENCOUNTER — MEDICAL CORRESPONDENCE (OUTPATIENT)
Dept: HEALTH INFORMATION MANAGEMENT | Facility: CLINIC | Age: 69
End: 2019-09-24

## 2019-09-25 ENCOUNTER — TELEPHONE (OUTPATIENT)
Dept: ORTHOPEDICS | Facility: CLINIC | Age: 69
End: 2019-09-25

## 2019-09-25 NOTE — TELEPHONE ENCOUNTER
[employer] forms completed, signed and faxed back to number provided. Copy sent to scanning , copy placed in ortho drawer......................Hui Ashraf CMA  ([facility])

## 2019-09-26 ENCOUNTER — OFFICE VISIT (OUTPATIENT)
Dept: ORTHOPEDICS | Facility: CLINIC | Age: 69
End: 2019-09-26
Payer: COMMERCIAL

## 2019-09-26 ENCOUNTER — ANCILLARY PROCEDURE (OUTPATIENT)
Dept: GENERAL RADIOLOGY | Facility: CLINIC | Age: 69
End: 2019-09-26
Attending: ORTHOPAEDIC SURGERY
Payer: COMMERCIAL

## 2019-09-26 VITALS
TEMPERATURE: 98.1 F | HEIGHT: 70 IN | SYSTOLIC BLOOD PRESSURE: 124 MMHG | WEIGHT: 272 LBS | BODY MASS INDEX: 38.94 KG/M2 | DIASTOLIC BLOOD PRESSURE: 60 MMHG

## 2019-09-26 DIAGNOSIS — M17.12 PRIMARY OSTEOARTHRITIS OF LEFT KNEE: ICD-10-CM

## 2019-09-26 DIAGNOSIS — Z96.651 S/P TOTAL KNEE REPLACEMENT USING CEMENT, RIGHT: ICD-10-CM

## 2019-09-26 DIAGNOSIS — Z96.651 S/P TOTAL KNEE REPLACEMENT USING CEMENT, RIGHT: Primary | ICD-10-CM

## 2019-09-26 PROCEDURE — 99213 OFFICE O/P EST LOW 20 MIN: CPT | Mod: 24 | Performed by: ORTHOPAEDIC SURGERY

## 2019-09-26 PROCEDURE — 73562 X-RAY EXAM OF KNEE 3: CPT | Mod: TC

## 2019-09-26 PROCEDURE — 99024 POSTOP FOLLOW-UP VISIT: CPT | Performed by: ORTHOPAEDIC SURGERY

## 2019-09-26 ASSESSMENT — PAIN SCALES - GENERAL: PAINLEVEL: NO PAIN (0)

## 2019-09-26 ASSESSMENT — MIFFLIN-ST. JEOR: SCORE: 1995.03

## 2019-09-26 NOTE — LETTER
9/26/2019         RE: Glenroy Padilla  [address] [address]        Dear Colleague,    Thank you for referring your patient, Glenroy Padilla, to the [facility]. Please see a copy of my visit note below.    Orthopedic Clinic Post-Operative Note    CHIEF COMPLAINT:   Chief Complaint   Patient presents with     RECHECK     knee pain     Surgical Followup     dos: 7/30/2019~Right total knee arthroplasty~       HISTORY OF PRESENT ILLNESS  Returns for post-op on right knee. States the knee is doing great. Has been increasing activity, pain is resolved and much better than pre-op. No incision concerns, continues with home exercise program, very happy with progress.   He states that now his left knee is limiting his activity and getting more and more painful. Would like to discuss left knee replacement. no current infection or open wound concerns. States continue to loose weight.   Has attempted on the left knee: Steroid injections (no lasting relief), Synvisc injections, physical therapy, activity modification, rest, time, weight loss. Continues to use wheelchair due to left knee pain and instability. Is able to walk less than 1 block with the left knee due to the pain. Tolerating more activity and walking with the right knee but left is now limiting.       Patient's past medical, surgical, social and family histories reviewed.     Past Medical History:   Diagnosis Date     Acute pain of left knee 5/22/2017     Chronic pain of right knee 5/22/2017     Hx of coronary artery disease 5/22/2017     Hx of heart artery stent 5/22/2017     Mixed incontinence 8/13/2018     Obesity, morbid, BMI 40.0-49.9 (H) 11/20/2017     Open wound of left lower extremity without complication, initial encounter 5/22/2017     DAYTON (obstructive sleep apnea) 5/22/2017     Type 2 diabetes mellitus with other circulatory complication, without long-term current use of insulin (H) 5/22/2017     Venous stasis  ulcer of left lower extremity (H) 5/22/2017     Venous stasis ulcer of left lower extremity (H) 5/22/2017       Past Surgical History:   Procedure Laterality Date     ARTHROPLASTY KNEE Right 7/30/2019    Procedure: Right total knee replacement;  Surgeon: Marc Ibrahim DO;  Location:  OR     CARDIAC SURGERY      stent placement     CHOLECYSTECTOMY       LAMINECTOMY LUMBAR THREE+ LEVELS N/A 3/13/2018    Procedure: LAMINECTOMY LUMBAR THREE+ LEVELS;  T5-9 LAMINECTOMIES, RESECTION OF EPIDURAL LIPOMATOSIS;  Surgeon: Hugh Mendieta MD;  Location:  OR       Medications:  acetaminophen (TYLENOL) 325 MG tablet, Take 3 tablets (975 mg) by mouth every 8 hours  blood glucose (NO BRAND SPECIFIED) lancing device, Use to test blood sugars 2 times daily or as directed.  blood glucose calibration (NO BRAND SPECIFIED) solution, To accompany: Blood Glucose Monitor Brands: per insurance.  blood glucose monitoring (NO BRAND SPECIFIED) meter device kit, test blood sugar 2 xdaily or as directed.  Blood Glucose Monitor Brands: per insurance.  blood glucose monitoring (NO BRAND SPECIFIED) test strip, Use to test blood sugar 2 times daily Blood Glucose Monitor Brands: Glucocard Expression  blood glucose monitoring (NO BRAND SPECIFIED) test strip, Use to test blood sugar 2x  daily or as directed. To accompany: Blood Glucose Monitor Brands: per insurance.  Blood Glucose Monitoring Suppl (GLUCOCARD EXPRESSION MONITOR) W/DEVICE KIT, USE TWICE DAILY TO TEST BLOOD GLUCOSE  clopidogrel (PLAVIX) 75 MG tablet, Take 1 tablet (75 mg) by mouth daily  furosemide (LASIX) 40 MG tablet, Take 1 tablet (40 mg) by mouth daily Do not restart until seen by primary care doctor and given the okay.  GNP IRON 200 (65 Fe) MG TABS, TAKE 1 TABLET (325 MG) BY MOUTH DAILY (WITH BREAKFAST)  JANUMET XR  MG TB24, TAKE 2 TABLETS BY MOUTH DAILY (WITH BREAKFAST)  metoprolol tartrate (LOPRESSOR) 25 MG tablet, Take 1 tablet (25 mg) by mouth 2 times  daily  Nutritional Supplements (ENSURE ACTIVE HIGH PROTEIN) LIQD, Take 1 Can by mouth daily  order for DME, Equipment being ordered: Wheelchair of appropriate style and size  order for DME, Equipment being ordered: VASO PRESS-DVT PROPHYLAXIS SYSTEM                                             APPLY AT HS TO BILATERAL LOWER EXREMITIES  order for DME, Equipment being ordered: Replacement battery or batteries for an [employer] M 51.  order for DME, Equipment being ordered: electric chair for sleeping and adjustment to allow for elevation of legs above the heart.  Zero gravity type heavy duty sleep chair advised. [employer] ([id number])  order for DME, Equipment being ordered: Needs to have a sleep chair for home use.  order for DME, Compression garments toe to knee.  Specific brand is Compraflex Light Compression garment.   Diagnosis codes for venous stasis ulcer I83.029 and for  Lymphoedema is I89.0   Goal is for compression of about 30 to 40 mm of mercury   measurements are right ankle 28.5 cm and right calf is 45.5 cm.  Left ankle is 27.5 cm and the calf is 45 cm   Tall for length.  order for DME, Equipment being ordered: compression stockings below knee and above knee if available.  Medium compression.  rosuvastatin (CRESTOR) 10 MG tablet, TAKE 1 TABLET (10 MG) BY MOUTH DAILY  sitagliptin-metFORMIN (JANUMET)  MG tablet, Take 1 tablet by mouth 2 times daily (with meals) This is actually Janumet XR  thin (NO BRAND SPECIFIED) lancets, Test 2 x daily or as directed.  Brands: per insurance.    No current facility-administered medications on file prior to visit.       Allergies   Allergen Reactions     No Known Allergies        Social History     Occupational History     Not on file   Tobacco Use     Smoking status: Former Smoker     Types: Cigars     Smokeless tobacco: Never Used     Tobacco comment: exposure to second hand smoke   Substance and Sexual Activity     Alcohol use: No     Drug use: No      "Sexual activity: Never     Partners: Female       No family history on file.    REVIEW OF SYSTEMS  General: negative for, night sweats, dizziness, fatigue  Resp: No shortness of breath and no cough  CV: negative for chest pain, syncope or near-syncope  GI: negative for nausea, vomiting and diarrhea  : negative for dysuria and hematuria  Musculoskeletal: as above  Neurologic: negative for syncope   Hematologic: negative for bleeding disorder    Physical Exam:  Vitals: /60   Temp 98.1  F (36.7  C) (Temporal)   Ht 1.778 m (5' 10\")   Wt 123.4 kg (272 lb)   BMI 39.03 kg/m     BMI= Body mass index is 39.03 kg/m .  Constitutional: healthy, alert and no acute distress   Psychiatric: mentation appears normal and affect normal/bright  NEURO: no focal deficits  RESP: Normal with easy respirations and no use of accessory muscles to breathe, no audible wheezing or retractions  CV: Significant bilateral lower extremity edema, mid calf to ankle.  SKIN: Right knee: well healed anterior knee incision without evidence of infection.   Left knee:   No other areas of skin ulceration on lower extremities visualized.  Chronic venous stasis changes bilateral lower extremities.  JOINT/EXTREMITIES:  Right knee: AROM 3-95. Very mild swelling. No pain with ROM. No instability. No focal or diffuse areas of tenderness.  Extensor intact. Calf at baseline edema and venous stasis changes.   Left knee Varus alignment. Mild effusion. Tender along medial joint line. 7-95 AROM. normal Valgus stress test, normal Varus, negative drawer testing.  GAIT: In wheelchair      Diagnostic Modalities:  right knee X-ray: The prosthesis has acceptable alignment. No fractures or dislocations. Prosthesis is well seated with no evidence of loosening    Left knee: moderate medial and patellofemoral compartment joint space loss. Osteophyte formation to the PF compartment. Varus alignment.  Small medial and lateral compartment osteophytes.    Independent " visualization of the images was performed.      Impression:   Chief Complaint   Patient presents with     RECHECK     knee pain     Surgical Followup     dos: 7/30/2019~Right total knee arthroplasty~   Right knee recovering very well  Left knee primary osteaorthritis    Plan:   Right knee:  Activity: continue to increase activity as tolerated. Continue with home exercises.  Making improving and pain free.  Feels that his right knee is now the strong one and ready for left knee replacement.     Mr. Padilla has attempted conservative treatments that include Steroid injections (no lasting relief), Synvisc injections, physical therapy, activity modification, rest, time, weight loss.  yet still continues to have significant issues. These issues include progressive pain, progressive inability to ambulate due to the pain, use of wheelchair, pain at night. Pain at rest. . Secondary to these reasons I have offered surgery in the form of left total knee replacement     Risks, benefits, complications, alternatives, limitations, and post operative course were discussed. Risks including infection (requiring long-term antibiotics and repeat surgeries), implant loosening (requiring revision surgery), heart attacks, strokes, bleeding (possibly requiring blood transfusion), scars, instability, numbness around the knee, stiffness (requiring manipulations or repeat surgeries), instability and dislocations, fractures, blood clots the legs and lungs, nerve injury (possibly leading to foot drop).  Postoperative course was discussed as far as limitations and expected recovery times. It was also discussed that after surgery there is a need for blood thinners to prevent blood clots, but even when being treated it is possible to develop a blood clot. Anticipate being hospitalized 1-3 days and subsequently either discharged home or to a rehabilitation facility. Determinations of this will be based on progress with physical therapy while in the  hospital. The importance of post-operative physical therapy was discussed. If discharge home from the hospital expect [facility] home physical therapy for about 2 weeks and then transition to going to a physical therapy location for more aggressive therapy. Without physical therapy, outcomes may not be optimal. All questions were answered. The patient agrees to proceed with surgery.  Past medical and surgical history was reviewed. It is positive for DM, PVD. Mr. Padilla will need a pre-operative medical evaluation and clearance by a primary care provider. We will obtain a CBC, UA, nasal swab for MRSA as well as the appropriate radiographs prior to surgery. I will leave it to the discretion of the primary care provider for additional pre-operative testing.     After discussing with patient returning to his assistive living did not go well and had multiple issues with this due to lack of 24/7 support and accessibility issues with bathrooms and bedroom.  He will likely require TCU placement following surgery.        Return to clinic 1 week prior to surgery without xrays, 14 days post-op with xrays, or sooner as needed for changes.  Re-x-ray on return: as above     Scribed by:  ELFEGO Long, CNP  8:46 AM  6/26/2019    I attest I have seen and evaluated the patient.  I agree with above impression and plan.     Federico Ibrahim D.O.          Again, thank you for allowing me to participate in the care of your patient.        Sincerely,        Marc Ibrahim, DO

## 2019-09-26 NOTE — PROGRESS NOTES
Orthopedic Clinic Post-Operative Note    CHIEF COMPLAINT:   Chief Complaint   Patient presents with     RECHECK     knee pain     Surgical Followup     dos: 7/30/2019~Right total knee arthroplasty~       HISTORY OF PRESENT ILLNESS  Returns for post-op on right knee. States the knee is doing great. Has been increasing activity, pain is resolved and much better than pre-op. No incision concerns, continues with home exercise program, very happy with progress.   He states that now his left knee is limiting his activity and getting more and more painful. Would like to discuss left knee replacement. no current infection or open wound concerns. States continue to loose weight.   Has attempted on the left knee: Steroid injections (no lasting relief), Synvisc injections, physical therapy, activity modification, rest, time, weight loss. Continues to use wheelchair due to left knee pain and instability. Is able to walk less than 1 block with the left knee due to the pain. Tolerating more activity and walking with the right knee but left is now limiting.       Patient's past medical, surgical, social and family histories reviewed.     Past Medical History:   Diagnosis Date     Acute pain of left knee 5/22/2017     Chronic pain of right knee 5/22/2017     Hx of coronary artery disease 5/22/2017     Hx of heart artery stent 5/22/2017     Mixed incontinence 8/13/2018     Obesity, morbid, BMI 40.0-49.9 (H) 11/20/2017     Open wound of left lower extremity without complication, initial encounter 5/22/2017     [place] (obstructive sleep apnea) 5/22/2017     Type 2 diabetes mellitus with other circulatory complication, without long-term current use of insulin (H) 5/22/2017     Venous stasis ulcer of left lower extremity (H) 5/22/2017     Venous stasis ulcer of left lower extremity (H) 5/22/2017       Past Surgical History:   Procedure Laterality Date     ARTHROPLASTY KNEE Right 7/30/2019    Procedure: Right total knee replacement;   Surgeon: Marc Ibrahim DO;  Location:  OR     CARDIAC SURGERY      stent placement     CHOLECYSTECTOMY       LAMINECTOMY LUMBAR THREE+ LEVELS N/A 3/13/2018    Procedure: LAMINECTOMY LUMBAR THREE+ LEVELS;  T5-9 LAMINECTOMIES, RESECTION OF EPIDURAL LIPOMATOSIS;  Surgeon: Hugh Mendieta MD;  Location:  OR       Medications:  acetaminophen (TYLENOL) 325 MG tablet, Take 3 tablets (975 mg) by mouth every 8 hours  blood glucose (NO BRAND SPECIFIED) lancing device, Use to test blood sugars 2 times daily or as directed.  blood glucose calibration (NO BRAND SPECIFIED) solution, To accompany: Blood Glucose Monitor Brands: per insurance.  blood glucose monitoring (NO BRAND SPECIFIED) meter device kit, test blood sugar 2 xdaily or as directed.  Blood Glucose Monitor Brands: per insurance.  blood glucose monitoring (NO BRAND SPECIFIED) test strip, Use to test blood sugar 2 times daily Blood Glucose Monitor Brands: Glucocard Expression  blood glucose monitoring (NO BRAND SPECIFIED) test strip, Use to test blood sugar 2x  daily or as directed. To accompany: Blood Glucose Monitor Brands: per insurance.  Blood Glucose Monitoring Suppl (GLUCOCARD EXPRESSION MONITOR) W/DEVICE KIT, USE TWICE DAILY TO TEST BLOOD GLUCOSE  clopidogrel (PLAVIX) 75 MG tablet, Take 1 tablet (75 mg) by mouth daily  furosemide (LASIX) 40 MG tablet, Take 1 tablet (40 mg) by mouth daily Do not restart until seen by primary care doctor and given the okay.  GNP IRON 200 (65 Fe) MG TABS, TAKE 1 TABLET (325 MG) BY MOUTH DAILY (WITH BREAKFAST)  JANUMET XR  MG TB24, TAKE 2 TABLETS BY MOUTH DAILY (WITH BREAKFAST)  metoprolol tartrate (LOPRESSOR) 25 MG tablet, Take 1 tablet (25 mg) by mouth 2 times daily  Nutritional Supplements (ENSURE ACTIVE HIGH PROTEIN) LIQD, Take 1 Can by mouth daily  order for DME, Equipment being ordered: Wheelchair of appropriate style and size  order for DME, Equipment being ordered: VASO PRESS-DVT PROPHYLAXIS SYSTEM                                              APPLY AT HS TO BILATERAL LOWER EXREMITIES  order for DME, Equipment being ordered: Replacement battery or batteries for an [employer] Pronto M 51.  order for DME, Equipment being ordered: electric chair for sleeping and adjustment to allow for elevation of legs above the heart.  Zero gravity type heavy duty sleep chair advised. MaxiComforter ([id number])  order for DME, Equipment being ordered: Needs to have a sleep chair for home use.  order for DME, Compression garments toe to knee.  Specific brand is Compraflex Light Compression garment.   Diagnosis codes for venous stasis ulcer I83.029 and for  Lymphoedema is I89.0   Goal is for compression of about 30 to 40 mm of mercury   measurements are right ankle 28.5 cm and right calf is 45.5 cm.  Left ankle is 27.5 cm and the calf is 45 cm   Tall for length.  order for DME, Equipment being ordered: compression stockings below knee and above knee if available.  Medium compression.  rosuvastatin (CRESTOR) 10 MG tablet, TAKE 1 TABLET (10 MG) BY MOUTH DAILY  sitagliptin-metFORMIN (JANUMET)  MG tablet, Take 1 tablet by mouth 2 times daily (with meals) This is actually Janumet XR  thin (NO BRAND SPECIFIED) lancets, Test 2 x daily or as directed.  Brands: per insurance.    No current facility-administered medications on file prior to visit.       Allergies   Allergen Reactions     No Known Allergies        Social History     Occupational History     Not on file   Tobacco Use     Smoking status: Former Smoker     Types: Cigars     Smokeless tobacco: Never Used     Tobacco comment: exposure to second hand smoke   Substance and Sexual Activity     Alcohol use: No     Drug use: No     Sexual activity: Never     Partners: Female       No family history on file.    REVIEW OF SYSTEMS  General: negative for, night sweats, dizziness, fatigue  Resp: No shortness of breath and no cough  CV: negative for chest pain, syncope or  "near-syncope  GI: negative for nausea, vomiting and diarrhea  : negative for dysuria and hematuria  Musculoskeletal: as above  Neurologic: negative for syncope   Hematologic: negative for bleeding disorder    Physical Exam:  Vitals: /60   Temp 98.1  F (36.7  C) (Temporal)   Ht 1.778 m (5' 10\")   Wt 123.4 kg (272 lb)   BMI 39.03 kg/m    BMI= Body mass index is 39.03 kg/m .  Constitutional: healthy, alert and no acute distress   Psychiatric: mentation appears normal and affect normal/bright  NEURO: no focal deficits  RESP: Normal with easy respirations and no use of accessory muscles to breathe, no audible wheezing or retractions  CV: Significant bilateral lower extremity edema, mid calf to ankle.  SKIN: Right knee: well healed anterior knee incision without evidence of infection.   Left knee:   No other areas of skin ulceration on lower extremities visualized.  Chronic venous stasis changes bilateral lower extremities.  JOINT/EXTREMITIES:  Right knee: AROM 3-95. Very mild swelling. No pain with ROM. No instability. No focal or diffuse areas of tenderness.  Extensor intact. Calf at baseline edema and venous stasis changes.   Left knee Varus alignment. Mild effusion. Tender along medial joint line. 7-95 AROM. normal Valgus stress test, normal Varus, negative drawer testing.  GAIT: In wheelchair      Diagnostic Modalities:  right knee X-ray: The prosthesis has acceptable alignment. No fractures or dislocations. Prosthesis is well seated with no evidence of loosening    Left knee: moderate medial and patellofemoral compartment joint space loss. Osteophyte formation to the PF compartment. Varus alignment.  Small medial and lateral compartment osteophytes.    Independent visualization of the images was performed.      Impression:   Chief Complaint   Patient presents with     RECHECK     knee pain     Surgical Followup     dos: 7/30/2019~Right total knee arthroplasty~   Right knee recovering very well  Left knee " primary osteaorthritis    Plan:   Right knee:  Activity: continue to increase activity as tolerated. Continue with home exercises.  Making improving and pain free.  Feels that his right knee is now the strong one and ready for left knee replacement.     Mr. Padilla has attempted conservative treatments that include Steroid injections (no lasting relief), Synvisc injections, physical therapy, activity modification, rest, time, weight loss.  yet still continues to have significant issues. These issues include progressive pain, progressive inability to ambulate due to the pain, use of wheelchair, pain at night. Pain at rest. . Secondary to these reasons I have offered surgery in the form of left total knee replacement     Risks, benefits, complications, alternatives, limitations, and post operative course were discussed. Risks including infection (requiring long-term antibiotics and repeat surgeries), implant loosening (requiring revision surgery), heart attacks, strokes, bleeding (possibly requiring blood transfusion), scars, instability, numbness around the knee, stiffness (requiring manipulations or repeat surgeries), instability and dislocations, fractures, blood clots the legs and lungs, nerve injury (possibly leading to foot drop).  Postoperative course was discussed as far as limitations and expected recovery times. It was also discussed that after surgery there is a need for blood thinners to prevent blood clots, but even when being treated it is possible to develop a blood clot. Anticipate being hospitalized 1-3 days and subsequently either discharged home or to a rehabilitation facility. Determinations of this will be based on progress with physical therapy while in the hospital. The importance of post-operative physical therapy was discussed. If discharge home from the hospital expect [facility] home physical therapy for about 2 weeks and then transition to going to a physical therapy location for more  aggressive therapy. Without physical therapy, outcomes may not be optimal. All questions were answered. The patient agrees to proceed with surgery.  Past medical and surgical history was reviewed. It is positive for DM, PVD. Mr. Padilla will need a pre-operative medical evaluation and clearance by a primary care provider. We will obtain a CBC, UA, nasal swab for MRSA as well as the appropriate radiographs prior to surgery. I will leave it to the discretion of the primary care provider for additional pre-operative testing.     After discussing with patient returning to his assistive living did not go well and had multiple issues with this due to lack of 24/7 support and accessibility issues with bathrooms and bedroom.  He will likely require TCU placement following surgery.        Return to clinic 1 week prior to surgery without xrays, 14 days post-op with xrays, or sooner as needed for changes.  Re-x-ray on return: as above     Scribed by:  ELFEGO Long, CNP  8:46 AM  6/26/2019    I attest I have seen and evaluated the patient.  I agree with above impression and plan.     Federico Ibrahim D.O.

## 2019-09-27 ENCOUNTER — TELEPHONE (OUTPATIENT)
Dept: ORTHOPEDICS | Facility: OTHER | Age: 69
End: 2019-09-27

## 2019-09-27 ENCOUNTER — HOSPITAL ENCOUNTER (INPATIENT)
Facility: CLINIC | Age: 69
Setting detail: SURGERY ADMIT
End: 2019-09-27
Attending: ORTHOPAEDIC SURGERY | Admitting: ORTHOPAEDIC SURGERY
Payer: COMMERCIAL

## 2019-09-27 DIAGNOSIS — M17.12 PRIMARY OSTEOARTHRITIS OF LEFT KNEE: ICD-10-CM

## 2019-09-27 DIAGNOSIS — Z01.818 PREOP GENERAL PHYSICAL EXAM: Primary | ICD-10-CM

## 2019-09-27 NOTE — PROGRESS NOTES
Subjective     Glenroy Padilla is a 69 year old male who presents to clinic today for the following health issues:    HPI   Diabetes Follow-up      How often are you checking your blood sugar? A few times a week    What time of day are you checking your blood sugars (select all that apply)?  Before and after meals    Have you had any blood sugars above 200?  No    Have you had any blood sugars below 70?  No    What symptoms do you notice when your blood sugar is low?  None    What concerns do you have today about your diabetes? None     Do you have any of these symptoms? (Select all that apply)  No numbness or tingling in feet.  No redness, sores or blisters on feet.  No complaints of excessive thirst.  No reports of blurry vision.  No significant changes to weight.     Have you had a diabetic eye exam in the last 12 months? Yes- Date of last eye exam: within 2 months    Pt here for diabetic foot shoe evaluation.    He is working on walking about 1500 steps/day.      He had his right knee replaced in July.  Has rehabbed well from this.  Getting his left knee replaced soon.      He also has had improvement in his foot pain after his back surgery.    BP Readings from Last 2 Encounters:   10/02/19 122/56   09/26/19 124/60     Hemoglobin A1C (%)   Date Value   07/15/2019 5.6   02/21/2019 6.1 (H)     LDL Cholesterol Calculated (mg/dL)   Date Value   02/21/2019 61   09/25/2017 57     LDL Cholesterol Direct (mg/dL)   Date Value   07/15/2019 73   03/09/2018 76       Diabetes Management Resources  Hyperlipidemia Follow-Up      Are you having any of the following symptoms? (Select all that apply)  Shortness of breath and Increased sweating or nausea with activity    Are you regularly taking any medication or supplement to lower your cholesterol?   Yes- rosuvastain    Are you having muscle aches or other side effects that you think could be caused by your cholesterol lowering medication?  No      Hypertension Follow-up      Do  you check your blood pressure regularly outside of the clinic? Yes     Are you following a low salt diet? Yes    Are your blood pressures ever more than 140 on the top number (systolic) OR more   than 90 on the bottom number (diastolic), for example 140/90? No      How many servings of fruits and vegetables do you eat daily?  0-1    On average, how many sweetened beverages do you drink each day (soda, juice, sweet tea, etc)?   1    How many days per week do you miss taking your medication? 0    Wonders if he needs 3 BP medications still.      Current Outpatient Medications   Medication Sig Dispense Refill     acetaminophen (TYLENOL) 325 MG tablet Take 3 tablets (975 mg) by mouth every 8 hours 100 tablet 0     blood glucose (NO BRAND SPECIFIED) lancing device Use to test blood sugars 2 times daily or as directed. 1 each 0     blood glucose calibration (NO BRAND SPECIFIED) solution To accompany: Blood Glucose Monitor Brands: per insurance. 1 Bottle 3     blood glucose monitoring (NO BRAND SPECIFIED) meter device kit test blood sugar 2 xdaily or as directed.  Blood Glucose Monitor Brands: per insurance. 1 kit 0     blood glucose monitoring (NO BRAND SPECIFIED) test strip Use to test blood sugar 2 times daily Blood Glucose Monitor Brands: Glucocard Expression 100 strip 6     blood glucose monitoring (NO BRAND SPECIFIED) test strip Use to test blood sugar 2x  daily or as directed. To accompany: Blood Glucose Monitor Brands: per insurance. 100 strip 1     Blood Glucose Monitoring Suppl (GLUCOCARD EXPRESSION MONITOR) W/DEVICE KIT USE TWICE DAILY TO TEST BLOOD GLUCOSE  0     clopidogrel (PLAVIX) 75 MG tablet Take 1 tablet (75 mg) by mouth daily 90 tablet 1     furosemide (LASIX) 40 MG tablet Take 1 tablet (40 mg) by mouth daily Do not restart until seen by primary care doctor and given the okay. 90 tablet 2     GNP IRON 200 (65 Fe) MG TABS TAKE 1 TABLET (325 MG) BY MOUTH DAILY (WITH BREAKFAST) 90 tablet 1     JANUMET XR   MG TB24 TAKE 2 TABLETS BY MOUTH DAILY (WITH BREAKFAST) 180 tablet 0     losartan (COZAAR) 25 MG tablet   11     metoprolol tartrate (LOPRESSOR) 25 MG tablet Take 1 tablet (25 mg) by mouth 2 times daily 180 tablet 3     Nutritional Supplements (ENSURE ACTIVE HIGH PROTEIN) LIQD Take 1 Can by mouth daily 30 each 1     order for DME Equipment being ordered: Wheelchair of appropriate style and size 1 Device 0     order for DME Equipment being ordered: VASO PRESS-DVT PROPHYLAXIS SYSTEM                                             APPLY AT HS TO BILATERAL LOWER EXREMITIES 1 Units 0     order for DME Equipment being ordered: Replacement battery or batteries for an [employer] Pronto M 51. 1 Device 0     order for DME Equipment being ordered: electric chair for sleeping and adjustment to allow for elevation of legs above the heart.  Zero gravity type heavy duty sleep chair advised. MaxiComforter ([id number]) 1 Units 0     order for DME Equipment being ordered: Needs to have a sleep chair for home use. 1 Device 0     order for DME Compression garments toe to knee.  Specific brand is Compraflex Light Compression garment.   Diagnosis codes for venous stasis ulcer I83.029 and for  Lymphoedema is I89.0   Goal is for compression of about 30 to 40 mm of mercury   measurements are right ankle 28.5 cm and right calf is 45.5 cm.  Left ankle is 27.5 cm and the calf is 45 cm   Tall for length. 2 Device 1     order for DME Equipment being ordered: compression stockings below knee and above knee if available.  Medium compression. 1 Units 1     rosuvastatin (CRESTOR) 10 MG tablet TAKE 1 TABLET (10 MG) BY MOUTH DAILY 90 tablet 0     thin (NO BRAND SPECIFIED) lancets Test 2 x daily or as directed.  Brands: per insurance. 100 each 1     sitagliptin-metFORMIN (JANUMET)  MG tablet Take 1 tablet by mouth 2 times daily (with meals) This is actually Janumet XR       Allergies   Allergen Reactions     No Known Allergies      Recent Labs    Lab Test 08/29/19  1143 08/07/19  0826 07/15/19  0852 02/21/19  0929  08/23/18  1049  03/09/18  1020  09/25/17  1216 05/22/17  1029   A1C  --   --  5.6 6.1*  --  5.7*   < >  --    < > 6.2* 6.4*   LDL  --   --  73 61  --   --   --  76  --  57 64   HDL  --   --   --  40  --   --   --   --   --  43 42   TRIG  --   --   --  166*  --   --   --   --   --  140 118   ALT 16  --  15 18  --  13   < > 19  --  19 20   CR 1.13 0.99 1.23 1.23  --  1.13   < > 0.91   < > 0.92 0.91   GFRESTIMATED 65 76 59* 59*   < > 64   < > 82   < > 81 82   GFRESTBLACK 75 88 68 68   < > 78   < > >90   < > >90 >90  African American GFR Calc     POTASSIUM 4.0 4.1 4.1 4.2  --  4.0   < > 4.6   < > 4.1 4.0   TSH  --   --   --   --   --   --   --   --   --  2.07  --     < > = values in this interval not displayed.      BP Readings from Last 3 Encounters:   10/02/19 122/56   09/26/19 124/60   09/05/19 130/70    Wt Readings from Last 3 Encounters:   10/02/19 131.6 kg (290 lb 1.6 oz)   09/26/19 123.4 kg (272 lb)   09/05/19 128.9 kg (284 lb 1.6 oz)                  Reviewed and updated as needed this visit by Provider         Review of Systems   ROS COMP: Constitutional, HEENT, cardiovascular, pulmonary, gi and gu systems are negative, except as otherwise noted.  Worries about getting a kidney stone, getting some flank pain.      Objective    /56   Pulse 80   Temp 97  F (36.1  C) (Temporal)   Resp 16   Wt 131.6 kg (290 lb 1.6 oz)   BMI 41.63 kg/m    Body mass index is 41.63 kg/m .  Physical Exam   GENERAL: healthy, alert and no distress  NECK: no adenopathy, no asymmetry, masses, or scars and thyroid normal to palpation  RESP: lungs clear to auscultation - no rales, rhonchi or wheezes  CV: regular rate and rhythm, normal S1 S2, no S3 or S4, no murmur, click or rub, no peripheral edema and peripheral pulses strong  ABDOMEN: soft, nontender, no hepatosplenomegaly, no masses and bowel sounds normal  MS: no gross musculoskeletal defects noted, 2+  pitting edema up to knees  Diabetic foot exam: normal DP and PT pulses, normal sensory exam, normal monofilament exam and some callus formation on feet    Diagnostic Test Results:  Labs reviewed in Epic  Results for orders placed or performed in visit on 09/26/19   XR Knee Standing Bilateral 3 Views    Narrative    XR KNEE STANDING BILATERAL THREE VIEWS  9/26/2019 1:45 PM     HISTORY: Status post total knee replacement using cement, right.  Primary osteoarthritis of left knee.    COMPARISON: Right knee x-ray from 8/8/2019. Left knee x-ray from  4/16/2018.      Impression    IMPRESSION: No change in appearance of right total knee arthroplasty.  No evidence of fracture or loosening.    Moderate to advanced medial compartment joint space narrowing left  knee with moderate patellofemoral degenerative changes. Lateral  compartment joint space is well-preserved with some mild marginal  osteophyte formation. No significant change.    NORA WHITE MD           Assessment & Plan       ICD-10-CM    1. Type 2 diabetes mellitus with other circulatory complication, without long-term current use of insulin (H) E11.59    2. Hypertension, goal below 140/90 I10    3. Lymphedema I89.0    4. Special screening for malignant neoplasms, colon Z12.11 Fecal colorectal cancer screen (FIT)   5. Need for vaccination Z23 PPSV23, IM/SUBQ (2+ YRS) - [id number]   6. Need for prophylactic vaccination and inoculation against influenza Z23 INFLUENZA (HIGH DOSE) 3 VALENT VACCINE [17121]     Vaccine Administration, Initial [98228]     1.  Excellent control.  Does have some mild callus formation and decreased circulation, largely related to his chronic lymphedema.  Agree with rationale for diabetic shoes to help prevent injuries as patient is getting more active with his weight loss.  Paperwork filled out for this today.  2.  Currently controlled.  We discussed possible reduction in 1 of his medications to help with this, but patient does not want to  "make any changes if he can just stop the medication.  Currently asymptomatic from his medications.  3.  Reportedly stable to slightly improved.  Patient is happy with his current treatment wraps.  We will continue these.  Follow-up as needed.  4.  FIT screening ordered.  5, 6.  Immunized.    Portions of this note were completed using Dragon dictation software.  Although reviewed, there may be typographical and other inadvertent errors that remain.            BMI:   Estimated body mass index is 41.63 kg/m  as calculated from the following:    Height as of 9/26/19: 1.778 m (5' 10\").    Weight as of this encounter: 131.6 kg (290 lb 1.6 oz).   Weight management plan: Discussed healthy diet and exercise guidelines        Patient Instructions   Thank you for visiting [facility]    Let's get your colon screening done.      Get your diabetic shoes.    You'll be due for your preop soon.    Contact us or return if questions or concerns.     Have a nice day!    Dr. Copeland     No follow-ups on file.      If you had imaging scheduled please refer to your radiology prep sheet.      If you need medication refills, please contact your pharmacy 3 days before your prescriptions runs out or download the [facility] criss for your smart phone.   If you are out of refills, your pharmacy will contact contact the clinic.    Contact us or return if questions or concerns.     -Your [facility] Care Team:    MD Reyna Trammell, ÁNGELA Marcelo PA-C Elizabeth \"Khadijah\" ELFEGO Ndiaye CNP, APRN, ELFEGO Carey, LIA Meyers, RN, BSN       General information about your   [facility]      Clinic hours:     Lab hours:  Phone [phone]  Monday 7:30 am-7 pm    Monday 8:30 am-6:30 pm  Tuesday-Friday 7:30 am-5 pm   Tuesday-Friday 8:30 am-4:30 pm    Pharmacy hours:  Phone [phone]  Monday 8:30 am-7pm  Tuesday-Friday 8:30am-6 pm           "                           [facility] assistance [phone]    We would like to hear from you, how was your visit today?    Susanna Tee  Patient Information Supervisor   Patient Care Supervisor  [facility]  [phone]          Return in about 2 weeks (around 10/16/2019) for preop.    Armen Copeland MD, MD  [facility]

## 2019-09-27 NOTE — TELEPHONE ENCOUNTER
Type of surgery: L TKA  Location of surgery: [facility]  Date and time of surgery: 11/26  Surgeon: Patrice  Pre-Op Appt Date: 11/4  Post-Op Appt Date: 12/11   Packet sent out: Yes  Pre-cert/Authorization completed:  Not Applicable  Date: na

## 2019-10-02 ENCOUNTER — OFFICE VISIT (OUTPATIENT)
Dept: FAMILY MEDICINE | Facility: OTHER | Age: 69
End: 2019-10-02
Payer: COMMERCIAL

## 2019-10-02 ENCOUNTER — MEDICAL CORRESPONDENCE (OUTPATIENT)
Dept: HEALTH INFORMATION MANAGEMENT | Facility: CLINIC | Age: 69
End: 2019-10-02

## 2019-10-02 VITALS
BODY MASS INDEX: 41.63 KG/M2 | TEMPERATURE: 97 F | RESPIRATION RATE: 16 BRPM | HEART RATE: 80 BPM | DIASTOLIC BLOOD PRESSURE: 56 MMHG | SYSTOLIC BLOOD PRESSURE: 122 MMHG | WEIGHT: 290.1 LBS

## 2019-10-02 DIAGNOSIS — I10 HYPERTENSION, GOAL BELOW 140/90: ICD-10-CM

## 2019-10-02 DIAGNOSIS — Z23 NEED FOR PROPHYLACTIC VACCINATION AND INOCULATION AGAINST INFLUENZA: ICD-10-CM

## 2019-10-02 DIAGNOSIS — Z23 NEED FOR VACCINATION: ICD-10-CM

## 2019-10-02 DIAGNOSIS — E11.59 TYPE 2 DIABETES MELLITUS WITH OTHER CIRCULATORY COMPLICATION, WITHOUT LONG-TERM CURRENT USE OF INSULIN (H): Primary | ICD-10-CM

## 2019-10-02 DIAGNOSIS — Z12.11 SPECIAL SCREENING FOR MALIGNANT NEOPLASMS, COLON: ICD-10-CM

## 2019-10-02 DIAGNOSIS — I89.0 LYMPHEDEMA: ICD-10-CM

## 2019-10-02 PROCEDURE — G0008 ADMIN INFLUENZA VIRUS VAC: HCPCS | Performed by: FAMILY MEDICINE

## 2019-10-02 PROCEDURE — 90662 IIV NO PRSV INCREASED AG IM: CPT | Performed by: FAMILY MEDICINE

## 2019-10-02 PROCEDURE — 99214 OFFICE O/P EST MOD 30 MIN: CPT | Mod: 25 | Performed by: FAMILY MEDICINE

## 2019-10-02 PROCEDURE — G0009 ADMIN PNEUMOCOCCAL VACCINE: HCPCS | Performed by: FAMILY MEDICINE

## 2019-10-02 PROCEDURE — 90732 PPSV23 VACC 2 YRS+ SUBQ/IM: CPT | Performed by: FAMILY MEDICINE

## 2019-10-02 RX ORDER — LOSARTAN POTASSIUM 25 MG/1
TABLET ORAL
Refills: 11 | Status: ON HOLD | COMMUNITY
Start: 2019-09-18 | End: 2019-12-20

## 2019-10-02 NOTE — NURSING NOTE
Prior to immunization administration, verified patients identity using patient s name and date of birth. Please see Immunization Activity for additional information.     Screening Questionnaire for Adult Immunization    Are you sick today?   No   Do you have allergies to medications, food, a vaccine component or latex?   No   Have you ever had a serious reaction after receiving a vaccination?   No   Do you have a long-term health problem with heart disease, lung disease, asthma, kidney disease, metabolic disease (e.g. diabetes), anemia, or other blood disorder?   Yes   Do you have cancer, leukemia, HIV/AIDS, or any other immune system problem?   No   In the past 3 months, have you taken medications that affect  your immune system, such as prednisone, other steroids, or anticancer drugs; drugs for the treatment of rheumatoid arthritis, Crohn s disease, or psoriasis; or have you had radiation treatments?   No   Have you had a seizure, or a brain or other nervous system problem?   No   During the past year, have you received a transfusion of blood or blood     products, or been given immune (gamma) globulin or antiviral drug?   No   For women: Are you pregnant or is there a chance you could become        pregnant during the next month?   No   Have you received any vaccinations in the past 4 weeks?   No     Immunization questionnaire was positive for at least one answer.  Notified DJ..        Per orders of Dr. Copeland, injection of pneumoccal 23 given by Alecia Ashford CMA. Patient instructed to remain in clinic for 15 minutes afterwards, and to report any adverse reaction to me immediately.       Screening performed by Alecia Ashford CMA on 10/2/2019 at 10:45 AM.

## 2019-10-02 NOTE — PATIENT INSTRUCTIONS
"Thank you for visiting [facility]    Let's get your colon screening done.      Get your diabetic shoes.    You'll be due for your preop soon.    Contact us or return if questions or concerns.     Have a nice day!    Dr. Copeland     No follow-ups on file.      If you had imaging scheduled please refer to your radiology prep sheet.      If you need medication refills, please contact your pharmacy 3 days before your prescriptions runs out or download the [facility] criss for your smart phone.   If you are out of refills, your pharmacy will contact contact the clinic.    Contact us or return if questions or concerns.     -Your [facility] Care Team:    MD Reyna Trammell, ÁNGELA Marcelo PA-C Elizabeth \"Khadijah\" ELFEGO Ndiaye CNP, APRN, LIA Gonzalez APRN, LIA Meyers, RN, BSN       General information about your   [facility]      Clinic hours:     Lab hours:  Phone [phone]  Monday 7:30 am-7 pm    Monday 8:30 am-6:30 pm  Tuesday-Friday 7:30 am-5 pm   Tuesday-Friday 8:30 am-4:30 pm    Pharmacy hours:  Phone [phone]  Monday 8:30 am-7pm  Tuesday-Friday 8:30am-6 pm                                     Mychart assistance [phone]    We would like to hear from you, how was your visit today?    Susanna Tee  Patient Information Supervisor   Patient Care Supervisor  [facility] [employer], and [facility], and [facility]  [phone]      "

## 2019-10-03 ENCOUNTER — TELEPHONE (OUTPATIENT)
Dept: SURGERY | Facility: OTHER | Age: 69
End: 2019-10-03

## 2019-10-03 DIAGNOSIS — R60.0 EDEMA OF BOTH LEGS: Primary | ICD-10-CM

## 2019-10-03 NOTE — TELEPHONE ENCOUNTER
Writer called Carolin and left message requesting she re-fax the paperwork to [phone].     Meme Beth RN on 10/3/2019 at 10:06 AM

## 2019-10-03 NOTE — TELEPHONE ENCOUNTER
Cathi Owen  You 1 hour ago (2:26 PM)     The form we received was from [facility] in regards to an order for diabetic shoes. This was completed at yesterdays visit, if that is what you guys have received Dr. Shine could disregard that form. Ravi Owen,       Routing comment       You  Cathi Owen Yesterday (11:46 AM)     I have forms for this patient as well.. I work with Dr. Shine (general surgery at [facility]). I'm wondering if we have the same forms and If Dr. Copeland will be completing as Dr. Shine doesn't manage patient's diabetes. Let me know either way :)     Meme

## 2019-10-03 NOTE — TELEPHONE ENCOUNTER
I have discarded the forms as Dr. Copeland filled them out yesterday.  Meme Beth RN on 10/3/2019 at 3:55 PM

## 2019-10-03 NOTE — TELEPHONE ENCOUNTER
"Writer called Carolin back who said she was unable to find a company that would fill the VASO PRESS-DVT PROPHYLAXIS SYSTEM including [place]. Do we know where this can be ordered or obtained from?    Otherwise, [facility] could provide her with a substitute. If this is ok, please place new order and fax to Care Coordinator Carolin at [phone]. Also, please specify if the patient should have a \"sequential or non-sequential pump\". Carolin would need the device qualification form filled out (this should be with the paperwork faxed Tuesday).     Meme Beth RN on 10/3/2019 at 9:21 AM    "

## 2019-10-03 NOTE — TELEPHONE ENCOUNTER
Faxed DME RX to Carolin at number provided , copy placed in surgery drawer......................Hui Ashraf CMA  ([facility])

## 2019-10-03 NOTE — TELEPHONE ENCOUNTER
Reason for Call:  Other appointment    Detailed comments: Carolin, care coordinator, calling regarding a fax that she had sent on Tuesday and would like to discuss this. Please call her at [phone]    Phone Number Patient can be reached at: Home number on file      Best Time: any    Can we leave a detailed message on this number? YES    Call taken on 10/3/2019 at 8:08 AM by Meme Isaacs

## 2019-10-07 ENCOUNTER — MEDICAL CORRESPONDENCE (OUTPATIENT)
Dept: HEALTH INFORMATION MANAGEMENT | Facility: CLINIC | Age: 69
End: 2019-10-07

## 2019-10-07 NOTE — TELEPHONE ENCOUNTER
[employer] form for Pneumatic COmpression Device form received. Filled out by Dr. Shine, copy of Dr. Shine's last OV note sent for continuing care, completed form and copy or original RX faxed back to Carolin at number provided on Form, copy sent to scanning and copy placed in drawer..................Hui Ashraf CMA  ([facility])

## 2019-10-14 NOTE — PROGRESS NOTES
Subjective     Glenroy Padilla is a 69 year old male who presents to clinic today for the following health issues:    History of Present Illness        Diabetes:   He presents for follow up of diabetes.  He is checking home blood glucose a few times a week. He checks blood glucose before and after meals.  Blood glucose is never over 200 and never under 70. When his blood glucose is low, the patient is asymptomatic for confusion, blurred vision, lethargy and reports not feeling dizzy, shaky, or weak.  He has no concerns regarding his diabetes at this time.  He is having weight gain. The patient has had a diabetic eye exam in the last 12 months. Eye exam performed on July.    Diabetes Management Resources    He eats 0-1 servings of fruits and vegetables daily.He consumes 0 sweetened beverage(s) daily.  He is taking medications regularly.     Patient Active Problem List   Diagnosis     Type 2 diabetes mellitus with other circulatory complication, without long-term current use of insulin (H)     Venous stasis ulcer of left lower extremity (H)     Acute pain of left knee     Chronic pain of right knee     Open wound of left lower extremity without complication, initial encounter     Hx of coronary artery disease     Hx of heart artery stent     [place] (obstructive sleep apnea)     Hypertension, goal below 140/90     Hyperlipidemia LDL goal <100     History of coronary artery stent placement     Morbid obesity due to excess calories (H)     Neuropathy     Foot drop, right     Cardiomegaly     Gastroesophageal reflux disease without esophagitis     Falls frequently     Weakness of both legs     Central spinal stenosis L3-4 and L4-5     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     Lymphedema     S/P spinal surgery     Complete tear of left rotator cuff     Lymphedema of right lower extremity     Primary osteoarthritis of right knee     Mixed incontinence     Iron deficiency anemia secondary to inadequate dietary iron intake      Ischemic vascular disease     Disease of spinal cord (H)     MSSA (methicillin-susceptible Staphylococcus aureus) colonization     S/P total knee replacement using cement, right     Primary osteoarthritis of left knee     Past Surgical History:   Procedure Laterality Date     ARTHROPLASTY KNEE Right 7/30/2019    Procedure: Right total knee replacement;  Surgeon: Marc Ibrahim DO;  Location:  OR     CARDIAC SURGERY      stent placement     CHOLECYSTECTOMY       LAMINECTOMY LUMBAR THREE+ LEVELS N/A 3/13/2018    Procedure: LAMINECTOMY LUMBAR THREE+ LEVELS;  T5-9 LAMINECTOMIES, RESECTION OF EPIDURAL LIPOMATOSIS;  Surgeon: Hugh Mendieta MD;  Location:  OR       Social History     Tobacco Use     Smoking status: Former Smoker     Packs/day: 0.00     Types: Cigars     Smokeless tobacco: Never Used     Tobacco comment: exposure to second hand smoke   Substance Use Topics     Alcohol use: No     History reviewed. No pertinent family history.      Current Outpatient Medications   Medication Sig Dispense Refill     acetaminophen (TYLENOL) 325 MG tablet Take 3 tablets (975 mg) by mouth every 8 hours 100 tablet 0     blood glucose (NO BRAND SPECIFIED) lancing device Use to test blood sugars 2 times daily or as directed. 1 each 0     blood glucose calibration (NO BRAND SPECIFIED) solution To accompany: Blood Glucose Monitor Brands: per insurance. 1 Bottle 3     blood glucose monitoring (NO BRAND SPECIFIED) meter device kit test blood sugar 2 xdaily or as directed.  Blood Glucose Monitor Brands: per insurance. 1 kit 0     blood glucose monitoring (NO BRAND SPECIFIED) test strip Use to test blood sugar 2 times daily Blood Glucose Monitor Brands: Glucocard Expression 100 strip 6     blood glucose monitoring (NO BRAND SPECIFIED) test strip Use to test blood sugar 2x  daily or as directed. To accompany: Blood Glucose Monitor Brands: per insurance. 100 strip 1     Blood Glucose Monitoring Suppl (GLUCOCARD  EXPRESSION MONITOR) W/DEVICE KIT USE TWICE DAILY TO TEST BLOOD GLUCOSE  0     clopidogrel (PLAVIX) 75 MG tablet Take 1 tablet (75 mg) by mouth daily 90 tablet 1     furosemide (LASIX) 40 MG tablet Take 1 tablet (40 mg) by mouth daily Do not restart until seen by primary care doctor and given the okay. 90 tablet 2     GNP IRON 200 (65 Fe) MG TABS TAKE 1 TABLET (325 MG) BY MOUTH DAILY (WITH BREAKFAST) 90 tablet 1     JANUMET XR  MG TB24 TAKE 2 TABLETS BY MOUTH DAILY (WITH BREAKFAST) 180 tablet 0     losartan (COZAAR) 25 MG tablet   11     metoprolol tartrate (LOPRESSOR) 25 MG tablet Take 1 tablet (25 mg) by mouth 2 times daily 180 tablet 3     Nutritional Supplements (ENSURE ACTIVE HIGH PROTEIN) LIQD Take 1 Can by mouth daily 30 each 1     order for DME Equipment being ordered: VASO PRESS-DVT PROPHYLAXIS SYSTEM- SEQUENTIAL 1 Device 0     order for DME Equipment being ordered: VASO PRESS-DVT PROPHYLAXIS SYSTEM or SUBSTITUTE BRAND SEQUENTIAL PUMP 1 Device 0     order for DME Equipment being ordered: Wheelchair of appropriate style and size 1 Device 0     order for DME Equipment being ordered: VASO PRESS-DVT PROPHYLAXIS SYSTEM                                             APPLY AT HS TO BILATERAL LOWER EXREMITIES 1 Units 0     order for DME Equipment being ordered: Replacement battery or batteries for an [employer] Pronto M 51. 1 Device 0     order for DME Equipment being ordered: electric chair for sleeping and adjustment to allow for elevation of legs above the heart.  Zero gravity type heavy duty sleep chair advised. MaxiComforter ([id number]) 1 Units 0     order for DME Equipment being ordered: Needs to have a sleep chair for home use. 1 Device 0     order for DME Compression garments toe to knee.  Specific brand is Compraflex Light Compression garment.   Diagnosis codes for venous stasis ulcer I83.029 and for  Lymphoedema is I89.0   Goal is for compression of about 30 to 40 mm of mercury   measurements are right  ankle 28.5 cm and right calf is 45.5 cm.  Left ankle is 27.5 cm and the calf is 45 cm   Tall for length. 2 Device 1     order for DME Equipment being ordered: compression stockings below knee and above knee if available.  Medium compression. 1 Units 1     rosuvastatin (CRESTOR) 10 MG tablet TAKE 1 TABLET (10 MG) BY MOUTH DAILY 90 tablet 0     sitagliptin-metFORMIN (JANUMET)  MG tablet Take 1 tablet by mouth 2 times daily (with meals) This is actually Janumet XR       thin (NO BRAND SPECIFIED) lancets Test 2 x daily or as directed.  Brands: per insurance. 100 each 1     Allergies   Allergen Reactions     No Known Allergies      Recent Labs   Lab Test 08/29/19  1143 08/07/19  0826 07/15/19  0852 02/21/19  0929  08/23/18  1049  03/09/18  1020  09/25/17  1216 05/22/17  1029   A1C  --   --  5.6 6.1*  --  5.7*   < >  --    < > 6.2* 6.4*   LDL  --   --  73 61  --   --   --  76  --  57 64   HDL  --   --   --  40  --   --   --   --   --  43 42   TRIG  --   --   --  166*  --   --   --   --   --  140 118   ALT 16  --  15 18  --  13   < > 19  --  19 20   CR 1.13 0.99 1.23 1.23  --  1.13   < > 0.91   < > 0.92 0.91   GFRESTIMATED 65 76 59* 59*   < > 64   < > 82   < > 81 82   GFRESTBLACK 75 88 68 68   < > 78   < > >90   < > >90 >90  African American GFR Calc     POTASSIUM 4.0 4.1 4.1 4.2  --  4.0   < > 4.6   < > 4.1 4.0   TSH  --   --   --   --   --   --   --   --   --  2.07  --     < > = values in this interval not displayed.      BP Readings from Last 3 Encounters:   10/17/19 122/68   10/02/19 122/56   09/26/19 124/60    Wt Readings from Last 3 Encounters:   10/17/19 124.1 kg (273 lb 8 oz)   10/02/19 131.6 kg (290 lb 1.6 oz)   09/26/19 123.4 kg (272 lb)                    Reviewed and updated as needed this visit by Provider         Review of Systems   ROS COMP: Constitutional, HEENT, cardiovascular, pulmonary, GI, , musculoskeletal, neuro, skin, endocrine and psych systems are negative, except as otherwise noted.     "  Objective    /68   Pulse 72   Temp 97  F (36.1  C) (Temporal)   Resp 16   Wt 124.1 kg (273 lb 8 oz)   SpO2 97%   BMI 39.24 kg/m    Body mass index is 39.24 kg/m .  Physical Exam   GENERAL: healthy, alert and no distress  NECK: no adenopathy, no asymmetry, masses, or scars and thyroid normal to palpation  RESP: lungs clear to auscultation - no rales, rhonchi or wheezes  CV: regular rate and rhythm, normal S1 S2, no S3 or S4, no murmur, click or rub, no peripheral edema and peripheral pulses strong  MS: no gross musculoskeletal defects noted, no edema  SKIN: no suspicious lesions or rashes to visible skin  NEURO: Normal strength and tone, mentation intact and speech normal  PSYCH: mentation appears normal, affect normal/bright    Diagnostic Test Results:  Labs reviewed in Epic  No results found for this or any previous visit (from the past 24 hour(s)).        Assessment & Plan     1. Iron deficiency anemia secondary to inadequate dietary iron intake  2. Type 2 diabetes mellitus with other circulatory complication, without long-term current use of insulin (H)  3. Hypertension, goal below 140/90  4. Primary osteoarthritis of left knee  5. Primary osteoarthritis of right knee  6. Neuropathy  7. Morbid obesity due to excess calories (H)  Doing well, no new concerns.  ROV 3 months for recheck.  Rechecking labs.  - TSH WITH FREE T4 REFLEX  - CBC with platelets  - Iron and iron binding capacity  - Transferrin  - Ferritin     BMI:   Estimated body mass index is 39.24 kg/m  as calculated from the following:    Height as of 9/26/19: 1.778 m (5' 10\").    Weight as of this encounter: 124.1 kg (273 lb 8 oz).   Weight management plan: Discussed healthy diet and exercise guidelines      Return in about 3 months (around 1/17/2020) for Medication Recheck, BP Recheck with provider, recheck of current condition.    Julio Cesar Sahu PA-C  [facility]    "

## 2019-10-15 ENCOUNTER — MEDICAL CORRESPONDENCE (OUTPATIENT)
Dept: HEALTH INFORMATION MANAGEMENT | Facility: CLINIC | Age: 69
End: 2019-10-15

## 2019-10-15 ENCOUNTER — TELEPHONE (OUTPATIENT)
Dept: FAMILY MEDICINE | Facility: OTHER | Age: 69
End: 2019-10-15

## 2019-10-15 NOTE — TELEPHONE ENCOUNTER
Reason for Call:  Form, our goal is to have forms completed with 72 hours, however, some forms may require a visit or additional information.    Type of letter, form or note:  medical    Who is the form from?: [facility] (if other please explain)    Where did the form come from: form was faxed in    What clinic location was the form placed at?: [facility] - [phone]    Where the form was placed: Box Box/Folder    What number is listed as a contact on the form?: [phone]       Additional comments: n/a    Call taken on 10/15/2019 at 8:26 AM by Diamond Juarez

## 2019-10-17 ENCOUNTER — OFFICE VISIT (OUTPATIENT)
Dept: FAMILY MEDICINE | Facility: OTHER | Age: 69
End: 2019-10-17
Payer: COMMERCIAL

## 2019-10-17 ENCOUNTER — TELEPHONE (OUTPATIENT)
Dept: SURGERY | Facility: CLINIC | Age: 69
End: 2019-10-17

## 2019-10-17 VITALS
BODY MASS INDEX: 39.24 KG/M2 | RESPIRATION RATE: 16 BRPM | SYSTOLIC BLOOD PRESSURE: 122 MMHG | HEART RATE: 72 BPM | TEMPERATURE: 97 F | OXYGEN SATURATION: 97 % | WEIGHT: 273.5 LBS | DIASTOLIC BLOOD PRESSURE: 68 MMHG

## 2019-10-17 DIAGNOSIS — G62.9 NEUROPATHY: ICD-10-CM

## 2019-10-17 DIAGNOSIS — E11.59 TYPE 2 DIABETES MELLITUS WITH OTHER CIRCULATORY COMPLICATION, WITHOUT LONG-TERM CURRENT USE OF INSULIN (H): ICD-10-CM

## 2019-10-17 DIAGNOSIS — M17.11 PRIMARY OSTEOARTHRITIS OF RIGHT KNEE: ICD-10-CM

## 2019-10-17 DIAGNOSIS — I10 HYPERTENSION, GOAL BELOW 140/90: ICD-10-CM

## 2019-10-17 DIAGNOSIS — E66.01 MORBID OBESITY DUE TO EXCESS CALORIES (H): ICD-10-CM

## 2019-10-17 DIAGNOSIS — D50.8 IRON DEFICIENCY ANEMIA SECONDARY TO INADEQUATE DIETARY IRON INTAKE: Primary | ICD-10-CM

## 2019-10-17 DIAGNOSIS — M17.12 PRIMARY OSTEOARTHRITIS OF LEFT KNEE: ICD-10-CM

## 2019-10-17 LAB
ERYTHROCYTE [DISTWIDTH] IN BLOOD BY AUTOMATED COUNT: 16.2 % (ref 10–15)
FERRITIN SERPL-MCNC: 213 NG/ML (ref 26–388)
HCT VFR BLD AUTO: 34 % (ref 40–53)
HGB BLD-MCNC: 10.1 G/DL (ref 13.3–17.7)
IRON SATN MFR SERPL: 14 % (ref 15–46)
IRON SERPL-MCNC: 37 UG/DL (ref 35–180)
MCH RBC QN AUTO: 26 PG (ref 26.5–33)
MCHC RBC AUTO-ENTMCNC: 29.7 G/DL (ref 31.5–36.5)
MCV RBC AUTO: 88 FL (ref 78–100)
PLATELET # BLD AUTO: 325 10E9/L (ref 150–450)
RBC # BLD AUTO: 3.88 10E12/L (ref 4.4–5.9)
TIBC SERPL-MCNC: 257 UG/DL (ref 240–430)
TRANSFERRIN SERPL-MCNC: 198 MG/DL (ref 210–360)
TSH SERPL DL<=0.005 MIU/L-ACNC: 2.29 MU/L (ref 0.4–4)
WBC # BLD AUTO: 7.7 10E9/L (ref 4–11)

## 2019-10-17 PROCEDURE — 83540 ASSAY OF IRON: CPT | Performed by: PHYSICIAN ASSISTANT

## 2019-10-17 PROCEDURE — 36415 COLL VENOUS BLD VENIPUNCTURE: CPT | Performed by: PHYSICIAN ASSISTANT

## 2019-10-17 PROCEDURE — 82728 ASSAY OF FERRITIN: CPT | Performed by: PHYSICIAN ASSISTANT

## 2019-10-17 PROCEDURE — 83550 IRON BINDING TEST: CPT | Performed by: PHYSICIAN ASSISTANT

## 2019-10-17 PROCEDURE — 85027 COMPLETE CBC AUTOMATED: CPT | Performed by: PHYSICIAN ASSISTANT

## 2019-10-17 PROCEDURE — 84443 ASSAY THYROID STIM HORMONE: CPT | Performed by: PHYSICIAN ASSISTANT

## 2019-10-17 PROCEDURE — 84466 ASSAY OF TRANSFERRIN: CPT | Performed by: PHYSICIAN ASSISTANT

## 2019-10-17 PROCEDURE — 99213 OFFICE O/P EST LOW 20 MIN: CPT | Performed by: PHYSICIAN ASSISTANT

## 2019-10-17 ASSESSMENT — PAIN SCALES - GENERAL: PAINLEVEL: NO PAIN (0)

## 2019-10-17 NOTE — TELEPHONE ENCOUNTER
Reason for Call:  Form, our goal is to have forms completed with 72 hours, however, some forms may require a visit or additional information.    Type of letter, form or note:  certificate of medical necessity    Who is the form from?: [facility] (if other please explain)    Where did the form come from: form was faxed in    What clinic location was the form placed at?: [facility]    Where the form was placed: MD Box/Folder    What number is listed as a contact on the form?:        Additional comments: form completed to the best of my ability. Placed in MD mailbox for signature Monday.    Call taken on 10/17/2019 at 3:53 PM by Meme Beth RN

## 2019-10-18 ENCOUNTER — TELEPHONE (OUTPATIENT)
Dept: FAMILY MEDICINE | Facility: OTHER | Age: 69
End: 2019-10-18

## 2019-10-18 NOTE — TELEPHONE ENCOUNTER
We need to verify this.  My suspicion is he is actually taking a 65 mg tablet (GNP iron).  He may be better off of the 325 mg ferrous gluconate tablet.  Electronically signed:    Julio Cesar Sahu PA-C

## 2019-10-18 NOTE — TELEPHONE ENCOUNTER
Patient states that he is on 65mg tablets (GNP). Patient is going to double his dose and take 1 qam and 1qpm. He will follow up with provider on 11/7/19

## 2019-10-18 NOTE — TELEPHONE ENCOUNTER
Patient was given results of low iron and instructed to take Iron supplement. Patient informed me that he has been taking one, 25 mg  Daily. Patient wants to know if they should increase this or if Julio Cesar would like to send something else in.     Pended pharmacy.     Ravi Owen,

## 2019-10-21 ENCOUNTER — MEDICAL CORRESPONDENCE (OUTPATIENT)
Dept: HEALTH INFORMATION MANAGEMENT | Facility: CLINIC | Age: 69
End: 2019-10-21

## 2019-10-21 DIAGNOSIS — E11.59 TYPE 2 DIABETES MELLITUS WITH OTHER CIRCULATORY COMPLICATION, WITHOUT LONG-TERM CURRENT USE OF INSULIN (H): ICD-10-CM

## 2019-10-21 DIAGNOSIS — Z95.5 HISTORY OF CORONARY ARTERY STENT PLACEMENT: ICD-10-CM

## 2019-10-21 DIAGNOSIS — I10 HYPERTENSION, GOAL BELOW 140/90: ICD-10-CM

## 2019-10-21 DIAGNOSIS — Z86.2 HISTORY OF ANEMIA: ICD-10-CM

## 2019-10-21 NOTE — TELEPHONE ENCOUNTER
Form signed by provider and faxed back to number provided on form, copy sent to scanning, copy placed in surgery drawer..................Hui Ashraf CMA  ([facility])

## 2019-10-22 ENCOUNTER — PATIENT OUTREACH (OUTPATIENT)
Dept: CARE COORDINATION | Facility: CLINIC | Age: 69
End: 2019-10-22

## 2019-10-22 RX ORDER — FUROSEMIDE 40 MG
40 TABLET ORAL DAILY
Qty: 90 TABLET | Refills: 0 | Status: SHIPPED | OUTPATIENT
Start: 2019-10-22 | End: 2019-11-29

## 2019-10-22 RX ORDER — METOPROLOL TARTRATE 25 MG/1
25 TABLET, FILM COATED ORAL 2 TIMES DAILY
Qty: 180 TABLET | Refills: 3 | Status: SHIPPED | OUTPATIENT
Start: 2019-10-22 | End: 2020-01-14

## 2019-10-22 NOTE — TELEPHONE ENCOUNTER
Pending Prescriptions:                       Disp   Refills    Ferrous Sulfate Dried (GNP IRON) 200 (65 *90 tab*1             Last Written Prescription Date:  4/25/19  Last Fill Quantity: 90,   # refills: 1  Last Office Visit: 10/17/19  Future Office visit:    Next 5 appointments (look out 90 days)    Nov 04, 2019  8:00 AM CST  Pre-Op physical with Julio Cesar Esteban PA-C  [facility] ([facility] [id number] [place] [id number]  [phone]   Nov 18, 2019  8:00 AM CST  Return Visit with Marc Ibrahim DO  [facility] ([facility]) [address] [place] [id number]  [phone]   Dec 11, 2019  8:40 AM CST  Return Visit with Marc Irbahim DO  [facility] ([facility]) [address] [place] [id number]  [phone]           Routing refill request to provider for review/approval because:  Drug not on the G, [facility] or [facility] refill protocol or controlled substance        furosemide (LASIX) 40 MG tablet           90 tab*2            Sig: Take 1 tablet (40 mg) by mouth daily Do not           restart until seen by primary care doctor and           given the okay.    Prescription approved per G Refill Protocol.        metoprolol tartrate (LOPRESSOR) 25 MG tab*180 ta*3            Sig: Take 1 tablet (25 mg) by mouth 2 times daily    Prescription approved per G Refill Protocol.      Nneka Keith, RN, BSN

## 2019-10-22 NOTE — PROGRESS NOTES
Clinic Care Coordination Contact    Assessment: Care Coordinator contacted patient for 2 month follow up.  Patient has continued to follow the plan of care and assessment is negative for any new needs or concerns.    Enrollment status: Graduated.      Plan: No further outreaches at this time.  Patient will continue to follow the plan of care.  If new needs arise a new Care Coordination referral may be placed.  FYI to PCP    SARAH Glover   Primary Care Clinic- Social Work Care Coordinator  [facility]  10/22/2019 11:42 AM  [phone]

## 2019-10-23 NOTE — TELEPHONE ENCOUNTER
[facility] returned this form with a few recommendations of edits. Edits made, MD initialed and returned fax to Hand. Copy to scanning. Copy to RN faxed items drawer.  Meme Beth RN on 10/23/2019 at 3:58 PM

## 2019-10-24 NOTE — PROGRESS NOTES
[facility]  [address] [facility] [address] [phone]  Dept: [phone]    PRE-OP EVALUATION:  Today's date: 2019    Glenroy Padilla (: 1950) presents for pre-operative evaluation assessment as requested by Dr. Ibrahim.  He requires evaluation and anesthesia risk assessment prior to undergoing surgery/procedure for treatment of left knee replacement.    Fax number for surgical facility:   Primary Physician: Julio Cesar Esteban  Type of Anesthesia Anticipated: Spinal    Patient has a Health Care Directive or Living Will:  YES Full Code    Preop Questions 2019   Who is doing your surgery? luna   What are you having done? replacment   Date of Surgery/Procedure: 19   Facility or Hospital where procedure/surgery will be performed: -   1.  Do you have a history of Heart attack, stroke, stent, coronary bypass surgery, or other heart surgery? No   2.  Do you ever have any pain or discomfort in your chest? No   3.  Do you have a history of  Heart Failure? No   4.   Are you troubled by shortness of breath when:  walking on a level surface, or up a slight hill, or at night? No   5.  Do you currently have a cold, bronchitis or other respiratory infection? No   6.  Do you have a cough, shortness of breath, or wheezing? No   7.  Do you sometimes get pains in the calves of your legs when you walk? YES -    8. Do you or anyone in your family have previous history of blood clots? No   9.  Do you or does anyone in your family have a serious bleeding problem such as prolonged bleeding following surgeries or cuts? No   10. Have you ever had problems with anemia or been told to take iron pills? YES -    11. Have you had any abnormal blood loss such as black, tarry or bloody stools? No   12. Have you ever had a blood transfusion? No   13. Have you or any of your relatives ever had problems with anesthesia? No   14. Do you have sleep apnea, excessive snoring or daytime drowsiness? No    15. Do you have any prosthetic heart valves? No   16. Do you have prosthetic joints? YES -          HPI:     HPI related to upcoming procedure: long term knee problems and history of right knee replacement      See problem list for active medical problems.  Problems all longstanding and stable, except as noted/documented.  See ROS for pertinent symptoms related to these conditions.      MEDICAL HISTORY:     Patient Active Problem List    Diagnosis Date Noted     Primary osteoarthritis of left knee 09/27/2019     Priority: Medium     Added automatically from request for surgery [id number]       S/P total knee replacement using cement, right 07/30/2019     Priority: Medium     MSSA (methicillin-susceptible Staphylococcus aureus) colonization 07/18/2019     Priority: Medium     Disease of spinal cord (H) 07/15/2019     Priority: Medium     Ischemic vascular disease 02/21/2019     Priority: Medium     Iron deficiency anemia secondary to inadequate dietary iron intake 08/23/2018     Priority: Medium     Mixed incontinence 08/13/2018     Priority: Medium     Lymphedema of right lower extremity 05/14/2018     Priority: Medium     Primary osteoarthritis of right knee 05/14/2018     Priority: Medium     Complete tear of left rotator cuff 04/04/2018     Priority: Medium     S/P spinal surgery 03/13/2018     Priority: Medium     Lymphedema 01/29/2018     Priority: Medium     Falls frequently 01/27/2018     Priority: Medium     Weakness of both legs 01/27/2018     Priority: Medium     Central spinal stenosis L3-4 and L4-5 01/27/2018     Priority: Medium     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 01/27/2018     Priority: Medium     Neuropathy 01/25/2018     Priority: Medium     Foot drop, right 01/25/2018     Priority: Medium     Cardiomegaly 01/25/2018     Priority: Medium     Gastroesophageal reflux disease without esophagitis 01/25/2018     Priority: Medium     Morbid obesity due to excess calories (H) 11/20/2017      Priority: Medium     Hypertension, goal below 140/90 09/25/2017     Priority: Medium     Hyperlipidemia LDL goal <100 09/25/2017     Priority: Medium     History of coronary artery stent placement 09/25/2017     Priority: Medium     Type 2 diabetes mellitus with other circulatory complication, without long-term current use of insulin (H) 05/22/2017     Priority: Medium     Venous stasis ulcer of left lower extremity (H) 05/22/2017     Priority: Medium     Acute pain of left knee 05/22/2017     Priority: Medium     Chronic pain of right knee 05/22/2017     Priority: Medium     Open wound of left lower extremity without complication, initial encounter 05/22/2017     Priority: Medium     Hx of heart artery stent 05/22/2017     Priority: Medium     DAYTON (obstructive sleep apnea) 05/22/2017     Priority: Medium     Hx of coronary artery disease 03/12/2016     Priority: Medium      Past Medical History:   Diagnosis Date     Acute pain of left knee 5/22/2017     Chronic pain of right knee 5/22/2017     Hx of coronary artery disease 5/22/2017     Hx of heart artery stent 5/22/2017     Mixed incontinence 8/13/2018     Obesity, morbid, BMI 40.0-49.9 (H) 11/20/2017     Open wound of left lower extremity without complication, initial encounter 5/22/2017     DAYTON (obstructive sleep apnea) 5/22/2017     Type 2 diabetes mellitus with other circulatory complication, without long-term current use of insulin (H) 5/22/2017     Venous stasis ulcer of left lower extremity (H) 5/22/2017     Venous stasis ulcer of left lower extremity (H) 5/22/2017     Past Surgical History:   Procedure Laterality Date     ARTHROPLASTY KNEE Right 7/30/2019    Procedure: Right total knee replacement;  Surgeon: Marc Ibrahim DO;  Location: [facility] OR     CARDIAC SURGERY      stent placement     CHOLECYSTECTOMY       LAMINECTOMY LUMBAR THREE+ LEVELS N/A 3/13/2018    Procedure: LAMINECTOMY LUMBAR THREE+ LEVELS;  T5-9 LAMINECTOMIES, RESECTION OF EPIDURAL  LIPOMATOSIS;  Surgeon: Hugh Mendieta MD;  Location: [facility] OR     Current Outpatient Medications   Medication Sig Dispense Refill     acetaminophen (TYLENOL) 325 MG tablet Take 3 tablets (975 mg) by mouth every 8 hours 100 tablet 0     blood glucose (NO BRAND SPECIFIED) lancing device Use to test blood sugars 2 times daily or as directed. 1 each 0     blood glucose calibration (NO BRAND SPECIFIED) solution To accompany: Blood Glucose Monitor Brands: per insurance. 1 Bottle 3     blood glucose monitoring (NO BRAND SPECIFIED) meter device kit test blood sugar 2 xdaily or as directed.  Blood Glucose Monitor Brands: per insurance. 1 kit 0     blood glucose monitoring (NO BRAND SPECIFIED) test strip Use to test blood sugar 2 times daily Blood Glucose Monitor Brands: Glucocard Expression 100 strip 6     blood glucose monitoring (NO BRAND SPECIFIED) test strip Use to test blood sugar 2x  daily or as directed. To accompany: Blood Glucose Monitor Brands: per insurance. 100 strip 1     Blood Glucose Monitoring Suppl (GLUCOCARD EXPRESSION MONITOR) W/DEVICE KIT USE TWICE DAILY TO TEST BLOOD GLUCOSE  0     clopidogrel (PLAVIX) 75 MG tablet Take 1 tablet (75 mg) by mouth daily 90 tablet 1     Ferrous Sulfate Dried (GNP IRON) 200 (65 Fe) MG TABS Take 325 mg by mouth every morning 90 tablet 1     furosemide (LASIX) 40 MG tablet Take 1 tablet (40 mg) by mouth daily 90 tablet 0     JANUMET XR  MG TB24 TAKE 2 TABLETS BY MOUTH DAILY (WITH BREAKFAST) 180 tablet 0     losartan (COZAAR) 25 MG tablet   11     metoprolol tartrate (LOPRESSOR) 25 MG tablet Take 1 tablet (25 mg) by mouth 2 times daily 180 tablet 3     Nutritional Supplements (ENSURE ACTIVE HIGH PROTEIN) LIQD Take 1 Can by mouth daily 30 each 1     order for DME Equipment being ordered: VASO PRESS-DVT PROPHYLAXIS SYSTEM- SEQUENTIAL 1 Device 0     order for DME Equipment being ordered: VASO PRESS-DVT PROPHYLAXIS SYSTEM or SUBSTITUTE BRAND SEQUENTIAL PUMP 1 Device 0      order for DME Equipment being ordered: Wheelchair of appropriate style and size 1 Device 0     order for DME Equipment being ordered: VASO PRESS-DVT PROPHYLAXIS SYSTEM                                             APPLY AT HS TO BILATERAL LOWER EXREMITIES 1 Units 0     order for DME Equipment being ordered: Replacement battery or batteries for an [employer] Pronto M 51. 1 Device 0     order for DME Equipment being ordered: electric chair for sleeping and adjustment to allow for elevation of legs above the heart.  Zero gravity type heavy duty sleep chair advised. MaxiComforter ([id number]) 1 Units 0     order for DME Equipment being ordered: Needs to have a sleep chair for home use. 1 Device 0     order for DME Compression garments toe to knee.  Specific brand is Compraflex Light Compression garment.   Diagnosis codes for venous stasis ulcer I83.029 and for  Lymphoedema is I89.0   Goal is for compression of about 30 to 40 mm of mercury   measurements are right ankle 28.5 cm and right calf is 45.5 cm.  Left ankle is 27.5 cm and the calf is 45 cm   Tall for length. 2 Device 1     order for DME Equipment being ordered: compression stockings below knee and above knee if available.  Medium compression. 1 Units 1     rosuvastatin (CRESTOR) 10 MG tablet TAKE 1 TABLET (10 MG) BY MOUTH DAILY 90 tablet 0     sitagliptin-metFORMIN (JANUMET)  MG tablet Take 1 tablet by mouth 2 times daily (with meals) This is actually Janumet XR       thin (NO BRAND SPECIFIED) lancets Test 2 x daily or as directed.  Brands: per insurance. 100 each 1     OTC products: None, except as noted above    Allergies   Allergen Reactions     No Known Allergies       Latex Allergy: NO    Social History     Tobacco Use     Smoking status: Former Smoker     Packs/day: 0.00     Types: Cigars     Smokeless tobacco: Never Used     Tobacco comment: exposure to second hand smoke   Substance Use Topics     Alcohol use: No     History   Drug Use No       REVIEW  OF SYSTEMS:   CONSTITUTIONAL: NEGATIVE for fever, chills, change in weight  INTEGUMENTARY/SKIN: NEGATIVE for worrisome rashes, moles or lesions  EYES: NEGATIVE for vision changes or irritation  ENT/MOUTH: NEGATIVE for ear, mouth and throat problems  RESP: NEGATIVE for significant cough or SOB  BREAST: NEGATIVE for masses, tenderness or discharge  CV: NEGATIVE for chest pain, palpitations or peripheral edema  GI: NEGATIVE for nausea, abdominal pain, heartburn, or change in bowel habits  : NEGATIVE for frequency, dysuria, or hematuria  MUSCULOSKELETAL:struggling with left knee pain but hoping surgery will take care of this.  NEURO: NEGATIVE for weakness, dizziness or paresthesias  ENDOCRINE: NEGATIVE for temperature intolerance, skin/hair changes  HEME: NEGATIVE for bleeding problems  PSYCHIATRIC: NEGATIVE for changes in mood or affect    EXAM:   /60   Pulse 78   Temp 97.7  F (36.5  C) (Temporal)   Resp 18   Wt 122 kg (268 lb 14.4 oz)   SpO2 96%   BMI 38.58 kg/m      GENERAL APPEARANCE: healthy, alert and no distress     EYES: EOMI,  PERRL     HENT: ear canals and TM's normal and nose and mouth without ulcers or lesions     NECK: no adenopathy, no asymmetry, masses, or scars and thyroid normal to palpation     RESP: lungs clear to auscultation - no rales, rhonchi or wheezes     CV: regular rates and rhythm, normal S1 S2, no S3 or S4 and no murmur, click or rub     ABDOMEN:  soft, nontender, no HSM or masses and bowel sounds normal     MS: extremities normal- no gross deformities noted, no evidence of inflammation in joints, FROM in all extremities.     SKIN: no suspicious lesions or rashes though dry skin noted to the extremities.     NEURO: Normal strength and tone, sensory exam grossly normal, mentation intact and speech normal     PSYCH: mentation appears normal. and affect normal/bright     LYMPHATICS: No cervical adenopathy    DIAGNOSTICS:     EKG: appears normal, NSR, normal axis, normal intervals,  no acute ST/T changes c/w ischemia, no LVH by voltage criteria, nonspecific intraventricular conduction delay, unchanged from previous tracings  Labs Resulted Today: No results found for any visits on 11/04/19.  Labs Drawn and in Process:   Unresulted Labs Ordered in the Past 30 Days of this Admission     Date and Time Order Name Status Description    11/4/2019 0801 MRSA MSSA PRESURGICAL SCREEN In process           Recent Labs   Lab Test 10/17/19  0744 08/29/19  1143 08/07/19  0826  07/15/19  0852 02/21/19  0929   HGB 10.1* 9.2*  --    < > 10.9*  --     319  --   --  295  --    NA  --  138 139  --  141 138   POTASSIUM  --  4.0 4.1  --  4.1 4.2   CR  --  1.13 0.99  --  1.23 1.23   A1C  --   --   --   --  5.6 6.1*    < > = values in this interval not displayed.        IMPRESSION:   Reason for surgery/procedure: left knee pain  Diagnosis/reason for consult: anesthesia / surgical clearance.    The proposed surgical procedure is considered INTERMEDIATE risk.    REVISED CARDIAC RISK INDEX  The patient has the following serious cardiovascular risks for perioperative complications such as (MI, PE, VFib and 3  AV Block):  No serious cardiac risks  INTERPRETATION: 1 risks: Class II (low risk - 0.9% complication rate)    The patient has the following additional risks for perioperative complications:  No identified additional risks  The 10-year ASCVD risk score (Houltonpaul WYNNE Jr., et al., 2013) is: 33.2%    Values used to calculate the score:      Age: 69 years      Sex: Male      Is Non- : No      Diabetic: Yes      Tobacco smoker: No      Systolic Blood Pressure: 122 mmHg      Is BP treated: Yes      HDL Cholesterol: 40 mg/dL      Total Cholesterol: 134 mg/dL      ICD-10-CM    1. Preop general physical exam Z01.818 MRSA MSSA Presurgical Screen     CBC with platelets differential     MRSA MSSA Presurgical Screen     *UA reflex to Microscopic and Culture ([place]; [facility]; [facility];  [facility]; [place] - [facility]; [facility]; [place]; [place])     EKG 12-lead complete w/read - Clinics   2. Primary osteoarthritis of left knee M17.12 EKG 12-lead complete w/read - Clinics   3. Iron deficiency anemia secondary to inadequate dietary iron intake D50.8 EKG 12-lead complete w/read - Clinics   4. MSSA (methicillin-susceptible Staphylococcus aureus) colonization Z22.321 EKG 12-lead complete w/read - Clinics   5. Hx of heart artery stent Z95.5 EKG 12-lead complete w/read - Clinics   6. Hx of coronary artery disease Z86.79 EKG 12-lead complete w/read - Clinics   7. Hypertension, goal below 140/90 I10 EKG 12-lead complete w/read - Clinics   8. Type 2 diabetes mellitus with other circulatory complication, without long-term current use of insulin (H) E11.59 **A1C FUTURE 3mo     EKG 12-lead complete w/read - Clinics   9. Morbid obesity due to excess calories (H) E66.01 EKG 12-lead complete w/read - Clinics   10. DAYTON (obstructive sleep apnea) G47.33 EKG 12-lead complete w/read - Clinics   11. Neuropathy G62.9 EKG 12-lead complete w/read - Clinics       RECOMMENDATIONS:     --Consult hospital rounder / IM to assist post-op medical management    Obstructive Sleep Apnea (or suspected sleep apnea)  Patient is clearly advised to use their home CPAP when released from surgery      --Patient is to take all scheduled medications on the day of surgery EXCEPT for modifications listed below. Cozaar / Losartan day of, Plavix as directed.    Pending review of diagnostic evaluation, APPROVAL GIVEN to proceed with proposed procedure, without further diagnostic evaluation.       Signed Electronically by: DAKOTA Barker PAJosephC    Copy of this evaluation report is provided to requesting physician.    [place] Preop Guidelines    Revised Cardiac Risk Index

## 2019-10-29 ENCOUNTER — TELEPHONE (OUTPATIENT)
Dept: SURGERY | Facility: OTHER | Age: 69
End: 2019-10-29

## 2019-10-29 NOTE — TELEPHONE ENCOUNTER
Left message to determine what needs to be on the form.     Albina STEINER, Lead RN, BSN. . .  10/29/2019, 4:16 PM

## 2019-10-29 NOTE — TELEPHONE ENCOUNTER
Reason for Call:  Other appointment    Detailed comments: Carolin, [facility] care coordinator, calling to state that based on the medical necessity that Rl filled out, patient will not qualify for the lymphedema pump. Please call Carolin at [phone] to clarify.     Phone Number Patient can be reached at:    Best Time: any    Can we leave a detailed message on this number? YES    Call taken on 10/29/2019 at 10:43 AM by Meme Isaacs

## 2019-10-30 NOTE — TELEPHONE ENCOUNTER
Called Carolin at [facility].  for  Return call.  Meme Beth RN on 10/30/2019 at 10:29 AM

## 2019-10-31 ENCOUNTER — TELEPHONE (OUTPATIENT)
Dept: FAMILY MEDICINE | Facility: OTHER | Age: 69
End: 2019-10-31

## 2019-10-31 NOTE — TELEPHONE ENCOUNTER
Writer called Carolin back.  [facility] Medical doesn't have a diagnosis of edema.  [facility] [phone]    Now writer calling [facility] -has to be for venous status ulcers or venous insufficiency. Writer reviewed this with Dr. Shine.    What needs to happen next: once Rl changes the dianosis in the last OV, nursing staff to have MD sign/date updated form (placed in MD mailbox), print last OV note, then fax to [facility] medical.  Meme Beth RN on 10/31/2019 at 12:23 PM

## 2019-10-31 NOTE — TELEPHONE ENCOUNTER
Reason for Call:  Form, our goal is to have forms completed with 72 hours, however, some forms may require a visit or additional information.    Type of letter, form or note:  medical    Who is the form from?: [facility] (if other please explain)    Where did the form come from: form was faxed in    What clinic location was the form placed at?: [facility] - [phone]    Where the form was placed: box Box/Folder    What number is listed as a contact on the form?: [phone]       Additional comments: n/a    Call taken on 10/31/2019 at 10:46 AM by Diamond Juarez

## 2019-11-01 NOTE — TELEPHONE ENCOUNTER
Form signed and dated by Dr. Shine, form and copy of last OV with Dr. Shine faxed back to number provided, copy sent to scanning, copy placed in surgery drawer.....................Hui Ashraf CMA  ([facility])

## 2019-11-04 ENCOUNTER — OFFICE VISIT (OUTPATIENT)
Dept: FAMILY MEDICINE | Facility: OTHER | Age: 69
End: 2019-11-04
Payer: COMMERCIAL

## 2019-11-04 VITALS
WEIGHT: 268.9 LBS | SYSTOLIC BLOOD PRESSURE: 122 MMHG | DIASTOLIC BLOOD PRESSURE: 60 MMHG | OXYGEN SATURATION: 96 % | RESPIRATION RATE: 18 BRPM | BODY MASS INDEX: 38.58 KG/M2 | HEART RATE: 78 BPM | TEMPERATURE: 97.7 F

## 2019-11-04 DIAGNOSIS — Z22.321 MSSA (METHICILLIN-SUSCEPTIBLE STAPHYLOCOCCUS AUREUS) COLONIZATION: ICD-10-CM

## 2019-11-04 DIAGNOSIS — D50.8 IRON DEFICIENCY ANEMIA SECONDARY TO INADEQUATE DIETARY IRON INTAKE: ICD-10-CM

## 2019-11-04 DIAGNOSIS — I10 HYPERTENSION, GOAL BELOW 140/90: ICD-10-CM

## 2019-11-04 DIAGNOSIS — Z86.79 HX OF CORONARY ARTERY DISEASE: ICD-10-CM

## 2019-11-04 DIAGNOSIS — G47.33 OSA (OBSTRUCTIVE SLEEP APNEA): ICD-10-CM

## 2019-11-04 DIAGNOSIS — E11.59 TYPE 2 DIABETES MELLITUS WITH OTHER CIRCULATORY COMPLICATION, WITHOUT LONG-TERM CURRENT USE OF INSULIN (H): ICD-10-CM

## 2019-11-04 DIAGNOSIS — G62.9 NEUROPATHY: ICD-10-CM

## 2019-11-04 DIAGNOSIS — Z95.5 HX OF HEART ARTERY STENT: ICD-10-CM

## 2019-11-04 DIAGNOSIS — Z01.818 PREOP GENERAL PHYSICAL EXAM: Primary | ICD-10-CM

## 2019-11-04 DIAGNOSIS — M17.12 PRIMARY OSTEOARTHRITIS OF LEFT KNEE: ICD-10-CM

## 2019-11-04 DIAGNOSIS — E66.01 MORBID OBESITY DUE TO EXCESS CALORIES (H): ICD-10-CM

## 2019-11-04 LAB
ALBUMIN UR-MCNC: NEGATIVE MG/DL
APPEARANCE UR: CLEAR
BACTERIA #/AREA URNS HPF: ABNORMAL /HPF
BASOPHILS # BLD AUTO: 0 10E9/L (ref 0–0.2)
BASOPHILS NFR BLD AUTO: 0.5 %
BILIRUB UR QL STRIP: NEGATIVE
COLOR UR AUTO: YELLOW
DIFFERENTIAL METHOD BLD: ABNORMAL
EOSINOPHIL # BLD AUTO: 0.3 10E9/L (ref 0–0.7)
EOSINOPHIL NFR BLD AUTO: 3.6 %
ERYTHROCYTE [DISTWIDTH] IN BLOOD BY AUTOMATED COUNT: 16.8 % (ref 10–15)
GLUCOSE UR STRIP-MCNC: NEGATIVE MG/DL
HBA1C MFR BLD: 5 % (ref 0–5.6)
HCT VFR BLD AUTO: 36.6 % (ref 40–53)
HGB BLD-MCNC: 10.8 G/DL (ref 13.3–17.7)
HGB UR QL STRIP: ABNORMAL
KETONES UR STRIP-MCNC: NEGATIVE MG/DL
LEUKOCYTE ESTERASE UR QL STRIP: ABNORMAL
LYMPHOCYTES # BLD AUTO: 0.9 10E9/L (ref 0.8–5.3)
LYMPHOCYTES NFR BLD AUTO: 9.7 %
MCH RBC QN AUTO: 26.1 PG (ref 26.5–33)
MCHC RBC AUTO-ENTMCNC: 29.5 G/DL (ref 31.5–36.5)
MCV RBC AUTO: 88 FL (ref 78–100)
MONOCYTES # BLD AUTO: 0.8 10E9/L (ref 0–1.3)
MONOCYTES NFR BLD AUTO: 9 %
NEUTROPHILS # BLD AUTO: 6.8 10E9/L (ref 1.6–8.3)
NEUTROPHILS NFR BLD AUTO: 77.2 %
NITRATE UR QL: NEGATIVE
NON-SQ EPI CELLS #/AREA URNS LPF: ABNORMAL /LPF
PH UR STRIP: 5 PH (ref 5–7)
PLATELET # BLD AUTO: 319 10E9/L (ref 150–450)
RBC # BLD AUTO: 4.14 10E12/L (ref 4.4–5.9)
RBC #/AREA URNS AUTO: ABNORMAL /HPF
SOURCE: ABNORMAL
SP GR UR STRIP: 1.01 (ref 1–1.03)
UROBILINOGEN UR STRIP-ACNC: 0.2 EU/DL (ref 0.2–1)
WBC # BLD AUTO: 8.9 10E9/L (ref 4–11)
WBC #/AREA URNS AUTO: ABNORMAL /HPF

## 2019-11-04 PROCEDURE — 83036 HEMOGLOBIN GLYCOSYLATED A1C: CPT | Performed by: ORTHOPAEDIC SURGERY

## 2019-11-04 PROCEDURE — 40000868 ZZHCL STATISTIC MRSA/MSSA PRESURGICAL SCREEN ID: Performed by: PHYSICIAN ASSISTANT

## 2019-11-04 PROCEDURE — 40000869 ZZHCL STATISTIC MRSA/MSSA PRESURGICAL SCREEN CULTURE: Performed by: PHYSICIAN ASSISTANT

## 2019-11-04 PROCEDURE — 81001 URINALYSIS AUTO W/SCOPE: CPT | Performed by: ORTHOPAEDIC SURGERY

## 2019-11-04 PROCEDURE — 36415 COLL VENOUS BLD VENIPUNCTURE: CPT | Performed by: ORTHOPAEDIC SURGERY

## 2019-11-04 PROCEDURE — 99215 OFFICE O/P EST HI 40 MIN: CPT | Performed by: PHYSICIAN ASSISTANT

## 2019-11-04 PROCEDURE — 85025 COMPLETE CBC W/AUTO DIFF WBC: CPT | Performed by: ORTHOPAEDIC SURGERY

## 2019-11-04 ASSESSMENT — PAIN SCALES - GENERAL: PAINLEVEL: NO PAIN (0)

## 2019-11-05 ENCOUNTER — TELEPHONE (OUTPATIENT)
Dept: FAMILY MEDICINE | Facility: OTHER | Age: 69
End: 2019-11-05

## 2019-11-05 LAB
BACTERIA SPEC CULT: NORMAL
BACTERIA SPEC CULT: NORMAL
SPECIMEN SOURCE: NORMAL

## 2019-11-05 NOTE — TELEPHONE ENCOUNTER
Per provider letter written.  Lisa Zuluaga CMA ([facility])    Notes recorded by Julio Cesar Esteban PA-C on 11/5/2019 at 3:17 PM CST  Stable labs.  Please, send letter with a copy of results and any advice listed.  Electronically signed:    Julio Cesar Esteban PA-C

## 2019-11-05 NOTE — LETTER
November 5, 2019      Glenroy Padilla  [address] [address]        Dear ,    We are writing to inform you of your test results.    Your test results fall within the expected range(s) or remain unchanged from previous results.  Please continue with current treatment plan.      If you have any questions or concerns, please call the clinic at the number listed above.       Sincerely,        Julio Cesar Sahu PA-C

## 2019-11-07 RX ORDER — CEFAZOLIN SODIUM 1 G/3ML
1 INJECTION, POWDER, FOR SOLUTION INTRAMUSCULAR; INTRAVENOUS SEE ADMIN INSTRUCTIONS
Status: CANCELLED | OUTPATIENT
Start: 2019-11-07

## 2019-11-07 RX ORDER — CEFAZOLIN SODIUM IN 0.9 % NACL 3 G/100 ML
3 INTRAVENOUS SOLUTION, PIGGYBACK (ML) INTRAVENOUS
Status: CANCELLED | OUTPATIENT
Start: 2019-11-07

## 2019-11-13 ENCOUNTER — TELEPHONE (OUTPATIENT)
Dept: SURGERY | Facility: CLINIC | Age: 69
End: 2019-11-13

## 2019-11-13 NOTE — TELEPHONE ENCOUNTER
Request for records received via fax. Writer faxed them to medical records. Original sent to scanning in blue bag.    Meme Beth RN on 11/13/2019 at 12:24 PM

## 2019-11-18 ENCOUNTER — OFFICE VISIT (OUTPATIENT)
Dept: ORTHOPEDICS | Facility: CLINIC | Age: 69
End: 2019-11-18
Payer: COMMERCIAL

## 2019-11-18 VITALS
BODY MASS INDEX: 41.09 KG/M2 | WEIGHT: 287 LBS | DIASTOLIC BLOOD PRESSURE: 64 MMHG | HEIGHT: 70 IN | SYSTOLIC BLOOD PRESSURE: 118 MMHG

## 2019-11-18 DIAGNOSIS — Z01.818 PREOP GENERAL PHYSICAL EXAM: Primary | ICD-10-CM

## 2019-11-18 PROCEDURE — 99207 ZZC NO BILLABLE SERVICE THIS VISIT: CPT | Performed by: ORTHOPAEDIC SURGERY

## 2019-11-18 ASSESSMENT — MIFFLIN-ST. JEOR: SCORE: 2063.07

## 2019-11-18 NOTE — PROGRESS NOTES
1 week prior.  Weight is 287.  BMI 41.2.  I discussed this with him.  He is not carrying extra water that he knows about.  I like to see his weight down 15 pounds before considering knee replacement.  We will postpone surgery.  Plan to see him back in 2 weeks for re-weigh.  I discussed my rationale and he understands.

## 2019-11-18 NOTE — LETTER
11/18/2019         RE: Glenroy Padilla  [address] [address]        Dear Colleague,    Thank you for referring your patient, Glenroy Padilla, to the [facility]. Please see a copy of my visit note below.    1 week prior.  Weight is 287.  BMI 41.2.  I discussed this with him.  He is not carrying extra water that he knows about.  I like to see his weight down 15 pounds before considering knee replacement.  We will postpone surgery.  Plan to see him back in 2 weeks for re-weigh.  I discussed my rationale and he understands.    Again, thank you for allowing me to participate in the care of your patient.        Sincerely,        Marc Ibrahim, DO

## 2019-11-26 DIAGNOSIS — I10 HYPERTENSION, GOAL BELOW 140/90: ICD-10-CM

## 2019-11-26 DIAGNOSIS — Z86.2 HISTORY OF ANEMIA: ICD-10-CM

## 2019-11-27 NOTE — TELEPHONE ENCOUNTER
Pending Prescriptions:                       Disp   Refills    Ferrous Sulfate 324 (65 Fe) MG TBEC [Phar*90 tab*1            Sig: TAKE ONE TABLET BY MOUTH EVERY MORNING     Routing refill request to provider for review/approval because:  pts states he is taking BID?       furosemide (LASIX) 40 MG tablet [Pharmacy*90 tab*3            Sig: TAKE 1 TABLET (40 MG) BY MOUTH DAILY    Sent 10/22/2019 with 3 month supply. Refill not appropriate at this time.     Chantel Mederos, RN, BSN

## 2019-11-27 NOTE — TELEPHONE ENCOUNTER
Spoke with Rl who needs to see patient in order to fulfill requests from [facility] regarding the leg pumps. This request has been placed in Rl's mailbox until after 12/6/19 OV. Patient updated.  Meme Beth RN on 11/27/2019 at 1:24 PM

## 2019-11-29 DIAGNOSIS — I10 HYPERTENSION, GOAL BELOW 140/90: ICD-10-CM

## 2019-11-29 RX ORDER — FUROSEMIDE 40 MG
40 TABLET ORAL DAILY
Qty: 90 TABLET | Refills: 1 | Status: SHIPPED | OUTPATIENT
Start: 2019-11-29

## 2019-11-29 RX ORDER — FERROUS SULFATE 324(65)MG
TABLET, DELAYED RELEASE (ENTERIC COATED) ORAL
Qty: 90 TABLET | Refills: 1 | OUTPATIENT
Start: 2019-11-29

## 2019-11-29 RX ORDER — FUROSEMIDE 40 MG
40 TABLET ORAL DAILY
Qty: 90 TABLET | Refills: 3 | OUTPATIENT
Start: 2019-11-29

## 2019-11-29 NOTE — TELEPHONE ENCOUNTER
Patient was taking 65 mg, and was told to double the dose so he used up his iron rx. Requesting that new prescription be dosed at the level that was advised.     [facility] in [place]

## 2019-11-29 NOTE — TELEPHONE ENCOUNTER
Pending Prescriptions:                       Disp   Refills    furosemide (LASIX) 40 MG tablet [Pharmacy*90 tab*1            Sig: TAKE 1 TABLET (40 MG) BY MOUTH DAILY    Prescription approved per [facility] according to the [facility] Refill Protocol.    Nneka Keith, RN, BSN

## 2019-12-02 ENCOUNTER — OFFICE VISIT (OUTPATIENT)
Dept: ORTHOPEDICS | Facility: CLINIC | Age: 69
End: 2019-12-02
Payer: COMMERCIAL

## 2019-12-02 ENCOUNTER — TELEPHONE (OUTPATIENT)
Dept: ORTHOPEDICS | Facility: OTHER | Age: 69
End: 2019-12-02

## 2019-12-02 VITALS
WEIGHT: 276.5 LBS | SYSTOLIC BLOOD PRESSURE: 110 MMHG | DIASTOLIC BLOOD PRESSURE: 68 MMHG | BODY MASS INDEX: 39.58 KG/M2 | HEIGHT: 70 IN

## 2019-12-02 DIAGNOSIS — M17.12 PRIMARY OSTEOARTHRITIS OF LEFT KNEE: Primary | ICD-10-CM

## 2019-12-02 DIAGNOSIS — Z01.818 PRE-OP TESTING: Primary | ICD-10-CM

## 2019-12-02 PROCEDURE — 99207 ZZC PREOP VISIT IN GLOBAL PKG: CPT | Performed by: NURSE PRACTITIONER

## 2019-12-02 ASSESSMENT — MIFFLIN-ST. JEOR: SCORE: 2015.45

## 2019-12-02 ASSESSMENT — PAIN SCALES - GENERAL: PAINLEVEL: NO PAIN (0)

## 2019-12-02 NOTE — PROGRESS NOTES
Returns for weight check. Has lost 11 pounds. Is less than 40 on bmi. Discussed with katarina Zapata to proceed.    Otherwise doing well.   Labs and pre-op reviewed: unremarkable. Pre-op H&P recommending hospitalist rounding  Also recommended to bring CPAP.   Has plan for plavix and when to stop.   His pre-op and labs will be >30 days so will need to repeat this prior to surgery.    Will contact patient if abnormalities.  Otherwise plan to proceed.  If surgery is >30 days from today will see him back 1 week prior to surgery.   Discharge plan: direct to TCU placement. Patient states has cleared this with his insurance and does not need a certain day amount.  First choice is [facility] ([facility])  will help with these arrangements post-op.     ELFEGO Perdue, CNP  Orthopedic Surgery

## 2019-12-02 NOTE — LETTER
12/2/2019         RE: Glenroy Padilla  [address] [address]        Dear Colleague,    Thank you for referring your patient, Glenroy Padilla, to the [facility]. Please see a copy of my visit note below.    Returns for weight check. Has lost 11 pounds. Is less than 40 on bmi. Discussed with Dr. Ibrahim, ok to proceed.    Otherwise doing well.   Labs and pre-op reviewed: unremarkable. Pre-op H&P recommending hospitalist rounding  Also recommended to bring CPAP.   Has plan for plavix and when to stop.   His pre-op and labs will be >30 days so will need to repeat this prior to surgery.    Will contact patient if abnormalities.  Otherwise plan to proceed.  If surgery is >30 days from today will see him back 1 week prior to surgery.   Discharge plan: direct to TCU placement. Patient states has cleared this with his insurance and does not need a certain day amount.  First choice is [facility] ([facility])  will help with these arrangements post-op.     ELFEGO Perdue, CNP  Orthopedic Surgery        Again, thank you for allowing me to participate in the care of your patient.        Sincerely,        Mrac Ibrahim, DO

## 2019-12-02 NOTE — TELEPHONE ENCOUNTER
Type of surgery: Left total knee repalcement  Location of surgery: [facility]   Date of surgery: 12/19/19  Surgeon: Dr. Ibrahim  Pre-Op Appt Date: 12/9/19  Post-Op Appt Date: 1/2/20   Packet sent out: Surgery packet was given to patient in clinic.   Pre-cert/Authorization completed: NA  Date: 12/2/2019    *labs ordered  *patient already had soap and took class    Huyen Escalona  Surgery Scheduler

## 2019-12-03 ENCOUNTER — TELEPHONE (OUTPATIENT)
Dept: SURGERY | Facility: OTHER | Age: 69
End: 2019-12-03

## 2019-12-03 PROBLEM — Z86.2 HISTORY OF ANEMIA: Status: ACTIVE | Noted: 2019-12-03

## 2019-12-03 NOTE — H&P (VIEW-ONLY)
[facility]  [id number] [facility] [address]  [phone]  Dept: [phone]    PRE-OP EVALUATION:  Today's date: 2019    Glenroy Padilla (: 1950) presents for pre-operative evaluation assessment as requested by Dr. Ibrahim.  He requires evaluation and anesthesia risk assessment prior to undergoing surgery/procedure for treatment of left knee pain.    Fax number for surgical facility:   Primary Physician: Julio Cesar Esteban  Type of Anesthesia Anticipated: General    Patient has a Health Care Directive or Living Will:  YES FULL    Preop Questions 2019   Who is doing your surgery? luna   What are you having done? replacment   Date of Surgery/Procedure: 19   Facility or Hospital where procedure/surgery will be performed: -   1.  Do you have a history of Heart attack, stroke, stent, coronary bypass surgery, or other heart surgery? No   2.  Do you ever have any pain or discomfort in your chest? No   3.  Do you have a history of  Heart Failure? No   4.   Are you troubled by shortness of breath when:  walking on a level surface, or up a slight hill, or at night? No   5.  Do you currently have a cold, bronchitis or other respiratory infection? No   6.  Do you have a cough, shortness of breath, or wheezing? No   7.  Do you sometimes get pains in the calves of your legs when you walk? YES -    8. Do you or anyone in your family have previous history of blood clots? No   9.  Do you or does anyone in your family have a serious bleeding problem such as prolonged bleeding following surgeries or cuts? No   10. Have you ever had problems with anemia or been told to take iron pills? YES -    11. Have you had any abnormal blood loss such as black, tarry or bloody stools? No   12. Have you ever had a blood transfusion? No   13. Have you or any of your relatives ever had problems with anesthesia? No   14. Do you have sleep apnea, excessive snoring or daytime drowsiness? No   15. Do you  have any prosthetic heart valves? No   16. Do you have prosthetic joints? YES -          HPI:     HPI related to upcoming procedure: long standing left knee pain and degenerative changes.      See problem list for active medical problems.  Problems all longstanding and stable, except as noted/documented.  See ROS for pertinent symptoms related to these conditions.      MEDICAL HISTORY:     Patient Active Problem List    Diagnosis Date Noted     History of anemia 12/03/2019     Priority: Medium     Primary osteoarthritis of left knee 09/27/2019     Priority: Medium     Added automatically from request for surgery [id number]       S/P total knee replacement using cement, right 07/30/2019     Priority: Medium     MSSA (methicillin-susceptible Staphylococcus aureus) colonization 07/18/2019     Priority: Medium     Disease of spinal cord (H) 07/15/2019     Priority: Medium     Ischemic vascular disease 02/21/2019     Priority: Medium     Iron deficiency anemia secondary to inadequate dietary iron intake 08/23/2018     Priority: Medium     Mixed incontinence 08/13/2018     Priority: Medium     Lymphedema of right lower extremity 05/14/2018     Priority: Medium     Primary osteoarthritis of right knee 05/14/2018     Priority: Medium     Complete tear of left rotator cuff 04/04/2018     Priority: Medium     S/P spinal surgery 03/13/2018     Priority: Medium     Lymphedema 01/29/2018     Priority: Medium     Falls frequently 01/27/2018     Priority: Medium     Weakness of both legs 01/27/2018     Priority: Medium     Central spinal stenosis L3-4 and L4-5 01/27/2018     Priority: Medium     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 01/27/2018     Priority: Medium     Neuropathy 01/25/2018     Priority: Medium     Foot drop, right 01/25/2018     Priority: Medium     Cardiomegaly 01/25/2018     Priority: Medium     Gastroesophageal reflux disease without esophagitis 01/25/2018     Priority: Medium     Morbid obesity due to excess  calories (H) 11/20/2017     Priority: Medium     Hypertension, goal below 140/90 09/25/2017     Priority: Medium     Hyperlipidemia LDL goal <100 09/25/2017     Priority: Medium     History of coronary artery stent placement 09/25/2017     Priority: Medium     Type 2 diabetes mellitus with other circulatory complication, without long-term current use of insulin (H) 05/22/2017     Priority: Medium     Venous stasis ulcer of left lower extremity (H) 05/22/2017     Priority: Medium     Acute pain of left knee 05/22/2017     Priority: Medium     Chronic pain of right knee 05/22/2017     Priority: Medium     Open wound of left lower extremity without complication, initial encounter 05/22/2017     Priority: Medium     Hx of heart artery stent 05/22/2017     Priority: Medium     DAYTON (obstructive sleep apnea) 05/22/2017     Priority: Medium     Hx of coronary artery disease 03/12/2016     Priority: Medium      Past Medical History:   Diagnosis Date     Acute pain of left knee 5/22/2017     Chronic pain of right knee 5/22/2017     Hx of coronary artery disease 5/22/2017     Hx of heart artery stent 5/22/2017     Mixed incontinence 8/13/2018     Obesity, morbid, BMI 40.0-49.9 (H) 11/20/2017     Open wound of left lower extremity without complication, initial encounter 5/22/2017     DAYTON (obstructive sleep apnea) 5/22/2017     Type 2 diabetes mellitus with other circulatory complication, without long-term current use of insulin (H) 5/22/2017     Venous stasis ulcer of left lower extremity (H) 5/22/2017     Venous stasis ulcer of left lower extremity (H) 5/22/2017     Past Surgical History:   Procedure Laterality Date     ARTHROPLASTY KNEE Right 7/30/2019    Procedure: Right total knee replacement;  Surgeon: Marc Ibrahim DO;  Location: [facility] OR     CARDIAC SURGERY      stent placement     CHOLECYSTECTOMY       LAMINECTOMY LUMBAR THREE+ LEVELS N/A 3/13/2018    Procedure: LAMINECTOMY LUMBAR THREE+ LEVELS;  T5-9  LAMINECTOMIES, RESECTION OF EPIDURAL LIPOMATOSIS;  Surgeon: Hugh Mendieta MD;  Location:  OR     Current Outpatient Medications   Medication Sig Dispense Refill     acetaminophen (TYLENOL) 325 MG tablet Take 3 tablets (975 mg) by mouth every 8 hours 100 tablet 0     blood glucose (NO BRAND SPECIFIED) lancing device Use to test blood sugars 2 times daily or as directed. 1 each 0     blood glucose calibration (NO BRAND SPECIFIED) solution To accompany: Blood Glucose Monitor Brands: per insurance. 1 Bottle 3     blood glucose monitoring (NO BRAND SPECIFIED) meter device kit test blood sugar 2 xdaily or as directed.  Blood Glucose Monitor Brands: per insurance. 1 kit 0     blood glucose monitoring (NO BRAND SPECIFIED) test strip Use to test blood sugar 2 times daily Blood Glucose Monitor Brands: Glucocard Expression 100 strip 6     blood glucose monitoring (NO BRAND SPECIFIED) test strip Use to test blood sugar 2x  daily or as directed. To accompany: Blood Glucose Monitor Brands: per insurance. 100 strip 1     Blood Glucose Monitoring Suppl (GLUCOCARD EXPRESSION MONITOR) W/DEVICE KIT USE TWICE DAILY TO TEST BLOOD GLUCOSE  0     clopidogrel (PLAVIX) 75 MG tablet Take 1 tablet (75 mg) by mouth daily 90 tablet 1     Ferrous Sulfate Dried (GNP IRON) 200 (65 Fe) MG TABS Take 650 mg by mouth every morning 180 tablet 1     furosemide (LASIX) 40 MG tablet TAKE 1 TABLET (40 MG) BY MOUTH DAILY 90 tablet 1     JANUMET XR  MG TB24 TAKE 2 TABLETS BY MOUTH DAILY (WITH BREAKFAST) 180 tablet 0     losartan (COZAAR) 25 MG tablet   11     metoprolol tartrate (LOPRESSOR) 25 MG tablet Take 1 tablet (25 mg) by mouth 2 times daily 180 tablet 3     Nutritional Supplements (ENSURE ACTIVE HIGH PROTEIN) LIQD Take 1 Can by mouth daily 30 each 1     order for DME Equipment being ordered: VASO PRESS-DVT PROPHYLAXIS SYSTEM- SEQUENTIAL 1 Device 0     order for DME Equipment being ordered: VASO PRESS-DVT PROPHYLAXIS SYSTEM or SUBSTITUTE  BRAND SEQUENTIAL PUMP 1 Device 0     order for DME Equipment being ordered: Wheelchair of appropriate style and size 1 Device 0     order for DME Equipment being ordered: VASO PRESS-DVT PROPHYLAXIS SYSTEM                                             APPLY AT HS TO BILATERAL LOWER EXREMITIES 1 Units 0     order for DME Equipment being ordered: Replacement battery or batteries for an [employer] Pronto M 51. 1 Device 0     order for DME Equipment being ordered: electric chair for sleeping and adjustment to allow for elevation of legs above the heart.  Zero gravity type heavy duty sleep chair advised. MaxiComforter ([id number]) 1 Units 0     order for DME Equipment being ordered: Needs to have a sleep chair for home use. 1 Device 0     order for DME Compression garments toe to knee.  Specific brand is Compraflex Light Compression garment.   Diagnosis codes for venous stasis ulcer I83.029 and for  Lymphoedema is I89.0   Goal is for compression of about 30 to 40 mm of mercury   measurements are right ankle 28.5 cm and right calf is 45.5 cm.  Left ankle is 27.5 cm and the calf is 45 cm   Tall for length. 2 Device 1     order for DME Equipment being ordered: compression stockings below knee and above knee if available.  Medium compression. 1 Units 1     rosuvastatin (CRESTOR) 10 MG tablet TAKE 1 TABLET (10 MG) BY MOUTH DAILY 90 tablet 0     sitagliptin-metFORMIN (JANUMET)  MG tablet Take 1 tablet by mouth 2 times daily (with meals) This is actually Janumet XR       thin (NO BRAND SPECIFIED) lancets Test 2 x daily or as directed.  Brands: per insurance. 100 each 1     OTC products: no recent use of OTC ASA, NSAIDS or Steroids    Allergies   Allergen Reactions     No Known Allergies       Latex Allergy: NO    Social History     Tobacco Use     Smoking status: Former Smoker     Packs/day: 0.00     Types: Cigars     Smokeless tobacco: Never Used     Tobacco comment: exposure to second hand smoke   Substance Use Topics      Alcohol use: No     History   Drug Use No       REVIEW OF SYSTEMS:   CONSTITUTIONAL: NEGATIVE for fever, chills, change in weight  INTEGUMENTARY/SKIN: NEGATIVE for worrisome rashes, moles or lesions  EYES: NEGATIVE for vision changes or irritation  ENT/MOUTH: NEGATIVE for ear, mouth and throat problems  RESP: NEGATIVE for significant cough or SOB  BREAST: NEGATIVE for masses, tenderness or discharge  CV: NEGATIVE for chest pain, palpitations or peripheral edema  GI: NEGATIVE for nausea, abdominal pain, heartburn, or change in bowel habits  : NEGATIVE for frequency, dysuria, or hematuria  MUSCULOSKELETAL: NEGATIVE for significant arthralgias or myalgia  NEURO: NEGATIVE for weakness, dizziness or paresthesias  ENDOCRINE: NEGATIVE for temperature intolerance, skin/hair changes  HEME: NEGATIVE for bleeding problems  PSYCHIATRIC: NEGATIVE for changes in mood or affect    EXAM:   /60   Pulse 80   Temp 98.2  F (36.8  C) (Temporal)   Resp 18   Wt 124.7 kg (274 lb 14.4 oz)   SpO2 98%   BMI 39.44 kg/m      GENERAL APPEARANCE: healthy, alert and no distress     EYES: EOMI,  PERRL     HENT: ear canals and TM's normal and nose and mouth without ulcers or lesions     NECK: no adenopathy, no asymmetry, masses, or scars and thyroid normal to palpation     RESP: lungs clear to auscultation - no rales, rhonchi or wheezes     CV: regular rates and rhythm, normal S1 S2, no S3 or S4 and no murmur, click or rub     ABDOMEN:  soft, nontender, no HSM or masses and bowel sounds normal     MS: extremities normal- no gross deformities noted, no evidence of inflammation in joints, FROM in all extremities.     SKIN: no suspicious lesions or rashes     NEURO: Normal strength and tone, sensory exam grossly normal, mentation intact and speech normal     PSYCH: mentation appears normal. and affect normal/bright     LYMPHATICS: No cervical adenopathy    DIAGNOSTICS:     EKG: appears normal, NSR, normal axis, normal intervals, no acute  ST/T changes c/w ischemia, no LVH by voltage criteria, nonspecific intraventricular conduction delay, unchanged from previous tracings  MRSA/MSSA screening for elective joint replacement surgery  Labs Drawn and in Process:   Unresulted Labs Ordered in the Past 30 Days of this Admission     Date and Time Order Name Status Description    12/9/2019 1110 MRSA MSSA PRESURGICAL SCREEN In process           Recent Labs   Lab Test 11/04/19  0830 10/17/19  0744 08/29/19  1143 08/07/19  0826  07/15/19  0852   HGB 10.8* 10.1* 9.2*  --    < > 10.9*    325 319  --   --  295   NA  --   --  138 139  --  141   POTASSIUM  --   --  4.0 4.1  --  4.1   CR  --   --  1.13 0.99  --  1.23   A1C 5.0  --   --   --   --  5.6    < > = values in this interval not displayed.        IMPRESSION:   Reason for surgery/procedure: Intervention related to chronic pain of the left knee and related degenerative osteoarthritis of same.  Diagnosis/reason for consult: Anesthesia/surgical clearance.    The proposed surgical procedure is considered INTERMEDIATE risk.    REVISED CARDIAC RISK INDEX  The patient has the following serious cardiovascular risks for perioperative complications such as (MI, PE, VFib and 3  AV Block):  No serious cardiac risks  INTERPRETATION: 1 risks: Class II (low risk - 0.9% complication rate)    The patient has the following additional risks for perioperative complications:  No identified additional risks  The 10-year ASCVD risk score (Radhapaul WYNNE Jr., et al., 2013) is: 33.2%    Values used to calculate the score:      Age: 69 years      Sex: Male      Is Non- : No      Diabetic: Yes      Tobacco smoker: No      Systolic Blood Pressure: 122 mmHg      Is BP treated: Yes      HDL Cholesterol: 40 mg/dL      Total Cholesterol: 134 mg/dL      ICD-10-CM    1. Preop general physical exam Z01.818    2. Chronic pain of left knee M25.562     G89.29    3. Disease of spinal cord (H) G95.9    4. Neuropathy G62.9    5.  DAYTON (obstructive sleep apnea) G47.33    6. Morbid obesity due to excess calories (H) E66.01    7. Gastroesophageal reflux disease without esophagitis K21.9    8. Pre-op testing Z01.818 MRSA MSSA Presurgical Screen     CBC with platelets differential     CANCELED: UA reflex to Microscopic and Culture   9. Hyperlipidemia LDL goal <100 E78.5    10. Type 2 diabetes mellitus with other circulatory complication, without long-term current use of insulin (H) E11.59    11. Hypertension, goal below 140/90 I10    12. Primary osteoarthritis of left knee M17.12    13. MSSA (methicillin-susceptible Staphylococcus aureus) colonization Z22.321        RECOMMENDATIONS:     --Consult hospital rounder / IM to assist post-op medical management should inpatient stay be needed.    Obstructive Sleep Apnea (or suspected sleep apnea)  Patient is to bring their home CPAP with them on the day of surgery      --Patient is to take all scheduled medications on the day of surgery EXCEPT for modifications listed below.    Diabetes Medication Use  -----Hold usual oral and non-insulin diabetic meds (e.g. Metformin, Actos, Glipizide) while NPO.       Anticoagulant or Antiplatelet Medication Use  PLAVIX: No contraindication to stopping plavix.  Stop 7-10 days prior to surgery        ACE Inhibitor or Angiotensin Receptor Blocker (ARB) Use  Ace inhibitor or Angiotensin Receptor Blocker (ARB) and should HOLD this medication for the 24 hours prior to surgery.      APPROVAL GIVEN to proceed with proposed procedure, without further diagnostic evaluation       Signed Electronically by: Julio Cesar Sahu PA-C    Copy of this evaluation report is provided to requesting physician.    [facility] Preop Guidelines    Revised Cardiac Risk Index

## 2019-12-03 NOTE — TELEPHONE ENCOUNTER
Reason for Call:  Other call back    Detailed comments: Patient calling back with questions on the order for leg pump.  Please call    Phone Number Patient can be reached at: Home number on file [phone] (home)    Best Time: any      Can we leave a detailed message on this number? YES    Call taken on 12/3/2019 at 2:49 PM by Stephanie Leone

## 2019-12-03 NOTE — TELEPHONE ENCOUNTER
Routed back to Counts include 234 beds at the [facility] for prescription as he is back in office today.

## 2019-12-03 NOTE — PROGRESS NOTES
[facility]  [address] [facility] [address]  [phone]  Dept: [phone]    PRE-OP EVALUATION:  Today's date: 2019    Glenroy Padilla (: 1950) presents for pre-operative evaluation assessment as requested by Dr. Ibrahim.  He requires evaluation and anesthesia risk assessment prior to undergoing surgery/procedure for treatment of left knee pain.    Fax number for surgical facility:   Primary Physician: Julio Cesar Esteban  Type of Anesthesia Anticipated: General    Patient has a Health Care Directive or Living Will:  YES FULL    Preop Questions 2019   Who is doing your surgery? luna   What are you having done? replacment   Date of Surgery/Procedure: 19   Facility or Hospital where procedure/surgery will be performed: -   1.  Do you have a history of Heart attack, stroke, stent, coronary bypass surgery, or other heart surgery? No   2.  Do you ever have any pain or discomfort in your chest? No   3.  Do you have a history of  Heart Failure? No   4.   Are you troubled by shortness of breath when:  walking on a level surface, or up a slight hill, or at night? No   5.  Do you currently have a cold, bronchitis or other respiratory infection? No   6.  Do you have a cough, shortness of breath, or wheezing? No   7.  Do you sometimes get pains in the calves of your legs when you walk? YES -    8. Do you or anyone in your family have previous history of blood clots? No   9.  Do you or does anyone in your family have a serious bleeding problem such as prolonged bleeding following surgeries or cuts? No   10. Have you ever had problems with anemia or been told to take iron pills? YES -    11. Have you had any abnormal blood loss such as black, tarry or bloody stools? No   12. Have you ever had a blood transfusion? No   13. Have you or any of your relatives ever had problems with anesthesia? No   14. Do you have sleep apnea, excessive snoring or daytime drowsiness? No   15. Do you  have any prosthetic heart valves? No   16. Do you have prosthetic joints? YES -          HPI:     HPI related to upcoming procedure: long standing left knee pain and degenerative changes.      See problem list for active medical problems.  Problems all longstanding and stable, except as noted/documented.  See ROS for pertinent symptoms related to these conditions.      MEDICAL HISTORY:     Patient Active Problem List    Diagnosis Date Noted     History of anemia 12/03/2019     Priority: Medium     Primary osteoarthritis of left knee 09/27/2019     Priority: Medium     Added automatically from request for surgery [id number]       S/P total knee replacement using cement, right 07/30/2019     Priority: Medium     MSSA (methicillin-susceptible Staphylococcus aureus) colonization 07/18/2019     Priority: Medium     Disease of spinal cord (H) 07/15/2019     Priority: Medium     Ischemic vascular disease 02/21/2019     Priority: Medium     Iron deficiency anemia secondary to inadequate dietary iron intake 08/23/2018     Priority: Medium     Mixed incontinence 08/13/2018     Priority: Medium     Lymphedema of right lower extremity 05/14/2018     Priority: Medium     Primary osteoarthritis of right knee 05/14/2018     Priority: Medium     Complete tear of left rotator cuff 04/04/2018     Priority: Medium     S/P spinal surgery 03/13/2018     Priority: Medium     Lymphedema 01/29/2018     Priority: Medium     Falls frequently 01/27/2018     Priority: Medium     Weakness of both legs 01/27/2018     Priority: Medium     Central spinal stenosis L3-4 and L4-5 01/27/2018     Priority: Medium     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 01/27/2018     Priority: Medium     Neuropathy 01/25/2018     Priority: Medium     Foot drop, right 01/25/2018     Priority: Medium     Cardiomegaly 01/25/2018     Priority: Medium     Gastroesophageal reflux disease without esophagitis 01/25/2018     Priority: Medium     Morbid obesity due to excess  calories (H) 11/20/2017     Priority: Medium     Hypertension, goal below 140/90 09/25/2017     Priority: Medium     Hyperlipidemia LDL goal <100 09/25/2017     Priority: Medium     History of coronary artery stent placement 09/25/2017     Priority: Medium     Type 2 diabetes mellitus with other circulatory complication, without long-term current use of insulin (H) 05/22/2017     Priority: Medium     Venous stasis ulcer of left lower extremity (H) 05/22/2017     Priority: Medium     Acute pain of left knee 05/22/2017     Priority: Medium     Chronic pain of right knee 05/22/2017     Priority: Medium     Open wound of left lower extremity without complication, initial encounter 05/22/2017     Priority: Medium     Hx of heart artery stent 05/22/2017     Priority: Medium     DAYTON (obstructive sleep apnea) 05/22/2017     Priority: Medium     Hx of coronary artery disease 03/12/2016     Priority: Medium      Past Medical History:   Diagnosis Date     Acute pain of left knee 5/22/2017     Chronic pain of right knee 5/22/2017     Hx of coronary artery disease 5/22/2017     Hx of heart artery stent 5/22/2017     Mixed incontinence 8/13/2018     Obesity, morbid, BMI 40.0-49.9 (H) 11/20/2017     Open wound of left lower extremity without complication, initial encounter 5/22/2017     DAYTON (obstructive sleep apnea) 5/22/2017     Type 2 diabetes mellitus with other circulatory complication, without long-term current use of insulin (H) 5/22/2017     Venous stasis ulcer of left lower extremity (H) 5/22/2017     Venous stasis ulcer of left lower extremity (H) 5/22/2017     Past Surgical History:   Procedure Laterality Date     ARTHROPLASTY KNEE Right 7/30/2019    Procedure: Right total knee replacement;  Surgeon: Marc Ibrahim DO;  Location: [facility] OR     CARDIAC SURGERY      stent placement     CHOLECYSTECTOMY       LAMINECTOMY LUMBAR THREE+ LEVELS N/A 3/13/2018    Procedure: LAMINECTOMY LUMBAR THREE+ LEVELS;  T5-9  LAMINECTOMIES, RESECTION OF EPIDURAL LIPOMATOSIS;  Surgeon: Hugh Mendieta MD;  Location:  OR     Current Outpatient Medications   Medication Sig Dispense Refill     acetaminophen (TYLENOL) 325 MG tablet Take 3 tablets (975 mg) by mouth every 8 hours 100 tablet 0     blood glucose (NO BRAND SPECIFIED) lancing device Use to test blood sugars 2 times daily or as directed. 1 each 0     blood glucose calibration (NO BRAND SPECIFIED) solution To accompany: Blood Glucose Monitor Brands: per insurance. 1 Bottle 3     blood glucose monitoring (NO BRAND SPECIFIED) meter device kit test blood sugar 2 xdaily or as directed.  Blood Glucose Monitor Brands: per insurance. 1 kit 0     blood glucose monitoring (NO BRAND SPECIFIED) test strip Use to test blood sugar 2 times daily Blood Glucose Monitor Brands: Glucocard Expression 100 strip 6     blood glucose monitoring (NO BRAND SPECIFIED) test strip Use to test blood sugar 2x  daily or as directed. To accompany: Blood Glucose Monitor Brands: per insurance. 100 strip 1     Blood Glucose Monitoring Suppl (GLUCOCARD EXPRESSION MONITOR) W/DEVICE KIT USE TWICE DAILY TO TEST BLOOD GLUCOSE  0     clopidogrel (PLAVIX) 75 MG tablet Take 1 tablet (75 mg) by mouth daily 90 tablet 1     Ferrous Sulfate Dried (GNP IRON) 200 (65 Fe) MG TABS Take 650 mg by mouth every morning 180 tablet 1     furosemide (LASIX) 40 MG tablet TAKE 1 TABLET (40 MG) BY MOUTH DAILY 90 tablet 1     JANUMET XR  MG TB24 TAKE 2 TABLETS BY MOUTH DAILY (WITH BREAKFAST) 180 tablet 0     losartan (COZAAR) 25 MG tablet   11     metoprolol tartrate (LOPRESSOR) 25 MG tablet Take 1 tablet (25 mg) by mouth 2 times daily 180 tablet 3     Nutritional Supplements (ENSURE ACTIVE HIGH PROTEIN) LIQD Take 1 Can by mouth daily 30 each 1     order for DME Equipment being ordered: VASO PRESS-DVT PROPHYLAXIS SYSTEM- SEQUENTIAL 1 Device 0     order for DME Equipment being ordered: VASO PRESS-DVT PROPHYLAXIS SYSTEM or SUBSTITUTE  BRAND SEQUENTIAL PUMP 1 Device 0     order for DME Equipment being ordered: Wheelchair of appropriate style and size 1 Device 0     order for DME Equipment being ordered: VASO PRESS-DVT PROPHYLAXIS SYSTEM                                             APPLY AT HS TO BILATERAL LOWER EXREMITIES 1 Units 0     order for DME Equipment being ordered: Replacement battery or batteries for an [employer] Pronto M 51. 1 Device 0     order for DME Equipment being ordered: electric chair for sleeping and adjustment to allow for elevation of legs above the heart.  Zero gravity type heavy duty sleep chair advised. MaxiComforter ([id number]) 1 Units 0     order for DME Equipment being ordered: Needs to have a sleep chair for home use. 1 Device 0     order for DME Compression garments toe to knee.  Specific brand is Compraflex Light Compression garment.   Diagnosis codes for venous stasis ulcer I83.029 and for  Lymphoedema is I89.0   Goal is for compression of about 30 to 40 mm of mercury   measurements are right ankle 28.5 cm and right calf is 45.5 cm.  Left ankle is 27.5 cm and the calf is 45 cm   Tall for length. 2 Device 1     order for DME Equipment being ordered: compression stockings below knee and above knee if available.  Medium compression. 1 Units 1     rosuvastatin (CRESTOR) 10 MG tablet TAKE 1 TABLET (10 MG) BY MOUTH DAILY 90 tablet 0     sitagliptin-metFORMIN (JANUMET)  MG tablet Take 1 tablet by mouth 2 times daily (with meals) This is actually Janumet XR       thin (NO BRAND SPECIFIED) lancets Test 2 x daily or as directed.  Brands: per insurance. 100 each 1     OTC products: no recent use of OTC ASA, NSAIDS or Steroids    Allergies   Allergen Reactions     No Known Allergies       Latex Allergy: NO    Social History     Tobacco Use     Smoking status: Former Smoker     Packs/day: 0.00     Types: Cigars     Smokeless tobacco: Never Used     Tobacco comment: exposure to second hand smoke   Substance Use Topics      Alcohol use: No     History   Drug Use No       REVIEW OF SYSTEMS:   CONSTITUTIONAL: NEGATIVE for fever, chills, change in weight  INTEGUMENTARY/SKIN: NEGATIVE for worrisome rashes, moles or lesions  EYES: NEGATIVE for vision changes or irritation  ENT/MOUTH: NEGATIVE for ear, mouth and throat problems  RESP: NEGATIVE for significant cough or SOB  BREAST: NEGATIVE for masses, tenderness or discharge  CV: NEGATIVE for chest pain, palpitations or peripheral edema  GI: NEGATIVE for nausea, abdominal pain, heartburn, or change in bowel habits  : NEGATIVE for frequency, dysuria, or hematuria  MUSCULOSKELETAL: NEGATIVE for significant arthralgias or myalgia  NEURO: NEGATIVE for weakness, dizziness or paresthesias  ENDOCRINE: NEGATIVE for temperature intolerance, skin/hair changes  HEME: NEGATIVE for bleeding problems  PSYCHIATRIC: NEGATIVE for changes in mood or affect    EXAM:   /60   Pulse 80   Temp 98.2  F (36.8  C) (Temporal)   Resp 18   Wt 124.7 kg (274 lb 14.4 oz)   SpO2 98%   BMI 39.44 kg/m      GENERAL APPEARANCE: healthy, alert and no distress     EYES: EOMI,  PERRL     HENT: ear canals and TM's normal and nose and mouth without ulcers or lesions     NECK: no adenopathy, no asymmetry, masses, or scars and thyroid normal to palpation     RESP: lungs clear to auscultation - no rales, rhonchi or wheezes     CV: regular rates and rhythm, normal S1 S2, no S3 or S4 and no murmur, click or rub     ABDOMEN:  soft, nontender, no HSM or masses and bowel sounds normal     MS: extremities normal- no gross deformities noted, no evidence of inflammation in joints, FROM in all extremities.     SKIN: no suspicious lesions or rashes     NEURO: Normal strength and tone, sensory exam grossly normal, mentation intact and speech normal     PSYCH: mentation appears normal. and affect normal/bright     LYMPHATICS: No cervical adenopathy    DIAGNOSTICS:     EKG: appears normal, NSR, normal axis, normal intervals, no acute  ST/T changes c/w ischemia, no LVH by voltage criteria, nonspecific intraventricular conduction delay, unchanged from previous tracings  MRSA/MSSA screening for elective joint replacement surgery  Labs Drawn and in Process:   Unresulted Labs Ordered in the Past 30 Days of this Admission     Date and Time Order Name Status Description    12/9/2019 1110 MRSA MSSA PRESURGICAL SCREEN In process           Recent Labs   Lab Test 11/04/19  0830 10/17/19  0744 08/29/19  1143 08/07/19  0826  07/15/19  0852   HGB 10.8* 10.1* 9.2*  --    < > 10.9*    325 319  --   --  295   NA  --   --  138 139  --  141   POTASSIUM  --   --  4.0 4.1  --  4.1   CR  --   --  1.13 0.99  --  1.23   A1C 5.0  --   --   --   --  5.6    < > = values in this interval not displayed.        IMPRESSION:   Reason for surgery/procedure: Intervention related to chronic pain of the left knee and related degenerative osteoarthritis of same.  Diagnosis/reason for consult: Anesthesia/surgical clearance.    The proposed surgical procedure is considered INTERMEDIATE risk.    REVISED CARDIAC RISK INDEX  The patient has the following serious cardiovascular risks for perioperative complications such as (MI, PE, VFib and 3  AV Block):  No serious cardiac risks  INTERPRETATION: 1 risks: Class II (low risk - 0.9% complication rate)    The patient has the following additional risks for perioperative complications:  No identified additional risks  The 10-year ASCVD risk score (Radhapaul WYNNE Jr., et al., 2013) is: 33.2%    Values used to calculate the score:      Age: 69 years      Sex: Male      Is Non- : No      Diabetic: Yes      Tobacco smoker: No      Systolic Blood Pressure: 122 mmHg      Is BP treated: Yes      HDL Cholesterol: 40 mg/dL      Total Cholesterol: 134 mg/dL      ICD-10-CM    1. Preop general physical exam Z01.818    2. Chronic pain of left knee M25.562     G89.29    3. Disease of spinal cord (H) G95.9    4. Neuropathy G62.9    5.  DAYTON (obstructive sleep apnea) G47.33    6. Morbid obesity due to excess calories (H) E66.01    7. Gastroesophageal reflux disease without esophagitis K21.9    8. Pre-op testing Z01.818 MRSA MSSA Presurgical Screen     CBC with platelets differential     CANCELED: UA reflex to Microscopic and Culture   9. Hyperlipidemia LDL goal <100 E78.5    10. Type 2 diabetes mellitus with other circulatory complication, without long-term current use of insulin (H) E11.59    11. Hypertension, goal below 140/90 I10    12. Primary osteoarthritis of left knee M17.12    13. MSSA (methicillin-susceptible Staphylococcus aureus) colonization Z22.321        RECOMMENDATIONS:     --Consult hospital rounder / IM to assist post-op medical management should inpatient stay be needed.    Obstructive Sleep Apnea (or suspected sleep apnea)  Patient is to bring their home CPAP with them on the day of surgery      --Patient is to take all scheduled medications on the day of surgery EXCEPT for modifications listed below.    Diabetes Medication Use  -----Hold usual oral and non-insulin diabetic meds (e.g. Metformin, Actos, Glipizide) while NPO.       Anticoagulant or Antiplatelet Medication Use  PLAVIX: No contraindication to stopping plavix.  Stop 7-10 days prior to surgery        ACE Inhibitor or Angiotensin Receptor Blocker (ARB) Use  Ace inhibitor or Angiotensin Receptor Blocker (ARB) and should HOLD this medication for the 24 hours prior to surgery.      APPROVAL GIVEN to proceed with proposed procedure, without further diagnostic evaluation       Signed Electronically by: Julio Cesar Sahu PA-C    Copy of this evaluation report is provided to requesting physician.    [facility] Preop Guidelines    Revised Cardiac Risk Index

## 2019-12-03 NOTE — TELEPHONE ENCOUNTER
RN Patient Contact    Attempt # 1    Was call answered?  No.  Left message on voicemail with information to call me back.    Albina STEINER, Lead RN, BSN. . .  12/3/2019, 4:36 PM

## 2019-12-04 NOTE — TELEPHONE ENCOUNTER
"Upon return call, patient said \"it has all been resolved, I have an appointment at 9:30am tomorrow\".    Keep this message open as a reminder to send in requested information after OV appt tomorrow.  Meme Beth RN on 12/4/2019 at 5:00 PM    "

## 2019-12-04 NOTE — TELEPHONE ENCOUNTER
"Writer called patient back who is frustrated with having to come back to clinic in effort to get these leg pumps approved. He says he was just in to see Rl around Nov 8th 2019. I don't see an encounter. Patient says \"I was in to see Rl and Patrice on the same day\"... \"I saw benito and she will tell you. She saw me and told me she was working on the paperwork\"    I will talk with benito this afternoon and get back to patient.    Meme Beth RN on 12/4/2019 at 9:05 AM    "

## 2019-12-04 NOTE — TELEPHONE ENCOUNTER
I spoke with Hui BLACK who remembers seeing patient while working in Dr. Ibrahim's clinic in November. She remembers working on a form for him. She did send the OV note from Sept to [employer] replying back to us saying they need additional information added to the office visit note. MD would like to see patient in clinic to discuss the treatments.    Return call to patient. After verifying patient name in voicemail, left a message with the above information. Verified appt time for tomorrow.  Meme Beth RN on 12/4/2019 at 1:25 PM

## 2019-12-05 ENCOUNTER — OFFICE VISIT (OUTPATIENT)
Dept: SURGERY | Facility: CLINIC | Age: 69
End: 2019-12-05
Payer: COMMERCIAL

## 2019-12-05 ENCOUNTER — MEDICAL CORRESPONDENCE (OUTPATIENT)
Dept: HEALTH INFORMATION MANAGEMENT | Facility: CLINIC | Age: 69
End: 2019-12-05

## 2019-12-05 VITALS
WEIGHT: 279.2 LBS | HEART RATE: 90 BPM | BODY MASS INDEX: 40.06 KG/M2 | OXYGEN SATURATION: 97 % | SYSTOLIC BLOOD PRESSURE: 124 MMHG | DIASTOLIC BLOOD PRESSURE: 62 MMHG

## 2019-12-05 DIAGNOSIS — I87.2 STASIS DERMATITIS OF BOTH LEGS: ICD-10-CM

## 2019-12-05 DIAGNOSIS — I89.0 LYMPHEDEMA OF BOTH LOWER EXTREMITIES: Primary | ICD-10-CM

## 2019-12-05 PROCEDURE — 99213 OFFICE O/P EST LOW 20 MIN: CPT | Performed by: SPECIALIST

## 2019-12-05 NOTE — TELEPHONE ENCOUNTER
Dr. Shine's OV note with DX of Lymphedema was faxed to [facility] Medical Attn: Jazmin Wheatley per [employer] request.  Copy of form sent to scanning and copy placed in surgery drawer........................Hui Ashraf CMA  ([facility])

## 2019-12-05 NOTE — PROGRESS NOTES
F/U for leg edema      Subjective:  Patient is well-known to me for bilateral lower extremity edema and wounds.  There were insurance issues with his chair and he cannot elevate his legs above his heart at night.  He does wear ready wraps during the day.  He now presents for prescription for a compression boot system.  He would like a prescription for it.  He reports no wounds at this time.    Objective:  B/P: 124/62, T: Data Unavailable, P: 90, R: Data Unavailable  LE - +1 edema,  B/l stasis changes.  Dry skin    Assessment/plan:  This is a 69-year-old gentleman well-known to me for lower extremely wounds that are all healed.  There were insurance issues with a special chair to elevate his legs above his heart and he would like to try the vasopressor DVT prophylaxis system at night.  The patient was given a prescription for that.  He will follow-up with me as necessary.    Casey Shine MD, FACS

## 2019-12-05 NOTE — LETTER
12/5/2019         RE: Glenroy Padilla  [address] [address]        Dear Colleague,    Thank you for referring your patient, Glenroy Padilla, to the [facility]. Please see a copy of my visit note below.    F/U for leg edema      Subjective:  Patient is well-known to me for bilateral lower extremity edema and wounds.  There were insurance issues with his chair and he cannot elevate his legs above his heart at night.  He does wear ready wraps during the day.  He now presents for prescription for a compression boot system.  He would like a prescription for it.  He reports no wounds at this time.    Objective:  B/P: 124/62, T: Data Unavailable, P: 90, R: Data Unavailable  LE - +1 edema,  B/l stasis changes.  Dry skin    Assessment/plan:  This is a 69-year-old gentleman well-known to me for lower extremely wounds that are all healed.  There were insurance issues with a special chair to elevate his legs above his heart and he would like to try the vasopressor DVT prophylaxis system at night.  The patient was given a prescription for that.  He will follow-up with me as necessary.    Casey Shine MD, FACS            Again, thank you for allowing me to participate in the care of your patient.        Sincerely,        Casey Shine MD

## 2019-12-09 ENCOUNTER — OFFICE VISIT (OUTPATIENT)
Dept: FAMILY MEDICINE | Facility: OTHER | Age: 69
End: 2019-12-09
Payer: COMMERCIAL

## 2019-12-09 VITALS
WEIGHT: 274.9 LBS | DIASTOLIC BLOOD PRESSURE: 60 MMHG | TEMPERATURE: 98.2 F | BODY MASS INDEX: 39.44 KG/M2 | RESPIRATION RATE: 18 BRPM | SYSTOLIC BLOOD PRESSURE: 122 MMHG | HEART RATE: 80 BPM | OXYGEN SATURATION: 98 %

## 2019-12-09 DIAGNOSIS — Z01.818 PRE-OP TESTING: ICD-10-CM

## 2019-12-09 DIAGNOSIS — K21.9 GASTROESOPHAGEAL REFLUX DISEASE WITHOUT ESOPHAGITIS: ICD-10-CM

## 2019-12-09 DIAGNOSIS — G62.9 NEUROPATHY: ICD-10-CM

## 2019-12-09 DIAGNOSIS — G95.9 DISEASE OF SPINAL CORD (H): ICD-10-CM

## 2019-12-09 DIAGNOSIS — E78.5 HYPERLIPIDEMIA LDL GOAL <100: ICD-10-CM

## 2019-12-09 DIAGNOSIS — E11.59 TYPE 2 DIABETES MELLITUS WITH OTHER CIRCULATORY COMPLICATION, WITHOUT LONG-TERM CURRENT USE OF INSULIN (H): ICD-10-CM

## 2019-12-09 DIAGNOSIS — Z01.818 PREOP GENERAL PHYSICAL EXAM: Primary | ICD-10-CM

## 2019-12-09 DIAGNOSIS — M17.12 PRIMARY OSTEOARTHRITIS OF LEFT KNEE: ICD-10-CM

## 2019-12-09 DIAGNOSIS — E66.01 MORBID OBESITY DUE TO EXCESS CALORIES (H): ICD-10-CM

## 2019-12-09 DIAGNOSIS — M25.562 CHRONIC PAIN OF LEFT KNEE: ICD-10-CM

## 2019-12-09 DIAGNOSIS — I10 HYPERTENSION, GOAL BELOW 140/90: ICD-10-CM

## 2019-12-09 DIAGNOSIS — G89.29 CHRONIC PAIN OF LEFT KNEE: ICD-10-CM

## 2019-12-09 DIAGNOSIS — G47.33 OSA (OBSTRUCTIVE SLEEP APNEA): ICD-10-CM

## 2019-12-09 DIAGNOSIS — Z22.321 MSSA (METHICILLIN-SUSCEPTIBLE STAPHYLOCOCCUS AUREUS) COLONIZATION: ICD-10-CM

## 2019-12-09 LAB
ALBUMIN UR-MCNC: 30 MG/DL
APPEARANCE UR: CLEAR
BACTERIA #/AREA URNS HPF: ABNORMAL /HPF
BASOPHILS # BLD AUTO: 0 10E9/L (ref 0–0.2)
BASOPHILS NFR BLD AUTO: 0.5 %
BILIRUB UR QL STRIP: NEGATIVE
COLOR UR AUTO: YELLOW
DIFFERENTIAL METHOD BLD: ABNORMAL
EOSINOPHIL # BLD AUTO: 0.2 10E9/L (ref 0–0.7)
EOSINOPHIL NFR BLD AUTO: 2.6 %
ERYTHROCYTE [DISTWIDTH] IN BLOOD BY AUTOMATED COUNT: 16.5 % (ref 10–15)
GLUCOSE UR STRIP-MCNC: NEGATIVE MG/DL
HCT VFR BLD AUTO: 33.7 % (ref 40–53)
HGB BLD-MCNC: 10.1 G/DL (ref 13.3–17.7)
HGB UR QL STRIP: ABNORMAL
KETONES UR STRIP-MCNC: NEGATIVE MG/DL
LEUKOCYTE ESTERASE UR QL STRIP: ABNORMAL
LYMPHOCYTES # BLD AUTO: 0.8 10E9/L (ref 0.8–5.3)
LYMPHOCYTES NFR BLD AUTO: 10.4 %
MCH RBC QN AUTO: 26.3 PG (ref 26.5–33)
MCHC RBC AUTO-ENTMCNC: 30 G/DL (ref 31.5–36.5)
MCV RBC AUTO: 88 FL (ref 78–100)
MONOCYTES # BLD AUTO: 0.7 10E9/L (ref 0–1.3)
MONOCYTES NFR BLD AUTO: 10 %
NEUTROPHILS # BLD AUTO: 5.6 10E9/L (ref 1.6–8.3)
NEUTROPHILS NFR BLD AUTO: 76.5 %
NITRATE UR QL: NEGATIVE
NON-SQ EPI CELLS #/AREA URNS LPF: ABNORMAL /LPF
PH UR STRIP: 5 PH (ref 5–7)
PLATELET # BLD AUTO: 267 10E9/L (ref 150–450)
RBC # BLD AUTO: 3.84 10E12/L (ref 4.4–5.9)
RBC #/AREA URNS AUTO: ABNORMAL /HPF
SOURCE: ABNORMAL
SP GR UR STRIP: 1.01 (ref 1–1.03)
UROBILINOGEN UR STRIP-ACNC: 0.2 EU/DL (ref 0.2–1)
WBC # BLD AUTO: 7.3 10E9/L (ref 4–11)
WBC #/AREA URNS AUTO: ABNORMAL /HPF

## 2019-12-09 PROCEDURE — 93000 ELECTROCARDIOGRAM COMPLETE: CPT | Performed by: PHYSICIAN ASSISTANT

## 2019-12-09 PROCEDURE — 99215 OFFICE O/P EST HI 40 MIN: CPT | Performed by: PHYSICIAN ASSISTANT

## 2019-12-09 PROCEDURE — 85025 COMPLETE CBC W/AUTO DIFF WBC: CPT | Performed by: ORTHOPAEDIC SURGERY

## 2019-12-09 PROCEDURE — 36415 COLL VENOUS BLD VENIPUNCTURE: CPT | Performed by: ORTHOPAEDIC SURGERY

## 2019-12-09 PROCEDURE — 40000869 ZZHCL STATISTIC MRSA/MSSA PRESURGICAL SCREEN CULTURE: Performed by: ORTHOPAEDIC SURGERY

## 2019-12-09 PROCEDURE — 81001 URINALYSIS AUTO W/SCOPE: CPT | Performed by: ORTHOPAEDIC SURGERY

## 2019-12-09 PROCEDURE — 40000868 ZZHCL STATISTIC MRSA/MSSA PRESURGICAL SCREEN ID: Performed by: ORTHOPAEDIC SURGERY

## 2019-12-09 ASSESSMENT — PAIN SCALES - GENERAL: PAINLEVEL: NO PAIN (0)

## 2019-12-10 LAB
BACTERIA SPEC CULT: NORMAL
BACTERIA SPEC CULT: NORMAL
SPECIMEN SOURCE: NORMAL

## 2019-12-18 ENCOUNTER — ANESTHESIA EVENT (OUTPATIENT)
Dept: SURGERY | Facility: CLINIC | Age: 69
End: 2019-12-18
Payer: COMMERCIAL

## 2019-12-18 NOTE — ANESTHESIA PREPROCEDURE EVALUATION
Anesthesia Pre-Procedure Evaluation    Patient: Glenroy Padilla   MRN: [id number] : 1950          Preoperative Diagnosis: Primary osteoarthritis of left knee [M17.12]    Procedure(s):  LEFT TOTAL KNEE REPLACEMENT    Past Medical History:   Diagnosis Date     Acute pain of left knee 2017     Chronic pain of right knee 2017     Hx of coronary artery disease 2017     Hx of heart artery stent 2017     Mixed incontinence 2018     Obesity, morbid, BMI 40.0-49.9 (H) 2017     Open wound of left lower extremity without complication, initial encounter 2017     DAYTON (obstructive sleep apnea) 2017     Type 2 diabetes mellitus with other circulatory complication, without long-term current use of insulin (H) 2017     Venous stasis ulcer of left lower extremity (H) 2017     Venous stasis ulcer of left lower extremity (H) 2017     Past Surgical History:   Procedure Laterality Date     ARTHROPLASTY KNEE Right 2019    Procedure: Right total knee replacement;  Surgeon: Marc Ibrahim DO;  Location:  OR     CARDIAC SURGERY      stent placement     CHOLECYSTECTOMY       LAMINECTOMY LUMBAR THREE+ LEVELS N/A 3/13/2018    Procedure: LAMINECTOMY LUMBAR THREE+ LEVELS;  T5-9 LAMINECTOMIES, RESECTION OF EPIDURAL LIPOMATOSIS;  Surgeon: Hugh Mendieta MD;  Location:  OR       Anesthesia Evaluation     . Pt has had prior anesthetic. Type: General, MAC and Regional    No history of anesthetic complications          ROS/MED HX    ENT/Pulmonary:     (+)DAYTON risk factors (pt denies he has DAYTON since loosing weight and does not use CPAP) hypertension, obese, , . .   (-) tobacco use   Neurologic:     (+)neuropathy other neuro spinal surgery with neuropathy    Cardiovascular: Comment: 1 stent mid LAD on 17    (+) Dyslipidemia, hypertension--CAD, --stent,2017  1 Drug Eluting Stent .. : . . . :. . Previous cardiac testing Echodate:2018results:Mild left  ventricular concentric hypertrophy, no wall motion abnormalities, EF 60-65%date: results:ECG reviewed date:12/9/19 results:appears normal, NSR with First degree A-V block , normal axis, no acute ST/T changes c/w ischemia, no LVH by voltage criteria, unchanged from previous tracings date: results:          METS/Exercise Tolerance:  >4 METS   Hematologic:     (+) Anemia (Hgb 10.1 on 12/9/19), Other Hematologic Disorder-lymphadema       Musculoskeletal:   (+) arthritis,  other musculoskeletal- spinal stenosis of L3/4 and L4/5      GI/Hepatic:     (+) GERD Asymptomatic on medication,       Renal/Genitourinary:         Endo:     (+) type II DM Last HgA1c: 5.0 date: 11/4/19 Not using insulin - not using insulin pump not previously admitted for DM/DKA Obesity, .      Psychiatric:  - neg psychiatric ROS       Infectious Disease:  - neg infectious disease ROS       Malignancy:      - no malignancy   Other:    - neg other ROS                      Physical Exam  Normal systems: cardiovascular and pulmonary    Airway   Mallampati: II  TM distance: >3 FB  Neck ROM: full    Dental   (+) upper dentures and lower dentures    Cardiovascular   Rhythm and rate: regular and normal      Pulmonary    breath sounds clear to auscultation    Other findings: Dentures removed preoperatively    AM blood glucose 100mg/dL    Patient did not take any of his regular medications in AM, including metoprolol.  Plan to give IV metoprolol intraop if warranted.        Lab Results   Component Value Date    WBC 7.3 12/09/2019    HGB 10.1 (L) 12/09/2019    HCT 33.7 (L) 12/09/2019     12/09/2019    CRP 21.5 (H) 05/22/2017    SED 49 (H) 05/22/2017     08/29/2019    POTASSIUM 4.0 08/29/2019    CHLORIDE 103 08/29/2019    CO2 29 08/29/2019    BUN 22 08/29/2019    CR 1.13 08/29/2019     (H) 08/29/2019    PHAN 9.2 08/29/2019    ALBUMIN 3.3 (L) 08/29/2019    PROTTOTAL 8.4 08/29/2019    ALT 16 08/29/2019    AST 14 08/29/2019    ALKPHOS 81  "08/29/2019    BILITOTAL 0.6 08/29/2019    TSH 2.29 10/17/2019       Preop Vitals  BP Readings from Last 3 Encounters:   12/09/19 122/60   12/05/19 124/62   12/02/19 110/68    Pulse Readings from Last 3 Encounters:   12/09/19 80   12/05/19 90   11/04/19 78      Resp Readings from Last 3 Encounters:   12/09/19 18   11/04/19 18   10/17/19 16    SpO2 Readings from Last 3 Encounters:   12/09/19 98%   12/05/19 97%   11/04/19 96%      Temp Readings from Last 1 Encounters:   12/09/19 98.2  F (36.8  C) (Temporal)    Ht Readings from Last 1 Encounters:   12/02/19 1.778 m (5' 10\")      Wt Readings from Last 1 Encounters:   12/09/19 124.7 kg (274 lb 14.4 oz)    Estimated body mass index is 39.44 kg/m  as calculated from the following:    Height as of 12/2/19: 1.778 m (5' 10\").    Weight as of 12/9/19: 124.7 kg (274 lb 14.4 oz).       Anesthesia Plan      History & Physical Review  History and physical reviewed and following examination; no interval change.    ASA Status:  3 .    NPO Status:  > 6 hours    Plan for Spinal, MAC, Peripheral Nerve Block, For Post-op pain in coordination with surgeon and Periph. Nerve Block for postop pain with Propofol and Intravenous induction. Maintenance will be TIVA.  Reason for MAC:  Deep or markedly invasive procedure (G8)  PONV prophylaxis:  Ondansetron (or other 5HT-3)       Postoperative Care  Postoperative pain management:  Peripheral nerve block (Single Shot), IV analgesics, Oral pain medications and Multi-modal analgesia.      Consents  Anesthetic plan, risks, benefits and alternatives discussed with:  Patient.  Use of blood products discussed: No .   .                 ELFEGO Perdue CRNA  "

## 2019-12-19 ENCOUNTER — HOSPITAL ENCOUNTER (OUTPATIENT)
Facility: CLINIC | Age: 69
LOS: 1 days | Discharge: HOME | End: 2019-12-20
Attending: ORTHOPAEDIC SURGERY | Admitting: ORTHOPAEDIC SURGERY
Payer: COMMERCIAL

## 2019-12-19 ENCOUNTER — ANESTHESIA (OUTPATIENT)
Dept: SURGERY | Facility: CLINIC | Age: 69
End: 2019-12-19
Payer: COMMERCIAL

## 2019-12-19 ENCOUNTER — APPOINTMENT (OUTPATIENT)
Dept: GENERAL RADIOLOGY | Facility: CLINIC | Age: 69
End: 2019-12-19
Attending: ORTHOPAEDIC SURGERY
Payer: COMMERCIAL

## 2019-12-19 ENCOUNTER — APPOINTMENT (OUTPATIENT)
Dept: PHYSICAL THERAPY | Facility: CLINIC | Age: 69
End: 2019-12-19
Attending: ORTHOPAEDIC SURGERY
Payer: COMMERCIAL

## 2019-12-19 DIAGNOSIS — Z98.890 S/P SPINAL SURGERY: ICD-10-CM

## 2019-12-19 DIAGNOSIS — E78.5 HYPERLIPIDEMIA LDL GOAL <100: ICD-10-CM

## 2019-12-19 DIAGNOSIS — E11.59 TYPE 2 DIABETES MELLITUS WITH OTHER CIRCULATORY COMPLICATION, WITHOUT LONG-TERM CURRENT USE OF INSULIN (H): ICD-10-CM

## 2019-12-19 DIAGNOSIS — Z96.651 S/P TOTAL KNEE REPLACEMENT USING CEMENT, RIGHT: ICD-10-CM

## 2019-12-19 DIAGNOSIS — M17.12 PRIMARY OSTEOARTHRITIS OF LEFT KNEE: ICD-10-CM

## 2019-12-19 DIAGNOSIS — Z96.652 STATUS POST TOTAL LEFT KNEE REPLACEMENT USING CEMENT: Primary | ICD-10-CM

## 2019-12-19 DIAGNOSIS — I10 HYPERTENSION, GOAL BELOW 140/90: ICD-10-CM

## 2019-12-19 LAB
GLUCOSE BLDC GLUCOMTR-MCNC: 100 MG/DL (ref 70–99)
GLUCOSE BLDC GLUCOMTR-MCNC: 149 MG/DL (ref 70–99)
GLUCOSE BLDC GLUCOMTR-MCNC: 152 MG/DL (ref 70–99)
GLUCOSE BLDC GLUCOMTR-MCNC: 158 MG/DL (ref 70–99)
GLUCOSE BLDC GLUCOMTR-MCNC: 236 MG/DL (ref 70–99)

## 2019-12-19 PROCEDURE — C1776 JOINT DEVICE (IMPLANTABLE): HCPCS | Performed by: ORTHOPAEDIC SURGERY

## 2019-12-19 PROCEDURE — 25000128 H RX IP 250 OP 636: Performed by: NURSE PRACTITIONER

## 2019-12-19 PROCEDURE — 25000132 ZZH RX MED GY IP 250 OP 250 PS 637: Performed by: ORTHOPAEDIC SURGERY

## 2019-12-19 PROCEDURE — 25800030 ZZH RX IP 258 OP 636: Performed by: ORTHOPAEDIC SURGERY

## 2019-12-19 PROCEDURE — 25000125 ZZHC RX 250: Performed by: NURSE ANESTHETIST, CERTIFIED REGISTERED

## 2019-12-19 PROCEDURE — 25000128 H RX IP 250 OP 636: Performed by: NURSE ANESTHETIST, CERTIFIED REGISTERED

## 2019-12-19 PROCEDURE — 27210794 ZZH OR GENERAL SUPPLY STERILE: Performed by: ORTHOPAEDIC SURGERY

## 2019-12-19 PROCEDURE — 99214 OFFICE O/P EST MOD 30 MIN: CPT | Performed by: NURSE PRACTITIONER

## 2019-12-19 PROCEDURE — 27447 TOTAL KNEE ARTHROPLASTY: CPT | Mod: LT | Performed by: ORTHOPAEDIC SURGERY

## 2019-12-19 PROCEDURE — 82962 GLUCOSE BLOOD TEST: CPT

## 2019-12-19 PROCEDURE — 97530 THERAPEUTIC ACTIVITIES: CPT | Mod: GP | Performed by: PHYSICAL THERAPIST

## 2019-12-19 PROCEDURE — 25000128 H RX IP 250 OP 636: Performed by: ORTHOPAEDIC SURGERY

## 2019-12-19 PROCEDURE — 97116 GAIT TRAINING THERAPY: CPT | Mod: GP,59 | Performed by: PHYSICAL THERAPIST

## 2019-12-19 PROCEDURE — 27810169 ZZH OR IMPLANT GENERAL: Performed by: ORTHOPAEDIC SURGERY

## 2019-12-19 PROCEDURE — 25000125 ZZHC RX 250: Performed by: ORTHOPAEDIC SURGERY

## 2019-12-19 PROCEDURE — 40000986 XR KNEE PORT LT 1/2 VW: Mod: LT

## 2019-12-19 PROCEDURE — 25800030 ZZH RX IP 258 OP 636: Performed by: NURSE PRACTITIONER

## 2019-12-19 PROCEDURE — 37000009 ZZH ANESTHESIA TECHNICAL FEE, EACH ADDTL 15 MIN: Performed by: ORTHOPAEDIC SURGERY

## 2019-12-19 PROCEDURE — 97110 THERAPEUTIC EXERCISES: CPT | Mod: GP | Performed by: PHYSICAL THERAPIST

## 2019-12-19 PROCEDURE — 36000063 ZZH SURGERY LEVEL 4 EA 15 ADDTL MIN: Performed by: ORTHOPAEDIC SURGERY

## 2019-12-19 PROCEDURE — 37000008 ZZH ANESTHESIA TECHNICAL FEE, 1ST 30 MIN: Performed by: ORTHOPAEDIC SURGERY

## 2019-12-19 PROCEDURE — 27447 TOTAL KNEE ARTHROPLASTY: CPT | Mod: AS | Performed by: NURSE PRACTITIONER

## 2019-12-19 PROCEDURE — 99207 ZZC CONSULT E&M CHANGED TO SUBSEQUENT LEVEL: CPT | Performed by: NURSE PRACTITIONER

## 2019-12-19 PROCEDURE — 71000014 ZZH RECOVERY PHASE 1 LEVEL 2 FIRST HR: Performed by: ORTHOPAEDIC SURGERY

## 2019-12-19 PROCEDURE — 36000093 ZZH SURGERY LEVEL 4 1ST 30 MIN: Performed by: ORTHOPAEDIC SURGERY

## 2019-12-19 PROCEDURE — 25800030 ZZH RX IP 258 OP 636: Performed by: NURSE ANESTHETIST, CERTIFIED REGISTERED

## 2019-12-19 PROCEDURE — 97162 PT EVAL MOD COMPLEX 30 MIN: CPT | Mod: GP | Performed by: PHYSICAL THERAPIST

## 2019-12-19 PROCEDURE — 40000306 ZZH STATISTIC PRE PROC ASSESS II: Performed by: ORTHOPAEDIC SURGERY

## 2019-12-19 DEVICE — BONE CEMENT GENTAMYCIN SMART SET GHV 5450-35-500: Type: IMPLANTABLE DEVICE | Site: KNEE | Status: FUNCTIONAL

## 2019-12-19 DEVICE — IMPLANTABLE DEVICE: Type: IMPLANTABLE DEVICE | Site: KNEE | Status: FUNCTIONAL

## 2019-12-19 RX ORDER — DEXTROSE MONOHYDRATE 25 G/50ML
25-50 INJECTION, SOLUTION INTRAVENOUS
Status: DISCONTINUED | OUTPATIENT
Start: 2019-12-19 | End: 2019-12-20 | Stop reason: HOSPADM

## 2019-12-19 RX ORDER — OXYCODONE HYDROCHLORIDE 5 MG/1
5-10 TABLET ORAL
Qty: 50 TABLET | Refills: 0 | Status: SHIPPED | OUTPATIENT
Start: 2019-12-19 | End: 2019-12-20

## 2019-12-19 RX ORDER — BUPIVACAINE HYDROCHLORIDE 7.5 MG/ML
INJECTION, SOLUTION INTRASPINAL PRN
Status: DISCONTINUED | OUTPATIENT
Start: 2019-12-19 | End: 2019-12-19

## 2019-12-19 RX ORDER — TIZANIDINE 2 MG/1
2-4 TABLET ORAL EVERY 6 HOURS PRN
DISCHARGE
Start: 2019-12-19 | End: 2019-12-20

## 2019-12-19 RX ORDER — DOCUSATE SODIUM 100 MG/1
100 CAPSULE, LIQUID FILLED ORAL 2 TIMES DAILY PRN
Status: DISCONTINUED | OUTPATIENT
Start: 2019-12-19 | End: 2019-12-20 | Stop reason: HOSPADM

## 2019-12-19 RX ORDER — BUPIVACAINE HYDROCHLORIDE AND EPINEPHRINE 5; 5 MG/ML; UG/ML
INJECTION, SOLUTION PERINEURAL PRN
Status: DISCONTINUED | OUTPATIENT
Start: 2019-12-19 | End: 2019-12-19

## 2019-12-19 RX ORDER — NALOXONE HYDROCHLORIDE 0.4 MG/ML
.1-.4 INJECTION, SOLUTION INTRAMUSCULAR; INTRAVENOUS; SUBCUTANEOUS
Status: DISCONTINUED | OUTPATIENT
Start: 2019-12-19 | End: 2019-12-20 | Stop reason: HOSPADM

## 2019-12-19 RX ORDER — METOPROLOL TARTRATE 25 MG/1
25 TABLET, FILM COATED ORAL 2 TIMES DAILY
Status: DISCONTINUED | OUTPATIENT
Start: 2019-12-19 | End: 2019-12-20 | Stop reason: HOSPADM

## 2019-12-19 RX ORDER — ACETAMINOPHEN 325 MG/1
975 TABLET ORAL EVERY 8 HOURS PRN
Qty: 100 TABLET | Refills: 0 | DISCHARGE
Start: 2019-12-19

## 2019-12-19 RX ORDER — ACETAMINOPHEN 325 MG/1
975 TABLET ORAL EVERY 8 HOURS
Status: DISCONTINUED | OUTPATIENT
Start: 2019-12-19 | End: 2019-12-20 | Stop reason: HOSPADM

## 2019-12-19 RX ORDER — METOPROLOL TARTRATE 1 MG/ML
1-2 INJECTION, SOLUTION INTRAVENOUS EVERY 5 MIN PRN
Status: DISCONTINUED | OUTPATIENT
Start: 2019-12-19 | End: 2019-12-19 | Stop reason: HOSPADM

## 2019-12-19 RX ORDER — PROCHLORPERAZINE MALEATE 5 MG
5 TABLET ORAL EVERY 6 HOURS PRN
Status: DISCONTINUED | OUTPATIENT
Start: 2019-12-19 | End: 2019-12-20 | Stop reason: HOSPADM

## 2019-12-19 RX ORDER — DEXAMETHASONE SODIUM PHOSPHATE 10 MG/ML
4 INJECTION INTRAMUSCULAR; INTRAVENOUS
Status: DISCONTINUED | OUTPATIENT
Start: 2019-12-19 | End: 2019-12-19 | Stop reason: HOSPADM

## 2019-12-19 RX ORDER — SODIUM CHLORIDE, SODIUM LACTATE, POTASSIUM CHLORIDE, CALCIUM CHLORIDE 600; 310; 30; 20 MG/100ML; MG/100ML; MG/100ML; MG/100ML
INJECTION, SOLUTION INTRAVENOUS CONTINUOUS
Status: DISCONTINUED | OUTPATIENT
Start: 2019-12-19 | End: 2019-12-20 | Stop reason: HOSPADM

## 2019-12-19 RX ORDER — CEFAZOLIN SODIUM IN 0.9 % NACL 3 G/100 ML
3 INTRAVENOUS SOLUTION, PIGGYBACK (ML) INTRAVENOUS
Status: COMPLETED | OUTPATIENT
Start: 2019-12-19 | End: 2019-12-19

## 2019-12-19 RX ORDER — CLOPIDOGREL BISULFATE 75 MG/1
75 TABLET ORAL DAILY
Qty: 90 TABLET | Refills: 1 | DISCHARGE
Start: 2020-01-03 | End: 2020-01-23

## 2019-12-19 RX ORDER — FENTANYL CITRATE 50 UG/ML
25-50 INJECTION, SOLUTION INTRAMUSCULAR; INTRAVENOUS
Status: DISCONTINUED | OUTPATIENT
Start: 2019-12-19 | End: 2019-12-19 | Stop reason: HOSPADM

## 2019-12-19 RX ORDER — HYDROXYZINE HYDROCHLORIDE 10 MG/1
10 TABLET, FILM COATED ORAL EVERY 6 HOURS PRN
Qty: 45 TABLET | Refills: 0 | DISCHARGE
Start: 2019-12-19

## 2019-12-19 RX ORDER — CEFAZOLIN SODIUM 1 G/3ML
1 INJECTION, POWDER, FOR SOLUTION INTRAMUSCULAR; INTRAVENOUS SEE ADMIN INSTRUCTIONS
Status: DISCONTINUED | OUTPATIENT
Start: 2019-12-19 | End: 2019-12-19 | Stop reason: HOSPADM

## 2019-12-19 RX ORDER — DOCUSATE SODIUM 100 MG/1
100 CAPSULE, LIQUID FILLED ORAL 2 TIMES DAILY PRN
Qty: 60 CAPSULE | Refills: 1 | DISCHARGE
Start: 2019-12-19 | End: 2020-02-25

## 2019-12-19 RX ORDER — ONDANSETRON 4 MG/1
4 TABLET, ORALLY DISINTEGRATING ORAL EVERY 30 MIN PRN
Status: DISCONTINUED | OUTPATIENT
Start: 2019-12-19 | End: 2019-12-19 | Stop reason: HOSPADM

## 2019-12-19 RX ORDER — ONDANSETRON 2 MG/ML
INJECTION INTRAMUSCULAR; INTRAVENOUS PRN
Status: DISCONTINUED | OUTPATIENT
Start: 2019-12-19 | End: 2019-12-19

## 2019-12-19 RX ORDER — CALCIUM CARBONATE 500 MG/1
1000 TABLET, CHEWABLE ORAL 4 TIMES DAILY PRN
Status: DISCONTINUED | OUTPATIENT
Start: 2019-12-19 | End: 2019-12-20 | Stop reason: HOSPADM

## 2019-12-19 RX ORDER — PROPOFOL 10 MG/ML
INJECTION, EMULSION INTRAVENOUS PRN
Status: DISCONTINUED | OUTPATIENT
Start: 2019-12-19 | End: 2019-12-19

## 2019-12-19 RX ORDER — ONDANSETRON 4 MG/1
4 TABLET, ORALLY DISINTEGRATING ORAL EVERY 8 HOURS PRN
DISCHARGE
Start: 2019-12-19

## 2019-12-19 RX ORDER — FUROSEMIDE 40 MG
40 TABLET ORAL DAILY
Status: DISCONTINUED | OUTPATIENT
Start: 2019-12-20 | End: 2019-12-20 | Stop reason: HOSPADM

## 2019-12-19 RX ORDER — TIZANIDINE 2 MG/1
2-4 TABLET ORAL EVERY 6 HOURS PRN
Status: DISCONTINUED | OUTPATIENT
Start: 2019-12-19 | End: 2019-12-20 | Stop reason: HOSPADM

## 2019-12-19 RX ORDER — DEXAMETHASONE SODIUM PHOSPHATE 10 MG/ML
INJECTION, SOLUTION INTRAMUSCULAR; INTRAVENOUS PRN
Status: DISCONTINUED | OUTPATIENT
Start: 2019-12-19 | End: 2019-12-19

## 2019-12-19 RX ORDER — CEFAZOLIN SODIUM 2 G/100ML
2 INJECTION, SOLUTION INTRAVENOUS EVERY 8 HOURS
Status: COMPLETED | OUTPATIENT
Start: 2019-12-19 | End: 2019-12-20

## 2019-12-19 RX ORDER — LIDOCAINE 40 MG/G
CREAM TOPICAL
Status: DISCONTINUED | OUTPATIENT
Start: 2019-12-19 | End: 2019-12-20 | Stop reason: HOSPADM

## 2019-12-19 RX ORDER — ONDANSETRON 2 MG/ML
4 INJECTION INTRAMUSCULAR; INTRAVENOUS EVERY 30 MIN PRN
Status: DISCONTINUED | OUTPATIENT
Start: 2019-12-19 | End: 2019-12-19 | Stop reason: HOSPADM

## 2019-12-19 RX ORDER — METOPROLOL TARTRATE 1 MG/ML
INJECTION, SOLUTION INTRAVENOUS PRN
Status: DISCONTINUED | OUTPATIENT
Start: 2019-12-19 | End: 2019-12-19

## 2019-12-19 RX ORDER — ONDANSETRON 4 MG/1
4 TABLET, ORALLY DISINTEGRATING ORAL EVERY 6 HOURS PRN
Status: DISCONTINUED | OUTPATIENT
Start: 2019-12-19 | End: 2019-12-20 | Stop reason: HOSPADM

## 2019-12-19 RX ORDER — SODIUM CHLORIDE, SODIUM LACTATE, POTASSIUM CHLORIDE, CALCIUM CHLORIDE 600; 310; 30; 20 MG/100ML; MG/100ML; MG/100ML; MG/100ML
INJECTION, SOLUTION INTRAVENOUS CONTINUOUS
Status: DISCONTINUED | OUTPATIENT
Start: 2019-12-19 | End: 2019-12-19 | Stop reason: HOSPADM

## 2019-12-19 RX ORDER — NICOTINE POLACRILEX 4 MG
15-30 LOZENGE BUCCAL
Status: DISCONTINUED | OUTPATIENT
Start: 2019-12-19 | End: 2019-12-20 | Stop reason: HOSPADM

## 2019-12-19 RX ORDER — OXYCODONE HYDROCHLORIDE 5 MG/1
5-10 TABLET ORAL
Status: DISCONTINUED | OUTPATIENT
Start: 2019-12-19 | End: 2019-12-20 | Stop reason: HOSPADM

## 2019-12-19 RX ORDER — PROPOFOL 10 MG/ML
INJECTION, EMULSION INTRAVENOUS CONTINUOUS PRN
Status: DISCONTINUED | OUTPATIENT
Start: 2019-12-19 | End: 2019-12-19

## 2019-12-19 RX ORDER — HYDROMORPHONE HYDROCHLORIDE 1 MG/ML
0.2 INJECTION, SOLUTION INTRAMUSCULAR; INTRAVENOUS; SUBCUTANEOUS
Status: DISCONTINUED | OUTPATIENT
Start: 2019-12-19 | End: 2019-12-20 | Stop reason: HOSPADM

## 2019-12-19 RX ORDER — ONDANSETRON 2 MG/ML
4 INJECTION INTRAMUSCULAR; INTRAVENOUS EVERY 6 HOURS PRN
Status: DISCONTINUED | OUTPATIENT
Start: 2019-12-19 | End: 2019-12-20 | Stop reason: HOSPADM

## 2019-12-19 RX ORDER — HYDROXYZINE HYDROCHLORIDE 10 MG/1
10 TABLET, FILM COATED ORAL EVERY 6 HOURS PRN
Status: DISCONTINUED | OUTPATIENT
Start: 2019-12-19 | End: 2019-12-20 | Stop reason: HOSPADM

## 2019-12-19 RX ORDER — DIMENHYDRINATE 50 MG/ML
25 INJECTION, SOLUTION INTRAMUSCULAR; INTRAVENOUS
Status: DISCONTINUED | OUTPATIENT
Start: 2019-12-19 | End: 2019-12-19 | Stop reason: HOSPADM

## 2019-12-19 RX ADMIN — METOPROLOL TARTRATE 2 MG: 5 INJECTION INTRAVENOUS at 09:03

## 2019-12-19 RX ADMIN — METOPROLOL TARTRATE 2 MG: 5 INJECTION INTRAVENOUS at 08:10

## 2019-12-19 RX ADMIN — MIDAZOLAM 1 MG: 1 INJECTION INTRAMUSCULAR; INTRAVENOUS at 07:19

## 2019-12-19 RX ADMIN — METOPROLOL TARTRATE 25 MG: 25 TABLET ORAL at 20:15

## 2019-12-19 RX ADMIN — PROPOFOL 30 MG: 10 INJECTION, EMULSION INTRAVENOUS at 07:41

## 2019-12-19 RX ADMIN — SODIUM CHLORIDE, POTASSIUM CHLORIDE, SODIUM LACTATE AND CALCIUM CHLORIDE: 600; 310; 30; 20 INJECTION, SOLUTION INTRAVENOUS at 12:04

## 2019-12-19 RX ADMIN — ACETAMINOPHEN 975 MG: 325 TABLET, FILM COATED ORAL at 12:24

## 2019-12-19 RX ADMIN — DEXAMETHASONE SODIUM PHOSPHATE 10 MG: 10 INJECTION, SOLUTION INTRAMUSCULAR; INTRAVENOUS at 10:02

## 2019-12-19 RX ADMIN — CEFAZOLIN SODIUM 2 G: 2 INJECTION, SOLUTION INTRAVENOUS at 15:28

## 2019-12-19 RX ADMIN — CEFAZOLIN 3 G: 1 INJECTION, POWDER, FOR SOLUTION INTRAMUSCULAR; INTRAVENOUS at 07:41

## 2019-12-19 RX ADMIN — ACETAMINOPHEN 975 MG: 325 TABLET, FILM COATED ORAL at 20:15

## 2019-12-19 RX ADMIN — TRANEXAMIC ACID 1 G: 100 INJECTION, SOLUTION INTRAVENOUS at 08:59

## 2019-12-19 RX ADMIN — PROPOFOL 100 MCG/KG/MIN: 10 INJECTION, EMULSION INTRAVENOUS at 07:42

## 2019-12-19 RX ADMIN — Medication 20 ML: at 10:02

## 2019-12-19 RX ADMIN — SODIUM CHLORIDE, POTASSIUM CHLORIDE, SODIUM LACTATE AND CALCIUM CHLORIDE: 600; 310; 30; 20 INJECTION, SOLUTION INTRAVENOUS at 06:46

## 2019-12-19 RX ADMIN — ONDANSETRON 4 MG: 2 INJECTION INTRAMUSCULAR; INTRAVENOUS at 07:19

## 2019-12-19 RX ADMIN — MIDAZOLAM 1 MG: 1 INJECTION INTRAMUSCULAR; INTRAVENOUS at 07:22

## 2019-12-19 RX ADMIN — BUPIVACAINE HYDROCHLORIDE IN DEXTROSE 1.8 ML: 7.5 INJECTION, SOLUTION SUBARACHNOID at 07:38

## 2019-12-19 RX ADMIN — METOPROLOL TARTRATE 25 MG: 25 TABLET ORAL at 12:23

## 2019-12-19 ASSESSMENT — ACTIVITIES OF DAILY LIVING (ADL)
RETIRED_COMMUNICATION: 0-->UNDERSTANDS/COMMUNICATES WITHOUT DIFFICULTY
DRESS: 2-->ASSISTIVE PERSON
TOILETING: 1-->ASSISTIVE EQUIPMENT
TRANSFERRING: 1-->ASSISTIVE EQUIPMENT
BATHING: 0-->INDEPENDENT
RETIRED_EATING: 0-->INDEPENDENT
FALL_HISTORY_WITHIN_LAST_SIX_MONTHS: NO
WHICH_OF_THE_ABOVE_FUNCTIONAL_RISKS_HAD_A_RECENT_ONSET_OR_CHANGE?: AMBULATION
COGNITION: 0 - NO COGNITION ISSUES REPORTED
SWALLOWING: 0-->SWALLOWS FOODS/LIQUIDS WITHOUT DIFFICULTY
AMBULATION: 1-->ASSISTIVE EQUIPMENT

## 2019-12-19 ASSESSMENT — LIFESTYLE VARIABLES: TOBACCO_USE: 0

## 2019-12-19 NOTE — ANESTHESIA PROCEDURE NOTES
Peripheral nerve/Neuraxial procedure note : intrathecal  Pre-Procedure  Performed by  Lucinda Bonds APRN CRNA   Location: OR    Procedure Times:12/19/2019 7:26 AM and 12/19/2019 7:38 AM  Pre-Anesthestic Checklist: patient identified, IV checked, risks and benefits discussed, informed consent, monitors and equipment checked, pre-op evaluation, at physician/surgeon's request and post-op pain management    Timeout  Correct Patient: Yes   Correct Procedure: Yes   Correct Site: Yes   Correct Laterality: N/A   Correct Position: Yes   Site Marked: N/A   .   Procedure Documentation  ASA 3  Diagnosis:Left total knee replacement.    Procedure: intrathecal, .   Patient Position:sitting Insertion Site:L4-5  (midline approach)     Patient Prep/Sterile Barriers; mask, sterile gloves, povidone-iodine 7.5% surgical scrub, patient draped.  .  Needle:  Spinal Needle (gauge): 22  Spinal/LP Needle Length (inches): 5 # of attempts: 2 and  # of redirects:  3 Introducer used Introducer: 20 G .        Assessment/Narrative  Paresthesias: No.  .  .  clear CSF fluid removed . Time Injected: 07:38  Sensory Level: T10 Comments:  First attempt by Lucinda Bonds CRNA at L3/4 interspace with multiple redirects with 25g and 22g spinal needle. Unable to reach intrathecal space. Second attempt by Miles Arnold CRNA at L4/5 with 25g needle and multiple redirects, unsuccessful. Attempted again at L4/5 with 22g and successful at reaching intrathecal space with positive CSF return.     12/19/2019 7:38 AM

## 2019-12-19 NOTE — CONSULTS
"CARE TRANSITION SOCIAL WORK INITIAL ASSESSMENT:      Met with: Patient.    DATA  Principal Problem:    Status post total left knee replacement using cement  Active Problems:    Type 2 diabetes mellitus with other circulatory complication, without long-term current use of insulin (H)    Hypertension, goal below 140/90    Hyperlipidemia LDL goal <100    History of coronary artery stent placement    Iron deficiency anemia secondary to inadequate dietary iron intake    Primary osteoarthritis of left knee       [facility]  Primary Care MD Name: Julio Cesar Esteban  Contact information and PCP information verified: Yes      ASSESSMENT  Cognitive Status: awake, alert and oriented.       Description of Support System: Supportive, Involved     Who is your support system?: Significant Other, Children     Support Assessment: Adequate family and caregiver support, Adequate social supports     Insurance Concerns: No Insurance issues identified       This writer met with patient and introduced self and role. Discussed discharge planning and medicare guidelines in regards to home care and SNF benefits. Pt/family was given the Medicare Compare list for SNF, with associated star ratings to assist with choice for referrals/discharge planning: Yes    Education was given to pt/family that star ratings are updated/maintained by Medicare and can be reviewed by visiting www.medicare.gov: Yes    Discussed TCU placement for patient per surgeon.  Patient in agreement.  Patient was provided with Medicare certified nursing home list. Pts choices are as follows: [facility] (Admissions: [phone], Main Phone: [phone] Fax: [phone]).  Patient not interested in giving any other choices at this time.  Referral has been sent.     Patient reported that after his last surgery, he went back home to his assisted living apartment in [place], \"[facility]\".  This did not go well, so he " ended up going to the TCU at [facility] in [place].  He had a good experience and wants to go back there after this surgery.    Discussed transportation to a TCU.  Patient has his own electric wheelchair here and needs van transport.  [facility] is requesting [facility].  Care Transitions to call [employer] through patient's insurance to set up the transport once a TCU is found.    Patient stated that he does anticipate being ready for discharge to a TCU tomorrow.      1456- Patient has been accepted for TCU placement at [facility] in [place] for tomorrow.  Writer is calling [employer] to set up transport.         PLAN    Care Transitions to continue to follow.  Awaiting call back from [facility] TCU.            SARAH Acevedo

## 2019-12-19 NOTE — PROGRESS NOTES
S-(situation): Patient registered to Observation-Outpatient in a bed. Patient arrived to room 272 via cart from PACU    B-(background): Left total knee arthroplasty    A-(assessment): Pt's vitals stable. Pain controlled. Pt alert and oriented. LR infusing at 100 ml/hr. Sequential on RLE. Dressing with ACE wrap dry and intact at LLE. Capnography on pt and WNL.    R-(recommendations): Orders and observation goals reviewed with patient.    Nursing Observation criteria listed below was met:    Skin issues/needs documented:Yes  Isolation needs addressed, if appropriate: NA  Fall Prevention: Education given and documented: Yes  Education Assessment documented:Yes  Education Documented (Pre-existing chronic infection such as, MRSA/VRE need education on admission): Yes  OBS video/handout Reviewed & Documented: Outpatient in a bed pt.  Medication Reconciliation Complete: Yes  New medication patient education completed and documented (Possible Side Effects of Common Medications handout): Yes  Home medications if not able to send immediately home with family stored here: NA  Reminder note placed in discharge instructions: Yes  Patient has discharge needs (If yes, please explain): Yes

## 2019-12-19 NOTE — CONSULTS
[facility]  Consult Note - Hospitalist Service     Date of Admission:  12/19/2019  Consult Requested by: Dr. Ibrahim  Reason for Consult: management of chronic medical problems post left total knee arthroplasty    Assessment & Plan   Glenroy Padilla is a 69 year old male admitted on 12/19/2019. He presented for an elective left total knee arthroplasty performed today by Dr. Ibrahim. History is significant for type II diabetes mellitus controlled taking sitagliptin/metformin twice daily, hypertension taking Lasix and losartan once daily and metoprolol twice daily, history of CAD with stent placement on Plavix and chronic lymphedema involving the right lower extremity with a history of central spinal stenosis status post surgery. Patient tolerated surgery well and patient currently denies pain. Home antihypertensive medications have been restarted other than losartan and Lasix which is reasonable until blood pressure stabilizes. Home dose of sitagliptin/metformin restarted for control of diabetes. Orthopedic surgery is recommending use of Xarelto for DVT prophylaxis. At this time would anticipate routine postoperative cares. Will monitor blood pressures closely. Patient is expecting to discharge to TCU within the next day.     Principal Problem:    Status post total left knee replacement using cement  Active Problems:    Type 2 diabetes mellitus with other circulatory complication, without long-term current use of insulin (H)    Hypertension, goal below 140/90    Hyperlipidemia LDL goal <100    History of coronary artery stent placement    Iron deficiency anemia secondary to inadequate dietary iron intake    Primary osteoarthritis of left knee      The patient's care was discussed with the Patient.    Julio Cesar Mitchell NP  [facility]    ______________________________________________________________________    Chief Complaint   Admitted with chronic left knee pain  for elective left total knee arthroplasty    History is obtained from the patient and the electronic health record    History of Present Illness   Glenroy Padilla is a 69 year old male who presents with chronic left knee pain with elective left total knee arthroplasty performed today by Dr. Ibrahim. History is significant for type II diabetes mellitus controlled taking sitagliptin/metformin twice daily, hypertension taking Lasix and losartan once daily and metoprolol twice daily, history of CAD with stent placement on Plavix and chronic lymphedema involving the right lower extremity with a history of central spinal stenosis status post surgery. Patient tolerated surgery well and is currently denying any pain. Denies shortness of breath and patient is maintaining oxygen saturations above 90% on room air.  Patient denies nausea and is tolerating oral intake.     Review of Systems   The 10 point Review of Systems is negative other than noted in the HPI or here.     Past Medical History    I have reviewed this patient's medical history and updated it with pertinent information if needed.   Past Medical History:   Diagnosis Date     Acute pain of left knee 5/22/2017     Chronic pain of right knee 5/22/2017     Hx of coronary artery disease 5/22/2017     Hx of heart artery stent 5/22/2017     Mixed incontinence 8/13/2018     Obesity, morbid, BMI 40.0-49.9 (H) 11/20/2017     Open wound of left lower extremity without complication, initial encounter 5/22/2017     DAYTON (obstructive sleep apnea) 5/22/2017     Type 2 diabetes mellitus with other circulatory complication, without long-term current use of insulin (H) 5/22/2017     Venous stasis ulcer of left lower extremity (H) 5/22/2017     Venous stasis ulcer of left lower extremity (H) 5/22/2017       Past Surgical History   I have reviewed this patient's surgical history and updated it with pertinent information if needed.  Past Surgical History:   Procedure Laterality Date      ARTHROPLASTY KNEE Right 7/30/2019    Procedure: Right total knee replacement;  Surgeon: Marc Ibrahim DO;  Location: [facility] OR     CARDIAC SURGERY      stent placement     CHOLECYSTECTOMY       LAMINECTOMY LUMBAR THREE+ LEVELS N/A 3/13/2018    Procedure: LAMINECTOMY LUMBAR THREE+ LEVELS;  T5-9 LAMINECTOMIES, RESECTION OF EPIDURAL LIPOMATOSIS;  Surgeon: Hugh Mendieta MD;  Location:  OR       Social History   I have reviewed this patient's social history and updated it with pertinent information if needed.  Social History     Tobacco Use     Smoking status: Former Smoker     Packs/day: 0.00     Types: Cigars     Smokeless tobacco: Never Used     Tobacco comment: exposure to second hand smoke   Substance Use Topics     Alcohol use: No     Drug use: No       Family History   I have reviewed this patient's family history and updated it with pertinent information if needed.   History reviewed. No pertinent family history.     Medications   Medications Prior to Admission   Medication Sig Dispense Refill Last Dose     acetaminophen (TYLENOL) 325 MG tablet Take 3 tablets (975 mg) by mouth every 8 hours 100 tablet 0 Past Week at Unknown time     blood glucose monitoring (NO BRAND SPECIFIED) meter device kit test blood sugar 2 xdaily or as directed.  Blood Glucose Monitor Brands: per insurance. 1 kit 0 12/18/2019 at Unknown time     blood glucose monitoring (NO BRAND SPECIFIED) test strip Use to test blood sugar 2 times daily Blood Glucose Monitor Brands: Glucocard Expression 100 strip 6 12/18/2019 at Unknown time     blood glucose monitoring (NO BRAND SPECIFIED) test strip Use to test blood sugar 2x  daily or as directed. To accompany: Blood Glucose Monitor Brands: per insurance. 100 strip 1 12/18/2019 at Unknown time     Blood Glucose Monitoring Suppl (GLUCOCARD EXPRESSION MONITOR) W/DEVICE KIT USE TWICE DAILY TO TEST BLOOD GLUCOSE  0 12/18/2019 at Unknown time     Ferrous Sulfate Dried (GNP IRON) 200 (65 Fe)  MG TABS Take 650 mg by mouth every morning 180 tablet 1 12/18/2019 at Unknown time     furosemide (LASIX) 40 MG tablet TAKE 1 TABLET (40 MG) BY MOUTH DAILY 90 tablet 1 12/18/2019 at Unknown time     JANUMET XR  MG TB24 TAKE 2 TABLETS BY MOUTH DAILY (WITH BREAKFAST) 180 tablet 0 12/18/2019 at Unknown time     losartan (COZAAR) 25 MG tablet   11 12/18/2019 at Unknown time     metoprolol tartrate (LOPRESSOR) 25 MG tablet Take 1 tablet (25 mg) by mouth 2 times daily 180 tablet 3 12/18/2019 at Unknown time     Nutritional Supplements (ENSURE ACTIVE HIGH PROTEIN) LIQD Take 1 Can by mouth daily 30 each 1 12/18/2019 at Unknown time     order for DME Equipment being ordered: VASO PRESS-DVT PROPHYLAXIS SYSTEM- SEQUENTIAL 1 Device 0 12/18/2019 at Unknown time     order for DME Equipment being ordered: VASO PRESS-DVT PROPHYLAXIS SYSTEM or SUBSTITUTE BRAND SEQUENTIAL PUMP 1 Device 0 12/18/2019 at Unknown time     order for DME Equipment being ordered: Wheelchair of appropriate style and size 1 Device 0 12/18/2019 at Unknown time     order for DME Equipment being ordered: VASO PRESS-DVT PROPHYLAXIS SYSTEM                                             APPLY AT HS TO BILATERAL LOWER EXREMITIES 1 Units 0 12/18/2019 at Unknown time     order for DME Equipment being ordered: Replacement battery or batteries for an [employer] Pronto M 51. 1 Device 0 12/18/2019 at Unknown time     order for DME Equipment being ordered: electric chair for sleeping and adjustment to allow for elevation of legs above the heart.  Zero gravity type heavy duty sleep chair advised. MaxiComforter ([id number]) 1 Units 0 12/18/2019 at Unknown time     order for DME Equipment being ordered: Needs to have a sleep chair for home use. 1 Device 0 12/18/2019 at Unknown time     order for DME Compression garments toe to knee.  Specific brand is Compraflex Light Compression garment.   Diagnosis codes for venous stasis ulcer I83.029 and for  Lymphoedema is I89.0   Goal  is for compression of about 30 to 40 mm of mercury   measurements are right ankle 28.5 cm and right calf is 45.5 cm.  Left ankle is 27.5 cm and the calf is 45 cm   Tall for length. 2 Device 1 12/18/2019 at Unknown time     order for DME Equipment being ordered: compression stockings below knee and above knee if available.  Medium compression. 1 Units 1 12/18/2019 at Unknown time     rosuvastatin (CRESTOR) 10 MG tablet TAKE 1 TABLET (10 MG) BY MOUTH DAILY 90 tablet 0 12/18/2019 at Unknown time     sitagliptin-metFORMIN (JANUMET)  MG tablet Take 1 tablet by mouth 2 times daily (with meals) This is actually Janumet XR   12/18/2019 at Unknown time     thin (NO BRAND SPECIFIED) lancets Test 2 x daily or as directed.  Brands: per insurance. 100 each 1 12/18/2019 at Unknown time     blood glucose (NO BRAND SPECIFIED) lancing device Use to test blood sugars 2 times daily or as directed. 1 each 0 Taking     blood glucose calibration (NO BRAND SPECIFIED) solution To accompany: Blood Glucose Monitor Brands: per insurance. 1 Bottle 3 Taking     clopidogrel (PLAVIX) 75 MG tablet Take 1 tablet (75 mg) by mouth daily 90 tablet 1 12/6/2019       Allergies   Allergies   Allergen Reactions     No Known Allergies        Physical Exam   Vital Signs: Temp: 97.4  F (36.3  C) Temp src: Oral BP: 129/48 Pulse: 68 Heart Rate: 68 Resp: 16 SpO2: 98 % O2 Device: None (Room air)    Weight: 0 lbs 0 oz    Constitutional: awake, alert, cooperative, no apparent distress, and appears stated age  Respiratory: No increased work of breathing, good air exchange, clear to auscultation bilaterally, no crackles or wheezing  Cardiovascular: regular rate and rhythm, normal S1 and S2 and no murmur noted  GI: normal bowel sounds, non-distended and non-tender  Skin: no bruising or bleeding and normal skin color, texture, turgor  Musculoskeletal: left knee dressed and wrapped in ACE bandage; CMS intact to left toes  Neurologic: Awake, alert, oriented to  name, place and time.  Cranial nerves II-XII are grossly intact.  Motor is 5 out of 5 bilaterally.  Cerebellar finger to nose, heel to shin intact.  Sensory is intact.  Babinski down going, Romberg negative, and gait is normal.    Data   Results for orders placed or performed during the hospital encounter of 12/19/19 (from the past 24 hour(s))   Glucose by meter   Result Value Ref Range    Glucose 100 (H) 70 - 99 mg/dL   XR Knee Port Left 1/2 Views    Narrative    KNEE PORTABLE LEFT 1/2 VIEW(S)   12/19/2019 10:12 AM     HISTORY: Post-Op Total Knee.    COMPARISON: None.      Impression    IMPRESSION: Right total knee arthroplasty in place. No evidence of  complication.   Glucose by meter   Result Value Ref Range    Glucose 152 (H) 70 - 99 mg/dL   Glucose by meter   Result Value Ref Range    Glucose 149 (H) 70 - 99 mg/dL

## 2019-12-19 NOTE — BRIEF OP NOTE
[facility] Brief Operative Note    Pre-operative diagnosis: Primary osteoarthritis of left knee [M17.12]   Post-operative diagnosis: Same   Procedure: Procedure(s):  LEFT TOTAL KNEE REPLACEMENT   Surgeon:  Anesthesia: Marc Ibrahim DO  Spinal   Assistant(s): Jason Rapp APRN, CNP, DNP (A advanced practice provider was necessary for his expertise, exposure and surgical assistance throughout the case.)   Estimated blood loss:  Tourniquet time/pressure:  Urine output:  Fluids: Less than 10 ml  78 minutes at 330 mmHg  200 ml  700 ml Crystalloids   Specimens: None   Findings: left knee primary osteoarthritis [id number]     Federico Ibrahim D.O.      Implants:   Implant Name Type Inv. Item Serial No.  Lot No. LRB No. Used   BONE CEMENT GENTAMYCIN SMART SET [facility] 5450- Cement, Bone BONE CEMENT GENTAMYCIN SMART SET [employer] 5450-  J [id number] Left 2   IMP PATELLA JJ ATTUNE DOME 38MM [id number] Total Joint Component/Insert IMP PATELLA JJ ATTUNE DOME 38MM [id number]  [employer]C [id number] Left 1   IMP INSERT JJ ATTUNE PS RP SZ7 5MM [id number] Total Joint Component/Insert IMP INSERT JJ ATTUNE PS RP SZ7 5MM [id number]  [employer]-   [id number] Left 1   Attune Femoral Posterior Stabilized Size 7 Left Cemented    Depuy [id number] Left 1   Attune Tibial Base Rotating PLatform Size 7 Cemented    Depuy [id number] Left 1

## 2019-12-19 NOTE — DISCHARGE INSTRUCTIONS
Patient brought home medication-3 pills  Total Knee Replacement Discharge Instructions                                     [phone]  Bone and Joint Service Line for issues or concerns  General Care:  After surgery you may feel tired/sleepy. This is normal. If you have any question along the way please contact the office. If you feel it is an issue cannot wait for normal office hours, contact the 24 hour bone and joint line at [phone]. You should not drive or operate machines/equipment until released by your physician to do so.     Wound Care:  Keep incision covered with hospital dressing (Aquacel) for one week. It is okay to shower with this dressing on. However, do not submerge your dressing and incision in water for roughly 2 weeks. After one week remove this dressing and then keep it clean, dry, and covered. If this dressing become looses or falls off it is ok to cover with gauze and tape.  Wash the incision gently with warm soapy water after removing your hospital dressing and lightly pat dry.       Diet:  Start with non-alcoholic liquids at first, particularly water or sports drinks after surgery. Progress to bland foods such as crackers and bread and finally to your normal diet if you have no problems. Avoid alcohol when taking narcotic pain medications.      Pain control:  Take your pain medications as prescribed. These medications may make you sleepy. Do not drive, operate equipment, or drink alcohol when taking these.  You may take Tylenol (Generic name is acetaminophen) as directed on the bottle for additional relief or in place of the prescribed pain medications as your pain gets better. Do not take any other NSAIDs (Motrin, Ibuprofen, Aleve, Naproxen) while taking the blood thinner. If the medications cause a reaction such as nausea or skin rash, stop taking them and contact your doctor. Please plan accordingly, pain medications will not be re-filled on the weekends or at night. Call the office  during the day if you need more medications.    Blood thinner:  It is very important to take it as prescribed. It is a medication to help prevent blood clots in your legs or lungs. No medication is perfect, so if you notice a sudden onset of pain/swelling in your calf area call your doctor. If you notice a sudden onset of troubles breathing and/or chest pain call 911.     Swelling (edema) control:  Preventing swelling (edema) in your legs after surgery is very important. It is helpful for achieving optimal range of motion as well as preventing blood clots.  It starts with simple things such as elevation of your legs and icing. Elevate your legs above your heart.    Do this for 20 minutes every couple hours the first few days after surgery. We also recommend LIANNE hose (compression hose) to be worn. Wear on both legs during the day. You may remove at night. Wear these until directed to stop.      Icing:  It is common for some swelling, aching and stiffness to occur for up to 6-9 months after a knee replacement. If you knee swells, get off your feet, elevate your leg and apply some ice packs. Apply for 20 minutes at a time. For the first 1-2 weeks apply ice 2-3 x day or more after therapy.    Walker/crutches:  Use a walker/crutches when you go home. You will transition to the use of a cane and finally to no additional support.     Braces:  You will go home with a knee immobilizer. You can take it off when you are lying or sitting down. Wear it when you are walking. This immobilizer can come off after you are doing a straight leg raise. Your physician and or physical therapist will help to determine when to stop wearing it.     Physical Therapy:  The success of your knee replacement is based on doing physical therapy. You will have some pain and discomfort along the way. If you feel your pain is limiting your progress make sure to take some pain medication prior to your therapy session. If you pain medications are not  working talk to your surgeon.   For the first 2 weeks after going home you will have in-home physical therapy. The goal is to work on range of motion. While it is important to working on bending your knee, it is equally important to make sure you knee comes out all the way straight. To assist in this do not place any bumps, pillows and or blankets under your knee when you are lying down. You should have an outpatient physical therapy appointment scheduled for about 2 weeks after surgery.     Activity:  Unless otherwise instructed, you can weight bear as tolerated. While laying or sitting down you should straighten your knee all the way out and then gently work on bending the knee back. Do not worry at first if your knee feels stiff and will not bend like normal, this will get better. Never put a pillow or bump under you knee, instead put a pillow under your ankle so your knee will straighten out all the way.     Normal findings after surgery:  Numbness and tenderness around the incisions and to the outside of the incision is normal.  You may have bruising around the incisions and down the lower leg.   Your knee will be swollen for months after surgery. It will feel  tight  to move.   Low grade fevers less than 100.5 degrees Fahrenheit are normal.   You may have some minor swelling in the leg/calf area.  You will have some increased pain after your therapy sessions.     When to call the Office:  Temperature greater than 101.5 degrees Fahreheit.  Fever, chills, and increasing pain in the knee.  Excessive drainage from the incisions that include bright red blood.  Drainage from the incisions sites that appear yellow, pus-like, or foul smelling.  Increasing pain the knee not relieved by the prescribed pain medications or ice.  Persistent nausea or vomiting not helped by the Zofran.  Increased pain or swelling in your calf area (in back above your ankle) that wasn t there when in the hospital.  Any other effects you feel  are significant.  Call 911 if you experience any chest pain and/or shortness or breath.

## 2019-12-19 NOTE — OP NOTE
Procedure Date: 12/19/2019      PREOPERATIVE DIAGNOSIS:  Left knee primary osteoarthritis.      POSTOPERATIVE DIAGNOSIS:  Left knee primary osteoarthritis.      PROCEDURE:  Left total knee arthroplasty.      SURGEON:  Marc Ibrahim DO.       FIRST ASSISTANT:  ELFEGO Perdue, CNP.  Utilized throughout the procedure, assisting with soft tissue retraction, assisting with trial and final implant placement, and he provided skin closure.      ANESTHESIA:  Spinal with IV sedation.      COMPLICATIONS:  None.      ESTIMATED BLOOD LOSS:  10 mL.      TOURNIQUET TIME PRESSURE:  78 minutes at 330 mmHg.      URINE OUTPUT:  200 mL.      FLUIDS:  700 mL crystalloids.      COUNTS:  Correct.      DISPOSITION:  To PACU in stable condition.      SPECIFICATIONS:  Includes DePuy Attune size 7 posterior stabilized femoral component with a size 7 rotating platform tibial baseplate, a 5 mm polyethylene insert and a 38 mm patellar button.      GROSS FINDINGS:  Motion was approximately 3-115 degrees preoperatively.  There was a flexible varus contracture.  There was complete loss of articular cartilage of the medial joint.  Associated with this was rimming osteophytes.  He also had complete articular cartilage loss throughout the patellofemoral compartment.      INDICATIONS:  This is a pleasant 69-year-old male well known to myself.  He underwent a contralateral knee replacement and has done extremely well.  On the left side, he has had pain for many years, progressive in nature.  He uses a wheelchair due to the left knee pain and instability.  He can walk less than a block due to pain.  He has attempted steroid injections, Synvisc injections, physical therapy, activity modification, rest, weight loss and time with no improvement.  His radiographs are consistent with his clinical exam.  Given his continued pain refractory to conservative care impacting the quality of his life, we discussed proceeding with left knee replacement.   Risks, benefits, complications, alternatives and anticipated postop course discussed.  Postoperative timeframe  for recovery reviewed.  Once discussed, he has opted to proceed surgically.      DETAILS OF PROCEDURE:  He was met preoperatively again informed consent was verified, appropriate extremity was marked.  He was wheeled back to operative suite #1.  Transferred to the OR table without any issues.  When deemed appropriate by Anesthesia, left lower extremity was sterilely prepped and draped in normal manner.  Prior to incision, a timeout was performed.  Again, the appropriate patient, surgery and extremity were verified and antibiotics administered.  A HemaClear tourniquet was applied.  A midline skin incision was followed with a medial parapatellar arthrotomy.  The anterior fat pad was excised.  The medial tibia was skeletonized with no releases being performed.  Rimming osteophytes were removed.  The knee was gently flexed up.  Collateral ligament retractors were placed and maintained through the key components of the procedure.  A drill was utilized to enter the distal intramedullary canal of the femur with no issues.  The contents were very gradually and easily aspirated.  The distal guide was placed very gradually and slowly intramedullary.  It was set and 5 degrees of valgus, taking 10 mm off distally.  With this jig pinned, the distal medial and lateral aspect of the femur was cut with no issues.  The tibia was then presented, the ACL excised.  The posterior retractor was placed.  The extramedullary tibia guide was placed on the tibia.  Set to take 4 mm off the low side, which was the medial side, with care to recreate the slope in varus and valgus alignment, the jig was pinned into position.  The cut on the medial and lateral aspect of the proximal tibia was performed with no issues.  The knee was brought out to full extension.  A spacer block was placed.  It showed a balanced resection in full  extension.  A drop april which was placed showed normal alignment.  The knee was flexed up.  A tibial baseplate was placed on the tibia.  A drop april again was placed showing acceptable resection.  Collateral ligament retractors were placed.  Rotation guide sizing guide was placed on the distal femur.  Rotation was based on the [place] line and the epicondylar axis.  The pins were placed.  It measured a size 7.  A size 7, 4-in-1 block was placed.  The 90-degree flexion space was consistent with its extension space when measured off the block.  The cuts were performed with no problems.  This followed with the box cut.  Excess bone and meniscal remnants were removed.  Trial components were placed.  He had 0-130 degrees of motion.  Patellar tracked with no thumbs technique through the arc of motion.  The knee was stable through the arc of motion.  The patella was then resurfaced back down to anatomic dimensions using a freehand technique and again taken through full range of motion, showing normal tracking.  The tibia and femur were prepared.  The final components as listed above were cemented in position, held in approximately 20 degrees of flexion until the cement cured.  During this process, a dilute Betadine lavage was performed for 3 minutes followed with pulse lavage.  Excess bone and cement fragments were removed.  The arthrotomy was closed with 0 Vicryl.  The knee was taken through range of motion, showing a tight closure and was balanced through the arc of motion, 2-0 Vicryl subcutaneous closing followed with a running Monocryl suture and skin glue.  A sterile bandage applied and he subsequently transferred to the PACU in stable condition.  He will be admitted to the hospital for orthopedic care, DVT prophylaxis, pain management, physical therapy and a hospitalist consult.         VANCE GALLAGHER DO             D: 12/19/2019   T: 12/19/2019   MT: SHANEKA      Name:     MALCOLM SANCHES   MRN:      [id number]         Account:        [id number]   :      1950           Procedure Date: 2019      Document: [id number]

## 2019-12-19 NOTE — ANESTHESIA PROCEDURE NOTES
Peripheral nerve/Neuraxial procedure note : Adductor canal  Pre-Procedure    Location: post-op      Pre-Anesthestic Checklist: patient identified, IV checked, site marked, risks and benefits discussed, informed consent, monitors and equipment checked, pre-op evaluation, at physician/surgeon's request and post-op pain management    Timeout  Correct Patient: Yes   Correct Procedure: Yes   Correct Site: Yes   Correct Laterality: Yes   Correct Position: Yes   Site Marked: Yes   .   Procedure Documentation    .    Procedure: Adductor canal, left.   Patient Position:supine Local skin infiltrated with 1 mL of 1% lidocaine.    Ultrasound used to identify targeted nerve, plexus, or vascular marker and placed a needle adjacent to it., Ultrasound was used to visualize the spread of the anesthetic in close proximity to the above stated nerve. A permanent image is entered into the patient's record.  Patient Prep/Sterile Barriers; mask, sterile gloves, chlorhexidine gluconate and isopropyl alcohol.  Nerve Stim: Initial Level 0.05 mA. Lowest motor response mA..  Needle: insulated   Needle Gauge: 22.  Needle Length (millimeters) 100  Insertion Method: Single Shot.        Assessment/Narrative  Injection made incrementally with aspirations every 5 mL..  The placement was negative for: blood aspirated, painful injection and site bleeding.  Bolus given via needle..   Secured via.   Complications: none. Comments:  Pt tolerated procedure well.   No complications noted.  Will follow if needed.

## 2019-12-19 NOTE — PROGRESS NOTES
Saw and examined patient.  POD0 s/p total knee replacement, left  Per patient doing very well. Mild pain controlled by oral medications.  No current nausea toleraing oral intact, will be advancing diet as tolerated.  Has not voided yet, DTV. Just starting to work with therapist. Numbness wearing off from spinal.  Patient will discharging directly to [facility]. This has been arranged.  No current concerns.    Answered all questions.     /40 (BP Location: Left arm)   Pulse 66   Temp 97.3  F (36.3  C) (Oral)   Resp 20   SpO2 94%     Alert and orient x 4, sitting up in bed in NAD.   Dressing CDI.  Immobilizer at bedside.   Sensation, motor intact to foot.  Toes well perfused. D.p. Pulse 2+    Plan: will see again tomorrow Am.   Plan for discharge tomorrow to TCU pending progress    ELFEGO Perdue, CNP  Orthopedic Surgery

## 2019-12-19 NOTE — OR NURSING
Transfer from  PACU to Room MED/SURG  Transferred to bed via AIR MAT (Glyder Mat,Transfer Boar,Slider Sheet)    S: 70 y/o MALE  S/P LEFT TOTAL KNEE4       Anesthesia Type:  SPINAL, MAC, ADDUCTOR BLOCK       Surgeon:  Dr. GALLAGHER       Allergies:  See Medication Reconciliation Record       DNR: SEE EHR  (Yes,No)    B:  Pertinent Medical History:   Past Medical History:   Diagnosis Date     Acute pain of left knee 5/22/2017     Chronic pain of right knee 5/22/2017     Hx of coronary artery disease 5/22/2017     Hx of heart artery stent 5/22/2017     Mixed incontinence 8/13/2018     Obesity, morbid, BMI 40.0-49.9 (H) 11/20/2017     Open wound of left lower extremity without complication, initial encounter 5/22/2017     [place] (obstructive sleep apnea) 5/22/2017     Type 2 diabetes mellitus with other circulatory complication, without long-term current use of insulin (H) 5/22/2017     Venous stasis ulcer of left lower extremity (H) 5/22/2017     Venous stasis ulcer of left lower extremity (H) 5/22/2017      (CHF; Heart Disease; Lung Disease; Chronic Pain; Diabetes; Other (Comment)          Surgical History:    Past Surgical History:   Procedure Laterality Date     ARTHROPLASTY KNEE Right 7/30/2019    Procedure: Right total knee replacement;  Surgeon: Marc Gallagher DO;  Location: [facility] OR     CARDIAC SURGERY      stent placement     CHOLECYSTECTOMY       LAMINECTOMY LUMBAR THREE+ LEVELS N/A 3/13/2018    Procedure: LAMINECTOMY LUMBAR THREE+ LEVELS;  T5-9 LAMINECTOMIES, RESECTION OF EPIDURAL LIPOMATOSIS;  Surgeon: Hugh Mendieta MD;  Location:  OR       A:  EBL: 10        IVF:  800        UOP:  200        NPO:  ___Yes __X_No         Vomiting:  ___Yes _X__No         Drainage: NONE        Skin Integrity: INCISIONS AND ABRASION (Normal; Pressure Ulcer (Location)        RFO: ___Yes_X__No (identify item if present)        SSI Patient?  X___Yes___No (if yes, see checklist for actions)        Brace/sling/equipment:   ___Yes_X__No (identify item if present)         See PACU record for ongoing assessment, vital signs and pain assessment.    R: Post-Op vitals and assessments as ordered/indicated per patient's condition.       Follow Post-Op orders and notify Physician prn.       Continue to involve patient/family in plan of care and discharge planning.       Reinforce Pre-Operative education.       Implement skin safety interventions as appropriate.

## 2019-12-19 NOTE — ANESTHESIA CARE TRANSFER NOTE
Patient: Glenroy Padilla    Procedure(s):  LEFT TOTAL KNEE REPLACEMENT    Diagnosis: Primary osteoarthritis of left knee [M17.12]  Diagnosis Additional Information: No value filed.    Anesthesia Type:   Spinal, MAC, Peripheral Nerve Block, For Post-op pain in coordination with surgeonBina. Nerve Block for postop pain     Note:  Airway :Face Mask  Patient transferred to:PACU  Handoff Report: Identifed the Patient, Identified the Reponsible Provider, Reviewed the pertinent medical history, Discussed the surgical course, Reviewed Intra-OP anesthesia mangement and issues during anesthesia, Set expectations for post-procedure period and Allowed opportunity for questions and acknowledgement of understanding      Vitals: (Last set prior to Anesthesia Care Transfer)    CRNA VITALS  12/19/2019 0900 - 12/19/2019 0935      12/19/2019             SpO2:  99 %                Electronically Signed By: ELFEGO Perdue CRNA  December 19, 2019  9:35 AM

## 2019-12-20 ENCOUNTER — APPOINTMENT (OUTPATIENT)
Dept: PHYSICAL THERAPY | Facility: CLINIC | Age: 69
End: 2019-12-20
Attending: ORTHOPAEDIC SURGERY
Payer: COMMERCIAL

## 2019-12-20 ENCOUNTER — MEDICAL CORRESPONDENCE (OUTPATIENT)
Dept: HEALTH INFORMATION MANAGEMENT | Facility: CLINIC | Age: 69
End: 2019-12-20

## 2019-12-20 VITALS
DIASTOLIC BLOOD PRESSURE: 57 MMHG | HEART RATE: 68 BPM | RESPIRATION RATE: 18 BRPM | SYSTOLIC BLOOD PRESSURE: 125 MMHG | TEMPERATURE: 97.6 F | OXYGEN SATURATION: 94 %

## 2019-12-20 LAB
GLUCOSE BLDC GLUCOMTR-MCNC: 133 MG/DL (ref 70–99)
GLUCOSE SERPL-MCNC: 137 MG/DL (ref 70–99)
HGB BLD-MCNC: 8.9 G/DL (ref 13.3–17.7)

## 2019-12-20 PROCEDURE — 36415 COLL VENOUS BLD VENIPUNCTURE: CPT | Performed by: ORTHOPAEDIC SURGERY

## 2019-12-20 PROCEDURE — 25000132 ZZH RX MED GY IP 250 OP 250 PS 637: Performed by: NURSE PRACTITIONER

## 2019-12-20 PROCEDURE — 97530 THERAPEUTIC ACTIVITIES: CPT | Mod: GP | Performed by: PHYSICAL THERAPIST

## 2019-12-20 PROCEDURE — 82962 GLUCOSE BLOOD TEST: CPT

## 2019-12-20 PROCEDURE — 82947 ASSAY GLUCOSE BLOOD QUANT: CPT | Mod: 91 | Performed by: ORTHOPAEDIC SURGERY

## 2019-12-20 PROCEDURE — 99207 ZZC CDG-CODE CATEGORY CHANGED: CPT | Performed by: NURSE PRACTITIONER

## 2019-12-20 PROCEDURE — 25000128 H RX IP 250 OP 636: Performed by: ORTHOPAEDIC SURGERY

## 2019-12-20 PROCEDURE — 85018 HEMOGLOBIN: CPT | Performed by: ORTHOPAEDIC SURGERY

## 2019-12-20 PROCEDURE — 25000132 ZZH RX MED GY IP 250 OP 250 PS 637: Performed by: ORTHOPAEDIC SURGERY

## 2019-12-20 PROCEDURE — 99214 OFFICE O/P EST MOD 30 MIN: CPT | Performed by: NURSE PRACTITIONER

## 2019-12-20 RX ORDER — LOSARTAN POTASSIUM 25 MG/1
TABLET ORAL
Refills: 11 | COMMUNITY
Start: 2019-12-20 | End: 2020-04-09

## 2019-12-20 RX ORDER — TIZANIDINE 2 MG/1
4 TABLET ORAL EVERY 6 HOURS PRN
Qty: 60 TABLET | Refills: 0 | Status: SHIPPED | OUTPATIENT
Start: 2019-12-20

## 2019-12-20 RX ORDER — OXYCODONE HYDROCHLORIDE 5 MG/1
10 TABLET ORAL
Qty: 50 TABLET | Refills: 0 | Status: SHIPPED | OUTPATIENT
Start: 2019-12-20

## 2019-12-20 RX ADMIN — METOPROLOL TARTRATE 25 MG: 25 TABLET ORAL at 08:12

## 2019-12-20 RX ADMIN — CEFAZOLIN SODIUM 2 G: 2 INJECTION, SOLUTION INTRAVENOUS at 01:18

## 2019-12-20 RX ADMIN — ACETAMINOPHEN 975 MG: 325 TABLET, FILM COATED ORAL at 04:39

## 2019-12-20 RX ADMIN — FUROSEMIDE 40 MG: 40 TABLET ORAL at 08:12

## 2019-12-20 RX ADMIN — RIVAROXABAN 10 MG: 10 TABLET, FILM COATED ORAL at 08:12

## 2019-12-20 NOTE — PROGRESS NOTES
"Care Transitions contacted [employer] through patient's insurance.  They are looking for a transport for patient. Writer has requested [facility] at 1015, as writer has already spoken with them and they are available for this time today.      [employer] rep stated that \"they cannot guarantee the company, but they can put in a request for the department that actually schedules the ride.  Writer given confirmation number of #[phone] for the call.  Writer has to call [employer] Robertson back later to verify what agency the ride was set up with.    "

## 2019-12-20 NOTE — PROGRESS NOTES
S-(situation): Patient discharged to [facility] in [place] via wheelchair with [facility] [employer].    B-(background): Left Total Knee Replacement    A-(assessment): Pt alert and oriented. Pt's vitals stable. Pain controlled with Tylenol, declines other pain medication. Voiding adequately. Eating adequately. Blood sugar WNL.   Last bowel movement: Prior to surgery.    R-(recommendations):Report called to [facility]. Listed belongings gathered and sent with patient.     Discharge Nursing Criteria:     Care Plan and Patient education resolved: Yes    Core Measures   Core Measures applicable during stay (Stroke/TIA, MI, Pneumonia and SSI): N/A    Vaccines  Influenza status verified at discharge:  Yes    MISC  Home and hospital aquired medications returned to patient: Yes  Medication Bin checked and emptied on discharge Yes  All paperwork sent with patient/Copy of AVS given to patient or family Yes.

## 2019-12-20 NOTE — PROGRESS NOTES
Name: Glenroy Padilla       MRN#: [id number]    Reason for Hospitalization: Primary osteoarthritis of left knee [M17.12]    Discharge Date: 12/20/2019    Patient / Family response to discharge plan: [facility]  [facility] has approved for patient's ride with [facility] at 1015.  Writer has called [facility] with the discharge time.      Other Providers (Care Coordinator, County Services, PCA services etc): No    CTS Hand Off Completed: Not needed    PAS #: [id number]    KRUNAL Score: None Listed     Future Appointments:   Future Appointments   Date Time Provider Department Center   1/2/2020  8:20 AM Marc Ibrahim DO [facility]       Discharge Disposition: transitional care unit    Discharge Planner   Discharge Plans in progress: Completed. discharge to TCU at [facility].    Barriers to discharge plan: None   Follow up plan: Per TCU/patient       Entered by: LINDA DASILVA 12/20/2019 10:13 AM           SARAH Acevedo

## 2019-12-20 NOTE — PROGRESS NOTES
S-(situation): End of Shift Note    B-(background): LTKA    A-(assessment): Alert and oriented x4. VSS. Afebrile. Tolerating RA with SATS mid to upper 90's. Patient reported mild pain in LLE. Scheduled Tylenol was administered along with Ice and was effective in managing pain to comfortable levels. Patient has been tolerating Regular diet. Drinking adequate amounts of fluids. Patient transfers well with walker assistance of 1. Bowel Sounds Ax4 normoactive. Patient passing gas. No stool. Patient voiding adequate amounts of clear yellow urine without difficulties/no issues.     CMS intact in LLE. ACE Dressing intact. No drainage observed on dressing.

## 2019-12-20 NOTE — PLAN OF CARE
Discharge Planner PT   Patient plan for discharge: TCU  Current status: Sitting up in chair, required mod assist for sit to stand due to height of chair. Ambulated 100 ft with 2WW, no LOB, knee immobilizer in place and good knee control. IND sit to supine in bed and positioning in bed. Dependent doffing knee immobilizer. Reviewed HEP verbally, reviewed elevation, ice and knee extension at rest.  Barriers to return to prior living situation: lives alone, knee immobilizer, assistance for cares and mobility  Recommendations for discharge: TCU  Rationale for recommendations: Given patient's home environment, the knee immobilizer and patient's baseline function TCU is recommended for cares and safety. Patient would benefit from continued skilled therapeutic intervention in order to progress them towards a higher level of function in accordance with their surgeon's protocol.       Entered by: Charlee Mcintosh 12/20/2019 12:47 PM      Thank you for your referral.  Charlee Mcintosh, PT, DPT, ATC    [facility]  O: [phone]  E: [email or web address]

## 2019-12-20 NOTE — PROGRESS NOTES
Patient vital signs are at baseline: Yes  Patient able to ambulate as they were prior to admission or with assist devices provided by therapies during their stay:  Yes  Patient MUST void prior to discharge:  Yes  Patient able to tolerate oral intake:  Yes  Pain has adequate pain control using Oral analgesics:  Yes

## 2019-12-20 NOTE — PROGRESS NOTES
Patient vital signs are at baseline: Yes  Patient able to ambulate as they were prior to admission or with assist devices provided by therapies during their stay:  No,  Reason:  Not out of bed yet at this time, awaiting PT appt.  Patient MUST void prior to discharge:  No,  Reason:  No void since mujica removed after surgery, will continue to monitor.  Patient able to tolerate oral intake:  Yes  Pain has adequate pain control using Oral analgesics:  Yes

## 2019-12-20 NOTE — PLAN OF CARE
Discharge Planner PT   Patient plan for discharge: TCU  Current status: Patient is a 69 year old male, day 0 status post left total knee arthroplasty, 10mL EBL, spinal nerve block, MAC, adductor nerve block and IV sedation by Dr. Ibrahim. Patient has a previous medical history of obesity, R TKA 7/30/19, HTN, [place], GERD, ischemic vascular disease, anemia, CAD, mixed incontinence, RLE lymphedema, L RCT 2018, spinal stenosis L3-5 with spinal surgery. frequent falls, neurpathy, foot drop and cardiomegaly. Prior to surgery patient living alone in a single apartment in a assisted living facility where he receives assistance for housekeeping, laundry, dressing, bathing and lymphedema wraps. At baseline patient sleeps in a recliner, ambulates 1.5 blocks without assistive device, uses a scooter for greater mobility, has a walker for exercising and single point cane for stairs. Patient reports no falls in the past 6 months and good recovery of his contralateral limb. Currently, patient reports 2/10 pain at rest. Demonstrates intact sensation throughout bilat LEs and good strength in bilat LEs, left knee AROM 0-30 degrees with fullness and some increase in pain. Completed post operative HEP with verbal and tactile cues, slight increase in pain, however good tolerance overall. Dependent donning knee immobilizer. Supine to sitting EOB with HOB elevated per baseline and use of right grab bar MOD IND. Sit to/from stand SBA with knee immobilizer and 2WW. Patient requesting to void and refused pre-gait tasks, progressed IND to ambulate with walker and knee immobilizer, CGA. Bed to/from toilet with 2WW, knee immobilizer and CGA with good knee control however significant reliance on knee immobilizer for stability. Stand to/from sit on toilet with grab bars and CGA for safety given low surface patient using significant effort to complete safe transfer. IND sit to supine in bed with knee immobilizer and HOB flat. IND scooting across bed.    Barriers to return to prior living situation: medical status, lives alone, knee immobilizer management   Recommendations for discharge: TCU, agency transport, walker from home  Rationale for recommendations: Patient tolerated POD 0 mobilization well and demonstrates good post operative mobilization tolerance and control. Anticipate patient to make good progress towards higher level functional mobility. Given patient's home environment, the knee immobilizer and patient's baseline function TCU is recommended for cares and safety. Patient would benefit from continued skilled therapeutic intervention in order to progress them towards a higher level of function in accordance with their surgeon's protocol.       Entered by: Charlee Mcintosh 12/19/2019 6:09 PM     Thank you for your referral.  Charlee Mcintosh, PT, DPT, ATC    [facility]  O: [phone]  E: [email or web address]

## 2019-12-20 NOTE — PROGRESS NOTES
[facility]    Medicine Progress Note - Hospitalist Service       Date of Admission:  12/19/2019  Assessment & Plan        Glenroy Padilla is a 69 year old male admitted on 12/19/2019. He presented for an elective left total knee arthroplasty performed 12/19/19 by Dr. Ibrahim. History is significant for type II diabetes mellitus controlled taking sitagliptin/metformin twice daily, hypertension taking Lasix and losartan once daily and metoprolol twice daily, history of CAD with stent placement on Plavix and chronic lymphedema involving the right lower extremity with a history of central spinal stenosis status post surgery. Patient tolerated surgery well and patient currently notes minimal pain to left knee. Denies shortness of breath and patient is maintaining oxygen saturations above 90% on room air. Denies nausea and is tolerating oral intake to maintain nutrition and hydration status. Patient is passing gas but has not had a bowel movement since prior to surgery. Patient is afebrile and hemodynamically stable.       Principal Problem:      Status post total left knee replacement using cement  Assessment: Patient underwent left total knee arthroplasty on 12/19/19.  Patient tolerated surgery well and patient currently notes minimal pain to left knee. Denies shortness of breath and patient is maintaining oxygen saturations above 90% on room air. Denies nausea and is tolerating oral intake to maintain nutrition and hydration status. Patient is passing gas but has not had a bowel movement since prior to surgery. Patient is afebrile and hemodynamically stable.   Plan: Patient to discharge to TCU later today. Orthopedic surgery is recommending use of Xarelto for DVT prophylaxis. Further cares per orthopedic surgery.     Active Problems:      Type 2 diabetes mellitus with other circulatory complication, without long-term current use of insulin (H)  Assessment: Chronic and stable on home dose  sitagliptin/metformin. Hemoglobin A1c of 5.0 on 11/4/19.   Plan: Home dose of sitagliptin/metformin ordered while patient in hospital and continued after discharge.       Hypertension, goal below 140/90  Assessment: Chronic and stable. Managed at home with losartan, metoprolol, and furosemide. Blood pressures have been low normal despite holding losartan.   Plan: Will continue home dose of metoprolol and furosemide after discharge. Recommend holding losartan until blood pressures stabilize. Could be restarted by TCU provider when appropriate      Hyperlipidemia LDL goal <100  Assessment: Chronic and stable on daily Crestor 10 mg.   Plan: Restarted at discharge     The patient's care was discussed with the Patient.     Julio Cesar Mitchell NP  [facility]    Diet: Advance Diet as Tolerated: Regular Diet Adult  Advance Diet as Tolerated  Room Service    DVT Prophylaxis: Xarelto  Fountain Catheter: not present  Code Status: Full Code      Disposition Plan   Expected discharge: Today, recommended to transitional care unit once adequate pain management/ tolerating PO medications, safe disposition plan/ TCU bed available and cleared by orthopedic surgery.  Entered: Julio Cesar Mitchell NP 12/20/2019, 9:03 AM       The patient's care was discussed with the Attending Physician, Dr. Mancera and Patient.    Julio Cesar Mitchell NP  Hospitalist Service  [facility]    ______________________________________________________________________    Interval History   Patient seen sitting up in chair with no new complaints. Patient notes minimal pain to left knee. Denies shortness of breath and patient is maintaining oxygen saturations above 90% on room air. Denies nausea and is tolerating oral intake to maintain nutrition and hydration status. Patient is passing gas but has not had a bowel movement since prior to surgery. Patient is afebrile and hemodynamically stable.     Data reviewed today: I  reviewed all medications, new labs and imaging results over the last 24 hours    Physical Exam   Vital Signs: Temp: 97.6  F (36.4  C) Temp src: Oral BP: 125/57 Pulse: 68 Heart Rate: 65 Resp: 18 SpO2: 94 % O2 Device: None (Room air)    Weight: 0 lbs 0 oz  Constitutional: awake, alert, cooperative, no apparent distress, and appears stated age  Respiratory: No increased work of breathing, good air exchange, clear to auscultation bilaterally, no crackles or wheezing  Cardiovascular: regular rate and rhythm, normal S1 and S2 and no murmur noted  GI: normal bowel sounds, non-distended and non-tender  Skin: no bruising or bleeding and normal skin color, texture, turgor; left leg wrapped in ACE bandage  Musculoskeletal: left leg in immobilizer; normal muscle tone  Neurologic: Awake, alert, oriented to name, place and time.  Sensory is intact.     Data   Recent Labs   Lab 12/20/19  0534   HGB 8.9*   *     Recent Results (from the past 24 hour(s))   XR Knee Port Left 1/2 Views    Narrative    KNEE PORTABLE LEFT 1/2 VIEW(S)   12/19/2019 10:12 AM     HISTORY: Post-Op Total Knee.    COMPARISON: None.      Impression    IMPRESSION: Right total knee arthroplasty in place. No evidence of  complication.    NORA WHITE MD     Medications     lactated ringers Stopped (12/19/19 7864)       acetaminophen  975 mg Oral Q8H     furosemide  40 mg Oral Daily     metoprolol tartrate  25 mg Oral BID     rivaroxaban ANTICOAGULANT  10 mg Oral Daily     sitagliptin-metFORMIN  1 tablet Oral BID w/meals     sodium chloride (PF)  3 mL Intracatheter Q8H

## 2019-12-20 NOTE — PROGRESS NOTES
[facility]  Orthopedics Progress Note           Assessment and Plan:    Assessment:   Post-operative day #1 Procedure(s):  LEFT TOTAL KNEE REPLACEMENT  History of hypertension and diabetes        Plan:   Encourage IS  Start or continue physical therapy  Activity as tolerated  Weight-bear as tolerated.  Left lower extremity  Discharge to nursing/rehab facility. Today at 1030 to [facility]  Pain control measures  Advance diet as tolerated  Routine wound care  physical therapy:[facility].   Social work: TCU as mentioned above  DVT Prophylaxis: Xeralto 10mg x 14 days.   Hospitalist following and assisting in medical issues. FEDERICA Mitchell saw pt yesterday. OK with discharge to TCU today per note yesterday.   Discharge orders done and discharge summary done            Interval History:   Doing well.  Continues to improve.  Pain is well-controlled.  No fevers.  Patient is sitting up in a chair on exam.  Patient states that the block is still working.  Patient states that he is doing well.  Patient states that he is ready to go to the TCU.           Review of Systems:    The patient denies any chest pain, shortness of breath, excessive pain, fever, chills, purulent drainage from the wound, nausea or vomiting.               Physical Exam:   General: awake, alert, appropriate and in no acute distress  Blood pressure (!) 152/53, pulse 68, temperature 98  F (36.7  C), temperature source Oral, resp. rate 20, SpO2 95 %.  Left knee:  Dressing clean, dry and intact. Surrounding skin intact, no breakdown. Calf is soft, nontender with no significant swelling. Distal neurovascular grossly intact.  Compartments soft and non-tender.  Foot is warm, sensation intact to foot, able to df/pf against resistance.  Moving all toes.           Data:     Results for orders placed or performed during the hospital encounter of 12/19/19 (from the past 24 hour(s))   XR Knee  Port Left 1/2 Views    Narrative    KNEE PORTABLE LEFT 1/2 VIEW(S)   12/19/2019 10:12 AM     HISTORY: Post-Op Total Knee.    COMPARISON: None.      Impression    IMPRESSION: Right total knee arthroplasty in place. No evidence of  complication.    NORA WHITE MD   Glucose by meter   Result Value Ref Range    Glucose 152 (H) 70 - 99 mg/dL   Hospitalist IP Consult: Requesting provider? Hospitalist (if different from attending physician); Patient to be seen: ASAP - within 4 hours; Call back #: [id number]; Hospitalist Consult; Consultant may enter orders: Yes    Narrative    Julio Cesar Mitchell NP     12/19/2019 12:53 PM  [facility]  Consult Note - Hospitalist Service     Date of Admission:  12/19/2019  Consult Requested by: Dr. Ibrahim  Reason for Consult: management of chronic medical problems post   left total knee arthroplasty    Assessment & Plan   Glenroy Padilla is a 69 year old male admitted on 12/19/2019. He   presented for an elective left total knee arthroplasty performed   today by Dr. Ibrahim. History is significant for type II diabetes   mellitus controlled taking sitagliptin/metformin twice daily,   hypertension taking Lasix and losartan once daily and metoprolol   twice daily, history of CAD with stent placement on Plavix and   chronic lymphedema involving the right lower extremity with a   history of central spinal stenosis status post surgery. Patient   tolerated surgery well and patient currently denies pain. Home   antihypertensive medications have been restarted other than   losartan and Lasix which is reasonable until blood pressure   stabilizes. Home dose of sitagliptin/metformin restarted for   control of diabetes. Orthopedic surgery is recommending use of   Xarelto for DVT prophylaxis. At this time would anticipate   routine postoperative cares. Will monitor blood pressures   closely. Patient is expecting to discharge to TCU within the next   day.     Principal Problem:     Status post total left knee replacement using cement  Active Problems:    Type 2 diabetes mellitus with other circulatory complication,   without long-term current use of insulin (H)    Hypertension, goal below 140/90    Hyperlipidemia LDL goal <100    History of coronary artery stent placement    Iron deficiency anemia secondary to inadequate dietary iron   intake    Primary osteoarthritis of left knee      The patient's care was discussed with the Patient.    Julio Cesar Mitchell NP  [facility]    __________________________________________________________________  ____    Chief Complaint   Admitted with chronic left knee pain for elective left total knee   arthroplasty    History is obtained from the patient and the electronic health   record    History of Present Illness   Glenroy Padilla is a 69 year old male who presents with chronic   left knee pain with elective left total knee arthroplasty   performed today by Dr. Ibrahim. History is significant for type II   diabetes mellitus controlled taking sitagliptin/metformin twice   daily, hypertension taking Lasix and losartan once daily and   metoprolol twice daily, history of CAD with stent placement on   Plavix and chronic lymphedema involving the right lower extremity   with a history of central spinal stenosis status post surgery.   Patient tolerated surgery well and is currently denying any pain.   Denies shortness of breath and patient is maintaining oxygen   saturations above 90% on room air.  Patient denies nausea and is   tolerating oral intake.     Review of Systems   The 10 point Review of Systems is negative other than noted in   the HPI or here.     Past Medical History    I have reviewed this patient's medical history and updated it   with pertinent information if needed.   Past Medical History:   Diagnosis Date     Acute pain of left knee 5/22/2017     Chronic pain of right knee 5/22/2017     Hx of coronary artery disease  5/22/2017     Hx of heart artery stent 5/22/2017     Mixed incontinence 8/13/2018     Obesity, morbid, BMI 40.0-49.9 (H) 11/20/2017     Open wound of left lower extremity without complication,   initial encounter 5/22/2017     [place] (obstructive sleep apnea) 5/22/2017     Type 2 diabetes mellitus with other circulatory complication,   without long-term current use of insulin (H) 5/22/2017     Venous stasis ulcer of left lower extremity (H) 5/22/2017     Venous stasis ulcer of left lower extremity (H) 5/22/2017       Past Surgical History   I have reviewed this patient's surgical history and updated it   with pertinent information if needed.  Past Surgical History:   Procedure Laterality Date     ARTHROPLASTY KNEE Right 7/30/2019    Procedure: Right total knee replacement;  Surgeon: Marc Ibrahim DO;  Location:  OR     CARDIAC SURGERY      stent placement     CHOLECYSTECTOMY       LAMINECTOMY LUMBAR THREE+ LEVELS N/A 3/13/2018    Procedure: LAMINECTOMY LUMBAR THREE+ LEVELS;  T5-9   LAMINECTOMIES, RESECTION OF EPIDURAL LIPOMATOSIS;  Surgeon: Hugh Mendieta MD;  Location:  OR       Social History   I have reviewed this patient's social history and updated it with   pertinent information if needed.  Social History     Tobacco Use     Smoking status: Former Smoker     Packs/day: 0.00     Types: Cigars     Smokeless tobacco: Never Used     Tobacco comment: exposure to second hand smoke   Substance Use Topics     Alcohol use: No     Drug use: No       Family History   I have reviewed this patient's family history and updated it with   pertinent information if needed.   History reviewed. No pertinent family history.     Medications   Medications Prior to Admission   Medication Sig Dispense Refill Last Dose     acetaminophen (TYLENOL) 325 MG tablet Take 3 tablets (975 mg)   by mouth every 8 hours 100 tablet 0 Past Week at Unknown time     blood glucose monitoring (NO BRAND SPECIFIED) meter device kit    test blood sugar 2 xdaily or as directed.  Blood Glucose Monitor   Brands: per insurance. 1 kit 0 12/18/2019 at Unknown time     blood glucose monitoring (NO BRAND SPECIFIED) test strip Use to   test blood sugar 2 times daily Blood Glucose Monitor Brands:   Glucocard Expression 100 strip 6 12/18/2019 at Unknown time     blood glucose monitoring (NO BRAND SPECIFIED) test strip Use to   test blood sugar 2x  daily or as directed. To accompany: Blood   Glucose Monitor Brands: per insurance. 100 strip 1 12/18/2019 at   Unknown time     Blood Glucose Monitoring Suppl (GLUCOCARD EXPRESSION MONITOR)   W/DEVICE KIT USE TWICE DAILY TO TEST BLOOD GLUCOSE  0 12/18/2019   at Unknown time     Ferrous Sulfate Dried (GNP IRON) 200 (65 Fe) MG TABS Take 650   mg by mouth every morning 180 tablet 1 12/18/2019 at Unknown time       furosemide (LASIX) 40 MG tablet TAKE 1 TABLET (40 MG) BY MOUTH   DAILY 90 tablet 1 12/18/2019 at Unknown time     JANUMET XR  MG TB24 TAKE 2 TABLETS BY MOUTH DAILY (WITH   BREAKFAST) 180 tablet 0 12/18/2019 at Unknown time     losartan (COZAAR) 25 MG tablet   11 12/18/2019 at Unknown time     metoprolol tartrate (LOPRESSOR) 25 MG tablet Take 1 tablet (25   mg) by mouth 2 times daily 180 tablet 3 12/18/2019 at Unknown   time     Nutritional Supplements (ENSURE ACTIVE HIGH PROTEIN) LIQD Take   1 Can by mouth daily 30 each 1 12/18/2019 at Unknown time     order for DME Equipment being ordered: VASO PRESS-DVT   PROPHYLAXIS SYSTEM- SEQUENTIAL 1 Device 0 12/18/2019 at Unknown   time     order for DME Equipment being ordered: VASO PRESS-DVT   PROPHYLAXIS SYSTEM or SUBSTITUTE BRAND SEQUENTIAL PUMP 1 Device 0   12/18/2019 at Unknown time     order for DME Equipment being ordered: Wheelchair of   appropriate style and size 1 Device 0 12/18/2019 at Unknown time     order for DME Equipment being ordered: VASO PRESS-DVT   PROPHYLAXIS SYSTEM                                             APPLY AT HS TO   BILATERAL  LOWER EXREMITIES 1 Units 0 12/18/2019 at Unknown time     order for DME Equipment being ordered: Replacement battery or   batteries for an [employer] Pronto M 51. 1 Device 0 12/18/2019 at   Unknown time     order for DME Equipment being ordered: electric chair for   sleeping and adjustment to allow for elevation of legs above the   heart.  Zero gravity type heavy duty sleep chair advised.   MaxiComforter ([id number]) 1 Units 0 12/18/2019 at Unknown time     order for DME Equipment being ordered: Needs to have a sleep   chair for home use. 1 Device 0 12/18/2019 at Unknown time     order for DME Compression garments toe to knee.  Specific brand   is Compraflex Light Compression garment.   Diagnosis codes for venous stasis ulcer I83.029 and for    Lymphoedema is I89.0   Goal is for compression of about 30 to 40 mm of mercury   measurements are right ankle 28.5 cm and right calf is 45.5 cm.    Left ankle is 27.5 cm and the calf is 45 cm   Tall for length. 2 Device 1 12/18/2019 at Unknown time     order for DME Equipment being ordered: compression stockings   below knee and above knee if available.  Medium compression. 1   Units 1 12/18/2019 at Unknown time     rosuvastatin (CRESTOR) 10 MG tablet TAKE 1 TABLET (10 MG) BY   MOUTH DAILY 90 tablet 0 12/18/2019 at Unknown time     sitagliptin-metFORMIN (JANUMET)  MG tablet Take 1 tablet   by mouth 2 times daily (with meals) This is actually Janumet XR     12/18/2019 at Unknown time     thin (NO BRAND SPECIFIED) lancets Test 2 x daily or as   directed.  Brands: per insurance. 100 each 1 12/18/2019 at   Unknown time     blood glucose (NO BRAND SPECIFIED) lancing device Use to test   blood sugars 2 times daily or as directed. 1 each 0 Taking     blood glucose calibration (NO BRAND SPECIFIED) solution To   accompany: Blood Glucose Monitor Brands: per insurance. 1 Bottle   3 Taking     clopidogrel (PLAVIX) 75 MG tablet Take 1 tablet (75 mg) by   mouth daily 90 tablet 1  12/6/2019       Allergies   Allergies   Allergen Reactions     No Known Allergies        Physical Exam   Vital Signs: Temp: 97.4  F (36.3  C) Temp src: Oral BP: 129/48   Pulse: 68 Heart Rate: 68 Resp: 16 SpO2: 98 % O2 Device: None   (Room air)    Weight: 0 lbs 0 oz    Constitutional: awake, alert, cooperative, no apparent distress,   and appears stated age  Respiratory: No increased work of breathing, good air exchange,   clear to auscultation bilaterally, no crackles or wheezing  Cardiovascular: regular rate and rhythm, normal S1 and S2 and no   murmur noted  GI: normal bowel sounds, non-distended and non-tender  Skin: no bruising or bleeding and normal skin color, texture,   turgor  Musculoskeletal: left knee dressed and wrapped in ACE bandage;   CMS intact to left toes  Neurologic: Awake, alert, oriented to name, place and time.    Cranial nerves II-XII are grossly intact.  Motor is 5 out of 5   bilaterally.  Cerebellar finger to nose, heel to shin intact.    Sensory is intact.  Babinski down going, Romberg negative, and   gait is normal.    Data   Results for orders placed or performed during the hospital   encounter of 12/19/19 (from the past 24 hour(s))   Glucose by meter   Result Value Ref Range    Glucose 100 (H) 70 - 99 mg/dL   XR Knee Port Left 1/2 Views    Narrative    KNEE PORTABLE LEFT 1/2 VIEW(S)   12/19/2019 10:12 AM     HISTORY: Post-Op Total Knee.    COMPARISON: None.      Impression    IMPRESSION: Right total knee arthroplasty in place. No evidence   of  complication.   Glucose by meter   Result Value Ref Range    Glucose 152 (H) 70 - 99 mg/dL   Glucose by meter   Result Value Ref Range    Glucose 149 (H) 70 - 99 mg/dL      Glucose by meter   Result Value Ref Range    Glucose 149 (H) 70 - 99 mg/dL   Care Transition RN/SW IP Consult    Narrative    [place] PointeAntonio hollowayi     12/19/2019  2:56 PM  CARE TRANSITION SOCIAL WORK INITIAL ASSESSMENT:      Met with: Patient.    DATA  Principal Problem:    Status post  "total left knee replacement using cement  Active Problems:    Type 2 diabetes mellitus with other circulatory complication,   without long-term current use of insulin (H)    Hypertension, goal below 140/90    Hyperlipidemia LDL goal <100    History of coronary artery stent placement    Iron deficiency anemia secondary to inadequate dietary iron   intake    Primary osteoarthritis of left knee       [facility]  Primary Care MD Name: Julio Cesar Esteban  Contact information and PCP information verified: Yes      ASSESSMENT  Cognitive Status: awake, alert and oriented.       Description of Support System: Supportive, Involved     Who is your support system?: Significant Other, Children     Support Assessment: Adequate family and caregiver support,   Adequate social supports     Insurance Concerns: No Insurance issues identified       This writer met with patient and introduced self and role.   Discussed discharge planning and medicare guidelines in regards   to home care and SNF benefits. Pt/family was given the Medicare   Compare list for SNF, with associated star ratings to assist with   choice for referrals/discharge planning: Yes    Education was given to pt/family that star ratings are   updated/maintained by Medicare and can be reviewed by visiting   www.medicare.gov: Yes    Discussed TCU placement for patient per surgeon.  Patient in   agreement.  Patient was provided with Medicare certified nursing   home list. Pts choices are as follows: [facility] (Admissions: [phone], Main   Phone: [phone] Fax: [phone]).  Patient not interested   in giving any other choices at this time.  Referral has been   sent.     Patient reported that after his last surgery, he went back home   to his assisted living apartment in [place], \"[facility]".  This   did not go well, so he ended up going to the TCU at [facility] in   [place].  He had a good experience " and wants to go back there   after this surgery.    Discussed transportation to a TCU.  Patient has his own electric   wheelchair here and needs van transport.  He is requesting [facility].  Care Transitions to call [facility] through patient's   insurance to set up the transport once a TCU is found.    Patient stated that he does anticipate being ready for discharge   to a TCU tomorrow.      1456- Patient has been accepted for TCU placement at [facility]   in [place] for tomorrow.  Writer is calling [facility] to set   up transport.         PLAN    Care Transitions to continue to follow.  Awaiting call back from   [facility] TCU.            SARAH Acevedo    Glucose by meter   Result Value Ref Range    Glucose 236 (H) 70 - 99 mg/dL   Glucose by meter   Result Value Ref Range    Glucose 158 (H) 70 - 99 mg/dL   Hemoglobin   Result Value Ref Range    Hemoglobin 8.9 (L) 13.3 - 17.7 g/dL   Glucose   Result Value Ref Range    Glucose 137 (H) 70 - 99 mg/dL

## 2019-12-20 NOTE — PROGRESS NOTES
Glenroy Baughsilvino  Gender: male  : 1950  [address] [facility] [address] [phone] (home)     Medical Record: [id number]  Pharmacy:    [facility] - [place], [place] - [address] [facility] - [place], [place] - [address] [facility] [address] - [place], [place] - [address] [facility] - [place], [place] - [address] [id number] - [place], [place] - [address] [facility]  WRITTEN PRESCRIPTION REQUESTED  Primary Care Provider: Julio Cesar Esteban    Parent's names are: Data Unavailable (mother) and Data Unavailable (father).      [facility]  2019     Discharge Phone Call:  Discharge to Replaced by [facility]

## 2019-12-20 NOTE — ANESTHESIA POSTPROCEDURE EVALUATION
Patient: Glenroy Padilla    Procedure(s):  LEFT TOTAL KNEE REPLACEMENT    Diagnosis:Primary osteoarthritis of left knee [M17.12]  Diagnosis Additional Information: No value filed.    Anesthesia Type:  Spinal, MAC, Peripheral Nerve Block, For Post-op pain in coordination with surgeon, Bina. Nerve Block for postop pain    Note:  Anesthesia Post Evaluation    Patient location during evaluation: Phase 2  Patient participation: Able to fully participate in evaluation  Level of consciousness: awake and alert  Pain management: adequate  Cardiovascular status: blood pressure returned to baseline  Respiratory status: spontaneous ventilation and room air  Hydration status: acceptable  PONV: none     Anesthetic complications: None    Comments: Called patient at home.  Patient was very pleased with his anesthesia. His adductor canal block is still working well for him. He has no concerns. Will follow as necessary.         Last vitals:  Vitals:    12/20/19 0004 12/20/19 0319 12/20/19 0709   BP: 127/57 (!) 152/53 125/57   Pulse:      Resp: 18 20 18   Temp: 98.3  F (36.8  C) 98  F (36.7  C) 97.6  F (36.4  C)   SpO2: 94% 95% 94%         Electronically Signed By: ELFEGO Dixon CRNA  December 20, 2019  2:51 PM

## 2019-12-20 NOTE — DISCHARGE SUMMARY
[facility] Discharge Summary    Glenroy Padilla MRN# [id number]   Age: 69 year old YOB: 1950     Date of Admission:  12/19/2019  Date of Discharge::  12/20/2019  Admitting Physician:  Mrac Ibrahim DO  Discharge Physician:  Grant Horner PA-C     Home clinic: [facility]          Admission Diagnoses:   Primary osteoarthritis of left knee [M17.12]          Discharge Diagnosis:     Primary osteoarthritis of left knee [M17.12]          Procedures:     Procedure(s): Procedure(s):  LEFT TOTAL KNEE REPLACEMENT       No other procedures performed during this admission           Medications Prior to Admission:     Medications Prior to Admission   Medication Sig Dispense Refill Last Dose     blood glucose monitoring (NO BRAND SPECIFIED) meter device kit test blood sugar 2 xdaily or as directed.  Blood Glucose Monitor Brands: per insurance. 1 kit 0 12/18/2019 at Unknown time     blood glucose monitoring (NO BRAND SPECIFIED) test strip Use to test blood sugar 2 times daily Blood Glucose Monitor Brands: Glucocard Expression 100 strip 6 12/18/2019 at Unknown time     blood glucose monitoring (NO BRAND SPECIFIED) test strip Use to test blood sugar 2x  daily or as directed. To accompany: Blood Glucose Monitor Brands: per insurance. 100 strip 1 12/18/2019 at Unknown time     Blood Glucose Monitoring Suppl (GLUCOCARD EXPRESSION MONITOR) W/DEVICE KIT USE TWICE DAILY TO TEST BLOOD GLUCOSE  0 12/18/2019 at Unknown time     Ferrous Sulfate Dried (GNP IRON) 200 (65 Fe) MG TABS Take 650 mg by mouth every morning 180 tablet 1 12/18/2019 at Unknown time     furosemide (LASIX) 40 MG tablet TAKE 1 TABLET (40 MG) BY MOUTH DAILY 90 tablet 1 12/18/2019 at Unknown time     metoprolol tartrate (LOPRESSOR) 25 MG tablet Take 1 tablet (25 mg) by mouth 2 times daily 180 tablet 3 12/18/2019 at Unknown time     Nutritional Supplements (ENSURE ACTIVE HIGH PROTEIN) LIQD Take 1 Can by mouth daily  30 each 1 12/18/2019 at Unknown time     order for DME Equipment being ordered: VASO PRESS-DVT PROPHYLAXIS SYSTEM- SEQUENTIAL 1 Device 0 12/18/2019 at Unknown time     order for DME Equipment being ordered: VASO PRESS-DVT PROPHYLAXIS SYSTEM or SUBSTITUTE BRAND SEQUENTIAL PUMP 1 Device 0 12/18/2019 at Unknown time     order for DME Equipment being ordered: Wheelchair of appropriate style and size 1 Device 0 12/18/2019 at Unknown time     order for DME Equipment being ordered: VASO PRESS-DVT PROPHYLAXIS SYSTEM                                             APPLY AT HS TO BILATERAL LOWER EXREMITIES 1 Units 0 12/18/2019 at Unknown time     order for DME Equipment being ordered: Replacement battery or batteries for an [employer] Pr[employer] M 51. 1 Device 0 12/18/2019 at Unknown time     order for DME Equipment being ordered: electric chair for sleeping and adjustment to allow for elevation of legs above the heart.  Zero gravity type heavy duty sleep chair advised. MaxiComforter ([id number]) 1 Units 0 12/18/2019 at Unknown time     order for DME Equipment being ordered: Needs to have a sleep chair for home use. 1 Device 0 12/18/2019 at Unknown time     order for DME Compression garments toe to knee.  Specific brand is Compraflex Light Compression garment.   Diagnosis codes for venous stasis ulcer I83.029 and for  Lymphoedema is I89.0   Goal is for compression of about 30 to 40 mm of mercury   measurements are right ankle 28.5 cm and right calf is 45.5 cm.  Left ankle is 27.5 cm and the calf is 45 cm   Tall for length. 2 Device 1 12/18/2019 at Unknown time     order for DME Equipment being ordered: compression stockings below knee and above knee if available.  Medium compression. 1 Units 1 12/18/2019 at Unknown time     rosuvastatin (CRESTOR) 10 MG tablet TAKE 1 TABLET (10 MG) BY MOUTH DAILY 90 tablet 0 12/18/2019 at Unknown time     sitagliptin-metFORMIN (JANUMET)  MG tablet Take 1 tablet by mouth 2 times daily (with meals)  This is actually Janumet XR   12/18/2019 at Unknown time     thin (NO BRAND SPECIFIED) lancets Test 2 x daily or as directed.  Brands: per insurance. 100 each 1 12/18/2019 at Unknown time     blood glucose (NO BRAND SPECIFIED) lancing device Use to test blood sugars 2 times daily or as directed. 1 each 0 Taking     blood glucose calibration (NO BRAND SPECIFIED) solution To accompany: Blood Glucose Monitor Brands: per insurance. 1 Bottle 3 Taking     [DISCONTINUED] acetaminophen (TYLENOL) 325 MG tablet Take 3 tablets (975 mg) by mouth every 8 hours 100 tablet 0 Past Week at Unknown time     [DISCONTINUED] clopidogrel (PLAVIX) 75 MG tablet Take 1 tablet (75 mg) by mouth daily 90 tablet 1 12/6/2019     [DISCONTINUED] losartan (COZAAR) 25 MG tablet   11 12/18/2019 at Unknown time             Discharge Medications:     Current Discharge Medication List      START taking these medications    Details   docusate sodium (COLACE) 100 MG capsule Take 1 capsule (100 mg) by mouth 2 times daily as needed for constipation  Qty: 60 capsule, Refills: 1    Associated Diagnoses: Status post total left knee replacement using cement      hydrOXYzine (ATARAX) 10 MG tablet Take 1 tablet (10 mg) by mouth every 6 hours as needed for other (adjuvant pain)  Qty: 45 tablet, Refills: 0    Associated Diagnoses: Status post total left knee replacement using cement      ondansetron (ZOFRAN-ODT) 4 MG ODT tab Take 1 tablet (4 mg) by mouth every 8 hours as needed for nausea or vomiting    Associated Diagnoses: Status post total left knee replacement using cement      oxyCODONE (ROXICODONE) 5 MG tablet Take 1-2 tablets (5-10 mg) by mouth every 3 hours as needed for moderate to severe pain  Qty: 50 tablet, Refills: 0    Associated Diagnoses: Status post total left knee replacement using cement      rivaroxaban ANTICOAGULANT (XARELTO) 10 MG TABS tablet Take 1 tablet (10 mg) by mouth daily for 14 days  Qty: 14 tablet, Refills: 0    Associated Diagnoses:  Status post total left knee replacement using cement      tiZANidine (ZANAFLEX) 2 MG tablet Take 1-2 tablets (2-4 mg) by mouth every 6 hours as needed for muscle spasms    Associated Diagnoses: Status post total left knee replacement using cement         CONTINUE these medications which have CHANGED    Details   acetaminophen (TYLENOL) 325 MG tablet Take 3 tablets (975 mg) by mouth every 8 hours as needed for mild pain  Qty: 100 tablet, Refills: 0    Associated Diagnoses: S/P total knee replacement using cement, right      clopidogrel (PLAVIX) 75 MG tablet Take 1 tablet (75 mg) by mouth daily Resume once finished with Xarelto  Qty: 90 tablet, Refills: 1    Associated Diagnoses: Type 2 diabetes mellitus with other circulatory complication, without long-term current use of insulin (H); S/P spinal surgery; Hypertension, goal below 140/90; Hyperlipidemia LDL goal <100      losartan (COZAAR) 25 MG tablet PLEASE DO NOT TAKE THIS MEDICATION UNTIL YOU FOLLOW UP WITH PCP. Pt was taking 25 mg once daily  Refills: 11         CONTINUE these medications which have NOT CHANGED    Details   blood glucose monitoring (NO BRAND SPECIFIED) meter device kit test blood sugar 2 xdaily or as directed.  Blood Glucose Monitor Brands: per insurance.  Qty: 1 kit, Refills: 0    Associated Diagnoses: Type 2 diabetes mellitus with other circulatory complication, without long-term current use of insulin (H)      !! blood glucose monitoring (NO BRAND SPECIFIED) test strip Use to test blood sugar 2 times daily Blood Glucose Monitor Brands: Glucocard Expression  Qty: 100 strip, Refills: 6    Comments: Dx: E11.59  Associated Diagnoses: Type 2 diabetes mellitus with other circulatory complication, without long-term current use of insulin (H)      !! blood glucose monitoring (NO BRAND SPECIFIED) test strip Use to test blood sugar 2x  daily or as directed. To accompany: Blood Glucose Monitor Brands: per insurance.  Qty: 100 strip, Refills: 1     Associated Diagnoses: Type 2 diabetes mellitus with other circulatory complication, without long-term current use of insulin (H)      Blood Glucose Monitoring Suppl (GLUCOCARD EXPRESSION MONITOR) W/DEVICE KIT USE TWICE DAILY TO TEST BLOOD GLUCOSE  Refills: 0      Ferrous Sulfate Dried (GNP IRON) 200 (65 Fe) MG TABS Take 650 mg by mouth every morning  Qty: 180 tablet, Refills: 1    Associated Diagnoses: History of anemia      furosemide (LASIX) 40 MG tablet TAKE 1 TABLET (40 MG) BY MOUTH DAILY  Qty: 90 tablet, Refills: 1    Associated Diagnoses: Hypertension, goal below 140/90      metoprolol tartrate (LOPRESSOR) 25 MG tablet Take 1 tablet (25 mg) by mouth 2 times daily  Qty: 180 tablet, Refills: 3    Associated Diagnoses: Type 2 diabetes mellitus with other circulatory complication, without long-term current use of insulin (H); History of coronary artery stent placement; Hypertension, goal below 140/90      Nutritional Supplements (ENSURE ACTIVE HIGH PROTEIN) LIQD Take 1 Can by mouth daily  Qty: 30 each, Refills: 1    Associated Diagnoses: Gastroesophageal reflux disease without esophagitis; Morbid obesity due to excess calories (H); Type 2 diabetes mellitus with other circulatory complication, without long-term current use of insulin (H); Ischemic vascular disease      !! order for DME Equipment being ordered: VASO PRESS-DVT PROPHYLAXIS SYSTEM- SEQUENTIAL  Qty: 1 Device, Refills: 0    Associated Diagnoses: Edema of both legs      !! order for DME Equipment being ordered: VASO PRESS-DVT PROPHYLAXIS SYSTEM or SUBSTITUTE BRAND SEQUENTIAL PUMP  Qty: 1 Device, Refills: 0    Associated Diagnoses: Edema of both legs      !! order for DME Equipment being ordered: Wheelchair of appropriate style and size  Qty: 1 Device, Refills: 0    Associated Diagnoses: Chronic pain of right knee; Disease of spinal cord (H); Neuropathy; Weakness of both legs; Type 2 diabetes mellitus with other circulatory complication, without long-term  current use of insulin (H); Hypertension, goal below 140/90; Ischemic vascular disease; Foraminal stenosis of lumbar region; Lymphedema of right lower extremity; Foot drop, right; S/P spinal surgery; S/P total knee replacement using cement, right; Lymphedema      !! order for DME Equipment being ordered: VASO PRESS-DVT PROPHYLAXIS SYSTEM                                             APPLY AT HS TO BILATERAL LOWER EXREMITIES  Qty: 1 Units, Refills: 0    Associated Diagnoses: Edema of both legs      !! order for DME Equipment being ordered: Replacement battery or batteries for an [employer] M 51.  Qty: 1 Device, Refills: 0    Associated Diagnoses: Acute pain of left knee; Chronic pain of right knee; Neuropathy; Weakness of both legs; Foot drop, right; S/P spinal surgery; Central spinal stenosis      !! order for DME Equipment being ordered: electric chair for sleeping and adjustment to allow for elevation of legs above the heart.  Zero gravity type heavy duty sleep chair advised. MaxiComforter ([id number])  Qty: 1 Units, Refills: 0    Associated Diagnoses: Screening for diabetic peripheral neuropathy; History of coronary artery stent placement; Hypertension, goal below 140/90; Open wound of left lower extremity without complication, initial encounter; Type 2 diabetes mellitus with other circulatory complication, without long-term current use of insulin (H); Weakness of both legs; Iron deficiency anemia secondary to inadequate dietary iron intake; [place] (obstructive sleep apnea); Central spinal stenosis; Foraminal stenosis of lumbar region      !! order for DME Equipment being ordered: Needs to have a sleep chair for home use.  Qty: 1 Device, Refills: 0    Associated Diagnoses: Type 2 diabetes mellitus with other circulatory complication, without long-term current use of insulin (H); Morbid obesity due to excess calories (H); History of coronary artery stent placement; Gastroesophageal reflux disease without  esophagitis; Chronic pain of left knee; [place] (obstructive sleep apnea)      !! order for DME Compression garments toe to knee.  Specific brand is Compraflex Light Compression garment.   Diagnosis codes for venous stasis ulcer I83.029 and for  Lymphoedema is I89.0   Goal is for compression of about 30 to 40 mm of mercury   measurements are right ankle 28.5 cm and right calf is 45.5 cm.  Left ankle is 27.5 cm and the calf is 45 cm   Tall for length.  Qty: 2 Device, Refills: 1    Associated Diagnoses: Neuropathy; Lymphedema of right lower extremity; Lymphedema      !! order for DME Equipment being ordered: compression stockings below knee and above knee if available.  Medium compression.  Qty: 1 Units, Refills: 1    Associated Diagnoses: Primary osteoarthritis of right knee; Lymphedema of right lower extremity      rosuvastatin (CRESTOR) 10 MG tablet TAKE 1 TABLET (10 MG) BY MOUTH DAILY  Qty: 90 tablet, Refills: 0    Associated Diagnoses: Hyperlipidemia LDL goal <100      sitagliptin-metFORMIN (JANUMET)  MG tablet Take 1 tablet by mouth 2 times daily (with meals) This is actually Janumet XR    Comments: This is actually Janumet XR      thin (NO BRAND SPECIFIED) lancets Test 2 x daily or as directed.  Brands: per insurance.  Qty: 100 each, Refills: 1    Associated Diagnoses: Type 2 diabetes mellitus with other circulatory complication, without long-term current use of insulin (H)      blood glucose (NO BRAND SPECIFIED) lancing device Use to test blood sugars 2 times daily or as directed.  Qty: 1 each, Refills: 0    Comments: Gluco Card Expressions  Associated Diagnoses: Type 2 diabetes mellitus with other circulatory complication, without long-term current use of insulin (H)      blood glucose calibration (NO BRAND SPECIFIED) solution To accompany: Blood Glucose Monitor Brands: per insurance.  Qty: 1 Bottle, Refills: 3    Associated Diagnoses: Type 2 diabetes mellitus with other circulatory complication, without  long-term current use of insulin (H)       !! - Potential duplicate medications found. Please discuss with provider.                Consultations:   Hospitalist/Physical Therapy          Brief History of Illness:   Reason for admission requiring a surgical or invasive procedure:   Primary osteoarthritis of left knee [M17.12]   The patient underwent the following procedure(s):   Procedure(s):  LEFT TOTAL KNEE REPLACEMENT   There were no immediate complications during this procedure.    Please refer to the full operative summary for details.           Hospital Course:   This patient was admitted for the above diagnosis to under go the above procedure.  Postoperatively he did very well with no apparent complications, and he had an uneventful hospital stay. Antibiotics were given for 24 hours after surgery. He was given Xeralto, leg pumps and teds/Ace wrap for DVT prophylaxis and his pain was well controlled with IV pain meds, then transitioned to oral pain medications during his hospital stay. The patient was followed by the Orthopedic team and on postoperative day 1 CMS/dressing were intact.  He progressed well with physical therapy and discharge to TCU was recommended. His chronic medical problems were followed by the medicine team during his hospital stay and there were no significant changes to conditions or treatment plans.  No new medical problems presented during his hospital stay.       Discharge Instructions and Follow-Up:     Discharge diet: Pre-procedure diet or regular   Discharge activity: Activity as tolerated  Driving restricitIons: no driving while taking narcotic analgesics  Weight bearing status: Weight bearing as tolerated left lower extremity   Discharge follow-up: Follow up with Dr. Ibrahim on 1/2/2020 at 8:20 AM in [facility], we will get standing AP, lateral, sunrise of the left knee at that time.   Wound care: Keep the Aquacel (surgical) dressing on for 7 days after surgery, on the  7th day you can remove the dressing and apply gauze and tape.  Change that dressing once per day until your clinic follow up.    Anticoagulation:            Xeralto 10mg x 14 days.            Discharge Disposition:     Discharged to [facility]      Attestation:  I have reviewed today's vital signs, notes, medications, labs and imaging.  Total time: 45 minutes    Grant Horner PA-C

## 2019-12-20 NOTE — PROGRESS NOTES
12/19/19 1600   Quick Adds   Type of Visit Initial PT Evaluation       Present no   Living Environment   Lives With alone   Living Arrangements assisted living   Home Accessibility no concerns   Transportation Anticipated health plan transportation   Living Environment Comment patient receives assistance with dressing, bathing and lymphedema wraps, housekeeping, laundry. Patient sleeps in a recliner.    Self-Care   Usual Activity Tolerance moderate   Current Activity Tolerance fair   Regular Exercise No   Equipment Currently Used at Home walker, rolling;wheelchair, manual   Functional Level Prior   Ambulation 1-->assistive equipment  (short distances)   Transferring 0-->independent   Toileting 1-->assistive equipment  (grabbars)   Bathing 1-->assistive equipment  (grab bars)   Communication 0-->understands/communicates without difficulty   Swallowing 0-->swallows foods/liquids without difficulty   Cognition 0 - no cognition issues reported   Fall history within last six months no   Which of the above functional risks had a recent onset or change? ambulation;transferring   Prior Functional Level Comment Patient able to ambulate 1.5 blocks following right knee surgery, fatigue of back and left knee led to use of power chair. walker for short distances and cane for stairs. walks 1,000 stairs a day   General Information   Onset of Illness/Injury or Date of Surgery - Date 12/19/19   Referring Physician Dr. Ibrahim   Patient/Family Goals Statement [facility]   Pertinent History of Current Problem (include personal factors and/or comorbidities that impact the POC) Patient is a 69 year old male, day 0 status post left total knee arthroplasty, 10mL EBL, spinal nerve block, MAC, adductor nerve block and IV sedation by Dr. Ibrahim. Patient has a previous medical history of obesity, R TKA 7/30/19, HTN, DAYTON, GERD, ischemic vascular disease, anemia, CAD, mixed incontinence, RLE lymphedema, L  RCT 2018, spinal stenosis L3-5 with spinal surgery. frequent falls, neurpathy, foot drop and cardiomegaly.    Precautions/Limitations fall precautions   Weight-Bearing Status - LUE full weight-bearing   Weight-Bearing Status - RUE full weight-bearing   Weight-Bearing Status - LLE weight-bearing as tolerated   Weight-Bearing Status - RLE full weight-bearing   General Observations patient on room air at rest, 96% saturation. Conversational and agreeable   General Info Comments PT orders: evaluate and treat post op knee, ambulate and exercise. Activity orders: up in chair, ambulate, ice, position, elevate, knee immobilizer   Cognitive Status Examination   Orientation orientation to person, place and time   Level of Consciousness alert   Follows Commands and Answers Questions 100% of the time;able to follow multistep instructions   Personal Safety and Judgment intact   Memory intact   Pain Assessment   Patient Currently in Pain Yes, see Vital Sign flowsheet  (2/10)   Integumentary/Edema   Integumentary/Edema Comments post operative dressing CDI   Posture    Posture Forward head position;Kyphosis;Protracted shoulders   Range of Motion (ROM)   ROM Comment left knee AROM 0-30 degrees    Strength   Strength Comments good strength in bilat LEs   Bed Mobility   Bed Mobility Bed mobility skill: Rolling/Turning;Bed mobility skill: Sit to supine;Bed mobility skill: Supine to sit   Bed Mobility Skill: Rolling/Turning   Level of [place]: Rolling/Turning stand-by assist   Physical/Nonphysical Assist: Rolling/Turning supervision   Assistive Device: Rolling/Turning bed rails   Bed Mobility Skill: Sit to Supine   Level of [place]: Sit/Supine stand-by assist   Physical Assist/Nonphysical Assist: Sit/Supine supervision   Bed Mobility Skill: Supine to Sit   Level of [place]: Supine/Sit stand-by assist   Physical Assist/Nonphysical Assist: Supine/Sit supervision;verbal cues   Assistive Device: Supine/Sit bed rails  (HOB  elevated)   Transfer Skills   Transfer Transfer Skill: Sit to Stand;Transfer Skill:  Stand to Sit;Transfer Skill:  Toilet Transfer   Transfer Skill:  Sit to Stand   Level of [place]: Sit/Stand contact guard   Physical Assist/Nonphysical Assist: Sit/Stand supervision;verbal cues   Weightbearing Restrictions: Sit/Stand weight-bearing as tolerated   Assistive Device for Transfer: Sit/Stand rolling walker   Transfer Skill: Stand to Sit   Level of [place]: Stand/Sit contact guard   Physical Assist/Nonphysical Assist: Stand/Sit supervision;verbal cues   Weight-Bearing Restrictions: Stand/Sit weight-bearing as tolerated   Assistive Device: Stand to Sit rolling walker   Transfer Skill: Toilet Transfer   Level of [place]: Toilet contact guard   Physical Assist/Nonphysical Assist: Toilet supervision;verbal cues   Weight-Bearing Restrictions: Toilet weight-bearing as tolerated   Assistive Device rolling walker;rejib bars   Gait   Gait Gait Skill;Gait Analysis   Gait Skills   Level of [place]: Gait contact guard   Physical Assist/Nonphysical Assist: Gait supervision;verbal cues   Weight-Bearing Restrictions: Gait weight-bearing as tolerated   Assistive Device for Transfer: Gait rolling walker   Gait Distance   (10)   Gait Analysis   Gait Pattern Used swing-to gait   Gait Deviations Noted decreased toe-to-floor clearance;decreased stride length;decreased step length;increased time in double stance;decreased jacqueline   Impairments Contributing to Gait Deviations decreased ROM;decreased strength   Balance   Balance Comments no LOB, however significant reliance on knee immobilizer for stability in standing   Sensory Examination   Sensory Perception Comments intact sensation throughout bilat LEs    Coordination   Coordination no deficits were identified   Muscle Tone   Muscle Tone no deficits were identified   Modality Interventions   Planned Modality Interventions Cryotherapy   Planned Modality Interventions  "Comments Left knee PRN   General Therapy Interventions   Planned Therapy Interventions gait training;bed mobility training;ROM;strengthening;home program guidelines;progressive activity/exercise   Clinical Impression   Criteria for Skilled Therapeutic Intervention yes, treatment indicated   PT Diagnosis joint stiffness, muscle weakness, muscle tension   Influenced by the following impairments day 0 status post left total knee arthroplasty   Functional limitations due to impairments increased assistance for baseline mobility, decreased activity tolerance, pain   Clinical Presentation Evolving/Changing   Clinical Presentation Rationale POD 0 pain and impaired mobility, anticipate progression towards stability with medical management    Clinical Decision Making (Complexity) Moderate complexity   Therapy Frequency 2x/day   Predicted Duration of Therapy Intervention (days/wks) 1-2 days   Anticipated Equipment Needs at Discharge   (defer to TCU)   Anticipated Discharge Disposition Transitional Care Facility   Risk & Benefits of therapy have been explained Yes   Patient, Family & other staff in agreement with plan of care Yes   Clinical Impression Comments Patient tolerated POD 0 mobilization well and demonstrates good post operative mobilization tolerance and control. Anticipate patient to make good progress towards higher level functional mobility. Given patient's home environment, the knee immobilizer and patient's baseline function TCU is recommended for cares and safety. Patient would benefit from continued skilled therapeutic intervention in order to progress them towards a higher level of function in accordance with their surgeon's protocol.   [facility] [employer] TM \"6 Clicks\"   2016, Trustees of [facility], under license to [employer].  All rights reserved.   6 Clicks Short Forms Basic Mobility Inpatient Short Form   [facility] [employer]  \"6 Clicks\" V.2 Basic Mobility Inpatient Short Form   1. " Turning from your back to your side while in a flat bed without using bedrails? 3 - A Little   2. Moving from lying on your back to sitting on the side of a flat bed without using bedrails? 3 - A Little   3. Moving to and from a bed to a chair (including a wheelchair)? 3 - A Little   4. Standing up from a chair using your arms (e.g., wheelchair, or bedside chair)? 3 - A Little   5. To walk in hospital room? 4 - None   6. Climbing 3-5 steps with a railing? 2 - A Lot   Basic Mobility Raw Score (Score out of 24.Lower scores equate to lower levels of function) 18   Total Evaluation Time   Total Evaluation Time (Minutes) 15     Thank you for your referral.  Charlee Mcintosh, PT, DPT, ATC    [facility]  O: [phone]  E: [email or web address]

## 2020-01-02 ENCOUNTER — MEDICAL CORRESPONDENCE (OUTPATIENT)
Dept: HEALTH INFORMATION MANAGEMENT | Facility: CLINIC | Age: 70
End: 2020-01-02

## 2020-01-02 ENCOUNTER — OFFICE VISIT (OUTPATIENT)
Dept: ORTHOPEDICS | Facility: CLINIC | Age: 70
End: 2020-01-02
Payer: COMMERCIAL

## 2020-01-02 ENCOUNTER — ANCILLARY PROCEDURE (OUTPATIENT)
Dept: GENERAL RADIOLOGY | Facility: CLINIC | Age: 70
End: 2020-01-02
Attending: ORTHOPAEDIC SURGERY
Payer: COMMERCIAL

## 2020-01-02 VITALS
SYSTOLIC BLOOD PRESSURE: 113 MMHG | BODY MASS INDEX: 40.73 KG/M2 | WEIGHT: 275 LBS | HEIGHT: 69 IN | DIASTOLIC BLOOD PRESSURE: 73 MMHG

## 2020-01-02 DIAGNOSIS — Z96.652 STATUS POST TOTAL LEFT KNEE REPLACEMENT USING CEMENT: ICD-10-CM

## 2020-01-02 DIAGNOSIS — Z96.652 STATUS POST TOTAL LEFT KNEE REPLACEMENT USING CEMENT: Primary | ICD-10-CM

## 2020-01-02 PROCEDURE — 99024 POSTOP FOLLOW-UP VISIT: CPT | Performed by: NURSE PRACTITIONER

## 2020-01-02 PROCEDURE — 73562 X-RAY EXAM OF KNEE 3: CPT | Mod: TC

## 2020-01-02 ASSESSMENT — PAIN SCALES - GENERAL: PAINLEVEL: NO PAIN (0)

## 2020-01-02 ASSESSMENT — MIFFLIN-ST. JEOR: SCORE: 1984.83

## 2020-01-02 NOTE — LETTER
"    1/2/2020         RE: Glenroy Padilla  [address] [id number]        Dear Colleague,    Thank you for referring your patient, Glenroy Padilla, to the [facility]. Please see a copy of my visit note below.    Orthopedic Clinic Post-Operative Note    CHIEF COMPLAINT:   Chief Complaint   Patient presents with     Surgical Followup     DOS: 12/19/2019~Left total knee arthroplasty~14 days postop       HISTORY OF PRESENT ILLNESS  Returns to clinic for post-op visit.  States doing much better this time around. TCU is going well, therapy going well, was told he is getting to 100 degrees flexion. Pain is mild. Stopped narcotics and muscle relaxer a few days ago.  Happy with progress. No incision concerns. Hopeful to discharge back to assistive living tomorrow and then back to home in a few weeks. Has therapy arranged to start once back at assistive living. Has \"new pump and socks\" for pre-existing lymphedema and happy with this.     Patient's past medical, surgical, social and family histories reviewed.     Past Medical History:   Diagnosis Date     Acute pain of left knee 5/22/2017     Chronic pain of right knee 5/22/2017     Hx of coronary artery disease 5/22/2017     Hx of heart artery stent 5/22/2017     Mixed incontinence 8/13/2018     Obesity, morbid, BMI 40.0-49.9 (H) 11/20/2017     Open wound of left lower extremity without complication, initial encounter 5/22/2017     DAYTON (obstructive sleep apnea) 5/22/2017     Type 2 diabetes mellitus with other circulatory complication, without long-term current use of insulin (H) 5/22/2017     Venous stasis ulcer of left lower extremity (H) 5/22/2017     Venous stasis ulcer of left lower extremity (H) 5/22/2017       Past Surgical History:   Procedure Laterality Date     ARTHROPLASTY KNEE Right 7/30/2019    Procedure: Right total knee replacement;  Surgeon: Marc Ibrahim DO;  Location: [facility] OR     ARTHROPLASTY KNEE Left 12/19/2019 "    Procedure: LEFT TOTAL KNEE REPLACEMENT;  Surgeon: Marc Ibrahim DO;  Location:  OR     CARDIAC SURGERY      stent placement     CHOLECYSTECTOMY       LAMINECTOMY LUMBAR THREE+ LEVELS N/A 3/13/2018    Procedure: LAMINECTOMY LUMBAR THREE+ LEVELS;  T5-9 LAMINECTOMIES, RESECTION OF EPIDURAL LIPOMATOSIS;  Surgeon: Hugh Mendieta MD;  Location:  OR       Medications:  acetaminophen (TYLENOL) 325 MG tablet, Take 3 tablets (975 mg) by mouth every 8 hours as needed for mild pain  blood glucose (NO BRAND SPECIFIED) lancing device, Use to test blood sugars 2 times daily or as directed.  blood glucose calibration (NO BRAND SPECIFIED) solution, To accompany: Blood Glucose Monitor Brands: per insurance.  blood glucose monitoring (NO BRAND SPECIFIED) meter device kit, test blood sugar 2 xdaily or as directed.  Blood Glucose Monitor Brands: per insurance.  blood glucose monitoring (NO BRAND SPECIFIED) test strip, Use to test blood sugar 2 times daily Blood Glucose Monitor Brands: Glucocard Expression  blood glucose monitoring (NO BRAND SPECIFIED) test strip, Use to test blood sugar 2x  daily or as directed. To accompany: Blood Glucose Monitor Brands: per insurance.  Blood Glucose Monitoring Suppl (GLUCOCARD EXPRESSION MONITOR) W/DEVICE KIT, USE TWICE DAILY TO TEST BLOOD GLUCOSE  [START ON 1/3/2020] clopidogrel (PLAVIX) 75 MG tablet, Take 1 tablet (75 mg) by mouth daily Resume once finished with Xarelto  docusate sodium (COLACE) 100 MG capsule, Take 1 capsule (100 mg) by mouth 2 times daily as needed for constipation  Ferrous Sulfate Dried (GNP IRON) 200 (65 Fe) MG TABS, Take 650 mg by mouth every morning  furosemide (LASIX) 40 MG tablet, TAKE 1 TABLET (40 MG) BY MOUTH DAILY  hydrOXYzine (ATARAX) 10 MG tablet, Take 1 tablet (10 mg) by mouth every 6 hours as needed for other (adjuvant pain)  losartan (COZAAR) 25 MG tablet, PLEASE DO NOT TAKE THIS MEDICATION UNTIL YOU FOLLOW UP WITH PCP. Pt was taking 25 mg once  daily  metoprolol tartrate (LOPRESSOR) 25 MG tablet, Take 1 tablet (25 mg) by mouth 2 times daily  Nutritional Supplements (ENSURE ACTIVE HIGH PROTEIN) LIQD, Take 1 Can by mouth daily  order for DME, Equipment being ordered: VASO PRESS-DVT PROPHYLAXIS SYSTEM- SEQUENTIAL  order for DME, Equipment being ordered: VASO PRESS-DVT PROPHYLAXIS SYSTEM or SUBSTITUTE BRAND SEQUENTIAL PUMP  order for DME, Equipment being ordered: Wheelchair of appropriate style and size  order for DME, Equipment being ordered: VASO PRESS-DVT PROPHYLAXIS SYSTEM                                             APPLY AT HS TO BILATERAL LOWER EXREMITIES  order for DME, Equipment being ordered: Replacement battery or batteries for an [employer] M 51.  order for DME, Equipment being ordered: electric chair for sleeping and adjustment to allow for elevation of legs above the heart.  Zero gravity type heavy duty sleep chair advised. MaxiComforter ([id number])  order for DME, Equipment being ordered: Needs to have a sleep chair for home use.  order for DME, Compression garments toe to knee.  Specific brand is Compraflex Light Compression garment.   Diagnosis codes for venous stasis ulcer I83.029 and for  Lymphoedema is I89.0   Goal is for compression of about 30 to 40 mm of mercury   measurements are right ankle 28.5 cm and right calf is 45.5 cm.  Left ankle is 27.5 cm and the calf is 45 cm   Tall for length.  order for DME, Equipment being ordered: compression stockings below knee and above knee if available.  Medium compression.  rivaroxaban ANTICOAGULANT (XARELTO) 10 MG TABS tablet, Take 1 tablet (10 mg) by mouth daily for 14 days  rosuvastatin (CRESTOR) 10 MG tablet, TAKE 1 TABLET (10 MG) BY MOUTH DAILY  sitagliptin-metFORMIN (JANUMET)  MG tablet, Take 1 tablet by mouth 2 times daily (with meals) This is actually Janumet XR  thin (NO BRAND SPECIFIED) lancets, Test 2 x daily or as directed.  Brands: per insurance.  ondansetron (ZOFRAN-ODT) 4  "MG ODT tab, Take 1 tablet (4 mg) by mouth every 8 hours as needed for nausea or vomiting (Patient not taking: Reported on 1/2/2020)  oxyCODONE (ROXICODONE) 5 MG tablet, Take 2 tablets (10 mg) by mouth every 3 hours as needed for moderate to severe pain (Patient not taking: Reported on 1/2/2020)  tiZANidine (ZANAFLEX) 2 MG tablet, Take 2 tablets (4 mg) by mouth every 6 hours as needed for muscle spasms (Patient not taking: Reported on 1/2/2020)    No current facility-administered medications on file prior to visit.       Allergies   Allergen Reactions     No Known Allergies        Social History     Occupational History     Not on file   Tobacco Use     Smoking status: Former Smoker     Packs/day: 0.00     Types: Cigars     Smokeless tobacco: Never Used     Tobacco comment: exposure to second hand smoke   Substance and Sexual Activity     Alcohol use: No     Drug use: No     Sexual activity: Not Currently     Partners: Female       No family history on file.    REVIEW OF SYSTEMS  General: negative for, night sweats, dizziness, fatigue  Resp: No shortness of breath and no cough  CV: negative for chest pain, syncope or near-syncope  GI: negative for nausea, vomiting and diarrhea  : negative for dysuria and hematuria  Musculoskeletal: as above  Neurologic: negative for syncope   Hematologic: negative for bleeding disorder    Physical Exam:  Vitals: /73   Ht 1.74 m (5' 8.5\")   Wt 124.7 kg (275 lb)   BMI 41.21 kg/m     BMI= Body mass index is 41.21 kg/m .  Constitutional: healthy, alert and no acute distress   Psychiatric: mentation appears normal and affect normal/bright  NEURO: no focal deficits  SKIN: .healing well, well approximated skin edges, without signs of infection including no erythema, incision breakdown or purlent drainage  JOINT/EXTREMITIES: left knee: 3-100. Expected post-op swelling. Lower extremity is at baseline for edema, and skin pigment changes.  Distal neurovascular at pre-op baseline. No " "instability. Calf soft non-tender.   GAIT: not tested     Diagnostic Modalities:  left knee X-ray: The prosthesis has acceptable alignment. No fractures or dislocations. Prosthesis is well seated with no evidence of loosening  Independent visualization of the images was performed.      Impression:   Chief Complaint   Patient presents with     Surgical Followup     DOS: 12/19/2019~Left total knee arthroplasty~14 days postop   Recovering very well    Plan:   Activity: continue with therapy and transition from TCU to assistive living to back to home once felt safe and appropriate by patient, family, and therapy.  Xeralto will be finished soon.  Continue to increase activity as tolerated.   Macie/Sutures out: tails of monocryl clipped from proximal pole of incision.  Ok to leave the incision open to air and shower uncovered. No soaking for another 2 weeks or once fully healed.    Pain controlled: Yes. Continue to use: tylenol, ice, elevation.  Patient has stopped narcotics and muscle relaxers.   Immobilzation: No   Has stronger grade stockings and lymphedema \"pumps\" continue this as prescribed by his PCP  Physical Therapy: continue with therapy. continue with edema therapy  Rest, Ice, Elevation, Compression  Return to clinic 4 weeks, or sooner as needed for changes.    Re-x-ray on return: No    ELFEGO Perdue, CNP  Orthopedic Surgery        Again, thank you for allowing me to participate in the care of your patient.        Sincerely,        Marc Ibrahim, DO    "

## 2020-01-02 NOTE — PROGRESS NOTES
"Orthopedic Clinic Post-Operative Note    CHIEF COMPLAINT:   Chief Complaint   Patient presents with     Surgical Followup     DOS: 12/19/2019~Left total knee arthroplasty~14 days postop       HISTORY OF PRESENT ILLNESS  Returns to clinic for post-op visit.  States doing much better this time around. TCU is going well, therapy going well, was told he is getting to 100 degrees flexion. Pain is mild. Stopped narcotics and muscle relaxer a few days ago.  Happy with progress. No incision concerns. Hopeful to discharge back to assistive living tomorrow and then back to home in a few weeks. Has therapy arranged to start once back at assistive living. Has \"new pump and socks\" for pre-existing lymphedema and happy with this.     Patient's past medical, surgical, social and family histories reviewed.     Past Medical History:   Diagnosis Date     Acute pain of left knee 5/22/2017     Chronic pain of right knee 5/22/2017     Hx of coronary artery disease 5/22/2017     Hx of heart artery stent 5/22/2017     Mixed incontinence 8/13/2018     Obesity, morbid, BMI 40.0-49.9 (H) 11/20/2017     Open wound of left lower extremity without complication, initial encounter 5/22/2017     DAY[place] (obstructive sleep apnea) 5/22/2017     Type 2 diabetes mellitus with other circulatory complication, without long-term current use of insulin (H) 5/22/2017     Venous stasis ulcer of left lower extremity (H) 5/22/2017     Venous stasis ulcer of left lower extremity (H) 5/22/2017       Past Surgical History:   Procedure Laterality Date     ARTHROPLASTY KNEE Right 7/30/2019    Procedure: Right total knee replacement;  Surgeon: Marc Ibrahim DO;  Location: [facility]     ARTHROPLASTY KNEE Left 12/19/2019    Procedure: LEFT TOTAL KNEE REPLACEMENT;  Surgeon: Marc Ibrahim DO;  Location: [facility]     CARDIAC SURGERY      stent placement     CHOLECYSTECTOMY       LAMINECTOMY LUMBAR THREE+ LEVELS N/A 3/13/2018    Procedure: LAMINECTOMY LUMBAR " THREE+ LEVELS;  T5-9 LAMINECTOMIES, RESECTION OF EPIDURAL LIPOMATOSIS;  Surgeon: Hugh Mendieta MD;  Location:  OR       Medications:  acetaminophen (TYLENOL) 325 MG tablet, Take 3 tablets (975 mg) by mouth every 8 hours as needed for mild pain  blood glucose (NO BRAND SPECIFIED) lancing device, Use to test blood sugars 2 times daily or as directed.  blood glucose calibration (NO BRAND SPECIFIED) solution, To accompany: Blood Glucose Monitor Brands: per insurance.  blood glucose monitoring (NO BRAND SPECIFIED) meter device kit, test blood sugar 2 xdaily or as directed.  Blood Glucose Monitor Brands: per insurance.  blood glucose monitoring (NO BRAND SPECIFIED) test strip, Use to test blood sugar 2 times daily Blood Glucose Monitor Brands: Glucocard Expression  blood glucose monitoring (NO BRAND SPECIFIED) test strip, Use to test blood sugar 2x  daily or as directed. To accompany: Blood Glucose Monitor Brands: per insurance.  Blood Glucose Monitoring Suppl (GLUCOCARD EXPRESSION MONITOR) W/DEVICE KIT, USE TWICE DAILY TO TEST BLOOD GLUCOSE  [START ON 1/3/2020] clopidogrel (PLAVIX) 75 MG tablet, Take 1 tablet (75 mg) by mouth daily Resume once finished with Xarelto  docusate sodium (COLACE) 100 MG capsule, Take 1 capsule (100 mg) by mouth 2 times daily as needed for constipation  Ferrous Sulfate Dried (GNP IRON) 200 (65 Fe) MG TABS, Take 650 mg by mouth every morning  furosemide (LASIX) 40 MG tablet, TAKE 1 TABLET (40 MG) BY MOUTH DAILY  hydrOXYzine (ATARAX) 10 MG tablet, Take 1 tablet (10 mg) by mouth every 6 hours as needed for other (adjuvant pain)  losartan (COZAAR) 25 MG tablet, PLEASE DO NOT TAKE THIS MEDICATION UNTIL YOU FOLLOW UP WITH PCP. Pt was taking 25 mg once daily  metoprolol tartrate (LOPRESSOR) 25 MG tablet, Take 1 tablet (25 mg) by mouth 2 times daily  Nutritional Supplements (ENSURE ACTIVE HIGH PROTEIN) LIQD, Take 1 Can by mouth daily  order for DME, Equipment being ordered: VASO PRESS-DVT  PROPHYLAXIS SYSTEM- SEQUENTIAL  order for DME, Equipment being ordered: VASO PRESS-DVT PROPHYLAXIS SYSTEM or SUBSTITUTE BRAND SEQUENTIAL PUMP  order for DME, Equipment being ordered: Wheelchair of appropriate style and size  order for DME, Equipment being ordered: VASO PRESS-DVT PROPHYLAXIS SYSTEM                                             APPLY AT HS TO BILATERAL LOWER EXREMITIES  order for DME, Equipment being ordered: Replacement battery or batteries for an [employer] Pronto M 51.  order for DME, Equipment being ordered: electric chair for sleeping and adjustment to allow for elevation of legs above the heart.  Zero gravity type heavy duty sleep chair advised. MaxiComforter ([id number])  order for DME, Equipment being ordered: Needs to have a sleep chair for home use.  order for DME, Compression garments toe to knee.  Specific brand is Compraflex Light Compression garment.   Diagnosis codes for venous stasis ulcer I83.029 and for  Lymphoedema is I89.0   Goal is for compression of about 30 to 40 mm of mercury   measurements are right ankle 28.5 cm and right calf is 45.5 cm.  Left ankle is 27.5 cm and the calf is 45 cm   Tall for length.  order for DME, Equipment being ordered: compression stockings below knee and above knee if available.  Medium compression.  rivaroxaban ANTICOAGULANT (XARELTO) 10 MG TABS tablet, Take 1 tablet (10 mg) by mouth daily for 14 days  rosuvastatin (CRESTOR) 10 MG tablet, TAKE 1 TABLET (10 MG) BY MOUTH DAILY  sitagliptin-metFORMIN (JANUMET)  MG tablet, Take 1 tablet by mouth 2 times daily (with meals) This is actually Janumet XR  thin (NO BRAND SPECIFIED) lancets, Test 2 x daily or as directed.  Brands: per insurance.  ondansetron (ZOFRAN-ODT) 4 MG ODT tab, Take 1 tablet (4 mg) by mouth every 8 hours as needed for nausea or vomiting (Patient not taking: Reported on 1/2/2020)  oxyCODONE (ROXICODONE) 5 MG tablet, Take 2 tablets (10 mg) by mouth every 3 hours as needed for moderate to  "severe pain (Patient not taking: Reported on 1/2/2020)  tiZANidine (ZANAFLEX) 2 MG tablet, Take 2 tablets (4 mg) by mouth every 6 hours as needed for muscle spasms (Patient not taking: Reported on 1/2/2020)    No current facility-administered medications on file prior to visit.       Allergies   Allergen Reactions     No Known Allergies        Social History     Occupational History     Not on file   Tobacco Use     Smoking status: Former Smoker     Packs/day: 0.00     Types: Cigars     Smokeless tobacco: Never Used     Tobacco comment: exposure to second hand smoke   Substance and Sexual Activity     Alcohol use: No     Drug use: No     Sexual activity: Not Currently     Partners: Female       No family history on file.    REVIEW OF SYSTEMS  General: negative for, night sweats, dizziness, fatigue  Resp: No shortness of breath and no cough  CV: negative for chest pain, syncope or near-syncope  GI: negative for nausea, vomiting and diarrhea  : negative for dysuria and hematuria  Musculoskeletal: as above  Neurologic: negative for syncope   Hematologic: negative for bleeding disorder    Physical Exam:  Vitals: /73   Ht 1.74 m (5' 8.5\")   Wt 124.7 kg (275 lb)   BMI 41.21 kg/m    BMI= Body mass index is 41.21 kg/m .  Constitutional: healthy, alert and no acute distress   Psychiatric: mentation appears normal and affect normal/bright  NEURO: no focal deficits  SKIN: .healing well, well approximated skin edges, without signs of infection including no erythema, incision breakdown or purlent drainage  JOINT/EXTREMITIES: left knee: 3-100. Expected post-op swelling. Lower extremity is at baseline for edema, and skin pigment changes.  Distal neurovascular at pre-op baseline. No instability. Calf soft non-tender.   GAIT: not tested     Diagnostic Modalities:  left knee X-ray: The prosthesis has acceptable alignment. No fractures or dislocations. Prosthesis is well seated with no evidence of loosening  Independent " "visualization of the images was performed.      Impression:   Chief Complaint   Patient presents with     Surgical Followup     DOS: 12/19/2019~Left total knee arthroplasty~14 days postop   Recovering very well    Plan:   Activity: continue with therapy and transition from TCU to assistive living to back to home once felt safe and appropriate by patient, family, and therapy.  Xeralto will be finished soon.  Continue to increase activity as tolerated.   Macie/Sutures out: tails of monocryl clipped from proximal pole of incision.  Ok to leave the incision open to air and shower uncovered. No soaking for another 2 weeks or once fully healed.    Pain controlled: Yes. Continue to use: tylenol, ice, elevation.  Patient has stopped narcotics and muscle relaxers.   Immobilzation: No   Has stronger grade stockings and lymphedema \"pumps\" continue this as prescribed by his PCP  Physical Therapy: continue with therapy. continue with edema therapy  Rest, Ice, Elevation, Compression  Return to clinic 4 weeks, or sooner as needed for changes.    Re-x-ray on return: No    ELFEGO Perdue, CNP  Orthopedic Surgery      "

## 2020-01-03 ENCOUNTER — TELEPHONE (OUTPATIENT)
Dept: FAMILY MEDICINE | Facility: OTHER | Age: 70
End: 2020-01-03

## 2020-01-03 NOTE — TELEPHONE ENCOUNTER
Reason for Call: Request for an order or referral:    Order or referral being requested:  [facility] care calling to see if Dr Copeland will sign the plan of care and home care orders for patient.  He is being admitted on 01-08.  I asked them to fax over the order and plan of care but she said she wants to know first with a verbal okay that NAMAN Copeland will sign this before she sends it.  This is Julio Cesar Wharton's patient.  Please advise.  Her name is Sumi.     Date needed: as soon as possible    Has the patient been seen by the PCP for this problem? Not Applicable    Additional comments:  [facility] care, ask for Sumi.  .    Phone number Patient can be reached at:  Other phone number:  [phone]    Best Time:  any    Can we leave a detailed message on this number?  YES    Call taken on 1/3/2020 at 8:04 AM by Ora Conde

## 2020-01-08 ENCOUNTER — TELEPHONE (OUTPATIENT)
Dept: FAMILY MEDICINE | Facility: OTHER | Age: 70
End: 2020-01-08

## 2020-01-08 ENCOUNTER — TELEPHONE (OUTPATIENT)
Dept: SURGERY | Facility: OTHER | Age: 70
End: 2020-01-08

## 2020-01-08 NOTE — TELEPHONE ENCOUNTER
Writer called bita back. Reports Glenroy just got the wraps today. She wondering how often she should be wearing these and how long. I will have Rl review this and get back to Bita.     I did leave a message with our physical therapy department to get back to me on this. If we don't know the answer, the next step would be to reach out to the supplier.  Meme Beth RN on 1/8/2020 at 1:17 PM

## 2020-01-08 NOTE — TELEPHONE ENCOUNTER
Reason for Call: Request for an order or referral:    Order or referral being requested: Verbal Orders: Nursing 1 time a week for 3 weeks, PT eval, and Occupational Therapy Eval    Date needed: as soon as possible    Has the patient been seen by the PCP for this problem? YES    Additional comments: Please call back Bita from [facility]    Phone number Patient can be reached at:  Other phone number:  Bita [phone]    Best Time:  any    Can we leave a detailed message on this number?  YES    Call taken on 1/8/2020 at 11:48 AM by Anne Escoto

## 2020-01-08 NOTE — TELEPHONE ENCOUNTER
Reason for Call:  Other appointment    Detailed comments: Bita from [facility] calling to verify orders for his lymphedema wrappings. Please call her at [phone]    Phone Number Patient can be reached at: Home number on file    Best Time: any    Can we leave a detailed message on this number? YES    Call taken on 1/8/2020 at 11:56 AM by Meme Isaacs

## 2020-01-08 NOTE — TELEPHONE ENCOUNTER
I spoke with Tulio from PT who works with different lymphedema pumps.. generally the pumps are once per day. The pump will go through a cycle for 45-60 minutes. The vendor will many times come to patient's house and re-verify the information.    I called the home care nurse Bita back and left the above information on her  Secure VM.    Meme Beth RN on 1/8/2020 at 2:17 PM

## 2020-01-09 NOTE — PROGRESS NOTES
Subjective     Glenroy Padilla is a 69 year old male who presents to clinic today for the following health issues:    HPI       Hospital Follow-up Visit:    Hospital/Nursing Home/IP Rehab Facility: [facility]  Date of Admission: 12/20  Date of Discharge: 01/07/2020  Reason(s) for Admission: Total knee replacement rehab            Problems taking medications regularly:  None       Medication changes since discharge: None       Problems adhering to non-medication therapy:  None    Summary of hospitalization:  [facility] discharge summary reviewed  Diagnostic Tests/Treatments reviewed.  Follow up needed: PRN  Other Healthcare Providers Involved in Patient s Care:         None  Update since discharge: improved.     Post Discharge Medication Reconciliation: discharge medications reconciled, continue medications without change.  Plan of care communicated with patient     Coding guidelines for this visit:  Type of Medical   Decision Making Face-to-Face Visit       within 7 Days of discharge Face-to-Face Visit        within 14 days of discharge   Moderate Complexity [id number]   High Complexity [id number]            Patient Active Problem List   Diagnosis     Type 2 diabetes mellitus with other circulatory complication, without long-term current use of insulin (H)     Venous stasis ulcer of left lower extremity (H)     Acute pain of left knee     Chronic pain of left knee     Open wound of left lower extremity without complication, initial encounter     Hx of coronary artery disease     Hx of heart artery stent     DAY[place] (obstructive sleep apnea)     Hypertension, goal below 140/90     Hyperlipidemia LDL goal <100     History of coronary artery stent placement     Morbid obesity due to excess calories (H)     Neuropathy     Foot drop, right     Cardiomegaly     Gastroesophageal reflux disease without esophagitis     Falls frequently     Weakness of both legs     Central spinal stenosis L3-4 and L4-5      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     Lymphedema     S/P spinal surgery     Complete tear of left rotator cuff     Lymphedema of right lower extremity     Primary osteoarthritis of right knee     Mixed incontinence     Iron deficiency anemia secondary to inadequate dietary iron intake     Ischemic vascular disease     Disease of spinal cord (H)     MSSA (methicillin-susceptible Staphylococcus aureus) colonization     S/P total knee replacement using cement, right     Primary osteoarthritis of left knee     History of anemia     Status post total left knee replacement using cement     Glaucoma suspect, bilateral     Blurred vision     Past Surgical History:   Procedure Laterality Date     ARTHROPLASTY KNEE Right 7/30/2019    Procedure: Right total knee replacement;  Surgeon: Marc Ibrahim DO;  Location: [facility]     ARTHROPLASTY KNEE Left 12/19/2019    Procedure: LEFT TOTAL KNEE REPLACEMENT;  Surgeon: Marc Ibrahim DO;  Location: [facility]     CARDIAC SURGERY      stent placement     CHOLECYSTECTOMY       LAMINECTOMY LUMBAR THREE+ LEVELS N/A 3/13/2018    Procedure: LAMINECTOMY LUMBAR THREE+ LEVELS;  T5-9 LAMINECTOMIES, RESECTION OF EPIDURAL LIPOMATOSIS;  Surgeon: Huhg Mendieta MD;  Location:  OR       Social History     Tobacco Use     Smoking status: Former Smoker     Packs/day: 0.00     Types: Cigars     Smokeless tobacco: Never Used     Tobacco comment: exposure to second hand smoke   Substance Use Topics     Alcohol use: No     History reviewed. No pertinent family history.      Current Outpatient Medications   Medication Sig Dispense Refill     acetaminophen (TYLENOL) 325 MG tablet Take 3 tablets (975 mg) by mouth every 8 hours as needed for mild pain 100 tablet 0     blood glucose (NO BRAND SPECIFIED) lancing device Use to test blood sugars 2 times daily or as directed. 1 each 0     blood glucose calibration (NO BRAND SPECIFIED) solution To accompany: Blood Glucose Monitor Brands:  per insurance. 1 Bottle 3     blood glucose monitoring (NO BRAND SPECIFIED) meter device kit test blood sugar 2 xdaily or as directed.  Blood Glucose Monitor Brands: per insurance. 1 kit 0     blood glucose monitoring (NO BRAND SPECIFIED) test strip Use to test blood sugar 2 times daily Blood Glucose Monitor Brands: Glucocard Expression 100 strip 6     blood glucose monitoring (NO BRAND SPECIFIED) test strip Use to test blood sugar 2x  daily or as directed. To accompany: Blood Glucose Monitor Brands: per insurance. 100 strip 1     Blood Glucose Monitoring Suppl (GLUCOCARD EXPRESSION MONITOR) W/DEVICE KIT USE TWICE DAILY TO TEST BLOOD GLUCOSE  0     clopidogrel (PLAVIX) 75 MG tablet Take 1 tablet (75 mg) by mouth daily Resume once finished with Xarelto 90 tablet 1     docusate sodium (COLACE) 100 MG capsule Take 1 capsule (100 mg) by mouth 2 times daily as needed for constipation 60 capsule 1     Ferrous Sulfate Dried (GNP IRON) 200 (65 Fe) MG TABS Take 650 mg by mouth every morning 180 tablet 1     furosemide (LASIX) 40 MG tablet TAKE 1 TABLET (40 MG) BY MOUTH DAILY 90 tablet 1     hydrOXYzine (ATARAX) 10 MG tablet Take 1 tablet (10 mg) by mouth every 6 hours as needed for other (adjuvant pain) 45 tablet 0     losartan (COZAAR) 25 MG tablet PLEASE DO NOT TAKE THIS MEDICATION UNTIL YOU FOLLOW UP WITH PCP. Pt was taking 25 mg once daily  11     metoprolol succinate ER (TOPROL-XL) 25 MG 24 hr tablet Take 1 tablet (25 mg) by mouth daily 90 tablet 1     Nutritional Supplements (ENSURE ACTIVE HIGH PROTEIN) LIQD Take 1 Can by mouth daily 30 each 1     order for DME Equipment being ordered: VASO PRESS-DVT PROPHYLAXIS SYSTEM- SEQUENTIAL 1 Device 0     order for DME Equipment being ordered: VASO PRESS-DVT PROPHYLAXIS SYSTEM or SUBSTITUTE BRAND SEQUENTIAL PUMP 1 Device 0     order for DME Equipment being ordered: Wheelchair of appropriate style and size 1 Device 0     order for DME Equipment being ordered: VASO PRESS-DVT  PROPHYLAXIS SYSTEM                                             APPLY AT HS TO BILATERAL LOWER EXREMITIES 1 Units 0     order for DME Equipment being ordered: Replacement battery or batteries for an [employer] Pronto M 51. 1 Device 0     order for DME Equipment being ordered: electric chair for sleeping and adjustment to allow for elevation of legs above the heart.  Zero gravity type heavy duty sleep chair advised. MaxiComforter ([id number]) 1 Units 0     order for DME Equipment being ordered: Needs to have a sleep chair for home use. 1 Device 0     order for DME Compression garments toe to knee.  Specific brand is Compraflex Light Compression garment.   Diagnosis codes for venous stasis ulcer I83.029 and for  Lymphoedema is I89.0   Goal is for compression of about 30 to 40 mm of mercury   measurements are right ankle 28.5 cm and right calf is 45.5 cm.  Left ankle is 27.5 cm and the calf is 45 cm   Tall for length. 2 Device 1     order for DME Equipment being ordered: compression stockings below knee and above knee if available.  Medium compression. 1 Units 1     rosuvastatin (CRESTOR) 10 MG tablet TAKE 1 TABLET (10 MG) BY MOUTH DAILY 90 tablet 0     sitagliptin-metFORMIN (JANUMET)  MG tablet Take 1 tablet by mouth 2 times daily (with meals) This is actually Janumet XR       thin (NO BRAND SPECIFIED) lancets Test 2 x daily or as directed.  Brands: per insurance. 100 each 1     ondansetron (ZOFRAN-ODT) 4 MG ODT tab Take 1 tablet (4 mg) by mouth every 8 hours as needed for nausea or vomiting (Patient not taking: Reported on 1/2/2020)       oxyCODONE (ROXICODONE) 5 MG tablet Take 2 tablets (10 mg) by mouth every 3 hours as needed for moderate to severe pain (Patient not taking: Reported on 1/2/2020) 50 tablet 0     rivaroxaban ANTICOAGULANT (XARELTO) 10 MG TABS tablet Take 1 tablet (10 mg) by mouth daily for 14 days (Patient not taking: Reported on 1/14/2020) 14 tablet 0     tiZANidine (ZANAFLEX) 2 MG tablet Take 2  "tablets (4 mg) by mouth every 6 hours as needed for muscle spasms (Patient not taking: Reported on 1/2/2020) 60 tablet 0     Allergies   Allergen Reactions     No Known Allergies      Recent Labs   Lab Test 11/04/19  0830 10/17/19  0744 08/29/19  1143 08/07/19  0826 07/15/19  0852 02/21/19  0929  03/09/18  1020  09/25/17  1216 05/22/17  1029   A1C 5.0  --   --   --  5.6 6.1*   < >  --    < > 6.2* 6.4*   LDL  --   --   --   --  73 61  --  76  --  57 64   HDL  --   --   --   --   --  40  --   --   --  43 42   TRIG  --   --   --   --   --  166*  --   --   --  140 118   ALT  --   --  16  --  15 18   < > 19  --  19 20   CR  --   --  1.13 0.99 1.23 1.23   < > 0.91   < > 0.92 0.91   GFRESTIMATED  --   --  65 76 59* 59*   < > 82   < > 81 82   GFRESTBLACK  --   --  75 88 68 68   < > >90   < > >90 >90  African American GFR Calc     POTASSIUM  --   --  4.0 4.1 4.1 4.2   < > 4.6   < > 4.1 4.0   TSH  --  2.29  --   --   --   --   --   --   --  2.07  --     < > = values in this interval not displayed.      BP Readings from Last 3 Encounters:   01/14/20 104/54   01/02/20 113/73   12/20/19 125/57    Wt Readings from Last 3 Encounters:   01/14/20 120.3 kg (265 lb 4.8 oz)   01/02/20 124.7 kg (275 lb)   12/09/19 124.7 kg (274 lb 14.4 oz)                    Reviewed and updated as needed this visit by Provider         Review of Systems   ROS COMP: Constitutional, HEENT, cardiovascular, pulmonary, GI, , musculoskeletal, neuro, skin, endocrine and psych systems are negative, except as otherwise noted.      Objective    /54   Pulse 92   Temp 97.1  F (36.2  C) (Temporal)   Resp 18   Ht 1.74 m (5' 8.5\")   Wt 120.3 kg (265 lb 4.8 oz)   SpO2 97%   BMI 39.75 kg/m    Body mass index is 39.75 kg/m .  Physical Exam   GENERAL: healthy, alert and no distress  NECK: no adenopathy, no asymmetry, masses, or scars and trachea midline and normal to palpation  RESP: lungs clear to auscultation - no rales, rhonchi or wheezes  CV: regular " rate and rhythm, normal S1 S2, no S3 or S4, no murmur, click or rub, no peripheral edema and peripheral pulses strong  MS: no gross musculoskeletal defects noted, minimal edema is present.  He states that the sores that he has had from a venous stasis standpoint have resolved.  His range of motion is much improved since surgery.  He actually has walked in to our clinic room from the waiting area leaving his wheelchair for his return trip to the car.  He states that he is getting up and more more on his own and is happy with results of his knee replacement.  SKIN: no suspicious lesions or rashes to exposed skin.  NEURO: Normal strength and tone, mentation intact and speech normal  PSYCH: mentation appears normal, affect normal/bright    Diagnostic Test Results:  Labs reviewed in Epic  No results found for this or any previous visit (from the past 24 hour(s)).        Assessment & Plan     1. Type 2 diabetes mellitus with other circulatory complication, without long-term current use of insulin (H)  Refilled medications requested follow-up in 2 months for medication check  - thin (NO BRAND SPECIFIED) lancets; Test 2 x daily or as directed.  Brands: per insurance.  Dispense: 100 each; Refill: 1  - metoprolol succinate ER (TOPROL-XL) 25 MG 24 hr tablet; Take 1 tablet (25 mg) by mouth daily  Dispense: 90 tablet; Refill: 1    2. DAY[place] (obstructive sleep apnea)  Encouraged him to continue to treat this.  Follow-up PRN.    3. Hyperlipidemia LDL goal <100  4. Hypertension, goal below 140/90  Refilled medications as requested to a single dose daily.  More for protective effect of the heart related diabetes and for hypertension or tachycardia today.  - metoprolol succinate ER (TOPROL-XL) 25 MG 24 hr tablet; Take 1 tablet (25 mg) by mouth daily  Dispense: 90 tablet; Refill: 1    5. Ischemic vascular disease  6. Primary osteoarthritis of left knee  7. Status post total left knee replacement using cement  Doing well no new concerns  follow-up with surgeon PRN.    8. Blurred vision  9. Glaucoma suspect, bilateral  Patient reports he has been told that he has glaucoma.  Order has been placed for him to have evaluation via ophthalmology sometime in the near future.  - OPHTHALMOLOGY ADULT REFERRAL    Return in about 3 months (around 4/14/2020) for recheck of current condition, Medication Recheck.    Julio Cesar Sahu PA-C  [facility]

## 2020-01-13 ENCOUNTER — TELEPHONE (OUTPATIENT)
Dept: SURGERY | Facility: CLINIC | Age: 70
End: 2020-01-13

## 2020-01-13 NOTE — TELEPHONE ENCOUNTER
[facility] [place] forms received and signed by Dr. Shine, faxed by to number provided, copy sent to scanning and copy placed in specialty drawer........................Hui Ashraf CMA  ([facility])

## 2020-01-14 ENCOUNTER — OFFICE VISIT (OUTPATIENT)
Dept: FAMILY MEDICINE | Facility: OTHER | Age: 70
End: 2020-01-14
Payer: COMMERCIAL

## 2020-01-14 VITALS
TEMPERATURE: 97.1 F | OXYGEN SATURATION: 97 % | HEIGHT: 69 IN | BODY MASS INDEX: 39.29 KG/M2 | SYSTOLIC BLOOD PRESSURE: 104 MMHG | DIASTOLIC BLOOD PRESSURE: 54 MMHG | HEART RATE: 92 BPM | RESPIRATION RATE: 18 BRPM | WEIGHT: 265.3 LBS

## 2020-01-14 DIAGNOSIS — H53.8 BLURRED VISION: ICD-10-CM

## 2020-01-14 DIAGNOSIS — H40.003 GLAUCOMA SUSPECT, BILATERAL: ICD-10-CM

## 2020-01-14 DIAGNOSIS — Z96.652 STATUS POST TOTAL LEFT KNEE REPLACEMENT USING CEMENT: ICD-10-CM

## 2020-01-14 DIAGNOSIS — G47.33 OSA (OBSTRUCTIVE SLEEP APNEA): Primary | ICD-10-CM

## 2020-01-14 DIAGNOSIS — E11.59 TYPE 2 DIABETES MELLITUS WITH OTHER CIRCULATORY COMPLICATION, WITHOUT LONG-TERM CURRENT USE OF INSULIN (H): ICD-10-CM

## 2020-01-14 DIAGNOSIS — M17.12 PRIMARY OSTEOARTHRITIS OF LEFT KNEE: ICD-10-CM

## 2020-01-14 DIAGNOSIS — E78.5 HYPERLIPIDEMIA LDL GOAL <100: ICD-10-CM

## 2020-01-14 DIAGNOSIS — I10 HYPERTENSION, GOAL BELOW 140/90: ICD-10-CM

## 2020-01-14 DIAGNOSIS — I99.8 ISCHEMIC VASCULAR DISEASE: ICD-10-CM

## 2020-01-14 PROCEDURE — 99214 OFFICE O/P EST MOD 30 MIN: CPT | Performed by: PHYSICIAN ASSISTANT

## 2020-01-14 RX ORDER — LANCETS
EACH MISCELLANEOUS
Qty: 100 EACH | Refills: 1 | Status: SHIPPED | OUTPATIENT
Start: 2020-01-14

## 2020-01-14 RX ORDER — METOPROLOL SUCCINATE 25 MG/1
25 TABLET, EXTENDED RELEASE ORAL DAILY
Qty: 90 TABLET | Refills: 1 | Status: SHIPPED | OUTPATIENT
Start: 2020-01-14

## 2020-01-14 ASSESSMENT — PAIN SCALES - GENERAL: PAINLEVEL: NO PAIN (0)

## 2020-01-14 ASSESSMENT — MIFFLIN-ST. JEOR: SCORE: 1940.83

## 2020-01-17 ENCOUNTER — MEDICAL CORRESPONDENCE (OUTPATIENT)
Dept: HEALTH INFORMATION MANAGEMENT | Facility: CLINIC | Age: 70
End: 2020-01-17

## 2020-01-20 DIAGNOSIS — E78.5 HYPERLIPIDEMIA LDL GOAL <100: ICD-10-CM

## 2020-01-20 DIAGNOSIS — Z98.890 S/P SPINAL SURGERY: ICD-10-CM

## 2020-01-20 DIAGNOSIS — I10 HYPERTENSION, GOAL BELOW 140/90: ICD-10-CM

## 2020-01-20 DIAGNOSIS — E11.59 TYPE 2 DIABETES MELLITUS WITH OTHER CIRCULATORY COMPLICATION, WITHOUT LONG-TERM CURRENT USE OF INSULIN (H): ICD-10-CM

## 2020-01-22 ENCOUNTER — HOSPITAL ENCOUNTER (OUTPATIENT)
Facility: CLINIC | Age: 70
End: 2020-01-22
Attending: OPHTHALMOLOGY | Admitting: OPHTHALMOLOGY
Payer: COMMERCIAL

## 2020-01-23 RX ORDER — SITAGLIPTIN AND METFORMIN HYDROCHLORIDE 1000; 50 MG/1; MG/1
TABLET, FILM COATED, EXTENDED RELEASE ORAL
Qty: 180 TABLET | Refills: 5 | Status: SHIPPED | OUTPATIENT
Start: 2020-01-23

## 2020-01-23 RX ORDER — ROSUVASTATIN CALCIUM 10 MG/1
10 TABLET, COATED ORAL DAILY
Qty: 90 TABLET | Refills: 1 | Status: SHIPPED | OUTPATIENT
Start: 2020-01-23

## 2020-01-23 RX ORDER — CLOPIDOGREL BISULFATE 75 MG/1
TABLET ORAL
Qty: 90 TABLET | Refills: 1 | Status: SHIPPED | OUTPATIENT
Start: 2020-01-23

## 2020-01-23 NOTE — TELEPHONE ENCOUNTER
Pending Prescriptions:                       Disp   Refills    rosuvastatin (CRESTOR) 10 MG tablet [Phar*90 tab*5            Sig: TAKE 1 TABLET (10 MG) BY MOUTH DAILY    Prescription approved per [facility] Refill Protocol.        JANUMET XR  MG TB24 [Pharmacy Med *180 ta*5            Sig: TAKE 2 TABLETS BY MOUTH DAILY (WITH BREAKFAST)    Routing refill request to provider for review/approval because:  Medication is reported/historical        clopidogrel (PLAVIX) 75 MG tablet [Pharma*30 tab*5            Sig: TAKE 1 TABLET (75 MG) BY MOUTH DAILY    Sent 1/3/2020 with 6 month supply. Refill not appropriate at this time.     Chantel Mederos, MSN, RN

## 2020-01-24 ENCOUNTER — TELEPHONE (OUTPATIENT)
Dept: FAMILY MEDICINE | Facility: OTHER | Age: 70
End: 2020-01-24

## 2020-01-24 NOTE — TELEPHONE ENCOUNTER
Reason for Call:  Form, our goal is to have forms completed with 72 hours, however, some forms may require a visit or additional information.    Type of letter, form or note:  medical    Who is the form from?: Home care    Where did the form come from: form was faxed in    What clinic location was the form placed at?: [facility] - [phone]    Where the form was placed: box Box/Folder    What number is listed as a contact on the form?: fax [phone]       Additional comments: Please complete and fax    Call taken on 1/24/2020 at 8:15 AM by Olivia Mayfield

## 2020-01-27 ENCOUNTER — MEDICAL CORRESPONDENCE (OUTPATIENT)
Dept: HEALTH INFORMATION MANAGEMENT | Facility: CLINIC | Age: 70
End: 2020-01-27

## 2020-01-27 DIAGNOSIS — Z53.9 DIAGNOSIS NOT YET DEFINED: Primary | ICD-10-CM

## 2020-01-27 PROCEDURE — G0180 MD CERTIFICATION HHA PATIENT: HCPCS | Performed by: FAMILY MEDICINE

## 2020-01-27 NOTE — TELEPHONE ENCOUNTER
Forms have been completed, signed, faxed/mailed, and sent to scanning.  Lisa Zuluaga CMA on 1/27/2020 at 12:54 PM

## 2020-02-07 ENCOUNTER — TELEPHONE (OUTPATIENT)
Dept: FAMILY MEDICINE | Facility: OTHER | Age: 70
End: 2020-02-07

## 2020-02-07 ENCOUNTER — MEDICAL CORRESPONDENCE (OUTPATIENT)
Dept: HEALTH INFORMATION MANAGEMENT | Facility: CLINIC | Age: 70
End: 2020-02-07

## 2020-02-07 NOTE — TELEPHONE ENCOUNTER
Forms have been completed, signed, faxed/mailed, and sent to scanning.  Lisa Zuluaga CMA ([facility])

## 2020-02-07 NOTE — TELEPHONE ENCOUNTER
Reason for Call:  Form, our goal is to have forms completed with 72 hours, however, some forms may require a visit or additional information.    Type of letter, form or note:  medical    Who is the form from?: Home care    Where did the form come from: form was faxed in    What clinic location was the form placed at?: [facility] - [phone]    Where the form was placed: box Box/Folder    What number is listed as a contact on the form?: fax [phone]       Additional comments: Please complete and fax    Call taken on 2/7/2020 at 7:43 AM by Olivia Mayfield

## 2020-02-11 ENCOUNTER — MEDICAL CORRESPONDENCE (OUTPATIENT)
Dept: HEALTH INFORMATION MANAGEMENT | Facility: CLINIC | Age: 70
End: 2020-02-11

## 2020-02-18 NOTE — PROGRESS NOTES
[facility]  [id number] [place] [id number]  [phone]  Dept: [phone]    PRE-OP EVALUATION:  Today's date: 2020    Glenroy Padilla (: 1950) presents for pre-operative evaluation assessment as requested by Dr. gr.  He requires evaluation and anesthesia risk assessment prior to undergoing surgery/procedure for treatment of cataract .    Proposed Surgery/ Procedure: cataract-   Date of Surgery/ Procedure: right eye 3/5 and left eye 3/19  Time of Surgery/ Procedure:   Hospital/Surgical Facility: [facility]  Fax number for surgical facility:   Primary Physician: Julio Cesar Esteban  Type of Anesthesia Anticipated: to be determined    Patient has a Health Care Directive or Living Will:  YES     1. NO - Do you have a history of heart attack, stroke, stent, bypass or surgery on an artery in the head, neck, heart or legs?  2. NO - Do you ever have any pain or discomfort in your chest?  3. NO - Do you have a history of  Heart Failure?  4. NO - Are you troubled by shortness of breath when: walking on the level, up a slight hill or at night?  5. NO - Do you currently have a cold, bronchitis or other respiratory infection?  6. NO - Do you have a cough, shortness of breath or wheezing?  7. yes - Do you sometimes get pains in the calves of your legs when you walk?  8. yes - Do you or anyone in your family have previous history of blood clots?  9. NO - Do you or does anyone in your family have a serious bleeding problem such as prolonged bleeding following surgeries or cuts?  10. yes - Have you ever had problems with anemia or been told to take iron pills?  11. NO - Have you had any abnormal blood loss such as black, tarry or bloody stools, or abnormal vaginal bleeding?  12. NO - Have you ever had a blood transfusion?  13. NO - Have you or any of your relatives ever had problems with anesthesia?  14. NO - Do you have sleep apnea, excessive snoring or daytime drowsiness?  15. NO -  Do you have any prosthetic heart valves?  16. yes - Do you have prosthetic joints?  17. NO - Is there any chance that you may be pregnant?      HPI:     HPI related to upcoming procedure: Bilateral cataracts in need of intervention.      See problem list for active medical problems.  Problems all longstanding and stable, except as noted/documented.  See ROS for pertinent symptoms related to these conditions.      MEDICAL HISTORY:     Patient Active Problem List    Diagnosis Date Noted     Glaucoma suspect, bilateral 01/14/2020     Priority: Medium     Blurred vision 01/14/2020     Priority: Medium     Status post total left knee replacement using cement 12/19/2019     Priority: Medium     History of anemia 12/03/2019     Priority: Medium     Primary osteoarthritis of left knee 09/27/2019     Priority: Medium     Added automatically from request for surgery [id number]       S/P total knee replacement using cement, right 07/30/2019     Priority: Medium     MSSA (methicillin-susceptible Staphylococcus aureus) colonization 07/18/2019     Priority: Medium     Disease of spinal cord (H) 07/15/2019     Priority: Medium     Ischemic vascular disease 02/21/2019     Priority: Medium     Iron deficiency anemia secondary to inadequate dietary iron intake 08/23/2018     Priority: Medium     Mixed incontinence 08/13/2018     Priority: Medium     Lymphedema of right lower extremity 05/14/2018     Priority: Medium     Primary osteoarthritis of right knee 05/14/2018     Priority: Medium     Complete tear of left rotator cuff 04/04/2018     Priority: Medium     S/P spinal surgery 03/13/2018     Priority: Medium     Lymphedema 01/29/2018     Priority: Medium     Falls frequently 01/27/2018     Priority: Medium     Weakness of both legs 01/27/2018     Priority: Medium     Central spinal stenosis L3-4 and L4-5 01/27/2018     Priority: Medium     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 01/27/2018     Priority: Medium     Neuropathy  01/25/2018     Priority: Medium     Foot drop, right 01/25/2018     Priority: Medium     Cardiomegaly 01/25/2018     Priority: Medium     Gastroesophageal reflux disease without esophagitis 01/25/2018     Priority: Medium     Morbid obesity due to excess calories (H) 11/20/2017     Priority: Medium     Hypertension, goal below 140/90 09/25/2017     Priority: Medium     Hyperlipidemia LDL goal <100 09/25/2017     Priority: Medium     History of coronary artery stent placement 09/25/2017     Priority: Medium     Type 2 diabetes mellitus with other circulatory complication, without long-term current use of insulin (H) 05/22/2017     Priority: Medium     Venous stasis ulcer of left lower extremity (H) 05/22/2017     Priority: Medium     Acute pain of left knee 05/22/2017     Priority: Medium     Chronic pain of left knee 05/22/2017     Priority: Medium     Open wound of left lower extremity without complication, initial encounter 05/22/2017     Priority: Medium     Hx of heart artery stent 05/22/2017     Priority: Medium     DAY[place] (obstructive sleep apnea) 05/22/2017     Priority: Medium     Hx of coronary artery disease 03/12/2016     Priority: Medium      Past Medical History:   Diagnosis Date     Acute pain of left knee 5/22/2017     Chronic pain of right knee 5/22/2017     Hx of coronary artery disease 5/22/2017     Hx of heart artery stent 5/22/2017     Mixed incontinence 8/13/2018     Obesity, morbid, BMI 40.0-49.9 (H) 11/20/2017     Open wound of left lower extremity without complication, initial encounter 5/22/2017     DAY[place] (obstructive sleep apnea) 5/22/2017     Type 2 diabetes mellitus with other circulatory complication, without long-term current use of insulin (H) 5/22/2017     Venous stasis ulcer of left lower extremity (H) 5/22/2017     Venous stasis ulcer of left lower extremity (H) 5/22/2017     Past Surgical History:   Procedure Laterality Date     ARTHROPLASTY KNEE Right 7/30/2019    Procedure: Right  total knee replacement;  Surgeon: Marc Ibrahim DO;  Location: [facility] OR     ARTHROPLASTY KNEE Left 12/19/2019    Procedure: LEFT TOTAL KNEE REPLACEMENT;  Surgeon: Marc Ibrahim DO;  Location: [facility] OR     CARDIAC SURGERY      stent placement     CHOLECYSTECTOMY       LAMINECTOMY LUMBAR THREE+ LEVELS N/A 3/13/2018    Procedure: LAMINECTOMY LUMBAR THREE+ LEVELS;  T5-9 LAMINECTOMIES, RESECTION OF EPIDURAL LIPOMATOSIS;  Surgeon: Hugh Mendieta MD;  Location:  OR     Current Outpatient Medications   Medication Sig Dispense Refill     acetaminophen (TYLENOL) 325 MG tablet Take 3 tablets (975 mg) by mouth every 8 hours as needed for mild pain 100 tablet 0     blood glucose (NO BRAND SPECIFIED) lancing device Use to test blood sugars 2 times daily or as directed. 1 each 0     blood glucose calibration (NO BRAND SPECIFIED) solution To accompany: Blood Glucose Monitor Brands: per insurance. 1 Bottle 3     blood glucose monitoring (NO BRAND SPECIFIED) meter device kit test blood sugar 2 xdaily or as directed.  Blood Glucose Monitor Brands: per insurance. 1 kit 0     blood glucose monitoring (NO BRAND SPECIFIED) test strip Use to test blood sugar 2 times daily Blood Glucose Monitor Brands: Glucocard Expression 100 strip 6     blood glucose monitoring (NO BRAND SPECIFIED) test strip Use to test blood sugar 2x  daily or as directed. To accompany: Blood Glucose Monitor Brands: per insurance. 100 strip 1     Blood Glucose Monitoring Suppl (GLUCOCARD EXPRESSION MONITOR) W/DEVICE KIT USE TWICE DAILY TO TEST BLOOD GLUCOSE  0     clopidogrel (PLAVIX) 75 MG tablet TAKE 1 TABLET (75 MG) BY MOUTH DAILY 90 tablet 1     docusate sodium (COLACE) 100 MG capsule Take 1 capsule (100 mg) by mouth 2 times daily as needed for constipation 60 capsule 1     Ferrous Sulfate Dried (GNP IRON) 200 (65 Fe) MG TABS Take 650 mg by mouth every morning 180 tablet 1     furosemide (LASIX) 40 MG tablet TAKE 1 TABLET (40 MG) BY MOUTH  DAILY 90 tablet 1     hydrOXYzine (ATARAX) 10 MG tablet Take 1 tablet (10 mg) by mouth every 6 hours as needed for other (adjuvant pain) 45 tablet 0     JANUMET XR  MG TB24 TAKE 2 TABLETS BY MOUTH DAILY (WITH BREAKFAST) 180 tablet 5     losartan (COZAAR) 25 MG tablet PLEASE DO NOT TAKE THIS MEDICATION UNTIL YOU FOLLOW UP WITH PCP. Pt was taking 25 mg once daily  11     metoprolol succinate ER (TOPROL-XL) 25 MG 24 hr tablet Take 1 tablet (25 mg) by mouth daily 90 tablet 1     Nutritional Supplements (ENSURE ACTIVE HIGH PROTEIN) LIQD Take 1 Can by mouth daily 30 each 1     ondansetron (ZOFRAN-ODT) 4 MG ODT tab Take 1 tablet (4 mg) by mouth every 8 hours as needed for nausea or vomiting (Patient not taking: Reported on 1/2/2020)       order for DME Equipment being ordered: VASO PRESS-DVT PROPHYLAXIS SYSTEM- SEQUENTIAL 1 Device 0     order for DME Equipment being ordered: VASO PRESS-DVT PROPHYLAXIS SYSTEM or SUBSTITUTE BRAND SEQUENTIAL PUMP 1 Device 0     order for DME Equipment being ordered: Wheelchair of appropriate style and size 1 Device 0     order for DME Equipment being ordered: VASO PRESS-DVT PROPHYLAXIS SYSTEM                                             APPLY AT HS TO BILATERAL LOWER EXREMITIES 1 Units 0     order for DME Equipment being ordered: Replacement battery or batteries for an [employer] Pronto M 51. 1 Device 0     order for DME Equipment being ordered: electric chair for sleeping and adjustment to allow for elevation of legs above the heart.  Zero gravity type heavy duty sleep chair advised. MaxiComforter ([id number]) 1 Units 0     order for DME Equipment being ordered: Needs to have a sleep chair for home use. 1 Device 0     order for DME Compression garments toe to knee.  Specific brand is Compraflex Light Compression garment.   Diagnosis codes for venous stasis ulcer I83.029 and for  Lymphoedema is I89.0   Goal is for compression of about 30 to 40 mm of mercury   measurements are right ankle  28.5 cm and right calf is 45.5 cm.  Left ankle is 27.5 cm and the calf is 45 cm   Tall for length. 2 Device 1     order for DME Equipment being ordered: compression stockings below knee and above knee if available.  Medium compression. 1 Units 1     oxyCODONE (ROXICODONE) 5 MG tablet Take 2 tablets (10 mg) by mouth every 3 hours as needed for moderate to severe pain (Patient not taking: Reported on 1/2/2020) 50 tablet 0     rivaroxaban ANTICOAGULANT (XARELTO) 10 MG TABS tablet Take 1 tablet (10 mg) by mouth daily for 14 days (Patient not taking: Reported on 1/14/2020) 14 tablet 0     rosuvastatin (CRESTOR) 10 MG tablet TAKE 1 TABLET (10 MG) BY MOUTH DAILY 90 tablet 1     sitagliptin-metFORMIN (JANUMET)  MG tablet Take 1 tablet by mouth 2 times daily (with meals) This is actually Janumet XR       thin (NO BRAND SPECIFIED) lancets Test 2 x daily or as directed.  Brands: per insurance. 100 each 1     tiZANidine (ZANAFLEX) 2 MG tablet Take 2 tablets (4 mg) by mouth every 6 hours as needed for muscle spasms (Patient not taking: Reported on 1/2/2020) 60 tablet 0     OTC products: None, except as noted above    Allergies   Allergen Reactions     No Known Allergies       Latex Allergy: NO    Social History     Tobacco Use     Smoking status: Former Smoker     Packs/day: 0.00     Types: Cigars     Smokeless tobacco: Never Used     Tobacco comment: exposure to second hand smoke   Substance Use Topics     Alcohol use: No     History   Drug Use No       REVIEW OF SYSTEMS:   CONSTITUTIONAL: NEGATIVE for fever, chills, change in weight  INTEGUMENTARY/SKIN: NEGATIVE for worrisome rashes, moles or lesions, though he does admit to some stasis irritation of the buttocks for which he asked for a better cream today.  EYES: NEGATIVE for vision changes or irritation  ENT/MOUTH: NEGATIVE for ear, mouth and throat problems  RESP: NEGATIVE for significant cough or SOB  BREAST: NEGATIVE for masses, tenderness or discharge  CV:  "NEGATIVE for chest pain, palpitations or peripheral edema  GI: NEGATIVE for nausea, abdominal pain, heartburn, or change in bowel habits  : NEGATIVE for frequency, dysuria, or hematuria  MUSCULOSKELETAL: NEGATIVE for significant arthralgias or myalgia  NEURO: NEGATIVE for weakness, dizziness or paresthesias  ENDOCRINE: NEGATIVE for temperature intolerance, skin/hair changes  HEME: NEGATIVE for bleeding problems  PSYCHIATRIC: NEGATIVE for changes in mood or affect    EXAM:   /60   Pulse 78   Temp 98.6  F (37  C) (Temporal)   Resp 18   Ht 1.74 m (5' 8.5\")   Wt 118.8 kg (262 lb)   SpO2 99%   BMI 39.26 kg/m      GENERAL APPEARANCE: healthy, alert and no distress     EYES: EOMI,  PERRL     HENT: ear canals and TM's normal and nose and mouth without ulcers or lesions     NECK: no adenopathy, no asymmetry, masses, or scars and thyroid normal to palpation     RESP: lungs clear to auscultation - no rales, rhonchi or wheezes     CV: regular rates and rhythm, normal S1 S2, no S3 or S4 and no murmur, click or rub     ABDOMEN:  soft, nontender, no HSM or masses and bowel sounds normal     MS: extremities normal- no gross deformities noted, no evidence of inflammation in joints, FROM in all extremities.     SKIN: no suspicious lesions or rashes to exposed visible skin today.     NEURO: Normal strength and tone, sensory exam grossly normal, mentation intact and speech normal     PSYCH: mentation appears normal. and affect normal/bright     LYMPHATICS: No cervical adenopathy    DIAGNOSTICS:   EKG: appears normal, NSR, normal axis, normal intervals, no acute ST/T changes c/w ischemia, no LVH by voltage criteria, unchanged from previous tracings    Recent Labs   Lab Test 12/20/19  0534 12/09/19  1133 11/04/19  0830  08/29/19  1143 08/07/19  0826  07/15/19  0852   HGB 8.9* 10.1* 10.8*   < > 9.2*  --    < > 10.9*   PLT  --  267 319   < > 319  --   --  295   NA  --   --   --   --  138 139  --  141   POTASSIUM  --   --   " --   --  4.0 4.1  --  4.1   CR  --   --   --   --  1.13 0.99  --  1.23   A1C  --   --  5.0  --   --   --   --  5.6    < > = values in this interval not displayed.        IMPRESSION:   Reason for surgery/procedure: Bilateral cataract intervention  Diagnosis/reason for consult: Anesthesia/surgical clearance    The proposed surgical procedure is considered LOW risk.    REVISED CARDIAC RISK INDEX  The patient has the following serious cardiovascular risks for perioperative complications such as (MI, PE, VFib and 3  AV Block):  No serious cardiac risks  INTERPRETATION: 1 risks: Class II (low risk - 0.9% complication rate)    The patient has the following additional risks for perioperative complications:  No identified additional risks  The 10-year ASCVD risk score (Radha WYNNE Jr., et al., 2013) is: 34%    Values used to calculate the score:      Age: 69 years      Sex: Male      Is Non- : No      Diabetic: Yes      Tobacco smoker: No      Systolic Blood Pressure: 124 mmHg      Is BP treated: Yes      HDL Cholesterol: 40 mg/dL      Total Cholesterol: 134 mg/dL      ICD-10-CM    1. Preop general physical exam Z01.818 EKG 12-lead complete w/read - Clinics     Hemoglobin A1c     Comprehensive metabolic panel   2. Cortical age-related cataract of both eyes H25.013 EKG 12-lead complete w/read - Clinics     Comprehensive metabolic panel   3. Hyperlipidemia LDL goal <100 E78.5 Lipid panel reflex to direct LDL Fasting     Albumin Random Urine Quantitative with Creat Ratio     EKG 12-lead complete w/read - Clinics     LDL cholesterol direct   4. Type 2 diabetes mellitus with other circulatory complication, without long-term current use of insulin (H) E11.59 Albumin Random Urine Quantitative with Creat Ratio     EKG 12-lead complete w/read - Clinics     Hemoglobin A1c     LDL cholesterol direct   5. Hypertension, goal below 140/90 I10 Albumin Random Urine Quantitative with Creat Ratio     EKG 12-lead complete  w/read - Clinics     LDL cholesterol direct   6. Ischemic vascular disease I99.8 EKG 12-lead complete w/read - Clinics   7. Iron deficiency anemia secondary to inadequate dietary iron intake D50.8 EKG 12-lead complete w/read - Clinics   8. MSSA (methicillin-susceptible Staphylococcus aureus) colonization Z22.321 EKG 12-lead complete w/read - Clinics   9. Status post total left knee replacement using cement Z96.652 docusate sodium (COLACE) 100 MG capsule   10. Skin breakdown L90.9 zinc oxide (DESITIN) 40 % external ointment   11. Screening for prostate cancer Z12.5 Prostate spec antigen screen       RECOMMENDATIONS:     --Consult hospital rounder / IM to assist post-op medical management    Obstructive Sleep Apnea (or suspected sleep apnea)  Patient is clearly advised to use their home CPAP when released from surgery      --Patient is to take all scheduled medications on the day of surgery EXCEPT for modifications listed below.    ACE Inhibitor or Angiotensin Receptor Blocker (ARB) Use  Ace inhibitor or Angiotensin Receptor Blocker (ARB) and should HOLD this medication for the 24 hours prior to surgery.      APPROVAL GIVEN to proceed with proposed procedure, without further diagnostic evaluation       Signed Electronically by: Julio Cesar Sahu PA-C    Copy of this evaluation report is provided to requesting physician.    [facility] Preop Guidelines    Revised Cardiac Risk Index

## 2020-02-25 ENCOUNTER — TELEPHONE (OUTPATIENT)
Dept: FAMILY MEDICINE | Facility: OTHER | Age: 70
End: 2020-02-25

## 2020-02-25 ENCOUNTER — OFFICE VISIT (OUTPATIENT)
Dept: FAMILY MEDICINE | Facility: OTHER | Age: 70
End: 2020-02-25
Payer: COMMERCIAL

## 2020-02-25 VITALS
DIASTOLIC BLOOD PRESSURE: 60 MMHG | BODY MASS INDEX: 38.8 KG/M2 | TEMPERATURE: 98.6 F | SYSTOLIC BLOOD PRESSURE: 124 MMHG | HEIGHT: 69 IN | RESPIRATION RATE: 18 BRPM | WEIGHT: 262 LBS | HEART RATE: 78 BPM | OXYGEN SATURATION: 99 %

## 2020-02-25 DIAGNOSIS — Z12.5 SCREENING FOR PROSTATE CANCER: ICD-10-CM

## 2020-02-25 DIAGNOSIS — E78.5 HYPERLIPIDEMIA LDL GOAL <100: ICD-10-CM

## 2020-02-25 DIAGNOSIS — I99.8 ISCHEMIC VASCULAR DISEASE: ICD-10-CM

## 2020-02-25 DIAGNOSIS — D50.8 IRON DEFICIENCY ANEMIA SECONDARY TO INADEQUATE DIETARY IRON INTAKE: ICD-10-CM

## 2020-02-25 DIAGNOSIS — Z01.818 PREOP GENERAL PHYSICAL EXAM: Primary | ICD-10-CM

## 2020-02-25 DIAGNOSIS — E11.59 TYPE 2 DIABETES MELLITUS WITH OTHER CIRCULATORY COMPLICATION, WITHOUT LONG-TERM CURRENT USE OF INSULIN (H): ICD-10-CM

## 2020-02-25 DIAGNOSIS — I10 HYPERTENSION, GOAL BELOW 140/90: ICD-10-CM

## 2020-02-25 DIAGNOSIS — H25.013 CORTICAL AGE-RELATED CATARACT OF BOTH EYES: ICD-10-CM

## 2020-02-25 DIAGNOSIS — R23.8 SKIN BREAKDOWN: ICD-10-CM

## 2020-02-25 DIAGNOSIS — Z96.652 STATUS POST TOTAL LEFT KNEE REPLACEMENT USING CEMENT: ICD-10-CM

## 2020-02-25 DIAGNOSIS — Z22.321 MSSA (METHICILLIN-SUSCEPTIBLE STAPHYLOCOCCUS AUREUS) COLONIZATION: ICD-10-CM

## 2020-02-25 LAB
ALBUMIN SERPL-MCNC: 3.4 G/DL (ref 3.4–5)
ALP SERPL-CCNC: 70 U/L (ref 40–150)
ALT SERPL W P-5'-P-CCNC: 15 U/L (ref 0–70)
ANION GAP SERPL CALCULATED.3IONS-SCNC: 4 MMOL/L (ref 3–14)
AST SERPL W P-5'-P-CCNC: 14 U/L (ref 0–45)
BILIRUB SERPL-MCNC: 0.5 MG/DL (ref 0.2–1.3)
BUN SERPL-MCNC: 18 MG/DL (ref 7–30)
CALCIUM SERPL-MCNC: 9 MG/DL (ref 8.5–10.1)
CHLORIDE SERPL-SCNC: 107 MMOL/L (ref 94–109)
CHOLEST SERPL-MCNC: 133 MG/DL
CO2 SERPL-SCNC: 29 MMOL/L (ref 20–32)
CREAT SERPL-MCNC: 1.04 MG/DL (ref 0.66–1.25)
GFR SERPL CREATININE-BSD FRML MDRD: 72 ML/MIN/{1.73_M2}
GLUCOSE SERPL-MCNC: 109 MG/DL (ref 70–99)
HBA1C MFR BLD: 4.8 % (ref 0–5.6)
HDLC SERPL-MCNC: 41 MG/DL
LDLC SERPL CALC-MCNC: 66 MG/DL
LDLC SERPL DIRECT ASSAY-MCNC: 74 MG/DL
NONHDLC SERPL-MCNC: 92 MG/DL
POTASSIUM SERPL-SCNC: 4.1 MMOL/L (ref 3.4–5.3)
PROT SERPL-MCNC: 8.3 G/DL (ref 6.8–8.8)
PSA SERPL-ACNC: 0.4 UG/L (ref 0–4)
SODIUM SERPL-SCNC: 140 MMOL/L (ref 133–144)
TRIGL SERPL-MCNC: 130 MG/DL

## 2020-02-25 PROCEDURE — 83721 ASSAY OF BLOOD LIPOPROTEIN: CPT | Performed by: PHYSICIAN ASSISTANT

## 2020-02-25 PROCEDURE — 93000 ELECTROCARDIOGRAM COMPLETE: CPT | Performed by: PHYSICIAN ASSISTANT

## 2020-02-25 PROCEDURE — G0103 PSA SCREENING: HCPCS | Performed by: PHYSICIAN ASSISTANT

## 2020-02-25 PROCEDURE — 80061 LIPID PANEL: CPT | Performed by: PHYSICIAN ASSISTANT

## 2020-02-25 PROCEDURE — 36415 COLL VENOUS BLD VENIPUNCTURE: CPT | Performed by: PHYSICIAN ASSISTANT

## 2020-02-25 PROCEDURE — 83036 HEMOGLOBIN GLYCOSYLATED A1C: CPT | Performed by: PHYSICIAN ASSISTANT

## 2020-02-25 PROCEDURE — 99215 OFFICE O/P EST HI 40 MIN: CPT | Performed by: PHYSICIAN ASSISTANT

## 2020-02-25 PROCEDURE — 80053 COMPREHEN METABOLIC PANEL: CPT | Performed by: PHYSICIAN ASSISTANT

## 2020-02-25 RX ORDER — DOCUSATE SODIUM 100 MG/1
100 CAPSULE, LIQUID FILLED ORAL 2 TIMES DAILY PRN
Qty: 180 CAPSULE | Refills: 3 | Status: SHIPPED | OUTPATIENT
Start: 2020-02-25

## 2020-02-25 RX ORDER — ZINC OXIDE
OINTMENT (GRAM) TOPICAL PRN
Qty: 113 G | Refills: 1 | Status: SHIPPED | OUTPATIENT
Start: 2020-02-25

## 2020-02-25 ASSESSMENT — MIFFLIN-ST. JEOR: SCORE: 1925.86

## 2020-02-25 NOTE — LETTER
February 25, 2020      Glenroy Baughsilvino  [address] [address]        Dear ,    We are writing to inform you of your test results.    Your labs are stable at this time.      If you have any questions or concerns, please call the clinic at the number listed above.       Sincerely,        Julio Cesar Sahu PA-C

## 2020-02-25 NOTE — TELEPHONE ENCOUNTER
Lab normal patient notified by letter.   Lisa Gonzales    Notes recorded by Julio Cesar Esteban PA-C on 2/25/2020 at 2:41 PM CST  Stable labs at this time.  Electronically signed:    Julio Cesar Esteban PA-C

## 2020-02-27 ENCOUNTER — TELEPHONE (OUTPATIENT)
Dept: FAMILY MEDICINE | Facility: OTHER | Age: 70
End: 2020-02-27

## 2020-02-27 NOTE — TELEPHONE ENCOUNTER
Routing to [facility]    Please start a PRIOR AUTHORIZATION for Easy TOuch Lancets 28G,     Per pharmacy patient states that he has to have this kind cause they are the safety lancets which are required by his residence, to have the safety lancets.     Thanks  Josselin Montes RT (R)         Key:[id number]

## 2020-03-02 ENCOUNTER — OFFICE VISIT (OUTPATIENT)
Dept: SURGERY | Facility: CLINIC | Age: 70
End: 2020-03-02
Payer: COMMERCIAL

## 2020-03-02 VITALS
DIASTOLIC BLOOD PRESSURE: 60 MMHG | WEIGHT: 267.6 LBS | HEART RATE: 84 BPM | BODY MASS INDEX: 40.1 KG/M2 | SYSTOLIC BLOOD PRESSURE: 142 MMHG

## 2020-03-02 DIAGNOSIS — S81.802D OPEN WOUND OF LEFT LOWER EXTREMITY WITHOUT COMPLICATION, SUBSEQUENT ENCOUNTER: Primary | ICD-10-CM

## 2020-03-02 DIAGNOSIS — E83.59 CALCINOSIS: ICD-10-CM

## 2020-03-02 PROCEDURE — 99213 OFFICE O/P EST LOW 20 MIN: CPT | Performed by: SPECIALIST

## 2020-03-02 RX ORDER — HYDROPHILIC CREAM
1 PASTE (GRAM) TOPICAL DAILY
Qty: 71 G | Refills: 3 | Status: SHIPPED | OUTPATIENT
Start: 2020-03-02 | End: 2020-03-09

## 2020-03-02 RX ORDER — GAUZE BANDAGE 4"X3.5"
1 SPONGE TOPICAL DAILY
Qty: 20 EACH | Refills: 3 | Status: SHIPPED | OUTPATIENT
Start: 2020-03-02 | End: 2020-03-09

## 2020-03-02 NOTE — TELEPHONE ENCOUNTER
PA Initiation    Medication: Easy Touch Lancets   Insurance Company: [employer] - Phone [phone] Fax [phone]  Pharmacy Filling the Rx: [id number] - [place], [place] - [address] [place] [address]  Filling Pharmacy Phone: [phone]  Filling Pharmacy Fax: [phone]  Start Date: 3/2/2020

## 2020-03-02 NOTE — TELEPHONE ENCOUNTER
Is the patient a long term care facility resident?    I [facility] in the chart that [facility]

## 2020-03-02 NOTE — NURSING NOTE
Per VO Dr. zaragoza-Triad applied to lower left leg wound, covered with sterile dressing, secured with tape per pt...............Hui Ashraf CMA  ([facility])

## 2020-03-02 NOTE — LETTER
3/2/2020         RE: Glenroy Padilla  [address] [address]        Dear Colleague,    Thank you for referring your patient, Glenroy Padilla, to the [facility]. Please see a copy of my visit note below.    Glenroy to follow up with Primary Care provider regarding elevated blood pressure.    F/U for left leg wound        Subjective:  Patient is well-known to me for left leg wounds has been healed for quite a while now.  He was having his wraps placed by his nurse and it with 1 was placed on lately and he developed a subsequent wound and improvement in the previously closed area.  He did not have Triad and now follows up for the wound.    Objective:  B/P: 142/60, T: Data Unavailable, P: 84, R: Data Unavailable  LLE - old wound reopened with exposed Ca.  Ca removed.  Wound base clean - 1x0.3x0.3cm      Assessment/plan:  This is a 69-year-old gentleman developed a wound in area of previous calcinosis.  A large chunk of calcium was removed today.  The plan at this time is to continue his wraps and triad to the area.  With adequate compression should heal without difficulty.  Will follow-up in 1 week for wound check.    Glenroy to follow up with Primary Care provider regarding elevated blood pressure.      Casey Shine MD, FACS    Again, thank you for allowing me to participate in the care of your patient.        Sincerely,        Casey Shine MD

## 2020-03-02 NOTE — PROGRESS NOTES
F/U for left leg wound        Subjective:  Patient is well-known to me for left leg wounds has been healed for quite a while now.  He was having his wraps placed by his nurse and it with 1 was placed on lately and he developed a subsequent wound and improvement in the previously closed area.  He did not have Triad and now follows up for the wound.    Objective:  B/P: 142/60, T: Data Unavailable, P: 84, R: Data Unavailable  LLE - old wound reopened with exposed Ca.  Ca removed.  Wound base clean - 1x0.3x0.3cm      Assessment/plan:  This is a 69-year-old gentleman developed a wound in area of previous calcinosis.  A large chunk of calcium was removed today.  The plan at this time is to continue his wraps and triad to the area.  With adequate compression should heal without difficulty.  Will follow-up in 1 week for wound check.    Glenroy to follow up with Primary Care provider regarding elevated blood pressure.      Casey Shine MD, FACS

## 2020-03-02 NOTE — TELEPHONE ENCOUNTER
Yes patient lives in an assisted living facility  [facility]   [address] [facility] [address]      Thank you   Josselin Montes RT (R)

## 2020-03-05 ENCOUNTER — HOSPITAL ENCOUNTER (OUTPATIENT)
Facility: CLINIC | Age: 70
Discharge: HOME | End: 2020-03-05
Attending: OPHTHALMOLOGY | Admitting: OPHTHALMOLOGY
Payer: COMMERCIAL

## 2020-03-05 ENCOUNTER — ANESTHESIA EVENT (OUTPATIENT)
Dept: SURGERY | Facility: CLINIC | Age: 70
End: 2020-03-05
Payer: COMMERCIAL

## 2020-03-05 ENCOUNTER — ANESTHESIA (OUTPATIENT)
Dept: SURGERY | Facility: CLINIC | Age: 70
End: 2020-03-05
Payer: COMMERCIAL

## 2020-03-05 VITALS
HEART RATE: 82 BPM | TEMPERATURE: 98.2 F | RESPIRATION RATE: 18 BRPM | OXYGEN SATURATION: 95 % | SYSTOLIC BLOOD PRESSURE: 146 MMHG | DIASTOLIC BLOOD PRESSURE: 66 MMHG

## 2020-03-05 LAB — GLUCOSE BLDC GLUCOMTR-MCNC: 98 MG/DL (ref 70–99)

## 2020-03-05 PROCEDURE — 25000125 ZZHC RX 250: Performed by: OPHTHALMOLOGY

## 2020-03-05 PROCEDURE — 25000128 H RX IP 250 OP 636: Performed by: NURSE ANESTHETIST, CERTIFIED REGISTERED

## 2020-03-05 PROCEDURE — 37000012 ZZH ANESTHESIA CATARACT PACKAGE: Performed by: OPHTHALMOLOGY

## 2020-03-05 PROCEDURE — 25000128 H RX IP 250 OP 636: Performed by: OPHTHALMOLOGY

## 2020-03-05 PROCEDURE — 82962 GLUCOSE BLOOD TEST: CPT

## 2020-03-05 PROCEDURE — 25000125 ZZHC RX 250: Performed by: NURSE ANESTHETIST, CERTIFIED REGISTERED

## 2020-03-05 PROCEDURE — V2632 POST CHMBR INTRAOCULAR LENS: HCPCS | Performed by: OPHTHALMOLOGY

## 2020-03-05 PROCEDURE — 36000025 ZZH CATARACT SURGICAL PACKAGE: Performed by: OPHTHALMOLOGY

## 2020-03-05 PROCEDURE — 71000022 ZZH RECOVERY CATRACT PACKAGE: Performed by: OPHTHALMOLOGY

## 2020-03-05 DEVICE — EYE IMP IOL AMO PCL TECNIS ZCB00 21.5: Type: IMPLANTABLE DEVICE | Site: EYE | Status: FUNCTIONAL

## 2020-03-05 RX ORDER — NALOXONE HYDROCHLORIDE 0.4 MG/ML
.1-.4 INJECTION, SOLUTION INTRAMUSCULAR; INTRAVENOUS; SUBCUTANEOUS
Status: ACTIVE | OUTPATIENT
Start: 2020-03-05 | End: 2020-03-06

## 2020-03-05 RX ORDER — LIDOCAINE 40 MG/G
CREAM TOPICAL
Status: DISCONTINUED | OUTPATIENT
Start: 2020-03-05 | End: 2020-03-05 | Stop reason: HOSPADM

## 2020-03-05 RX ORDER — MEPERIDINE HYDROCHLORIDE 25 MG/ML
12.5 INJECTION INTRAMUSCULAR; INTRAVENOUS; SUBCUTANEOUS
Status: ACTIVE | OUTPATIENT
Start: 2020-03-05

## 2020-03-05 RX ORDER — PHENYLEPHRINE HYDROCHLORIDE 25 MG/ML
1 SOLUTION/ DROPS OPHTHALMIC
Status: COMPLETED | OUTPATIENT
Start: 2020-03-05 | End: 2020-03-05

## 2020-03-05 RX ORDER — SODIUM CHLORIDE, SODIUM LACTATE, POTASSIUM CHLORIDE, CALCIUM CHLORIDE 600; 310; 30; 20 MG/100ML; MG/100ML; MG/100ML; MG/100ML
INJECTION, SOLUTION INTRAVENOUS CONTINUOUS
Status: ACTIVE | OUTPATIENT
Start: 2020-03-05

## 2020-03-05 RX ORDER — PROPARACAINE HYDROCHLORIDE 5 MG/ML
1 SOLUTION/ DROPS OPHTHALMIC ONCE
Status: COMPLETED | OUTPATIENT
Start: 2020-03-05 | End: 2020-03-05

## 2020-03-05 RX ORDER — ONDANSETRON 2 MG/ML
4 INJECTION INTRAMUSCULAR; INTRAVENOUS EVERY 30 MIN PRN
Status: ACTIVE | OUTPATIENT
Start: 2020-03-05

## 2020-03-05 RX ORDER — MOXIFLOXACIN 5 MG/ML
SOLUTION/ DROPS OPHTHALMIC PRN
Status: DISCONTINUED | OUTPATIENT
Start: 2020-03-05 | End: 2020-03-05 | Stop reason: HOSPADM

## 2020-03-05 RX ORDER — MOXIFLOXACIN 5 MG/ML
1 SOLUTION/ DROPS OPHTHALMIC
Status: COMPLETED | OUTPATIENT
Start: 2020-03-05 | End: 2020-03-05

## 2020-03-05 RX ORDER — FENTANYL CITRATE 50 UG/ML
INJECTION, SOLUTION INTRAMUSCULAR; INTRAVENOUS PRN
Status: DISCONTINUED | OUTPATIENT
Start: 2020-03-05 | End: 2020-03-05

## 2020-03-05 RX ORDER — DICLOFENAC SODIUM 1 MG/ML
1 SOLUTION/ DROPS OPHTHALMIC
Status: COMPLETED | OUTPATIENT
Start: 2020-03-05 | End: 2020-03-05

## 2020-03-05 RX ORDER — TROPICAMIDE 10 MG/ML
1 SOLUTION/ DROPS OPHTHALMIC
Status: COMPLETED | OUTPATIENT
Start: 2020-03-05 | End: 2020-03-05

## 2020-03-05 RX ORDER — PREDNISOLONE ACETATE 10 MG/ML
SUSPENSION/ DROPS OPHTHALMIC PRN
Status: DISCONTINUED | OUTPATIENT
Start: 2020-03-05 | End: 2020-03-05 | Stop reason: HOSPADM

## 2020-03-05 RX ORDER — ONDANSETRON 4 MG/1
4 TABLET, ORALLY DISINTEGRATING ORAL EVERY 30 MIN PRN
Status: ACTIVE | OUTPATIENT
Start: 2020-03-05

## 2020-03-05 RX ADMIN — MIDAZOLAM 1 MG: 1 INJECTION INTRAMUSCULAR; INTRAVENOUS at 10:24

## 2020-03-05 RX ADMIN — FENTANYL CITRATE 50 MCG: 50 INJECTION, SOLUTION INTRAMUSCULAR; INTRAVENOUS at 10:24

## 2020-03-05 RX ADMIN — MOXIFLOXACIN 1 DROP: 5 SOLUTION/ DROPS OPHTHALMIC at 09:23

## 2020-03-05 RX ADMIN — TROPICAMIDE 1 DROP: 10 SOLUTION/ DROPS OPHTHALMIC at 09:33

## 2020-03-05 RX ADMIN — DICLOFENAC SODIUM 1 DROP: 1 SOLUTION OPHTHALMIC at 09:23

## 2020-03-05 RX ADMIN — TROPICAMIDE 1 DROP: 10 SOLUTION/ DROPS OPHTHALMIC at 09:28

## 2020-03-05 RX ADMIN — PHENYLEPHRINE HYDROCHLORIDE 1 DROP: 25 SOLUTION/ DROPS OPHTHALMIC at 09:23

## 2020-03-05 RX ADMIN — MOXIFLOXACIN 1 DROP: 5 SOLUTION/ DROPS OPHTHALMIC at 09:28

## 2020-03-05 RX ADMIN — DICLOFENAC SODIUM 1 DROP: 1 SOLUTION OPHTHALMIC at 09:28

## 2020-03-05 RX ADMIN — TROPICAMIDE 1 DROP: 10 SOLUTION/ DROPS OPHTHALMIC at 09:23

## 2020-03-05 RX ADMIN — PROPARACAINE HYDROCHLORIDE 1 DROP: 5 SOLUTION/ DROPS OPHTHALMIC at 09:22

## 2020-03-05 RX ADMIN — FENTANYL CITRATE 50 MCG: 50 INJECTION, SOLUTION INTRAMUSCULAR; INTRAVENOUS at 10:28

## 2020-03-05 RX ADMIN — PHENYLEPHRINE HYDROCHLORIDE 1 DROP: 25 SOLUTION/ DROPS OPHTHALMIC at 09:33

## 2020-03-05 RX ADMIN — LIDOCAINE HYDROCHLORIDE 1 ML: 10 INJECTION, SOLUTION EPIDURAL; INFILTRATION; INTRACAUDAL; PERINEURAL at 09:35

## 2020-03-05 RX ADMIN — PHENYLEPHRINE HYDROCHLORIDE 1 DROP: 25 SOLUTION/ DROPS OPHTHALMIC at 09:28

## 2020-03-05 RX ADMIN — MOXIFLOXACIN 1 DROP: 5 SOLUTION/ DROPS OPHTHALMIC at 09:33

## 2020-03-05 RX ADMIN — MIDAZOLAM 1 MG: 1 INJECTION INTRAMUSCULAR; INTRAVENOUS at 10:19

## 2020-03-05 RX ADMIN — DICLOFENAC SODIUM 1 DROP: 1 SOLUTION OPHTHALMIC at 09:33

## 2020-03-05 ASSESSMENT — LIFESTYLE VARIABLES: TOBACCO_USE: 0

## 2020-03-05 NOTE — ANESTHESIA CARE TRANSFER NOTE
Patient: Glenroy Padilla    Procedure(s):  PHACOEMULSIFICATION Right WITH STANDARD INTRAOCULAR LENS IMPLANT INSERTION    Diagnosis: Nuclear sclerotic cataract of right eye [H25.11]  Diagnosis Additional Information: No value filed.    Anesthesia Type:   MAC     Note:  Airway :Room Air  Patient transferred to:Phase II  Handoff Report: Identifed the Patient, Identified the Reponsible Provider, Reviewed the pertinent medical history, Discussed the surgical course, Reviewed Intra-OP anesthesia mangement and issues during anesthesia, Set expectations for post-procedure period and Allowed opportunity for questions and acknowledgement of understanding      Vitals: (Last set prior to Anesthesia Care Transfer)    CRNA VITALS  3/5/2020 1014 - 3/5/2020 1048      3/5/2020             Resp Rate (observed):  (!) 5                Electronically Signed By: ELFEGO Pierce CRNA  March 5, 2020  10:48 AM

## 2020-03-05 NOTE — ANESTHESIA PREPROCEDURE EVALUATION
Anesthesia Pre-Procedure Evaluation    Patient: Glenroy Padilla   MRN: [id number] : 1950          Preoperative Diagnosis: Primary osteoarthritis of left knee [M17.12]    Procedure(s):  LEFT TOTAL KNEE REPLACEMENT    Past Medical History:   Diagnosis Date     Acute pain of left knee 2017     Chronic pain of right knee 2017     Hx of coronary artery disease 2017     Hx of heart artery stent 2017     Mixed incontinence 2018     Obesity, morbid, BMI 40.0-49.9 (H) 2017     Open wound of left lower extremity without complication, initial encounter 2017     DAYTON (obstructive sleep apnea) 2017     Type 2 diabetes mellitus with other circulatory complication, without long-term current use of insulin (H) 2017     Venous stasis ulcer of left lower extremity (H) 2017     Venous stasis ulcer of left lower extremity (H) 2017     Past Surgical History:   Procedure Laterality Date     ARTHROPLASTY KNEE Right 2019    Procedure: Right total knee replacement;  Surgeon: Marc Ibrahim DO;  Location: [facility]     ARTHROPLASTY KNEE Left 2019    Procedure: LEFT TOTAL KNEE REPLACEMENT;  Surgeon: Marc Ibrahim DO;  Location: [facility]     CARDIAC SURGERY      stent placement     CHOLECYSTECTOMY       LAMINECTOMY LUMBAR THREE+ LEVELS N/A 3/13/2018    Procedure: LAMINECTOMY LUMBAR THREE+ LEVELS;  T5-9 LAMINECTOMIES, RESECTION OF EPIDURAL LIPOMATOSIS;  Surgeon: Hugh Mendieta MD;  Location: [facility]       Anesthesia Evaluation     . Pt has had prior anesthetic. Type: General, MAC and Regional    No history of anesthetic complications          ROS/MED HX    ENT/Pulmonary:     (+)sleep apnea, DAYTON risk factors (pt denies he has DAYTON since loosing weight and does not use CPAP) hypertension, obese, doesn't use CPAP , . .   (-) tobacco use   Neurologic:     (+)neuropathy other neuro spinal surgery with neuropathy    Cardiovascular: Comment: 1 stent mid LAD on  4/28/17    (+) Dyslipidemia, hypertension--CAD, --stent,5/2017  1 Drug Eluting Stent .. : . . . :. . Previous cardiac testing Echodate:9/2018results:Mild left ventricular concentric hypertrophy, no wall motion abnormalities, EF 60-65%date: results:ECG reviewed date:12/9/19 results:appears normal, NSR with First degree A-V block , normal axis, no acute ST/T changes c/w ischemia, no LVH by voltage criteria, unchanged from previous tracings date: results:          METS/Exercise Tolerance:  >4 METS   Hematologic:     (+) Anemia (Hgb 10.1 on 12/9/19), Other Hematologic Disorder-lymphadema       Musculoskeletal:   (+) arthritis,  other musculoskeletal- spinal stenosis of L3/4 and L4/5      GI/Hepatic:     (+) GERD Asymptomatic on medication,       Renal/Genitourinary:         Endo:     (+) type II DM Last HgA1c: 5.0 date: 11/4/19 Not using insulin - not using insulin pump not previously admitted for DM/DKA Obesity, .      Psychiatric:  - neg psychiatric ROS       Infectious Disease:  - neg infectious disease ROS       Malignancy:      - no malignancy   Other:    - neg other ROS                        Physical Exam  Normal systems: cardiovascular and pulmonary    Airway   Mallampati: II  TM distance: >3 FB  Neck ROM: full    Dental   (+) upper dentures and lower dentures    Cardiovascular   Rhythm and rate: regular and normal      Pulmonary    breath sounds clear to auscultation            Lab Results   Component Value Date    WBC 7.3 12/09/2019    HGB 8.9 (L) 12/20/2019    HCT 33.7 (L) 12/09/2019     12/09/2019    CRP 21.5 (H) 05/22/2017    SED 49 (H) 05/22/2017     02/25/2020    POTASSIUM 4.1 02/25/2020    CHLORIDE 107 02/25/2020    CO2 29 02/25/2020    BUN 18 02/25/2020    CR 1.04 02/25/2020     (H) 02/25/2020    PHAN 9.0 02/25/2020    ALBUMIN 3.4 02/25/2020    PROTTOTAL 8.3 02/25/2020    ALT 15 02/25/2020    AST 14 02/25/2020    ALKPHOS 70 02/25/2020    BILITOTAL 0.5 02/25/2020    TSH 2.29 10/17/2019  "      Preop Vitals  BP Readings from Last 3 Encounters:   03/02/20 (!) 142/60   02/25/20 124/60   01/14/20 104/54    Pulse Readings from Last 3 Encounters:   03/02/20 84   02/25/20 78   01/14/20 92      Resp Readings from Last 3 Encounters:   02/25/20 18   01/14/20 18   12/20/19 18    SpO2 Readings from Last 3 Encounters:   02/25/20 99%   01/14/20 97%   12/20/19 94%      Temp Readings from Last 1 Encounters:   02/25/20 98.6  F (37  C) (Temporal)    Ht Readings from Last 1 Encounters:   02/25/20 1.74 m (5' 8.5\")      Wt Readings from Last 1 Encounters:   03/02/20 121.4 kg (267 lb 9.6 oz)    Estimated body mass index is 40.1 kg/m  as calculated from the following:    Height as of 2/25/20: 1.74 m (5' 8.5\").    Weight as of 3/2/20: 121.4 kg (267 lb 9.6 oz).       Anesthesia Plan      History & Physical Review  History and physical reviewed and following examination; no interval change.    ASA Status:  3 .    NPO Status:  > 6 hours    Plan for MAC with Other induction. Reason for MAC:  Procedure to face, neck, head or breast         Postoperative Care      Consents  Anesthetic plan, risks, benefits and alternatives discussed with:  Patient.  Use of blood products discussed: No .   .                   ELFEGO Pierce CRNA  "

## 2020-03-05 NOTE — ANESTHESIA POSTPROCEDURE EVALUATION
Patient: Glenroy Padilla    Procedure(s):  PHACOEMULSIFICATION Right WITH STANDARD INTRAOCULAR LENS IMPLANT INSERTION    Diagnosis:Nuclear sclerotic cataract of right eye [H25.11]  Diagnosis Additional Information: No value filed.    Anesthesia Type:  MAC    Note:  Anesthesia Post Evaluation    Patient location during evaluation: Phase 2  Patient participation: Able to fully participate in evaluation  Level of consciousness: awake  Pain management: adequate  Airway patency: patent  Cardiovascular status: acceptable and hemodynamically stable  Respiratory status: acceptable, room air and nonlabored ventilation  Hydration status: stable  PONV: none     Anesthetic complications: None    Comments: Patient was happy with the anesthesia care received and no anesthesia related complications were noted.  I will follow up with the patient again if it is needed.        Last vitals:  Vitals:    03/05/20 0931 03/05/20 1046 03/05/20 1047   BP: (!) 148/75 (!) 144/65    Pulse: 79 81    Resp: 18     Temp: 98.2  F (36.8  C)     SpO2: 97%  97%         Electronically Signed By: ELFEGO Pierce CRNA  March 5, 2020  10:58 AM

## 2020-03-05 NOTE — OP NOTE
[facility]  Ophthalmology Operative Note    PREOPERATIVE DIAGNOSIS: Cataract, Right eye.   POSTOPERATIVE DIAGNOSIS: Cataract, Right eye.   PROCEDURE: Kelman phacoemulsification with posterior chamber lens implant, Right eye.   ANESTHESIA: Topical.   COMPLICATIONS: None.   INDICATIONS FOR PROCEDURE: Glenroy Padilla is a 69 year old male who was evaluated preoperatively in the eye clinic and found to have a visually significant cataract of the Right eye which decreased his vision to the 20/60 level. This decreased vision was interfering with activities of daily living such as reading and driving. The patient was deemed a suitable candidate for cataract extraction. The risks, benefits and alternatives were explained to the patient. All questions were answered and the patient elected to proceed with cataract extraction and lens implant.   DESCRIPTION OF PROCEDURE: After informed consent was obtained, the patient was given topical lidocaine gel to the Right eye and it was prepped with Betadine and draped in the usual sterile manner. A paracentesis was made at the 7 o'clock position and the anterior chamber was inflated with Endocoat. A clear corneal incision using a 2.5 mm metal keratome was made at the 9 o'clock position. A continuous tear anterior capsulorrhexis was started with the cystotome and completed with the Utrata forceps. The nucleus was hydrodissected and found to rotate freely. The nucleus was removed with the phacoemulsification handpiece and the residual cortex with the irrigation-aspiration handpiece. The capsular bag was inspected and found to be free of any tears or breaks and was inflated with Healon. A model ZCBOO 21.5 diopter posterior chamber lens was inserted into the capsular bag without complications. It was found to center well and rotate freely. The residual Healon was removed with the irrigation-aspiration handpiece. The anterior chamber was inflated with balanced salt  solution. The eye was palpated and found to be of normal physiologic pressure. The wound was inspected and found to be free of any leaks. One drop each of Betadine, Vigamox, and Omnipred were instilled in the Right eye and a shield was placed over the eye. The patient was transferred to the postanesthesia care unit in stable condition.     Noah Stevens M.D.

## 2020-03-05 NOTE — DISCHARGE INSTRUCTIONS
POST CATARACT SURGERY EYE INSTRUCTIONS  [facility]           Eye Medications    VIGAMOX/OFLOXACIN (Tan Cap)    KETOROLAC (Wiseman Cap)    PREDNISOLONE (Pink or White Cap)    If you opted for the individual bottles of eye medications follow these instructions.    *Instill one drop from each bottle into the operative eye 3 times a day until each  bottle is empty.    *Wait 5 minutes between each drop.    If you opted for the one bottle containing all 3 eye medications, follow these instructions.    *Instill one drop into the operative eye 3 times a day until the bottle is empty.       - If your are currently being treated for glaucoma please continue your    medication as normally prescribed.     - Wear eye shield while sleeping for 3 days     - Refrain from heavy lifting, bending, straining, or strenuous activity for 1      week.     - Do not rub or push on the operative eye for 1 week (you may wipe the    eye lid(s) gently with a wet wash cloth to remove matter from                         eyelashes).     - You may bathe or shower, but do not get the eye wet for 1 month      (swimming, hot tubs or other water activities).     - Minor itching, scratching sensation, burning sensation, etc. is normal.     Report any severe eye pain or loss of vision.     - Please call our office at [phone] if you have questions or     concerns.

## 2020-03-09 ENCOUNTER — OFFICE VISIT (OUTPATIENT)
Dept: SURGERY | Facility: CLINIC | Age: 70
End: 2020-03-09
Payer: COMMERCIAL

## 2020-03-09 ENCOUNTER — MEDICAL CORRESPONDENCE (OUTPATIENT)
Dept: HEALTH INFORMATION MANAGEMENT | Facility: CLINIC | Age: 70
End: 2020-03-09

## 2020-03-09 VITALS
BODY MASS INDEX: 40.29 KG/M2 | WEIGHT: 268.9 LBS | HEART RATE: 80 BPM | DIASTOLIC BLOOD PRESSURE: 60 MMHG | SYSTOLIC BLOOD PRESSURE: 142 MMHG

## 2020-03-09 DIAGNOSIS — S81.802D OPEN WOUND OF LEFT LOWER EXTREMITY WITHOUT COMPLICATION, SUBSEQUENT ENCOUNTER: ICD-10-CM

## 2020-03-09 PROCEDURE — 99212 OFFICE O/P EST SF 10 MIN: CPT | Performed by: SPECIALIST

## 2020-03-09 RX ORDER — HYDROPHILIC CREAM
1 PASTE (GRAM) TOPICAL DAILY
Qty: 71 G | Refills: 3 | Status: SHIPPED | OUTPATIENT
Start: 2020-03-09

## 2020-03-09 RX ORDER — GAUZE BANDAGE 4"X3.5"
1 SPONGE TOPICAL DAILY
Qty: 20 EACH | Refills: 3 | Status: SHIPPED | OUTPATIENT
Start: 2020-03-09

## 2020-03-09 NOTE — LETTER
3/9/2020         RE: Glenroy Padilla  [address] [id number]        Dear Colleague,    Thank you for referring your patient, Glenroy Padilla, to the [facility]. Please see a copy of my visit note below.    F/U for left leg wound        Subjective:  Patient is well-known to me for left leg wounds has been healed for quite a while now.  He was having his wraps placed by his nurse and it with 1 was placed on lately and he developed a subsequent wound and improvement in the previously closed area.        He had some calcium move last week and now is only been putting Telfa on the wound but she was at a Triad.      Objective:  B/P: 142/60, T: Data Unavailable, P: 80, R: Data Unavailable  LLE - old wound reopened with exposed Ca.  Ca removed.  Wound base clean - 1x0.3xcm.  Filled in      Assessment/plan:  This is a 69-year-old gentleman developed a wound in area of previous calcinosis.  A large chunk of calcium removed last visit.  Try to be sent to a different pharmacy.  The wound is smaller and more filled in on today's visit.  The plan at this time is to continue his wraps and triad to the area.  With adequate compression should heal without difficulty.  Will follow-up  with the wound nurse in 2 weeks    Glenroy to follow up with Primary Care provider regarding elevated blood pressure.    Casey Shine MD, FACS    Glenroy to follow up with Primary Care provider regarding elevated blood pressure.    Again, thank you for allowing me to participate in the care of your patient.        Sincerely,        Casey Shine MD

## 2020-03-09 NOTE — PROGRESS NOTES
F/U for left leg wound        Subjective:  Patient is well-known to me for left leg wounds has been healed for quite a while now.  He was having his wraps placed by his nurse and it with 1 was placed on lately and he developed a subsequent wound and improvement in the previously closed area.        He had some calcium move last week and now is only been putting Telfa on the wound but she was at a Triad.      Objective:  B/P: 142/60, T: Data Unavailable, P: 80, R: Data Unavailable  LLE - old wound reopened with exposed Ca.  Ca removed.  Wound base clean - 1x0.3xcm.  Filled in      Assessment/plan:  This is a 69-year-old gentleman developed a wound in area of previous calcinosis.  A large chunk of calcium removed last visit.  Try to be sent to a different pharmacy.  The wound is smaller and more filled in on today's visit.  The plan at this time is to continue his wraps and triad to the area.  With adequate compression should heal without difficulty.  Will follow-up  with the wound nurse in 2 weeks    Glenroy to follow up with Primary Care provider regarding elevated blood pressure.    Casey Shine MD, FACS

## 2020-03-11 NOTE — TELEPHONE ENCOUNTER
Called to check status of PA- request was cancelled as these need to be billed under Part B. Rep was able to put in request to Part B to have these covered. We should hear decision in 24 hours.

## 2020-03-16 NOTE — TELEPHONE ENCOUNTER
Prior Authorization Not Needed per Insurance    Medication: Easy Touch Lancets   Insurance Company: [employer] - Phone [phone] Fax [phone]  Expected CoPay:      Pharmacy Filling the Rx: [id number] - [place], [place] - [address]  Pharmacy Notified: Yes  Patient Notified: Yes    Pharmacy needs to bill Part B, not Part D. Called pharmacy and made them aware to process through Part B.  Pharmacy may have to contact clinic if still does not process through part B as PA team does not handle Part B/medical benefits.

## 2020-03-23 ENCOUNTER — HOSPITAL ENCOUNTER (OUTPATIENT)
Dept: WOUND CARE | Facility: CLINIC | Age: 70
Discharge: HOME | End: 2020-03-23
Attending: SPECIALIST | Admitting: SPECIALIST
Payer: COMMERCIAL

## 2020-03-23 ENCOUNTER — TELEPHONE (OUTPATIENT)
Dept: FAMILY MEDICINE | Facility: OTHER | Age: 70
End: 2020-03-23

## 2020-03-23 ENCOUNTER — MEDICAL CORRESPONDENCE (OUTPATIENT)
Dept: HEALTH INFORMATION MANAGEMENT | Facility: CLINIC | Age: 70
End: 2020-03-23

## 2020-03-23 PROCEDURE — G0463 HOSPITAL OUTPT CLINIC VISIT: HCPCS

## 2020-03-23 NOTE — DISCHARGE INSTRUCTIONS
Today the wound on your left lower leg is improving well.    Smaller in size in general, only small opening remaining.  Continue with the use of the Triad paste and cover with gauze.    Continue with using your compression pump on the lower legs daily.    If any new concerns are noted call our scheduling department at [phone].    Jett Garza RN [email or web address]

## 2020-03-23 NOTE — TELEPHONE ENCOUNTER
Reason for Call:  Form, our goal is to have forms completed with 72 hours, however, some forms may require a visit or additional information.    Type of letter, form or note:  medical    Who is the form from?: [employer] (if other please explain)    Where did the form come from: form was faxed in    What clinic location was the form placed at?: [facility] - [phone]    Where the form was placed: box Box/Folder    What number is listed as a contact on the form?: fax [phone]       Additional comments: Please complete and fax    Call taken on 3/23/2020 at 12:50 PM by Olivia Mayfield

## 2020-03-23 NOTE — PROGRESS NOTES
Reason For Visit: Glenroy Padilla, 69 year old male, seen as outpatient to evaluate and treat  A left lower leg wound. Referred by Dr Shine. Patient presents by himself today.      History:   Pt well known to me from past visit to the Wound and Ostomy clinic for treatement of LE wounds.  Pt was seen by Dr Shine on 3/9/20  after developing a new wound in area of previous calcinosis.  MD removed a large chunk of calcium rat that visit.  Pt was instructed at that time to resume the use of his Lymphedema pumps at his current residence and start the use of Triad paste to the site daily.     Personal/social history: pt is currently living in a nursing home in the [facility] area, he is planning on returning to [place] in the next few weeks.    Objective:   Physical appearance: alert and oriented  Ambulation: very limited, did not transfer out of electric wheelchair today.     Current treatment plan: Triad paste as primary dressing  Last changed: yesterday    Wound #1 Left anterior lower leg. Calcinosis related ulcer.  Stage/tissue depth: full thickness  1 cm L x 0.3 cm W x 0 cm D  Tunneling: none  Undermining: none  Wound bed type/amount: scattered dry scab of dried drainage; NA fluctuant  Wound Edges: NA  Periwound: wnl  Drainage: none  Odor: no  Pain: no    Dorsalis Pedal Pulse: palpable: NA doppler: NA phasic  Hair growth: limited below the knee  Capillary Refill: less than 3 seconds  Feet/toes color: Not assessed this visit  Nails: not assessed this visit  R Leg: Edema not assessed this visit. Ankle circumference NA cm. Calf circumference NA cm.  L Leg: Edema nonpitting edema. Ankle circumference NA cm. Calf circumference NA cm.    Mobility: lited  Current offloading/footwear: regular shoes  Sensation: wnl  HgbA1C: 4.8 Date: 2/25/20  Checks Blood Glucose?:  Check once daily Average Readings: low 100's  Other callousing/areas of concern: none noted    Diet: regular with awareness of effects on blood sugars  Smoking:  former smoker    Discussed: etiology of wound (re occurring area over Cacinosis), pathophysiology and patient specific goals for wound healing.   Education: Wound status, continue with current wound cares. Continue the Lymphedema cares in NH setting.    Assessment:  The wound on the pt's left lower leg is well improved based on MD's on day calcium build up was removed.  No open tissue present.  Of the area measured the wound bed tissue is all dry scattered scabs of dried drainage within intact scar tissue.  No signs of infection noted.     Barriers to wound healing:   Poor nutrition: inadequate supply of protein, carbohydrates, fatty acids, and trace elements essential for all phases of wound healing  Reduced Blood Supply: inadequate perfusion to heal wound  Medication: NA  Chemotherapy: suppresses the immune system and inflammatory response  Radiotherapy: increases production of free radical which damage cells  Psychological stress: NA  Obesity: decreases tissue perfusion  Infection: prolongs inflammatory phase, uses vital nutrients, impairs epithelialization and releases toxins, none noted this visit  Underlying Disease: diabetes mellitis and autoimmune disorders, present, most recent HgbA1C showed well controlled blood sugar levels  Maceration: reduces wound tensile strength and inhibits epithelial migration, none noted  Patient compliance, Na  Unrelieved pressure, NA  Immobility, NA  Substance abuse: NA    Plan:  Pt instructed to have nursing home staff continue with the once daily application of Triad paste till the scabs are fully resolved and no open tissue is noted below.  Also pt is to continue with the use of his lymphedema pumps for bilateral lower legs.      Topical care: Triad paste  Offloading: NA  Additional recommendations: none at this time    Wound Care: Wound cleansed with NS and gauze, patted dry. Periwound protected with NA. Wound base filled with Triad paste. Covered with dry gauze, followed by  NA. Secured with tape. To be changed daily.    Discussed plan of care with patient. Teaching done with patient for dressing changes; pt is unable to perform self cares, nursing home staff are able and willing to perform.    The following discharge instructions were reviewed with and sent home with the patient: See AVS    The following supplies were sent home with the patient:  None today, will order pt Triad paste today from [facility]  Will reassess care plan in: NA no follow up planned at this time.    Return visit: NA    Verbal, written, & demonstrative education provided.  Face to face time (excluding procedure): approximately 35 minutes.  Procedure: NA  Care plan was changed.    [phone]

## 2020-03-23 NOTE — TELEPHONE ENCOUNTER
Glenroy is requesting a refill of Prednisolone 1% , Moxifloxacin 0.5%, and Ketorolac 0.5 % ophthalmic drops.     He would like these sent to [facility]     Thanks  [facility]   [phone]

## 2020-04-07 DIAGNOSIS — I10 HYPERTENSION, GOAL BELOW 140/90: Primary | ICD-10-CM

## 2020-04-08 NOTE — TELEPHONE ENCOUNTER
Pending Prescriptions:                       Disp   Refills    losartan (COZAAR) 25 MG tablet [Pharmacy M*30 tab*11       Sig: TAKE 1 TABLET (25 MG) BY MOUTH DAILY    Routing refill request to provider for review/approval because:  BP is not at goal,  Medication was listed as historical  Yonny Meyers, RN, BSN

## 2020-04-09 RX ORDER — LOSARTAN POTASSIUM 25 MG/1
TABLET ORAL
Qty: 30 TABLET | Refills: 11 | Status: SHIPPED | OUTPATIENT
Start: 2020-04-09

## 2020-04-27 ENCOUNTER — MEDICAL CORRESPONDENCE (OUTPATIENT)
Dept: HEALTH INFORMATION MANAGEMENT | Facility: CLINIC | Age: 70
End: 2020-04-27

## 2020-05-20 NOTE — PROGRESS NOTES
"Glenroy Padilla is a 69 year old male who is being evaluated via a billable telephone visit.      The patient has been notified of following:     \"This telephone visit will be conducted via a call between you and your physician/provider. We have found that certain health care needs can be provided without the need for a physical exam.  This service lets us provide the care you need with a short phone conversation.  If a prescription is necessary we can send it directly to your pharmacy.  If lab work is needed we can place an order for that and you can then stop by our lab to have the test done at a later time.    Telephone visits are billed at different rates depending on your insurance coverage. During this emergency period, for some insurers they may be billed the same as an in-person visit.  Please reach out to your insurance provider with any questions.    If during the course of the call the physician/provider feels a telephone visit is not appropriate, you will not be charged for this service.\"    Patient has given verbal consent for Telephone visit?  Yes    What phone number would you like to be contacted at? [phone]    How would you like to obtain your AVS? Mail a copy    Subjective     Glenroy Padilla is a 69 year old male who presents via phone visit today for the following health issues:    HPI     Concern - post procedure visit     Description:   Both eyes had lenses replaced for cataracts. Pt has not been able to have a recheck with his eye doctor and was told to follow up with PCP.  No complications, pt feeling good.           Patient Active Problem List   Diagnosis     Type 2 diabetes mellitus with other circulatory complication, without long-term current use of insulin (H)     Venous stasis ulcer of left lower extremity (H)     Acute pain of left knee     Chronic pain of left knee     Open wound of left lower extremity without complication, initial encounter     Hx of coronary artery disease     " Hx of heart artery stent     DAY[place] (obstructive sleep apnea)     Hypertension, goal below 140/90     Hyperlipidemia LDL goal <100     History of coronary artery stent placement     Morbid obesity due to excess calories (H)     Neuropathy     Foot drop, right     Cardiomegaly     Gastroesophageal reflux disease without esophagitis     Falls frequently     Weakness of both legs     Central spinal stenosis L3-4 and L4-5     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     Lymphedema     S/P spinal surgery     Complete tear of left rotator cuff     Lymphedema of right lower extremity     Primary osteoarthritis of right knee     Mixed incontinence     Iron deficiency anemia secondary to inadequate dietary iron intake     Ischemic vascular disease     Disease of spinal cord (H)     MSSA (methicillin-susceptible Staphylococcus aureus) colonization     S/P total knee replacement using cement, right     Primary osteoarthritis of left knee     History of anemia     Status post total left knee replacement using cement     Glaucoma suspect, bilateral     Blurred vision     Cortical age-related cataract of both eyes     Past Surgical History:   Procedure Laterality Date     ARTHROPLASTY KNEE Right 7/30/2019    Procedure: Right total knee replacement;  Surgeon: Marc Ibrahim DO;  Location: [facility] OR     ARTHROPLASTY KNEE Left 12/19/2019    Procedure: LEFT TOTAL KNEE REPLACEMENT;  Surgeon: Marc Ibrahim DO;  Location:  OR     CARDIAC SURGERY      stent placement     CHOLECYSTECTOMY       LAMINECTOMY LUMBAR THREE+ LEVELS N/A 3/13/2018    Procedure: LAMINECTOMY LUMBAR THREE+ LEVELS;  T5-9 LAMINECTOMIES, RESECTION OF EPIDURAL LIPOMATOSIS;  Surgeon: Hugh Mendieta MD;  Location: [facility] OR     PHACOEMULSIFICATION WITH STANDARD INTRAOCULAR LENS IMPLANT Right 3/5/2020    Procedure: PHACOEMULSIFICATION Right WITH STANDARD INTRAOCULAR LENS IMPLANT INSERTION;  Surgeon: Noah Stevens MD;  Location:  OR       Social  "History     Tobacco Use     Smoking status: Former Smoker     Packs/day: 0.00     Types: Cigars     Smokeless tobacco: Never Used     Tobacco comment: exposure to second hand smoke   Substance Use Topics     Alcohol use: No     No family history on file.      Current Outpatient Medications   Medication Sig Dispense Refill     acetaminophen (TYLENOL) 325 MG tablet Take 3 tablets (975 mg) by mouth every 8 hours as needed for mild pain 100 tablet 0     Adhesive Tape (MEDIPORE H SURGICAL 2\"X10YD) TAPE 1 Application daily 1 each 3     blood glucose (NO BRAND SPECIFIED) lancing device Use to test blood sugars 2 times daily or as directed. 1 each 0     blood glucose calibration (NO BRAND SPECIFIED) solution To accompany: Blood Glucose Monitor Brands: per insurance. 1 Bottle 3     blood glucose monitoring (NO BRAND SPECIFIED) meter device kit test blood sugar 2 xdaily or as directed.  Blood Glucose Monitor Brands: per insurance. 1 kit 0     blood glucose monitoring (NO BRAND SPECIFIED) test strip Use to test blood sugar 2 times daily Blood Glucose Monitor Brands: Glucocard Expression 100 strip 6     blood glucose monitoring (NO BRAND SPECIFIED) test strip Use to test blood sugar 2x  daily or as directed. To accompany: Blood Glucose Monitor Brands: per insurance. 100 strip 1     Blood Glucose Monitoring Suppl (GLUCOCARD EXPRESSION MONITOR) W/DEVICE KIT USE TWICE DAILY TO TEST BLOOD GLUCOSE  0     clopidogrel (PLAVIX) 75 MG tablet TAKE 1 TABLET (75 MG) BY MOUTH DAILY 90 tablet 1     docusate sodium (COLACE) 100 MG capsule Take 1 capsule (100 mg) by mouth 2 times daily as needed for constipation 180 capsule 3     Ferrous Sulfate Dried (GNP IRON) 200 (65 Fe) MG TABS Take 650 mg by mouth every morning 180 tablet 1     furosemide (LASIX) 40 MG tablet TAKE 1 TABLET (40 MG) BY MOUTH DAILY 90 tablet 1     Gauze Pads & Dressings (CURITY GAUZE SPONGES) 3-1/2\"X4\" PADS 1 Application daily 20 each 3     hydrOXYzine (ATARAX) 10 MG tablet " Take 1 tablet (10 mg) by mouth every 6 hours as needed for other (adjuvant pain) 45 tablet 0     JANUMET XR  MG TB24 TAKE 2 TABLETS BY MOUTH DAILY (WITH BREAKFAST) 180 tablet 5     losartan (COZAAR) 25 MG tablet TAKE 1 TABLET (25 MG) BY MOUTH DAILY 30 tablet 11     metoprolol succinate ER (TOPROL-XL) 25 MG 24 hr tablet Take 1 tablet (25 mg) by mouth daily 90 tablet 1     Nutritional Supplements (ENSURE ACTIVE HIGH PROTEIN) LIQD Take 1 Can by mouth daily 30 each 1     order for DME Equipment being ordered: VASO PRESS-DVT PROPHYLAXIS SYSTEM- SEQUENTIAL 1 Device 0     order for DME Equipment being ordered: VASO PRESS-DVT PROPHYLAXIS SYSTEM or SUBSTITUTE BRAND SEQUENTIAL PUMP 1 Device 0     order for DME Equipment being ordered: Wheelchair of appropriate style and size 1 Device 0     order for DME Equipment being ordered: Replacement battery or batteries for an [employer] Pronto M 51. 1 Device 0     order for DME Equipment being ordered: electric chair for sleeping and adjustment to allow for elevation of legs above the heart.  Zero gravity type heavy duty sleep chair advised. MaxiComforter ([id number]) 1 Units 0     order for DME Equipment being ordered: Needs to have a sleep chair for home use. 1 Device 0     order for DME Compression garments toe to knee.  Specific brand is Compraflex Light Compression garment.   Diagnosis codes for venous stasis ulcer I83.029 and for  Lymphoedema is I89.0   Goal is for compression of about 30 to 40 mm of mercury   measurements are right ankle 28.5 cm and right calf is 45.5 cm.  Left ankle is 27.5 cm and the calf is 45 cm   Tall for length. 2 Device 1     order for DME Equipment being ordered: compression stockings below knee and above knee if available.  Medium compression. 1 Units 1     rosuvastatin (CRESTOR) 10 MG tablet TAKE 1 TABLET (10 MG) BY MOUTH DAILY 90 tablet 1     sitagliptin-metFORMIN (JANUMET)  MG tablet Take 1 tablet by mouth 2 times daily (with meals) This  is actually Janumet XR       thin (NO BRAND SPECIFIED) lancets Test 2 x daily or as directed.  Brands: per insurance. 100 each 1     Wound Dressings (TRIAD HYDROPHILIC WOUND DRESSI) PSTE Externally apply 1 g topically daily 71 g 3     zinc oxide (DESITIN) 40 % external ointment Apply topically as needed for dry skin or irritation 113 g 1     ondansetron (ZOFRAN-ODT) 4 MG ODT tab Take 1 tablet (4 mg) by mouth every 8 hours as needed for nausea or vomiting (Patient not taking: Reported on 1/2/2020)       order for DME Equipment being ordered: VASO PRESS-DVT PROPHYLAXIS SYSTEM                                             APPLY AT HS TO BILATERAL LOWER EXREMITIES (Patient not taking: Reported on 5/22/2020) 1 Units 0     oxyCODONE (ROXICODONE) 5 MG tablet Take 2 tablets (10 mg) by mouth every 3 hours as needed for moderate to severe pain (Patient not taking: Reported on 1/2/2020) 50 tablet 0     rivaroxaban ANTICOAGULANT (XARELTO) 10 MG TABS tablet Take 1 tablet (10 mg) by mouth daily for 14 days (Patient not taking: Reported on 1/14/2020) 14 tablet 0     tiZANidine (ZANAFLEX) 2 MG tablet Take 2 tablets (4 mg) by mouth every 6 hours as needed for muscle spasms (Patient not taking: Reported on 1/2/2020) 60 tablet 0     Allergies   Allergen Reactions     No Known Allergies      Recent Labs   Lab Test 02/25/20  0832 11/04/19  0830 10/17/19  0744 08/29/19  1143  07/15/19  0852 02/21/19  0929  09/25/17  1216   A1C 4.8 5.0  --   --   --  5.6 6.1*   < > 6.2*   LDL 66  74  --   --   --   --  73 61   < > 57   HDL 41  --   --   --   --   --  40  --  43   TRIG 130  --   --   --   --   --  166*  --  140   ALT 15  --   --  16  --  15 18   < > 19   CR 1.04  --   --  1.13   < > 1.23 1.23   < > 0.92   GFRESTIMATED 72  --   --  65   < > 59* 59*   < > 81   GFRESTBLACK 83  --   --  75   < > 68 68   < > >90   POTASSIUM 4.1  --   --  4.0   < > 4.1 4.2   < > 4.1   TSH  --   --  2.29  --   --   --   --   --  2.07    < > = values in this  interval not displayed.      BP Readings from Last 3 Encounters:   03/09/20 (!) 142/60   03/05/20 (!) 146/66   03/02/20 (!) 142/60    Wt Readings from Last 3 Encounters:   03/09/20 122 kg (268 lb 14.4 oz)   03/02/20 121.4 kg (267 lb 9.6 oz)   02/25/20 118.8 kg (262 lb)                    Reviewed and updated as needed this visit by Provider         Review of Systems   Constitutional, HEENT, cardiovascular, pulmonary, GI, , musculoskeletal, neuro, skin, endocrine and psych systems are negative, except as otherwise noted.       Objective   Reported vitals:  There were no vitals taken for this visit.   healthy, alert and no distress  PSYCH: Alert and oriented times 3; coherent speech, normal   rate and volume, able to articulate logical thoughts, able   to abstract reason, no tangential thoughts, no hallucinations   or delusions  His affect is normal and pleasant  RESP: No cough, no audible wheezing, able to talk in full sentences  Remainder of exam unable to be completed due to telephone visits    Diagnostic Test Results:  Labs reviewed in Epic        Assessment/Plan:  1. [place] (obstructive sleep apnea)  2. Hypertension, goal below 140/90  3. Neuropathy  4. Weakness of both legs  5. Type 2 diabetes mellitus with other circulatory complication, without long-term current use of insulin (H)  No new concerns, recheck in 3 months    Return in about 3 months (around 8/22/2020) for Medication Recheck, recheck of current condition, if symptoms do not improve.    Phone call duration:  16 minutes    Julio Cesar Sahu PA-C

## 2020-05-22 ENCOUNTER — VIRTUAL VISIT (OUTPATIENT)
Dept: FAMILY MEDICINE | Facility: OTHER | Age: 70
End: 2020-05-22
Payer: COMMERCIAL

## 2020-05-22 DIAGNOSIS — R29.898 WEAKNESS OF BOTH LEGS: ICD-10-CM

## 2020-05-22 DIAGNOSIS — G47.33 OSA (OBSTRUCTIVE SLEEP APNEA): Primary | ICD-10-CM

## 2020-05-22 DIAGNOSIS — I10 HYPERTENSION, GOAL BELOW 140/90: ICD-10-CM

## 2020-05-22 DIAGNOSIS — G62.9 NEUROPATHY: ICD-10-CM

## 2020-05-22 DIAGNOSIS — E11.59 TYPE 2 DIABETES MELLITUS WITH OTHER CIRCULATORY COMPLICATION, WITHOUT LONG-TERM CURRENT USE OF INSULIN (H): ICD-10-CM

## 2020-05-22 PROCEDURE — 99214 OFFICE O/P EST MOD 30 MIN: CPT | Mod: 95 | Performed by: PHYSICIAN ASSISTANT

## 2020-07-04 NOTE — PROGRESS NOTES
F/U for left foot wound    Subjective:   Patient is well-known to me for leg wounds have healed.  He is still in the nursing home and had his foot near a heater and dense sustained subsequent blistering of his left medial foot about a week and a half ago for which he now follows up.  That area is insensate from his diabetes.  The blisters opened up     Has been using TRIAD.  Now presents for followup.    Objective:  B/P: 130/60, T: Data Unavailable, P: 83, R: Data Unavailable  LLE: Warm, no edema, (+)CVSD changes.  Medial foot - 2.5x1.5 cm wound with evidence of old blister.  70% pink tissue - eschar lifted off    Assessment/plan:  This is a 68-year-old gentleman with known diabetes PVD who sustained a second degree burn to his left foot.  The base is clean with a good pink tissue.  Smaller this week  The plan at this time is to continue triad to the area and with adequate nutrition this area will hopefully epithelialize.  He will follow-up with me in 2-3 weeks for wound check if it remains open.      Casey Shine MD, FACS     wound care

## 2020-08-10 ENCOUNTER — TRANSFERRED RECORDS (OUTPATIENT)
Dept: HEALTH INFORMATION MANAGEMENT | Facility: CLINIC | Age: 70
End: 2020-08-10

## 2021-03-04 ENCOUNTER — TELEPHONE (OUTPATIENT)
Dept: FAMILY MEDICINE | Facility: OTHER | Age: 71
End: 2021-03-04

## 2021-03-04 NOTE — TELEPHONE ENCOUNTER
Patient Quality Outreach Summary      Summary:    Patient is due/failing the following:   Diabetic Follow-Up Visit and Medicare annual wellness visit     Type of outreach:    Phone, spoke to patient/parent. patient will call to schedule in May     Questions for provider review:    None                                                                                                                    Carmelina Wilkerson [facility]       Chart routed to Care Team.

## 2021-03-04 NOTE — LETTER
[facility]  [address]  [place] [address]  Phone: [phone]  May 7, 2021      Glenroy Padilla  [address] [place] [address]    [facility] [address]      Dear Glenroy,    We care about your health and have reviewed your health plan including your medical conditions, medications, and lab results.  Based on this review, it is recommended that you follow up regarding the following health topic(s):  -Diabetes    We recommend you take the following action(s):  -schedule a FOLLOWUP OFFICE APPOINTMENT.  We will perform the following labs:  A1c, Lipids (fasting cholesterol - nothing to eat except water and/or meds for 8-10 hours), BMP (basic metabolic panel) and Microablumin.     Please call us at the [facility] - [phone] (or use TelePharm) to address the above recommendations.     Thank you for trusting [facility] and we appreciate the opportunity to serve you.  We look forward to supporting your healthcare needs in the future.    Healthy Regards,    Your Health Care Team  [facility]

## 2021-03-26 NOTE — TELEPHONE ENCOUNTER
Wound care form received from [facility]-form completed, signed and faxed back to number provider. Copy sent to scanning, copy placed in surgery drawer..........................Hui Ashraf CMA  ([facility])   no

## 2021-05-07 NOTE — TELEPHONE ENCOUNTER
Patient Quality Outreach Summary      Summary:    Patient is due/failing the following:   Diabetic Follow-Up Visit    Type of outreach:    Sent letter.    Questions for provider review:    None                                                                                                                    Carmelina Wilkerson CMA       Chart routed to Care Team.

## 2021-05-11 ENCOUNTER — TELEPHONE (OUTPATIENT)
Dept: FAMILY MEDICINE | Facility: OTHER | Age: 71
End: 2021-05-11

## 2021-05-11 DIAGNOSIS — D50.8 IRON DEFICIENCY ANEMIA SECONDARY TO INADEQUATE DIETARY IRON INTAKE: Primary | ICD-10-CM

## 2021-05-11 NOTE — TELEPHONE ENCOUNTER
Did leave a message for call back.  Myelination surgery [place].  I can certainly have a chat with him.  Would definitely have him continue to follow-up with his hematologist.  Electronically signed:    Julio Cesar Sahu PA-C

## 2021-05-11 NOTE — TELEPHONE ENCOUNTER
Patient calling to discuss his low red blood cells. Patient is in [place] and was advised that we may not be able to provide any advisement due to licensure.     Patient states that he has not been feeling well and has had shortness of breathing. He sees a hematologist in [place] and was told to decrease iron dose and to follow up in 6 months. Patient did not have and difficulty talking during conversation.     ACTION:   Patient would like Julio Cesar to call him at Cell: [phone].     Rafa Coleman RN, BSN  [place]  May 11, 2021

## 2021-05-11 NOTE — TELEPHONE ENCOUNTER
Advised office visit.  Needs cardiology consult, echo and related labs.  Needs full ECHO.  Needs CBC, TIBC.  Electronically signed:    Julio Cesar Sahu PA-C

## 2022-01-14 NOTE — DISCHARGE INSTRUCTIONS
Today the wounds on your left lower leg has increased in size of length and there is some decrease in width.  Only true improvement I note is that the majority of the hypergranular tissue has resolved and the wound beds are mostly all granular tissue.  Continue with three times weekly dressing changes Monday, Wednesday and Friday to be done at same time as the lymphedema wraps are being changed.  I will talk with Sunita, the lymphedema therapist with your home care agency, to find out if she has all the information needed to order you premade velcrow compression garments.  Once these are available your wounds should improve more consistently as the garments will be more easily applied in a more consistent fashion.    anse wounds with soap and water, normal saline or a no rinse dermal cleanser and dry well.  For both of the wounds on the left lower leg continue to apply Silver alginate to the wound beds, cover with ABD pad secured with roll gauze and tape.  Continue with the Dressing changes and lymphedema wraps Monday Wed and Fridays.    I will see you again in about three weeks on 8/20/18    Any questions call the scheduling line at [phone].    Jett Garza RN [email or web address]   Nostril Rim Text: The closure involved the nostril rim.

## 2022-05-19 NOTE — TELEPHONE ENCOUNTER
Forms have been completed, signed, faxed/mailed, and sent to scanning.  Lisa Zuluaga CMA ([facility])     Doxycycline Counseling:  Patient counseled regarding possible photosensitivity and increased risk for sunburn.  Patient instructed to avoid sunlight, if possible.  When exposed to sunlight, patients should wear protective clothing, sunglasses, and sunscreen.  The patient was instructed to call the office immediately if the following severe adverse effects occur:  hearing changes, easy bruising/bleeding, severe headache, or vision changes.  The patient verbalized understanding of the proper use and possible adverse effects of doxycycline.  All of the patient's questions and concerns were addressed.

## 2024-04-22 NOTE — PROGRESS NOTES
Continuing to look for TCU placement for patient.    [facility] - No Beds  [facility] - Too Complex   [facility] - No Beds    [facility] - [facility]    Additional referrals sent to:    - [facility]  - [facility] - SARAH Sher  [facility] [phone]/ [facility] [phone]       Removed intact

## 2025-06-27 NOTE — PROGRESS NOTES
FRANK:  I: FRANK contacted Maggie at [facility] which accepted pt for admission of Friday and then delayed admission due to late arrival and wound care needs on lower leg.  Maggie requested progress notes regarding pt's leg care. RN progress note faxed to [phone] for review.  FRANK followed up with two phone messages with Maggie to ensure that faxed was received and information reviewed. SW awaiting return call.  In anticipation of discharge potentially this afternoon, FRANK called [facility] and spoke with Apoloina to orders wheelchair ride. First available ride for pt is at 17:00.  A: Pt anxious to discharge and begin therapy.  P: Continue with final discharge planning.    11:56 FRANK received call from Tatyana at [facility] stating that they had assessed pt on Thursday and left message that their swing beds do not have MA coverage if pt stays longer than 20 days.  FRANK returned call to clarify if they are declining pt or if pt is informed of limited stay, they would accept pt. Tatyana requested updated information. RN progress notes sent to requested one time use fax [phone].    12:01: FRANK called and spoke with Maggie regarding pt and she has been in a meeting all morning and has not reviewed information. FRANK confirmed fax number and resent information to [phone] per request. SW awaiting results of reassessment. Maggie returned call and stated that the DON is not comfortable with pt's open area on legs. FRANK pointed out that pt has not changed except for improved over the weekend. Neche  FRANK Maggie just repeated that they felt they could not care for pt per DON at their facility and pt declined.    2:04: FRANK met with pt and pt's wife for update and they both agree that pt is doing better and they anticipate that he is able to return to [facility] with increased services from [facility].  FRANK  Contacted [facility] and spoke with Alvina SINCLAIR at [phone] to update. Alvina requesting update from PT be faxed to  [phone]. Discharge orders faxed to same number.  3:19: FRANK met with pt and wife to obtain the name and number of the Home Care Service pt is using. FRANK Called [facility] and spoke with Alice. Alice indicates that pt had been discharged prior to his hospitalization. Alice is checking schedule to see if they are able to pick pt up again and they are able to accommodate pt. Face sheet, discharge orders, summary MAR need to be faxed to [phone].     3:42: FRANK contacted [facility] and spoke with Bridgette to change destination of ride to pt's assistive living apartment at [facility].   4:00 FRANK  Contacted Alvina at [facility] and confirmed with bedside RN that pt's controlled medications are being filled at hospital prior to discharge. [facility]      SARAH Torres  [facility]  Phone: [phone]   Patient is a 45 year old male, privately domiciled in [place], unemployed on SSD, PPHx of schizophrenia vs. schizoaffective disorder, polysubstance use (cannabis, cocaine, alcohol, PCP), multiple prior psychiatric hospitalizations (last at [facility] from 8/15-8/17/2023), history of NSSIB (cutting), no known SA, history of incarceration, PMHx of cataracts and thyroid surgery, presenting with psychosis.    On exam, patient is disorganized, paranoid, tangential. He endorses AH of strangers screaming, and VH of people who aren't actually there. He initially endorsed SI, although has since retracted that since admission. He also reports feeling depressed due to memories of his daughter being murdered. He reports a history of frequent cannabis and PCP use. Clinical diagnosis most likely for decompensated schizophrenia in the setting of medication non-compliance, although substance use may also contribute to recent psychosis. Plan to start Prolixin for psychosis, and titrate as indicated. He remains appropriate for psychiatric hospitalization for medication management, stabilization, and safety.    6/23: Over the weekend, patient endorsing AH and Prolixin increased to 10mg daily. However, today, minimizing events of weekend, now denying SI/HI/AH/VH. Linear, goal-directed towards discharge.  6/24: On exam, patient linear, organized, in good behavioral control. Denies SI/HI/AH/VH. Discharge focused.  6/25: No acute concerns. In good behavioral control. Discharge focused.  6/25: No acute concerns. In good behavioral control. Discharge focused.  6/26: 6/25: Denies SI/HI/AH/VH. In good behavioral control. Discharge focused.  6/27: Attending groups. In good behavioral control.    - Admit 9.13  - Continue prolixin 10mg qhs for psychosis  - Prolixin 5mg and Ativan 2mg q6h PRN agitation  - Trazodone 50mg qhs PRN insomnia  - Nicotine patch + nicotine gum PRN Patient is a 45 year old male, privately domiciled in [place], unemployed on SSD, PPHx of schizophrenia vs. schizoaffective disorder, polysubstance use (cannabis, cocaine, alcohol, PCP), multiple prior psychiatric hospitalizations (last at [facility] from 8/15-8/17/2023), history of NSSIB (cutting), no known SA, history of incarceration, PMHx of cataracts and thyroid surgery, presenting with psychosis.    On exam, patient is disorganized, paranoid, tangential. He endorses AH of strangers screaming, and VH of people who aren't actually there. He initially endorsed SI, although has since retracted that since admission. He also reports feeling depressed due to memories of his daughter being murdered. He reports a history of frequent cannabis and PCP use. Clinical diagnosis most likely for decompensated schizophrenia in the setting of medication non-compliance, although substance use may also contribute to recent psychosis. Plan to start Prolixin for psychosis, and titrate as indicated. He remains appropriate for psychiatric hospitalization for medication management, stabilization, and safety.    6/23: Over the weekend, patient endorsing AH and Prolixin increased to 10mg daily. However, today, minimizing events of weekend, now denying SI/HI/AH/VH. Linear, goal-directed towards discharge.  6/24: On exam, patient linear, organized, in good behavioral control. Denies SI/HI/AH/VH. Discharge focused.  6/25: No acute concerns. In good behavioral concern. Discharge focused.    - Admit 9.13  - Continue prolixin 10mg qhs for psychosis  - Prolixin 5mg and Ativan 2mg q6h PRN agitation  - Trazodone 50mg qhs PRN insomnia  - Nicotine patch + nicotine gum PRN Patient is a 45 year old male, privately domiciled in [place], unemployed on SSD, PPHx of schizophrenia vs. schizoaffective disorder, polysubstance use (cannabis, cocaine, alcohol, PCP), multiple prior psychiatric hospitalizations (last at [facility] from 8/15-8/17/2023), history of NSSIB (cutting), no known SA, history of incarceration, PMHx of cataracts and thyroid surgery, presenting with psychosis.    On exam, patient is disorganized, paranoid, tangential. He endorses AH of strangers screaming, and VH of people who aren't actually there. He initially endorsed SI, although has since retracted that since admission. He also reports feeling depressed due to memories of his daughter being murdered. He reports a history of frequent cannabis and PCP use. Clinical diagnosis most likely for decompensated schizophrenia in the setting of medication non-compliance, although substance use may also contribute to recent psychosis. Plan to start Prolixin for psychosis, and titrate as indicated. He remains appropriate for psychiatric hospitalization for medication management, stabilization, and safety.    6/23: Over the weekend, patient endorsing AH and Prolixin increased to 10mg daily. However, today, minimizing events of weekend, now denying SI/HI/AH/VH. Linear, goal-directed towards discharge.    - Admit 9.13  - Continue prolixin 10mg qhs for psychosis  - Prolixin 5mg and Ativan 2mg q6h PRN agitation  - Trazodone 50mg qhs PRN insomnia  - Nicotine patch + nicotine gum PRN Patient is a 45 year old male, privately domiciled in [place], unemployed on SSD, PPHx of schizophrenia vs. schizoaffective disorder, polysubstance use (cannabis, cocaine, alcohol, PCP), multiple prior psychiatric hospitalizations (last at [facility] from 8/15-8/17/2023), history of NSSIB (cutting), no known SA, history of incarceration, PMHx of cataracts and thyroid surgery, presenting with psychosis.    On exam, patient is disorganized, paranoid, tangential. He endorses AH of strangers screaming, and VH of people who aren't actually there. He initially endorsed SI, although has since retracted that since admission. He also reports feeling depressed due to memories of his daughter being murdered. He reports a history of frequent cannabis and PCP use. Clinical diagnosis most likely for decompensated schizophrenia in the setting of medication non-compliance, although substance use may also contribute to recent psychosis. Plan to start Prolixin for psychosis, and titrate as indicated. He remains appropriate for psychiatric hospitalization for medication management, stabilization, and safety.    6/23: Over the weekend, patient endorsing AH and Prolixin increased to 10mg daily. However, today, minimizing events of weekend, now denying SI/HI/AH/VH. Linear, goal-directed towards discharge.  6/24: On exam, patient linear, organized, in good behavioral control. Denies SI/HI/AH/VH. Discharge focused.  6/25: No acute concerns. In good behavioral concern. Discharge focused.  6/25: No acute concerns. In good behavioral concern. Discharge focused.  6/26: 6/25: Denies SI/HI/AH/VH. In good behavioral concern. Discharge focused.    - Admit 9.13  - Continue prolixin 10mg qhs for psychosis  - Prolixin 5mg and Ativan 2mg q6h PRN agitation  - Trazodone 50mg qhs PRN insomnia  - Nicotine patch + nicotine gum PRN Patient is a 45 year old male, privately domiciled in [place], unemployed on SSD, PPHx of schizophrenia vs. schizoaffective disorder, polysubstance use (cannabis, cocaine, alcohol, PCP), multiple prior psychiatric hospitalizations (last at [facility] from 8/15-8/17/2023), history of NSSIB (cutting), no known SA, history of incarceration, PMHx of cataracts and thyroid surgery, presenting with psychosis.    On exam, patient is disorganized, paranoid, tangential. He endorses AH of strangers screaming, and VH of people who aren't actually there. He initially endorsed SI, although has since retracted that since admission. He also reports feeling depressed due to memories of his daughter being murdered. He reports a history of frequent cannabis and PCP use. Clinical diagnosis most likely for decompensated schizophrenia in the setting of medication non-compliance, although substance use may also contribute to recent psychosis. Plan to start Prolixin for psychosis, and titrate as indicated. He remains appropriate for psychiatric hospitalization for medication management, stabilization, and safety.    - Admit 9.13  - increase prolixin to 10mg qhs for psychosis  - Prolixin 5mg and Ativan 2mg q6h PRN agitation  - Trazodone 50mg qhs PRN insomnia  - Nicotine patch + nicotine gum PRN Patient is a 45 year old male, privately domiciled in [place], unemployed on SSD, PPHx of schizophrenia vs. schizoaffective disorder, polysubstance use (cannabis, cocaine, alcohol, PCP), multiple prior psychiatric hospitalizations (last at [facility] from 8/15-8/17/2023), history of NSSIB (cutting), no known SA, history of incarceration, PMHx of cataracts and thyroid surgery, presenting with psychosis.    On exam, patient is disorganized, paranoid, tangential. He endorses AH of strangers screaming, and VH of people who aren't actually there. He initially endorsed SI, although has since retracted that since admission. He also reports feeling depressed due to memories of his daughter being murdered. He reports a history of frequent cannabis and PCP use. Clinical diagnosis most likely for decompensated schizophrenia in the setting of medication non-compliance, although substance use may also contribute to recent psychosis. Plan to start Prolixin for psychosis, and titrate as indicated. He remains appropriate for psychiatric hospitalization for medication management, stabilization, and safety.    - Admit 9.13  - increase prolixin to 10mg qhs for psychosis  - Prolixin 5mg and Ativan 2mg q6h PRN agitation  - Trazodone 50mg qhs PRN insomnia  - Nicotine patch + nicotine gum PRN Patient is a 45 year old male, privately domiciled in [place], unemployed on SSD, PPHx of schizophrenia vs. schizoaffective disorder, polysubstance use (cannabis, cocaine, alcohol, PCP), multiple prior psychiatric hospitalizations (last at [facility] from 8/15-8/17/2023), history of NSSIB (cutting), no known SA, history of incarceration, PMHx of cataracts and thyroid surgery, presenting with psychosis.    On exam, patient is disorganized, paranoid, tangential. He endorses AH of strangers screaming, and VH of people who aren't actually there. He initially endorsed SI, although has since retracted that since admission. He also reports feeling depressed due to memories of his daughter being murdered. He reports a history of frequent cannabis and PCP use. Clinical diagnosis most likely for decompensated schizophrenia in the setting of medication non-compliance, although substance use may also contribute to recent psychosis. Plan to start Prolixin for psychosis, and titrate as indicated. He remains appropriate for psychiatric hospitalization for medication management, stabilization, and safety.    6/23: Over the weekend, patient endorsing AH and Prolixin increased to 10mg daily. However, today, minimizing events of weekend, now denying SI/HI/AH/VH. Linear, goal-directed towards discharge.  6/24: On exam, patient linear, organized, in good behavioral control. Denies SI/HI/AH/VH. Dischare focused.    - Admit 9.13  - Continue prolixin 10mg qhs for psychosis  - Prolixin 5mg and Ativan 2mg q6h PRN agitation  - Trazodone 50mg qhs PRN insomnia  - Nicotine patch + nicotine gum PRN

## (undated) DEVICE — NDL SPINAL 18GA 3.5" 405184

## (undated) DEVICE — DRSG KERLIX FLUFFS X5

## (undated) DEVICE — DRAPE U SPLIT 74X120" 29440

## (undated) DEVICE — NDL COUNTER 40CT  31142311

## (undated) DEVICE — PACK SPINE SM CUSTOM SNE15SSFSK

## (undated) DEVICE — SPONGE RAY-TEC 4X8" 7318

## (undated) DEVICE — SU VICRYL 2-0 CT-2 CR 8X18" J726D

## (undated) DEVICE — SU VICRYL 0 CT-1 CR 8X18" J740D

## (undated) DEVICE — DRAIN JACKSON PRATT 07MM FLAT SU130-1310

## (undated) DEVICE — CATH TRAY FOLEY 16FR DRAINAGE BAG STATLOCK 899916

## (undated) DEVICE — BLADE SAW OSCIL/SAG STRK 11X90X1.19MM 4/2000 4111-119-090

## (undated) DEVICE — SUCTION TIP YANKAUER ORTHO SUPER SUCKER EFS-111

## (undated) DEVICE — DRAPE SHEET REV FOLD 3/4 9349

## (undated) DEVICE — SU VICRYL 2-0 CP-2 18" UND J762D

## (undated) DEVICE — DRAPE POUCH INSTRUMENT 1018

## (undated) DEVICE — SUCTION IRR SYSTEM W/O TIP INTERPULSE HANDPIECE 0210-100-000

## (undated) DEVICE — Device

## (undated) DEVICE — PREP CHLORAPREP 26ML TINTED ORANGE  260815

## (undated) DEVICE — GLOVE PROTEXIS BLUE W/NEU-THERA 8.5  2D73EB85

## (undated) DEVICE — GLOVE PROTEXIS W/NEU-THERA 7.5  2D73TE75

## (undated) DEVICE — TOURNIQUET HEMACLEAR 60 BLUE 30-60CM PRH-060-BL-01A

## (undated) DEVICE — SYR 50ML LL W/O NDL 309653

## (undated) DEVICE — SU MONOCRYL 4-0 PS-2 18" UND Y496G

## (undated) DEVICE — BONE CLEANING TIP INTERPULSE  0210-010-000

## (undated) DEVICE — ESU ELEC BLADE 4" COATED

## (undated) DEVICE — DRAPE EXTREMITY W/ARMBOARD 29405

## (undated) DEVICE — ESU ELEC BLADE 2.75" COATED/INSULATED E1455

## (undated) DEVICE — SOL NACL 0.9% IRRIG 1000ML BOTTLE 07138-09

## (undated) DEVICE — CAST PADDING 4" COTTON WEBRIL STERILE 9084S

## (undated) DEVICE — SYR 10ML PREFILLED 0.9% NACL INJ NOT STERILE 306500

## (undated) DEVICE — ESU ELEC BLADE 6" COATED/INSULATED E1455-6

## (undated) DEVICE — SOL WATER IRRIG 1000ML BOTTLE 07139-09

## (undated) DEVICE — STPL SKIN 35W 059037

## (undated) DEVICE — DRSG XEROFORM 1X8"

## (undated) DEVICE — DRAPE CONVERTORS U-DRAPE 60X72" 8476

## (undated) DEVICE — DRSG TELFA ISLAND 4X8" 7541

## (undated) DEVICE — SOL NACL 0.9% IRRIG 3000ML BAG 07972-08

## (undated) DEVICE — DRAPE COVER C-ARM SEAMLESS SNAP-KAP 03-KP26 LATEX FREE

## (undated) DEVICE — SU VICRYL 0 OS-6 18" UND J754T

## (undated) DEVICE — NDL COUNTER 20CT 31142493

## (undated) DEVICE — PREP DURAPREP 26ML APL 8630

## (undated) DEVICE — ESU GROUND PAD UNIVERSAL W/O CORD

## (undated) DEVICE — HOOD FLYTE 0408-800-000

## (undated) DEVICE — CAST PADDING 6" COTTON WEBRIL UNSTERILE 9086

## (undated) DEVICE — BLADE SAW SAGITTAL STRK 18X90X1.19MM HD SYS 6 6118-119-090

## (undated) DEVICE — SPONGE SURGIFOAM 50

## (undated) DEVICE — PACK TOTAL JOINT STD LATEX

## (undated) DEVICE — DRAIN JACKSON PRATT RESERVOIR 100ML SU130-1305

## (undated) DEVICE — GLOVE PROTEXIS W/NEU-THERA 7.0  2D73TE70

## (undated) DEVICE — LINEN TOWEL PACK X5 5464

## (undated) DEVICE — BONE CEMENT MIXING BOWL W/SPATULA

## (undated) DEVICE — CUSHION INSERT LG PRONE VIEW JACKSON TABLE

## (undated) DEVICE — GLOVE PROTEXIS BLUE W/NEU-THERA 8.0  2D73EB80

## (undated) DEVICE — GLOVE PROTEGRITY 7.5 LATEX

## (undated) DEVICE — DRAPE MAYO STAND 23X54 8337

## (undated) DEVICE — SU VICRYL 0 CT-2 CR 8X18" J727D

## (undated) DEVICE — ESU BIPOLAR SEALER AQUAMANTYS 6MM 23-112-1

## (undated) DEVICE — GLOVE ESTEEM BLUE W/NEU-THERA 7.5  2D73PB75

## (undated) DEVICE — PEN MARKING SKIN

## (undated) DEVICE — RX SURGIFLO HEMOSTATIC MATRIX W/THROMBIN 8ML 2994

## (undated) DEVICE — SUCTION FRAZIER 12FR W/HANDLE K73

## (undated) DEVICE — GLOVE PROTEXIS BLUE W/NEU-THERA 6.5  2D73EB65

## (undated) DEVICE — TUBING SUCTION SOFT 20'X3/16" 0036570

## (undated) DEVICE — GLOVE PROTEXIS W/NEU-THERA 6.5  2D73TE65

## (undated) DEVICE — MIDAS REX DISSECTING TOOL 5.5MM T14MH25

## (undated) DEVICE — GLOVE PROTEXIS W/NEU-THERA 8.5  2D73TE85

## (undated) DEVICE — ADH LIQUID MASTISOL TOPICAL VIAL 2-3ML 0523-48

## (undated) DEVICE — DRSG GAUZE 4X4" 3033

## (undated) DEVICE — DRAPE MICROSCOPE EQUIP 48X120" 6130VL2

## (undated) RX ORDER — LIDOCAINE HYDROCHLORIDE 20 MG/ML
INJECTION, SOLUTION EPIDURAL; INFILTRATION; INTRACAUDAL; PERINEURAL
Status: DISPENSED
Start: 2019-12-19

## (undated) RX ORDER — ONDANSETRON 2 MG/ML
INJECTION INTRAMUSCULAR; INTRAVENOUS
Status: DISPENSED
Start: 2019-07-30

## (undated) RX ORDER — PHENYLEPHRINE HCL IN 0.9% NACL 1 MG/10 ML
SYRINGE (ML) INTRAVENOUS
Status: DISPENSED
Start: 2019-07-30

## (undated) RX ORDER — EPHEDRINE SULFATE 50 MG/ML
INJECTION, SOLUTION INTRAMUSCULAR; INTRAVENOUS; SUBCUTANEOUS
Status: DISPENSED
Start: 2019-07-30

## (undated) RX ORDER — METHYLPREDNISOLONE ACETATE 40 MG/ML
INJECTION, SUSPENSION INTRA-ARTICULAR; INTRALESIONAL; INTRAMUSCULAR; SOFT TISSUE
Status: DISPENSED
Start: 2018-03-13

## (undated) RX ORDER — PROPOFOL 10 MG/ML
INJECTION, EMULSION INTRAVENOUS
Status: DISPENSED
Start: 2019-07-30

## (undated) RX ORDER — FENTANYL CITRATE 50 UG/ML
INJECTION, SOLUTION INTRAMUSCULAR; INTRAVENOUS
Status: DISPENSED
Start: 2018-03-13

## (undated) RX ORDER — PROPOFOL 10 MG/ML
INJECTION, EMULSION INTRAVENOUS
Status: DISPENSED
Start: 2018-03-13

## (undated) RX ORDER — ONDANSETRON 2 MG/ML
INJECTION INTRAMUSCULAR; INTRAVENOUS
Status: DISPENSED
Start: 2019-12-19

## (undated) RX ORDER — CEFAZOLIN SODIUM 1 G/50ML
SOLUTION INTRAVENOUS
Status: DISPENSED
Start: 2018-03-13

## (undated) RX ORDER — CEFAZOLIN SODIUM 1 G/3ML
INJECTION, POWDER, FOR SOLUTION INTRAMUSCULAR; INTRAVENOUS
Status: DISPENSED
Start: 2018-03-13

## (undated) RX ORDER — METOPROLOL TARTRATE 1 MG/ML
INJECTION, SOLUTION INTRAVENOUS
Status: DISPENSED
Start: 2019-12-19

## (undated) RX ORDER — BUPIVACAINE HYDROCHLORIDE AND EPINEPHRINE 5; 5 MG/ML; UG/ML
INJECTION, SOLUTION EPIDURAL; INTRACAUDAL; PERINEURAL
Status: DISPENSED
Start: 2018-03-13

## (undated) RX ORDER — FENTANYL CITRATE 50 UG/ML
INJECTION, SOLUTION INTRAMUSCULAR; INTRAVENOUS
Status: DISPENSED
Start: 2019-12-19

## (undated) RX ORDER — BACITRACIN 50000 [IU]/1
INJECTION, POWDER, FOR SOLUTION INTRAMUSCULAR
Status: DISPENSED
Start: 2019-12-19

## (undated) RX ORDER — ALBUMIN, HUMAN INJ 5% 5 %
SOLUTION INTRAVENOUS
Status: DISPENSED
Start: 2018-03-13

## (undated) RX ORDER — LIDOCAINE HYDROCHLORIDE 10 MG/ML
INJECTION, SOLUTION EPIDURAL; INFILTRATION; INTRACAUDAL; PERINEURAL
Status: DISPENSED
Start: 2018-03-13

## (undated) RX ORDER — LIDOCAINE HYDROCHLORIDE 20 MG/ML
INJECTION, SOLUTION EPIDURAL; INFILTRATION; INTRACAUDAL; PERINEURAL
Status: DISPENSED
Start: 2019-07-30

## (undated) RX ORDER — FENTANYL CITRATE 50 UG/ML
INJECTION, SOLUTION INTRAMUSCULAR; INTRAVENOUS
Status: DISPENSED
Start: 2020-03-05

## (undated) RX ORDER — PROPOFOL 10 MG/ML
INJECTION, EMULSION INTRAVENOUS
Status: DISPENSED
Start: 2019-12-19

## (undated) RX ORDER — FENTANYL CITRATE 50 UG/ML
INJECTION, SOLUTION INTRAMUSCULAR; INTRAVENOUS
Status: DISPENSED
Start: 2019-07-30